# Patient Record
Sex: FEMALE | Race: BLACK OR AFRICAN AMERICAN | NOT HISPANIC OR LATINO | Employment: OTHER | URBAN - METROPOLITAN AREA
[De-identification: names, ages, dates, MRNs, and addresses within clinical notes are randomized per-mention and may not be internally consistent; named-entity substitution may affect disease eponyms.]

---

## 2020-09-27 ENCOUNTER — APPOINTMENT (OUTPATIENT)
Dept: RADIOLOGY | Facility: MEDICAL CENTER | Age: 45
End: 2020-09-27
Attending: EMERGENCY MEDICINE
Payer: MEDICAID

## 2020-09-27 ENCOUNTER — HOSPITAL ENCOUNTER (EMERGENCY)
Facility: MEDICAL CENTER | Age: 45
End: 2020-09-27
Attending: EMERGENCY MEDICINE
Payer: MEDICAID

## 2020-09-27 VITALS
TEMPERATURE: 96.8 F | OXYGEN SATURATION: 92 % | RESPIRATION RATE: 22 BRPM | SYSTOLIC BLOOD PRESSURE: 142 MMHG | WEIGHT: 216 LBS | HEIGHT: 72 IN | HEART RATE: 110 BPM | BODY MASS INDEX: 29.26 KG/M2 | DIASTOLIC BLOOD PRESSURE: 86 MMHG

## 2020-09-27 DIAGNOSIS — J45.41 MODERATE PERSISTENT ASTHMA WITH ACUTE EXACERBATION: ICD-10-CM

## 2020-09-27 DIAGNOSIS — F41.8 SITUATIONAL ANXIETY: ICD-10-CM

## 2020-09-27 LAB
ALBUMIN SERPL BCP-MCNC: 4.1 G/DL (ref 3.2–4.9)
ALBUMIN/GLOB SERPL: 1.4 G/DL
ALP SERPL-CCNC: 60 U/L (ref 30–99)
ALT SERPL-CCNC: 18 U/L (ref 2–50)
ANION GAP SERPL CALC-SCNC: 11 MMOL/L (ref 7–16)
AST SERPL-CCNC: 14 U/L (ref 12–45)
BASOPHILS # BLD AUTO: 0.2 % (ref 0–1.8)
BASOPHILS # BLD: 0.02 K/UL (ref 0–0.12)
BILIRUB SERPL-MCNC: 0.5 MG/DL (ref 0.1–1.5)
BUN SERPL-MCNC: 9 MG/DL (ref 8–22)
CALCIUM SERPL-MCNC: 9.3 MG/DL (ref 8.4–10.2)
CHLORIDE SERPL-SCNC: 106 MMOL/L (ref 96–112)
CO2 SERPL-SCNC: 23 MMOL/L (ref 20–33)
CREAT SERPL-MCNC: 0.77 MG/DL (ref 0.5–1.4)
EKG IMPRESSION: NORMAL
EOSINOPHIL # BLD AUTO: 0.03 K/UL (ref 0–0.51)
EOSINOPHIL NFR BLD: 0.3 % (ref 0–6.9)
ERYTHROCYTE [DISTWIDTH] IN BLOOD BY AUTOMATED COUNT: 46.8 FL (ref 35.9–50)
GLOBULIN SER CALC-MCNC: 3 G/DL (ref 1.9–3.5)
GLUCOSE SERPL-MCNC: 134 MG/DL (ref 65–99)
HCT VFR BLD AUTO: 48.8 % (ref 37–47)
HGB BLD-MCNC: 15.7 G/DL (ref 12–16)
IMM GRANULOCYTES # BLD AUTO: 0.04 K/UL (ref 0–0.11)
IMM GRANULOCYTES NFR BLD AUTO: 0.4 % (ref 0–0.9)
LACTATE BLD-SCNC: 2.3 MMOL/L (ref 0.5–2)
LYMPHOCYTES # BLD AUTO: 1.58 K/UL (ref 1–4.8)
LYMPHOCYTES NFR BLD: 15.6 % (ref 22–41)
MCH RBC QN AUTO: 29.2 PG (ref 27–33)
MCHC RBC AUTO-ENTMCNC: 32.2 G/DL (ref 33.6–35)
MCV RBC AUTO: 90.9 FL (ref 81.4–97.8)
MONOCYTES # BLD AUTO: 0.59 K/UL (ref 0–0.85)
MONOCYTES NFR BLD AUTO: 5.8 % (ref 0–13.4)
NEUTROPHILS # BLD AUTO: 7.9 K/UL (ref 2–7.15)
NEUTROPHILS NFR BLD: 77.7 % (ref 44–72)
NRBC # BLD AUTO: 0 K/UL
NRBC BLD-RTO: 0 /100 WBC
NT-PROBNP SERPL IA-MCNC: 166 PG/ML (ref 0–125)
PLATELET # BLD AUTO: 240 K/UL (ref 164–446)
PMV BLD AUTO: 10.3 FL (ref 9–12.9)
POTASSIUM SERPL-SCNC: 4 MMOL/L (ref 3.6–5.5)
PROT SERPL-MCNC: 7.1 G/DL (ref 6–8.2)
RBC # BLD AUTO: 5.37 M/UL (ref 4.2–5.4)
SODIUM SERPL-SCNC: 140 MMOL/L (ref 135–145)
TROPONIN T SERPL-MCNC: 6 NG/L (ref 6–19)
WBC # BLD AUTO: 10.2 K/UL (ref 4.8–10.8)

## 2020-09-27 PROCEDURE — 84484 ASSAY OF TROPONIN QUANT: CPT

## 2020-09-27 PROCEDURE — 700111 HCHG RX REV CODE 636 W/ 250 OVERRIDE (IP): Performed by: EMERGENCY MEDICINE

## 2020-09-27 PROCEDURE — 36415 COLL VENOUS BLD VENIPUNCTURE: CPT

## 2020-09-27 PROCEDURE — 93005 ELECTROCARDIOGRAM TRACING: CPT | Performed by: EMERGENCY MEDICINE

## 2020-09-27 PROCEDURE — 96365 THER/PROPH/DIAG IV INF INIT: CPT | Mod: XU

## 2020-09-27 PROCEDURE — 99284 EMERGENCY DEPT VISIT MOD MDM: CPT

## 2020-09-27 PROCEDURE — 71045 X-RAY EXAM CHEST 1 VIEW: CPT

## 2020-09-27 PROCEDURE — 700101 HCHG RX REV CODE 250: Performed by: EMERGENCY MEDICINE

## 2020-09-27 PROCEDURE — 94640 AIRWAY INHALATION TREATMENT: CPT

## 2020-09-27 PROCEDURE — 94760 N-INVAS EAR/PLS OXIMETRY 1: CPT

## 2020-09-27 PROCEDURE — 83605 ASSAY OF LACTIC ACID: CPT

## 2020-09-27 PROCEDURE — 85025 COMPLETE CBC W/AUTO DIFF WBC: CPT

## 2020-09-27 PROCEDURE — 83880 ASSAY OF NATRIURETIC PEPTIDE: CPT

## 2020-09-27 PROCEDURE — A9270 NON-COVERED ITEM OR SERVICE: HCPCS | Performed by: EMERGENCY MEDICINE

## 2020-09-27 PROCEDURE — 700117 HCHG RX CONTRAST REV CODE 255: Performed by: EMERGENCY MEDICINE

## 2020-09-27 PROCEDURE — 80053 COMPREHEN METABOLIC PANEL: CPT

## 2020-09-27 PROCEDURE — 700102 HCHG RX REV CODE 250 W/ 637 OVERRIDE(OP): Performed by: EMERGENCY MEDICINE

## 2020-09-27 PROCEDURE — 71275 CT ANGIOGRAPHY CHEST: CPT

## 2020-09-27 PROCEDURE — 96375 TX/PRO/DX INJ NEW DRUG ADDON: CPT | Mod: XU

## 2020-09-27 RX ORDER — DIPHENHYDRAMINE HCL 25 MG
25 TABLET ORAL DAILY
Status: SHIPPED | COMMUNITY
End: 2021-10-27

## 2020-09-27 RX ORDER — PROMETHAZINE HYDROCHLORIDE AND CODEINE PHOSPHATE 6.25; 1 MG/5ML; MG/5ML
5-10 SYRUP ORAL 4 TIMES DAILY PRN
Status: SHIPPED | COMMUNITY
End: 2021-03-08

## 2020-09-27 RX ORDER — ACETAMINOPHEN 500 MG
500-1000 TABLET ORAL EVERY 6 HOURS PRN
Status: SHIPPED | COMMUNITY
End: 2021-10-27

## 2020-09-27 RX ORDER — METHYLPREDNISOLONE SODIUM SUCCINATE 125 MG/2ML
125 INJECTION, POWDER, LYOPHILIZED, FOR SOLUTION INTRAMUSCULAR; INTRAVENOUS ONCE
Status: COMPLETED | OUTPATIENT
Start: 2020-09-27 | End: 2020-09-27

## 2020-09-27 RX ORDER — PREDNISONE 50 MG/1
50 TABLET ORAL DAILY
Qty: 5 TAB | Refills: 0 | Status: SHIPPED | OUTPATIENT
Start: 2020-09-27 | End: 2020-10-02

## 2020-09-27 RX ORDER — LORAZEPAM 1 MG/1
0.5 TABLET ORAL ONCE
Status: DISCONTINUED | OUTPATIENT
Start: 2020-09-27 | End: 2020-09-27

## 2020-09-27 RX ORDER — MAGNESIUM SULFATE HEPTAHYDRATE 500 MG/ML
1 INJECTION, SOLUTION INTRAMUSCULAR; INTRAVENOUS ONCE
Status: DISCONTINUED | OUTPATIENT
Start: 2020-09-27 | End: 2020-09-27

## 2020-09-27 RX ORDER — HYDROXYZINE HYDROCHLORIDE 25 MG/1
25 TABLET, FILM COATED ORAL 3 TIMES DAILY PRN
Qty: 30 TAB | Refills: 0 | Status: SHIPPED | OUTPATIENT
Start: 2020-09-27 | End: 2021-03-08

## 2020-09-27 RX ORDER — LORAZEPAM 1 MG/1
1 TABLET ORAL ONCE
Status: COMPLETED | OUTPATIENT
Start: 2020-09-27 | End: 2020-09-27

## 2020-09-27 RX ORDER — AZITHROMYCIN 250 MG/1
250-500 TABLET, FILM COATED ORAL DAILY
Status: SHIPPED | COMMUNITY
Start: 2020-09-24 | End: 2021-03-08

## 2020-09-27 RX ORDER — IPRATROPIUM BROMIDE AND ALBUTEROL SULFATE 2.5; .5 MG/3ML; MG/3ML
3 SOLUTION RESPIRATORY (INHALATION) ONCE
Status: COMPLETED | OUTPATIENT
Start: 2020-09-27 | End: 2020-09-27

## 2020-09-27 RX ORDER — ALBUTEROL SULFATE 90 UG/1
2 AEROSOL, METERED RESPIRATORY (INHALATION) EVERY 6 HOURS PRN
Status: SHIPPED | COMMUNITY
End: 2021-03-12 | Stop reason: SDUPTHER

## 2020-09-27 RX ORDER — PREDNISONE 20 MG/1
30-60 TABLET ORAL PRN
Status: SHIPPED | COMMUNITY
End: 2020-09-27

## 2020-09-27 RX ORDER — MAGNESIUM SULFATE HEPTAHYDRATE 40 MG/ML
2 INJECTION, SOLUTION INTRAVENOUS ONCE
Status: COMPLETED | OUTPATIENT
Start: 2020-09-27 | End: 2020-09-27

## 2020-09-27 RX ORDER — ALBUTEROL SULFATE 2.5 MG/3ML
SOLUTION RESPIRATORY (INHALATION)
Status: SHIPPED | COMMUNITY
Start: 2020-09-21 | End: 2020-09-27

## 2020-09-27 RX ORDER — HYDROCODONE BITARTRATE AND ACETAMINOPHEN 5; 325 MG/1; MG/1
1 TABLET ORAL 3 TIMES DAILY
Status: SHIPPED | COMMUNITY
End: 2021-10-27

## 2020-09-27 RX ORDER — IPRATROPIUM BROMIDE AND ALBUTEROL SULFATE 2.5; .5 MG/3ML; MG/3ML
3 SOLUTION RESPIRATORY (INHALATION)
Status: DISCONTINUED | OUTPATIENT
Start: 2020-09-27 | End: 2020-09-27

## 2020-09-27 RX ORDER — TIZANIDINE 2 MG/1
2 TABLET ORAL 3 TIMES DAILY
Status: SHIPPED | COMMUNITY
Start: 2020-09-21 | End: 2021-10-28

## 2020-09-27 RX ORDER — HYDROCODONE BITARTRATE AND ACETAMINOPHEN 10; 325 MG/1; MG/1
1 TABLET ORAL EVERY 8 HOURS PRN
Status: SHIPPED | COMMUNITY
End: 2020-09-27

## 2020-09-27 RX ORDER — IPRATROPIUM BROMIDE AND ALBUTEROL SULFATE 2.5; .5 MG/3ML; MG/3ML
3 SOLUTION RESPIRATORY (INHALATION)
Status: DISCONTINUED | OUTPATIENT
Start: 2020-09-27 | End: 2020-09-27 | Stop reason: HOSPADM

## 2020-09-27 RX ORDER — IPRATROPIUM BROMIDE AND ALBUTEROL SULFATE 2.5; .5 MG/3ML; MG/3ML
3 SOLUTION RESPIRATORY (INHALATION) 4 TIMES DAILY
Status: SHIPPED | COMMUNITY
End: 2020-09-27 | Stop reason: SDUPTHER

## 2020-09-27 RX ORDER — IPRATROPIUM BROMIDE AND ALBUTEROL SULFATE 2.5; .5 MG/3ML; MG/3ML
3 SOLUTION RESPIRATORY (INHALATION) 4 TIMES DAILY
Qty: 30 EACH | Refills: 0 | Status: SHIPPED | OUTPATIENT
Start: 2020-09-27 | End: 2021-03-12 | Stop reason: SDUPTHER

## 2020-09-27 RX ADMIN — MAGNESIUM SULFATE 2 G: 2 INJECTION INTRAVENOUS at 07:40

## 2020-09-27 RX ADMIN — LORAZEPAM 1 MG: 1 TABLET ORAL at 11:07

## 2020-09-27 RX ADMIN — ALBUTEROL SULFATE 10 MG/HR: 5 SOLUTION RESPIRATORY (INHALATION) at 07:28

## 2020-09-27 RX ADMIN — IOHEXOL 63 ML: 350 INJECTION, SOLUTION INTRAVENOUS at 11:58

## 2020-09-27 RX ADMIN — IPRATROPIUM BROMIDE 0.5 MG: 0.5 SOLUTION RESPIRATORY (INHALATION) at 07:33

## 2020-09-27 RX ADMIN — METHYLPREDNISOLONE SODIUM SUCCINATE 125 MG: 125 INJECTION, POWDER, FOR SOLUTION INTRAMUSCULAR; INTRAVENOUS at 07:39

## 2020-09-27 RX ADMIN — LORAZEPAM 1 MG: 1 TABLET ORAL at 06:58

## 2020-09-27 RX ADMIN — IPRATROPIUM BROMIDE AND ALBUTEROL SULFATE 3 ML: .5; 3 SOLUTION RESPIRATORY (INHALATION) at 09:52

## 2020-09-27 SDOH — HEALTH STABILITY: MENTAL HEALTH: HOW MANY STANDARD DRINKS CONTAINING ALCOHOL DO YOU HAVE ON A TYPICAL DAY?: 1 OR 2

## 2020-09-27 SDOH — HEALTH STABILITY: MENTAL HEALTH: HOW OFTEN DO YOU HAVE A DRINK CONTAINING ALCOHOL?: MONTHLY OR LESS

## 2020-09-27 SDOH — HEALTH STABILITY: MENTAL HEALTH: HOW OFTEN DO YOU HAVE 6 OR MORE DRINKS ON ONE OCCASION?: NEVER

## 2020-09-27 NOTE — FLOWSHEET NOTE
09/27/20 0953   Events/Summary/Plan   Events/Summary/Plan Found pt on 2L NC, tx given   Skin Inspection Respiratory Device Intact   Vital Signs   Pulse (!) 104   Respiration 19   Pulse Oximetry 95 %   Respiratory Assessment   Level of Consciousness Alert   Breath Sounds   RUL Breath Sounds Expiratory Wheezes   RML Breath Sounds Expiratory Wheezes   RLL Breath Sounds Expiratory Wheezes   STAN Breath Sounds Expiratory Wheezes   LLL Breath Sounds Expiratory Wheezes   Oxygen   O2 (LPM) 2   O2 Delivery Device Silicone Nasal Cannula

## 2020-09-27 NOTE — ED NOTES
Report to ANA LUISA Ohara. Care turned over at this time. ERP at bedside to reevaluate. Pt ambulated to restroom and back to [facility].

## 2020-09-27 NOTE — ED NOTES
"Pt reports feeling anxious but \" less tight\" following second tx. Pt's RA sat now 95%. Hot meal, extra blankets and pillow provided.   "

## 2020-09-27 NOTE — ED PROVIDER NOTES
ED Provider Note    ED Provider Note    Primary care provider: No primary care provider on file.  Means of arrival: Walk-in  History obtained from: Patient    CHIEF COMPLAINT  Chief Complaint   Patient presents with   • Shortness of Breath     Seen at 6:24 AM.   HPI  Melly Shin is a 45 y.o. female with history of severe persistent asthma presents with shortness of breath and cough, consistent with her prior asthma exacerbations.  She has been living in the [facility] for the past month, prior to this she received all of her medical treatment in [place].    She is going through a divorce which has had a great deal of stress on her.  Because of this she has been smoking more than usual.  She felt wheezing and shortness of breath increasing over the past 48 hours, she took 60 mg of prednisone yesterday which she has in case of flareups.  She took 30 mg today.    She states that when she usually gets like this, she requires high doses of Solu-Medrol and magnesium to the IV.  She does not feel that she is severe enough to require BiPAP currently.    She denies any fevers, chills, headache.  She notes some chest tightness.  She obviously notes shortness of breath.  She denies nausea, vomiting, diarrhea or abdominal pain.  She notes some chronic back pain for which she takes Norco.  She also notes some increasing anxiety.    At home she uses albuterol and ipratropium nebulizers.  She reports either having no effect or allergic reactions to Spiriva, Symbicort and Advair.  She is able to tolerate Ventolin HFA.  She denies any sick contacts or Covid-19 exposure.  She did have a Covid-19 swab 2 weeks ago that was negative.    REVIEW OF SYSTEMS  See HPI,   Remainder of ROS negative.     PAST MEDICAL HISTORY   has a past medical history of Asthma, Chronic obstructive pulmonary disease (HCC), and Psychiatric disorder.Severe persistent asthma    SURGICAL HISTORY  patient denies any surgical history    SOCIAL HISTORY  Social  History     Tobacco Use   • Smoking status: Current Some Day Smoker     Packs/day: 1.00     Types: Cigarettes   • Smokeless tobacco: Never Used   Substance Use Topics   • Alcohol use: Yes     Frequency: Monthly or less     Drinks per session: 1 or 2     Binge frequency: Never   • Drug use: Yes     Comment: THC      Social History     Substance and Sexual Activity   Drug Use Yes    Comment: THC       FAMILY HISTORY  History reviewed. No pertinent family history.    CURRENT MEDICATIONS  Reviewed.  See Encounter Summary.     ALLERGIES  Allergies   Allergen Reactions   • Aspirin Rash     Pt reports that she received a rash on face, pt reports it ok if she takes NORCO   • Penicillins Rash and Swelling     Pt reports that she gets swelling in throat and face, rash all over face and arms.        PHYSICAL EXAM  VITAL SIGNS: /86   Pulse (!) 110   Temp 36 °C (96.8 °F) (Temporal)   Resp (!) 22   Ht 1.829 m (6')   Wt 98 kg (216 lb)   SpO2 92%   Breastfeeding No   BMI 29.29 kg/m²   Constitutional: Awake, alert anxious, tachypneic with moderate respiratory distress.  HENT: Normocephalic, Bilateral external ears normal. Nose normal.   Eyes: Conjunctiva normal, non-icteric, EOMI.    Thorax & Lungs: Tachypneic, speaking in 3-4 word sentences, loud audible wheezes diffusely   cardiovascular: Tachycardic, No murmurs, rubs or gallops. Bilateral pulses symmetrical.   Abdomen:  Soft, nontender, nondistended, normal active bowel sounds.   :    Skin: Visualized skin is  Dry, No erythema, No rash.   Musculoskeletal:   No cyanosis, clubbing or edema. No leg asymmetry.   Neurologic: Alert, Grossly non-focal.   Psychiatric: Appropriately anxious.    Lymphatic:  No cervical LAD    EKG   12 lead Interpreted by me  Rhythm: Sinus tach  Rate: 130  Axis: normal  Ectopy: none  Conduction: normal  ST Segments: no acute change  T Waves: no acute change  Clinical Impression: Sinus tach, otherwise unremarkable EKG.    RADIOLOGY  CT-CTA  CHEST PULMONARY ARTERY W/ RECONS   Final Result      No central or segmental pulmonary embolus is identified.      Emphysematous changes.      Mild patchy groundglass opacities in the right lower lobe are likely infectious. The largest groundglass opacity measures 1.7 cm in the right lower lobe.      Mild secretions within bronchi on the right.      3 mm right lower lobe pulmonary nodule.               Less than 6 mm: CT at 3-6 months. If stable, consider CT at 2 and 4 years.      6 mm or greater: CT at 3-6 months. Subsequent management based on the most suspicious nodule(s).      Comments: Multiple less than 6 mm pure ground-glass nodules are usually benign, but consider follow-up in selected patients at high risk at 2 and 4 years.      Low Risk - Minimal or absent history of smoking and of other known risk factors.      High Risk - History of smoking or of other known risk factors.      Note: These recommendations do not apply to lung cancer screening, patients with immunosuppression, or patients with known primary cancer.      Fleischner Society 2017 Guidelines for Management of Incidentally Detected Pulmonary Nodules in Adults      DX-CHEST-PORTABLE (1 VIEW)   Final Result         1. No acute cardiopulmonary abnormalities are identified.            COURSE & MEDICAL DECISION MAKING  Pertinent Labs & Imaging studies reviewed. (See chart for details)    Differential diagnoses include but are not limited to: Most likely asthma/COPD exacerbation.  Much less likely sepsis, heart failure, pneumonia    6:24 AM - Medical record reviewed, no prior visits in our system.    6:45 AM-after discussion with the patient, she apparently does well with duo nebs, magnesium and Solu-Medrol.  Therefore we will initiate this treatment.      8:07 AM-2 g of magnesium is finished, the patient states that she does not feel right but is improved.  Tachypnea has improved.  Oxygenation 98% on room air.  Plan to observe the patient for  another 30 minutes after magnesium, possibly repeat nebulizer treatments.  Patient still audibly wheezing but improved.    10:49 AM -patient states she still feels tight, she feels anxious.  Resting heart rate 115, oxygenation 95% on room air, the patient is attempting to eat some mac & cheese.  Plan to administer 1 more dose of Ativan, no additional nebulizers indicated at this time, no increased work of breathing or tachypnea.    11:28 AM-patient states she feels tight still, she still feels short of breath and does not feel that this is her asthma, plan to CTA chest.  If negative anticipate discharge.    12:44 PM-we discussed the negative CT results.  Patient's heart rate is 105 at rest.  Tachypnea has resolved, initially the patient was sleeping.  When I awaken the patient she becomes more tachycardic, she states that she does not feel well still and does not feel like she can go home.  I explained that unfortunately I see no indication for admission as she has no leukocytosis, no findings on CT, no hypoxia, no tachypnea no increased work of breathing.  I do recommend she stop smoking and adhere to her COPD treatment.  If she notes no improvement then it would be reasonable to return to the emergency department for repeat evaluation, possible admit at that time.  Due to the Covid-19 pandemic I feel that it could be deleterious to admit the patient and put her at risk for a nosocomial infection.    Decision Making:  This is a 45 y.o. year old female who presents with shortness of breath consistent with asthma exacerbations.  The patient was aggressively treated with Solu-Medrol, magnesium, nebulizer treatments.  She had significant improvement in her tachypnea, tachycardia.  She did not have any episodes of hypoxia throughout the ED course.  She was quite anxious, she received Ativan 1 mg x 2 doses.  She gradually improved clinically throughout her ER stay but still felt that she was not back to baseline.  For  this reason I added a CTA of the chest to rule out a more sinister process, this is negative for any pulmonary embolus, the patient has some faint groundglass opacities, not concerning for Covid-19.    Currently I have no reason to admit the patient, high-sensitivity troponin is negative, proBNP not significantly elevated, no hypoxia with oxygenation 95% when the patient is sleeping.  The only abnormal finding on discharge was a resting tachycardia, which could be either due to anxiety or recent albuterol.    This also could be the patient's baseline tachycardia, I have do not have any prior records for her.  She was directed return for any worsening shortness of breath, pain or any other concern.  She left prior to receiving the discharge paperwork.  She was instructed not to drive after taking the Ativan.    Discharge Medications:  Discharge Medication List as of 9/27/2020 12:31 PM      START taking these medications    Details   hydrOXYzine HCl (ATARAX) 25 MG Tab Take 1 Tab by mouth 3 times a day as needed for Itching., Disp-30 Tab,R-0, Normal             The patient was discharged home (see d/c instructions) was told to return immediately for any signs or symptoms listed, or any worsening at all.  The patient verbally agreed to the discharge precautions and follow-up plan which is documented in EPIC.    The patient's blood pressure is elevated today. >120/80. I have referred them to primary care for follow up.       FINAL IMPRESSION  1. Moderate persistent asthma with acute exacerbation    2. Situational anxiety

## 2020-09-27 NOTE — ED NOTES
Pt c/o SOB for past 4-days; Pt stated that she has Hx of COPD & Asthma;     Pt has been using her inhaler with no relief;

## 2020-09-27 NOTE — ED NOTES
Returned to room-pt amb out of room to exit-provided d/c instructions and aware of need to  meds at [facility] and return for worsening s/s.-instructions given as pt walking out of er

## 2020-09-27 NOTE — FLOWSHEET NOTE
09/27/20 0733   Events/Summary/Plan   Events/Summary/Plan Continuous SVN given in ER   Vital Signs   Pulse 100   Respiration (!) 24   Pulse Oximetry 99 %   $ Pulse Oximetry (Spot Check) Yes   Respiratory Assessment   Level of Consciousness Alert   Breath Sounds   RUL Breath Sounds Expiratory Wheezes   RML Breath Sounds Expiratory Wheezes   RLL Breath Sounds Expiratory Wheezes   STAN Breath Sounds Expiratory Wheezes   LLL Breath Sounds Expiratory Wheezes   Oxygen   O2 (LPM) 0   O2 Delivery Device Room air w/o2 available

## 2020-12-07 ENCOUNTER — OFFICE VISIT (OUTPATIENT)
Dept: SLEEP MEDICINE | Facility: MEDICAL CENTER | Age: 45
End: 2020-12-07
Payer: MEDICAID

## 2020-12-07 VITALS
BODY MASS INDEX: 28.75 KG/M2 | OXYGEN SATURATION: 96 % | SYSTOLIC BLOOD PRESSURE: 126 MMHG | WEIGHT: 212 LBS | RESPIRATION RATE: 16 BRPM | HEART RATE: 98 BPM | TEMPERATURE: 98.2 F | DIASTOLIC BLOOD PRESSURE: 70 MMHG

## 2020-12-07 DIAGNOSIS — K21.9 GASTROESOPHAGEAL REFLUX DISEASE, UNSPECIFIED WHETHER ESOPHAGITIS PRESENT: ICD-10-CM

## 2020-12-07 DIAGNOSIS — Z72.0 TOBACCO USE: ICD-10-CM

## 2020-12-07 DIAGNOSIS — J45.50 SEVERE PERSISTENT ASTHMA WITHOUT COMPLICATION: ICD-10-CM

## 2020-12-07 PROCEDURE — 99204 OFFICE O/P NEW MOD 45 MIN: CPT | Performed by: INTERNAL MEDICINE

## 2020-12-07 RX ORDER — PREDNISONE 10 MG/1
20 TABLET ORAL
COMMUNITY
Start: 2020-09-21 | End: 2021-03-08

## 2020-12-07 RX ORDER — FLUTICASONE PROPIONATE 220 UG/1
AEROSOL, METERED RESPIRATORY (INHALATION)
COMMUNITY
Start: 2020-11-06 | End: 2020-12-07

## 2020-12-07 RX ORDER — TIOTROPIUM BROMIDE AND OLODATEROL 3.124; 2.736 UG/1; UG/1
2 SPRAY, METERED RESPIRATORY (INHALATION) DAILY
Qty: 3 EACH | Refills: 4 | Status: SHIPPED | OUTPATIENT
Start: 2020-12-07 | End: 2022-06-02

## 2020-12-07 RX ORDER — FLUTICASONE PROPIONATE 50 MCG
SPRAY, SUSPENSION (ML) NASAL
COMMUNITY
Start: 2020-10-02 | End: 2021-12-06 | Stop reason: SDUPTHER

## 2020-12-07 RX ORDER — NICOTINE 21 MG/24HR
1 PATCH, TRANSDERMAL 24 HOURS TRANSDERMAL EVERY 24 HOURS
Qty: 30 PATCH | Refills: 3 | Status: SHIPPED | OUTPATIENT
Start: 2020-12-07 | End: 2021-11-12

## 2020-12-07 RX ORDER — HYDROCODONE BITARTRATE AND ACETAMINOPHEN 10; 325 MG/1; MG/1
TABLET ORAL
COMMUNITY
Start: 2020-12-02 | End: 2021-10-27

## 2020-12-07 RX ORDER — TIOTROPIUM BROMIDE AND OLODATEROL 3.124; 2.736 UG/1; UG/1
SPRAY, METERED RESPIRATORY (INHALATION)
COMMUNITY
Start: 2020-11-06 | End: 2020-12-07 | Stop reason: SDUPTHER

## 2020-12-07 ASSESSMENT — ENCOUNTER SYMPTOMS
NECK PAIN: 0
ABDOMINAL PAIN: 0
SHORTNESS OF BREATH: 0
COUGH: 0
HEMOPTYSIS: 0
CONSTIPATION: 0
MYALGIAS: 0
CLAUDICATION: 0
VOMITING: 0
PALPITATIONS: 0
FALLS: 0
HEARTBURN: 0
SPUTUM PRODUCTION: 0
DOUBLE VISION: 0
FOCAL WEAKNESS: 0
ORTHOPNEA: 0
PHOTOPHOBIA: 0
NAUSEA: 0
DEPRESSION: 0
TREMORS: 0
STRIDOR: 0
WHEEZING: 0
SPEECH CHANGE: 0
SORE THROAT: 0
FEVER: 0
BACK PAIN: 0
WEIGHT LOSS: 0
EYE DISCHARGE: 0
WEAKNESS: 0
DIARRHEA: 0
PND: 0
DIAPHORESIS: 0
CHILLS: 0
DIZZINESS: 0
EYE REDNESS: 0
SINUS PAIN: 0
BLURRED VISION: 0
EYE PAIN: 0
HEADACHES: 0

## 2020-12-07 ASSESSMENT — FIBROSIS 4 INDEX: FIB4 SCORE: 0.65

## 2020-12-07 NOTE — PROGRESS NOTES
Chief Complaint   Patient presents with   • Establish Care     referral 10/26/2020 SANJU Flores MD DX moderate persistant asthma with acute exacerbation    • Results     HOS DX SOB 9/27/2020, CTA 9/27/2020, CXR 9/27/2020       HPI: This patient is a 45 y.o. female presenting for evaluation of asthma.  The patient's past medical history is significant for chronic asthma diagnosed in infancy with possible element of COPD, degenerative joint disease followed in pain management on chronic opiates, history of iron deficiency anemia.  She is a current tobacco user and has smoked for the past 30 years up to 1 pack/day.  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  She recently relocated in August from [facility] to [place].  She says in part this was to help with asthma control which was fairly severe from 2012 2016, per patient she was hospitalized almost once a month and on prednisone almost continuously.  She was seen by allergy in the past and started on allergy shot therapy in 2011 and 2012 which she believes contributed to the decline in her respiratory status therefore has not pursued this further.  She reports having multiple environmental allergies.  She also reports adverse reaction to Advair and was apparently not on inhaled steroid at the time of her recent emergency department visit on September 27 after which she was started on Flovent and Stiolto.  The patient has not started the Flovent but has been taking the Stiolto.  She does report decreased nebulizer use from every 4-6 hours while living in [place] to twice a day and only occasionally a third time of day.  She does use supplemental oxygen at 2 L/min at night and occasionally as needed during the day.  He will often self treat with prednisone at home using up to 60 mg at a time.  Her last prednisone was last Wednesday.  In the ED on 927 the patient had a CT chest  with bilateral groundglass infiltrates, she was treated with magnesium, Ativan and discharged with hydroxyzine.  No evidence for eosinophilia at the time of her visit although she had recently taken prednisone.  She apparently had IgE and eosinophil levels tested in the past in [place] and was told they were within normal limits.  No family history of atopic disease or sarcoidosis although she does report a sister with unknown autoimmune disease.  She does report gastroesophageal reflux disease that is fairly frequent and she reportedly had adverse reactions to both proton pump inhibitors and antihistamine acid suppressants in the past.    Past Medical History:   Diagnosis Date   • Asthma    • Chronic obstructive pulmonary disease (HCC)    • Psychiatric disorder     Anxiety, Depression       Social History     Socioeconomic History   • Marital status: Single     Spouse name: Not on file   • Number of children: Not on file   • Years of education: Not on file   • Highest education level: Not on file   Occupational History   • Not on file   Social Needs   • Financial resource strain: Not on file   • Food insecurity     Worry: Not on file     Inability: Not on file   • Transportation needs     Medical: Not on file     Non-medical: Not on file   Tobacco Use   • Smoking status: Current Some Day Smoker     Packs/day: 1.00     Years: 20.00     Pack years: 20.00     Types: Cigarettes   • Smokeless tobacco: Never Used   • Tobacco comment: on and off    Substance and Sexual Activity   • Alcohol use: Yes     Frequency: Monthly or less     Drinks per session: 1 or 2     Binge frequency: Never   • Drug use: Not Currently     Comment: THC   • Sexual activity: Not on file   Lifestyle   • Physical activity     Days per week: Not on file     Minutes per session: Not on file   • Stress: Not on file   Relationships   • Social connections     Talks on phone: Not on file     Gets together: Not on file     Attends Voodoo service: Not  on file     Active member of club or organization: Not on file     Attends meetings of clubs or organizations: Not on file     Relationship status: Not on file   • Intimate partner violence     Fear of current or ex partner: Not on file     Emotionally abused: Not on file     Physically abused: Not on file     Forced sexual activity: Not on file   Other Topics Concern   • Not on file   Social History Narrative   • Not on file       Family History   Problem Relation Age of Onset   • Diabetes Mother    • Cancer Father        Current Outpatient Medications on File Prior to Visit   Medication Sig Dispense Refill   • predniSONE (DELTASONE) 10 MG Tab Take 20 mg by mouth.     • HYDROcodone/acetaminophen (NORCO)  MG Tab      • fluticasone (FLONASE) 50 MCG/ACT nasal spray      • tizanidine (ZANAFLEX) 2 MG tablet Take 2 mg by mouth 3 times a day.     • promethazine-codeine (PHENERGAN-CODEINE) 6.25-10 MG/5ML Syrup Take 5-10 mL by mouth 4 times a day as needed for Cough.     • azithromycin (ZITHROMAX) 250 MG Tab Take 250-500 mg by mouth every day. Pt started on 9/24/2020 for 5 day course     • acetaminophen (TYLENOL) 500 MG Tab Take 500-1,000 mg by mouth every 6 hours as needed for Moderate Pain.     • diphenhydrAMINE (BENADRYL) 25 MG Tab Take 25 mg by mouth every day. For itching     • albuterol 108 (90 Base) MCG/ACT Aero Soln inhalation aerosol Inhale 2 Puffs by mouth every 6 hours as needed for Shortness of Breath.     • ipratropium-albuterol (DUONEB) 0.5-2.5 (3) MG/3ML nebulizer solution 3 mL by Nebulization route 4 times a day. 30 Each 0   • HYDROcodone-acetaminophen (NORCO) 5-325 MG Tab per tablet Take 1 Tab by mouth 3 times a day.     • hydrOXYzine HCl (ATARAX) 25 MG Tab Take 1 Tab by mouth 3 times a day as needed for Itching. (Patient not taking: Reported on 12/7/2020) 30 Tab 0     No current facility-administered medications on file prior to visit.        Allergies: Aspirin and Penicillins    ROS:   Review of  Systems   Constitutional: Negative for chills, diaphoresis, fever, malaise/fatigue and weight loss.   HENT: Negative for congestion, ear discharge, ear pain, hearing loss, nosebleeds, sinus pain, sore throat and tinnitus.    Eyes: Negative for blurred vision, double vision, photophobia, pain, discharge and redness.   Respiratory: Negative for cough, hemoptysis, sputum production, shortness of breath, wheezing and stridor.    Cardiovascular: Negative for chest pain, palpitations, orthopnea, claudication, leg swelling and PND.   Gastrointestinal: Negative for abdominal pain, constipation, diarrhea, heartburn, nausea and vomiting.   Genitourinary: Negative for dysuria and urgency.   Musculoskeletal: Negative for back pain, falls, joint pain, myalgias and neck pain.   Skin: Negative for itching and rash.   Neurological: Negative for dizziness, tremors, speech change, focal weakness, weakness and headaches.   Endo/Heme/Allergies: Negative for environmental allergies.   Psychiatric/Behavioral: Negative for depression.       /70 (BP Location: Right arm, Patient Position: Sitting, BP Cuff Size: Large adult)   Pulse 98   Temp 36.8 °C (98.2 °F) (Temporal)   Resp 16   Wt 96.2 kg (212 lb)   SpO2 96%     Physical Exam:  Physical Exam  Vitals signs reviewed.   Constitutional:       General: She is not in acute distress.     Appearance: Normal appearance. She is well-developed. She is obese.   HENT:      Head: Normocephalic and atraumatic.      Right Ear: External ear normal.      Left Ear: External ear normal.      Nose: Nose normal. No congestion.      Mouth/Throat:      Mouth: Mucous membranes are moist.      Pharynx: Oropharynx is clear. No oropharyngeal exudate.   Eyes:      General: No scleral icterus.     Extraocular Movements: Extraocular movements intact.      Conjunctiva/sclera: Conjunctivae normal.      Pupils: Pupils are equal, round, and reactive to light.   Neck:      Musculoskeletal: Normal range of  motion and neck supple.      Vascular: No JVD.      Trachea: No tracheal deviation.   Cardiovascular:      Rate and Rhythm: Normal rate and regular rhythm.      Heart sounds: Normal heart sounds. No murmur. No friction rub. No gallop.    Pulmonary:      Effort: Pulmonary effort is normal. No accessory muscle usage or respiratory distress.      Breath sounds: Wheezing present. No rales.      Comments: Diffuse bilateral expiratory wheezes  Abdominal:      General: There is no distension.      Palpations: Abdomen is soft.      Tenderness: There is no abdominal tenderness.   Musculoskeletal: Normal range of motion.         General: No tenderness or deformity.      Right lower leg: No edema.      Left lower leg: No edema.   Lymphadenopathy:      Cervical: No cervical adenopathy.   Skin:     General: Skin is warm and dry.      Findings: No rash.      Nails: There is no clubbing.     Neurological:      Mental Status: She is alert and oriented to person, place, and time.      Cranial Nerves: No cranial nerve deficit.      Gait: Gait normal.   Psychiatric:         Mood and Affect: Mood normal.         Behavior: Behavior normal.         PFTs as reviewed by me personally: Pending    Imaging as reviewed by me personally: As per HPI    Assessment:  1. Severe persistent asthma without complication  PULMONARY FUNCTION TESTS -Test requested: Complete Pulmonary Function Test    STIOLTO RESPIMAT 2.5-2.5 MCG/ACT Aero Soln    mometasone (ASMANEX) 220 MCG/INH inhaler    IGE SERUM    CBC WITH DIFFERENTIAL   2. Tobacco use     3. Gastroesophageal reflux disease, unspecified whether esophagitis present         Plan:  1.  This patient has a longstanding history of severe asthma that sounds as though was thoroughly worked up while in [facility] although she was not on a controller therapy regimen when seen in the emergency department.  Given she has had adverse reaction to Advair in the past, I would choose an alternative inhaled  steroid and told her not to take Flovent given this is fluticasone.  We will try adding mometasone with Asmanex, continue Stiolto and obtain full pulmonary function testing as well as IgE level and CBC with differential to evaluate for eosinophilia.  It sounds as though she has not tolerated allergy shot therapy in the past but may benefit from biologic therapy.  I advised her to try and get the labs when she is not on prednisone as that can affect the levels.  If necessary we can consider allergy consult.  2.  Patient was counseled extensively on tobacco cessation.  I did write her for some nicotine patches as these have worked for her in the past.   3.  This is chronic and she has not tolerated routine medication for acid suppression.  It is possible that her poorly controlled asthma is in part related to GERD and reflux with associated pneumonitis would explain abnormal CT were asthma alone would not.  We discussed lifestyle modifications.  If necessary we can consider GI consult.  Return in about 3 months (around 3/7/2021) for pfts, labs.

## 2020-12-26 ENCOUNTER — HOSPITAL ENCOUNTER (OUTPATIENT)
Dept: RADIOLOGY | Facility: MEDICAL CENTER | Age: 45
End: 2020-12-26
Attending: PHYSICIAN ASSISTANT
Payer: MEDICAID

## 2020-12-26 DIAGNOSIS — E04.9 ENLARGEMENT OF THYROID: ICD-10-CM

## 2020-12-26 PROCEDURE — 76536 US EXAM OF HEAD AND NECK: CPT

## 2020-12-29 ENCOUNTER — HOSPITAL ENCOUNTER (OUTPATIENT)
Dept: LAB | Facility: MEDICAL CENTER | Age: 45
End: 2020-12-29
Attending: INTERNAL MEDICINE
Payer: MEDICAID

## 2020-12-29 DIAGNOSIS — J45.50 SEVERE PERSISTENT ASTHMA WITHOUT COMPLICATION: ICD-10-CM

## 2020-12-29 LAB
BASOPHILS # BLD AUTO: 0.5 % (ref 0–1.8)
BASOPHILS # BLD: 0.03 K/UL (ref 0–0.12)
EOSINOPHIL # BLD AUTO: 0.09 K/UL (ref 0–0.51)
EOSINOPHIL NFR BLD: 1.4 % (ref 0–6.9)
ERYTHROCYTE [DISTWIDTH] IN BLOOD BY AUTOMATED COUNT: 49.4 FL (ref 35.9–50)
HCT VFR BLD AUTO: 48 % (ref 37–47)
HGB BLD-MCNC: 15 G/DL (ref 12–16)
IMM GRANULOCYTES # BLD AUTO: 0.02 K/UL (ref 0–0.11)
IMM GRANULOCYTES NFR BLD AUTO: 0.3 % (ref 0–0.9)
LYMPHOCYTES # BLD AUTO: 2.36 K/UL (ref 1–4.8)
LYMPHOCYTES NFR BLD: 37.8 % (ref 22–41)
MCH RBC QN AUTO: 28.5 PG (ref 27–33)
MCHC RBC AUTO-ENTMCNC: 31.3 G/DL (ref 33.6–35)
MCV RBC AUTO: 91.3 FL (ref 81.4–97.8)
MONOCYTES # BLD AUTO: 0.64 K/UL (ref 0–0.85)
MONOCYTES NFR BLD AUTO: 10.2 % (ref 0–13.4)
NEUTROPHILS # BLD AUTO: 3.11 K/UL (ref 2–7.15)
NEUTROPHILS NFR BLD: 49.8 % (ref 44–72)
NRBC # BLD AUTO: 0 K/UL
NRBC BLD-RTO: 0 /100 WBC
PLATELET # BLD AUTO: 219 K/UL (ref 164–446)
PMV BLD AUTO: 10.7 FL (ref 9–12.9)
RBC # BLD AUTO: 5.26 M/UL (ref 4.2–5.4)
WBC # BLD AUTO: 6.3 K/UL (ref 4.8–10.8)

## 2020-12-29 PROCEDURE — 36415 COLL VENOUS BLD VENIPUNCTURE: CPT

## 2020-12-29 PROCEDURE — 82785 ASSAY OF IGE: CPT

## 2020-12-29 PROCEDURE — 85025 COMPLETE CBC W/AUTO DIFF WBC: CPT

## 2020-12-31 LAB — IGE SERPL-ACNC: 39 KU/L

## 2021-01-08 ENCOUNTER — NON-PROVIDER VISIT (OUTPATIENT)
Dept: SLEEP MEDICINE | Facility: MEDICAL CENTER | Age: 46
End: 2021-01-08
Attending: INTERNAL MEDICINE
Payer: MEDICAID

## 2021-01-08 VITALS — BODY MASS INDEX: 29.12 KG/M2 | HEIGHT: 71 IN | WEIGHT: 208 LBS

## 2021-01-08 DIAGNOSIS — J45.50 SEVERE PERSISTENT ASTHMA WITHOUT COMPLICATION: ICD-10-CM

## 2021-01-08 PROCEDURE — 94729 DIFFUSING CAPACITY: CPT | Performed by: INTERNAL MEDICINE

## 2021-01-08 PROCEDURE — 94060 EVALUATION OF WHEEZING: CPT | Performed by: INTERNAL MEDICINE

## 2021-01-08 PROCEDURE — 94726 PLETHYSMOGRAPHY LUNG VOLUMES: CPT | Performed by: INTERNAL MEDICINE

## 2021-01-08 ASSESSMENT — FIBROSIS 4 INDEX: FIB4 SCORE: 0.71

## 2021-01-08 ASSESSMENT — PULMONARY FUNCTION TESTS
FEV1/FVC_PERCENT_CHANGE: 86
FVC: 4.52
FVC_LLN: 3.19
FVC_PERCENT_PREDICTED: 127
FEV1: 2.84
FVC_PREDICTED: 3.82
FEV1/FVC: 58
FEV1/FVC_PERCENT_PREDICTED: 73
FVC_PERCENT_PREDICTED: 118
FEV1/FVC_PERCENT_PREDICTED: 80
FEV1/FVC: 59
FEV1_PERCENT_CHANGE: 7
FEV1_PERCENT_PREDICTED: 87
FEV1/FVC_PERCENT_PREDICTED: 74
FEV1/FVC_PERCENT_PREDICTED: 72
FEV1_LLN: 2.55
FEV1_PERCENT_PREDICTED: 93
FVC: 4.86
FEV1: 2.66
FEV1/FVC: 58.44
FEV1/FVC_PERCENT_CHANGE: 0
FEV1/FVC_PERCENT_PREDICTED: 73
FEV1_PREDICTED: 3.05
FEV1/FVC_PERCENT_LLN: 67
FEV1/FVC_PREDICTED: 81
FEV1_PERCENT_CHANGE: 6
FEV1/FVC: 59

## 2021-01-08 NOTE — PROCEDURES
Tech: Natasha Monzon, RRT, CPFT  Tech notes: Good patient effort & cooperation.  Patient appeared anxious and confrontational, at times during session.  Pt smoked two cigarettes between 5am and 7am prior to testing, which may have affected DLCO measurement.  Unable to take a full inhale after FVC maneuvers.  The results of this test meet the ATS/ERS standards for acceptability & reproducibility.  Test was performed on the [employer] Body Plethysmograph-Elite DX system.  Predicted values were GLI-2012 for spirometry, GLI- 2017 for DLCO, ITS for Lung Volumes.  The DLCO was uncorrected for Hgb.  A bronchodilator of Ventolin HFA -2puffs via spacer administered.  DLCO performed during dilation period.    1. Baseline spirometry demonstrates a normal FEV1 and FVC. FEV1/FVC ratio is significantly reduced at 59%.  FEV1 is 2.66 L which is 87% of predicted.    2. After administration of an inhaled bronchodilator there is 6% improvement in FEV1.    3. Lung volumes demonstrate mild hyperinflation.    4. Gas exchange as estimated by DLCO is normal at 111% of predicted.    5. Airway resistance is in the normal range.      Impression:    This study demonstrates the presence of mild to moderate obstructive lung disease.  No significant reversibility is noted on the study.

## 2021-03-08 ENCOUNTER — OFFICE VISIT (OUTPATIENT)
Dept: SLEEP MEDICINE | Facility: MEDICAL CENTER | Age: 46
End: 2021-03-08
Payer: MEDICAID

## 2021-03-08 VITALS
HEIGHT: 72 IN | HEART RATE: 120 BPM | BODY MASS INDEX: 28.44 KG/M2 | DIASTOLIC BLOOD PRESSURE: 86 MMHG | SYSTOLIC BLOOD PRESSURE: 138 MMHG | TEMPERATURE: 98.1 F | OXYGEN SATURATION: 96 % | WEIGHT: 210 LBS | RESPIRATION RATE: 16 BRPM

## 2021-03-08 DIAGNOSIS — Z72.0 TOBACCO USE: ICD-10-CM

## 2021-03-08 DIAGNOSIS — K21.9 GASTROESOPHAGEAL REFLUX DISEASE, UNSPECIFIED WHETHER ESOPHAGITIS PRESENT: ICD-10-CM

## 2021-03-08 DIAGNOSIS — J32.0 CHRONIC MAXILLARY SINUSITIS: ICD-10-CM

## 2021-03-08 DIAGNOSIS — J45.50 SEVERE PERSISTENT ASTHMA WITHOUT COMPLICATION: ICD-10-CM

## 2021-03-08 PROCEDURE — 99214 OFFICE O/P EST MOD 30 MIN: CPT | Performed by: INTERNAL MEDICINE

## 2021-03-08 RX ORDER — HYDROXYZINE HYDROCHLORIDE 10 MG/1
10 TABLET, FILM COATED ORAL
COMMUNITY
Start: 2020-10-26 | End: 2021-10-27

## 2021-03-08 RX ORDER — PREDNISONE 10 MG/1
TABLET ORAL
Qty: 18 TABLET | Refills: 0 | Status: SHIPPED | OUTPATIENT
Start: 2021-03-08 | End: 2021-08-20 | Stop reason: SDUPTHER

## 2021-03-08 ASSESSMENT — ENCOUNTER SYMPTOMS
DOUBLE VISION: 0
STRIDOR: 0
PALPITATIONS: 0
SPUTUM PRODUCTION: 1
NAUSEA: 0
FEVER: 0
ABDOMINAL PAIN: 0
TREMORS: 0
BACK PAIN: 0
MYALGIAS: 0
FOCAL WEAKNESS: 0
SINUS PAIN: 0
DEPRESSION: 0
EYE REDNESS: 0
EYE DISCHARGE: 0
SPEECH CHANGE: 0
DIZZINESS: 0
PHOTOPHOBIA: 0
VOMITING: 0
EYE PAIN: 0
HEMOPTYSIS: 0
BLURRED VISION: 0
DIARRHEA: 0
CLAUDICATION: 0
FALLS: 0
NECK PAIN: 0
SORE THROAT: 0
CONSTIPATION: 0
COUGH: 1
WEIGHT LOSS: 0
DIAPHORESIS: 0
HEARTBURN: 0
HEADACHES: 0
CHILLS: 0
WEAKNESS: 0
SHORTNESS OF BREATH: 1
PND: 0
WHEEZING: 1
ORTHOPNEA: 0

## 2021-03-08 ASSESSMENT — FIBROSIS 4 INDEX: FIB4 SCORE: 0.71

## 2021-03-08 NOTE — PROGRESS NOTES
Chief Complaint   Patient presents with   • Asthma     last seen 12/7/2020    • Results     PFt 1/8/21, labs Ige, CBC 12/29/2020          HPI: This patient is a 45 y.o. female whom is followed in our clinic for asthma last seen by me on 12/7/2020.  The patient's past medical history is significant for chronic asthma diagnosed in infancy with possible element of COPD, degenerative joint disease followed in pain management on chronic opiates, history of iron deficiency anemia.  She is a current tobacco user and has smoked for the past 30 years up to 1 pack/day.    Patient presented to me to establish care after relocating from [place] to [facility] in August of last year.  She reported being hospitalized almost once a month and on prednisone almost continuously prior to relocation.  She had been seen by allergy in the past and apparently was treated with allergy shot therapy which she felt contributed to her decline in respiratory status.  She reported an adverse reaction to Advair and was on only Stiolto at the time of our last clinic visit although she had been prescribed Flovent.  She was using her nebulizer every 4-6 hours while living in [place] but did notice slight improvement in need relocating to [place].  She will often self treat with prednisone up to 60 mg at a time at home.  She had fairly severe chronic sinusitis with associated nasal polyps as well as gastroesophageal reflux disease for which she had not been able to tolerate proton pump inhibitors or antihistamine therapy.  I added Asmanex given she had had adverse reaction to Advair and her Flovent was the same inhaled corticosteroid as contained in Advair.  We also ordered serum CBC to evaluate for eosinophilia and IgE level.  Both of these were within normal limits.  Pulmonary function testing showed mild airflow obstruction with preserved FEV1, no bronchodilator response, normal lung volumes and normal DLCO.  She has since undergone septoplasty and  sinus surgery earlier this month.  She reports being able to breathe better and smell better however she stopped all of her inhalers prior to surgery and has developed fairly severe cough particularly at night.  She is also wheezing on exam today.  No fevers or chills.  No chest pain.  She was under the impression that the goal was to come off inhaled therapies.    Past Medical History:   Diagnosis Date   • Asthma    • Chronic obstructive pulmonary disease (HCC)    • Psychiatric disorder     Anxiety, Depression       Social History     Socioeconomic History   • Marital status: Single     Spouse name: Not on file   • Number of children: Not on file   • Years of education: Not on file   • Highest education level: Not on file   Occupational History   • Not on file   Tobacco Use   • Smoking status: Current Some Day Smoker     Packs/day: 1.00     Years: 20.00     Pack years: 20.00     Types: Cigarettes   • Smokeless tobacco: Never Used   • Tobacco comment: on and off    Substance and Sexual Activity   • Alcohol use: Not Currently   • Drug use: Not Currently     Comment: THC   • Sexual activity: Not on file   Other Topics Concern   • Not on file   Social History Narrative   • Not on file     Social Determinants of Health     Financial Resource Strain:    • Difficulty of Paying Living Expenses:    Food Insecurity:    • Worried About Running Out of Food in the Last Year:    • Ran Out of Food in the Last Year:    Transportation Needs:    • Lack of Transportation (Medical):    • Lack of Transportation (Non-Medical):    Physical Activity:    • Days of Exercise per Week:    • Minutes of Exercise per Session:    Stress:    • Feeling of Stress :    Social Connections:    • Frequency of Communication with Friends and Family:    • Frequency of Social Gatherings with Friends and Family:    • Attends [employer] Services:    • Active Member of Clubs or Organizations:    • Attends Club or Organization Meetings:    • Marital Status:     Intimate Partner Violence:    • Fear of Current or Ex-Partner:    • Emotionally Abused:    • Physically Abused:    • Sexually Abused:        Family History   Problem Relation Age of Onset   • Diabetes Mother    • Cancer Father        Current Outpatient Medications on File Prior to Visit   Medication Sig Dispense Refill   • hydrOXYzine HCl (ATARAX) 10 MG Tab Take 10 mg by mouth.     • HYDROcodone/acetaminophen (NORCO)  MG Tab      • fluticasone (FLONASE) 50 MCG/ACT nasal spray      • STIOLTO RESPIMAT 2.5-2.5 MCG/ACT Aero Soln Inhale 2 Puffs every day. 3 Each 4   • mometasone (ASMANEX) 220 MCG/INH inhaler Inhale 2 Puffs every day. 3 Each 4   • nicotine ([employer] NICOTINE TRANSDERMAL SYS) 14 MG/24HR PATCH 24 HR Place 1 Patch on the skin every 24 hours. 30 Patch 3   • tizanidine (ZANAFLEX) 2 MG tablet Take 2 mg by mouth 3 times a day.     • HYDROcodone-acetaminophen (NORCO) 5-325 MG Tab per tablet Take 1 Tab by mouth 3 times a day.     • acetaminophen (TYLENOL) 500 MG Tab Take 500-1,000 mg by mouth every 6 hours as needed for Moderate Pain.     • diphenhydrAMINE (BENADRYL) 25 MG Tab Take 25 mg by mouth every day. For itching     • albuterol 108 (90 Base) MCG/ACT Aero Soln inhalation aerosol Inhale 2 Puffs by mouth every 6 hours as needed for Shortness of Breath.     • ipratropium-albuterol (DUONEB) 0.5-2.5 (3) MG/3ML nebulizer solution 3 mL by Nebulization route 4 times a day. 30 Each 0   • predniSONE (DELTASONE) 10 MG Tab Take 20 mg by mouth.     • promethazine-codeine (PHENERGAN-CODEINE) 6.25-10 MG/5ML Syrup Take 5-10 mL by mouth 4 times a day as needed for Cough.     • azithromycin (ZITHROMAX) 250 MG Tab Take 250-500 mg by mouth every day. Pt started on 9/24/2020 for 5 day course     • hydrOXYzine HCl (ATARAX) 25 MG Tab Take 1 Tab by mouth 3 times a day as needed for Itching. (Patient not taking: Reported on 12/7/2020) 30 Tab 0     No current facility-administered medications on file prior to visit.       Aspirin  and Penicillins      ROS:   Review of Systems   Constitutional: Negative for chills, diaphoresis, fever, malaise/fatigue and weight loss.   HENT: Negative for congestion, ear discharge, ear pain, hearing loss, nosebleeds, sinus pain, sore throat and tinnitus.    Eyes: Negative for blurred vision, double vision, photophobia, pain, discharge and redness.   Respiratory: Positive for cough, sputum production, shortness of breath and wheezing. Negative for hemoptysis and stridor.    Cardiovascular: Negative for chest pain, palpitations, orthopnea, claudication, leg swelling and PND.   Gastrointestinal: Negative for abdominal pain, constipation, diarrhea, heartburn, nausea and vomiting.   Genitourinary: Negative for dysuria and urgency.   Musculoskeletal: Negative for back pain, falls, joint pain, myalgias and neck pain.   Skin: Negative for itching and rash.   Neurological: Negative for dizziness, tremors, speech change, focal weakness, weakness and headaches.   Endo/Heme/Allergies: Negative for environmental allergies.   Psychiatric/Behavioral: Negative for depression.       /86 (BP Location: Right arm, Patient Position: Sitting, BP Cuff Size: Large adult)   Pulse (!) 120   Temp 36.7 °C (98.1 °F) (Temporal)   Resp 16   Ht 1.829 m (6')   Wt 95.3 kg (210 lb)   SpO2 96%   Physical Exam  Vitals reviewed.   Constitutional:       General: She is not in acute distress.     Appearance: Normal appearance. She is well-developed and normal weight.   HENT:      Head: Normocephalic and atraumatic.      Right Ear: External ear normal.      Left Ear: External ear normal.      Nose: Nose normal.      Mouth/Throat:      Pharynx: No oropharyngeal exudate.   Eyes:      General: No scleral icterus.     Conjunctiva/sclera: Conjunctivae normal.      Pupils: Pupils are equal, round, and reactive to light.   Neck:      Vascular: No JVD.      Trachea: No tracheal deviation.   Cardiovascular:      Rate and Rhythm: Normal rate and  regular rhythm.      Heart sounds: Normal heart sounds. No murmur. No friction rub. No gallop.    Pulmonary:      Effort: Pulmonary effort is normal. No accessory muscle usage or respiratory distress.      Breath sounds: Normal breath sounds. No rales.      Comments: Diffuse bilateral expiratory wheezes  Abdominal:      General: There is no distension.      Palpations: Abdomen is soft.      Tenderness: There is no abdominal tenderness.   Musculoskeletal:         General: No tenderness or deformity. Normal range of motion.      Cervical back: Normal range of motion and neck supple.      Right lower leg: No edema.      Left lower leg: No edema.   Lymphadenopathy:      Cervical: No cervical adenopathy.   Skin:     General: Skin is warm and dry.      Findings: No rash.      Nails: There is no clubbing.   Neurological:      Mental Status: She is alert and oriented to person, place, and time.      Cranial Nerves: No cranial nerve deficit.      Gait: Gait normal.   Psychiatric:         Mood and Affect: Mood normal.         Behavior: Behavior normal.         PFTs as reviewed by me personally: As per HPI    Imaging as reviewed by me personally: As per HPI    Assessment:  1. Severe persistent asthma without complication  predniSONE (DELTASONE) 10 MG Tab   2. Tobacco use     3. Gastroesophageal reflux disease, unspecified whether esophagitis present     4. Chronic maxillary sinusitis         Plan:  1.  This is chronic, severe and she appears to be in acute exacerbation today.  I advised the patient that there is no plan to stop her inhaled therapies unless we are able to get full control of her symptoms for at least 3 months time.  I recommended she resume mometasone, Stiolto and I will treat with a 9-day taper of prednisone.  2.  Patient was counseled extensively on tobacco use.  She is not a candidate for lung cancer screening given age under 55.  3.  Hard to say whether or not this is contributing and her cough is worse at  night but this could also be due to postnasal drip following her recent surgery.  Discussed lifestyle modifications.  4.  Status post recent spinal surgery and septoplasty.  Follow-up with ENT.  Return in about 3 months (around 6/8/2021) for asthma.

## 2021-03-12 DIAGNOSIS — J45.50 SEVERE PERSISTENT ASTHMA WITHOUT COMPLICATION: ICD-10-CM

## 2021-03-12 NOTE — TELEPHONE ENCOUNTER
Have we ever prescribed this med? Yes.  If yes, what date? 9/27/2020    Last OV: 3/8/2021 Jeffery    Next OV: Due 6/2021    DX: Asthma    Medications: Albuterol and Duoneb

## 2021-03-15 RX ORDER — ALBUTEROL SULFATE 90 UG/1
2 AEROSOL, METERED RESPIRATORY (INHALATION) EVERY 6 HOURS PRN
Qty: 8.5 G | Refills: 5 | Status: SHIPPED
Start: 2021-03-15 | End: 2021-11-17 | Stop reason: SDUPTHER

## 2021-03-15 RX ORDER — IPRATROPIUM BROMIDE AND ALBUTEROL SULFATE 2.5; .5 MG/3ML; MG/3ML
3 SOLUTION RESPIRATORY (INHALATION) 4 TIMES DAILY
Qty: 120 EACH | Refills: 5 | Status: SHIPPED | OUTPATIENT
Start: 2021-03-15 | End: 2022-04-07

## 2021-05-04 ENCOUNTER — APPOINTMENT (OUTPATIENT)
Dept: RADIOLOGY | Facility: MEDICAL CENTER | Age: 46
End: 2021-05-04
Attending: PAIN MEDICINE
Payer: MEDICAID

## 2021-05-04 DIAGNOSIS — M47.816 LUMBAR SPONDYLOSIS: ICD-10-CM

## 2021-05-04 PROCEDURE — 72148 MRI LUMBAR SPINE W/O DYE: CPT

## 2021-05-26 ENCOUNTER — HOSPITAL ENCOUNTER (OUTPATIENT)
Dept: LAB | Facility: MEDICAL CENTER | Age: 46
End: 2021-05-26
Payer: MEDICAID

## 2021-05-26 ENCOUNTER — HOSPITAL ENCOUNTER (OUTPATIENT)
Dept: RADIOLOGY | Facility: MEDICAL CENTER | Age: 46
End: 2021-05-26
Attending: PAIN MEDICINE
Payer: MEDICAID

## 2021-05-26 DIAGNOSIS — M70.61 GREATER TROCHANTERIC BURSITIS OF BOTH HIPS: ICD-10-CM

## 2021-05-26 DIAGNOSIS — M70.62 GREATER TROCHANTERIC BURSITIS OF BOTH HIPS: ICD-10-CM

## 2021-05-26 PROCEDURE — 73502 X-RAY EXAM HIP UNI 2-3 VIEWS: CPT | Mod: LT

## 2021-05-27 ENCOUNTER — HOSPITAL ENCOUNTER (OUTPATIENT)
Dept: LAB | Facility: MEDICAL CENTER | Age: 46
End: 2021-05-27
Payer: MEDICAID

## 2021-05-27 LAB
ALBUMIN SERPL BCP-MCNC: 3.8 G/DL (ref 3.2–4.9)
ALBUMIN/GLOB SERPL: 1.3 G/DL
ALP SERPL-CCNC: 55 U/L (ref 30–99)
ALT SERPL-CCNC: 11 U/L (ref 2–50)
ANION GAP SERPL CALC-SCNC: 6 MMOL/L (ref 7–16)
AST SERPL-CCNC: 15 U/L (ref 12–45)
BASOPHILS # BLD AUTO: 0.6 % (ref 0–1.8)
BASOPHILS # BLD: 0.04 K/UL (ref 0–0.12)
BILIRUB SERPL-MCNC: 0.3 MG/DL (ref 0.1–1.5)
BUN SERPL-MCNC: 8 MG/DL (ref 8–22)
CALCIUM SERPL-MCNC: 9 MG/DL (ref 8.5–10.5)
CHLORIDE SERPL-SCNC: 106 MMOL/L (ref 96–112)
CHOLEST SERPL-MCNC: 176 MG/DL (ref 100–199)
CO2 SERPL-SCNC: 27 MMOL/L (ref 20–33)
CREAT SERPL-MCNC: 0.84 MG/DL (ref 0.5–1.4)
EOSINOPHIL # BLD AUTO: 0.09 K/UL (ref 0–0.51)
EOSINOPHIL NFR BLD: 1.4 % (ref 0–6.9)
ERYTHROCYTE [DISTWIDTH] IN BLOOD BY AUTOMATED COUNT: 51.9 FL (ref 35.9–50)
EST. AVERAGE GLUCOSE BLD GHB EST-MCNC: 120 MG/DL
FASTING STATUS PATIENT QL REPORTED: NORMAL
GLOBULIN SER CALC-MCNC: 2.9 G/DL (ref 1.9–3.5)
GLUCOSE SERPL-MCNC: 77 MG/DL (ref 65–99)
HBA1C MFR BLD: 5.8 % (ref 4–5.6)
HCT VFR BLD AUTO: 43.6 % (ref 37–47)
HDLC SERPL-MCNC: 53 MG/DL
HGB BLD-MCNC: 13.9 G/DL (ref 12–16)
IMM GRANULOCYTES # BLD AUTO: 0.02 K/UL (ref 0–0.11)
IMM GRANULOCYTES NFR BLD AUTO: 0.3 % (ref 0–0.9)
LDLC SERPL CALC-MCNC: 110 MG/DL
LYMPHOCYTES # BLD AUTO: 1.91 K/UL (ref 1–4.8)
LYMPHOCYTES NFR BLD: 30.7 % (ref 22–41)
MCH RBC QN AUTO: 28.3 PG (ref 27–33)
MCHC RBC AUTO-ENTMCNC: 31.9 G/DL (ref 33.6–35)
MCV RBC AUTO: 88.8 FL (ref 81.4–97.8)
MONOCYTES # BLD AUTO: 0.65 K/UL (ref 0–0.85)
MONOCYTES NFR BLD AUTO: 10.5 % (ref 0–13.4)
NEUTROPHILS # BLD AUTO: 3.51 K/UL (ref 2–7.15)
NEUTROPHILS NFR BLD: 56.5 % (ref 44–72)
NRBC # BLD AUTO: 0 K/UL
NRBC BLD-RTO: 0 /100 WBC
PLATELET # BLD AUTO: 195 K/UL (ref 164–446)
PMV BLD AUTO: 10.2 FL (ref 9–12.9)
POTASSIUM SERPL-SCNC: 4.3 MMOL/L (ref 3.6–5.5)
PROT SERPL-MCNC: 6.7 G/DL (ref 6–8.2)
RBC # BLD AUTO: 4.91 M/UL (ref 4.2–5.4)
SODIUM SERPL-SCNC: 139 MMOL/L (ref 135–145)
TRIGL SERPL-MCNC: 65 MG/DL (ref 0–149)
WBC # BLD AUTO: 6.2 K/UL (ref 4.8–10.8)

## 2021-05-27 PROCEDURE — 85025 COMPLETE CBC W/AUTO DIFF WBC: CPT

## 2021-05-27 PROCEDURE — 83036 HEMOGLOBIN GLYCOSYLATED A1C: CPT

## 2021-05-27 PROCEDURE — 80053 COMPREHEN METABOLIC PANEL: CPT

## 2021-05-27 PROCEDURE — 80061 LIPID PANEL: CPT

## 2021-05-27 PROCEDURE — 36415 COLL VENOUS BLD VENIPUNCTURE: CPT

## 2021-06-05 ENCOUNTER — HOSPITAL ENCOUNTER (OUTPATIENT)
Dept: RADIOLOGY | Facility: MEDICAL CENTER | Age: 46
End: 2021-06-05
Payer: MEDICAID

## 2021-06-05 DIAGNOSIS — Z87.19 PERSONAL HISTORY OF DIGESTIVE DISEASE: ICD-10-CM

## 2021-06-05 PROCEDURE — 74177 CT ABD & PELVIS W/CONTRAST: CPT

## 2021-06-05 PROCEDURE — 700117 HCHG RX CONTRAST REV CODE 255

## 2021-06-05 RX ADMIN — IOHEXOL 25 ML: 240 INJECTION, SOLUTION INTRATHECAL; INTRAVASCULAR; INTRAVENOUS; ORAL at 17:22

## 2021-06-05 RX ADMIN — IOHEXOL 100 ML: 350 INJECTION, SOLUTION INTRAVENOUS at 17:21

## 2021-06-16 ENCOUNTER — HOSPITAL ENCOUNTER (OUTPATIENT)
Dept: RADIOLOGY | Facility: MEDICAL CENTER | Age: 46
End: 2021-06-16
Attending: STUDENT IN AN ORGANIZED HEALTH CARE EDUCATION/TRAINING PROGRAM
Payer: MEDICAID

## 2021-06-16 DIAGNOSIS — R10.816 EPIGASTRIC ABDOMINAL TENDERNESS, REBOUND TENDERNESS PRESENCE NOT SPECIFIED: ICD-10-CM

## 2021-06-16 PROCEDURE — 71120 X-RAY EXAM BREASTBONE 2/>VWS: CPT

## 2021-07-27 ENCOUNTER — APPOINTMENT (OUTPATIENT)
Dept: RADIOLOGY | Facility: MEDICAL CENTER | Age: 46
End: 2021-07-27
Attending: STUDENT IN AN ORGANIZED HEALTH CARE EDUCATION/TRAINING PROGRAM
Payer: MEDICAID

## 2021-08-20 ENCOUNTER — SLEEP CENTER VISIT (OUTPATIENT)
Dept: SLEEP MEDICINE | Facility: MEDICAL CENTER | Age: 46
End: 2021-08-20
Payer: MEDICAID

## 2021-08-20 VITALS
TEMPERATURE: 98.1 F | SYSTOLIC BLOOD PRESSURE: 126 MMHG | DIASTOLIC BLOOD PRESSURE: 94 MMHG | WEIGHT: 212 LBS | BODY MASS INDEX: 28.71 KG/M2 | OXYGEN SATURATION: 100 % | HEIGHT: 72 IN | HEART RATE: 95 BPM | RESPIRATION RATE: 16 BRPM

## 2021-08-20 DIAGNOSIS — Z72.0 TOBACCO USE: ICD-10-CM

## 2021-08-20 DIAGNOSIS — J45.50 SEVERE PERSISTENT ASTHMA WITHOUT COMPLICATION: ICD-10-CM

## 2021-08-20 DIAGNOSIS — J32.0 CHRONIC MAXILLARY SINUSITIS: ICD-10-CM

## 2021-08-20 DIAGNOSIS — R93.89 ABNORMAL CT OF THE CHEST: ICD-10-CM

## 2021-08-20 DIAGNOSIS — K21.9 GASTROESOPHAGEAL REFLUX DISEASE, UNSPECIFIED WHETHER ESOPHAGITIS PRESENT: ICD-10-CM

## 2021-08-20 PROCEDURE — 99214 OFFICE O/P EST MOD 30 MIN: CPT | Performed by: INTERNAL MEDICINE

## 2021-08-20 RX ORDER — PREDNISONE 10 MG/1
TABLET ORAL
Qty: 18 TABLET | Refills: 3 | Status: SHIPPED | OUTPATIENT
Start: 2021-08-20 | End: 2021-11-12 | Stop reason: SDUPTHER

## 2021-08-20 ASSESSMENT — ENCOUNTER SYMPTOMS
STRIDOR: 0
DIAPHORESIS: 0
HEARTBURN: 0
CLAUDICATION: 0
EYE REDNESS: 0
FEVER: 0
HEMOPTYSIS: 0
SINUS PAIN: 0
NECK PAIN: 0
BLURRED VISION: 0
FALLS: 0
FOCAL WEAKNESS: 0
NAUSEA: 0
TREMORS: 0
SORE THROAT: 0
WEAKNESS: 0
PND: 0
EYE DISCHARGE: 0
ABDOMINAL PAIN: 0
CHILLS: 0
DOUBLE VISION: 0
HEADACHES: 0
SHORTNESS OF BREATH: 1
BACK PAIN: 0
WHEEZING: 1
SPEECH CHANGE: 0
VOMITING: 0
MYALGIAS: 0
DIARRHEA: 0
WEIGHT LOSS: 0
PALPITATIONS: 0
EYE PAIN: 0
ORTHOPNEA: 0
DEPRESSION: 0
CONSTIPATION: 0
DIZZINESS: 0
SPUTUM PRODUCTION: 0
COUGH: 0
PHOTOPHOBIA: 0

## 2021-08-20 ASSESSMENT — FIBROSIS 4 INDEX: FIB4 SCORE: 1.11

## 2021-08-20 NOTE — PROGRESS NOTES
Chief Complaint   Patient presents with   • Asthma     last seen 3/8/21    • Results     CT-ABDOMEN-PELVIS WITH 6/5/21         HPI: This patient is a 46 y.o. female whom is followed in our clinic for asthma last seen by me on 3/8/21.  The patient's past medical history is significant for chronic asthma diagnosed in infancy with possible element of COPD, degenerative joint disease followed in pain management on chronic opiates, history of iron deficiency anemia.  She is a current tobacco user and has smoked for the past 30 years up to 1 pack/day.    Patient presented to me to establish care after relocating from [place] to [facility] in August of last year.  She reported being hospitalized almost once a month and on prednisone almost continuously prior to relocation.  She had been seen by allergy in the past and apparently was treated with allergy shot therapy which she felt contributed to her decline in respiratory status.  She reported an adverse reaction to Advair and was on only Stiolto at the time of our last clinic visit.  Since that time she has had adverse reaction to Asmanex and more recently Pulmicort.  She is on no inhaled corticosteroid at this time but has not required hospitalization since relocating to [place].  She was using her nebulizer every 4-6 hours daily while living in [place] but has had some improvement since moving to [place].  She does self treat with prednisone between 20 and 40 mg at a time, last dosed 5 days ago.  She has fairly severe chronic sinusitis with associated nasal polyps as well as gastroesophageal reflux disease for which she has not been able to tolerate proton pump inhibitors or antihistamine therapy.  She did not have elevated IgE or eosinophil levels but as per above does self treat with prednisone on a fairly regular basis. Pulmonary function testing showed mild airflow obstruction with preserved FEV1, no bronchodilator response, normal lung volumes and normal DLCO.  She  has since undergone septoplasty and sinus surgery earlier this year which she felt improved her respiratory condition.  At her last clinic visit I actually treated her for acute exacerbation.  She presents today for routine follow-up.  She had a recent CT of her abdomen and pelvis which showed some mild groundglass opacity in the right lower lobe.  This was new compared to CT from September 2020 although she did have some patchy groundglass opacities in other areas of the lung at that time.  She also had a 3 mm right lower lobe nodule.    Past Medical History:   Diagnosis Date   • Asthma    • Chronic obstructive pulmonary disease (HCC)    • Psychiatric disorder     Anxiety, Depression       Social History     Socioeconomic History   • Marital status: Single     Spouse name: Not on file   • Number of children: Not on file   • Years of education: Not on file   • Highest education level: Not on file   Occupational History   • Not on file   Tobacco Use   • Smoking status: Current Some Day Smoker     Packs/day: 1.00     Years: 20.00     Pack years: 20.00     Types: Cigarettes   • Smokeless tobacco: Never Used   • Tobacco comment: on and off    Vaping Use   • Vaping Use: Never used   Substance and Sexual Activity   • Alcohol use: Not Currently   • Drug use: Not Currently     Comment: THC   • Sexual activity: Not on file   Other Topics Concern   • Not on file   Social History Narrative   • Not on file     Social Determinants of Health     Financial Resource Strain:    • Difficulty of Paying Living Expenses:    Food Insecurity:    • Worried About Running Out of Food in the Last Year:    • Ran Out of Food in the Last Year:    Transportation Needs:    • Lack of Transportation (Medical):    • Lack of Transportation (Non-Medical):    Physical Activity:    • Days of Exercise per Week:    • Minutes of Exercise per Session:    Stress:    • Feeling of Stress :    Social Connections:    • Frequency of Communication with Friends and  Family:    • Frequency of Social Gatherings with Friends and Family:    • Attends Christian Services:    • Active Member of Clubs or Organizations:    • Attends Club or Organization Meetings:    • Marital Status:    Intimate Partner Violence:    • Fear of Current or Ex-Partner:    • Emotionally Abused:    • Physically Abused:    • Sexually Abused:        Family History   Problem Relation Age of Onset   • Diabetes Mother    • Cancer Father        Current Outpatient Medications on File Prior to Visit   Medication Sig Dispense Refill   • ipratropium-albuterol (DUONEB) 0.5-2.5 (3) MG/3ML nebulizer solution Take 3 mL by nebulization 4 times a day. 120 Each 5   • albuterol 108 (90 Base) MCG/ACT Aero Soln inhalation aerosol Inhale 2 Puffs every 6 hours as needed for Shortness of Breath. 8.5 g 5   • hydrOXYzine HCl (ATARAX) 10 MG Tab Take 10 mg by mouth.     • HYDROcodone/acetaminophen (NORCO)  MG Tab      • fluticasone (FLONASE) 50 MCG/ACT nasal spray      • nicotine ([employer] NICOTINE TRANSDERMAL SYS) 14 MG/24HR PATCH 24 HR Place 1 Patch on the skin every 24 hours. 30 Patch 3   • tizanidine (ZANAFLEX) 2 MG tablet Take 2 mg by mouth 3 times a day.     • acetaminophen (TYLENOL) 500 MG Tab Take 500-1,000 mg by mouth every 6 hours as needed for Moderate Pain.     • diphenhydrAMINE (BENADRYL) 25 MG Tab Take 25 mg by mouth every day. For itching     • STIOLTO RESPIMAT 2.5-2.5 MCG/ACT Aero Soln Inhale 2 Puffs every day. (Patient not taking: Reported on 8/20/2021) 3 Each 4   • HYDROcodone-acetaminophen (NORCO) 5-325 MG Tab per tablet Take 1 Tab by mouth 3 times a day. (Patient not taking: Reported on 8/20/2021)       No current facility-administered medications on file prior to visit.       Aspirin and Penicillins      ROS:   Review of Systems   Constitutional: Negative for chills, diaphoresis, fever, malaise/fatigue and weight loss.   HENT: Negative for congestion, ear discharge, ear pain, hearing loss, nosebleeds, sinus pain,  sore throat and tinnitus.    Eyes: Negative for blurred vision, double vision, photophobia, pain, discharge and redness.   Respiratory: Positive for shortness of breath and wheezing. Negative for cough, hemoptysis, sputum production and stridor.    Cardiovascular: Negative for chest pain, palpitations, orthopnea, claudication, leg swelling and PND.   Gastrointestinal: Negative for abdominal pain, constipation, diarrhea, heartburn, nausea and vomiting.   Genitourinary: Negative for dysuria and urgency.   Musculoskeletal: Negative for back pain, falls, joint pain, myalgias and neck pain.   Skin: Negative for itching and rash.   Neurological: Negative for dizziness, tremors, speech change, focal weakness, weakness and headaches.   Endo/Heme/Allergies: Negative for environmental allergies.   Psychiatric/Behavioral: Negative for depression.       /94 (BP Location: Right arm, Patient Position: Sitting, BP Cuff Size: Large adult)   Pulse 95   Temp 36.7 °C (98.1 °F) (Temporal)   Resp 16   Ht 1.829 m (6')   Wt 96.2 kg (212 lb)   SpO2 100%   Physical Exam  Constitutional:       General: She is not in acute distress.     Appearance: Normal appearance. She is well-developed and normal weight.   HENT:      Head: Normocephalic and atraumatic.      Right Ear: External ear normal.      Left Ear: External ear normal.      Nose: Nose normal.      Mouth/Throat:      Pharynx: No oropharyngeal exudate.   Eyes:      General: No scleral icterus.     Conjunctiva/sclera: Conjunctivae normal.      Pupils: Pupils are equal, round, and reactive to light.   Neck:      Vascular: No JVD.      Trachea: No tracheal deviation.   Cardiovascular:      Rate and Rhythm: Normal rate and regular rhythm.      Heart sounds: Normal heart sounds. No murmur heard.   No friction rub. No gallop.    Pulmonary:      Effort: No accessory muscle usage or respiratory distress.      Breath sounds: Wheezing present. No rales.   Abdominal:      General:  There is no distension.      Palpations: Abdomen is soft.      Tenderness: There is no abdominal tenderness.   Musculoskeletal:         General: No tenderness or deformity. Normal range of motion.      Cervical back: Neck supple.   Lymphadenopathy:      Cervical: No cervical adenopathy.   Skin:     General: Skin is warm and dry.      Findings: No rash.      Nails: There is no clubbing.   Neurological:      Mental Status: She is alert and oriented to person, place, and time.      Cranial Nerves: No cranial nerve deficit.      Gait: Gait normal.         PFTs as reviewed by me personally: As per HPI    Imaging as reviewed by me personally: As per HPI    Assessment:  1. Severe persistent asthma without complication  Fluid Cell Count    predniSONE (DELTASONE) 10 MG Tab    CT-CHEST (THORAX) W/O   2. Tobacco use     3. Gastroesophageal reflux disease, unspecified whether esophagitis present     4. Chronic maxillary sinusitis     5. Abnormal CT of the chest         Plan:  1.  This is a chronic diagnosis with an element of COPD and poorly controlled but we have had difficulty getting her on a regimen that she can tolerate without significant side effects.  It sounds as though this is a chronic issue for the patient and symptoms were even worse when she lived in [place].  I would like to see if we can identify sputum eosinophilia to potentially qualify her for biologic therapy as it does not sound as though they ever attempted biologic therapy in [place].  We will attempt to get records from her pulmonologist there.  I did okay her to self treat with prednisone as it has kept her out of the hospital and we do not have her on adequate controller therapy at this time.    2.  Patient was counseled extensively on tobacco cessation as this is likely contributing to her active airway inflammation.  She is not yet a candidate for lung cancer screening.  3.  Patient has groundglass opacities in the right lower lobe now on 2  different occasions that appear to be in different locations.  This is concerning for reflux which can be contributing to her ongoing airway inflammation.  We discussed lifestyle modifications, she already sleeps with the head of her bed elevated.  Has not tolerated antihistamine or PPI.  4.  Well controlled at present after sinus surgery.  5.  See discussion above.  I will repeat CT next month 1 year from prior at 6 months from most recent.  Return in about 3 months (around 11/20/2021) for okay to add to a Friday at 10:10 or 10:30.

## 2021-08-26 ENCOUNTER — HOSPITAL ENCOUNTER (OUTPATIENT)
Dept: RADIOLOGY | Facility: MEDICAL CENTER | Age: 46
End: 2021-08-26
Attending: STUDENT IN AN ORGANIZED HEALTH CARE EDUCATION/TRAINING PROGRAM
Payer: MEDICAID

## 2021-08-26 DIAGNOSIS — N83.292 COMPLEX CYST OF LEFT OVARY: ICD-10-CM

## 2021-08-26 PROCEDURE — 76830 TRANSVAGINAL US NON-OB: CPT

## 2021-09-08 ENCOUNTER — HOSPITAL ENCOUNTER (OUTPATIENT)
Dept: RADIOLOGY | Facility: MEDICAL CENTER | Age: 46
End: 2021-09-08
Attending: INTERNAL MEDICINE
Payer: MEDICAID

## 2021-09-08 ENCOUNTER — HOSPITAL ENCOUNTER (OUTPATIENT)
Facility: MEDICAL CENTER | Age: 46
End: 2021-09-08
Attending: INTERNAL MEDICINE
Payer: MEDICAID

## 2021-09-08 DIAGNOSIS — J45.50 SEVERE PERSISTENT ASTHMA WITHOUT COMPLICATION: ICD-10-CM

## 2021-09-08 PROCEDURE — 89051 BODY FLUID CELL COUNT: CPT

## 2021-09-08 PROCEDURE — 71250 CT THORAX DX C-: CPT

## 2021-10-27 RX ORDER — ALBUTEROL SULFATE 90 UG/1
1 AEROSOL, METERED RESPIRATORY (INHALATION)
COMMUNITY
Start: 2020-09-21 | End: 2021-10-27

## 2021-10-27 RX ORDER — BUDESONIDE AND FORMOTEROL FUMARATE DIHYDRATE 160; 4.5 UG/1; UG/1
AEROSOL RESPIRATORY (INHALATION)
COMMUNITY
Start: 2021-07-22 | End: 2021-10-27

## 2021-10-27 RX ORDER — DIPHENHYDRAMINE HCL 25 MG
CAPSULE ORAL
COMMUNITY
End: 2021-10-27

## 2021-10-27 RX ORDER — ACETAMINOPHEN 500 MG
TABLET ORAL
COMMUNITY
End: 2021-10-27

## 2021-10-27 RX ORDER — TIOTROPIUM BROMIDE AND OLODATEROL 3.124; 2.736 UG/1; UG/1
1 SPRAY, METERED RESPIRATORY (INHALATION)
COMMUNITY
End: 2021-10-27

## 2021-10-27 RX ORDER — MOMETASONE FUROATE 100 UG/1
2-4 AEROSOL RESPIRATORY (INHALATION)
COMMUNITY
End: 2022-02-03

## 2021-10-27 RX ORDER — CETIRIZINE HYDROCHLORIDE 10 MG/1
TABLET ORAL
COMMUNITY
Start: 2021-06-15 | End: 2021-10-27

## 2021-10-27 RX ORDER — HYDROCODONE BITARTRATE AND ACETAMINOPHEN 10; 325 MG/1; MG/1
1 TABLET ORAL
COMMUNITY
End: 2021-10-27

## 2021-10-27 RX ORDER — HYDROCODONE BITARTRATE AND ACETAMINOPHEN 10; 325 MG/15ML; MG/15ML
SOLUTION ORAL
COMMUNITY
Start: 2021-06-15 | End: 2021-10-27

## 2021-10-27 RX ORDER — AZITHROMYCIN 250 MG/1
TABLET, FILM COATED ORAL
COMMUNITY
Start: 2021-07-22 | End: 2021-10-28

## 2021-10-27 RX ORDER — OMEPRAZOLE 40 MG/1
40 CAPSULE, DELAYED RELEASE ORAL
COMMUNITY
Start: 2021-02-11 | End: 2022-03-01

## 2021-10-27 RX ORDER — DEXTROMETHORPHAN POLISTIREX 30 MG/5ML
SUSPENSION ORAL
COMMUNITY
Start: 2021-07-22 | End: 2021-10-28

## 2021-10-28 ENCOUNTER — OFFICE VISIT (OUTPATIENT)
Dept: INTERNAL MEDICINE | Facility: OTHER | Age: 46
End: 2021-10-28
Payer: MEDICAID

## 2021-10-28 ENCOUNTER — HOSPITAL ENCOUNTER (OUTPATIENT)
Dept: LAB | Facility: MEDICAL CENTER | Age: 46
End: 2021-10-28
Attending: STUDENT IN AN ORGANIZED HEALTH CARE EDUCATION/TRAINING PROGRAM
Payer: MEDICAID

## 2021-10-28 VITALS
HEIGHT: 72 IN | HEART RATE: 97 BPM | TEMPERATURE: 99.1 F | WEIGHT: 215 LBS | SYSTOLIC BLOOD PRESSURE: 138 MMHG | OXYGEN SATURATION: 97 % | BODY MASS INDEX: 29.12 KG/M2 | DIASTOLIC BLOOD PRESSURE: 83 MMHG

## 2021-10-28 DIAGNOSIS — R35.0 URINARY FREQUENCY: ICD-10-CM

## 2021-10-28 DIAGNOSIS — G62.9 NEUROPATHY: ICD-10-CM

## 2021-10-28 DIAGNOSIS — M25.561 CHRONIC PAIN OF RIGHT KNEE: ICD-10-CM

## 2021-10-28 DIAGNOSIS — G89.29 CHRONIC PAIN OF RIGHT KNEE: ICD-10-CM

## 2021-10-28 DIAGNOSIS — Z32.00 POSSIBLE PREGNANCY, NOT YET CONFIRMED: ICD-10-CM

## 2021-10-28 PROBLEM — J45.909 ASTHMA: Status: ACTIVE | Noted: 2021-07-12

## 2021-10-28 PROBLEM — Z13.228 ENCOUNTER FOR SCREENING FOR OTHER METABOLIC DISORDERS: Status: ACTIVE | Noted: 2021-05-26

## 2021-10-28 PROBLEM — G47.33 OBSTRUCTIVE SLEEP APNEA SYNDROME: Status: ACTIVE | Noted: 2021-07-12

## 2021-10-28 PROBLEM — F41.9 CHRONIC ANXIETY: Status: ACTIVE | Noted: 2020-10-26

## 2021-10-28 PROBLEM — N83.292 OTHER OVARIAN CYST, LEFT SIDE: Status: ACTIVE | Noted: 2021-06-15

## 2021-10-28 PROBLEM — J32.9 CHRONIC SINUSITIS, UNSPECIFIED: Status: ACTIVE | Noted: 2020-09-21

## 2021-10-28 PROBLEM — I10 HYPERTENSION: Status: ACTIVE | Noted: 2021-07-12

## 2021-10-28 PROBLEM — R91.8 LUNG MASS: Status: ACTIVE | Noted: 2021-07-12

## 2021-10-28 PROBLEM — K21.00 GASTROESOPHAGEAL REFLUX DISEASE WITH ESOPHAGITIS: Status: ACTIVE | Noted: 2021-02-11

## 2021-10-28 PROBLEM — R73.03 PREDIABETES: Status: ACTIVE | Noted: 2021-06-15

## 2021-10-28 PROBLEM — D17.1 LIPOMA OF ABDOMINAL WALL: Status: ACTIVE | Noted: 2021-07-12

## 2021-10-28 PROBLEM — R05.9 COUGH: Status: ACTIVE | Noted: 2021-07-22

## 2021-10-28 PROBLEM — Z12.4 SCREENING FOR MALIGNANT NEOPLASM OF CERVIX: Status: ACTIVE | Noted: 2021-08-27

## 2021-10-28 PROBLEM — J44.9 CHRONIC OBSTRUCTIVE PULMONARY DISEASE, UNSPECIFIED (HCC): Status: ACTIVE | Noted: 2020-09-21

## 2021-10-28 PROBLEM — E66.3 OVERWEIGHT WITH BODY MASS INDEX (BMI) 25.0-29.9: Status: ACTIVE | Noted: 2021-07-12

## 2021-10-28 PROBLEM — M54.9 BACK PAIN: Status: ACTIVE | Noted: 2020-09-21

## 2021-10-28 PROBLEM — M51.37 DEGENERATION OF INTERVERTEBRAL DISC OF LUMBOSACRAL REGION: Status: ACTIVE | Noted: 2020-09-21

## 2021-10-28 PROBLEM — F17.200 TOBACCO DEPENDENCE SYNDROME: Status: ACTIVE | Noted: 2021-07-12

## 2021-10-28 PROBLEM — F41.1 GENERALIZED ANXIETY DISORDER: Status: ACTIVE | Noted: 2021-07-12

## 2021-10-28 PROBLEM — R91.8 ABNORMAL FINDINGS ON DIAGNOSTIC IMAGING OF LUNG: Status: ACTIVE | Noted: 2021-06-15

## 2021-10-28 PROBLEM — Z87.19 HISTORY OF ABDOMINAL HERNIA: Status: ACTIVE | Noted: 2021-05-26

## 2021-10-28 PROBLEM — E78.5 HYPERLIPIDEMIA: Status: ACTIVE | Noted: 2021-07-12

## 2021-10-28 PROBLEM — R10.816 EPIGASTRIC ABDOMINAL TENDERNESS: Status: ACTIVE | Noted: 2021-06-15

## 2021-10-28 LAB
ALBUMIN SERPL BCP-MCNC: 4.3 G/DL (ref 3.2–4.9)
ALBUMIN/GLOB SERPL: 1.7 G/DL
ALP SERPL-CCNC: 59 U/L (ref 30–99)
ALT SERPL-CCNC: 16 U/L (ref 2–50)
ANION GAP SERPL CALC-SCNC: 10 MMOL/L (ref 7–16)
APPEARANCE UR: CLEAR
AST SERPL-CCNC: 16 U/L (ref 12–45)
B-HCG SERPL-ACNC: <1 MIU/ML (ref 0–5)
BASOPHILS # BLD AUTO: 0.7 % (ref 0–1.8)
BASOPHILS # BLD: 0.04 K/UL (ref 0–0.12)
BILIRUB SERPL-MCNC: 0.3 MG/DL (ref 0.1–1.5)
BILIRUB UR STRIP-MCNC: NEGATIVE MG/DL
BUN SERPL-MCNC: 9 MG/DL (ref 8–22)
CALCIUM SERPL-MCNC: 9.4 MG/DL (ref 8.5–10.5)
CHLORIDE SERPL-SCNC: 108 MMOL/L (ref 96–112)
CO2 SERPL-SCNC: 23 MMOL/L (ref 20–33)
COLOR UR AUTO: YELLOW
CREAT SERPL-MCNC: 0.82 MG/DL (ref 0.5–1.4)
EOSINOPHIL # BLD AUTO: 0.13 K/UL (ref 0–0.51)
EOSINOPHIL NFR BLD: 2.2 % (ref 0–6.9)
ERYTHROCYTE [DISTWIDTH] IN BLOOD BY AUTOMATED COUNT: 49.1 FL (ref 35.9–50)
FOLATE SERPL-MCNC: 8.6 NG/ML
GLOBULIN SER CALC-MCNC: 2.6 G/DL (ref 1.9–3.5)
GLUCOSE SERPL-MCNC: 85 MG/DL (ref 65–99)
GLUCOSE UR STRIP.AUTO-MCNC: NEGATIVE MG/DL
HCT VFR BLD AUTO: 45.5 % (ref 37–47)
HGB BLD-MCNC: 14.5 G/DL (ref 12–16)
IMM GRANULOCYTES # BLD AUTO: 0.02 K/UL (ref 0–0.11)
IMM GRANULOCYTES NFR BLD AUTO: 0.3 % (ref 0–0.9)
INT CON NEG: NEGATIVE
INT CON POS: POSITIVE
KETONES UR STRIP.AUTO-MCNC: NEGATIVE MG/DL
LEUKOCYTE ESTERASE UR QL STRIP.AUTO: NORMAL
LYMPHOCYTES # BLD AUTO: 2.37 K/UL (ref 1–4.8)
LYMPHOCYTES NFR BLD: 40.2 % (ref 22–41)
MCH RBC QN AUTO: 28.1 PG (ref 27–33)
MCHC RBC AUTO-ENTMCNC: 31.9 G/DL (ref 33.6–35)
MCV RBC AUTO: 88.2 FL (ref 81.4–97.8)
MONOCYTES # BLD AUTO: 0.62 K/UL (ref 0–0.85)
MONOCYTES NFR BLD AUTO: 10.5 % (ref 0–13.4)
NEUTROPHILS # BLD AUTO: 2.71 K/UL (ref 2–7.15)
NEUTROPHILS NFR BLD: 46.1 % (ref 44–72)
NITRITE UR QL STRIP.AUTO: NEGATIVE
NRBC # BLD AUTO: 0 K/UL
NRBC BLD-RTO: 0 /100 WBC
PH UR STRIP.AUTO: 5.5 [PH] (ref 5–8)
PLATELET # BLD AUTO: 244 K/UL (ref 164–446)
PMV BLD AUTO: 10.8 FL (ref 9–12.9)
POC URINE PREGNANCY TEST: NEGATIVE
POTASSIUM SERPL-SCNC: 4.5 MMOL/L (ref 3.6–5.5)
PROT SERPL-MCNC: 6.9 G/DL (ref 6–8.2)
PROT UR QL STRIP: NEGATIVE MG/DL
RBC # BLD AUTO: 5.16 M/UL (ref 4.2–5.4)
RBC UR QL AUTO: NEGATIVE
SODIUM SERPL-SCNC: 141 MMOL/L (ref 135–145)
SP GR UR STRIP.AUTO: 1.02
TSH SERPL DL<=0.005 MIU/L-ACNC: 1.61 UIU/ML (ref 0.38–5.33)
UROBILINOGEN UR STRIP-MCNC: 0.2 MG/DL
VIT B12 SERPL-MCNC: 465 PG/ML (ref 211–911)
WBC # BLD AUTO: 5.9 K/UL (ref 4.8–10.8)

## 2021-10-28 PROCEDURE — 84443 ASSAY THYROID STIM HORMONE: CPT

## 2021-10-28 PROCEDURE — 81002 URINALYSIS NONAUTO W/O SCOPE: CPT | Mod: GC | Performed by: STUDENT IN AN ORGANIZED HEALTH CARE EDUCATION/TRAINING PROGRAM

## 2021-10-28 PROCEDURE — 84702 CHORIONIC GONADOTROPIN TEST: CPT

## 2021-10-28 PROCEDURE — 82607 VITAMIN B-12: CPT

## 2021-10-28 PROCEDURE — 99213 OFFICE O/P EST LOW 20 MIN: CPT | Mod: 25 | Performed by: STUDENT IN AN ORGANIZED HEALTH CARE EDUCATION/TRAINING PROGRAM

## 2021-10-28 PROCEDURE — 36415 COLL VENOUS BLD VENIPUNCTURE: CPT

## 2021-10-28 PROCEDURE — 80053 COMPREHEN METABOLIC PANEL: CPT

## 2021-10-28 PROCEDURE — 82746 ASSAY OF FOLIC ACID SERUM: CPT

## 2021-10-28 PROCEDURE — 85025 COMPLETE CBC W/AUTO DIFF WBC: CPT

## 2021-10-28 PROCEDURE — 81025 URINE PREGNANCY TEST: CPT | Mod: GC | Performed by: STUDENT IN AN ORGANIZED HEALTH CARE EDUCATION/TRAINING PROGRAM

## 2021-10-28 RX ORDER — HYDROCODONE BITARTRATE AND ACETAMINOPHEN 10; 325 MG/1; MG/1
1 TABLET ORAL 4 TIMES DAILY
Status: ON HOLD | COMMUNITY
End: 2023-01-01

## 2021-10-28 RX ORDER — METHOCARBAMOL 500 MG/1
TABLET, FILM COATED ORAL
COMMUNITY
Start: 2021-09-08 | End: 2021-10-28

## 2021-10-28 RX ORDER — HYDROCODONE BITARTRATE AND ACETAMINOPHEN 10; 325 MG/1; MG/1
TABLET ORAL
Qty: 20 TABLET | Status: CANCELLED | OUTPATIENT
Start: 2021-10-28

## 2021-10-28 ASSESSMENT — FIBROSIS 4 INDEX: FIB4 SCORE: 1.11

## 2021-10-28 NOTE — PROGRESS NOTES
Established Patient    Patient Care Team:  Yosvany Montesinos M.D. as PCP - General (Internal Medicine)    Melly Shin is a 46 y.o. female who presents today with the following Chief Complaint(s): Follow up for Diagnoses of Possible pregnancy, not yet confirmed, Neuropathy, Chronic pain of right knee, and Urinary frequency were pertinent to this visit.    HPI:  Patient last seen in clinic 8/27/2021 for pelvic ultrasound follow-up in asthma.    Patient has a past medical history of hiatal hernia, GERD, hives with PPI, MICHELL without CPAP due to claustrophobia, VIVIANA, asthma (pulmonary on 3/21 mild airflow obstruction and preserved FEV1), prior abnormal imaging finding (CT chest showed groundglass in bilateral bases need 1 year follow-up, follows with [facility] Dr. Gil) ovarian cyst (ultrasound shows hemorrhagic cyst)    She was referred to OB/GYN during last visit. She report she has appointment with [facility] 11/10/21    She report asthma is well controlled.       Problem   Possible Pregnancy, Not Yet Confirmed    Report menstrual cycle is 12 days late  Patient has tried OTC pregnancy test which were negative   Patient report has appointment with Dr. Moreira for OBGYN for hemorrhagic cyst  Patient last sexual encounter was 10/6/2021  No hot flashes  She is concern as she has history of ectopic pregnancy.  POC HCG was negative in clinic       Neuropathy    -Report burning and pain on bilateral feet and legs x months, frequency of every night  - patient has hx of CLBP and cervical pain for which she follows with pain management. She report she has not had injection for past 6 month. She previously took methocarbamol but stop because it made her too relaxed  -Patient follows with pain management, next appointment 11/10/21  -No weakness     Right Knee Pain    - Reports on occasion she experiences popping sensation with associated short term pain  - She report she is able to walk normally after  - no limit  on range of motion     Screening for Malignant Neoplasm of Cervix   Cough   Asthma   Generalized Anxiety Disorder   Hyperlipidemia   Hypertension   Lipoma of Abdominal Wall   Lung Mass   Obstructive Sleep Apnea Syndrome   Overweight With Body Mass Index (Bmi) 25.0-29.9   Tobacco Dependence Syndrome   Abnormal Findings On Diagnostic Imaging of Lung   Epigastric Abdominal Tenderness   Other Ovarian Cyst, Left Side   Prediabetes   Encounter for Screening for Other Metabolic Disorders   History of Abdominal Hernia   Gastroesophageal Reflux Disease With Esophagitis   Chronic Anxiety   Back Pain   Chronic Obstructive Pulmonary Disease, Unspecified (Hcc)   Chronic Sinusitis, Unspecified   Degeneration of Intervertebral Disc of Lumbosacral Region        ROS:     Denies any new chest pain or shortness of breath.  No changes to urinary or bowel function.  See HPI.    Past Medical History:   Diagnosis Date   • Asthma    • Chronic obstructive pulmonary disease (HCC)    • Psychiatric disorder     Anxiety, Depression     Social History     Tobacco Use   • Smoking status: Current Some Day Smoker     Packs/day: 1.00     Years: 20.00     Pack years: 20.00     Types: Cigarettes   • Smokeless tobacco: Never Used   • Tobacco comment: on and off    Vaping Use   • Vaping Use: Never used   Substance Use Topics   • Alcohol use: Not Currently   • Drug use: Not Currently     Comment: THC     Current Outpatient Medications   Medication Sig Dispense Refill   • diclofenac sodium (VOLTAREN) 1 % Gel      • HYDROcodone/acetaminophen (NORCO)  MG Tab Take 1-2 Tablets by mouth every 6 hours as needed.     • Mometasone Furoate (ASMANEX HFA) 100 MCG/ACT Aerosol Inhale 2-4 Puffs.     • omeprazole (PRILOSEC) 40 MG delayed-release capsule Take 40 mg by mouth.     • predniSONE (DELTASONE) 10 MG Tab Take 30mg x 3 days, then take 20mg x 3 days, then take 10mg x 3 days, with food, then discontinue. 18 Tablet 3   • ipratropium-albuterol (DUONEB) 0.5-2.5  (3) MG/3ML nebulizer solution Take 3 mL by nebulization 4 times a day. 120 Each 5   • albuterol 108 (90 Base) MCG/ACT Aero Soln inhalation aerosol Inhale 2 Puffs every 6 hours as needed for Shortness of Breath. 8.5 g 5   • fluticasone (FLONASE) 50 MCG/ACT nasal spray      • STIOLTO RESPIMAT 2.5-2.5 MCG/ACT Aero Soln Inhale 2 Puffs every day. 3 Each 4   • nicotine ([employer] NICOTINE TRANSDERMAL SYS) 14 MG/24HR PATCH 24 HR Place 1 Patch on the skin every 24 hours. 30 Patch 3     No current facility-administered medications for this visit.     Physical Exam:  /83 (BP Location: Left arm, Patient Position: Sitting, BP Cuff Size: Large adult)   Pulse 97   Temp 37.3 °C (99.1 °F) (Temporal)   Ht 1.829 m (6')   Wt 97.5 kg (215 lb)   SpO2 97%   BMI 29.16 kg/m²   General: Well developed, well nourished female, in no distress.  Eyes: Conjuntiva without any obvious injection or erythema.   Cardiovascular: Heart is regular with No murmur  Lungs: Clear to auscultation bilaterally. Mild expiratory wheezes. No rhonci or crackles heard. Respiratory effort is normal.  Abd: Soft, non-tender, no pelvic discomfort with palpation  Ext: right knee strength intact, rom intact, mild tenderness to palpation in area lateral to tibial tuberosity    Assessment and Plan:   Neuropathy  Neuropathy without association with weakness, incontinence. Etiology likely related to chronic back injury, however will check tsh, vitamins.  No recent trauma or increase in back pain    Plan  - Will check vitamin B12, Folate, TSH  - patient declined trial gabapentin  - if symptoms persists may consider EMG for evaluation      Possible pregnancy, not yet confirmed  POC urine hcg negative  Will obtain serum HCG  Discussed with patient to obtain lab asap, will schedule for close follow up, will need to adjust medication including opiate medication if patient is pregnant    Right knee pain  Atraumatic  Etiology likely bursitis/ or related to IT band over  usage  Plan  Recommend conservative management  Continue to monitor  Patient instructed to follow up if pain worsens for consideration of xray imaging  Will refer to physical therapy       Return in about 2 months (around 12/28/2021).  There are no Patient Instructions on file for this visit.

## 2021-11-01 ENCOUNTER — TELEPHONE (OUTPATIENT)
Dept: INTERNAL MEDICINE | Facility: OTHER | Age: 46
End: 2021-11-01

## 2021-11-01 NOTE — TELEPHONE ENCOUNTER
Called patient, reviewed labs.   Discussed with patient base on labs study it is unlikely she is pregnant. She confirmed her LMP 9/23/21.  Serum bHCG < 1.0  Reviewed TSH which is within normal limit.

## 2021-11-01 NOTE — TELEPHONE ENCOUNTER
Caller Name: Melly Shin  Call Back Number: [phone]      How would the patient prefer to be contacted with a response: Phone call OK to leave a detailed message    Lab Results: pt called and is having severe anxiety, she stated she saw her lab results on MyChart but does not know what they mean, she would like a call back regarding results.

## 2021-11-08 ENCOUNTER — GYNECOLOGY VISIT (OUTPATIENT)
Dept: OBGYN | Facility: CLINIC | Age: 46
End: 2021-11-08
Payer: MEDICAID

## 2021-11-08 ENCOUNTER — HOSPITAL ENCOUNTER (OUTPATIENT)
Facility: MEDICAL CENTER | Age: 46
End: 2021-11-08
Attending: STUDENT IN AN ORGANIZED HEALTH CARE EDUCATION/TRAINING PROGRAM
Payer: MEDICAID

## 2021-11-08 VITALS
BODY MASS INDEX: 29.43 KG/M2 | DIASTOLIC BLOOD PRESSURE: 74 MMHG | HEART RATE: 82 BPM | WEIGHT: 217 LBS | SYSTOLIC BLOOD PRESSURE: 134 MMHG

## 2021-11-08 DIAGNOSIS — R35.0 FREQUENCY OF URINATION: ICD-10-CM

## 2021-11-08 DIAGNOSIS — Z11.3 SCREENING EXAMINATION FOR SEXUALLY TRANSMITTED DISEASE: ICD-10-CM

## 2021-11-08 DIAGNOSIS — N88.9 CERVICAL LESION: ICD-10-CM

## 2021-11-08 DIAGNOSIS — Z12.4 CERVICAL CANCER SCREENING: ICD-10-CM

## 2021-11-08 DIAGNOSIS — N92.6 IRREGULAR MENSES: Primary | ICD-10-CM

## 2021-11-08 DIAGNOSIS — N39.46 URINARY INCONTINENCE, MIXED: ICD-10-CM

## 2021-11-08 LAB
APPEARANCE UR: CLEAR
BILIRUB UR STRIP-MCNC: NORMAL MG/DL
COLOR UR AUTO: YELLOW
GLUCOSE UR STRIP.AUTO-MCNC: NEGATIVE MG/DL
INT CON NEG: NORMAL
INT CON POS: NORMAL
KETONES UR STRIP.AUTO-MCNC: NEGATIVE MG/DL
LEUKOCYTE ESTERASE UR QL STRIP.AUTO: NORMAL
NITRITE UR QL STRIP.AUTO: NEGATIVE
PH UR STRIP.AUTO: 6 [PH] (ref 5–8)
POC URINE PREGNANCY TEST: NEGATIVE
PROT UR QL STRIP: NEGATIVE MG/DL
RBC UR QL AUTO: NEGATIVE
SP GR UR STRIP.AUTO: 1
UROBILINOGEN UR STRIP-MCNC: NORMAL MG/DL

## 2021-11-08 PROCEDURE — 81002 URINALYSIS NONAUTO W/O SCOPE: CPT | Performed by: STUDENT IN AN ORGANIZED HEALTH CARE EDUCATION/TRAINING PROGRAM

## 2021-11-08 PROCEDURE — 99204 OFFICE O/P NEW MOD 45 MIN: CPT | Mod: 25 | Performed by: STUDENT IN AN ORGANIZED HEALTH CARE EDUCATION/TRAINING PROGRAM

## 2021-11-08 PROCEDURE — 88175 CYTOPATH C/V AUTO FLUID REDO: CPT

## 2021-11-08 PROCEDURE — 87491 CHLMYD TRACH DNA AMP PROBE: CPT

## 2021-11-08 PROCEDURE — 87591 N.GONORRHOEAE DNA AMP PROB: CPT

## 2021-11-08 PROCEDURE — 81025 URINE PREGNANCY TEST: CPT | Performed by: STUDENT IN AN ORGANIZED HEALTH CARE EDUCATION/TRAINING PROGRAM

## 2021-11-08 ASSESSMENT — FIBROSIS 4 INDEX: FIB4 SCORE: 0.79

## 2021-11-08 NOTE — PROGRESS NOTES
Urogynecology and Pelvic Reconstructive Surgery Consultation Visit    Melly Shin MRN:[id number] :[id number]      Reason for Visit:   Chief Complaint   Patient presents with   • New Patient     irregular bleeding         Subjective     History of Presenting Illness:        HPI: Melly Shin is a 46 y.o.  LMP 21with h/o severe asthma, DJD with chronic pain, iron deficiency anemia. She presents today for GYN visit to discuss irregular bleeding, and to establish GYN care after moving from [place] last year.    Her main issue today is irregular cycles. She has always had very regular period, however this last period was 15 days late and heavier than usual. She has taken multiple pregnancy tests, including a beta quant on 10/28/21, all of which were negative. She is wary of these results due to a h/o prior pregnancy which had negative pregnancy tests until late, as well as a h/o prior ectopic pregnancy x2. She had not been sexually active for some time, and had sex in early October with a partner she had known previously but had not been with in a long time.     Health maintenance:  Last pap: likely 2019 (result unavailable), reports no abnormal previously  Mammo: n/a - h/o breast reduction 2019  Colonoscopy: n/a        Prior Pelvic surgery:   Right salpingectomy - ruptured ectopic        Fluid intake:   5 cups water  1 cup coffee  1 cup tea    Pelvic floor symptom review:     Bladder:   Voids per day: 5 Voids per night: 3      Urinary incontinence episodes per day: 1-2    Urge leakage:  Full Bladder   Stress leakage: With Cough and With Laugh   Continuous / insensible urine loss: No    Nocturnal enuresis: No    Leakage volume: Drops   Number of pads/day: none    Bladder emptying: Incomplete   Voiding symptoms: Post-Void Dribble   UTI in last 12 months: No   Other urologic history: no      Prolapse:     Prolapse symptoms: None        Sexual function:    Sexually active: Yes   Gender of partners:  "Male   Pain with intercourse: No       Pelvic Pain: No     Social: current tobacco smoker    Past medical and surgical history    Past obstetric history   Number of vaginal deliveries: 1   Number of  deliveries: 0   History of vacuum/forceps: No    History of obstetric anal sphincter injury: No     Past gynecological history:    Last menstrual period: 21   Periods: regular, length 6+ days   History of abnormal uterine bleeding: \"missed period\" otherwise normal   History of fibroids: No    History of endometrial polyps:  No    History of endometriosis: No    History of cervical dysplasia: No    Last pap: likely 2019   Current contraception: none   Prior GYN surgery: Adnexal Surgery    Past medical history:  Past Medical History:   Diagnosis Date   • Asthma    • Chronic obstructive pulmonary disease (HCC)    • Psychiatric disorder     Anxiety, Depression     Past surgical history:No past surgical history on file.  Medications:has a current medication list which includes the following prescription(s): diclofenac sodium, hydrocodone/acetaminophen, asmanex hfa, omeprazole, prednisone, ipratropium-albuterol, albuterol, fluticasone, stiolto respimat, and nicotine.  Allergies:Aspirin and Penicillins  Family history:  Family History   Problem Relation Age of Onset   • Diabetes Mother    • Cancer Father      Social history: reports that she has been smoking cigarettes. She has a 20.00 pack-year smoking history. She has never used smokeless tobacco. She reports previous alcohol use. She reports previous drug use.    Review of systems: A full review of systems was performed, and negative with the exception of want is noted above in the HPI.        Objective        /74 (BP Location: Right arm, Patient Position: Sitting, BP Cuff Size: Adult)   Pulse 82   Wt 98.4 kg (217 lb)   BMI 29.43 kg/m²     Physical Exam    Genitourinary:    External female genitalia: WNL   Vulva: WNL   Urethra: WNL   Vagina: WNL "    Cervix: small friable lesion on anterior cervix. Darkened area around the right cervical os, which on palpation felt like a nabothian cyst, however this is uncelar. External os with inflammatory changes.    Atrophy: None   Cough stress test: Negative    Pelvic floor:    POP-Q: no significant prolapse    Urethral tenderness: No    Bladder/ suprapubic tenderness: No    Levator tenderness: None   Levator muscle tone: WNL   Pelvic floor contraction strength (modified Oxford scale): 3=Moderate   Pelvic floor contraction duration: Normal   Bimanual exam: small mobile uterus, no palpable adnexal masses     Procedure Performed: No    Diagnostic test and records review:    Urine dipstick: leuks     Post-void residual: 0mL, performed by Bladder Scanner    Labs:     Radiology:     Pelvic US 8/26/21 - images independently reviewed  FINDINGS:  Both transabdominal and transvaginal scanning were performed to optimally visualize the pelvis.     The uterus measures 5.36 cm x 9.62 cm x 6.03 cm.  The uterine myometrium is inhomogeneous.  The endometrial echo complex measures 0.14 cm.     Low resistive waveforms are confirmed within the ovaries.  The right ovary measures 2.51 cm x 2.26 cm x 1.99 cm.     The left ovary measures 4.86 cm x 2.98 cm x 2.96 cm.     There is a hypoechoic lesion left adnexa without internal vascularity measuring 3.0 x 2.6 x 2.4 cm. This is most consistent with a hemorrhagic cyst.     There is no free fluid seen.     IMPRESSION:  1.  There is a 3 cm left ovarian hemorrhagic cyst.    Documentation reviewed: Prior EMR Records             Assessment & Plan     Ms.Ayanna Shin is a 46 y.o. year old P1 with irregular vaginal bleeding, liklely perimenopausal. Also, abnormal cervical appearance today on exam. We discussed my recommendations for further diagnosis and treatment at length today.     1. Irregular menses  Multiple pregnancy tests were negative, including today.  She is counseled thoroughly that her  pattern of bleeding can be safely observed at this time.  She has had regular periods her entire life and just now reports a missed period with a heavier menstruation when it arrived last week.  Given her age is very likely that she is in a perimenopausal status.  The normal pattern of perimenopause is lengthening time between periods.  Additionally, a recent pelvic ultrasound was overall unremarkable with the exception of a small hemorrhagic ovarian cyst which does not require routine follow-up unless symptoms change.  She was counseled that if her periods to become consistently heavier, if she has intermenstrual bleeding, or postcoital bleeding, we can involve further work-up.  If positive bleeding symptoms worsen she can contact me and be seen in 3 to 4 months time.    2. Cervical cancer screening  3. Cervical lesion  Abnormal cervical parents was seen today with an anterior small friable lesion as well as a inflamed appearing cervix with a possible nabothian cyst visualized.  For this reason, and for cervical cancer screening, a Pap with cotesting was performed.  - THINPREP RFLX HPV ASCUS W/CTNG; Future    4. Screening examination for sexually transmitted disease  Given the inflamed appearance of her cervix I recommended gonorrhea and Chlamydia testing, especially given the recent reinitiation of sexual intercourse.  GC/CT testing done at the time of Pap.    5.  Mixed urinary incontinence  She does report both stress and urge related symptoms, most of which are mild at this time without the use of a pad.  Given her age and mild status of symptoms I do recommend conservative management observation, encouraging pelvic floor exercises in the interim.  Most bothersome symptom is that she cannot drive long distances without having a strong urge to urinate, more specifically when she drives to [place].  Given the infrequency of these events I do not recommend urge medications at this time, however this might be a  useful intervention in the future.  She was briefly touch urge suppression strategies with rapidfire Kegel's as well as avoidance of bladder irritants like caffeine and tea prior to drives, as well as fluid restriction prior to drives.  -Continue to monitor and if symptoms worsen we can talk about further management for her incontinence symptoms           Phylicia Patricio MD, FACOG    Female Pelvic Medicine and Reconstructive Surgery  Department of Obstetrics and Gynecology  [facility]        This medical record contains text that has been entered with the assistance of computer voice recognition and dictation software.  Therefore, it may contain unintended errors in text, spelling, punctuation, or grammar

## 2021-11-08 NOTE — NON-PROVIDER
PT here today for urination frequency   PT States Urgency and frequency to urinate for 2 months. As well as irregular period in October.   Hysterectomy?Never  Good #: [phone]   Bladder Scan: N/A  Pharmacy Verified

## 2021-11-10 LAB
C TRACH DNA GENITAL QL NAA+PROBE: NEGATIVE
CYTOLOGY REG CYTOL: NORMAL
N GONORRHOEA DNA GENITAL QL NAA+PROBE: NEGATIVE
SPECIMEN SOURCE: NORMAL

## 2021-11-12 ENCOUNTER — OFFICE VISIT (OUTPATIENT)
Dept: SLEEP MEDICINE | Facility: MEDICAL CENTER | Age: 46
End: 2021-11-12
Payer: MEDICAID

## 2021-11-12 VITALS
DIASTOLIC BLOOD PRESSURE: 82 MMHG | HEART RATE: 102 BPM | WEIGHT: 217 LBS | HEIGHT: 72 IN | OXYGEN SATURATION: 99 % | BODY MASS INDEX: 29.39 KG/M2 | SYSTOLIC BLOOD PRESSURE: 122 MMHG

## 2021-11-12 DIAGNOSIS — Z28.21 COVID-19 VACCINATION REFUSED: ICD-10-CM

## 2021-11-12 DIAGNOSIS — J45.50 SEVERE PERSISTENT ASTHMA WITHOUT COMPLICATION: ICD-10-CM

## 2021-11-12 DIAGNOSIS — F41.1 GENERALIZED ANXIETY DISORDER: ICD-10-CM

## 2021-11-12 DIAGNOSIS — J43.1 PANLOBULAR EMPHYSEMA (HCC): ICD-10-CM

## 2021-11-12 DIAGNOSIS — F41.9 CHRONIC ANXIETY: ICD-10-CM

## 2021-11-12 DIAGNOSIS — F17.200 TOBACCO DEPENDENCE SYNDROME: ICD-10-CM

## 2021-11-12 DIAGNOSIS — R91.8 GROUND GLASS OPACITY PRESENT ON IMAGING OF LUNG: ICD-10-CM

## 2021-11-12 PROCEDURE — 99215 OFFICE O/P EST HI 40 MIN: CPT | Performed by: INTERNAL MEDICINE

## 2021-11-12 RX ORDER — PREDNISONE 10 MG/1
TABLET ORAL
Qty: 18 TABLET | Refills: 3 | Status: SHIPPED | OUTPATIENT
Start: 2021-11-12 | End: 2022-06-02

## 2021-11-12 ASSESSMENT — FIBROSIS 4 INDEX: FIB4 SCORE: 0.79

## 2021-11-12 ASSESSMENT — ENCOUNTER SYMPTOMS
SHORTNESS OF BREATH: 1
COUGH: 1
WHEEZING: 1
NERVOUS/ANXIOUS: 1

## 2021-11-13 NOTE — ASSESSMENT & PLAN NOTE
CT chest 9/2021:  1.  No significant interval change in 17 mm right lower lobe groundglass opacity.  2.  Stable 3 mm right lower lobe pulmonary nodule.  3.  Emphysema with mild diffuse bronchial wall thickening suggesting chronic bronchitis.    Repeat CT chest 6/2023 per Fleischner guidelines

## 2021-11-13 NOTE — ASSESSMENT & PLAN NOTE
- Patient was advised of importance of smoking cessation and counseled about the importance of smoking cessation for improved morbidity, mortality, and overall well-being. Patient was presented pharmacologic and non-pharmacologic methods for quitting.  - At this time, the patient is pre contemplative and not interested in quitting. 5 min spent counseling.

## 2021-11-13 NOTE — ASSESSMENT & PLAN NOTE
Multiple medication/inhaler intolerances  Self titrates prednisone based on symptoms  Prescribed asmanex/stiolto, not using though, relying on duonebs and albuterol using twice daily  Active tobacco smoking and developing steroid-related complications  -- discussed these issues at length, she is reluctant to change patterns of behavior at this time  -- overall she feels she is much better than she was when living in [place]  -- refill for prednisone provided

## 2021-11-13 NOTE — ASSESSMENT & PLAN NOTE
She reports she is essentially crippled with anxiety about fear of anna COVID-19, medications and medication side effects, about quitting smoking.  -- this is a significant barrier to her health and well being, recommend continued counseling/psychiatric care

## 2021-11-13 NOTE — PROGRESS NOTES
Pulmonary Clinic Note    Chief Complaint:  Chief Complaint   Patient presents with   • Asthma     Last seen 08/20/21   • Results     CT-Chest 09/08/21     HPI:   Melly Shin is a very pleasant 46 y.o. female active tobacco smoker followed in our clinic by Dr. Gil for severe persistent asthma and COPD.  Please see detailed note from 8/2021 for details.  Briefly, the patient has a longstanding history of asthma since childhood.  Previously was living in [place] where she was hospitalized nearly monthly for asthma exacerbations.  She has multiple medication intolerances/allergies to various inhalers.  She has a history of allergy shots when living in [place] from which she also had negative reactions.  In 8/2020 she moved from [place] to [place] on the advice of her pulmonologist, and has since had much better control of her asthma and has not been hospitalized since relocating. She has a history of degenerative joint disease and severe anxiety for which she sees a counselor.  Previous work-up including peripheral differential and serum IgE was unremarkable.      Her current regimen includes Asmanex and Stiolto which she is currently not taking.  She is using DuoNebs twice daily and albuterol as needed, which she averages about twice a day as well.  She self titrates prednisone at various doses based on how she feels she is breathing.  She is currently on no prednisone but last used 3 weeks ago.  She is on omeprazole for GERD and Flonase for chronic rhinosinusitis.  She reports she continues to smoke anywhere from 0 to 1 pack/day, based on her stress levels    At her last appointment with Dr. Gil, a sputum cell count for eosinophils was ordered but was discontinued by the lab.  Groundglass opacities are noted on outpatient CT abdomen/pelvis, repeat CT chest showed redemonstration of these groundglass opacities with no change since prior.    She reports she is essentially crippled with anxiety about fear of  anna COVID-19 and does not leave her house.  She is not vaccinated, as she has previously had vaccine-related side effects.    Exacerbations this year: 3+    Past Medical History:   Diagnosis Date   • Asthma    • Chronic obstructive pulmonary disease (HCC)    • Psychiatric disorder     Anxiety, Depression     History reviewed. No pertinent surgical history.    Social History     Socioeconomic History   • Marital status:      Spouse name: Not on file   • Number of children: Not on file   • Years of education: Not on file   • Highest education level: Not on file   Occupational History   • Not on file   Tobacco Use   • Smoking status: Current Some Day Smoker     Packs/day: 1.00     Years: 20.00     Pack years: 20.00     Types: Cigarettes   • Smokeless tobacco: Never Used   • Tobacco comment: on and off    Vaping Use   • Vaping Use: Never used   Substance and Sexual Activity   • Alcohol use: Not Currently   • Drug use: Not Currently     Comment: THC   • Sexual activity: Not on file   Other Topics Concern   • Not on file   Social History Narrative   • Not on file     Social Determinants of Health     Financial Resource Strain:    • Difficulty of Paying Living Expenses: Not on file   Food Insecurity:    • Worried About Running Out of Food in the Last Year: Not on file   • Ran Out of Food in the Last Year: Not on file   Transportation Needs:    • Lack of Transportation (Medical): Not on file   • Lack of Transportation (Non-Medical): Not on file   Physical Activity:    • Days of Exercise per Week: Not on file   • Minutes of Exercise per Session: Not on file   Stress:    • Feeling of Stress : Not on file   Social Connections:    • Frequency of Communication with Friends and Family: Not on file   • Frequency of Social Gatherings with Friends and Family: Not on file   • Attends Bahai Services: Not on file   • Active Member of Clubs or Organizations: Not on file   • Attends Club or Organization Meetings: Not  on file   • Marital Status: Not on file   Intimate Partner Violence:    • Fear of Current or Ex-Partner: Not on file   • Emotionally Abused: Not on file   • Physically Abused: Not on file   • Sexually Abused: Not on file   Housing Stability:    • Unable to Pay for Housing in the Last Year: Not on file   • Number of Places Lived in the Last Year: Not on file   • Unstable Housing in the Last Year: Not on file        Family History   Problem Relation Age of Onset   • Diabetes Mother    • Cancer Father      Current Outpatient Medications on File Prior to Visit   Medication Sig Dispense Refill   • diclofenac sodium (VOLTAREN) 1 % Gel      • HYDROcodone/acetaminophen (NORCO)  MG Tab Take 1-2 Tablets by mouth every 6 hours as needed.     • Mometasone Furoate (ASMANEX HFA) 100 MCG/ACT Aerosol Inhale 2-4 Puffs.     • omeprazole (PRILOSEC) 40 MG delayed-release capsule Take 40 mg by mouth.     • ipratropium-albuterol (DUONEB) 0.5-2.5 (3) MG/3ML nebulizer solution Take 3 mL by nebulization 4 times a day. 120 Each 5   • albuterol 108 (90 Base) MCG/ACT Aero Soln inhalation aerosol Inhale 2 Puffs every 6 hours as needed for Shortness of Breath. 8.5 g 5   • fluticasone (FLONASE) 50 MCG/ACT nasal spray      • STIOLTO RESPIMAT 2.5-2.5 MCG/ACT Aero Soln Inhale 2 Puffs every day. 3 Each 4     No current facility-administered medications on file prior to visit.     Allergies: Aspirin and Penicillins    ROS:   Review of Systems   Constitutional:        She reports she is essentially crippled with anxiety about fear of anna COVID-19 and does not leave her house.   Respiratory: Positive for cough, shortness of breath and wheezing.    Psychiatric/Behavioral: The patient is nervous/anxious ( ).    All other systems reviewed and are negative.    Vitals:  /82 (BP Location: Left arm, Patient Position: Sitting, BP Cuff Size: Adult)   Pulse (!) 102   Ht 1.829 m (6')   Wt 98.4 kg (217 lb)   SpO2 99%     Physical  Exam:  Physical Exam  Vitals and nursing note reviewed.   Constitutional:       General: She is not in acute distress.     Appearance: Normal appearance. She is well-developed. She is not ill-appearing or diaphoretic.      Comments: Very pleasant   Eyes:      General: No scleral icterus.        Right eye: No discharge.         Left eye: No discharge.      Conjunctiva/sclera: Conjunctivae normal.      Pupils: Pupils are equal, round, and reactive to light.   Neck:      Thyroid: No thyromegaly.      Vascular: No JVD.   Cardiovascular:      Rate and Rhythm: Normal rate and regular rhythm.      Heart sounds: Normal heart sounds. No murmur heard.  No gallop.    Pulmonary:      Effort: Pulmonary effort is normal. No respiratory distress.      Breath sounds: Normal breath sounds. No wheezing (end expiratory, scattered) or rales.   Abdominal:      General: There is no distension.      Palpations: Abdomen is soft.      Tenderness: There is no abdominal tenderness. There is no guarding.   Musculoskeletal:         General: No tenderness.   Lymphadenopathy:      Cervical: No cervical adenopathy.   Skin:     General: Skin is warm.      Capillary Refill: Capillary refill takes less than 2 seconds.      Findings: No erythema or rash.   Neurological:      Mental Status: She is alert and oriented to person, place, and time.      Cranial Nerves: No cranial nerve deficit.      Sensory: No sensory deficit.      Motor: No abnormal muscle tone.   Psychiatric:      Comments: Generalized anxiety  Hyperverbal       Laboratory Data:    PFTs as reviewed by me personally show:  None for review at time of consultation    Imaging as reviewed by me personally show:    CT chest 9/2021 personally reviewed, agree with radiology interpretation:  1.  No significant interval change in 17 mm right lower lobe groundglass opacity.  2.  Stable 3 mm right lower lobe pulmonary nodule.  3.  Emphysema with mild diffuse bronchial wall thickening suggesting  chronic bronchitis.     Assessment/Plan:    Problem List Items Addressed This Visit     Severe asthma     Multiple medication/inhaler intolerances  Self titrates prednisone based on symptoms  Prescribed asmanex/stiolto, not using though, relying on duonebs and albuterol using twice daily  Active tobacco smoking and developing steroid-related complications  -- discussed these issues at length, she is reluctant to change patterns of behavior at this time  -- overall she feels she is much better than she was when living in [place]  -- refill for prednisone provided         Relevant Medications    predniSONE (DELTASONE) 10 MG Tab    Generalized anxiety disorder     She is essentially crippled with anxiety about fear of anna COVID-19, medications and medication side effects, quitting smoking, etc.  -- this is a significant barrier to her health and well being, recommend continued counseling/psychiatric care         Ground glass opacity present on imaging of lung     CT chest 9/2021:  1.  No significant interval change in 17 mm right lower lobe groundglass opacity.  2.  Stable 3 mm right lower lobe pulmonary nodule.  3.  Emphysema with mild diffuse bronchial wall thickening suggesting chronic bronchitis.  Repeat CT chest 6/2023 per Fleischner guidelines         Chronic obstructive pulmonary disease, unspecified (HCC)     +emphysema on CT  Continues to smoke  Plan of care as outlined above.         Relevant Medications    predniSONE (DELTASONE) 10 MG Tab    Tobacco dependence syndrome     - Patient was advised of importance of smoking cessation and counseled about the importance of smoking cessation for improved morbidity, mortality, and overall well-being. Patient was presented pharmacologic and non-pharmacologic methods for quitting.  - At this time, the patient is pre contemplative and not interested in quitting. 5 min spent counseling.         COVID-19 vaccination refused     Fearful of side effects due to  intolerance to vaccines in past  Plan of care as outlined above.             Return in about 6 months (around 5/12/2022)    This note was generated using voice recognition software which has a chance of producing errors of grammar and possibly content.  I have made every reasonable attempt to find and correct any obvious errors, but it should be expected that some may not be found prior to finalization of this note.    Time spent in record review prior to patient arrival, reviewing results, and in face-to-face encounter totaled 45 min, excluding any procedures if performed.  __________  Luis Jin MD  Pulmonary and Critical Care Medicine  [facility]

## 2021-11-13 NOTE — ASSESSMENT & PLAN NOTE
She is essentially crippled with anxiety about fear of anna COVID-19, medications and medication side effects, quitting smoking, etc.  -- this is a significant barrier to her health and well being, recommend continued counseling/psychiatric care

## 2021-11-17 ENCOUNTER — PATIENT MESSAGE (OUTPATIENT)
Dept: SLEEP MEDICINE | Facility: MEDICAL CENTER | Age: 46
End: 2021-11-17

## 2021-11-17 DIAGNOSIS — J45.50 SEVERE PERSISTENT ASTHMA WITHOUT COMPLICATION: ICD-10-CM

## 2021-11-17 RX ORDER — ALBUTEROL SULFATE 90 UG/1
2 AEROSOL, METERED RESPIRATORY (INHALATION) EVERY 6 HOURS PRN
Qty: 8.5 G | Refills: 5 | Status: SHIPPED | OUTPATIENT
Start: 2021-11-17 | End: 2022-05-16 | Stop reason: SDUPTHER

## 2021-11-29 ENCOUNTER — TELEPHONE (OUTPATIENT)
Dept: SLEEP MEDICINE | Facility: MEDICAL CENTER | Age: 46
End: 2021-11-29

## 2021-11-29 NOTE — TELEPHONE ENCOUNTER
MEDICATION PRIOR AUTHORIZATION NEEDED:    1. Name of Medication: ALBUTEROL SULFATE 90 MCG    2. Requested By (Name of Pharmacy): [facility]     3. Is insurance on file current? YES    4. What is the name & phone number of the 3rd party payor? MEDICAID [phone]

## 2021-11-30 NOTE — TELEPHONE ENCOUNTER
DOCUMENTATION OF PRIOR AUTH STATUS    1. Medication name and dose: Albuterol Sulfate 90 mcg    2. Name and Phone # of Prescription coverage company: Medicaid [id number]    3. Date Prior Auth was submitted: 11/23/2021    4. What information was given to obtain insurance decision: Clinical notes    5. Prior Auth letter Approved or Denied: Approved through 11/23/2022    6. Pharmacy notified: Yes    7. Patient notified: Yes

## 2021-12-06 ENCOUNTER — OFFICE VISIT (OUTPATIENT)
Dept: INTERNAL MEDICINE | Facility: OTHER | Age: 46
End: 2021-12-06
Payer: MEDICAID

## 2021-12-06 VITALS
HEIGHT: 72 IN | BODY MASS INDEX: 28.71 KG/M2 | HEART RATE: 88 BPM | SYSTOLIC BLOOD PRESSURE: 138 MMHG | OXYGEN SATURATION: 96 % | DIASTOLIC BLOOD PRESSURE: 86 MMHG | TEMPERATURE: 98.2 F | WEIGHT: 212 LBS

## 2021-12-06 DIAGNOSIS — J45.50 SEVERE PERSISTENT ASTHMA WITHOUT COMPLICATION: ICD-10-CM

## 2021-12-06 DIAGNOSIS — J32.3 CHRONIC SPHENOIDAL SINUSITIS: ICD-10-CM

## 2021-12-06 DIAGNOSIS — J00 ACUTE NASOPHARYNGITIS: ICD-10-CM

## 2021-12-06 DIAGNOSIS — Z28.21 COVID-19 VACCINATION REFUSED: ICD-10-CM

## 2021-12-06 PROCEDURE — 99213 OFFICE O/P EST LOW 20 MIN: CPT | Mod: GE | Performed by: STUDENT IN AN ORGANIZED HEALTH CARE EDUCATION/TRAINING PROGRAM

## 2021-12-06 RX ORDER — AZITHROMYCIN 500 MG/1
500 TABLET, FILM COATED ORAL DAILY
Qty: 5 TABLET | Refills: 0 | Status: SHIPPED | OUTPATIENT
Start: 2021-12-06 | End: 2021-12-09

## 2021-12-06 RX ORDER — FLUTICASONE PROPIONATE 50 MCG
2 SPRAY, SUSPENSION (ML) NASAL DAILY
Qty: 16 G | Refills: 11 | Status: SHIPPED | OUTPATIENT
Start: 2021-12-06 | End: 2023-01-01

## 2021-12-06 ASSESSMENT — FIBROSIS 4 INDEX: FIB4 SCORE: 0.79

## 2021-12-06 NOTE — PROGRESS NOTES
Established Patient    Patient Care Team:  Yosvany Montesinos M.D. as PCP - General (Internal Medicine)    Melly Shin is a 46 y.o. female who presents today with the following Chief Complaint(s): Follow up for Diagnoses of Severe persistent asthma without complication, Chronic sphenoidal sinusitis, Acute nasopharyngitis, and COVID-19 vaccination refused were pertinent to this visit.    HPI:    Patient has a past medical history of   - Hiatal hernia, GERD  - Hives with PPI  - MICHELL without CPAP due to claustrophobia,   - VIVIANA  - Hx of Asthma, previously living in [place], requiring multiple hospitalization. Hx of severe reaction to allergy shots. (pulmonary on 3/21 mild airflow obstruction and preserved FEV1)  - Prior abnormal lung imaging finding (CT chest showed groundglass in bilateral bases need 1 year follow-up, follows with [facility] pulmonology Dr. Gil)   - Ovarian cyst (ultrasound shows hemorrhagic cyst)    Since last visit patient had followed up with gynecology and pulmonology.   - OBGYN following irregular menses thought to be perimenopausal bleed, pap smear done pathology shows negative intraepithelial lesion or malignancy.  Shift in vaginal curry concern for BV.  -Patient notes no  concerns    Patient complaint of acute issue of sinus pain, drainage, productive cough with sputum and chest tightness x 3 days.   - nebulizer 4 times a day from 2 times a day  - albuterol 4 times a day  - patient had not required the prednisone 10mg , patient had supply ad home  - no fever, or chills  - noted shortness of breath  - report tested negative for COVID PCR from [place]            ROS:     Denies any new chest pain.  Positive shortness of breath and URI symptoms.  No changes to urinary or bowel function.  See HPI.    Past Medical History:   Diagnosis Date   • Asthma    • Chronic obstructive pulmonary disease (HCC)    • Psychiatric disorder     Anxiety, Depression     Social History     Tobacco Use   •  Smoking status: Current Some Day Smoker     Packs/day: 1.00     Years: 20.00     Pack years: 20.00     Types: Cigarettes   • Smokeless tobacco: Never Used   • Tobacco comment: on and off    Vaping Use   • Vaping Use: Never used   Substance Use Topics   • Alcohol use: Not Currently   • Drug use: Not Currently     Comment: THC     Current Outpatient Medications   Medication Sig Dispense Refill   • fluticasone (FLONASE) 50 MCG/ACT nasal spray Administer 2 Sprays into affected nostril(S) every day. 16 g 11   • azithromycin (ZITHROMAX) 500 MG tablet Take 1 Tablet by mouth every day for 5 days. 5 Tablet 0   • albuterol 108 (90 Base) MCG/ACT Aero Soln inhalation aerosol Inhale 2 Puffs every 6 hours as needed for Shortness of Breath. 8.5 g 5   • predniSONE (DELTASONE) 10 MG Tab Take 30mg x 3 days, then take 20mg x 3 days, then take 10mg x 3 days, with food, then discontinue. 18 Tablet 3   • diclofenac sodium (VOLTAREN) 1 % Gel      • HYDROcodone/acetaminophen (NORCO)  MG Tab Take 1-2 Tablets by mouth every 6 hours as needed.     • Mometasone Furoate (ASMANEX HFA) 100 MCG/ACT Aerosol Inhale 2-4 Puffs.     • omeprazole (PRILOSEC) 40 MG delayed-release capsule Take 40 mg by mouth.     • ipratropium-albuterol (DUONEB) 0.5-2.5 (3) MG/3ML nebulizer solution Take 3 mL by nebulization 4 times a day. 120 Each 5   • STIOLTO RESPIMAT 2.5-2.5 MCG/ACT Aero Soln Inhale 2 Puffs every day. 3 Each 4     No current facility-administered medications for this visit.     Physical Exam:  /86 (BP Location: Left arm, Patient Position: Sitting, BP Cuff Size: Adult)   Pulse 88   Temp 36.8 °C (98.2 °F) (Temporal)   Ht 1.829 m (6')   Wt 96.2 kg (212 lb)   SpO2 96%   BMI 28.75 kg/m²   General: Well developed, well nourished female, in no distress.  Eyes: Conjuntiva without any obvious injection or erythema.   HEENT: There is pain with palpation of the frontal and nasal and maxillary sinuses, there is slight erythema in the oropharynx,  no lymphadenopathy.  Cardiovascular: Heart is regular with no murmur  Lungs: Normal respiratory effort.  There is audible wheezes throughout.  Otherwise clear    Assessment and Plan:   1. Severe persistent asthma without complication  Worsened due to current URIs  History of severe asthma previously difficult to control back in [place] requiring almost monthly hospitalization.  Patient is requiring increased frequency of her nebulizer/albuterol and notes increased shortness of breath and wheezing  Plan  Refilled the fluticasone, will send a course of azithromycin 500 mg daily for 5 days.  Patient has emergent prednisone 10 mg at home prescribed by pulmonary.    - fluticasone (FLONASE) 50 MCG/ACT nasal spray; Administer 2 Sprays into affected nostril(S) every day.  Dispense: 16 g; Refill: 11  - azithromycin (ZITHROMAX) 500 MG tablet; Take 1 Tablet by mouth every day for 5 days.  Dispense: 5 Tablet; Refill: 0    2. Chronic sphenoidal sinusitis  Worsened with current URI  Continue fluticasone  - fluticasone (FLONASE) 50 MCG/ACT nasal spray; Administer 2 Sprays into affected nostril(S) every day.  Dispense: 16 g; Refill: 11  - azithromycin (ZITHROMAX) 500 MG tablet; Take 1 Tablet by mouth every day for 5 days.  Dispense: 5 Tablet; Refill: 0    3. Acute nasopharyngitis  Acute nasopharyngitis in the setting of severe asthma, emphysema on imaging and ongoing smoking  -We will prescribe azithromycin 500 mg daily for 5 days given significant comorbidities    4. COVID-19 vaccination refused  Patient reported prior adverse reaction with vaccination and is fearful for COVID-19 vaccination.

## 2022-01-01 ENCOUNTER — OFFICE VISIT (OUTPATIENT)
Dept: INTERNAL MEDICINE | Facility: OTHER | Age: 47
End: 2022-01-01
Payer: MEDICAID

## 2022-01-01 ENCOUNTER — APPOINTMENT (OUTPATIENT)
Dept: RADIOLOGY | Facility: MEDICAL CENTER | Age: 47
End: 2022-01-01
Attending: GENERAL PRACTICE
Payer: MEDICAID

## 2022-01-01 ENCOUNTER — TELEPHONE (OUTPATIENT)
Dept: INTERNAL MEDICINE | Facility: OTHER | Age: 47
End: 2022-01-01
Payer: MEDICAID

## 2022-01-01 ENCOUNTER — HOSPITAL ENCOUNTER (OUTPATIENT)
Dept: RADIOLOGY | Facility: MEDICAL CENTER | Age: 47
End: 2022-12-16
Payer: MEDICAID

## 2022-01-01 VITALS
SYSTOLIC BLOOD PRESSURE: 138 MMHG | DIASTOLIC BLOOD PRESSURE: 84 MMHG | HEIGHT: 72 IN | WEIGHT: 222.8 LBS | OXYGEN SATURATION: 94 % | TEMPERATURE: 98.3 F | BODY MASS INDEX: 30.18 KG/M2 | HEART RATE: 98 BPM

## 2022-01-01 VITALS
WEIGHT: 223 LBS | SYSTOLIC BLOOD PRESSURE: 146 MMHG | BODY MASS INDEX: 30.2 KG/M2 | HEART RATE: 100 BPM | HEIGHT: 72 IN | TEMPERATURE: 98.2 F | OXYGEN SATURATION: 94 % | DIASTOLIC BLOOD PRESSURE: 91 MMHG

## 2022-01-01 DIAGNOSIS — J43.1 PANLOBULAR EMPHYSEMA (HCC): ICD-10-CM

## 2022-01-01 DIAGNOSIS — J44.9 CHRONIC OBSTRUCTIVE PULMONARY DISEASE, UNSPECIFIED COPD TYPE (HCC): ICD-10-CM

## 2022-01-01 DIAGNOSIS — J06.9 UPPER RESPIRATORY TRACT INFECTION, UNSPECIFIED TYPE: ICD-10-CM

## 2022-01-01 DIAGNOSIS — J01.00 ACUTE MAXILLARY SINUSITIS, RECURRENCE NOT SPECIFIED: ICD-10-CM

## 2022-01-01 DIAGNOSIS — R09.82 POST-NASAL DRIP: ICD-10-CM

## 2022-01-01 DIAGNOSIS — F17.210 CIGARETTE NICOTINE DEPENDENCE WITHOUT COMPLICATION: ICD-10-CM

## 2022-01-01 DIAGNOSIS — J44.1 COPD EXACERBATION (HCC): ICD-10-CM

## 2022-01-01 DIAGNOSIS — G47.33 OBSTRUCTIVE SLEEP APNEA: ICD-10-CM

## 2022-01-01 PROCEDURE — 99214 OFFICE O/P EST MOD 30 MIN: CPT | Mod: GC | Performed by: GENERAL PRACTICE

## 2022-01-01 PROCEDURE — 71046 X-RAY EXAM CHEST 2 VIEWS: CPT

## 2022-01-01 PROCEDURE — 99214 OFFICE O/P EST MOD 30 MIN: CPT | Mod: GC

## 2022-01-01 RX ORDER — VARENICLINE TARTRATE 1 MG/1
1 TABLET, FILM COATED ORAL 2 TIMES DAILY
COMMUNITY
Start: 2022-10-14 | End: 2023-01-01

## 2022-01-01 RX ORDER — AZITHROMYCIN 250 MG/1
250 TABLET, FILM COATED ORAL DAILY
Qty: 6 TABLET | Refills: 0 | Status: SHIPPED | OUTPATIENT
Start: 2022-01-01 | End: 2022-01-01

## 2022-01-01 RX ORDER — LORATADINE 10 MG/1
10 TABLET ORAL DAILY
Qty: 30 TABLET | Refills: 0 | Status: SHIPPED | OUTPATIENT
Start: 2022-01-01 | End: 2023-01-01 | Stop reason: SDUPTHER

## 2022-01-01 RX ORDER — ALBUTEROL SULFATE 90 UG/1
2 AEROSOL, METERED RESPIRATORY (INHALATION) EVERY 4 HOURS PRN
Qty: 1 EACH | Refills: 3 | Status: SHIPPED | OUTPATIENT
Start: 2022-01-01 | End: 2022-01-01 | Stop reason: SDUPTHER

## 2022-01-01 RX ORDER — ALBUTEROL SULFATE 90 UG/1
AEROSOL, METERED RESPIRATORY (INHALATION)
Qty: 9 G | Refills: 3 | Status: SHIPPED
Start: 2022-01-01 | End: 2023-01-01

## 2022-01-01 ASSESSMENT — FIBROSIS 4 INDEX
FIB4 SCORE: 0.86
FIB4 SCORE: 0.86

## 2022-01-01 ASSESSMENT — LIFESTYLE VARIABLES: SUBSTANCE_ABUSE: 0

## 2022-01-01 ASSESSMENT — ENCOUNTER SYMPTOMS
TREMORS: 0
ABDOMINAL PAIN: 0
COUGH: 1
PALPITATIONS: 0
VOMITING: 0
MUSCULOSKELETAL NEGATIVE: 1
PSYCHIATRIC NEGATIVE: 1
SORE THROAT: 0
CHILLS: 0
SHORTNESS OF BREATH: 0
DOUBLE VISION: 0
CONSTIPATION: 0
WEAKNESS: 0
DEPRESSION: 0
FALLS: 0
DIZZINESS: 0
EYES NEGATIVE: 1
BLURRED VISION: 0
MYALGIAS: 0
CONSTITUTIONAL NEGATIVE: 1
NAUSEA: 0
HEARTBURN: 0
WHEEZING: 1
WEIGHT LOSS: 0
FEVER: 0
NEUROLOGICAL NEGATIVE: 1
SPUTUM PRODUCTION: 1
HEADACHES: 0
GASTROINTESTINAL NEGATIVE: 1
CARDIOVASCULAR NEGATIVE: 1
HEMOPTYSIS: 0
NERVOUS/ANXIOUS: 0
DIARRHEA: 0

## 2022-01-31 ENCOUNTER — TELEPHONE (OUTPATIENT)
Dept: OBGYN | Facility: CLINIC | Age: 47
End: 2022-01-31

## 2022-01-31 DIAGNOSIS — N93.9 ABNORMAL UTERINE BLEEDING: ICD-10-CM

## 2022-01-31 RX ORDER — MEDROXYPROGESTERONE ACETATE 10 MG/1
10 TABLET ORAL DAILY
Qty: 10 TABLET | Refills: 0 | Status: SHIPPED | OUTPATIENT
Start: 2022-01-31 | End: 2022-02-10

## 2022-01-31 NOTE — TELEPHONE ENCOUNTER
PT called stating she started her period 01/19/2022 has been bleeding pretty infrequently with sometimes filling a pad within an hour and some times her pad is completely dry, also states some discomfort and fatigue, PT does not want to go to ER due to respiratory issues. I advised with  and she stated she can send in provera 10 mg for 10 days to her pharmacy on file. PT verbalized understanding and was transferred to [place] [facility]RIEN to schedule fv.

## 2022-02-01 NOTE — TELEPHONE ENCOUNTER
Rx provera 10mg x 10 days to control current prolonged bleeding. Pt will need further eval including EMB for AUB. Appt made

## 2022-02-03 ENCOUNTER — HOSPITAL ENCOUNTER (OUTPATIENT)
Facility: MEDICAL CENTER | Age: 47
End: 2022-02-03
Attending: OBSTETRICS & GYNECOLOGY
Payer: MEDICAID

## 2022-02-03 ENCOUNTER — GYNECOLOGY VISIT (OUTPATIENT)
Dept: OBGYN | Facility: CLINIC | Age: 47
End: 2022-02-03
Payer: MEDICAID

## 2022-02-03 ENCOUNTER — HOSPITAL ENCOUNTER (OUTPATIENT)
Dept: LAB | Facility: MEDICAL CENTER | Age: 47
End: 2022-02-03
Attending: OBSTETRICS & GYNECOLOGY
Payer: MEDICAID

## 2022-02-03 VITALS — DIASTOLIC BLOOD PRESSURE: 90 MMHG | BODY MASS INDEX: 29.57 KG/M2 | WEIGHT: 218 LBS | SYSTOLIC BLOOD PRESSURE: 154 MMHG

## 2022-02-03 DIAGNOSIS — R30.0 DYSURIA: ICD-10-CM

## 2022-02-03 DIAGNOSIS — R10.32 ABDOMINAL PAIN, LLQ: ICD-10-CM

## 2022-02-03 DIAGNOSIS — N93.9 ABNORMAL UTERINE BLEEDING: ICD-10-CM

## 2022-02-03 DIAGNOSIS — B37.31 YEAST VAGINITIS: ICD-10-CM

## 2022-02-03 DIAGNOSIS — N93.9 ABNORMAL UTERINE BLEEDING: Primary | ICD-10-CM

## 2022-02-03 DIAGNOSIS — B96.89 BACTERIAL VAGINITIS: ICD-10-CM

## 2022-02-03 DIAGNOSIS — N76.0 BACTERIAL VAGINITIS: ICD-10-CM

## 2022-02-03 LAB
AMBIGUOUS DTTM AMBI4: NORMAL
APPEARANCE UR: CLEAR
BILIRUB UR STRIP-MCNC: NORMAL MG/DL
COLOR UR AUTO: NORMAL
ERYTHROCYTE [DISTWIDTH] IN BLOOD BY AUTOMATED COUNT: 54.7 FL (ref 35.9–50)
GLUCOSE UR STRIP.AUTO-MCNC: NEGATIVE MG/DL
HCT VFR BLD AUTO: 48.5 % (ref 37–47)
HGB BLD-MCNC: 14.9 G/DL (ref 12–16)
INT CON NEG: NEGATIVE
INT CON POS: POSITIVE
KETONES UR STRIP.AUTO-MCNC: NEGATIVE MG/DL
LEUKOCYTE ESTERASE UR QL STRIP.AUTO: NEGATIVE
MCH RBC QN AUTO: 27.4 PG (ref 27–33)
MCHC RBC AUTO-ENTMCNC: 30.7 G/DL (ref 33.6–35)
MCV RBC AUTO: 89.2 FL (ref 81.4–97.8)
NITRITE UR QL STRIP.AUTO: NEGATIVE
PATHOLOGY CONSULT NOTE: NORMAL
PH UR STRIP.AUTO: 7.5 [PH] (ref 5–8)
PLATELET # BLD AUTO: 226 K/UL (ref 164–446)
PMV BLD AUTO: 10.4 FL (ref 9–12.9)
POC URINE PREGNANCY TEST: NEGATIVE
PROT UR QL STRIP: NEGATIVE MG/DL
RBC # BLD AUTO: 5.44 M/UL (ref 4.2–5.4)
RBC UR QL AUTO: NORMAL
SP GR UR STRIP.AUTO: 1.01
UROBILINOGEN UR STRIP-MCNC: NORMAL MG/DL
WBC # BLD AUTO: 7.5 K/UL (ref 4.8–10.8)

## 2022-02-03 PROCEDURE — 88305 TISSUE EXAM BY PATHOLOGIST: CPT

## 2022-02-03 PROCEDURE — 99214 OFFICE O/P EST MOD 30 MIN: CPT | Mod: 25 | Performed by: OBSTETRICS & GYNECOLOGY

## 2022-02-03 PROCEDURE — 81025 URINE PREGNANCY TEST: CPT | Performed by: OBSTETRICS & GYNECOLOGY

## 2022-02-03 PROCEDURE — 58100 BIOPSY OF UTERUS LINING: CPT | Performed by: OBSTETRICS & GYNECOLOGY

## 2022-02-03 PROCEDURE — 36415 COLL VENOUS BLD VENIPUNCTURE: CPT

## 2022-02-03 PROCEDURE — 85027 COMPLETE CBC AUTOMATED: CPT

## 2022-02-03 PROCEDURE — 81002 URINALYSIS NONAUTO W/O SCOPE: CPT | Performed by: OBSTETRICS & GYNECOLOGY

## 2022-02-03 RX ORDER — METRONIDAZOLE 500 MG/1
500 TABLET ORAL 2 TIMES DAILY
Qty: 14 TABLET | Refills: 0 | Status: SHIPPED | OUTPATIENT
Start: 2022-02-03 | End: 2022-02-10

## 2022-02-03 RX ORDER — FLUCONAZOLE 150 MG/1
150 TABLET ORAL ONCE
Qty: 1 TABLET | Refills: 0 | Status: SHIPPED | OUTPATIENT
Start: 2022-02-03 | End: 2022-02-03

## 2022-02-03 ASSESSMENT — FIBROSIS 4 INDEX: FIB4 SCORE: 0.79

## 2022-02-03 NOTE — PROCEDURES
Endometrial Biopsy Procedure Note    Indication: AUB  The speculum was placed.  Beta-dine was applied to the cervix.  The uterus was not sounded.  The biopsy device was placed through the uterus without the aid of a tenaculum/Allis clamp.  Suction was applied and the sample removed and sent to pathology.  There were no complications.    Laura Mills D.O.

## 2022-02-03 NOTE — PROGRESS NOTES
"GYN PROBLEM VISIT    CC:  Menstrual Problem       HPI: Patient is a 46 y.o.  who complains of abnormal uterine bleeding. She was seen by Dr. Patricio in early November for the onset of this complaint (at the time she had skipped her October period and was afraid for pregnancy) and then also consulted with him for urinary urgency and frequency.    She informs me that she normally has regular monthly periods, lasting 5 days with only day 2 being heavy. She states she skipped menses in October and that's when she saw Dr. Patricio for a \"pregnancy scare.\"  UPT was negative at that time. She reports regular menses in November (and denies recollection of it being heavier or longer than normal as she just remembers being \"relieved\" that it came).  December was also normal.  She usually starts around the 17th or 18th each month.  She then started with normal timing in January (22) and reports she has been bleeding ever since.  She reports some hours/days are heavy and then sometimes it will taper off.  Nevertheless, it has not stopped since . She called the office on  and I sent a prescription for Provera 10mg x 10 days to try to stop the bleeding. She has taken 3 doses and as of now is still bleeding and flow has remained variable from heavy to light.    She also complains of an intense and \"horrible\" vaginal odor as well as burning with urination.  She also complains of abdominal pain, located centrally, just below umbilicus and radiating to LLQ that just \"pulls\" at times when she tries to move from sitting to standing.    GYN History   Menarche at age 9   Same sexual partner for many years, unprotected   GC/CT screening with pap negative in November    Pap wnl (cotest not sent) in November    OB History    Para Term  AB Living   11 5 5   6 5   SAB IAB Ectopic Molar Multiple Live Births   4   2            # Outcome Date GA Lbr [place]/2nd Weight Sex Delivery Anes PTL Lv   11 Ectopic            10 " Ectopic            9 SAB            8 SAB            7 SAB            6 SAB            5 Term            4 Term            3 Term            2 Term            1 Term                 ROS:   General: denies fever / chills  HEENT: denies sore throat:  CV: denies chest pain:  Repiratory: denies shortness of breath  GI: denies abdominal pain  : denies dysuria:    PFSH:  I personally reviewed the past medical and surgical histories.     Social History     Tobacco Use   • Smoking status: Current Some Day Smoker     Packs/day: 1.00     Years: 20.00     Pack years: 20.00     Types: Cigarettes   • Smokeless tobacco: Never Used   • Tobacco comment: on and off    Vaping Use   • Vaping Use: Never used   Substance Use Topics   • Alcohol use: Not Currently   • Drug use: Not Currently     Comment: THC       Social History     Substance and Sexual Activity   Sexual Activity Not on file        ALLERGIES / REACTIONS:  Allergies   Allergen Reactions   • Aspirin Rash and Hives     Pt reports that she received a rash on face, pt reports it ok if she takes NORCO   • Penicillins Rash, Swelling and Hives     Pt reports that she gets swelling in throat and face, rash all over face and arms.                            PHYSICAL EXAMINATION:  Vital Signs:   Vitals:    22 1307   BP: 154/90   BP Location: Left arm   Patient Position: Sitting   BP Cuff Size: Adult   Weight: 98.9 kg (218 lb)     Body mass index is 29.57 kg/m².    Gen: appears well, NAD  Respiratory: normal effort  Abdomen: Soft, non-tender.  Pelvic Exam:    Vulva: normal.    Urethra: normal.   Vagina: menstrual blood present   Cervix: normal. Bluish lesion seen previous by Dr. Patricio at 9:00 most likely a nabothian cyst    EMB obtained. See procedure note.    ASSESSMENT AND PLAN:  46 y.o.      1. Abnormal uterine bleeding   - discussed this is most likely perimenopausal bleeding but EMB obtained to rule out pathology   - recent US was normal and I believe  non-contributory in regards to her bleeding but she did have a hemorrhagic cyst on last US and given her bleeding and continued pain in the LLQ will go ahead and repeat US for any changes   - UPT neg today    - POCT Pregnancy  - CBC WITHOUT DIFFERENTIAL; Future  - US-PELVIC COMPLETE (TRANSABDOMINAL/TRANSVAGINAL) (COMBO); Future  - Pathology Specimen    2. Dysuria   - UA neg today except for blood (which is explained by uterine bleeding). No culture sent   - continue follow up with Dr. Patricio for urinary urgency and frequency.    - POCT Urinalysis    3. Bacterial vaginitis and yeast vaginitis   - neglected to collect swab today and pt needed to urinate badly during pelvic exam and EMB.  She was + for yeast and BV on November pap and given her complaints of vaginal burning and odor, will go ahead and give Rx for both yeast and BV    - metroNIDAZOLE (FLAGYL) 500 MG Tab; Take 1 Tablet by mouth 2 times a day for 7 days.  Dispense: 14 Tablet; Refill: 0    - fluconazole (DIFLUCAN) 150 MG tablet; Take 1 Tablet by mouth one time for 1 dose.  Dispense: 1 Tablet; Refill: 0    4. Abdominal pain, LLQ   - as above in AUB.  H/o hemorrhagic left ovarian cyst.  Will repeat US to verify resolution    - US-PELVIC COMPLETE (TRANSABDOMINAL/TRANSVAGINAL) (COMBO); Future      Patient to complete CBC and US and return to see me at next available appt for results review of labs/US and EMB and to discuss further management options for her bleeding.  Would be a great candidate for tubal/ablation.     Laura Mills D.O.

## 2022-02-08 ENCOUNTER — HOSPITAL ENCOUNTER (OUTPATIENT)
Dept: RADIOLOGY | Facility: MEDICAL CENTER | Age: 47
End: 2022-02-08
Attending: OBSTETRICS & GYNECOLOGY
Payer: MEDICAID

## 2022-02-08 DIAGNOSIS — N93.9 ABNORMAL UTERINE BLEEDING: ICD-10-CM

## 2022-02-08 DIAGNOSIS — R10.32 ABDOMINAL PAIN, LLQ: ICD-10-CM

## 2022-02-08 PROCEDURE — 76830 TRANSVAGINAL US NON-OB: CPT

## 2022-02-28 ENCOUNTER — OFFICE VISIT (OUTPATIENT)
Dept: INTERNAL MEDICINE | Facility: OTHER | Age: 47
End: 2022-02-28
Payer: MEDICAID

## 2022-02-28 VITALS
WEIGHT: 212 LBS | HEIGHT: 72 IN | TEMPERATURE: 97.5 F | BODY MASS INDEX: 28.71 KG/M2 | HEART RATE: 89 BPM | OXYGEN SATURATION: 95 % | DIASTOLIC BLOOD PRESSURE: 100 MMHG | SYSTOLIC BLOOD PRESSURE: 167 MMHG

## 2022-02-28 DIAGNOSIS — Z12.31 ENCOUNTER FOR SCREENING MAMMOGRAM FOR BREAST CANCER: ICD-10-CM

## 2022-02-28 DIAGNOSIS — R59.0 ENLARGED SUBMENTAL LYMPH NODE: ICD-10-CM

## 2022-02-28 DIAGNOSIS — J45.50 SEVERE PERSISTENT ASTHMA WITHOUT COMPLICATION: ICD-10-CM

## 2022-02-28 PROBLEM — R05.9 COUGH: Status: RESOLVED | Noted: 2021-07-22 | Resolved: 2022-02-28

## 2022-02-28 PROBLEM — Z13.228 ENCOUNTER FOR SCREENING FOR OTHER METABOLIC DISORDERS: Status: RESOLVED | Noted: 2021-05-26 | Resolved: 2022-02-28

## 2022-02-28 PROBLEM — R10.816 EPIGASTRIC ABDOMINAL TENDERNESS: Status: RESOLVED | Noted: 2021-06-15 | Resolved: 2022-02-28

## 2022-02-28 PROCEDURE — 99213 OFFICE O/P EST LOW 20 MIN: CPT | Mod: GE | Performed by: STUDENT IN AN ORGANIZED HEALTH CARE EDUCATION/TRAINING PROGRAM

## 2022-02-28 ASSESSMENT — PATIENT HEALTH QUESTIONNAIRE - PHQ9: CLINICAL INTERPRETATION OF PHQ2 SCORE: 0

## 2022-02-28 ASSESSMENT — FIBROSIS 4 INDEX: FIB4 SCORE: 0.85

## 2022-02-28 NOTE — PROGRESS NOTES
Established Patient    Patient Care Team:  Yosvany Montesinos M.D. as PCP - General (Internal Medicine)    Melly Shin is a 46 y.o. female who presents today with the following Chief Complaint(s): Follow up for There were no encounter diagnoses.    HPI:    Patient has a past medical history of   - Hiatal hernia, GERD  - Hives with PPI  - MICHELL without CPAP due to claustrophobia,   - VIVIANA  - Hx of Asthma, previously living in [place], requiring multiple hospitalization. Hx of severe reaction to allergy shots. (pulmonary on 3/21 mild airflow obstruction and preserved FEV1)  - Prior abnormal lung imaging finding (CT chest showed groundglass in bilateral bases need 1 year follow-up, follows with [facility] pulmonology Dr. Gil)   - Ovarian cyst (ultrasound shows hemorrhagic cyst)    Patient presents today for submandibular/ sub mental mass.   Reports here for for acute visit for neck mass x 1 week.   - mass located below the chin, originally appears to be 1.5 cm , and painful to touch. She report the size has decreased to 1cm and pain has improved  - She is concern this may be related to her thyroid  - she denies fever, chills, oral maxillary facial infection, weight loss, no recent URI    Patient has follow up with OBGYN tomorrow.       ROS:     Denies any new chest pain or shortness of breath.  No changes to urinary or bowel function.   See HPI.    Past Medical History:   Diagnosis Date   • Asthma    • Chronic obstructive pulmonary disease (HCC)    • Psychiatric disorder     Anxiety, Depression     Social History     Tobacco Use   • Smoking status: Current Some Day Smoker     Packs/day: 1.00     Years: 20.00     Pack years: 20.00     Types: Cigarettes   • Smokeless tobacco: Never Used   • Tobacco comment: on and off    Vaping Use   • Vaping Use: Never used   Substance Use Topics   • Alcohol use: Not Currently   • Drug use: Not Currently     Comment: THC     Current Outpatient Medications   Medication Sig Dispense  Refill   • azithromycin (ZITHROMAX) 250 MG Tab Take 2 tab on day 1, take 1 tab for 4 days 6 Tablet 0   • fluticasone (FLONASE) 50 MCG/ACT nasal spray Administer 2 Sprays into affected nostril(S) every day. 16 g 11   • albuterol 108 (90 Base) MCG/ACT Aero Soln inhalation aerosol Inhale 2 Puffs every 6 hours as needed for Shortness of Breath. 8.5 g 5   • predniSONE (DELTASONE) 10 MG Tab Take 30mg x 3 days, then take 20mg x 3 days, then take 10mg x 3 days, with food, then discontinue. 18 Tablet 3   • diclofenac sodium (VOLTAREN) 1 % Gel      • HYDROcodone/acetaminophen (NORCO)  MG Tab Take 1-2 Tablets by mouth every 6 hours as needed.     • omeprazole (PRILOSEC) 40 MG delayed-release capsule Take 40 mg by mouth.     • ipratropium-albuterol (DUONEB) 0.5-2.5 (3) MG/3ML nebulizer solution Take 3 mL by nebulization 4 times a day. 120 Each 5   • STIOLTO RESPIMAT 2.5-2.5 MCG/ACT Aero Soln Inhale 2 Puffs every day. 3 Each 4     No current facility-administered medications for this visit.     Physical Exam:  BP (!) 167/100 (BP Location: Left arm, Patient Position: Sitting, BP Cuff Size: Large adult)   Pulse 89   Temp 36.4 °C (97.5 °F) (Temporal)   Ht 1.829 m (6')   Wt 96.2 kg (212 lb)   SpO2 95%   BMI 28.75 kg/m²   General: Well developed, well nourished female, in no distress.  Eyes: Conjuntiva without any obvious injection or erythema.   Cardiovascular: Heart is regular with no murmur  Lungs: audible wheeze throughout.  Respiratory effort is normal.  HEENT- no notable lesion in oropharynx, prior dental work, no visible signs of infection swelling on maxillary or facial. There is a 1cm mass noted in submental region with minimal tenderness with palpation.       Assessment and Plan:   1. Enlarged submental lymph node  Acute. Likely reactive lymph node per history, as size and symptom of pain has improved  Plan  Watch/ monitor. Per hx likely reactive, however, there is no clear source of infection per exam.  Discussed with patient, if lymph node/ mass does not resolve or persist > 3-4 week will consider soft tissue ultrasound of neck    2. Severe persistent asthma without complication  Stable  Stable wheezing on exam.  Follows with pulmonology    3. Encounter for screening mammogram for breast cancer  Ordered    - MA-SCREENING MAMMO BILAT W/CAD; Future      Return in about 3 months (around 5/28/2022).  There are no Patient Instructions on file for this visit.

## 2022-02-28 NOTE — PATIENT INSTRUCTIONS
Mass likely consistent with Jaw/ chin lymph node inflammation  - please let me know if the mass persist or gets bigger and doesn't go away

## 2022-03-01 ENCOUNTER — GYNECOLOGY VISIT (OUTPATIENT)
Dept: OBGYN | Facility: CLINIC | Age: 47
End: 2022-03-01
Payer: MEDICAID

## 2022-03-01 VITALS
DIASTOLIC BLOOD PRESSURE: 90 MMHG | WEIGHT: 211 LBS | SYSTOLIC BLOOD PRESSURE: 146 MMHG | BODY MASS INDEX: 28.58 KG/M2 | HEIGHT: 72 IN

## 2022-03-01 DIAGNOSIS — F17.210 LIGHT SMOKER: ICD-10-CM

## 2022-03-01 DIAGNOSIS — N92.1 MENORRHAGIA WITH IRREGULAR CYCLE: Primary | ICD-10-CM

## 2022-03-01 DIAGNOSIS — Z30.011 ENCOUNTER FOR INITIAL PRESCRIPTION OF CONTRACEPTIVE PILLS: ICD-10-CM

## 2022-03-01 PROCEDURE — 99214 OFFICE O/P EST MOD 30 MIN: CPT | Performed by: OBSTETRICS & GYNECOLOGY

## 2022-03-01 ASSESSMENT — FIBROSIS 4 INDEX: FIB4 SCORE: 0.85

## 2022-03-01 NOTE — PROGRESS NOTES
"GYN FOLLOW UP VISIT    CC:  No chief complaint on file.       HPI: Patient is a 46 y.o.  who presents for follow up for to discuss US and EMB results and management of perimenopausal bleeding.  She reports bleeding finally stopped on  which was about 4 days after COMPLETING provera course.  In all, she bled from -.  She states she is feeling crampy now almost like she is going to start again.        ROS:   General: denies fever / chills  HEENT: denies sore throat:  CV: denies chest pain:  Repiratory: denies shortness of breath  GI: denies abdominal pain  : denies dysuria:    PFSH:  I personally reviewed the past medical and surgical histories.       PHYSICAL EXAMINATION:  Vital Signs:   Vitals:    22 1317   BP: 146/90   BP Location: Right arm   Patient Position: Sitting   Weight: 95.7 kg (211 lb)   Height: 1.829 m (6')     Body mass index is 28.62 kg/m².    Gen: appears well, NAD  Respiratory: normal effort    Exam deferred     ASSESSMENT AND PLAN:  46 y.o.    1. Encounter for initial prescription of contraceptive pills   - given pt initially presented with abnormal menses and \"pregnancy scares\" we discussed that hormonal medication would treat both her irregular/perimenopausal bleeding as well as provider her with a contraceptive method.    - pt is a light smoker so we reviewed that estrogen containing contraceptives are contradindicated given her age and smoking status   - she has used pills in the past and doesn't have a problem taking them but would like to have regularity of her menses if possible.  As such I would like to prescribe her with Slynd rather than micronor to try to aid in a regular monthly period (if approved by insurance)    - Drospirenone 4 MG Tab; Take 1 Tablet by mouth every day.  Dispense: 28 Tablet; Refill: 3    2. Light smoker  - Drospirenone 4 MG Tab; Take 1 Tablet by mouth every day.  Dispense: 28 Tablet; Refill: 3    3. Menorrhagia with irregular " cycle   - reviewed options for management of perimenopausal bleeding   - typically I recommend a step-wise approach to control bleeding medically or with conservative surgery first rather than hysterectomy which is a major surgery and pt has pulmonary compromise.     - recommend trial of progestin hormonal therapy first which will serve as treatment for menorrhagia as well as contraception.  Discussed IUD and progestin pills as options.  Pt is leery of IUD stating she had issues with one at age 21.  While I think she is an EXCELLENT candidate for IUD, we elected to trial oral contraceptives first   - discussed ablation as an option as well.  This is a great option given her proximity to menopause and would like control her bleeding up to and through menopause.      Reviewed US which indicates possible hydrosalpinx however images reviewed by me and imaging is unclear.  Given this was not present on US 8/2021 I am unsure of the accuracy of this dx.  Her pelvic pain is currently resolved so will not further pursue or concern with that at this time.  Pt has a h/o right salpingectomy for ectopic.  Left tube could be removed at time of ablation if deemed necessary.    Follow up with me in 3 mo to see how medication is working      Laura Mills D.O.

## 2022-04-07 DIAGNOSIS — J45.50 SEVERE PERSISTENT ASTHMA WITHOUT COMPLICATION: ICD-10-CM

## 2022-04-07 RX ORDER — IPRATROPIUM BROMIDE AND ALBUTEROL SULFATE 2.5; .5 MG/3ML; MG/3ML
SOLUTION RESPIRATORY (INHALATION)
Qty: 180 ML | Refills: 5 | Status: SHIPPED | OUTPATIENT
Start: 2022-04-07 | End: 2023-01-01

## 2022-04-07 NOTE — TELEPHONE ENCOUNTER
Caller Name: Melly Shin                 Call Back Number: [phone] (home)         Patient approves a detailed voicemail message: N\A    Have we ever prescribed this med? Yes.  If yes, what date? 3/15/21     Last OV: 8/20/21 Dr. Gil     Next OV: 5/16/22 Dr. Gil     DX: COPD     Medications:  Current Outpatient Medications   Medication Sig Dispense Refill   • Drospirenone 4 MG Tab Take 1 Tablet by mouth every day. 28 Tablet 3   • fluticasone (FLONASE) 50 MCG/ACT nasal spray Administer 2 Sprays into affected nostril(S) every day. 16 g 11   • albuterol 108 (90 Base) MCG/ACT Aero Soln inhalation aerosol Inhale 2 Puffs every 6 hours as needed for Shortness of Breath. 8.5 g 5   • predniSONE (DELTASONE) 10 MG Tab Take 30mg x 3 days, then take 20mg x 3 days, then take 10mg x 3 days, with food, then discontinue. 18 Tablet 3   • diclofenac sodium (VOLTAREN) 1 % Gel      • HYDROcodone/acetaminophen (NORCO)  MG Tab Take 1-2 Tablets by mouth every 6 hours as needed.     • ipratropium-albuterol (DUONEB) 0.5-2.5 (3) MG/3ML nebulizer solution Take 3 mL by nebulization 4 times a day. 120 Each 5   • STIOLTO RESPIMAT 2.5-2.5 MCG/ACT Aero Soln Inhale 2 Puffs every day. 3 Each 4     No current facility-administered medications for this visit.

## 2022-05-16 ENCOUNTER — OFFICE VISIT (OUTPATIENT)
Dept: SLEEP MEDICINE | Facility: MEDICAL CENTER | Age: 47
End: 2022-05-16
Payer: MEDICAID

## 2022-05-16 VITALS
OXYGEN SATURATION: 100 % | HEIGHT: 72 IN | HEART RATE: 96 BPM | DIASTOLIC BLOOD PRESSURE: 88 MMHG | BODY MASS INDEX: 30.65 KG/M2 | TEMPERATURE: 97.7 F | WEIGHT: 226.25 LBS | SYSTOLIC BLOOD PRESSURE: 114 MMHG

## 2022-05-16 DIAGNOSIS — J45.50 SEVERE PERSISTENT ASTHMA WITHOUT COMPLICATION: ICD-10-CM

## 2022-05-16 DIAGNOSIS — J43.1 PANLOBULAR EMPHYSEMA (HCC): ICD-10-CM

## 2022-05-16 DIAGNOSIS — Z87.892 HISTORY OF ANAPHYLAXIS: ICD-10-CM

## 2022-05-16 DIAGNOSIS — R93.89 ABNORMAL CT OF THE CHEST: ICD-10-CM

## 2022-05-16 DIAGNOSIS — Z72.0 TOBACCO USE: ICD-10-CM

## 2022-05-16 PROCEDURE — 99214 OFFICE O/P EST MOD 30 MIN: CPT | Performed by: INTERNAL MEDICINE

## 2022-05-16 RX ORDER — ALBUTEROL SULFATE 90 UG/1
2 AEROSOL, METERED RESPIRATORY (INHALATION) EVERY 6 HOURS PRN
Qty: 8.5 G | Refills: 5 | Status: SHIPPED | OUTPATIENT
Start: 2022-05-16 | End: 2022-01-01

## 2022-05-16 RX ORDER — DULOXETIN HYDROCHLORIDE 30 MG/1
30 CAPSULE, DELAYED RELEASE ORAL
COMMUNITY
Start: 2022-04-27 | End: 2023-01-01

## 2022-05-16 RX ORDER — HYDROXYZINE HYDROCHLORIDE 10 MG/1
10 TABLET, FILM COATED ORAL
Status: ON HOLD | COMMUNITY
Start: 2022-04-24 | End: 2023-01-01

## 2022-05-16 RX ORDER — GABAPENTIN 100 MG/1
100 CAPSULE ORAL EVERY EVENING
COMMUNITY
Start: 2022-04-27 | End: 2023-01-01

## 2022-05-16 RX ORDER — PREDNISONE 10 MG/1
TABLET ORAL
Qty: 40 TABLET | Refills: 1 | Status: SHIPPED | OUTPATIENT
Start: 2022-05-16 | End: 2022-06-02 | Stop reason: SDUPTHER

## 2022-05-16 RX ORDER — EPINEPHRINE 0.3 MG/.3ML
0.3 INJECTION SUBCUTANEOUS ONCE
Qty: 2 EACH | Refills: 1 | Status: SHIPPED | OUTPATIENT
Start: 2022-05-16 | End: 2022-05-16

## 2022-05-16 ASSESSMENT — ENCOUNTER SYMPTOMS
SPEECH CHANGE: 0
HEADACHES: 0
PND: 0
BACK PAIN: 0
DIAPHORESIS: 0
DEPRESSION: 0
FEVER: 0
DIZZINESS: 0
PALPITATIONS: 0
PHOTOPHOBIA: 0
EYE DISCHARGE: 0
COUGH: 1
EYE PAIN: 0
EYE REDNESS: 0
CONSTIPATION: 0
SORE THROAT: 0
FALLS: 0
WEAKNESS: 0
DIARRHEA: 0
TREMORS: 0
SINUS PAIN: 0
VOMITING: 0
WEIGHT LOSS: 0
HEMOPTYSIS: 0
NAUSEA: 0
BLURRED VISION: 0
STRIDOR: 0
DOUBLE VISION: 0
CHILLS: 0
SHORTNESS OF BREATH: 1
WHEEZING: 1
SPUTUM PRODUCTION: 1
FOCAL WEAKNESS: 0
ABDOMINAL PAIN: 0
MYALGIAS: 0
ORTHOPNEA: 0
HEARTBURN: 0
CLAUDICATION: 0
NECK PAIN: 0

## 2022-05-16 ASSESSMENT — FIBROSIS 4 INDEX: FIB4 SCORE: 0.85

## 2022-05-16 NOTE — PROGRESS NOTES
"Chief Complaint   Patient presents with   • Follow-Up     Severe persistent asthma without complication  Last seen with 11/12/21           HPI: This patient is a 46 y.o. female whom is followed in our clinic for severe, persistent asthma last seen by Dr. Jin on 11/12/21.  Prior to that the pt was seen by me in August. PMHx is significant for chronic asthma diagnosed in infancy with possible element of COPD, degenerative joint disease followed in pain management on chronic opiates, history of iron deficiency anemia.  She is a current tobacco user and has smoked for the past 30 years up to 1 pack/day.    Patient presented to me to establish care after relocating from [facility] in August of 2020.  She reported being hospitalized almost once a month and on prednisone almost continuously prior to relocation.  She had been seen by allergy in the past and apparently was treated with allergy shot therapy which she felt contributed to her decline in respiratory status. She has had adverse rxn to Advair, Pulmicort, asmanex and has stopped stiolto since I last saw her in august. She tells me she feels \"unsafe\" taking her controller medications and uses only rescue JAVED via nebulizer and MDI and has been titrating prednisone on her own rather than burst with taper as prescribed. She has not required hospitalization since I took over her care but symptom control has been poor with steroid dependence. She has fairly severe chronic sinusitis with associated nasal polyps as well as gastroesophageal reflux disease for which she has not been able to tolerate proton pump inhibitors or antihistamine therapy.  She has not had elevated IgE or eosinophil levels when checked last in 12/2020 but does self treat with prednisone on a fairly regular basis.  She is quite wheezy today but speaking in near complete sentences. She is reluctant to try any alternative controller therapy due to adverse rxn which is commonly a " sensation that the inhalers worsen her breathing acutely due to upper airway narrowing/sensation there is swelling in the back of her throat. She has not yet seen allergy locally but is interested in tertiary referral.     Pulmonary function testing from January 2021 showed a baseline FEV1/FVC ratio 59 with preserved FEV1 at 2.66 L or 87% predicted.  Postbronchodilator there was a 6% improvement in FEV1 to 2.84 L.  Total lung capacity was within normal limits at 7.16 L of 117% predicted.  There was mild air trapping with residual volume of 126% predicted.  Diffusion capacity within normal limits at 111% predicted.    CT chest from September 2021 showed 17 mm right lower lobe groundglass opacity stable compared to September 2020, 3 mm right lower lobe nodule also stable from 2020, mild emphysematous changes with diffuse bronchial wall thickening.      Past Medical History:   Diagnosis Date   • Asthma    • Chronic obstructive pulmonary disease (HCC)    • Psychiatric disorder     Anxiety, Depression       Social History     Socioeconomic History   • Marital status:      Spouse name: Not on file   • Number of children: Not on file   • Years of education: Not on file   • Highest education level: Not on file   Occupational History   • Not on file   Tobacco Use   • Smoking status: Current Some Day Smoker     Packs/day: 1.00     Years: 20.00     Pack years: 20.00     Types: Cigarettes   • Smokeless tobacco: Never Used   • Tobacco comment: on and off    Vaping Use   • Vaping Use: Never used   Substance and Sexual Activity   • Alcohol use: Not Currently   • Drug use: Not Currently     Comment: THC   • Sexual activity: Not on file   Other Topics Concern   • Not on file   Social History Narrative   • Not on file     Social Determinants of Health     Financial Resource Strain: Not on file   Food Insecurity: Not on file   Transportation Needs: Not on file   Physical Activity: Not on file   Stress: Not on file   Social  Connections: Not on file   Intimate Partner Violence: Not on file   Housing Stability: Not on file       Family History   Problem Relation Age of Onset   • Diabetes Mother    • Cancer Father        Current Outpatient Medications on File Prior to Visit   Medication Sig Dispense Refill   • DULoxetine (CYMBALTA) 30 MG Cap DR Particles Take 30 mg by mouth at bedtime.     • gabapentin (NEURONTIN) 100 MG Cap Take 100 mg by mouth 3 times a day.     • hydrOXYzine HCl (ATARAX) 10 MG Tab Take 10 mg by mouth at bedtime as needed.     • ipratropium-albuterol (DUONEB) 0.5-2.5 (3) MG/3ML nebulizer solution USE 1 AMPULE IN NEBULIZER 4 TIMES DAILY 180 mL 5   • Drospirenone 4 MG Tab Take 1 Tablet by mouth every day. 28 Tablet 3   • fluticasone (FLONASE) 50 MCG/ACT nasal spray Administer 2 Sprays into affected nostril(S) every day. 16 g 11   • predniSONE (DELTASONE) 10 MG Tab Take 30mg x 3 days, then take 20mg x 3 days, then take 10mg x 3 days, with food, then discontinue. 18 Tablet 3   • diclofenac sodium (VOLTAREN) 1 % Gel      • HYDROcodone/acetaminophen (NORCO)  MG Tab Take 1-2 Tablets by mouth every 6 hours as needed.     • STIOLTO RESPIMAT 2.5-2.5 MCG/ACT Aero Soln Inhale 2 Puffs every day. (Patient not taking: Reported on 5/16/2022) 3 Each 4     No current facility-administered medications on file prior to visit.       Aspirin and Penicillins      ROS:   Review of Systems   Constitutional: Negative for chills, diaphoresis, fever, malaise/fatigue and weight loss.   HENT: Negative for congestion, ear discharge, ear pain, hearing loss, nosebleeds, sinus pain, sore throat and tinnitus.    Eyes: Negative for blurred vision, double vision, photophobia, pain, discharge and redness.   Respiratory: Positive for cough, sputum production, shortness of breath and wheezing. Negative for hemoptysis and stridor.    Cardiovascular: Negative for chest pain, palpitations, orthopnea, claudication, leg swelling and PND.   Gastrointestinal:  Negative for abdominal pain, constipation, diarrhea, heartburn, nausea and vomiting.   Genitourinary: Negative for dysuria and urgency.   Musculoskeletal: Negative for back pain, falls, joint pain, myalgias and neck pain.   Skin: Negative for itching and rash.   Neurological: Negative for dizziness, tremors, speech change, focal weakness, weakness and headaches.   Endo/Heme/Allergies: Negative for environmental allergies.   Psychiatric/Behavioral: Negative for depression.       /88 (BP Location: Right arm, Patient Position: Sitting, BP Cuff Size: Adult)   Pulse 96   Temp 36.5 °C (97.7 °F) (Temporal)   Ht 1.829 m (6')   Wt 103 kg (226 lb 4 oz)   SpO2 100%   Physical Exam  Vitals reviewed.   Constitutional:       General: She is not in acute distress.     Appearance: Normal appearance. She is well-developed. She is obese.   HENT:      Head: Normocephalic and atraumatic.      Right Ear: External ear normal.      Left Ear: External ear normal.      Nose: Nose normal. No congestion.      Mouth/Throat:      Mouth: Mucous membranes are moist.      Pharynx: Oropharynx is clear. No oropharyngeal exudate.   Eyes:      General: No scleral icterus.     Extraocular Movements: Extraocular movements intact.      Conjunctiva/sclera: Conjunctivae normal.      Pupils: Pupils are equal, round, and reactive to light.   Neck:      Vascular: No JVD.      Trachea: No tracheal deviation.   Cardiovascular:      Rate and Rhythm: Normal rate and regular rhythm.      Heart sounds: Normal heart sounds. No murmur heard.    No friction rub. No gallop.   Pulmonary:      Effort: Pulmonary effort is normal. No accessory muscle usage or respiratory distress.      Breath sounds: Wheezing present. No rales.      Comments: Diffuse expiratory wheezing  Abdominal:      General: There is no distension.      Palpations: Abdomen is soft.      Tenderness: There is no abdominal tenderness.   Musculoskeletal:         General: No tenderness or  deformity. Normal range of motion.      Cervical back: Normal range of motion and neck supple.      Right lower leg: No edema.      Left lower leg: No edema.   Lymphadenopathy:      Cervical: No cervical adenopathy.   Skin:     General: Skin is warm and dry.      Findings: No rash.      Nails: There is no clubbing.   Neurological:      Mental Status: She is alert and oriented to person, place, and time.      Cranial Nerves: No cranial nerve deficit.      Gait: Gait normal.   Psychiatric:         Mood and Affect: Mood normal.         Behavior: Behavior normal.         PFTs as reviewed by me personally: as per hPI    Imaging as reviewed by me personally:  As per HPI    Assessment:  1. Severe persistent asthma without complication  Referral to Other    PULMONARY FUNCTION TESTS -Test requested: Complete Pulmonary Function Test    albuterol 108 (90 Base) MCG/ACT Aero Soln inhalation aerosol   2. Panlobular emphysema (HCC)     3. Tobacco use     4. History of anaphylaxis  EPINEPHrine (EPIPEN 2-CINDY) 0.3 MG/0.3ML Solution Auto-injector solution for injection   5. Abnormal CT of the chest         Plan:  1.  Chronic, severe and poorly controlled.  Patient is reluctant to try any other controller therapies due to sxs experienced with multiple agents. I had a long discussion with the patient regarding use of prednisone as needed for 1 day here and 1 day there.  I recommended she use her taper as prescribed today starting at fairly high doses with 40 mg and tapering to 10 mg then off.  She will contact me with any rebound symptoms or worsening symptoms as the taper proceeds.  I do think she needs to be assessed by a tertiary center where they can do a more comprehensive evaluation of her asthma phenotype, allergies and any contribution from allergies in addition to recommendations for long-term management.  I have placed a referral to [facility] where the patient can have extensive evaluation.  Pending weight to be seen in  [place], we can consider local allergy evaluation.  2.  Patient does have emphysematous changes on CT and is an active tobacco smoker.  She is well aware that this can contribute to asthma symptoms and worsened airflow obstruction in the long-term.  She is actively trying to cut back.  Not yet a candidate for lung cancer screening.  Intolerant to therapies other than rescue bronchodilators as per above.  We did not pursue any vaccines today given her concern that she may have allergic type reaction.  3.  Counseled on tobacco cessation.  See discussion above.  4.  Stable findings from September 2020 to September 2021.  Repeat CT in September 2022.  Return in about 4 months (around 9/16/2022) for asthma.

## 2022-06-02 ENCOUNTER — OFFICE VISIT (OUTPATIENT)
Dept: INTERNAL MEDICINE | Facility: OTHER | Age: 47
End: 2022-06-02
Payer: MEDICAID

## 2022-06-02 VITALS
BODY MASS INDEX: 30.61 KG/M2 | HEART RATE: 105 BPM | WEIGHT: 226 LBS | SYSTOLIC BLOOD PRESSURE: 154 MMHG | HEIGHT: 72 IN | OXYGEN SATURATION: 94 % | TEMPERATURE: 97.7 F | DIASTOLIC BLOOD PRESSURE: 96 MMHG

## 2022-06-02 DIAGNOSIS — R59.0 ENLARGED SUBMENTAL LYMPH NODE: ICD-10-CM

## 2022-06-02 DIAGNOSIS — J45.51 SEVERE PERSISTENT ASTHMA WITH ACUTE EXACERBATION: ICD-10-CM

## 2022-06-02 DIAGNOSIS — I10 HYPERTENSION, UNSPECIFIED TYPE: ICD-10-CM

## 2022-06-02 DIAGNOSIS — J45.50 SEVERE PERSISTENT ASTHMA WITHOUT COMPLICATION: ICD-10-CM

## 2022-06-02 PROCEDURE — 99213 OFFICE O/P EST LOW 20 MIN: CPT | Mod: GE,25 | Performed by: STUDENT IN AN ORGANIZED HEALTH CARE EDUCATION/TRAINING PROGRAM

## 2022-06-02 PROCEDURE — 94640 AIRWAY INHALATION TREATMENT: CPT | Performed by: STUDENT IN AN ORGANIZED HEALTH CARE EDUCATION/TRAINING PROGRAM

## 2022-06-02 RX ORDER — EPINEPHRINE 0.3 MG/.3ML
0.3 INJECTION SUBCUTANEOUS ONCE
Status: ON HOLD | COMMUNITY
Start: 2022-05-16 | End: 2023-01-01

## 2022-06-02 RX ORDER — MONTELUKAST SODIUM 10 MG/1
10 TABLET ORAL DAILY
Qty: 30 TABLET | Refills: 1 | Status: SHIPPED | OUTPATIENT
Start: 2022-06-02 | End: 2022-09-07

## 2022-06-02 RX ORDER — PREDNISONE 10 MG/1
TABLET ORAL
Qty: 40 TABLET | Refills: 2 | Status: SHIPPED | OUTPATIENT
Start: 2022-06-02 | End: 2023-01-01

## 2022-06-02 RX ORDER — AZITHROMYCIN 250 MG/1
TABLET, FILM COATED ORAL
Qty: 6 TABLET | Refills: 0 | Status: SHIPPED | OUTPATIENT
Start: 2022-06-02 | End: 2022-06-16

## 2022-06-02 RX ORDER — ALBUTEROL SULFATE 2.5 MG/3ML
2.5 SOLUTION RESPIRATORY (INHALATION) ONCE
Status: COMPLETED | OUTPATIENT
Start: 2022-06-02 | End: 2022-06-02

## 2022-06-02 RX ADMIN — ALBUTEROL SULFATE 2.5 MG: 2.5 SOLUTION RESPIRATORY (INHALATION) at 09:26

## 2022-06-02 ASSESSMENT — FIBROSIS 4 INDEX: FIB4 SCORE: 0.87

## 2022-06-02 NOTE — PATIENT INSTRUCTIONS
Patient breathing stabilized after breathing treatment.     She will use albuterol 2 puff every 4 hours, and nebulizer at home and start prednisone taper per pulmonlolgy    Recommend patient monitor her condition with peak flow meter if persistently < 60%  She will head to ER.     She declined ER transfer at this time. Please call ER if your condition get worse

## 2022-06-02 NOTE — PROGRESS NOTES
Established Patient    Patient Care Team:  Yosvany Montesinos M.D. as PCP - General (Internal Medicine)    Melly Shin is a 47 y.o. female who presents today with the following Chief Complaint(s): Follow up for Diagnoses of Severe persistent asthma without complication, Hypertension, unspecified type, Enlarged submental lymph node, and Severe persistent asthma with acute exacerbation were pertinent to this visit.    HPI:    Since last visit, she followed with OBGYN- initiated on contraceptive pills, drospirenone use as needed, for menses regulation. May consider OUD/ ablation in future  She followed with Dr. Gil. Pulmonologist. Patient continues to have severe poorly controlled asthma and was referred to tertiary center for more extensive evaluation. Pending insurance approval. CT scan with some emphysematous changes.     # Asthma, severe, uncontrolled  - Patient presents with acute complain of shortness of breath for past 3 day. She reports slight increase from baseline wheezing and dyspnea with exercise. She report prior adverse reaction with advair,, pulmicort, asmanex, and stiolto.  She has been using nebulizer 4 times per day and rescue JAVED 4 times per day.   - She was recently prescribed prednisone taper for acute exacerbation but has not started on the course.   - she was speaking in complete sentences today, but had coughing episode mid interview. She was given treatment of albuterol in office and report feeling better, she maintained her spO2 and was able to ambulate to car.   - She was instructed to start prednisone taper 40mg and to call our office and or head to ER if condition worsen.   - vital sign reviewed. Vitals pulse 105, spo2 94, 154/96        ROS:     Denies any new chest pain.  No changes to urinary or bowel function.   See HPI.    Past Medical History:   Diagnosis Date   • Asthma    • Chronic obstructive pulmonary disease (HCC)    • Psychiatric disorder     Anxiety, Depression      Social History     Tobacco Use   • Smoking status: Current Some Day Smoker     Packs/day: 1.00     Years: 20.00     Pack years: 20.00     Types: Cigarettes   • Smokeless tobacco: Never Used   • Tobacco comment: on and off    Vaping Use   • Vaping Use: Never used   Substance Use Topics   • Alcohol use: Not Currently   • Drug use: Not Currently     Comment: THC     Current Outpatient Medications   Medication Sig Dispense Refill   • montelukast (SINGULAIR) 10 MG Tab Take 1 Tablet by mouth every day. 30 Tablet 1   • predniSONE (DELTASONE) 10 MG Tab Take 40mg x 4 days, then take 30mg x 4 days, then take 20mg x 4 days, then 10mg x 4 days with food, then discontinue. 40 Tablet 2   • azithromycin (ZITHROMAX) 250 MG Tab Take 2 tab on day 1, take 1 tab on day 2, 3, 4, 5 6 Tablet 0   • DULoxetine (CYMBALTA) 30 MG Cap DR Particles Take 30 mg by mouth at bedtime.     • gabapentin (NEURONTIN) 100 MG Cap Take 100 mg by mouth 3 times a day.     • hydrOXYzine HCl (ATARAX) 10 MG Tab Take 10 mg by mouth at bedtime as needed.     • albuterol 108 (90 Base) MCG/ACT Aero Soln inhalation aerosol Inhale 2 Puffs  as needed in the morning and 2 Puffs as needed at noon and 2 Puffs as needed in the evening and 2 Puffs as needed before bedtime for Shortness of Breath. 8.5 g 5   • ipratropium-albuterol (DUONEB) 0.5-2.5 (3) MG/3ML nebulizer solution USE 1 AMPULE IN NEBULIZER 4 TIMES DAILY 180 mL 5   • Drospirenone 4 MG Tab Take 1 Tablet by mouth every day. 28 Tablet 3   • fluticasone (FLONASE) 50 MCG/ACT nasal spray Administer 2 Sprays into affected nostril(S) every day. 16 g 11   • diclofenac sodium (VOLTAREN) 1 % Gel      • HYDROcodone/acetaminophen (NORCO)  MG Tab Take 1-2 Tablets by mouth every 6 hours as needed.     • EPINEPHrine (EPIPEN) 0.3 MG/0.3ML Solution Auto-injector solution for injection INJECT 0.3ML INTO THE SHOULDER, THIGH, OR BUTTOCKS ONE TIME FOR 1 DOSE       No current facility-administered medications for this visit.      Physical Exam:  BP (!) 154/96 (BP Location: Left arm, Patient Position: Sitting, BP Cuff Size: Adult)   Pulse (!) 105   Temp 36.5 °C (97.7 °F) (Temporal)   Ht 1.829 m (6')   Wt 103 kg (226 lb)   SpO2 94%   BMI 30.65 kg/m²   General: Well developed, well nourished female, in no distress.  Eyes: Conjuntiva without any obvious injection or erythema.   Cardiovascular: Heart is regular with no murmur  Lungs: Wheezes through out. No in respiratory distress. Coughing. Oropharynx examined uvula midline, no swelling of tonsils, ulcers, or petichiae see.   Ext: No edema      Assessment and Plan:     1. Severe persistent asthma without complication  Patient has severe persistent asthma and worsen shortness of breath x 3 days. She had previously been placed on ICS but report adverse effect and declines ICS therapy. She is not on optimal therapy. She has not been measuring her PEF regularly. She has not start prednisone taper.   Plan  - start prednisone 40mg taper  - will send in z pack  - patient is on benadryl for allergy  - continue albuterol as needed and ipratropium neb as needed  - recommend measuring peak flow at home and monitor her respiratory status and head to ER if her condition gets worse  - She is currently pending referral to [facility] for further work up    orders  - Small Volume Nebulizer  - DX-CHEST-2 VIEWS; Future  - CBC WITH DIFFERENTIAL; Future  - Comp Metabolic Panel; Future    2. Hypertension, unspecified type  Elevated BP in setting of exacerbation  Continue to monitor    3. Enlarged submental lymph node  Report intermittently painful and change in size. On exam 1 cm nodule, non draining, and nontender to palpation.   On examination there is small lesion in under lip  Continue to monitor  She does complain of fever but likely more related to asthma exacerbation at this time. She does not report any other complaint.   Plan  May consider referral for biopsy vs imaging next visit if  persistent  Discuss with patient for close follow up for this issue once her respiratory condition improves    4. Severe persistent asthma with acute exacerbation  See assessment 1

## 2022-06-04 PROBLEM — M25.561 RIGHT KNEE PAIN: Status: RESOLVED | Noted: 2021-10-28 | Resolved: 2022-06-04

## 2022-06-04 PROBLEM — J00 ACUTE NASOPHARYNGITIS: Status: RESOLVED | Noted: 2021-12-06 | Resolved: 2022-06-04

## 2022-06-13 ENCOUNTER — HOSPITAL ENCOUNTER (OUTPATIENT)
Dept: RADIOLOGY | Facility: MEDICAL CENTER | Age: 47
End: 2022-06-13
Attending: STUDENT IN AN ORGANIZED HEALTH CARE EDUCATION/TRAINING PROGRAM
Payer: MEDICAID

## 2022-06-13 DIAGNOSIS — J45.50 SEVERE PERSISTENT ASTHMA WITHOUT COMPLICATION: ICD-10-CM

## 2022-06-13 PROCEDURE — 71046 X-RAY EXAM CHEST 2 VIEWS: CPT

## 2022-06-15 ENCOUNTER — HOSPITAL ENCOUNTER (OUTPATIENT)
Dept: LAB | Facility: MEDICAL CENTER | Age: 47
End: 2022-06-15
Attending: STUDENT IN AN ORGANIZED HEALTH CARE EDUCATION/TRAINING PROGRAM
Payer: MEDICAID

## 2022-06-15 DIAGNOSIS — J45.50 SEVERE PERSISTENT ASTHMA WITHOUT COMPLICATION: ICD-10-CM

## 2022-06-15 PROBLEM — Z12.4 SCREENING FOR MALIGNANT NEOPLASM OF CERVIX: Status: RESOLVED | Noted: 2021-08-27 | Resolved: 2022-06-15

## 2022-06-15 LAB
ALBUMIN SERPL BCP-MCNC: 4 G/DL (ref 3.2–4.9)
ALBUMIN/GLOB SERPL: 1.3 G/DL
ALP SERPL-CCNC: 53 U/L (ref 30–99)
ALT SERPL-CCNC: 6 U/L (ref 2–50)
ANION GAP SERPL CALC-SCNC: 11 MMOL/L (ref 7–16)
AST SERPL-CCNC: 13 U/L (ref 12–45)
BASOPHILS # BLD AUTO: 0.3 % (ref 0–1.8)
BASOPHILS # BLD: 0.02 K/UL (ref 0–0.12)
BILIRUB SERPL-MCNC: 0.3 MG/DL (ref 0.1–1.5)
BUN SERPL-MCNC: 6 MG/DL (ref 8–22)
CALCIUM SERPL-MCNC: 9.3 MG/DL (ref 8.5–10.5)
CHLORIDE SERPL-SCNC: 105 MMOL/L (ref 96–112)
CO2 SERPL-SCNC: 23 MMOL/L (ref 20–33)
CREAT SERPL-MCNC: 0.81 MG/DL (ref 0.5–1.4)
EOSINOPHIL # BLD AUTO: 0.16 K/UL (ref 0–0.51)
EOSINOPHIL NFR BLD: 2.4 % (ref 0–6.9)
ERYTHROCYTE [DISTWIDTH] IN BLOOD BY AUTOMATED COUNT: 52.4 FL (ref 35.9–50)
FASTING STATUS PATIENT QL REPORTED: NORMAL
GFR SERPLBLD CREATININE-BSD FMLA CKD-EPI: 89 ML/MIN/1.73 M 2
GLOBULIN SER CALC-MCNC: 3.2 G/DL (ref 1.9–3.5)
GLUCOSE SERPL-MCNC: 95 MG/DL (ref 65–99)
HCT VFR BLD AUTO: 45.1 % (ref 37–47)
HGB BLD-MCNC: 14.1 G/DL (ref 12–16)
IMM GRANULOCYTES # BLD AUTO: 0.02 K/UL (ref 0–0.11)
IMM GRANULOCYTES NFR BLD AUTO: 0.3 % (ref 0–0.9)
LYMPHOCYTES # BLD AUTO: 2.63 K/UL (ref 1–4.8)
LYMPHOCYTES NFR BLD: 39.1 % (ref 22–41)
MCH RBC QN AUTO: 27.6 PG (ref 27–33)
MCHC RBC AUTO-ENTMCNC: 31.3 G/DL (ref 33.6–35)
MCV RBC AUTO: 88.4 FL (ref 81.4–97.8)
MONOCYTES # BLD AUTO: 0.62 K/UL (ref 0–0.85)
MONOCYTES NFR BLD AUTO: 9.2 % (ref 0–13.4)
NEUTROPHILS # BLD AUTO: 3.27 K/UL (ref 2–7.15)
NEUTROPHILS NFR BLD: 48.7 % (ref 44–72)
NRBC # BLD AUTO: 0 K/UL
NRBC BLD-RTO: 0 /100 WBC
PLATELET # BLD AUTO: 304 K/UL (ref 164–446)
PMV BLD AUTO: 10 FL (ref 9–12.9)
POTASSIUM SERPL-SCNC: 4.2 MMOL/L (ref 3.6–5.5)
PROT SERPL-MCNC: 7.2 G/DL (ref 6–8.2)
RBC # BLD AUTO: 5.1 M/UL (ref 4.2–5.4)
SODIUM SERPL-SCNC: 139 MMOL/L (ref 135–145)
WBC # BLD AUTO: 6.7 K/UL (ref 4.8–10.8)

## 2022-06-15 PROCEDURE — 80053 COMPREHEN METABOLIC PANEL: CPT

## 2022-06-15 PROCEDURE — 85025 COMPLETE CBC W/AUTO DIFF WBC: CPT

## 2022-06-15 PROCEDURE — 36415 COLL VENOUS BLD VENIPUNCTURE: CPT

## 2022-06-15 ASSESSMENT — ENCOUNTER SYMPTOMS
SORE THROAT: 0
CONSTIPATION: 0
NERVOUS/ANXIOUS: 0
WHEEZING: 0
DIARRHEA: 0
FALLS: 0
COUGH: 0
ABDOMINAL PAIN: 0
SHORTNESS OF BREATH: 0
HEARTBURN: 0
BLURRED VISION: 0
HEADACHES: 0
CHILLS: 0
FEVER: 0
PALPITATIONS: 0
WEIGHT LOSS: 0
DEPRESSION: 0
WEAKNESS: 0
VOMITING: 0
DOUBLE VISION: 0
MYALGIAS: 0
DIZZINESS: 0
NAUSEA: 0

## 2022-06-15 ASSESSMENT — LIFESTYLE VARIABLES: SUBSTANCE_ABUSE: 0

## 2022-06-15 NOTE — PROGRESS NOTES
Established Patient    Melly presents today with the following:    CC: 2 week follow up    HPI: 47 year old female, presents for a follow up.      Asthma: treated earlier this month for an exacerbation with Prednisone and Pack. Patient also takes Albuterol and Ipatropium duoneb as needed. CBC, CMP, CXR. Denies fever, chills, less cough. CBC, CMP, CXR unremarkable. Followed by Pulmonology for Asthma and COPD. Seen by PUlmonlogy most recently 5/16/22. Is on a tapering Prednisone dose that started at 40mg and is now around 10mg daily. Has been on this for 8 years. Has a referral to [facility] by PUlmonlogy, but declined by insurance, now has a referral by Pulmonology to [facility] with pending approval. Pulm has her on Duoneb, Albuterol, Singulair. Has been off supplemental oxygen 2L for about two years since moving from [place].Due for PPVS 23. Overall, feels 50% better since treatment a few weeks ago by Dr. Montesinos.    Tobacco: 1/4-3/4 PPD for 20 years. Not ready to quit.      BP: 154/96 a few weeks ago, today 138/86. No history of HTN. Patient denies chest pain, headache, shortness of breath, or any other symptoms.   enlarged submental lymph node noted on last appointment. 1cm on exam earlier this month by Dr. Montesinos.           Patient Active Problem List    Diagnosis Date Noted   • Elevated blood pressure reading 06/16/2022   • Menorrhagia with irregular cycle 03/01/2022   • Enlarged submental lymph node 02/28/2022   • COVID-19 vaccination refused 11/12/2021   • Neuropathy 10/28/2021   • Severe persistent asthma with acute exacerbation 07/12/2021   • Generalized anxiety disorder 07/12/2021   • Hyperlipidemia 07/12/2021   • Hypertension 07/12/2021   • Lipoma of abdominal wall 07/12/2021   • Lung nodule < 6cm on CT 07/12/2021   • Obstructive sleep apnea syndrome 07/12/2021   • Overweight with body mass index (BMI) 25.0-29.9 07/12/2021   • Tobacco dependence syndrome 07/12/2021   • Ground glass opacity  present on imaging of lung 06/15/2021   • Other ovarian cyst, left side 06/15/2021   • Prediabetes 06/15/2021   • History of abdominal hernia 05/26/2021   • Gastroesophageal reflux disease with esophagitis 02/11/2021   • Back pain 09/21/2020   • Chronic obstructive pulmonary disease, unspecified (HCC) 09/21/2020   • Chronic sinusitis, unspecified 09/21/2020   • Degeneration of intervertebral disc of lumbosacral region 09/21/2020       Social History     Tobacco Use   • Smoking status: Current Some Day Smoker     Packs/day: 1.00     Years: 20.00     Pack years: 20.00     Types: Cigarettes   • Smokeless tobacco: Never Used   • Tobacco comment: on and off    Vaping Use   • Vaping Use: Never used   Substance Use Topics   • Alcohol use: Not Currently   • Drug use: Not Currently     Comment: THC       Current Outpatient Medications   Medication Sig Dispense Refill   • EPINEPHrine (EPIPEN) 0.3 MG/0.3ML Solution Auto-injector solution for injection INJECT 0.3ML INTO THE SHOULDER, THIGH, OR BUTTOCKS ONE TIME FOR 1 DOSE     • montelukast (SINGULAIR) 10 MG Tab Take 1 Tablet by mouth every day. 30 Tablet 1   • predniSONE (DELTASONE) 10 MG Tab Take 40mg x 4 days, then take 30mg x 4 days, then take 20mg x 4 days, then 10mg x 4 days with food, then discontinue. 40 Tablet 2   • DULoxetine (CYMBALTA) 30 MG Cap DR Particles Take 30 mg by mouth at bedtime.     • gabapentin (NEURONTIN) 100 MG Cap Take 100 mg by mouth 3 times a day.     • hydrOXYzine HCl (ATARAX) 10 MG Tab Take 10 mg by mouth at bedtime as needed.     • albuterol 108 (90 Base) MCG/ACT Aero Soln inhalation aerosol Inhale 2 Puffs  as needed in the morning and 2 Puffs as needed at noon and 2 Puffs as needed in the evening and 2 Puffs as needed before bedtime for Shortness of Breath. 8.5 g 5   • ipratropium-albuterol (DUONEB) 0.5-2.5 (3) MG/3ML nebulizer solution USE 1 AMPULE IN NEBULIZER 4 TIMES DAILY 180 mL 5   • fluticasone (FLONASE) 50 MCG/ACT nasal spray Administer 2  Sprays into affected nostril(S) every day. 16 g 11   • diclofenac sodium (VOLTAREN) 1 % Gel      • HYDROcodone/acetaminophen (NORCO)  MG Tab Take 1-2 Tablets by mouth every 6 hours as needed.       No current facility-administered medications for this visit.       Review of Systems   Constitutional: Negative for chills, fever, malaise/fatigue and weight loss.   HENT: Negative for congestion and sore throat.    Eyes: Negative for blurred vision and double vision.   Respiratory: Negative for cough, shortness of breath and wheezing.    Cardiovascular: Negative for chest pain and palpitations.   Gastrointestinal: Negative for abdominal pain, constipation, diarrhea, heartburn, nausea and vomiting.   Genitourinary: Negative for dysuria, frequency and urgency.   Musculoskeletal: Negative for falls, joint pain and myalgias.   Skin: Negative for rash.   Neurological: Negative for dizziness, weakness and headaches.   Psychiatric/Behavioral: Negative for depression, substance abuse and suicidal ideas. The patient is not nervous/anxious.          /86 (BP Location: Left arm, Patient Position: Sitting, BP Cuff Size: Adult)   Pulse 98   Temp 36.3 °C (97.4 °F) (Temporal)   Ht 1.829 m (6')   Wt 103 kg (227 lb)   SpO2 100%   BMI 30.79 kg/m²       PHYSICAL EXAM:  Physical Exam  Vitals and nursing note reviewed.   Constitutional:       General: She is not in acute distress.     Appearance: Normal appearance. She is not ill-appearing.   HENT:      Head: Normocephalic and atraumatic.      Right Ear: External ear normal.      Left Ear: External ear normal.      Mouth/Throat:      Mouth: Mucous membranes are moist.      Pharynx: Oropharynx is clear. No oropharyngeal exudate or posterior oropharyngeal erythema.   Eyes:      General:         Right eye: No discharge.         Left eye: No discharge.      Conjunctiva/sclera: Conjunctivae normal.   Neck:      Comments: No submental lymphadenopathy appreciated  Cardiovascular:       Rate and Rhythm: Normal rate and regular rhythm.      Pulses: Normal pulses.      Heart sounds: Normal heart sounds. No murmur heard.    No friction rub. No gallop.   Pulmonary:      Effort: Pulmonary effort is normal. No respiratory distress.      Breath sounds: Wheezing present. No rhonchi or rales.      Comments: Minimal expiratory wheezing bilaterally  Musculoskeletal:      Cervical back: Neck supple. No tenderness.   Lymphadenopathy:      Cervical:      Right cervical: No posterior cervical adenopathy.  Neurological:      Mental Status: She is alert and oriented to person, place, and time. Mental status is at baseline.      Cranial Nerves: No cranial nerve deficit.      Sensory: No sensory deficit.      Coordination: Coordination normal.   Psychiatric:         Mood and Affect: Mood normal.         Behavior: Behavior normal.         Note: I have reviewed all pertinent labs and diagnostic tests associated with this visit with specific comments listed under the assessment and plan below    Assessment and Plan    1. Severe persistent asthma with acute exacerbation-resolved.   -patient followed by Pulmonology  -continue current medication regimen per Pulmonology with pending appointment with [facility]   -recommend to get the Pneumococcal 20 vaccine    2. Tobacco dependence syndrome  Not ready to quit. Spent 5 minutes spent counseling patient regarding quitting smoking to improve overall health. Will address on follow up.    3. Enlarged submental lymph node  resolved    4. Elevated blood pressure reading  No history of HTN. ON the border, but is taking Prednisone that could be contributing to blood pressure elevation, but improved from last appointment earlier this month. Will continue to monitor on follow up          Followup: Return in about 10 weeks (around 8/25/2022).      Signed by: Dimitri Hdez Jr., M.D.

## 2022-06-16 ENCOUNTER — OFFICE VISIT (OUTPATIENT)
Dept: INTERNAL MEDICINE | Facility: OTHER | Age: 47
End: 2022-06-16
Payer: MEDICAID

## 2022-06-16 VITALS
BODY MASS INDEX: 30.75 KG/M2 | DIASTOLIC BLOOD PRESSURE: 86 MMHG | TEMPERATURE: 97.4 F | SYSTOLIC BLOOD PRESSURE: 138 MMHG | OXYGEN SATURATION: 100 % | HEIGHT: 72 IN | WEIGHT: 227 LBS | HEART RATE: 98 BPM

## 2022-06-16 DIAGNOSIS — R03.0 ELEVATED BLOOD PRESSURE READING: ICD-10-CM

## 2022-06-16 DIAGNOSIS — J45.51 SEVERE PERSISTENT ASTHMA WITH ACUTE EXACERBATION: ICD-10-CM

## 2022-06-16 DIAGNOSIS — R59.0 ENLARGED SUBMENTAL LYMPH NODE: ICD-10-CM

## 2022-06-16 DIAGNOSIS — F17.200 TOBACCO DEPENDENCE SYNDROME: ICD-10-CM

## 2022-06-16 PROCEDURE — 99213 OFFICE O/P EST LOW 20 MIN: CPT | Mod: GE | Performed by: GENERAL PRACTICE

## 2022-06-16 ASSESSMENT — FIBROSIS 4 INDEX: FIB4 SCORE: 0.86

## 2022-06-16 NOTE — PROGRESS NOTES
Teaching Physician Attestation      Level of Participation    I discussed with the resident physician the patient's history, exam, assessment and plan in detail.  Topics listed in my addendum were the focus of the visit.  Healthcare maintenance was not addressed this visit unless listed as a topic in my addendum.  I agree with plan as written along with the following additions/modifications:      Discussed smoking cessation, pt not ready to quit    Elevated BP  -likely 2/2 steroid use for asthma exacerbation treatment by pulm as well as acute illness as was previously was lower.  No sx end organ damage reported, mildly elevated, will monitor at 1 month f/u after prednisone wean completed and if still elevated begin tx.    rec pcv20 given hx asthma    Appreciate pulm support for asthma.    rtc 1 month to establish with new resident PCP.

## 2022-06-25 PROBLEM — R03.0 ELEVATED BLOOD PRESSURE READING: Status: RESOLVED | Noted: 2022-06-16 | Resolved: 2022-06-25

## 2022-07-21 PROBLEM — R59.0 ENLARGED SUBMENTAL LYMPH NODE: Status: RESOLVED | Noted: 2022-02-28 | Resolved: 2022-07-21

## 2022-07-25 ENCOUNTER — OFFICE VISIT (OUTPATIENT)
Dept: INTERNAL MEDICINE | Facility: OTHER | Age: 47
End: 2022-07-25
Payer: MEDICAID

## 2022-07-25 ENCOUNTER — HOSPITAL ENCOUNTER (OUTPATIENT)
Facility: MEDICAL CENTER | Age: 47
End: 2022-07-25
Attending: GENERAL PRACTICE
Payer: MEDICAID

## 2022-07-25 VITALS
DIASTOLIC BLOOD PRESSURE: 90 MMHG | WEIGHT: 222.6 LBS | HEIGHT: 72 IN | BODY MASS INDEX: 30.15 KG/M2 | TEMPERATURE: 97.6 F | OXYGEN SATURATION: 98 % | HEART RATE: 107 BPM | SYSTOLIC BLOOD PRESSURE: 140 MMHG

## 2022-07-25 DIAGNOSIS — J01.00 ACUTE MAXILLARY SINUSITIS, RECURRENCE NOT SPECIFIED: ICD-10-CM

## 2022-07-25 DIAGNOSIS — R05.9 COUGH: ICD-10-CM

## 2022-07-25 DIAGNOSIS — I10 HYPERTENSION, UNSPECIFIED TYPE: ICD-10-CM

## 2022-07-25 DIAGNOSIS — F17.200 TOBACCO DEPENDENCE SYNDROME: ICD-10-CM

## 2022-07-25 PROCEDURE — 99214 OFFICE O/P EST MOD 30 MIN: CPT | Mod: GC | Performed by: GENERAL PRACTICE

## 2022-07-25 PROCEDURE — U0003 INFECTIOUS AGENT DETECTION BY NUCLEIC ACID (DNA OR RNA); SEVERE ACUTE RESPIRATORY SYNDROME CORONAVIRUS 2 (SARS-COV-2) (CORONAVIRUS DISEASE [COVID-19]), AMPLIFIED PROBE TECHNIQUE, MAKING USE OF HIGH THROUGHPUT TECHNOLOGIES AS DESCRIBED BY CMS-2020-01-R: HCPCS

## 2022-07-25 PROCEDURE — U0005 INFEC AGEN DETEC AMPLI PROBE: HCPCS

## 2022-07-25 RX ORDER — LORATADINE 10 MG/1
10 TABLET ORAL DAILY
Qty: 30 TABLET | Refills: 0 | Status: SHIPPED | OUTPATIENT
Start: 2022-07-25 | End: 2022-01-01 | Stop reason: SDUPTHER

## 2022-07-25 RX ORDER — ALBUTEROL SULFATE 2.5 MG/3ML
SOLUTION RESPIRATORY (INHALATION)
COMMUNITY
Start: 2022-06-02 | End: 2022-01-01

## 2022-07-25 RX ORDER — GUAIFENESIN 100 MG/1
GRANULE ORAL
Qty: 20 EACH | Refills: 0 | Status: SHIPPED | OUTPATIENT
Start: 2022-07-25 | End: 2022-10-13

## 2022-07-25 ASSESSMENT — ENCOUNTER SYMPTOMS
DIARRHEA: 0
ABDOMINAL PAIN: 0
DOUBLE VISION: 0
SPUTUM PRODUCTION: 1
BLURRED VISION: 0
HEADACHES: 1
WEIGHT LOSS: 0
MYALGIAS: 0
HEMOPTYSIS: 0
PALPITATIONS: 0
CHILLS: 0
CONSTIPATION: 0
VOMITING: 0
WHEEZING: 0
SINUS PAIN: 1
NERVOUS/ANXIOUS: 0
FEVER: 0
COUGH: 1
SORE THROAT: 0
NAUSEA: 0
SHORTNESS OF BREATH: 0
HEARTBURN: 0

## 2022-07-25 ASSESSMENT — FIBROSIS 4 INDEX: FIB4 SCORE: 0.86

## 2022-07-25 NOTE — PROGRESS NOTES
"      Established Patient    Melly presents today with the following:    CC: follow up    HPI:       HTN: Here for blood pressure follow up.No history of HTN. ON the border, but is taking Prednisone that could be contributing to blood pressure elevation, but improved from last appointment earlier this month. Will continue to monitor on follow up.    The patient also reports a 5 days of cough and sinus pain. Denies fever, chills,sore throat, runny nose, sick contacts.. Productive mucous greenish. No increased wheezing, reports pain in sinus \"sinus cold\". Thinks it's a sinus infection. Taking Equate OTC cough medication. Has not taken decongestant.       Tobacco: smokes 7-8 cigarettes/day, for about 20 years.not ready to quit, but almost.    Patient Active Problem List    Diagnosis Date Noted   • Menorrhagia with irregular cycle 03/01/2022   • COVID-19 vaccination refused 11/12/2021   • Neuropathy 10/28/2021   • Severe persistent asthma with acute exacerbation 07/12/2021   • Generalized anxiety disorder 07/12/2021   • Hyperlipidemia 07/12/2021   • Hypertension 07/12/2021   • Lipoma of abdominal wall 07/12/2021   • Lung nodule < 6cm on CT 07/12/2021   • Obstructive sleep apnea syndrome 07/12/2021   • Overweight with body mass index (BMI) 25.0-29.9 07/12/2021   • Tobacco dependence syndrome 07/12/2021   • Ground glass opacity present on imaging of lung 06/15/2021   • Other ovarian cyst, left side 06/15/2021   • Prediabetes 06/15/2021   • History of abdominal hernia 05/26/2021   • Gastroesophageal reflux disease with esophagitis 02/11/2021   • Back pain 09/21/2020   • Chronic obstructive pulmonary disease, unspecified (HCC) 09/21/2020   • Chronic sinusitis, unspecified 09/21/2020   • Degeneration of intervertebral disc of lumbosacral region 09/21/2020       Social History     Tobacco Use   • Smoking status: Current Some Day Smoker     Packs/day: 1.00     Years: 20.00     Pack years: 20.00     Types: Cigarettes   • " Smokeless tobacco: Never Used   • Tobacco comment: on and off    Vaping Use   • Vaping Use: Never used   Substance Use Topics   • Alcohol use: Yes     Comment: occ, rare   • Drug use: Not Currently     Comment: THC       Current Outpatient Medications   Medication Sig Dispense Refill   • albuterol (PROVENTIL) 2.5mg/3ml Nebu Soln solution for nebulization      • Guaifenesin 100 MG Pack Take 100 mg by mouth 4 times a day as needed (cough). 20 Each 0   • loratadine (CLARITIN) 10 MG Tab Take 1 Tablet by mouth every day. 30 Tablet 0   • EPINEPHrine (EPIPEN) 0.3 MG/0.3ML Solution Auto-injector solution for injection INJECT 0.3ML INTO THE SHOULDER, THIGH, OR BUTTOCKS ONE TIME FOR 1 DOSE     • predniSONE (DELTASONE) 10 MG Tab Take 40mg x 4 days, then take 30mg x 4 days, then take 20mg x 4 days, then 10mg x 4 days with food, then discontinue. 40 Tablet 2   • DULoxetine (CYMBALTA) 30 MG Cap DR Particles Take 30 mg by mouth at bedtime.     • gabapentin (NEURONTIN) 100 MG Cap Take 100 mg by mouth 3 times a day.     • hydrOXYzine HCl (ATARAX) 10 MG Tab Take 10 mg by mouth at bedtime as needed.     • albuterol 108 (90 Base) MCG/ACT Aero Soln inhalation aerosol Inhale 2 Puffs  as needed in the morning and 2 Puffs as needed at noon and 2 Puffs as needed in the evening and 2 Puffs as needed before bedtime for Shortness of Breath. 8.5 g 5   • ipratropium-albuterol (DUONEB) 0.5-2.5 (3) MG/3ML nebulizer solution USE 1 AMPULE IN NEBULIZER 4 TIMES DAILY 180 mL 5   • fluticasone (FLONASE) 50 MCG/ACT nasal spray Administer 2 Sprays into affected nostril(S) every day. 16 g 11   • diclofenac sodium (VOLTAREN) 1 % Gel      • HYDROcodone/acetaminophen (NORCO)  MG Tab Take 1-2 Tablets by mouth every 6 hours as needed.     • montelukast (SINGULAIR) 10 MG Tab Take 1 Tablet by mouth every day. (Patient not taking: Reported on 7/25/2022) 30 Tablet 1     No current facility-administered medications for this visit.       Review of Systems    Constitutional: Positive for malaise/fatigue. Negative for chills, fever and weight loss.   HENT: Positive for congestion and sinus pain. Negative for ear discharge, ear pain and sore throat.    Eyes: Negative for blurred vision and double vision.   Respiratory: Positive for cough and sputum production. Negative for hemoptysis, shortness of breath and wheezing.    Cardiovascular: Negative for chest pain and palpitations.   Gastrointestinal: Negative for abdominal pain, constipation, diarrhea, heartburn, nausea and vomiting.   Musculoskeletal: Negative for joint pain and myalgias.   Skin: Negative for rash.   Neurological: Positive for headaches.   Psychiatric/Behavioral: The patient is not nervous/anxious.          BP (!) 140/90 (BP Location: Left arm, Patient Position: Sitting, BP Cuff Size: Adult)   Pulse (!) 107   Temp 36.4 °C (97.6 °F) (Temporal)   Ht 1.829 m (6')   Wt 101 kg (222 lb 9.6 oz)   SpO2 98%   BMI 30.19 kg/m²       PHYSICAL EXAM:  Physical Exam  Vitals and nursing note reviewed.   Constitutional:       General: She is not in acute distress.     Appearance: Normal appearance. She is ill-appearing. She is not toxic-appearing or diaphoretic.   HENT:      Head: Normocephalic and atraumatic.      Right Ear: Tympanic membrane, ear canal and external ear normal. There is no impacted cerumen.      Left Ear: Tympanic membrane, ear canal and external ear normal. There is no impacted cerumen.      Nose: Congestion present. No rhinorrhea.      Mouth/Throat:      Mouth: Mucous membranes are moist.      Pharynx: Oropharynx is clear. Posterior oropharyngeal erythema present. No oropharyngeal exudate.   Eyes:      General:         Right eye: No discharge.         Left eye: No discharge.      Conjunctiva/sclera: Conjunctivae normal.   Cardiovascular:      Rate and Rhythm: Normal rate and regular rhythm.      Pulses: Normal pulses.      Heart sounds: Normal heart sounds. No murmur heard.    No friction rub. No  gallop.   Pulmonary:      Effort: Pulmonary effort is normal. No respiratory distress.      Breath sounds: Wheezing present. No rhonchi or rales.      Comments: Mild bilateral expiratory wheezing  Musculoskeletal:         General: No tenderness.      Cervical back: Neck supple. No rigidity or tenderness.   Lymphadenopathy:      Cervical: No cervical adenopathy.   Skin:     General: Skin is warm and dry.      Findings: No bruising or rash.   Neurological:      Mental Status: She is alert and oriented to person, place, and time. Mental status is at baseline.   Psychiatric:         Mood and Affect: Mood normal.         Behavior: Behavior normal.         Note: I have reviewed all pertinent labs and diagnostic tests associated with this visit with specific comments listed under the assessment and plan below    Assessment and Plan    1. Cough  Concern for possible COVID-19.  --will call if COVID test positive. If so will prescribed Paxlovid. Would recommend to take home COVID test and if positive, call our clinic and will prescribed Paxlovid.  -recommend to go to the emergency room if having severe shortness of breath, wheezing, fever, chills, or chest pain  -recommend to get some decongestants: Claritin, Zyrtec, or Allega as well  -encourage to quit smoking  -return to the clinic in four days if not feeling better  -wear a mask, if have COVID-19, isolate 5 days, then wear mask 5 days afterwards  -If no improvement by day 10 and COVID-negative treat  with antibiotics for bacterial rhinosinusitis  - SARS-CoV-2, PCR (In-House); Future  - Guaifenesin 100 MG Pack; Take 100 mg by mouth 4 times a day as needed (cough).  Dispense: 20 Each; Refill: 0    2. Acute maxillary sinusitis, recurrence not specified  - loratadine (CLARITIN) 10 MG Tab; Take 1 Tablet by mouth every day.  Dispense: 30 Tablet; Refill: 0    3. Tobacco dependence syndrome  Not ready to quit, readdress on next appointment    4. Hypertension, unspecified  type  Readdress on next appointment            Followup: Return in 5 weeks (on 8/29/2022), or if symptoms worsen or fail to improve.      Signed by: Dimitri Hdez Jr., M.D.

## 2022-07-25 NOTE — TELEPHONE ENCOUNTER
Mucinex Issue- Pharmacy comment: Patient is requesting cough medication that is covered on insurance. Claims Mucinex is not working.    Please advise

## 2022-07-25 NOTE — PROGRESS NOTES
Teaching Physician Attestation      Level of Participation    I have personally interviewed and examined the patient.  In addition, I discussed with the resident physician the patient's history, exam, assessment and plan in detail.  Topics listed in my addendum were the focus of the visit.  Healthcare maintenance was not addressed this visit unless listed as a topic in my addendum.  I agree with the plan as written along with the following additions/modifications:      URI, possible sinusitis  -Patient has cough, sinus congestion, rhinorrhea, sore throat for 5 days.  Rescue inhaler use at baseline.  On exam she is nontoxic, speaking full sentences, satting normally on room air, lungs faint wheezing bilaterally but otherwise no crackles or consolidations.  Tender to palpation on the sinuses.  centor =0.    Plan  -COVID swab now, would treat with paxlovid if it is positive  -Supportive care with Treasure pot, loratadine daily  -If no improvement by day 10 and COVID-negative, would treat presumptively with antibiotics for bacterial rhinosinusitis that is prolonged.  -Any shortness of breath or increased rescue inhaler use to the emergency room immediately      Elevated bp and tobacco cessation will be addressed after acute issue resolves, moderately elevated BP today.

## 2022-07-25 NOTE — PATIENT INSTRUCTIONS
-will call if COVID test positive. If so will prescribed Paxlovid. Would recommend to take home COVID test and if positive, call our clinic and will prescribed Paxlovid.  -recommend to go to the emergency room if having severe shortness of breath, wheezing, fever, chills, or chest pain  -recommend to get some decongestants: Claritin, Zyrtec, or Allega as well  -encourage to quit smoking  -return to the clinic in four days if not feeling better  -wear a mask, if have COVID-19, isolate 5 days, then wear mask 5 days afterwards

## 2022-07-26 LAB — COVID ORDER STATUS COVID19: NORMAL

## 2022-07-27 ENCOUNTER — TELEPHONE (OUTPATIENT)
Dept: INTERNAL MEDICINE | Facility: OTHER | Age: 47
End: 2022-07-27
Payer: MEDICAID

## 2022-07-27 DIAGNOSIS — U07.1 COVID-19: ICD-10-CM

## 2022-07-27 LAB
SARS-COV-2 RNA RESP QL NAA+PROBE: DETECTED
SPECIMEN SOURCE: ABNORMAL

## 2022-07-27 RX ORDER — BENZONATATE 100 MG/1
100 CAPSULE ORAL 3 TIMES DAILY PRN
Qty: 30 CAPSULE | Refills: 0 | Status: SHIPPED | OUTPATIENT
Start: 2022-07-27 | End: 2022-10-13

## 2022-07-28 NOTE — TELEPHONE ENCOUNTER
Called the patient and informed her that she is COVID-19 positive and I would put in a prescription for Paxlovid and Tessalon for her cough. Advised her to isolate for 5 days and wear a mask for 5 days.may consider repeating COVID-19 testing at home in 7-10 days. Patient reports she is not having shortness of breath,wheezing, fever, chills, chest pain. Patient counseled to go to the emergency room if having severe shortness of breath, wheezing, fever, chills, or chest pain which the patient verbalized an understanding to. Patient declines wanting to get a COVID-19 vaccine in the future after discussing with her Pulmonologist in the past. Patient again counseled on smoking cessation which she is not ready to do. Patient has follow up scheduled with me 9/8/22 and counseled to call the clinic or message me on [email or web address] for any concerns or questions which the patient verbalized an understanding of.

## 2022-08-25 ENCOUNTER — TELEPHONE (OUTPATIENT)
Dept: SLEEP MEDICINE | Facility: MEDICAL CENTER | Age: 47
End: 2022-08-25
Payer: MEDICAID

## 2022-08-25 NOTE — TELEPHONE ENCOUNTER
Received letter from [facility] referral denied insurance is not contracted with [facility]. Scanned in media.

## 2022-09-02 PROBLEM — M54.9 BACK PAIN: Status: RESOLVED | Noted: 2020-09-21 | Resolved: 2022-09-02

## 2022-09-02 PROBLEM — J45.51 SEVERE PERSISTENT ASTHMA WITH ACUTE EXACERBATION: Status: RESOLVED | Noted: 2021-07-12 | Resolved: 2022-09-02

## 2022-09-02 NOTE — TELEPHONE ENCOUNTER
Paulina Gil M.D.  You      Would you mind letting her know that she would have to be self pay for any of the out of state referral due to her insurance   [employer] message sent to the patient.

## 2022-09-08 ENCOUNTER — HOSPITAL ENCOUNTER (OUTPATIENT)
Facility: MEDICAL CENTER | Age: 47
End: 2022-09-08
Attending: GENERAL PRACTICE
Payer: MEDICAID

## 2022-09-08 ENCOUNTER — OFFICE VISIT (OUTPATIENT)
Dept: INTERNAL MEDICINE | Facility: OTHER | Age: 47
End: 2022-09-08
Payer: MEDICAID

## 2022-09-08 VITALS
DIASTOLIC BLOOD PRESSURE: 82 MMHG | WEIGHT: 220 LBS | HEIGHT: 72 IN | TEMPERATURE: 98.3 F | HEART RATE: 93 BPM | BODY MASS INDEX: 29.8 KG/M2 | SYSTOLIC BLOOD PRESSURE: 132 MMHG

## 2022-09-08 DIAGNOSIS — F17.210 CIGARETTE NICOTINE DEPENDENCE WITHOUT COMPLICATION: ICD-10-CM

## 2022-09-08 DIAGNOSIS — L91.8 ACROCHORDON: ICD-10-CM

## 2022-09-08 DIAGNOSIS — G47.33 OBSTRUCTIVE SLEEP APNEA: ICD-10-CM

## 2022-09-08 DIAGNOSIS — Z12.31 ENCOUNTER FOR SCREENING MAMMOGRAM FOR BREAST CANCER: ICD-10-CM

## 2022-09-08 PROBLEM — Z87.19 HISTORY OF ABDOMINAL HERNIA: Status: RESOLVED | Noted: 2021-05-26 | Resolved: 2022-09-08

## 2022-09-08 PROBLEM — G62.9 NEUROPATHY: Status: RESOLVED | Noted: 2021-10-28 | Resolved: 2022-09-08

## 2022-09-08 PROBLEM — F17.200 TOBACCO DEPENDENCE SYNDROME: Status: RESOLVED | Noted: 2021-07-12 | Resolved: 2022-09-08

## 2022-09-08 PROBLEM — N92.1 MENORRHAGIA WITH IRREGULAR CYCLE: Status: RESOLVED | Noted: 2022-03-01 | Resolved: 2022-09-08

## 2022-09-08 PROBLEM — Z28.21 COVID-19 VACCINATION REFUSED: Status: RESOLVED | Noted: 2021-11-12 | Resolved: 2022-09-08

## 2022-09-08 LAB — PATHOLOGY CONSULT NOTE: NORMAL

## 2022-09-08 PROCEDURE — 88305 TISSUE EXAM BY PATHOLOGIST: CPT

## 2022-09-08 PROCEDURE — 99213 OFFICE O/P EST LOW 20 MIN: CPT | Mod: GC | Performed by: GENERAL PRACTICE

## 2022-09-08 ASSESSMENT — ENCOUNTER SYMPTOMS
PALPITATIONS: 0
DIARRHEA: 0
WEAKNESS: 0
WEIGHT LOSS: 0
NAUSEA: 0
CHILLS: 0
ABDOMINAL PAIN: 0
HEARTBURN: 0
DOUBLE VISION: 0
DEPRESSION: 0
HEADACHES: 0
CONSTIPATION: 0
FALLS: 0
NERVOUS/ANXIOUS: 0
DIZZINESS: 0
VOMITING: 0
MYALGIAS: 0
SORE THROAT: 0
BLURRED VISION: 0
WHEEZING: 0
SHORTNESS OF BREATH: 0
FEVER: 0
COUGH: 0

## 2022-09-08 ASSESSMENT — LIFESTYLE VARIABLES: SUBSTANCE_ABUSE: 0

## 2022-09-08 ASSESSMENT — FIBROSIS 4 INDEX: FIB4 SCORE: 0.86

## 2022-09-08 NOTE — PATIENT INSTRUCTIONS
-referral to sleep medicine and dermatology. Call [phone] in 2 weeks if not called to schedule an appointment  -call 1-00-QUIT NOW to assist with tobacco cessation  -mammogram: call [phone], press option 2

## 2022-09-08 NOTE — PROGRESS NOTES
Established Patient    Melly presents today with the following:    CC: follow up    HPI: 47 year old female, presents for a routine appointment.      COPD/asthma: followed by [facility].       MICHELL: intolerant to CPAP. Will talk to sleep medicine.      Smoker: 7-8 cigarettes/day for 20 years, not ready to quit.stress makes her smoke.Has not tried 1-00-QUIT NOW.    VIVIANA: Treated with Cymbalta.    Treated for URI/sinusitis in July 2022, and tested positive for COVID and treated with Paxlovid.    Due for a mammogram.never had an abnormal. Most recent 2019. Had mammoplasty 2019.    Due for a PCV20, wants to hold off.    Hep B series: due    Refuses COVID vaccine and influenza vaccine.      Patient Active Problem List    Diagnosis Date Noted    Obstructive sleep apnea 09/08/2022    Cigarette nicotine dependence without complication 09/08/2022    Acrochordon 09/08/2022    Generalized anxiety disorder 07/12/2021    Hyperlipidemia 07/12/2021    Hypertension 07/12/2021    Lipoma of abdominal wall 07/12/2021    Lung nodule < 6cm on CT 07/12/2021    Overweight with body mass index (BMI) 25.0-29.9 07/12/2021    Ground glass opacity present on imaging of lung 06/15/2021    Other ovarian cyst, left side 06/15/2021    Prediabetes 06/15/2021    Gastroesophageal reflux disease with esophagitis 02/11/2021    Chronic obstructive pulmonary disease, unspecified (HCC) 09/21/2020    Chronic sinusitis, unspecified 09/21/2020    Degeneration of intervertebral disc of lumbosacral region 09/21/2020       Social History     Tobacco Use    Smoking status: Some Days     Packs/day: 1.00     Years: 20.00     Pack years: 20.00     Types: Cigarettes    Smokeless tobacco: Never    Tobacco comments:     on and off    Vaping Use    Vaping Use: Never used   Substance Use Topics    Alcohol use: Yes     Comment: occ, rare    Drug use: Not Currently     Comment: THC       Current Outpatient Medications   Medication Sig Dispense Refill     benzonatate (TESSALON) 100 MG Cap Take 1 Capsule by mouth 3 times a day as needed for Cough. 30 Capsule 0    albuterol (PROVENTIL) 2.5mg/3ml Nebu Soln solution for nebulization       loratadine (CLARITIN) 10 MG Tab Take 1 Tablet by mouth every day. 30 Tablet 0    MUCINEX FOR KIDS 100 MG Pack TAKE 1  BY MOUTH 4 TIMES DAILY AS NEEDED FOR COUGH 20 Each 0    EPINEPHrine (EPIPEN) 0.3 MG/0.3ML Solution Auto-injector solution for injection INJECT 0.3ML INTO THE SHOULDER, THIGH, OR BUTTOCKS ONE TIME FOR 1 DOSE      predniSONE (DELTASONE) 10 MG Tab Take 40mg x 4 days, then take 30mg x 4 days, then take 20mg x 4 days, then 10mg x 4 days with food, then discontinue. 40 Tablet 2    DULoxetine (CYMBALTA) 30 MG Cap DR Particles Take 30 mg by mouth at bedtime.      gabapentin (NEURONTIN) 100 MG Cap Take 100 mg by mouth 3 times a day.      hydrOXYzine HCl (ATARAX) 10 MG Tab Take 10 mg by mouth at bedtime as needed.      albuterol 108 (90 Base) MCG/ACT Aero Soln inhalation aerosol Inhale 2 Puffs  as needed in the morning and 2 Puffs as needed at noon and 2 Puffs as needed in the evening and 2 Puffs as needed before bedtime for Shortness of Breath. 8.5 g 5    ipratropium-albuterol (DUONEB) 0.5-2.5 (3) MG/3ML nebulizer solution USE 1 AMPULE IN NEBULIZER 4 TIMES DAILY 180 mL 5    fluticasone (FLONASE) 50 MCG/ACT nasal spray Administer 2 Sprays into affected nostril(S) every day. 16 g 11    diclofenac sodium (VOLTAREN) 1 % Gel       HYDROcodone/acetaminophen (NORCO)  MG Tab Take 1-2 Tablets by mouth every 6 hours as needed.       No current facility-administered medications for this visit.       Review of Systems   Constitutional:  Negative for chills, fever, malaise/fatigue and weight loss.   HENT:  Negative for congestion and sore throat.    Eyes:  Negative for blurred vision and double vision.   Respiratory:  Negative for cough, shortness of breath and wheezing.    Cardiovascular:  Negative for chest pain and palpitations.    Gastrointestinal:  Negative for abdominal pain, constipation, diarrhea, heartburn, nausea and vomiting.   Genitourinary:  Negative for dysuria, frequency and urgency.   Musculoskeletal:  Negative for falls, joint pain and myalgias.   Skin:  Negative for rash.   Neurological:  Negative for dizziness, weakness and headaches.   Psychiatric/Behavioral:  Negative for depression, substance abuse and suicidal ideas. The patient is not nervous/anxious.        /82 (BP Location: Left arm, Patient Position: Sitting, BP Cuff Size: Adult)   Pulse 93   Temp 36.8 °C (98.3 °F) (Temporal)   Ht 1.829 m (6')   Wt 99.8 kg (220 lb)   BMI 29.84 kg/m²       PHYSICAL EXAM:  Physical Exam  Vitals and nursing note reviewed.   Constitutional:       General: She is not in acute distress.     Appearance: Normal appearance. She is not ill-appearing.   HENT:      Head: Normocephalic and atraumatic.   Eyes:      General:         Right eye: No discharge.         Left eye: No discharge.      Conjunctiva/sclera: Conjunctivae normal.   Cardiovascular:      Rate and Rhythm: Normal rate and regular rhythm.      Pulses: Normal pulses.      Heart sounds: Normal heart sounds. No murmur heard.    No friction rub. No gallop.   Pulmonary:      Effort: Pulmonary effort is normal. No respiratory distress.      Breath sounds: Normal breath sounds. No wheezing, rhonchi or rales.   Abdominal:      Tenderness: There is no guarding.   Skin:     Capillary Refill: Capillary refill takes less than 2 seconds.      Findings: Lesion present.      Comments: Flesh colored large papule inferior-lateral to right eye   Neurological:      Mental Status: She is alert and oriented to person, place, and time. Mental status is at baseline.      Cranial Nerves: No cranial nerve deficit.      Sensory: No sensory deficit.      Coordination: Coordination normal.   Psychiatric:         Mood and Affect: Mood normal.         Behavior: Behavior normal.       Skin tag  excision:  Skin tags are snipped off using Betadine for cleansing and sterile 11 blade to excise lesion.Local anesthesia was used 1% Lidocaine w/ Epi x 3 ml.These pathognomonic lesions are not sent for pathology.no complications. EBL <1ml. Used silver nitrate to control bleeding.     Dr. Casas, attending, supervised face to face with me during the procedure.    Note: I have reviewed all pertinent labs and diagnostic tests associated with this visit with specific comments listed under the assessment and plan below    Assessment and Plan    1. Acrochordon  Excised lesion.  - Referral to Dermatology  - Pathology Specimen; Future    2. Obstructive sleep apnea  - Referral to Pulmonary and Sleep Medicine    3. Cigarette nicotine dependence without complication  Smokes 7-8 cigarettes/daily for 20 years. Ready to quit, will try 1-800 QUIT NOW.   3 minutes spent counseling patient regarding quitting smoking to improve overall health.       4. Encounter for screening mammogram for breast cancer  - MA-SCREENING MAMMO BILAT W/TOMOSYNTHESIS W/CAD; Future        Followup: Return in about 5 weeks (around 10/13/2022).      Signed by: Dimitri Hdez Jr., M.D.

## 2022-09-19 ENCOUNTER — APPOINTMENT (OUTPATIENT)
Dept: SLEEP MEDICINE | Facility: MEDICAL CENTER | Age: 47
End: 2022-09-19
Attending: INTERNAL MEDICINE
Payer: MEDICAID

## 2022-09-19 ENCOUNTER — APPOINTMENT (OUTPATIENT)
Dept: SLEEP MEDICINE | Facility: MEDICAL CENTER | Age: 47
End: 2022-09-19
Payer: MEDICAID

## 2022-09-22 ENCOUNTER — APPOINTMENT (OUTPATIENT)
Dept: RADIOLOGY | Facility: MEDICAL CENTER | Age: 47
End: 2022-09-22
Attending: GENERAL PRACTICE
Payer: MEDICAID

## 2022-09-22 DIAGNOSIS — Z12.31 ENCOUNTER FOR SCREENING MAMMOGRAM FOR BREAST CANCER: ICD-10-CM

## 2022-09-22 PROCEDURE — 77067 SCR MAMMO BI INCL CAD: CPT

## 2022-10-12 PROBLEM — L91.8 ACROCHORDON: Status: RESOLVED | Noted: 2022-09-08 | Resolved: 2022-10-12

## 2022-10-12 PROBLEM — R91.8 GROUND GLASS OPACITY PRESENT ON IMAGING OF LUNG: Status: RESOLVED | Noted: 2021-06-15 | Resolved: 2022-10-12

## 2022-10-13 ENCOUNTER — OFFICE VISIT (OUTPATIENT)
Dept: INTERNAL MEDICINE | Facility: OTHER | Age: 47
End: 2022-10-13
Payer: MEDICAID

## 2022-10-13 VITALS
DIASTOLIC BLOOD PRESSURE: 82 MMHG | OXYGEN SATURATION: 100 % | HEIGHT: 72 IN | HEART RATE: 92 BPM | SYSTOLIC BLOOD PRESSURE: 122 MMHG | TEMPERATURE: 98 F | BODY MASS INDEX: 30.34 KG/M2 | WEIGHT: 224 LBS

## 2022-10-13 DIAGNOSIS — F17.210 CIGARETTE NICOTINE DEPENDENCE WITHOUT COMPLICATION: ICD-10-CM

## 2022-10-13 DIAGNOSIS — K21.00 GASTROESOPHAGEAL REFLUX DISEASE WITH ESOPHAGITIS, UNSPECIFIED WHETHER HEMORRHAGE: ICD-10-CM

## 2022-10-13 DIAGNOSIS — E66.9 OBESITY (BMI 30-39.9): ICD-10-CM

## 2022-10-13 PROBLEM — E66.3 OVERWEIGHT WITH BODY MASS INDEX (BMI) 25.0-29.9: Status: RESOLVED | Noted: 2021-07-12 | Resolved: 2022-10-13

## 2022-10-13 PROCEDURE — 99213 OFFICE O/P EST LOW 20 MIN: CPT | Mod: GE | Performed by: GENERAL PRACTICE

## 2022-10-13 RX ORDER — DROSPIRENONE 4 MG/1
TABLET, FILM COATED ORAL
COMMUNITY
End: 2022-01-01

## 2022-10-13 ASSESSMENT — ENCOUNTER SYMPTOMS
WEIGHT LOSS: 0
PALPITATIONS: 0
DIARRHEA: 0
HEADACHES: 0
DOUBLE VISION: 0
SORE THROAT: 0
DEPRESSION: 0
DIZZINESS: 0
ABDOMINAL PAIN: 0
MYALGIAS: 0
COUGH: 0
CHILLS: 0
NAUSEA: 0
BLURRED VISION: 0
CONSTIPATION: 0
SHORTNESS OF BREATH: 0
WHEEZING: 0
FALLS: 0
VOMITING: 0
FEVER: 0
NERVOUS/ANXIOUS: 0
HEARTBURN: 1
WEAKNESS: 0

## 2022-10-13 ASSESSMENT — FIBROSIS 4 INDEX: FIB4 SCORE: 0.86

## 2022-10-13 ASSESSMENT — LIFESTYLE VARIABLES: SUBSTANCE_ABUSE: 0

## 2022-10-13 NOTE — PATIENT INSTRUCTIONS
-start Chantix. Quit smoking days 8-35. Call the 1-800 QUIT NOW program along with the Chantix.  -recommend to get the Hepatitis B vaccine as well as the pneumococcal vaccine 20.

## 2022-10-13 NOTE — PROGRESS NOTES
Established Patient    Melly presents today with the following:    CC: established    HPI: 47 year old female, presents for a routine appointment.    Reports abdominal swelling.has bloating. Upper abdominal discomfort at times.  GERD:did not do well with Prevacid, Prilosec, no dysphagia or odynophagia, no weight loss, hematemesis. Did not do well with PPIs as stated above, less than a week with rash.    Wants to review lab results from Dr. Montesinos and Dr. Gil.Dr. Montesinos ordered a CBC/CMP June 2022 that are unremarkable.    COPD/asthma: followed by [facility]. Next appointment in January 2023.     MICHELL: intolerant to CPAP. Referred to sleep medicine September 2022 and pending appointment.      Smoker: 7-8 cigarettes/day for 20 years, not ready to quit.stress makes her smoke.Has not tried 1-00-QUIT NOW.declines pills, Chantix.stress related.       Obesity:Was on Prednisone 60mg [id number] for Asthma/COPD, but now not requiring Prednisone.declines nutrition referral.    Patient Active Problem List    Diagnosis Date Noted    Obesity (BMI 30-39.9) 10/13/2022    Obstructive sleep apnea 09/08/2022    Cigarette nicotine dependence without complication 09/08/2022    Generalized anxiety disorder 07/12/2021    Hyperlipidemia 07/12/2021    Hypertension 07/12/2021    Lipoma of abdominal wall 07/12/2021    Lung nodule < 6cm on CT 07/12/2021    Other ovarian cyst, left side 06/15/2021    Prediabetes 06/15/2021    Gastroesophageal reflux disease with esophagitis 02/11/2021    Chronic obstructive pulmonary disease, unspecified (HCC) 09/21/2020    Chronic sinusitis, unspecified 09/21/2020    Degeneration of intervertebral disc of lumbosacral region 09/21/2020       Social History     Tobacco Use    Smoking status: Some Days     Packs/day: 1.00     Years: 20.00     Pack years: 20.00     Types: Cigarettes    Smokeless tobacco: Never    Tobacco comments:     on and off    Vaping Use    Vaping Use: Never used   Substance  Use Topics    Alcohol use: Yes     Comment: occ, rare    Drug use: Not Currently     Comment: THC       Current Outpatient Medications   Medication Sig Dispense Refill    Drospirenone (SLYND) 4 MG Tab Take  by mouth.      varenicline (CHANTIX STARTING MONTH PAK) 0.5 MG X 11 & 1 MG X 42 tablet Follow the pack 53 Each 3    esomeprazole (NEXIUM) 20 MG capsule Take 1 Capsule by mouth every morning before breakfast for 45 days. 45 Capsule 1    albuterol (PROVENTIL) 2.5mg/3ml Nebu Soln solution for nebulization       loratadine (CLARITIN) 10 MG Tab Take 1 Tablet by mouth every day. 30 Tablet 0    EPINEPHrine (EPIPEN) 0.3 MG/0.3ML Solution Auto-injector solution for injection INJECT 0.3ML INTO THE SHOULDER, THIGH, OR BUTTOCKS ONE TIME FOR 1 DOSE      predniSONE (DELTASONE) 10 MG Tab Take 40mg x 4 days, then take 30mg x 4 days, then take 20mg x 4 days, then 10mg x 4 days with food, then discontinue. 40 Tablet 2    DULoxetine (CYMBALTA) 30 MG Cap DR Particles Take 30 mg by mouth at bedtime.      gabapentin (NEURONTIN) 100 MG Cap Take 100 mg by mouth 3 times a day.      hydrOXYzine HCl (ATARAX) 10 MG Tab Take 10 mg by mouth at bedtime as needed.      albuterol 108 (90 Base) MCG/ACT Aero Soln inhalation aerosol Inhale 2 Puffs  as needed in the morning and 2 Puffs as needed at noon and 2 Puffs as needed in the evening and 2 Puffs as needed before bedtime for Shortness of Breath. 8.5 g 5    ipratropium-albuterol (DUONEB) 0.5-2.5 (3) MG/3ML nebulizer solution USE 1 AMPULE IN NEBULIZER 4 TIMES DAILY 180 mL 5    fluticasone (FLONASE) 50 MCG/ACT nasal spray Administer 2 Sprays into affected nostril(S) every day. 16 g 11    diclofenac sodium (VOLTAREN) 1 % Gel       HYDROcodone/acetaminophen (NORCO)  MG Tab Take 1-2 Tablets by mouth every 6 hours as needed.       No current facility-administered medications for this visit.       Review of Systems   Constitutional:  Negative for chills, fever, malaise/fatigue and weight loss.    HENT:  Negative for congestion and sore throat.    Eyes:  Negative for blurred vision and double vision.   Respiratory:  Negative for cough, shortness of breath and wheezing.    Cardiovascular:  Negative for chest pain and palpitations.   Gastrointestinal:  Positive for heartburn. Negative for abdominal pain, constipation, diarrhea, melena, nausea and vomiting.   Genitourinary:  Negative for dysuria, frequency and urgency.   Musculoskeletal:  Negative for falls, joint pain and myalgias.   Skin:  Negative for rash.   Neurological:  Negative for dizziness, weakness and headaches.   Psychiatric/Behavioral:  Negative for depression, substance abuse and suicidal ideas. The patient is not nervous/anxious.        /82 (BP Location: Left arm, Patient Position: Sitting, BP Cuff Size: Adult)   Pulse 92   Temp 36.7 °C (98 °F) (Temporal)   Ht 1.829 m (6')   Wt 102 kg (224 lb)   SpO2 100%   BMI 30.38 kg/m²       PHYSICAL EXAM:  Physical Exam  Vitals and nursing note reviewed.   Constitutional:       General: She is not in acute distress.     Appearance: Normal appearance. She is not ill-appearing.   HENT:      Head: Normocephalic and atraumatic.   Cardiovascular:      Rate and Rhythm: Normal rate and regular rhythm.      Pulses: Normal pulses.      Heart sounds: Normal heart sounds. No murmur heard.    No friction rub. No gallop.   Pulmonary:      Effort: Pulmonary effort is normal. No respiratory distress.      Breath sounds: Normal breath sounds. No rhonchi or rales.      Comments: Mild bilateral expiratory wheezing upper mid lobes bilaterally  Abdominal:      General: Abdomen is flat. Bowel sounds are normal. There is no distension.      Palpations: Abdomen is soft.      Tenderness: There is no abdominal tenderness. There is no guarding.   Musculoskeletal:      Cervical back: Neck supple. No rigidity or tenderness.   Neurological:      Mental Status: She is alert and oriented to person, place, and time. Mental  status is at baseline.   Psychiatric:         Mood and Affect: Mood normal.         Behavior: Behavior normal.       Note: I have reviewed all pertinent labs and diagnostic tests associated with this visit with specific comments listed under the assessment and plan below    Assessment and Plan     1. Cigarette nicotine dependence without complication  Patient smokes 7.8 cig/day for 20 years.  Wants to try Chantix, willing to quit.    I discussed cessation with patient including starting on nicotine patch and/or gum on discharge.  I also discussed medications to help with cessation with patient including Wellbutrin and Chantix, nicotine patches .  Smoking cessation discussed with patient for 4 minutes.Previous attempts to quit: yes. Also given 1-800 QUIT NOW to utilize.      2. Gastroesophageal reflux disease with esophagitis, unspecified whether hemorrhage  - esomeprazole (NEXIUM) 20 MG capsule; Take 1 Capsule by mouth every morning before breakfast for 45 days.  Dispense: 45 Capsule; Refill: 1    3. Obesity (BMI 30-39.9)  - Patient identified as having weight management issue.  Appropriate orders and counseling given.      Followup: Return in about 6 weeks (around 11/24/2022).      Signed by: Dimitri Hdez Jr., M.D.

## 2022-10-14 ENCOUNTER — TELEPHONE (OUTPATIENT)
Dept: INTERNAL MEDICINE | Facility: OTHER | Age: 47
End: 2022-10-14
Payer: MEDICAID

## 2022-10-14 DIAGNOSIS — K21.00 GASTROESOPHAGEAL REFLUX DISEASE WITH ESOPHAGITIS, UNSPECIFIED WHETHER HEMORRHAGE: ICD-10-CM

## 2022-10-14 NOTE — TELEPHONE ENCOUNTER
DOCUMENTATION OF PAR STATUS:    1. Name of Medication & Dose: Esomeprazole Magnesium     2. Name of Prescription Coverage Company & phone #: Medicaid/[employer], cover my meds key [id number]    3. Date Prior Auth Submitted: 10/14/22    4. What information was given to obtain insurance decision? ICD-10, medication history, insurance information    5. Prior Auth Status? Pending    6. Patient Notified:

## 2022-10-14 NOTE — TELEPHONE ENCOUNTER
Medication Denied, scanned denial into chart, I listed the medications that she has failed and they still denied it, please advise on something else.

## 2022-10-17 RX ORDER — FAMOTIDINE 20 MG/1
20 TABLET, FILM COATED ORAL 2 TIMES DAILY
Qty: 60 TABLET | Refills: 2 | Status: SHIPPED | OUTPATIENT
Start: 2022-10-17 | End: 2022-01-01

## 2022-10-17 NOTE — TELEPHONE ENCOUNTER
Dimitri Hdez Jr., M.D.  You 2 days ago     [facility]  So they denied Prevacid and Prilosec as well as Nexium. Will they cover any PPI (Proton Pump Inhibitors) for GERD? She did not tolerate the first two.Thanks.

## 2022-10-17 NOTE — TELEPHONE ENCOUNTER
I dont know, I did a PA for the one you recently prescribed and that was denied, I put in the denial the ones that you listed on the visit note that she has failed, I am not sure what else they want or will cover at this point, you can try the other one you mentioned?

## 2022-10-31 PROBLEM — R09.82 POST-NASAL DRIP: Status: ACTIVE | Noted: 2022-01-01

## 2022-10-31 NOTE — PATIENT INSTRUCTIONS
Please call us if you develop new onset severe shortness of breath and throat swelling (left side) which can cause you difficulty of breathing difficulty eating, as well as fever  Please take your loratadine every day  Please follow-up with your pulmonologist
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
## 2022-11-01 NOTE — PROGRESS NOTES
Teaching Physician Attestation        Level of Participation     I have personally interviewed and examined the patient.  In addition, I discussed with the resident physician the patient's history, exam, assessment and plan in detail.  Topics listed in my addendum were the focus of the visit.  Healthcare maintenance was not addressed this visit unless listed as a topic in my addendum.  I agree with the plan as written along with the following additions/modifications:        Left ear pain in the setting of likely postnasal drip causing ear congestion resulting in pulsatile tinnitus, in the setting of possible mildly worsening COPD/asthma  -Patient reports following with Dr. Gil and having specialty lung treatments in [place], she reports sore throat and cough that is chronic and unchanged but new pain in the left ear along with intermittent pulsatile tinnitus when laying in bed on the side, on exam she is speaking in full sentences in no respiratory distress, satting 94% on room air, lungs have a faint expiratory wheeze bilaterally in the upper and mid fields though there is no crackles and no egophony at the bases, she has no accessory muscle use.  she states besides her ear feels at her baseline.  We discussed extensively possibilities for treatment given her significant lung history.  Patient reports many hospitalizations due to severe asthma.  Given her overall stability we elected to begin loratadine 10 mg daily to help with ear congestion, also given perhaps slightly worsening cough rx for azithromycin 5-day course given, particularly since azithromycin is indicated for COPD exacerbations and also has anti-inflammatory activity and has worked for her in the past.  she does NOT meet copd excerbation criteria at present thought.  plan is to try claritin for 2-3 days, if no clinical improvement begin zpack azithromycin, or if worsening begin azithromycin immediately and call.  She will continue to use her rescue  inhaler as she was instructed by Dr. Gil and will also reach out to Dr. Gil to make her aware of her current clinical status, appreciate support.  Patient states she has an action plan in place with prednisone at home, counseled patient any significant worsening of shortness of breath or change of rescue inhaler use from her baseline (patient states at baseline presently) to proceed to the emergency room immediately.  Patient will follow-up closely in 1 week to check on her progress.  We considered initiating prednisone now, but given overall patient states she is at her baseline except for ear pain and very mild progression of her chronic cough we will trial Claritin first.  Of note, tympanic membranes are clear bilaterally.  If pulsatile tinnitus does not improve with Claritin/resolution of allergic symptoms could consider imaging for AVM, although seems unlikley given clinical picture.

## 2022-11-28 PROBLEM — R09.82 POST-NASAL DRIP: Status: RESOLVED | Noted: 2022-01-01 | Resolved: 2022-01-01

## 2022-11-28 NOTE — PATIENT INSTRUCTIONS
-get chest xray today; if has pneumonia, will treat with antibiotics and call you  -changed inhaler to Ventolin  -double dose of Prednisone for 40mg for 5 days, then stop  -take Mucinex over the counter  -recommend to take Chantix once feel better  -recommend once upper respiratory infection better, get the COVID-19 and flu vaccines  -follow up in the clinic in one week

## 2022-11-28 NOTE — PROGRESS NOTES
Established Patient    Melly presents today with the following:    CC: follow up    HPI: 47 year old female, presents for a follow up.    Seen 10/31/22 in the clinic for URI and prescribed Claritin, Zpack which has completed. The patient denies fever, chills, muscle aches. Has brown-colored sputum, no hemoptysis. Overall, feels better since being seen in the clinic 10/31/22.      COPD/asthma: followed by [facility]. Next appointment in January 2023. PFT January 2021: FEV1 87% predicted. Needs Ventolin inhaler. Duoneb.      MICHELL: intolerant to CPAP. Referred to sleep medicine September 2022 and appointment in January 2023.     Smoker: 7-8 cigarettes/day for 20 years. Prescribed Chantix, not started yet.        Patient Active Problem List    Diagnosis Date Noted    Obesity (BMI 30-39.9) 10/13/2022    Obstructive sleep apnea 09/08/2022    Cigarette nicotine dependence without complication 09/08/2022    Generalized anxiety disorder 07/12/2021    Hyperlipidemia 07/12/2021    Hypertension 07/12/2021    Lipoma of abdominal wall 07/12/2021    Lung nodule < 6cm on CT 07/12/2021    Other ovarian cyst, left side 06/15/2021    Prediabetes 06/15/2021    Gastroesophageal reflux disease with esophagitis 02/11/2021    Chronic obstructive pulmonary disease, unspecified (HCC) 09/21/2020    Chronic sinusitis, unspecified 09/21/2020    Degeneration of intervertebral disc of lumbosacral region 09/21/2020       Social History     Tobacco Use    Smoking status: Some Days     Packs/day: 1.00     Years: 20.00     Pack years: 20.00     Types: Cigarettes    Smokeless tobacco: Never    Tobacco comments:     on and off    Vaping Use    Vaping Use: Never used   Substance Use Topics    Alcohol use: Yes     Comment: occ, rare    Drug use: Not Currently     Comment: THC       Current Outpatient Medications   Medication Sig Dispense Refill    albuterol 108 (90 Base) MCG/ACT Aero Soln inhalation aerosol Inhale 2 Puffs every four hours  as needed for Shortness of Breath. 1 Each 3    CHANTIX CONTINUING MONTH CINDY 1 MG tablet Take 1 mg by mouth 2 times a day.      loratadine (CLARITIN) 10 MG Tab Take 1 Tablet by mouth every day. 30 Tablet 0    EPINEPHrine (EPIPEN) 0.3 MG/0.3ML Solution Auto-injector solution for injection INJECT 0.3ML INTO THE SHOULDER, THIGH, OR BUTTOCKS ONE TIME FOR 1 DOSE      predniSONE (DELTASONE) 10 MG Tab Take 40mg x 4 days, then take 30mg x 4 days, then take 20mg x 4 days, then 10mg x 4 days with food, then discontinue. 40 Tablet 2    DULoxetine (CYMBALTA) 30 MG Cap DR Particles Take 30 mg by mouth at bedtime.      gabapentin (NEURONTIN) 100 MG Cap Take 100 mg by mouth 3 times a day.      hydrOXYzine HCl (ATARAX) 10 MG Tab Take 10 mg by mouth at bedtime as needed.      ipratropium-albuterol (DUONEB) 0.5-2.5 (3) MG/3ML nebulizer solution USE 1 AMPULE IN NEBULIZER 4 TIMES DAILY 180 mL 5    fluticasone (FLONASE) 50 MCG/ACT nasal spray Administer 2 Sprays into affected nostril(S) every day. 16 g 11    diclofenac sodium (VOLTAREN) 1 % Gel       HYDROcodone/acetaminophen (NORCO)  MG Tab Take 1-2 Tablets by mouth every 6 hours as needed.       No current facility-administered medications for this visit.       Review of Systems   Constitutional:  Negative for chills, fever, malaise/fatigue and weight loss.   HENT:  Negative for congestion and sore throat.    Eyes:  Negative for blurred vision and double vision.   Respiratory:  Positive for cough, sputum production and wheezing. Negative for hemoptysis and shortness of breath.    Cardiovascular:  Negative for chest pain and palpitations.   Gastrointestinal:  Negative for abdominal pain, constipation, diarrhea, heartburn, nausea and vomiting.   Genitourinary:  Negative for dysuria, frequency and urgency.   Musculoskeletal:  Negative for falls, joint pain and myalgias.   Skin:  Negative for rash.   Neurological:  Negative for dizziness, weakness and headaches.    Psychiatric/Behavioral:  Negative for depression, substance abuse and suicidal ideas. The patient is not nervous/anxious.        /84 (BP Location: Left arm, Patient Position: Sitting, BP Cuff Size: Adult)   Pulse 98   Temp 36.8 °C (98.3 °F) (Temporal)   Ht 1.829 m (6')   Wt 101 kg (222 lb 12.8 oz)   SpO2 94%   BMI 30.22 kg/m²       PHYSICAL EXAM:  Physical Exam  Vitals and nursing note reviewed.   Constitutional:       General: She is not in acute distress.     Appearance: Normal appearance. She is ill-appearing.   HENT:      Head: Normocephalic and atraumatic.      Right Ear: Tympanic membrane, ear canal and external ear normal. There is no impacted cerumen.      Left Ear: Tympanic membrane, ear canal and external ear normal. There is no impacted cerumen.      Nose: No congestion or rhinorrhea.      Mouth/Throat:      Mouth: Mucous membranes are moist.      Pharynx: Oropharynx is clear. No oropharyngeal exudate or posterior oropharyngeal erythema.   Neck:      Comments: Mild left anterior cervical lymphadenopathy  Cardiovascular:      Rate and Rhythm: Normal rate and regular rhythm.      Pulses: Normal pulses.      Heart sounds: Normal heart sounds. No murmur heard.    No friction rub. No gallop.   Pulmonary:      Effort: Pulmonary effort is normal. No respiratory distress.      Breath sounds: No stridor. Wheezing present. No rhonchi or rales.      Comments: Bilateral mild upper wheezes appreciated  Musculoskeletal:      Cervical back: Tenderness present. No rigidity.   Lymphadenopathy:      Cervical: Cervical adenopathy present.   Skin:     Capillary Refill: Capillary refill takes less than 2 seconds.   Neurological:      Mental Status: She is alert and oriented to person, place, and time. Mental status is at baseline.   Psychiatric:         Mood and Affect: Mood normal.         Behavior: Behavior normal.       Note: I have reviewed all pertinent labs and diagnostic tests associated with this visit  with specific comments listed under the assessment and plan below    Assessment and Plan    1. Upper respiratory tract infection, unspecified type  Concern for possible bacterial pneumonia vs. COPD exacerbation. If CXR unremarkable for pneumonia, no antibiotics to be given. Patient encouraged to get Mucinex OTC PRN, and to increase Prednisone to 40mg daily for five days total, as well as use Duoneb. Will prescribe Ventolin. Take other inhalers as  prescribed. Will reevaluate in 5 weeks, unless patient has a concern that needs to be addressed sooner, then the patient can send a [employer] message to me or call the clinic.  - DX-CHEST-2 VIEWS; Future    2. Panlobular emphysema (HCC)  Followed by Pulmonology with follow up in January 2023.  - albuterol 108 (90 Base) MCG/ACT Aero Soln inhalation aerosol; Inhale 2 Puffs every four hours as needed for Shortness of Breath.  Dispense: 1 Each; Refill: 3    3. Cigarette nicotine dependence without complication  Patient smokes 7-8 cigarettes/daily for 20 years.    I discussed cessation with patient including starting on nicotine patch and/or gum on discharge.  I also discussed medications to help with cessation with patient including Wellbutrin and Chantix.  Smoking cessation discussed with patient for 4 minutes.Previous attempts to quit:1.5 years in the past. Patient encouraged to start Chantix.          4. Obstructive sleep apnea  Followed by Sleep Medicine with follow up in January 2023.      Followup: Return in about 5 weeks (around 1/2/2023).      Signed by: Dimitri Hdez Jr., M.D.

## 2022-11-28 NOTE — TELEPHONE ENCOUNTER
DOCUMENTATION OF PAR STATUS:    1. Name of Medication & Dose: Albuterol     2. Name of Prescription Coverage Company & phone #: Medicaid [id number] cover my meds key: [id number]    3. Date Prior Auth Submitted: 11/28/22    4. What information was given to obtain insurance decision? Icd-10, insurance    5. Prior Auth Status? Pending    6. Patient Notified:

## 2022-11-29 NOTE — TELEPHONE ENCOUNTER
Called the patient and informed her the Chest xray was normal and no antibiotics indicated. Patient was happy for the phone call.

## 2022-12-08 NOTE — TELEPHONE ENCOUNTER
Called insurance- [employer]- she states the brand name is preferred and does not need PA    Called pharmacy and spoke with Edwin and he will run it as the brand name and get it filled

## 2023-01-01 ENCOUNTER — PATIENT MESSAGE (OUTPATIENT)
Dept: SLEEP MEDICINE | Facility: MEDICAL CENTER | Age: 48
End: 2023-01-01

## 2023-01-01 ENCOUNTER — TELEMEDICINE (OUTPATIENT)
Dept: SLEEP MEDICINE | Facility: MEDICAL CENTER | Age: 48
End: 2023-01-01
Attending: INTERNAL MEDICINE
Payer: MEDICAID

## 2023-01-01 ENCOUNTER — APPOINTMENT (OUTPATIENT)
Dept: RADIATION ONCOLOGY | Facility: MEDICAL CENTER | Age: 48
End: 2023-01-01
Attending: RADIOLOGY
Payer: MEDICAID

## 2023-01-01 ENCOUNTER — APPOINTMENT (OUTPATIENT)
Dept: INTERNAL MEDICINE | Facility: OTHER | Age: 48
End: 2023-01-01
Payer: MEDICAID

## 2023-01-01 ENCOUNTER — HOSPITAL ENCOUNTER (OUTPATIENT)
Dept: RADIOLOGY | Facility: MEDICAL CENTER | Age: 48
End: 2023-04-13
Payer: MEDICAID

## 2023-01-01 ENCOUNTER — TELEPHONE (OUTPATIENT)
Dept: HEALTH INFORMATION MANAGEMENT | Facility: OTHER | Age: 48
End: 2023-01-01
Payer: MEDICAID

## 2023-01-01 ENCOUNTER — APPOINTMENT (OUTPATIENT)
Dept: RADIOLOGY | Facility: MEDICAL CENTER | Age: 48
DRG: 180 | End: 2023-01-01
Attending: HOSPITALIST
Payer: MEDICAID

## 2023-01-01 ENCOUNTER — APPOINTMENT (OUTPATIENT)
Dept: SLEEP MEDICINE | Facility: MEDICAL CENTER | Age: 48
End: 2023-01-01
Attending: NURSE PRACTITIONER
Payer: MEDICAID

## 2023-01-01 ENCOUNTER — HOSPITAL ENCOUNTER (OUTPATIENT)
Dept: RADIATION ONCOLOGY | Facility: MEDICAL CENTER | Age: 48
End: 2023-07-12

## 2023-01-01 ENCOUNTER — HOSPITAL ENCOUNTER (OUTPATIENT)
Facility: MEDICAL CENTER | Age: 48
End: 2023-07-06
Attending: INTERNAL MEDICINE | Admitting: INTERNAL MEDICINE
Payer: MEDICAID

## 2023-01-01 ENCOUNTER — HOSPITAL ENCOUNTER (OUTPATIENT)
Dept: RADIOLOGY | Facility: MEDICAL CENTER | Age: 48
End: 2023-01-30
Attending: INTERNAL MEDICINE
Payer: MEDICAID

## 2023-01-01 ENCOUNTER — HOSPITAL ENCOUNTER (OUTPATIENT)
Dept: RADIATION ONCOLOGY | Facility: MEDICAL CENTER | Age: 48
End: 2023-06-30
Attending: RADIOLOGY
Payer: MEDICAID

## 2023-01-01 ENCOUNTER — TELEPHONE (OUTPATIENT)
Dept: VASCULAR LAB | Facility: MEDICAL CENTER | Age: 48
End: 2023-01-01
Payer: MEDICAID

## 2023-01-01 ENCOUNTER — TELEPHONE (OUTPATIENT)
Dept: HEMATOLOGY ONCOLOGY | Facility: MEDICAL CENTER | Age: 48
End: 2023-01-01
Payer: MEDICAID

## 2023-01-01 ENCOUNTER — TELEPHONE (OUTPATIENT)
Dept: INTERNAL MEDICINE | Facility: OTHER | Age: 48
End: 2023-01-01
Payer: MEDICAID

## 2023-01-01 ENCOUNTER — HOSPITAL ENCOUNTER (OUTPATIENT)
Dept: RADIOLOGY | Facility: MEDICAL CENTER | Age: 48
End: 2023-02-13
Attending: INTERNAL MEDICINE
Payer: MEDICAID

## 2023-01-01 ENCOUNTER — HOSPITAL ENCOUNTER (OUTPATIENT)
Dept: RADIATION ONCOLOGY | Facility: MEDICAL CENTER | Age: 48
End: 2023-04-17

## 2023-01-01 ENCOUNTER — APPOINTMENT (OUTPATIENT)
Dept: RADIOLOGY | Facility: MEDICAL CENTER | Age: 48
DRG: 180 | End: 2023-01-01
Attending: STUDENT IN AN ORGANIZED HEALTH CARE EDUCATION/TRAINING PROGRAM
Payer: MEDICAID

## 2023-01-01 ENCOUNTER — DOCUMENTATION (OUTPATIENT)
Dept: PHARMACY | Facility: MEDICAL CENTER | Age: 48
End: 2023-01-01
Payer: MEDICAID

## 2023-01-01 ENCOUNTER — APPOINTMENT (OUTPATIENT)
Dept: RADIOLOGY | Facility: MEDICAL CENTER | Age: 48
DRG: 190 | End: 2023-01-01
Attending: INTERNAL MEDICINE
Payer: MEDICAID

## 2023-01-01 ENCOUNTER — ANESTHESIA EVENT (OUTPATIENT)
Dept: RADIOLOGY | Facility: MEDICAL CENTER | Age: 48
End: 2023-01-01
Payer: MEDICAID

## 2023-01-01 ENCOUNTER — HOSPITAL ENCOUNTER (OUTPATIENT)
Dept: LAB | Facility: MEDICAL CENTER | Age: 48
End: 2023-01-27
Attending: PAIN MEDICINE
Payer: MEDICAID

## 2023-01-01 ENCOUNTER — HOSPITAL ENCOUNTER (OUTPATIENT)
Dept: RADIOLOGY | Facility: MEDICAL CENTER | Age: 48
End: 2023-05-18
Attending: GENERAL PRACTICE
Payer: MEDICAID

## 2023-01-01 ENCOUNTER — ANESTHESIA EVENT (OUTPATIENT)
Dept: SURGERY | Facility: MEDICAL CENTER | Age: 48
DRG: 175 | End: 2023-01-01
Payer: MEDICAID

## 2023-01-01 ENCOUNTER — APPOINTMENT (OUTPATIENT)
Dept: RADIOLOGY | Facility: MEDICAL CENTER | Age: 48
DRG: 180 | End: 2023-01-01
Attending: INTERNAL MEDICINE
Payer: MEDICAID

## 2023-01-01 ENCOUNTER — OFFICE VISIT (OUTPATIENT)
Dept: INTERNAL MEDICINE | Facility: OTHER | Age: 48
End: 2023-01-01
Payer: MEDICAID

## 2023-01-01 ENCOUNTER — APPOINTMENT (OUTPATIENT)
Dept: RADIOLOGY | Facility: MEDICAL CENTER | Age: 48
DRG: 180 | End: 2023-01-01
Payer: MEDICAID

## 2023-01-01 ENCOUNTER — ANESTHESIA (OUTPATIENT)
Dept: SURGERY | Facility: MEDICAL CENTER | Age: 48
DRG: 175 | End: 2023-01-01
Payer: MEDICAID

## 2023-01-01 ENCOUNTER — APPOINTMENT (OUTPATIENT)
Dept: RADIOLOGY | Facility: MEDICAL CENTER | Age: 48
DRG: 180 | End: 2023-01-01
Attending: EMERGENCY MEDICINE
Payer: MEDICAID

## 2023-01-01 ENCOUNTER — APPOINTMENT (OUTPATIENT)
Dept: LAB | Facility: MEDICAL CENTER | Age: 48
End: 2023-01-01
Attending: STUDENT IN AN ORGANIZED HEALTH CARE EDUCATION/TRAINING PROGRAM
Payer: MEDICAID

## 2023-01-01 ENCOUNTER — HOSPITAL ENCOUNTER (OUTPATIENT)
Dept: RADIATION ONCOLOGY | Facility: MEDICAL CENTER | Age: 48
End: 2023-07-31
Attending: RADIOLOGY
Payer: MEDICAID

## 2023-01-01 ENCOUNTER — APPOINTMENT (OUTPATIENT)
Dept: RADIOLOGY | Facility: MEDICAL CENTER | Age: 48
DRG: 190 | End: 2023-01-01
Attending: EMERGENCY MEDICINE
Payer: MEDICAID

## 2023-01-01 ENCOUNTER — HOSPITAL ENCOUNTER (OUTPATIENT)
Dept: RADIATION ONCOLOGY | Facility: MEDICAL CENTER | Age: 48
End: 2023-04-03

## 2023-01-01 ENCOUNTER — PATIENT MESSAGE (OUTPATIENT)
Dept: HEMATOLOGY ONCOLOGY | Facility: MEDICAL CENTER | Age: 48
End: 2023-01-01
Payer: MEDICAID

## 2023-01-01 ENCOUNTER — APPOINTMENT (OUTPATIENT)
Dept: CARDIOLOGY | Facility: MEDICAL CENTER | Age: 48
DRG: 180 | End: 2023-01-01
Payer: MEDICAID

## 2023-01-01 ENCOUNTER — HOSPITAL ENCOUNTER (OUTPATIENT)
Dept: HEMATOLOGY ONCOLOGY | Facility: MEDICAL CENTER | Age: 48
End: 2023-09-19
Attending: STUDENT IN AN ORGANIZED HEALTH CARE EDUCATION/TRAINING PROGRAM
Payer: MEDICAID

## 2023-01-01 ENCOUNTER — HOSPITAL ENCOUNTER (OUTPATIENT)
Dept: RADIATION ONCOLOGY | Facility: MEDICAL CENTER | Age: 48
End: 2023-07-19

## 2023-01-01 ENCOUNTER — HOSPITAL ENCOUNTER (OUTPATIENT)
Dept: RADIOLOGY | Facility: MEDICAL CENTER | Age: 48
End: 2023-07-10
Attending: RADIOLOGY
Payer: MEDICAID

## 2023-01-01 ENCOUNTER — PATIENT OUTREACH (OUTPATIENT)
Dept: ONCOLOGY | Facility: MEDICAL CENTER | Age: 48
End: 2023-01-01
Payer: MEDICAID

## 2023-01-01 ENCOUNTER — HOSPITAL ENCOUNTER (OUTPATIENT)
Dept: RADIOLOGY | Facility: MEDICAL CENTER | Age: 48
End: 2023-10-03
Attending: HOSPITALIST
Payer: MEDICAID

## 2023-01-01 ENCOUNTER — APPOINTMENT (OUTPATIENT)
Dept: RADIOLOGY | Facility: MEDICAL CENTER | Age: 48
DRG: 175 | End: 2023-01-01
Attending: EMERGENCY MEDICINE
Payer: MEDICAID

## 2023-01-01 ENCOUNTER — APPOINTMENT (OUTPATIENT)
Dept: ADMISSIONS | Facility: MEDICAL CENTER | Age: 48
End: 2023-01-01
Attending: SURGERY
Payer: MEDICAID

## 2023-01-01 ENCOUNTER — APPOINTMENT (OUTPATIENT)
Dept: RADIOLOGY | Facility: MEDICAL CENTER | Age: 48
DRG: 180 | End: 2023-01-01
Attending: NURSE PRACTITIONER
Payer: MEDICAID

## 2023-01-01 ENCOUNTER — HOSPITAL ENCOUNTER (INPATIENT)
Facility: MEDICAL CENTER | Age: 48
LOS: 3 days | DRG: 175 | End: 2023-08-24
Attending: EMERGENCY MEDICINE | Admitting: INTERNAL MEDICINE
Payer: MEDICAID

## 2023-01-01 ENCOUNTER — PATIENT OUTREACH (OUTPATIENT)
Dept: ONCOLOGY | Facility: MEDICAL CENTER | Age: 48
End: 2023-01-01

## 2023-01-01 ENCOUNTER — APPOINTMENT (OUTPATIENT)
Dept: CARDIOLOGY | Facility: MEDICAL CENTER | Age: 48
DRG: 180 | End: 2023-01-01
Attending: INTERNAL MEDICINE
Payer: MEDICAID

## 2023-01-01 ENCOUNTER — HOSPITAL ENCOUNTER (OUTPATIENT)
Dept: RADIATION ONCOLOGY | Facility: MEDICAL CENTER | Age: 48
End: 2023-07-21

## 2023-01-01 ENCOUNTER — APPOINTMENT (OUTPATIENT)
Dept: RADIOLOGY | Facility: MEDICAL CENTER | Age: 48
End: 2023-01-01
Attending: EMERGENCY MEDICINE
Payer: MEDICAID

## 2023-01-01 ENCOUNTER — TELEPHONE (OUTPATIENT)
Dept: SLEEP MEDICINE | Facility: MEDICAL CENTER | Age: 48
End: 2023-01-01
Payer: MEDICAID

## 2023-01-01 ENCOUNTER — HOSPITAL ENCOUNTER (OUTPATIENT)
Dept: RADIATION ONCOLOGY | Facility: MEDICAL CENTER | Age: 48
End: 2023-04-30
Attending: RADIOLOGY
Payer: MEDICAID

## 2023-01-01 ENCOUNTER — HOSPITAL ENCOUNTER (EMERGENCY)
Facility: MEDICAL CENTER | Age: 48
End: 2023-07-25
Attending: EMERGENCY MEDICINE
Payer: MEDICAID

## 2023-01-01 ENCOUNTER — APPOINTMENT (OUTPATIENT)
Dept: RADIOLOGY | Facility: MEDICAL CENTER | Age: 48
End: 2023-01-01
Attending: INTERNAL MEDICINE
Payer: MEDICAID

## 2023-01-01 ENCOUNTER — APPOINTMENT (OUTPATIENT)
Dept: CARDIOLOGY | Facility: MEDICAL CENTER | Age: 48
DRG: 175 | End: 2023-01-01
Attending: INTERNAL MEDICINE
Payer: MEDICAID

## 2023-01-01 ENCOUNTER — ANESTHESIA (OUTPATIENT)
Dept: SURGERY | Facility: MEDICAL CENTER | Age: 48
End: 2023-01-01
Payer: MEDICAID

## 2023-01-01 ENCOUNTER — HOSPITAL ENCOUNTER (OUTPATIENT)
Facility: MEDICAL CENTER | Age: 48
End: 2023-07-18
Attending: INTERNAL MEDICINE | Admitting: INTERNAL MEDICINE
Payer: MEDICAID

## 2023-01-01 ENCOUNTER — APPOINTMENT (OUTPATIENT)
Dept: VASCULAR LAB | Facility: MEDICAL CENTER | Age: 48
End: 2023-01-01
Payer: MEDICAID

## 2023-01-01 ENCOUNTER — ANESTHESIA EVENT (OUTPATIENT)
Dept: SURGERY | Facility: MEDICAL CENTER | Age: 48
End: 2023-01-01
Payer: MEDICAID

## 2023-01-01 ENCOUNTER — HOSPITAL ENCOUNTER (OUTPATIENT)
Dept: RADIOLOGY | Facility: MEDICAL CENTER | Age: 48
End: 2023-01-01
Attending: STUDENT IN AN ORGANIZED HEALTH CARE EDUCATION/TRAINING PROGRAM
Payer: MEDICAID

## 2023-01-01 ENCOUNTER — APPOINTMENT (OUTPATIENT)
Dept: RADIOLOGY | Facility: MEDICAL CENTER | Age: 48
DRG: 175 | End: 2023-01-01
Attending: INTERNAL MEDICINE
Payer: MEDICAID

## 2023-01-01 ENCOUNTER — HOSPITAL ENCOUNTER (OUTPATIENT)
Dept: RADIATION ONCOLOGY | Facility: MEDICAL CENTER | Age: 48
End: 2023-04-21

## 2023-01-01 ENCOUNTER — TELEMEDICINE (OUTPATIENT)
Dept: INTERNAL MEDICINE | Facility: OTHER | Age: 48
End: 2023-01-01
Payer: MEDICAID

## 2023-01-01 ENCOUNTER — HOSPITAL ENCOUNTER (OUTPATIENT)
Facility: MEDICAL CENTER | Age: 48
End: 2023-02-14
Attending: INTERNAL MEDICINE | Admitting: INTERNAL MEDICINE
Payer: MEDICAID

## 2023-01-01 ENCOUNTER — HOSPITAL ENCOUNTER (OUTPATIENT)
Dept: HEMATOLOGY ONCOLOGY | Facility: MEDICAL CENTER | Age: 48
End: 2023-07-28
Attending: STUDENT IN AN ORGANIZED HEALTH CARE EDUCATION/TRAINING PROGRAM
Payer: MEDICAID

## 2023-01-01 ENCOUNTER — PRE-ADMISSION TESTING (OUTPATIENT)
Dept: ADMISSIONS | Facility: MEDICAL CENTER | Age: 48
End: 2023-01-01
Attending: INTERNAL MEDICINE
Payer: MEDICAID

## 2023-01-01 ENCOUNTER — OFFICE VISIT (OUTPATIENT)
Dept: SLEEP MEDICINE | Facility: MEDICAL CENTER | Age: 48
End: 2023-01-01
Payer: MEDICAID

## 2023-01-01 ENCOUNTER — HOSPITAL ENCOUNTER (OUTPATIENT)
Dept: HEMATOLOGY ONCOLOGY | Facility: MEDICAL CENTER | Age: 48
End: 2023-07-14
Attending: STUDENT IN AN ORGANIZED HEALTH CARE EDUCATION/TRAINING PROGRAM
Payer: MEDICAID

## 2023-01-01 ENCOUNTER — HOSPITAL ENCOUNTER (OUTPATIENT)
Dept: RADIATION ONCOLOGY | Facility: MEDICAL CENTER | Age: 48
End: 2023-07-24

## 2023-01-01 ENCOUNTER — HOSPITAL ENCOUNTER (INPATIENT)
Facility: MEDICAL CENTER | Age: 48
LOS: 3 days | DRG: 190 | End: 2023-06-23
Attending: EMERGENCY MEDICINE | Admitting: INTERNAL MEDICINE
Payer: MEDICAID

## 2023-01-01 ENCOUNTER — PATIENT OUTREACH (OUTPATIENT)
Dept: HEALTH INFORMATION MANAGEMENT | Facility: OTHER | Age: 48
End: 2023-01-01
Payer: MEDICAID

## 2023-01-01 ENCOUNTER — HOSPITAL ENCOUNTER (OUTPATIENT)
Dept: HEMATOLOGY ONCOLOGY | Facility: MEDICAL CENTER | Age: 48
End: 2023-08-11
Attending: STUDENT IN AN ORGANIZED HEALTH CARE EDUCATION/TRAINING PROGRAM
Payer: MEDICAID

## 2023-01-01 ENCOUNTER — HOSPITAL ENCOUNTER (EMERGENCY)
Facility: MEDICAL CENTER | Age: 48
End: 2023-07-21
Attending: EMERGENCY MEDICINE
Payer: MEDICAID

## 2023-01-01 ENCOUNTER — HOSPITAL ENCOUNTER (OUTPATIENT)
Dept: LAB | Facility: MEDICAL CENTER | Age: 48
End: 2023-07-14
Attending: STUDENT IN AN ORGANIZED HEALTH CARE EDUCATION/TRAINING PROGRAM
Payer: MEDICAID

## 2023-01-01 ENCOUNTER — HOSPITAL ENCOUNTER (OUTPATIENT)
Dept: RADIATION ONCOLOGY | Facility: MEDICAL CENTER | Age: 48
End: 2023-03-31
Attending: RADIOLOGY
Payer: MEDICAID

## 2023-01-01 ENCOUNTER — HOSPITAL ENCOUNTER (OUTPATIENT)
Dept: RADIOLOGY | Facility: MEDICAL CENTER | Age: 48
End: 2023-07-27
Attending: STUDENT IN AN ORGANIZED HEALTH CARE EDUCATION/TRAINING PROGRAM
Payer: MEDICAID

## 2023-01-01 ENCOUNTER — HOSPITAL ENCOUNTER (OUTPATIENT)
Dept: RADIATION ONCOLOGY | Facility: MEDICAL CENTER | Age: 48
End: 2023-07-25

## 2023-01-01 ENCOUNTER — HOSPITAL ENCOUNTER (INPATIENT)
Facility: MEDICAL CENTER | Age: 48
LOS: 35 days | DRG: 180 | End: 2023-10-25
Attending: EMERGENCY MEDICINE | Admitting: HOSPITALIST
Payer: MEDICAID

## 2023-01-01 ENCOUNTER — PHARMACY VISIT (OUTPATIENT)
Dept: PHARMACY | Facility: MEDICAL CENTER | Age: 48
End: 2023-01-01
Payer: COMMERCIAL

## 2023-01-01 ENCOUNTER — HOSPITAL ENCOUNTER (OUTPATIENT)
Dept: RADIOLOGY | Facility: MEDICAL CENTER | Age: 48
End: 2023-03-29
Attending: SURGERY
Payer: MEDICAID

## 2023-01-01 ENCOUNTER — TELEPHONE (OUTPATIENT)
Dept: INTERNAL MEDICINE | Facility: OTHER | Age: 48
End: 2023-01-01

## 2023-01-01 ENCOUNTER — HOSPITAL ENCOUNTER (OUTPATIENT)
Dept: RADIATION ONCOLOGY | Facility: MEDICAL CENTER | Age: 48
End: 2023-07-20

## 2023-01-01 ENCOUNTER — ANESTHESIA (OUTPATIENT)
Dept: RADIOLOGY | Facility: MEDICAL CENTER | Age: 48
End: 2023-01-01
Payer: MEDICAID

## 2023-01-01 ENCOUNTER — HOSPITAL ENCOUNTER (OUTPATIENT)
Dept: HEMATOLOGY ONCOLOGY | Facility: MEDICAL CENTER | Age: 48
End: 2023-09-07
Attending: STUDENT IN AN ORGANIZED HEALTH CARE EDUCATION/TRAINING PROGRAM
Payer: MEDICAID

## 2023-01-01 ENCOUNTER — HOSPITAL ENCOUNTER (OUTPATIENT)
Dept: HEMATOLOGY ONCOLOGY | Facility: MEDICAL CENTER | Age: 48
End: 2023-08-16
Attending: STUDENT IN AN ORGANIZED HEALTH CARE EDUCATION/TRAINING PROGRAM
Payer: MEDICAID

## 2023-01-01 ENCOUNTER — HOSPITAL ENCOUNTER (OUTPATIENT)
Dept: RADIATION ONCOLOGY | Facility: MEDICAL CENTER | Age: 48
End: 2023-04-20

## 2023-01-01 ENCOUNTER — PATIENT MESSAGE (OUTPATIENT)
Dept: HEALTH INFORMATION MANAGEMENT | Facility: OTHER | Age: 48
End: 2023-01-01

## 2023-01-01 ENCOUNTER — HOSPITAL ENCOUNTER (OUTPATIENT)
Dept: RADIATION ONCOLOGY | Facility: MEDICAL CENTER | Age: 48
End: 2023-04-18

## 2023-01-01 ENCOUNTER — NON-PROVIDER VISIT (OUTPATIENT)
Dept: VASCULAR LAB | Facility: MEDICAL CENTER | Age: 48
End: 2023-01-01
Attending: INTERNAL MEDICINE
Payer: MEDICAID

## 2023-01-01 ENCOUNTER — HOSPITAL ENCOUNTER (OUTPATIENT)
Dept: RADIATION ONCOLOGY | Facility: MEDICAL CENTER | Age: 48
End: 2023-04-19

## 2023-01-01 ENCOUNTER — HOSPITAL ENCOUNTER (OUTPATIENT)
Facility: MEDICAL CENTER | Age: 48
End: 2023-06-07
Attending: SURGERY | Admitting: SURGERY
Payer: MEDICAID

## 2023-01-01 VITALS
HEART RATE: 103 BPM | HEIGHT: 72 IN | DIASTOLIC BLOOD PRESSURE: 88 MMHG | WEIGHT: 200 LBS | HEIGHT: 72 IN | WEIGHT: 205 LBS | OXYGEN SATURATION: 95 % | BODY MASS INDEX: 27.77 KG/M2 | BODY MASS INDEX: 27.09 KG/M2 | TEMPERATURE: 97.8 F | SYSTOLIC BLOOD PRESSURE: 142 MMHG

## 2023-01-01 VITALS
DIASTOLIC BLOOD PRESSURE: 75 MMHG | WEIGHT: 180.12 LBS | TEMPERATURE: 98.9 F | BODY MASS INDEX: 24.4 KG/M2 | OXYGEN SATURATION: 83 % | SYSTOLIC BLOOD PRESSURE: 113 MMHG | RESPIRATION RATE: 32 BRPM | HEIGHT: 72 IN | HEART RATE: 142 BPM

## 2023-01-01 VITALS
SYSTOLIC BLOOD PRESSURE: 163 MMHG | WEIGHT: 198.41 LBS | HEIGHT: 72 IN | TEMPERATURE: 97.2 F | DIASTOLIC BLOOD PRESSURE: 83 MMHG | BODY MASS INDEX: 26.87 KG/M2 | RESPIRATION RATE: 20 BRPM | OXYGEN SATURATION: 90 % | HEART RATE: 90 BPM

## 2023-01-01 VITALS
TEMPERATURE: 97.3 F | DIASTOLIC BLOOD PRESSURE: 86 MMHG | HEIGHT: 71 IN | HEART RATE: 108 BPM | OXYGEN SATURATION: 96 % | BODY MASS INDEX: 28.42 KG/M2 | SYSTOLIC BLOOD PRESSURE: 138 MMHG | WEIGHT: 203 LBS

## 2023-01-01 VITALS
BODY MASS INDEX: 26.59 KG/M2 | DIASTOLIC BLOOD PRESSURE: 94 MMHG | OXYGEN SATURATION: 95 % | TEMPERATURE: 99.1 F | HEART RATE: 95 BPM | SYSTOLIC BLOOD PRESSURE: 142 MMHG | WEIGHT: 196.32 LBS | HEIGHT: 72 IN

## 2023-01-01 VITALS
WEIGHT: 198.85 LBS | BODY MASS INDEX: 26.93 KG/M2 | HEIGHT: 72 IN | RESPIRATION RATE: 18 BRPM | HEART RATE: 95 BPM | DIASTOLIC BLOOD PRESSURE: 102 MMHG | TEMPERATURE: 97.8 F | OXYGEN SATURATION: 95 % | SYSTOLIC BLOOD PRESSURE: 175 MMHG

## 2023-01-01 VITALS
HEIGHT: 72 IN | HEART RATE: 104 BPM | RESPIRATION RATE: 16 BRPM | OXYGEN SATURATION: 96 % | DIASTOLIC BLOOD PRESSURE: 86 MMHG | TEMPERATURE: 97 F | BODY MASS INDEX: 27.25 KG/M2 | WEIGHT: 201.17 LBS | SYSTOLIC BLOOD PRESSURE: 142 MMHG

## 2023-01-01 VITALS
BODY MASS INDEX: 26.67 KG/M2 | TEMPERATURE: 98.1 F | HEART RATE: 113 BPM | SYSTOLIC BLOOD PRESSURE: 152 MMHG | WEIGHT: 196.65 LBS | DIASTOLIC BLOOD PRESSURE: 86 MMHG | OXYGEN SATURATION: 97 %

## 2023-01-01 VITALS
HEART RATE: 94 BPM | WEIGHT: 211.2 LBS | HEIGHT: 72 IN | OXYGEN SATURATION: 94 % | BODY MASS INDEX: 28.61 KG/M2 | RESPIRATION RATE: 16 BRPM | SYSTOLIC BLOOD PRESSURE: 145 MMHG | TEMPERATURE: 98.1 F | DIASTOLIC BLOOD PRESSURE: 84 MMHG

## 2023-01-01 VITALS
BODY MASS INDEX: 28.85 KG/M2 | SYSTOLIC BLOOD PRESSURE: 153 MMHG | HEART RATE: 74 BPM | OXYGEN SATURATION: 97 % | WEIGHT: 213 LBS | HEIGHT: 72 IN | DIASTOLIC BLOOD PRESSURE: 104 MMHG

## 2023-01-01 VITALS
DIASTOLIC BLOOD PRESSURE: 82 MMHG | HEIGHT: 72 IN | SYSTOLIC BLOOD PRESSURE: 136 MMHG | HEART RATE: 88 BPM | WEIGHT: 204.8 LBS | BODY MASS INDEX: 27.74 KG/M2 | OXYGEN SATURATION: 96 % | TEMPERATURE: 98.8 F

## 2023-01-01 VITALS
HEIGHT: 72 IN | TEMPERATURE: 97.3 F | DIASTOLIC BLOOD PRESSURE: 89 MMHG | SYSTOLIC BLOOD PRESSURE: 179 MMHG | WEIGHT: 203.71 LBS | RESPIRATION RATE: 18 BRPM | HEART RATE: 112 BPM | OXYGEN SATURATION: 95 % | BODY MASS INDEX: 27.59 KG/M2

## 2023-01-01 VITALS — WEIGHT: 210 LBS | HEIGHT: 71 IN | BODY MASS INDEX: 29.4 KG/M2

## 2023-01-01 VITALS
BODY MASS INDEX: 26.95 KG/M2 | HEIGHT: 72 IN | RESPIRATION RATE: 18 BRPM | SYSTOLIC BLOOD PRESSURE: 138 MMHG | WEIGHT: 199 LBS | OXYGEN SATURATION: 96 % | TEMPERATURE: 98.2 F | HEART RATE: 99 BPM | DIASTOLIC BLOOD PRESSURE: 88 MMHG

## 2023-01-01 VITALS
TEMPERATURE: 98.1 F | BODY MASS INDEX: 28.44 KG/M2 | HEART RATE: 86 BPM | HEIGHT: 72 IN | DIASTOLIC BLOOD PRESSURE: 95 MMHG | WEIGHT: 210 LBS | SYSTOLIC BLOOD PRESSURE: 154 MMHG

## 2023-01-01 VITALS
TEMPERATURE: 98.5 F | SYSTOLIC BLOOD PRESSURE: 153 MMHG | OXYGEN SATURATION: 94 % | WEIGHT: 199 LBS | HEART RATE: 107 BPM | BODY MASS INDEX: 26.95 KG/M2 | HEIGHT: 72 IN | DIASTOLIC BLOOD PRESSURE: 95 MMHG

## 2023-01-01 VITALS
WEIGHT: 203.2 LBS | DIASTOLIC BLOOD PRESSURE: 96 MMHG | HEIGHT: 72 IN | TEMPERATURE: 98 F | OXYGEN SATURATION: 95 % | HEART RATE: 92 BPM | SYSTOLIC BLOOD PRESSURE: 162 MMHG | BODY MASS INDEX: 27.52 KG/M2

## 2023-01-01 VITALS
OXYGEN SATURATION: 99 % | WEIGHT: 209.88 LBS | HEIGHT: 72 IN | SYSTOLIC BLOOD PRESSURE: 163 MMHG | DIASTOLIC BLOOD PRESSURE: 89 MMHG | HEART RATE: 97 BPM | RESPIRATION RATE: 20 BRPM | BODY MASS INDEX: 28.43 KG/M2 | TEMPERATURE: 98.3 F

## 2023-01-01 VITALS — BODY MASS INDEX: 26.28 KG/M2 | WEIGHT: 194 LBS | HEIGHT: 72 IN

## 2023-01-01 VITALS
DIASTOLIC BLOOD PRESSURE: 90 MMHG | OXYGEN SATURATION: 92 % | BODY MASS INDEX: 28.04 KG/M2 | HEART RATE: 90 BPM | WEIGHT: 207.01 LBS | SYSTOLIC BLOOD PRESSURE: 141 MMHG | TEMPERATURE: 97.5 F | RESPIRATION RATE: 16 BRPM | HEIGHT: 72 IN

## 2023-01-01 VITALS
OXYGEN SATURATION: 91 % | TEMPERATURE: 97.5 F | RESPIRATION RATE: 16 BRPM | SYSTOLIC BLOOD PRESSURE: 168 MMHG | WEIGHT: 203.48 LBS | HEIGHT: 72 IN | BODY MASS INDEX: 27.56 KG/M2 | HEART RATE: 92 BPM | DIASTOLIC BLOOD PRESSURE: 87 MMHG

## 2023-01-01 VITALS
SYSTOLIC BLOOD PRESSURE: 112 MMHG | HEIGHT: 72 IN | DIASTOLIC BLOOD PRESSURE: 78 MMHG | HEART RATE: 132 BPM | OXYGEN SATURATION: 88 % | TEMPERATURE: 97 F | BODY MASS INDEX: 25.3 KG/M2 | WEIGHT: 186.8 LBS

## 2023-01-01 VITALS
HEIGHT: 71 IN | DIASTOLIC BLOOD PRESSURE: 86 MMHG | BODY MASS INDEX: 29.4 KG/M2 | HEART RATE: 97 BPM | SYSTOLIC BLOOD PRESSURE: 124 MMHG | RESPIRATION RATE: 16 BRPM | OXYGEN SATURATION: 97 % | WEIGHT: 210 LBS

## 2023-01-01 VITALS
OXYGEN SATURATION: 93 % | WEIGHT: 192.9 LBS | HEART RATE: 123 BPM | HEIGHT: 72 IN | BODY MASS INDEX: 26.13 KG/M2 | SYSTOLIC BLOOD PRESSURE: 122 MMHG | DIASTOLIC BLOOD PRESSURE: 78 MMHG | TEMPERATURE: 98.4 F

## 2023-01-01 VITALS
WEIGHT: 199 LBS | TEMPERATURE: 97.6 F | HEIGHT: 72 IN | HEART RATE: 109 BPM | DIASTOLIC BLOOD PRESSURE: 103 MMHG | OXYGEN SATURATION: 93 % | SYSTOLIC BLOOD PRESSURE: 150 MMHG | BODY MASS INDEX: 26.95 KG/M2

## 2023-01-01 VITALS — DIASTOLIC BLOOD PRESSURE: 99 MMHG | OXYGEN SATURATION: 98 % | HEART RATE: 101 BPM | SYSTOLIC BLOOD PRESSURE: 150 MMHG

## 2023-01-01 VITALS — WEIGHT: 200 LBS | BODY MASS INDEX: 27.09 KG/M2 | HEIGHT: 72 IN

## 2023-01-01 DIAGNOSIS — Z72.0 TOBACCO USE: ICD-10-CM

## 2023-01-01 DIAGNOSIS — R03.0 ELEVATED BLOOD PRESSURE READING: ICD-10-CM

## 2023-01-01 DIAGNOSIS — G89.3 CHRONIC PAIN DUE TO NEOPLASM: ICD-10-CM

## 2023-01-01 DIAGNOSIS — C79.89 METASTATIC SARCOMA (HCC): ICD-10-CM

## 2023-01-01 DIAGNOSIS — C34.91 ADENOCARCINOMA OF RIGHT LUNG (HCC): ICD-10-CM

## 2023-01-01 DIAGNOSIS — A41.9 SEPSIS WITHOUT ACUTE ORGAN DYSFUNCTION, DUE TO UNSPECIFIED ORGANISM (HCC): ICD-10-CM

## 2023-01-01 DIAGNOSIS — M25.361 PATELLOFEMORAL INSTABILITY OF RIGHT KNEE WITH PAIN: ICD-10-CM

## 2023-01-01 DIAGNOSIS — R04.2 HEMOPTYSIS: ICD-10-CM

## 2023-01-01 DIAGNOSIS — C34.31 MALIGNANT NEOPLASM OF LOWER LOBE OF RIGHT LUNG (HCC): ICD-10-CM

## 2023-01-01 DIAGNOSIS — C49.9 PRIMARY UNDIFFERENTIATED SARCOMA OF SOFT TISSUE (HCC): Primary | ICD-10-CM

## 2023-01-01 DIAGNOSIS — R79.89 ELEVATED SERUM HCG: ICD-10-CM

## 2023-01-01 DIAGNOSIS — F17.210 CIGARETTE NICOTINE DEPENDENCE WITHOUT COMPLICATION: ICD-10-CM

## 2023-01-01 DIAGNOSIS — R93.89 ABNORMAL CT OF THE CHEST: ICD-10-CM

## 2023-01-01 DIAGNOSIS — C49.9 PRIMARY UNDIFFERENTIATED SARCOMA OF SOFT TISSUE (HCC): ICD-10-CM

## 2023-01-01 DIAGNOSIS — C34.91 PRIMARY ADENOCARCINOMA OF RIGHT LUNG (HCC): ICD-10-CM

## 2023-01-01 DIAGNOSIS — J96.01 ACUTE RESPIRATORY FAILURE WITH HYPOXIA (HCC): ICD-10-CM

## 2023-01-01 DIAGNOSIS — R91.1 LUNG NODULE: ICD-10-CM

## 2023-01-01 DIAGNOSIS — M53.3 SACRAL MASS: ICD-10-CM

## 2023-01-01 DIAGNOSIS — Z87.892 HISTORY OF ANAPHYLAXIS: ICD-10-CM

## 2023-01-01 DIAGNOSIS — C49.9 SARCOMA (HCC): ICD-10-CM

## 2023-01-01 DIAGNOSIS — J45.50 SEVERE PERSISTENT ASTHMA WITHOUT COMPLICATION: ICD-10-CM

## 2023-01-01 DIAGNOSIS — G89.29 CHRONIC RIGHT-SIDED LOW BACK PAIN WITH RIGHT-SIDED SCIATICA: ICD-10-CM

## 2023-01-01 DIAGNOSIS — J96.91 RESPIRATORY FAILURE WITH HYPOXIA, UNSPECIFIED CHRONICITY (HCC): ICD-10-CM

## 2023-01-01 DIAGNOSIS — J96.20 ACUTE ON CHRONIC RESPIRATORY FAILURE, UNSPECIFIED WHETHER WITH HYPOXIA OR HYPERCAPNIA (HCC): ICD-10-CM

## 2023-01-01 DIAGNOSIS — R52 INTRACTABLE PAIN: ICD-10-CM

## 2023-01-01 DIAGNOSIS — I26.99 PULMONARY EMBOLISM ON RIGHT (HCC): ICD-10-CM

## 2023-01-01 DIAGNOSIS — M79.89 NODULE OF SOFT TISSUE: ICD-10-CM

## 2023-01-01 DIAGNOSIS — J18.9 PNEUMONIA OF BOTH LUNGS DUE TO INFECTIOUS ORGANISM, UNSPECIFIED PART OF LUNG: ICD-10-CM

## 2023-01-01 DIAGNOSIS — Z71.89 GRIEF COUNSELING: ICD-10-CM

## 2023-01-01 DIAGNOSIS — C34.90 MALIGNANT NEOPLASM OF LUNG, UNSPECIFIED LATERALITY, UNSPECIFIED PART OF LUNG (HCC): ICD-10-CM

## 2023-01-01 DIAGNOSIS — M53.3 SACROCOCCYGEAL DISORDERS, NOT ELSEWHERE CLASSIFIED: ICD-10-CM

## 2023-01-01 DIAGNOSIS — N92.1 MENORRHAGIA WITH IRREGULAR CYCLE: ICD-10-CM

## 2023-01-01 DIAGNOSIS — M53.3 MASS OF SACRUM: ICD-10-CM

## 2023-01-01 DIAGNOSIS — L82.1 SEBORRHEIC KERATOSES: ICD-10-CM

## 2023-01-01 DIAGNOSIS — J15.9 BACTERIAL PNEUMONIA: ICD-10-CM

## 2023-01-01 DIAGNOSIS — R07.81 RIB PAIN ON LEFT SIDE: ICD-10-CM

## 2023-01-01 DIAGNOSIS — M79.2 NEUROPATHIC PAIN OF FOOT, RIGHT: ICD-10-CM

## 2023-01-01 DIAGNOSIS — F17.218 CIGARETTE NICOTINE DEPENDENCE WITH OTHER NICOTINE-INDUCED DISORDER: ICD-10-CM

## 2023-01-01 DIAGNOSIS — C79.9 METASTATIC MALIGNANT NEOPLASM, UNSPECIFIED SITE (HCC): ICD-10-CM

## 2023-01-01 DIAGNOSIS — M54.31 SCIATICA OF RIGHT SIDE: ICD-10-CM

## 2023-01-01 DIAGNOSIS — J30.2 SEASONAL ALLERGIES: ICD-10-CM

## 2023-01-01 DIAGNOSIS — M25.561 PATELLOFEMORAL INSTABILITY OF RIGHT KNEE WITH PAIN: ICD-10-CM

## 2023-01-01 DIAGNOSIS — C34.90 MALIGNANT NEOPLASM OF BRONCHUS AND LUNG (HCC): ICD-10-CM

## 2023-01-01 DIAGNOSIS — I26.94 MULTIPLE SUBSEGMENTAL PULMONARY EMBOLI WITHOUT ACUTE COR PULMONALE (HCC): Primary | ICD-10-CM

## 2023-01-01 DIAGNOSIS — R03.0 TRANSIENT ELEVATED BLOOD PRESSURE: ICD-10-CM

## 2023-01-01 DIAGNOSIS — M10.9 GOUT INVOLVING TOE OF RIGHT FOOT, UNSPECIFIED CAUSE, UNSPECIFIED CHRONICITY: ICD-10-CM

## 2023-01-01 DIAGNOSIS — Z13.228 SCREENING FOR METABOLIC DISORDER: ICD-10-CM

## 2023-01-01 DIAGNOSIS — M54.41 CHRONIC RIGHT-SIDED LOW BACK PAIN WITH RIGHT-SIDED SCIATICA: ICD-10-CM

## 2023-01-01 DIAGNOSIS — G89.18 POST-OPERATIVE PAIN: ICD-10-CM

## 2023-01-01 DIAGNOSIS — J32.3 CHRONIC SPHENOIDAL SINUSITIS: ICD-10-CM

## 2023-01-01 DIAGNOSIS — E78.5 HYPERLIPIDEMIA, UNSPECIFIED HYPERLIPIDEMIA TYPE: ICD-10-CM

## 2023-01-01 DIAGNOSIS — R22.2 CHEST WALL MASS: ICD-10-CM

## 2023-01-01 DIAGNOSIS — L91.8 ACROCHORDON: ICD-10-CM

## 2023-01-01 DIAGNOSIS — J43.1 PANLOBULAR EMPHYSEMA (HCC): ICD-10-CM

## 2023-01-01 DIAGNOSIS — R07.81 PLEURITIC CHEST PAIN: ICD-10-CM

## 2023-01-01 DIAGNOSIS — R00.0 TACHYCARDIA: ICD-10-CM

## 2023-01-01 DIAGNOSIS — J13 PNEUMONIA DUE TO STREPTOCOCCUS PNEUMONIAE, UNSPECIFIED LATERALITY, UNSPECIFIED PART OF LUNG (HCC): ICD-10-CM

## 2023-01-01 DIAGNOSIS — R06.09 OTHER FORM OF DYSPNEA: ICD-10-CM

## 2023-01-01 DIAGNOSIS — M25.561 ACUTE PAIN OF RIGHT KNEE: ICD-10-CM

## 2023-01-01 LAB
1,25(OH)2D3 SERPL-MCNC: 50.6 PG/ML (ref 19.9–79.3)
25(OH)D3 SERPL-MCNC: 10 NG/ML (ref 30–100)
ALBUMIN SERPL BCP-MCNC: 2 G/DL (ref 3.2–4.9)
ALBUMIN SERPL BCP-MCNC: 2.1 G/DL (ref 3.2–4.9)
ALBUMIN SERPL BCP-MCNC: 2.4 G/DL (ref 3.2–4.9)
ALBUMIN SERPL BCP-MCNC: 2.5 G/DL (ref 3.2–4.9)
ALBUMIN SERPL BCP-MCNC: 2.6 G/DL (ref 3.2–4.9)
ALBUMIN SERPL BCP-MCNC: 2.7 G/DL (ref 3.2–4.9)
ALBUMIN SERPL BCP-MCNC: 2.8 G/DL (ref 3.2–4.9)
ALBUMIN SERPL BCP-MCNC: 2.9 G/DL (ref 3.2–4.9)
ALBUMIN SERPL BCP-MCNC: 3 G/DL (ref 3.2–4.9)
ALBUMIN SERPL BCP-MCNC: 3 G/DL (ref 3.2–4.9)
ALBUMIN SERPL BCP-MCNC: 3.1 G/DL (ref 3.2–4.9)
ALBUMIN SERPL BCP-MCNC: 3.1 G/DL (ref 3.2–4.9)
ALBUMIN SERPL BCP-MCNC: 3.2 G/DL (ref 3.2–4.9)
ALBUMIN SERPL BCP-MCNC: 3.6 G/DL (ref 3.2–4.9)
ALBUMIN SERPL BCP-MCNC: 3.8 G/DL (ref 3.2–4.9)
ALBUMIN/GLOB SERPL: 0.6 G/DL
ALBUMIN/GLOB SERPL: 0.7 G/DL
ALBUMIN/GLOB SERPL: 0.8 G/DL
ALBUMIN/GLOB SERPL: 0.8 G/DL
ALBUMIN/GLOB SERPL: 1 G/DL
ALBUMIN/GLOB SERPL: 1 G/DL
ALBUMIN/GLOB SERPL: 1.1 G/DL
ALBUMIN/GLOB SERPL: 1.1 G/DL
ALBUMIN/GLOB SERPL: 1.2 G/DL
ALBUMIN/GLOB SERPL: 1.3 G/DL
ALBUMIN/GLOB SERPL: 1.3 G/DL
ALP SERPL-CCNC: 214 U/L (ref 30–99)
ALP SERPL-CCNC: 255 U/L (ref 30–99)
ALP SERPL-CCNC: 267 U/L (ref 30–99)
ALP SERPL-CCNC: 269 U/L (ref 30–99)
ALP SERPL-CCNC: 310 U/L (ref 30–99)
ALP SERPL-CCNC: 347 U/L (ref 30–99)
ALP SERPL-CCNC: 400 U/L (ref 30–99)
ALP SERPL-CCNC: 420 U/L (ref 30–99)
ALP SERPL-CCNC: 428 U/L (ref 30–99)
ALP SERPL-CCNC: 467 U/L (ref 30–99)
ALP SERPL-CCNC: 569 U/L (ref 30–99)
ALP SERPL-CCNC: 57 U/L (ref 30–99)
ALP SERPL-CCNC: 62 U/L (ref 30–99)
ALP SERPL-CCNC: 73 U/L (ref 30–99)
ALT SERPL-CCNC: 12 U/L (ref 2–50)
ALT SERPL-CCNC: 177 U/L (ref 2–50)
ALT SERPL-CCNC: 206 U/L (ref 2–50)
ALT SERPL-CCNC: 23 U/L (ref 2–50)
ALT SERPL-CCNC: 32 U/L (ref 2–50)
ALT SERPL-CCNC: 37 U/L (ref 2–50)
ALT SERPL-CCNC: 47 U/L (ref 2–50)
ALT SERPL-CCNC: 65 U/L (ref 2–50)
ALT SERPL-CCNC: 66 U/L (ref 2–50)
ALT SERPL-CCNC: 8 U/L (ref 2–50)
ALT SERPL-CCNC: 82 U/L (ref 2–50)
ALT SERPL-CCNC: 86 U/L (ref 2–50)
ALT SERPL-CCNC: 88 U/L (ref 2–50)
ALT SERPL-CCNC: 9 U/L (ref 2–50)
AMPHET UR QL SCN: NEGATIVE
ANION GAP SERPL CALC-SCNC: 10 MMOL/L (ref 7–16)
ANION GAP SERPL CALC-SCNC: 11 MMOL/L (ref 7–16)
ANION GAP SERPL CALC-SCNC: 12 MMOL/L (ref 7–16)
ANION GAP SERPL CALC-SCNC: 15 MMOL/L (ref 7–16)
ANION GAP SERPL CALC-SCNC: 6 MMOL/L (ref 7–16)
ANION GAP SERPL CALC-SCNC: 7 MMOL/L (ref 7–16)
ANION GAP SERPL CALC-SCNC: 8 MMOL/L (ref 7–16)
ANION GAP SERPL CALC-SCNC: 9 MMOL/L (ref 7–16)
ANISOCYTOSIS BLD QL SMEAR: ABNORMAL
APPEARANCE FLD: NORMAL
APPEARANCE UR: CLEAR
APTT PPP: 27.4 SEC (ref 24.7–36)
APTT PPP: 31 SEC (ref 24.7–36)
AST SERPL-CCNC: 101 U/L (ref 12–45)
AST SERPL-CCNC: 12 U/L (ref 12–45)
AST SERPL-CCNC: 13 U/L (ref 12–45)
AST SERPL-CCNC: 13 U/L (ref 12–45)
AST SERPL-CCNC: 15 U/L (ref 12–45)
AST SERPL-CCNC: 16 U/L (ref 12–45)
AST SERPL-CCNC: 17 U/L (ref 12–45)
AST SERPL-CCNC: 21 U/L (ref 12–45)
AST SERPL-CCNC: 30 U/L (ref 12–45)
AST SERPL-CCNC: 43 U/L (ref 12–45)
AST SERPL-CCNC: 43 U/L (ref 12–45)
AST SERPL-CCNC: 47 U/L (ref 12–45)
AST SERPL-CCNC: 78 U/L (ref 12–45)
AST SERPL-CCNC: 8 U/L (ref 12–45)
B PARAP IS1001 DNA NPH QL NAA+NON-PROBE: NOT DETECTED
B PERT.PT PRMT NPH QL NAA+NON-PROBE: NOT DETECTED
B-HCG SERPL-ACNC: <1 MIU/ML (ref 0–10)
B-HCG SERPL-ACNC: <1 MIU/ML (ref 0–5)
B-HCG SERPL-ACNC: <1 MIU/ML (ref 0–5)
BACTERIA BLD CULT: NORMAL
BACTERIA SPEC RESP CULT: NORMAL
BACTERIA UR CULT: NORMAL
BACTERIA UR CULT: NORMAL
BARBITURATES UR QL SCN: NEGATIVE
BASE EXCESS BLDA CALC-SCNC: 5 MMOL/L (ref -4–3)
BASE EXCESS BLDV CALC-SCNC: 5 MMOL/L
BASOPHILS # BLD AUTO: 0 % (ref 0–1.8)
BASOPHILS # BLD AUTO: 0.1 % (ref 0–1.8)
BASOPHILS # BLD AUTO: 0.2 % (ref 0–1.8)
BASOPHILS # BLD AUTO: 0.3 % (ref 0–1.8)
BASOPHILS # BLD AUTO: 0.4 % (ref 0–1.8)
BASOPHILS # BLD AUTO: 0.9 % (ref 0–1.8)
BASOPHILS # BLD: 0 K/UL (ref 0–0.12)
BASOPHILS # BLD: 0.01 K/UL (ref 0–0.12)
BASOPHILS # BLD: 0.02 K/UL (ref 0–0.12)
BASOPHILS # BLD: 0.03 K/UL (ref 0–0.12)
BASOPHILS # BLD: 0.04 K/UL (ref 0–0.12)
BASOPHILS # BLD: 0.04 K/UL (ref 0–0.12)
BASOPHILS # BLD: 0.05 K/UL (ref 0–0.12)
BASOPHILS # BLD: 0.05 K/UL (ref 0–0.12)
BASOPHILS # BLD: 0.11 K/UL (ref 0–0.12)
BENZODIAZ UR QL SCN: NEGATIVE
BILIRUB SERPL-MCNC: 0.2 MG/DL (ref 0.1–1.5)
BILIRUB SERPL-MCNC: 0.2 MG/DL (ref 0.1–1.5)
BILIRUB SERPL-MCNC: 0.3 MG/DL (ref 0.1–1.5)
BILIRUB SERPL-MCNC: 0.3 MG/DL (ref 0.1–1.5)
BILIRUB SERPL-MCNC: 0.4 MG/DL (ref 0.1–1.5)
BILIRUB SERPL-MCNC: 0.5 MG/DL (ref 0.1–1.5)
BILIRUB SERPL-MCNC: 0.6 MG/DL (ref 0.1–1.5)
BILIRUB SERPL-MCNC: 0.6 MG/DL (ref 0.1–1.5)
BILIRUB SERPL-MCNC: 0.9 MG/DL (ref 0.1–1.5)
BILIRUB SERPL-MCNC: 1.3 MG/DL (ref 0.1–1.5)
BILIRUB UR QL STRIP.AUTO: NEGATIVE
BODY FLD TYPE: NORMAL
BODY TEMPERATURE: 36.2 CENTIGRADE
BODY TEMPERATURE: 36.7 CENTIGRADE
BODY TEMPERATURE: 36.9 CENTIGRADE
BODY TEMPERATURE: 37 CENTIGRADE
BUN SERPL-MCNC: 10 MG/DL (ref 8–22)
BUN SERPL-MCNC: 10 MG/DL (ref 8–22)
BUN SERPL-MCNC: 11 MG/DL (ref 8–22)
BUN SERPL-MCNC: 12 MG/DL (ref 8–22)
BUN SERPL-MCNC: 12 MG/DL (ref 8–22)
BUN SERPL-MCNC: 14 MG/DL (ref 8–22)
BUN SERPL-MCNC: 15 MG/DL (ref 8–22)
BUN SERPL-MCNC: 15 MG/DL (ref 8–22)
BUN SERPL-MCNC: 16 MG/DL (ref 8–22)
BUN SERPL-MCNC: 17 MG/DL (ref 8–22)
BUN SERPL-MCNC: 17 MG/DL (ref 8–22)
BUN SERPL-MCNC: 18 MG/DL (ref 8–22)
BUN SERPL-MCNC: 18 MG/DL (ref 8–22)
BUN SERPL-MCNC: 19 MG/DL (ref 8–22)
BUN SERPL-MCNC: 21 MG/DL (ref 8–22)
BUN SERPL-MCNC: 22 MG/DL (ref 8–22)
BUN SERPL-MCNC: 23 MG/DL (ref 8–22)
BUN SERPL-MCNC: 23 MG/DL (ref 8–22)
BUN SERPL-MCNC: 6 MG/DL (ref 8–22)
BUN SERPL-MCNC: 6 MG/DL (ref 8–22)
BUN SERPL-MCNC: 7 MG/DL (ref 8–22)
BUN SERPL-MCNC: 7 MG/DL (ref 8–22)
BUN SERPL-MCNC: 8 MG/DL (ref 8–22)
BUN SERPL-MCNC: 9 MG/DL (ref 8–22)
BURR CELLS BLD QL SMEAR: NORMAL
BURR CELLS BLD QL SMEAR: NORMAL
BZE UR QL SCN: NEGATIVE
C PNEUM DNA NPH QL NAA+NON-PROBE: NOT DETECTED
CA-I SERPL-SCNC: 1.2 MMOL/L (ref 1.1–1.3)
CALCIUM ALBUM COR SERPL-MCNC: 9 MG/DL (ref 8.5–10.5)
CALCIUM ALBUM COR SERPL-MCNC: 9 MG/DL (ref 8.5–10.5)
CALCIUM ALBUM COR SERPL-MCNC: 9.1 MG/DL (ref 8.5–10.5)
CALCIUM ALBUM COR SERPL-MCNC: 9.1 MG/DL (ref 8.5–10.5)
CALCIUM ALBUM COR SERPL-MCNC: 9.2 MG/DL (ref 8.5–10.5)
CALCIUM ALBUM COR SERPL-MCNC: 9.3 MG/DL (ref 8.5–10.5)
CALCIUM ALBUM COR SERPL-MCNC: 9.4 MG/DL (ref 8.5–10.5)
CALCIUM ALBUM COR SERPL-MCNC: 9.4 MG/DL (ref 8.5–10.5)
CALCIUM ALBUM COR SERPL-MCNC: 9.5 MG/DL (ref 8.5–10.5)
CALCIUM ALBUM COR SERPL-MCNC: 9.6 MG/DL (ref 8.5–10.5)
CALCIUM ALBUM COR SERPL-MCNC: 9.7 MG/DL (ref 8.5–10.5)
CALCIUM ALBUM COR SERPL-MCNC: 9.8 MG/DL (ref 8.5–10.5)
CALCIUM ALBUM COR SERPL-MCNC: 9.9 MG/DL (ref 8.5–10.5)
CALCIUM SERPL-MCNC: 7.9 MG/DL (ref 8.5–10.5)
CALCIUM SERPL-MCNC: 8 MG/DL (ref 8.5–10.5)
CALCIUM SERPL-MCNC: 8.1 MG/DL (ref 8.5–10.5)
CALCIUM SERPL-MCNC: 8.2 MG/DL (ref 8.5–10.5)
CALCIUM SERPL-MCNC: 8.2 MG/DL (ref 8.5–10.5)
CALCIUM SERPL-MCNC: 8.3 MG/DL (ref 8.5–10.5)
CALCIUM SERPL-MCNC: 8.3 MG/DL (ref 8.5–10.5)
CALCIUM SERPL-MCNC: 8.4 MG/DL (ref 8.5–10.5)
CALCIUM SERPL-MCNC: 8.5 MG/DL (ref 8.5–10.5)
CALCIUM SERPL-MCNC: 8.6 MG/DL (ref 8.5–10.5)
CALCIUM SERPL-MCNC: 8.7 MG/DL (ref 8.4–10.2)
CALCIUM SERPL-MCNC: 8.7 MG/DL (ref 8.5–10.5)
CALCIUM SERPL-MCNC: 8.8 MG/DL (ref 8.4–10.2)
CALCIUM SERPL-MCNC: 8.8 MG/DL (ref 8.4–10.2)
CALCIUM SERPL-MCNC: 8.8 MG/DL (ref 8.5–10.5)
CALCIUM SERPL-MCNC: 8.9 MG/DL (ref 8.4–10.2)
CALCIUM SERPL-MCNC: 8.9 MG/DL (ref 8.4–10.2)
CALCIUM SERPL-MCNC: 8.9 MG/DL (ref 8.5–10.5)
CALCIUM SERPL-MCNC: 9 MG/DL (ref 8.4–10.2)
CALCIUM SERPL-MCNC: 9.1 MG/DL (ref 8.5–10.5)
CALCIUM SERPL-MCNC: 9.2 MG/DL (ref 8.4–10.2)
CALCIUM SERPL-MCNC: 9.4 MG/DL (ref 8.4–10.2)
CANNABINOIDS UR QL SCN: POSITIVE
CELLS FLD: 20
CHEMOTHERAPY INFUSION START DATE: NORMAL
CHEMOTHERAPY INFUSION STOP DATE: NORMAL
CHEMOTHERAPY INFUSION STOP DATE: NORMAL
CHEMOTHERAPY RECORDS: 10
CHEMOTHERAPY RECORDS: 3500
CHEMOTHERAPY RECORDS: 5000
CHEMOTHERAPY RECORDS: 7
CHEMOTHERAPY RECORDS: NORMAL
CHEMOTHERAPY RX CANCER: NORMAL
CHLORIDE SERPL-SCNC: 100 MMOL/L (ref 96–112)
CHLORIDE SERPL-SCNC: 101 MMOL/L (ref 96–112)
CHLORIDE SERPL-SCNC: 102 MMOL/L (ref 96–112)
CHLORIDE SERPL-SCNC: 104 MMOL/L (ref 96–112)
CHLORIDE SERPL-SCNC: 105 MMOL/L (ref 96–112)
CHLORIDE SERPL-SCNC: 108 MMOL/L (ref 96–112)
CHLORIDE SERPL-SCNC: 109 MMOL/L (ref 96–112)
CHLORIDE SERPL-SCNC: 94 MMOL/L (ref 96–112)
CHLORIDE SERPL-SCNC: 95 MMOL/L (ref 96–112)
CHLORIDE SERPL-SCNC: 95 MMOL/L (ref 96–112)
CHLORIDE SERPL-SCNC: 97 MMOL/L (ref 96–112)
CHLORIDE SERPL-SCNC: 98 MMOL/L (ref 96–112)
CHLORIDE SERPL-SCNC: 99 MMOL/L (ref 96–112)
CO2 SERPL-SCNC: 18 MMOL/L (ref 20–33)
CO2 SERPL-SCNC: 20 MMOL/L (ref 20–33)
CO2 SERPL-SCNC: 23 MMOL/L (ref 20–33)
CO2 SERPL-SCNC: 23 MMOL/L (ref 20–33)
CO2 SERPL-SCNC: 24 MMOL/L (ref 20–33)
CO2 SERPL-SCNC: 24 MMOL/L (ref 20–33)
CO2 SERPL-SCNC: 25 MMOL/L (ref 20–33)
CO2 SERPL-SCNC: 25 MMOL/L (ref 20–33)
CO2 SERPL-SCNC: 26 MMOL/L (ref 20–33)
CO2 SERPL-SCNC: 27 MMOL/L (ref 20–33)
CO2 SERPL-SCNC: 28 MMOL/L (ref 20–33)
CO2 SERPL-SCNC: 28 MMOL/L (ref 20–33)
CO2 SERPL-SCNC: 29 MMOL/L (ref 20–33)
CO2 SERPL-SCNC: 30 MMOL/L (ref 20–33)
CO2 SERPL-SCNC: 32 MMOL/L (ref 20–33)
CO2 SERPL-SCNC: 33 MMOL/L (ref 20–33)
COLOR FLD: NORMAL
COLOR UR: YELLOW
COMMENT 1642: NORMAL
COMMENT NL1176: NORMAL
COMMENT NL1176: NORMAL
CREAT SERPL-MCNC: 0.42 MG/DL (ref 0.5–1.4)
CREAT SERPL-MCNC: 0.43 MG/DL (ref 0.5–1.4)
CREAT SERPL-MCNC: 0.44 MG/DL (ref 0.5–1.4)
CREAT SERPL-MCNC: 0.45 MG/DL (ref 0.5–1.4)
CREAT SERPL-MCNC: 0.46 MG/DL (ref 0.5–1.4)
CREAT SERPL-MCNC: 0.46 MG/DL (ref 0.5–1.4)
CREAT SERPL-MCNC: 0.47 MG/DL (ref 0.5–1.4)
CREAT SERPL-MCNC: 0.48 MG/DL (ref 0.5–1.4)
CREAT SERPL-MCNC: 0.49 MG/DL (ref 0.5–1.4)
CREAT SERPL-MCNC: 0.5 MG/DL (ref 0.5–1.4)
CREAT SERPL-MCNC: 0.5 MG/DL (ref 0.5–1.4)
CREAT SERPL-MCNC: 0.51 MG/DL (ref 0.5–1.4)
CREAT SERPL-MCNC: 0.52 MG/DL (ref 0.5–1.4)
CREAT SERPL-MCNC: 0.53 MG/DL (ref 0.5–1.4)
CREAT SERPL-MCNC: 0.53 MG/DL (ref 0.5–1.4)
CREAT SERPL-MCNC: 0.55 MG/DL (ref 0.5–1.4)
CREAT SERPL-MCNC: 0.56 MG/DL (ref 0.5–1.4)
CREAT SERPL-MCNC: 0.58 MG/DL (ref 0.5–1.4)
CREAT SERPL-MCNC: 0.59 MG/DL (ref 0.5–1.4)
CREAT SERPL-MCNC: 0.59 MG/DL (ref 0.5–1.4)
CREAT SERPL-MCNC: 0.6 MG/DL (ref 0.5–1.4)
CREAT SERPL-MCNC: 0.61 MG/DL (ref 0.5–1.4)
CREAT SERPL-MCNC: 0.63 MG/DL (ref 0.5–1.4)
CREAT SERPL-MCNC: 0.64 MG/DL (ref 0.5–1.4)
CREAT SERPL-MCNC: 0.65 MG/DL (ref 0.5–1.4)
CREAT SERPL-MCNC: 0.68 MG/DL (ref 0.5–1.4)
CREAT SERPL-MCNC: 0.68 MG/DL (ref 0.5–1.4)
CREAT SERPL-MCNC: 0.69 MG/DL (ref 0.5–1.4)
CREAT SERPL-MCNC: 0.71 MG/DL (ref 0.5–1.4)
CREAT SERPL-MCNC: 0.82 MG/DL (ref 0.5–1.4)
CRP SERPL HS-MCNC: 0.54 MG/DL (ref 0–0.75)
CRP SERPL HS-MCNC: 2.94 MG/DL (ref 0–0.75)
CRP SERPL HS-MCNC: 20.85 MG/DL (ref 0–0.75)
D DIMER PPP IA.FEU-MCNC: >20 UG/ML (FEU) (ref 0–0.5)
DATE 1ST CHEMO CANCER: NORMAL
EKG IMPRESSION: NORMAL
EOSINOPHIL # BLD AUTO: 0 K/UL (ref 0–0.51)
EOSINOPHIL # BLD AUTO: 0.01 K/UL (ref 0–0.51)
EOSINOPHIL # BLD AUTO: 0.02 K/UL (ref 0–0.51)
EOSINOPHIL # BLD AUTO: 0.03 K/UL (ref 0–0.51)
EOSINOPHIL # BLD AUTO: 0.04 K/UL (ref 0–0.51)
EOSINOPHIL # BLD AUTO: 0.04 K/UL (ref 0–0.51)
EOSINOPHIL # BLD AUTO: 0.05 K/UL (ref 0–0.51)
EOSINOPHIL # BLD AUTO: 0.06 K/UL (ref 0–0.51)
EOSINOPHIL # BLD AUTO: 0.07 K/UL (ref 0–0.51)
EOSINOPHIL # BLD AUTO: 0.07 K/UL (ref 0–0.51)
EOSINOPHIL # BLD AUTO: 0.08 K/UL (ref 0–0.51)
EOSINOPHIL # BLD AUTO: 0.08 K/UL (ref 0–0.51)
EOSINOPHIL # BLD AUTO: 0.09 K/UL (ref 0–0.51)
EOSINOPHIL # BLD AUTO: 0.09 K/UL (ref 0–0.51)
EOSINOPHIL # BLD AUTO: 0.11 K/UL (ref 0–0.51)
EOSINOPHIL # BLD AUTO: 0.14 K/UL (ref 0–0.51)
EOSINOPHIL # BLD AUTO: 0.16 K/UL (ref 0–0.51)
EOSINOPHIL # BLD AUTO: 0.47 K/UL (ref 0–0.51)
EOSINOPHIL NFR BLD: 0 % (ref 0–6.9)
EOSINOPHIL NFR BLD: 0.1 % (ref 0–6.9)
EOSINOPHIL NFR BLD: 0.2 % (ref 0–6.9)
EOSINOPHIL NFR BLD: 0.3 % (ref 0–6.9)
EOSINOPHIL NFR BLD: 0.3 % (ref 0–6.9)
EOSINOPHIL NFR BLD: 0.4 % (ref 0–6.9)
EOSINOPHIL NFR BLD: 0.5 % (ref 0–6.9)
EOSINOPHIL NFR BLD: 0.5 % (ref 0–6.9)
EOSINOPHIL NFR BLD: 0.6 % (ref 0–6.9)
EOSINOPHIL NFR BLD: 0.7 % (ref 0–6.9)
EOSINOPHIL NFR BLD: 0.8 % (ref 0–6.9)
EOSINOPHIL NFR BLD: 1 % (ref 0–6.9)
EOSINOPHIL NFR BLD: 1.1 % (ref 0–6.9)
EOSINOPHIL NFR BLD: 1.1 % (ref 0–6.9)
EOSINOPHIL NFR BLD: 3.9 % (ref 0–6.9)
ERYTHROCYTE [DISTWIDTH] IN BLOOD BY AUTOMATED COUNT: 51.4 FL (ref 35.9–50)
ERYTHROCYTE [DISTWIDTH] IN BLOOD BY AUTOMATED COUNT: 54.5 FL (ref 35.9–50)
ERYTHROCYTE [DISTWIDTH] IN BLOOD BY AUTOMATED COUNT: 54.8 FL (ref 35.9–50)
ERYTHROCYTE [DISTWIDTH] IN BLOOD BY AUTOMATED COUNT: 55.2 FL (ref 35.9–50)
ERYTHROCYTE [DISTWIDTH] IN BLOOD BY AUTOMATED COUNT: 55.3 FL (ref 35.9–50)
ERYTHROCYTE [DISTWIDTH] IN BLOOD BY AUTOMATED COUNT: 55.7 FL (ref 35.9–50)
ERYTHROCYTE [DISTWIDTH] IN BLOOD BY AUTOMATED COUNT: 56 FL (ref 35.9–50)
ERYTHROCYTE [DISTWIDTH] IN BLOOD BY AUTOMATED COUNT: 56.1 FL (ref 35.9–50)
ERYTHROCYTE [DISTWIDTH] IN BLOOD BY AUTOMATED COUNT: 56.7 FL (ref 35.9–50)
ERYTHROCYTE [DISTWIDTH] IN BLOOD BY AUTOMATED COUNT: 56.7 FL (ref 35.9–50)
ERYTHROCYTE [DISTWIDTH] IN BLOOD BY AUTOMATED COUNT: 56.8 FL (ref 35.9–50)
ERYTHROCYTE [DISTWIDTH] IN BLOOD BY AUTOMATED COUNT: 57.2 FL (ref 35.9–50)
ERYTHROCYTE [DISTWIDTH] IN BLOOD BY AUTOMATED COUNT: 57.2 FL (ref 35.9–50)
ERYTHROCYTE [DISTWIDTH] IN BLOOD BY AUTOMATED COUNT: 57.3 FL (ref 35.9–50)
ERYTHROCYTE [DISTWIDTH] IN BLOOD BY AUTOMATED COUNT: 57.8 FL (ref 35.9–50)
ERYTHROCYTE [DISTWIDTH] IN BLOOD BY AUTOMATED COUNT: 57.9 FL (ref 35.9–50)
ERYTHROCYTE [DISTWIDTH] IN BLOOD BY AUTOMATED COUNT: 58.1 FL (ref 35.9–50)
ERYTHROCYTE [DISTWIDTH] IN BLOOD BY AUTOMATED COUNT: 58.1 FL (ref 35.9–50)
ERYTHROCYTE [DISTWIDTH] IN BLOOD BY AUTOMATED COUNT: 58.4 FL (ref 35.9–50)
ERYTHROCYTE [DISTWIDTH] IN BLOOD BY AUTOMATED COUNT: 58.5 FL (ref 35.9–50)
ERYTHROCYTE [DISTWIDTH] IN BLOOD BY AUTOMATED COUNT: 58.5 FL (ref 35.9–50)
ERYTHROCYTE [DISTWIDTH] IN BLOOD BY AUTOMATED COUNT: 58.6 FL (ref 35.9–50)
ERYTHROCYTE [DISTWIDTH] IN BLOOD BY AUTOMATED COUNT: 59.1 FL (ref 35.9–50)
ERYTHROCYTE [DISTWIDTH] IN BLOOD BY AUTOMATED COUNT: 59.8 FL (ref 35.9–50)
ERYTHROCYTE [DISTWIDTH] IN BLOOD BY AUTOMATED COUNT: 60 FL (ref 35.9–50)
ERYTHROCYTE [DISTWIDTH] IN BLOOD BY AUTOMATED COUNT: 60.1 FL (ref 35.9–50)
ERYTHROCYTE [DISTWIDTH] IN BLOOD BY AUTOMATED COUNT: 60.8 FL (ref 35.9–50)
ERYTHROCYTE [DISTWIDTH] IN BLOOD BY AUTOMATED COUNT: 60.9 FL (ref 35.9–50)
ERYTHROCYTE [DISTWIDTH] IN BLOOD BY AUTOMATED COUNT: 61.1 FL (ref 35.9–50)
ERYTHROCYTE [DISTWIDTH] IN BLOOD BY AUTOMATED COUNT: 61.5 FL (ref 35.9–50)
ERYTHROCYTE [DISTWIDTH] IN BLOOD BY AUTOMATED COUNT: 61.8 FL (ref 35.9–50)
ERYTHROCYTE [DISTWIDTH] IN BLOOD BY AUTOMATED COUNT: 61.9 FL (ref 35.9–50)
ERYTHROCYTE [DISTWIDTH] IN BLOOD BY AUTOMATED COUNT: 62 FL (ref 35.9–50)
ERYTHROCYTE [DISTWIDTH] IN BLOOD BY AUTOMATED COUNT: 62.4 FL (ref 35.9–50)
ERYTHROCYTE [DISTWIDTH] IN BLOOD BY AUTOMATED COUNT: 62.4 FL (ref 35.9–50)
ERYTHROCYTE [DISTWIDTH] IN BLOOD BY AUTOMATED COUNT: 62.5 FL (ref 35.9–50)
ERYTHROCYTE [DISTWIDTH] IN BLOOD BY AUTOMATED COUNT: 62.5 FL (ref 35.9–50)
ERYTHROCYTE [DISTWIDTH] IN BLOOD BY AUTOMATED COUNT: 62.6 FL (ref 35.9–50)
ERYTHROCYTE [DISTWIDTH] IN BLOOD BY AUTOMATED COUNT: 62.9 FL (ref 35.9–50)
ERYTHROCYTE [DISTWIDTH] IN BLOOD BY AUTOMATED COUNT: 64.7 FL (ref 35.9–50)
ERYTHROCYTE [SEDIMENTATION RATE] IN BLOOD BY WESTERGREN METHOD: 45 MM/HOUR (ref 0–25)
EST. AVERAGE GLUCOSE BLD GHB EST-MCNC: 146 MG/DL
FENTANYL UR QL: POSITIVE
FLUAV RNA NPH QL NAA+NON-PROBE: NOT DETECTED
FLUAV RNA SPEC QL NAA+PROBE: NEGATIVE
FLUBV RNA NPH QL NAA+NON-PROBE: NOT DETECTED
FLUBV RNA SPEC QL NAA+PROBE: NEGATIVE
FUNGUS SPEC CULT: NORMAL
FUNGUS SPEC CULT: NORMAL
FUNGUS SPEC FUNGUS STN: NORMAL
GFR SERPLBLD CREATININE-BSD FMLA CKD-EPI: 103 ML/MIN/1.73 M 2
GFR SERPLBLD CREATININE-BSD FMLA CKD-EPI: 105 ML/MIN/1.73 M 2
GFR SERPLBLD CREATININE-BSD FMLA CKD-EPI: 106 ML/MIN/1.73 M 2
GFR SERPLBLD CREATININE-BSD FMLA CKD-EPI: 106 ML/MIN/1.73 M 2
GFR SERPLBLD CREATININE-BSD FMLA CKD-EPI: 107 ML/MIN/1.73 M 2
GFR SERPLBLD CREATININE-BSD FMLA CKD-EPI: 108 ML/MIN/1.73 M 2
GFR SERPLBLD CREATININE-BSD FMLA CKD-EPI: 109 ML/MIN/1.73 M 2
GFR SERPLBLD CREATININE-BSD FMLA CKD-EPI: 110 ML/MIN/1.73 M 2
GFR SERPLBLD CREATININE-BSD FMLA CKD-EPI: 111 ML/MIN/1.73 M 2
GFR SERPLBLD CREATININE-BSD FMLA CKD-EPI: 111 ML/MIN/1.73 M 2
GFR SERPLBLD CREATININE-BSD FMLA CKD-EPI: 112 ML/MIN/1.73 M 2
GFR SERPLBLD CREATININE-BSD FMLA CKD-EPI: 112 ML/MIN/1.73 M 2
GFR SERPLBLD CREATININE-BSD FMLA CKD-EPI: 113 ML/MIN/1.73 M 2
GFR SERPLBLD CREATININE-BSD FMLA CKD-EPI: 114 ML/MIN/1.73 M 2
GFR SERPLBLD CREATININE-BSD FMLA CKD-EPI: 115 ML/MIN/1.73 M 2
GFR SERPLBLD CREATININE-BSD FMLA CKD-EPI: 116 ML/MIN/1.73 M 2
GFR SERPLBLD CREATININE-BSD FMLA CKD-EPI: 117 ML/MIN/1.73 M 2
GFR SERPLBLD CREATININE-BSD FMLA CKD-EPI: 117 ML/MIN/1.73 M 2
GFR SERPLBLD CREATININE-BSD FMLA CKD-EPI: 118 ML/MIN/1.73 M 2
GFR SERPLBLD CREATININE-BSD FMLA CKD-EPI: 119 ML/MIN/1.73 M 2
GFR SERPLBLD CREATININE-BSD FMLA CKD-EPI: 87 ML/MIN/1.73 M 2
GLOBULIN SER CALC-MCNC: 2.4 G/DL (ref 1.9–3.5)
GLOBULIN SER CALC-MCNC: 2.5 G/DL (ref 1.9–3.5)
GLOBULIN SER CALC-MCNC: 2.6 G/DL (ref 1.9–3.5)
GLOBULIN SER CALC-MCNC: 2.9 G/DL (ref 1.9–3.5)
GLOBULIN SER CALC-MCNC: 3 G/DL (ref 1.9–3.5)
GLOBULIN SER CALC-MCNC: 3.1 G/DL (ref 1.9–3.5)
GLOBULIN SER CALC-MCNC: 3.2 G/DL (ref 1.9–3.5)
GLUCOSE BLD STRIP.AUTO-MCNC: 102 MG/DL (ref 65–99)
GLUCOSE BLD STRIP.AUTO-MCNC: 143 MG/DL (ref 65–99)
GLUCOSE BLD STRIP.AUTO-MCNC: 174 MG/DL (ref 65–99)
GLUCOSE BLD STRIP.AUTO-MCNC: 95 MG/DL (ref 65–99)
GLUCOSE SERPL-MCNC: 106 MG/DL (ref 65–99)
GLUCOSE SERPL-MCNC: 106 MG/DL (ref 65–99)
GLUCOSE SERPL-MCNC: 109 MG/DL (ref 65–99)
GLUCOSE SERPL-MCNC: 122 MG/DL (ref 65–99)
GLUCOSE SERPL-MCNC: 122 MG/DL (ref 65–99)
GLUCOSE SERPL-MCNC: 124 MG/DL (ref 65–99)
GLUCOSE SERPL-MCNC: 125 MG/DL (ref 65–99)
GLUCOSE SERPL-MCNC: 125 MG/DL (ref 65–99)
GLUCOSE SERPL-MCNC: 133 MG/DL (ref 65–99)
GLUCOSE SERPL-MCNC: 134 MG/DL (ref 65–99)
GLUCOSE SERPL-MCNC: 135 MG/DL (ref 65–99)
GLUCOSE SERPL-MCNC: 138 MG/DL (ref 65–99)
GLUCOSE SERPL-MCNC: 141 MG/DL (ref 65–99)
GLUCOSE SERPL-MCNC: 141 MG/DL (ref 65–99)
GLUCOSE SERPL-MCNC: 145 MG/DL (ref 65–99)
GLUCOSE SERPL-MCNC: 148 MG/DL (ref 65–99)
GLUCOSE SERPL-MCNC: 151 MG/DL (ref 65–99)
GLUCOSE SERPL-MCNC: 157 MG/DL (ref 65–99)
GLUCOSE SERPL-MCNC: 159 MG/DL (ref 65–99)
GLUCOSE SERPL-MCNC: 166 MG/DL (ref 65–99)
GLUCOSE SERPL-MCNC: 172 MG/DL (ref 65–99)
GLUCOSE SERPL-MCNC: 173 MG/DL (ref 65–99)
GLUCOSE SERPL-MCNC: 178 MG/DL (ref 65–99)
GLUCOSE SERPL-MCNC: 179 MG/DL (ref 65–99)
GLUCOSE SERPL-MCNC: 183 MG/DL (ref 65–99)
GLUCOSE SERPL-MCNC: 184 MG/DL (ref 65–99)
GLUCOSE SERPL-MCNC: 193 MG/DL (ref 65–99)
GLUCOSE SERPL-MCNC: 205 MG/DL (ref 65–99)
GLUCOSE SERPL-MCNC: 210 MG/DL (ref 65–99)
GLUCOSE SERPL-MCNC: 214 MG/DL (ref 65–99)
GLUCOSE SERPL-MCNC: 228 MG/DL (ref 65–99)
GLUCOSE SERPL-MCNC: 252 MG/DL (ref 65–99)
GLUCOSE SERPL-MCNC: 264 MG/DL (ref 65–99)
GLUCOSE SERPL-MCNC: 78 MG/DL (ref 65–99)
GLUCOSE SERPL-MCNC: 79 MG/DL (ref 65–99)
GLUCOSE SERPL-MCNC: 96 MG/DL (ref 65–99)
GLUCOSE SERPL-MCNC: 96 MG/DL (ref 65–99)
GLUCOSE UR STRIP.AUTO-MCNC: NEGATIVE MG/DL
GRAM STN SPEC: NORMAL
HADV DNA NPH QL NAA+NON-PROBE: NOT DETECTED
HBA1C MFR BLD: 6.7 % (ref 4–5.6)
HCG SERPL QL: POSITIVE
HCG UR QL: NEGATIVE
HCG UR QL: NEGATIVE
HCG UR QL: POSITIVE
HCO3 BLDA-SCNC: 30 MMOL/L (ref 17–25)
HCO3 BLDA-SCNC: 31 MMOL/L (ref 17–25)
HCO3 BLDA-SCNC: 31 MMOL/L (ref 17–25)
HCO3 BLDV-SCNC: 29 MMOL/L (ref 24–28)
HCOV 229E RNA NPH QL NAA+NON-PROBE: NOT DETECTED
HCOV HKU1 RNA NPH QL NAA+NON-PROBE: NOT DETECTED
HCOV NL63 RNA NPH QL NAA+NON-PROBE: NOT DETECTED
HCOV OC43 RNA NPH QL NAA+NON-PROBE: NOT DETECTED
HCT VFR BLD AUTO: 28.3 % (ref 37–47)
HCT VFR BLD AUTO: 29.5 % (ref 37–47)
HCT VFR BLD AUTO: 31.5 % (ref 37–47)
HCT VFR BLD AUTO: 31.8 % (ref 37–47)
HCT VFR BLD AUTO: 32 % (ref 37–47)
HCT VFR BLD AUTO: 32.7 % (ref 37–47)
HCT VFR BLD AUTO: 32.7 % (ref 37–47)
HCT VFR BLD AUTO: 33.2 % (ref 37–47)
HCT VFR BLD AUTO: 33.3 % (ref 37–47)
HCT VFR BLD AUTO: 33.3 % (ref 37–47)
HCT VFR BLD AUTO: 33.6 % (ref 37–47)
HCT VFR BLD AUTO: 33.6 % (ref 37–47)
HCT VFR BLD AUTO: 33.7 % (ref 37–47)
HCT VFR BLD AUTO: 33.8 % (ref 37–47)
HCT VFR BLD AUTO: 33.9 % (ref 37–47)
HCT VFR BLD AUTO: 33.9 % (ref 37–47)
HCT VFR BLD AUTO: 34.3 % (ref 37–47)
HCT VFR BLD AUTO: 34.4 % (ref 37–47)
HCT VFR BLD AUTO: 34.4 % (ref 37–47)
HCT VFR BLD AUTO: 34.6 % (ref 37–47)
HCT VFR BLD AUTO: 34.7 % (ref 37–47)
HCT VFR BLD AUTO: 35 % (ref 37–47)
HCT VFR BLD AUTO: 35 % (ref 37–47)
HCT VFR BLD AUTO: 35.3 % (ref 37–47)
HCT VFR BLD AUTO: 35.4 % (ref 37–47)
HCT VFR BLD AUTO: 35.5 % (ref 37–47)
HCT VFR BLD AUTO: 35.7 % (ref 37–47)
HCT VFR BLD AUTO: 36 % (ref 37–47)
HCT VFR BLD AUTO: 36.1 % (ref 37–47)
HCT VFR BLD AUTO: 36.3 % (ref 37–47)
HCT VFR BLD AUTO: 36.4 % (ref 37–47)
HCT VFR BLD AUTO: 36.5 % (ref 37–47)
HCT VFR BLD AUTO: 36.8 % (ref 37–47)
HCT VFR BLD AUTO: 36.9 % (ref 37–47)
HCT VFR BLD AUTO: 37.2 % (ref 37–47)
HCT VFR BLD AUTO: 37.4 % (ref 37–47)
HCT VFR BLD AUTO: 38.2 % (ref 37–47)
HCT VFR BLD AUTO: 38.6 % (ref 37–47)
HCT VFR BLD AUTO: 38.7 % (ref 37–47)
HCT VFR BLD AUTO: 39.3 % (ref 37–47)
HCT VFR BLD AUTO: 39.3 % (ref 37–47)
HCT VFR BLD AUTO: 39.8 % (ref 37–47)
HCT VFR BLD AUTO: 39.9 % (ref 37–47)
HCT VFR BLD AUTO: 39.9 % (ref 37–47)
HCT VFR BLD AUTO: 40.1 % (ref 37–47)
HCT VFR BLD AUTO: 41.3 % (ref 37–47)
HCT VFR BLD AUTO: 43.4 % (ref 37–47)
HCT VFR BLD AUTO: 45 % (ref 37–47)
HGB BLD-MCNC: 10 G/DL (ref 12–16)
HGB BLD-MCNC: 10 G/DL (ref 12–16)
HGB BLD-MCNC: 10.1 G/DL (ref 12–16)
HGB BLD-MCNC: 10.2 G/DL (ref 12–16)
HGB BLD-MCNC: 10.2 G/DL (ref 12–16)
HGB BLD-MCNC: 10.3 G/DL (ref 12–16)
HGB BLD-MCNC: 10.3 G/DL (ref 12–16)
HGB BLD-MCNC: 10.4 G/DL (ref 12–16)
HGB BLD-MCNC: 10.4 G/DL (ref 12–16)
HGB BLD-MCNC: 10.5 G/DL (ref 12–16)
HGB BLD-MCNC: 10.6 G/DL (ref 12–16)
HGB BLD-MCNC: 10.8 G/DL (ref 12–16)
HGB BLD-MCNC: 10.9 G/DL (ref 12–16)
HGB BLD-MCNC: 11 G/DL (ref 12–16)
HGB BLD-MCNC: 11.3 G/DL (ref 12–16)
HGB BLD-MCNC: 11.3 G/DL (ref 12–16)
HGB BLD-MCNC: 11.5 G/DL (ref 12–16)
HGB BLD-MCNC: 11.8 G/DL (ref 12–16)
HGB BLD-MCNC: 11.9 G/DL (ref 12–16)
HGB BLD-MCNC: 12 G/DL (ref 12–16)
HGB BLD-MCNC: 12 G/DL (ref 12–16)
HGB BLD-MCNC: 12.2 G/DL (ref 12–16)
HGB BLD-MCNC: 12.2 G/DL (ref 12–16)
HGB BLD-MCNC: 12.4 G/DL (ref 12–16)
HGB BLD-MCNC: 12.6 G/DL (ref 12–16)
HGB BLD-MCNC: 13.3 G/DL (ref 12–16)
HGB BLD-MCNC: 13.4 G/DL (ref 12–16)
HGB BLD-MCNC: 13.5 G/DL (ref 12–16)
HGB BLD-MCNC: 8.4 G/DL (ref 12–16)
HGB BLD-MCNC: 8.5 G/DL (ref 12–16)
HGB BLD-MCNC: 9.4 G/DL (ref 12–16)
HGB BLD-MCNC: 9.5 G/DL (ref 12–16)
HGB BLD-MCNC: 9.6 G/DL (ref 12–16)
HGB BLD-MCNC: 9.7 G/DL (ref 12–16)
HGB BLD-MCNC: 9.7 G/DL (ref 12–16)
HGB BLD-MCNC: 9.8 G/DL (ref 12–16)
HGB BLD-MCNC: 9.9 G/DL (ref 12–16)
HMPV RNA NPH QL NAA+NON-PROBE: NOT DETECTED
HPIV1 RNA NPH QL NAA+NON-PROBE: NOT DETECTED
HPIV2 RNA NPH QL NAA+NON-PROBE: NOT DETECTED
HPIV3 RNA NPH QL NAA+NON-PROBE: NOT DETECTED
HPIV4 RNA NPH QL NAA+NON-PROBE: NOT DETECTED
HYPOCHROMIA BLD QL SMEAR: ABNORMAL
HYPOCHROMIA BLD QL SMEAR: ABNORMAL
IMM GRANULOCYTES # BLD AUTO: 0.02 K/UL (ref 0–0.11)
IMM GRANULOCYTES # BLD AUTO: 0.04 K/UL (ref 0–0.11)
IMM GRANULOCYTES # BLD AUTO: 0.09 K/UL (ref 0–0.11)
IMM GRANULOCYTES # BLD AUTO: 0.1 K/UL (ref 0–0.11)
IMM GRANULOCYTES # BLD AUTO: 0.15 K/UL (ref 0–0.11)
IMM GRANULOCYTES # BLD AUTO: 0.16 K/UL (ref 0–0.11)
IMM GRANULOCYTES # BLD AUTO: 0.22 K/UL (ref 0–0.11)
IMM GRANULOCYTES # BLD AUTO: 0.24 K/UL (ref 0–0.11)
IMM GRANULOCYTES # BLD AUTO: 0.34 K/UL (ref 0–0.11)
IMM GRANULOCYTES # BLD AUTO: 0.34 K/UL (ref 0–0.11)
IMM GRANULOCYTES # BLD AUTO: 0.38 K/UL (ref 0–0.11)
IMM GRANULOCYTES # BLD AUTO: 0.4 K/UL (ref 0–0.11)
IMM GRANULOCYTES # BLD AUTO: 0.41 K/UL (ref 0–0.11)
IMM GRANULOCYTES # BLD AUTO: 0.41 K/UL (ref 0–0.11)
IMM GRANULOCYTES # BLD AUTO: 0.42 K/UL (ref 0–0.11)
IMM GRANULOCYTES # BLD AUTO: 0.45 K/UL (ref 0–0.11)
IMM GRANULOCYTES # BLD AUTO: 0.48 K/UL (ref 0–0.11)
IMM GRANULOCYTES # BLD AUTO: 0.5 K/UL (ref 0–0.11)
IMM GRANULOCYTES # BLD AUTO: 0.53 K/UL (ref 0–0.11)
IMM GRANULOCYTES # BLD AUTO: 0.54 K/UL (ref 0–0.11)
IMM GRANULOCYTES # BLD AUTO: 0.62 K/UL (ref 0–0.11)
IMM GRANULOCYTES # BLD AUTO: 0.62 K/UL (ref 0–0.11)
IMM GRANULOCYTES # BLD AUTO: 0.67 K/UL (ref 0–0.11)
IMM GRANULOCYTES # BLD AUTO: 0.67 K/UL (ref 0–0.11)
IMM GRANULOCYTES # BLD AUTO: 0.77 K/UL (ref 0–0.11)
IMM GRANULOCYTES NFR BLD AUTO: 0.3 % (ref 0–0.9)
IMM GRANULOCYTES NFR BLD AUTO: 0.3 % (ref 0–0.9)
IMM GRANULOCYTES NFR BLD AUTO: 0.8 % (ref 0–0.9)
IMM GRANULOCYTES NFR BLD AUTO: 0.9 % (ref 0–0.9)
IMM GRANULOCYTES NFR BLD AUTO: 1.2 % (ref 0–0.9)
IMM GRANULOCYTES NFR BLD AUTO: 1.4 % (ref 0–0.9)
IMM GRANULOCYTES NFR BLD AUTO: 1.8 % (ref 0–0.9)
IMM GRANULOCYTES NFR BLD AUTO: 2 % (ref 0–0.9)
IMM GRANULOCYTES NFR BLD AUTO: 2.5 % (ref 0–0.9)
IMM GRANULOCYTES NFR BLD AUTO: 2.5 % (ref 0–0.9)
IMM GRANULOCYTES NFR BLD AUTO: 2.7 % (ref 0–0.9)
IMM GRANULOCYTES NFR BLD AUTO: 3.2 % (ref 0–0.9)
IMM GRANULOCYTES NFR BLD AUTO: 3.2 % (ref 0–0.9)
IMM GRANULOCYTES NFR BLD AUTO: 3.6 % (ref 0–0.9)
IMM GRANULOCYTES NFR BLD AUTO: 3.7 % (ref 0–0.9)
IMM GRANULOCYTES NFR BLD AUTO: 4.2 % (ref 0–0.9)
IMM GRANULOCYTES NFR BLD AUTO: 4.3 % (ref 0–0.9)
IMM GRANULOCYTES NFR BLD AUTO: 4.4 % (ref 0–0.9)
IMM GRANULOCYTES NFR BLD AUTO: 4.5 % (ref 0–0.9)
IMM GRANULOCYTES NFR BLD AUTO: 4.6 % (ref 0–0.9)
IMM GRANULOCYTES NFR BLD AUTO: 4.7 % (ref 0–0.9)
IMM GRANULOCYTES NFR BLD AUTO: 4.9 % (ref 0–0.9)
INHALED O2 FLOW RATE: 50 L/MIN (ref 2–10)
INHALED O2 FLOW RATE: 6 L/MIN
INHALED O2 FLOW RATE: 60 L/MIN (ref 2–10)
INHALED O2 FLOW RATE: 60 L/MIN (ref 2–10)
INHALED O2 FLOW RATE: ABNORMAL L/MIN
INR PPP: 1 (ref 0.87–1.13)
INR PPP: 1.06 (ref 0.87–1.13)
INR PPP: 1.23 (ref 0.87–1.13)
KETONES UR STRIP.AUTO-MCNC: NEGATIVE MG/DL
LACTATE SERPL-SCNC: 1.1 MMOL/L (ref 0.5–2)
LACTATE SERPL-SCNC: 1.2 MMOL/L (ref 0.5–2)
LACTATE SERPL-SCNC: 1.8 MMOL/L (ref 0.5–2)
LACTATE SERPL-SCNC: 1.9 MMOL/L (ref 0.5–2)
LACTATE SERPL-SCNC: 1.9 MMOL/L (ref 0.5–2)
LACTATE SERPL-SCNC: 2.2 MMOL/L (ref 0.5–2)
LEUKOCYTE ESTERASE UR QL STRIP.AUTO: NEGATIVE
LV EJECT FRACT  99904: 60
LV EJECT FRACT  99904: 65
LV EJECT FRACT  99904: 65
LV EJECT FRACT MOD 2C 99903: 36.11
LV EJECT FRACT MOD 2C 99903: 57.88
LV EJECT FRACT MOD 2C 99903: 65.63
LV EJECT FRACT MOD 4C 99902: 47.77
LV EJECT FRACT MOD 4C 99902: 66.28
LV EJECT FRACT MOD 4C 99902: 72.29
LV EJECT FRACT MOD BP 99901: 40.93
LV EJECT FRACT MOD BP 99901: 62.6
LV EJECT FRACT MOD BP 99901: 65.41
LYMPHOCYTES # BLD AUTO: 0.42 K/UL (ref 1–4.8)
LYMPHOCYTES # BLD AUTO: 0.55 K/UL (ref 1–4.8)
LYMPHOCYTES # BLD AUTO: 0.59 K/UL (ref 1–4.8)
LYMPHOCYTES # BLD AUTO: 0.65 K/UL (ref 1–4.8)
LYMPHOCYTES # BLD AUTO: 0.74 K/UL (ref 1–4.8)
LYMPHOCYTES # BLD AUTO: 0.75 K/UL (ref 1–4.8)
LYMPHOCYTES # BLD AUTO: 0.77 K/UL (ref 1–4.8)
LYMPHOCYTES # BLD AUTO: 0.79 K/UL (ref 1–4.8)
LYMPHOCYTES # BLD AUTO: 0.82 K/UL (ref 1–4.8)
LYMPHOCYTES # BLD AUTO: 0.9 K/UL (ref 1–4.8)
LYMPHOCYTES # BLD AUTO: 0.97 K/UL (ref 1–4.8)
LYMPHOCYTES # BLD AUTO: 0.99 K/UL (ref 1–4.8)
LYMPHOCYTES # BLD AUTO: 0.99 K/UL (ref 1–4.8)
LYMPHOCYTES # BLD AUTO: 1.01 K/UL (ref 1–4.8)
LYMPHOCYTES # BLD AUTO: 1.02 K/UL (ref 1–4.8)
LYMPHOCYTES # BLD AUTO: 1.06 K/UL (ref 1–4.8)
LYMPHOCYTES # BLD AUTO: 1.08 K/UL (ref 1–4.8)
LYMPHOCYTES # BLD AUTO: 1.08 K/UL (ref 1–4.8)
LYMPHOCYTES # BLD AUTO: 1.09 K/UL (ref 1–4.8)
LYMPHOCYTES # BLD AUTO: 1.25 K/UL (ref 1–4.8)
LYMPHOCYTES # BLD AUTO: 1.25 K/UL (ref 1–4.8)
LYMPHOCYTES # BLD AUTO: 1.28 K/UL (ref 1–4.8)
LYMPHOCYTES # BLD AUTO: 1.3 K/UL (ref 1–4.8)
LYMPHOCYTES # BLD AUTO: 1.33 K/UL (ref 1–4.8)
LYMPHOCYTES # BLD AUTO: 1.35 K/UL (ref 1–4.8)
LYMPHOCYTES # BLD AUTO: 1.38 K/UL (ref 1–4.8)
LYMPHOCYTES # BLD AUTO: 1.64 K/UL (ref 1–4.8)
LYMPHOCYTES NFR BLD: 10.5 % (ref 22–41)
LYMPHOCYTES NFR BLD: 11 % (ref 22–41)
LYMPHOCYTES NFR BLD: 12 % (ref 22–41)
LYMPHOCYTES NFR BLD: 12.1 % (ref 22–41)
LYMPHOCYTES NFR BLD: 12.3 % (ref 22–41)
LYMPHOCYTES NFR BLD: 3.5 % (ref 22–41)
LYMPHOCYTES NFR BLD: 4.4 % (ref 22–41)
LYMPHOCYTES NFR BLD: 5.3 % (ref 22–41)
LYMPHOCYTES NFR BLD: 5.7 % (ref 22–41)
LYMPHOCYTES NFR BLD: 5.9 % (ref 22–41)
LYMPHOCYTES NFR BLD: 6.2 % (ref 22–41)
LYMPHOCYTES NFR BLD: 6.2 % (ref 22–41)
LYMPHOCYTES NFR BLD: 6.9 % (ref 22–41)
LYMPHOCYTES NFR BLD: 7 % (ref 22–41)
LYMPHOCYTES NFR BLD: 7.1 % (ref 22–41)
LYMPHOCYTES NFR BLD: 7.4 % (ref 22–41)
LYMPHOCYTES NFR BLD: 7.4 % (ref 22–41)
LYMPHOCYTES NFR BLD: 7.6 % (ref 22–41)
LYMPHOCYTES NFR BLD: 7.7 % (ref 22–41)
LYMPHOCYTES NFR BLD: 7.8 % (ref 22–41)
LYMPHOCYTES NFR BLD: 8.2 % (ref 22–41)
LYMPHOCYTES NFR BLD: 8.5 % (ref 22–41)
LYMPHOCYTES NFR BLD: 8.5 % (ref 22–41)
LYMPHOCYTES NFR BLD: 8.8 % (ref 22–41)
LYMPHOCYTES NFR BLD: 9.1 % (ref 22–41)
LYMPHOCYTES NFR BLD: 9.4 % (ref 22–41)
LYMPHOCYTES NFR BLD: 9.5 % (ref 22–41)
LYMPHOCYTES NFR FLD: 24 %
M PNEUMO DNA NPH QL NAA+NON-PROBE: NOT DETECTED
MACROCYTES BLD QL SMEAR: ABNORMAL
MAGNESIUM SERPL-MCNC: 2 MG/DL (ref 1.5–2.5)
MAGNESIUM SERPL-MCNC: 2.1 MG/DL (ref 1.5–2.5)
MAGNESIUM SERPL-MCNC: 2.2 MG/DL (ref 1.5–2.5)
MAGNESIUM SERPL-MCNC: 2.2 MG/DL (ref 1.5–2.5)
MAGNESIUM SERPL-MCNC: 2.3 MG/DL (ref 1.5–2.5)
MAGNESIUM SERPL-MCNC: 2.3 MG/DL (ref 1.5–2.5)
MANUAL DIFF BLD: NORMAL
MANUAL DIFF BLD: NORMAL
MCH RBC QN AUTO: 25.5 PG (ref 27–33)
MCH RBC QN AUTO: 25.7 PG (ref 27–33)
MCH RBC QN AUTO: 25.8 PG (ref 27–33)
MCH RBC QN AUTO: 25.9 PG (ref 27–33)
MCH RBC QN AUTO: 26 PG (ref 27–33)
MCH RBC QN AUTO: 26 PG (ref 27–33)
MCH RBC QN AUTO: 26.1 PG (ref 27–33)
MCH RBC QN AUTO: 26.2 PG (ref 27–33)
MCH RBC QN AUTO: 26.2 PG (ref 27–33)
MCH RBC QN AUTO: 26.3 PG (ref 27–33)
MCH RBC QN AUTO: 26.4 PG (ref 27–33)
MCH RBC QN AUTO: 26.4 PG (ref 27–33)
MCH RBC QN AUTO: 26.5 PG (ref 27–33)
MCH RBC QN AUTO: 26.6 PG (ref 27–33)
MCH RBC QN AUTO: 26.6 PG (ref 27–33)
MCH RBC QN AUTO: 26.7 PG (ref 27–33)
MCH RBC QN AUTO: 26.8 PG (ref 27–33)
MCH RBC QN AUTO: 26.9 PG (ref 27–33)
MCH RBC QN AUTO: 26.9 PG (ref 27–33)
MCH RBC QN AUTO: 27 PG (ref 27–33)
MCH RBC QN AUTO: 27 PG (ref 27–33)
MCH RBC QN AUTO: 27.2 PG (ref 27–33)
MCH RBC QN AUTO: 27.3 PG (ref 27–33)
MCH RBC QN AUTO: 27.3 PG (ref 27–33)
MCH RBC QN AUTO: 27.4 PG (ref 27–33)
MCH RBC QN AUTO: 27.5 PG (ref 27–33)
MCH RBC QN AUTO: 27.6 PG (ref 27–33)
MCH RBC QN AUTO: 27.6 PG (ref 27–33)
MCH RBC QN AUTO: 27.7 PG (ref 27–33)
MCH RBC QN AUTO: 27.8 PG (ref 27–33)
MCHC RBC AUTO-ENTMCNC: 28.3 G/DL (ref 32.2–35.5)
MCHC RBC AUTO-ENTMCNC: 28.5 G/DL (ref 32.2–35.5)
MCHC RBC AUTO-ENTMCNC: 28.8 G/DL (ref 32.2–35.5)
MCHC RBC AUTO-ENTMCNC: 28.9 G/DL (ref 32.2–35.5)
MCHC RBC AUTO-ENTMCNC: 28.9 G/DL (ref 32.2–35.5)
MCHC RBC AUTO-ENTMCNC: 29 G/DL (ref 32.2–35.5)
MCHC RBC AUTO-ENTMCNC: 29.2 G/DL (ref 32.2–35.5)
MCHC RBC AUTO-ENTMCNC: 29.2 G/DL (ref 32.2–35.5)
MCHC RBC AUTO-ENTMCNC: 29.3 G/DL (ref 32.2–35.5)
MCHC RBC AUTO-ENTMCNC: 29.4 G/DL (ref 32.2–35.5)
MCHC RBC AUTO-ENTMCNC: 29.5 G/DL (ref 32.2–35.5)
MCHC RBC AUTO-ENTMCNC: 29.6 G/DL (ref 32.2–35.5)
MCHC RBC AUTO-ENTMCNC: 29.7 G/DL (ref 32.2–35.5)
MCHC RBC AUTO-ENTMCNC: 29.8 G/DL (ref 32.2–35.5)
MCHC RBC AUTO-ENTMCNC: 29.9 G/DL (ref 32.2–35.5)
MCHC RBC AUTO-ENTMCNC: 29.9 G/DL (ref 32.2–35.5)
MCHC RBC AUTO-ENTMCNC: 30 G/DL (ref 32.2–35.5)
MCHC RBC AUTO-ENTMCNC: 30 G/DL (ref 32.2–35.5)
MCHC RBC AUTO-ENTMCNC: 30.1 G/DL (ref 32.2–35.5)
MCHC RBC AUTO-ENTMCNC: 30.1 G/DL (ref 32.2–35.5)
MCHC RBC AUTO-ENTMCNC: 30.2 G/DL (ref 32.2–35.5)
MCHC RBC AUTO-ENTMCNC: 30.2 G/DL (ref 32.2–35.5)
MCHC RBC AUTO-ENTMCNC: 30.3 G/DL (ref 32.2–35.5)
MCHC RBC AUTO-ENTMCNC: 30.5 G/DL (ref 32.2–35.5)
MCHC RBC AUTO-ENTMCNC: 30.5 G/DL (ref 32.2–35.5)
MCHC RBC AUTO-ENTMCNC: 30.6 G/DL (ref 32.2–35.5)
MCHC RBC AUTO-ENTMCNC: 30.7 G/DL (ref 32.2–35.5)
MCHC RBC AUTO-ENTMCNC: 31 G/DL (ref 32.2–35.5)
MCHC RBC AUTO-ENTMCNC: 31.1 G/DL (ref 32.2–35.5)
MCHC RBC AUTO-ENTMCNC: 31.1 G/DL (ref 32.2–35.5)
MCHC RBC AUTO-ENTMCNC: 31.2 G/DL (ref 32.2–35.5)
MCHC RBC AUTO-ENTMCNC: 31.4 G/DL (ref 32.2–35.5)
MCHC RBC AUTO-ENTMCNC: 31.6 G/DL (ref 32.2–35.5)
MCHC RBC AUTO-ENTMCNC: 32.2 G/DL (ref 32.2–35.5)
MCV RBC AUTO: 84.6 FL (ref 81.4–97.8)
MCV RBC AUTO: 85.3 FL (ref 81.4–97.8)
MCV RBC AUTO: 85.9 FL (ref 81.4–97.8)
MCV RBC AUTO: 86.3 FL (ref 81.4–97.8)
MCV RBC AUTO: 86.6 FL (ref 81.4–97.8)
MCV RBC AUTO: 87.1 FL (ref 81.4–97.8)
MCV RBC AUTO: 87.2 FL (ref 81.4–97.8)
MCV RBC AUTO: 87.4 FL (ref 81.4–97.8)
MCV RBC AUTO: 87.5 FL (ref 81.4–97.8)
MCV RBC AUTO: 87.6 FL (ref 81.4–97.8)
MCV RBC AUTO: 87.7 FL (ref 81.4–97.8)
MCV RBC AUTO: 87.8 FL (ref 81.4–97.8)
MCV RBC AUTO: 87.8 FL (ref 81.4–97.8)
MCV RBC AUTO: 87.9 FL (ref 81.4–97.8)
MCV RBC AUTO: 88.3 FL (ref 81.4–97.8)
MCV RBC AUTO: 88.4 FL (ref 81.4–97.8)
MCV RBC AUTO: 88.4 FL (ref 81.4–97.8)
MCV RBC AUTO: 88.7 FL (ref 81.4–97.8)
MCV RBC AUTO: 88.8 FL (ref 81.4–97.8)
MCV RBC AUTO: 88.9 FL (ref 81.4–97.8)
MCV RBC AUTO: 89 FL (ref 81.4–97.8)
MCV RBC AUTO: 89.1 FL (ref 81.4–97.8)
MCV RBC AUTO: 89.1 FL (ref 81.4–97.8)
MCV RBC AUTO: 89.2 FL (ref 81.4–97.8)
MCV RBC AUTO: 89.2 FL (ref 81.4–97.8)
MCV RBC AUTO: 89.3 FL (ref 81.4–97.8)
MCV RBC AUTO: 89.4 FL (ref 81.4–97.8)
MCV RBC AUTO: 89.4 FL (ref 81.4–97.8)
MCV RBC AUTO: 89.9 FL (ref 81.4–97.8)
MCV RBC AUTO: 89.9 FL (ref 81.4–97.8)
MCV RBC AUTO: 90 FL (ref 81.4–97.8)
MCV RBC AUTO: 90 FL (ref 81.4–97.8)
MCV RBC AUTO: 90.1 FL (ref 81.4–97.8)
MCV RBC AUTO: 90.1 FL (ref 81.4–97.8)
MCV RBC AUTO: 90.4 FL (ref 81.4–97.8)
MCV RBC AUTO: 90.4 FL (ref 81.4–97.8)
MCV RBC AUTO: 90.5 FL (ref 81.4–97.8)
MCV RBC AUTO: 91.3 FL (ref 81.4–97.8)
MCV RBC AUTO: 92.7 FL (ref 81.4–97.8)
METAMYELOCYTES NFR BLD MANUAL: 1.3 %
METAMYELOCYTES NFR BLD MANUAL: 2.5 %
METHADONE UR QL SCN: NEGATIVE
MICRO URNS: NORMAL
MICROCYTES BLD QL SMEAR: ABNORMAL
MONOCYTES # BLD AUTO: 0.06 K/UL (ref 0–0.85)
MONOCYTES # BLD AUTO: 0.21 K/UL (ref 0–0.85)
MONOCYTES # BLD AUTO: 0.44 K/UL (ref 0–0.85)
MONOCYTES # BLD AUTO: 0.44 K/UL (ref 0–0.85)
MONOCYTES # BLD AUTO: 0.5 K/UL (ref 0–0.85)
MONOCYTES # BLD AUTO: 0.54 K/UL (ref 0–0.85)
MONOCYTES # BLD AUTO: 0.55 K/UL (ref 0–0.85)
MONOCYTES # BLD AUTO: 0.56 K/UL (ref 0–0.85)
MONOCYTES # BLD AUTO: 0.6 K/UL (ref 0–0.85)
MONOCYTES # BLD AUTO: 0.6 K/UL (ref 0–0.85)
MONOCYTES # BLD AUTO: 0.64 K/UL (ref 0–0.85)
MONOCYTES # BLD AUTO: 0.65 K/UL (ref 0–0.85)
MONOCYTES # BLD AUTO: 0.67 K/UL (ref 0–0.85)
MONOCYTES # BLD AUTO: 0.68 K/UL (ref 0–0.85)
MONOCYTES # BLD AUTO: 0.71 K/UL (ref 0–0.85)
MONOCYTES # BLD AUTO: 0.74 K/UL (ref 0–0.85)
MONOCYTES # BLD AUTO: 0.79 K/UL (ref 0–0.85)
MONOCYTES # BLD AUTO: 0.81 K/UL (ref 0–0.85)
MONOCYTES # BLD AUTO: 0.82 K/UL (ref 0–0.85)
MONOCYTES # BLD AUTO: 0.84 K/UL (ref 0–0.85)
MONOCYTES # BLD AUTO: 0.84 K/UL (ref 0–0.85)
MONOCYTES # BLD AUTO: 0.86 K/UL (ref 0–0.85)
MONOCYTES # BLD AUTO: 0.88 K/UL (ref 0–0.85)
MONOCYTES # BLD AUTO: 0.89 K/UL (ref 0–0.85)
MONOCYTES # BLD AUTO: 0.99 K/UL (ref 0–0.85)
MONOCYTES # BLD AUTO: 1.01 K/UL (ref 0–0.85)
MONOCYTES # BLD AUTO: 1.15 K/UL (ref 0–0.85)
MONOCYTES NFR BLD AUTO: 0.5 % (ref 0–13.4)
MONOCYTES NFR BLD AUTO: 11.1 % (ref 0–13.4)
MONOCYTES NFR BLD AUTO: 3.2 % (ref 0–13.4)
MONOCYTES NFR BLD AUTO: 3.7 % (ref 0–13.4)
MONOCYTES NFR BLD AUTO: 3.7 % (ref 0–13.4)
MONOCYTES NFR BLD AUTO: 3.8 % (ref 0–13.4)
MONOCYTES NFR BLD AUTO: 4 % (ref 0–13.4)
MONOCYTES NFR BLD AUTO: 4.2 % (ref 0–13.4)
MONOCYTES NFR BLD AUTO: 4.3 % (ref 0–13.4)
MONOCYTES NFR BLD AUTO: 4.7 % (ref 0–13.4)
MONOCYTES NFR BLD AUTO: 4.8 % (ref 0–13.4)
MONOCYTES NFR BLD AUTO: 5.1 % (ref 0–13.4)
MONOCYTES NFR BLD AUTO: 5.3 % (ref 0–13.4)
MONOCYTES NFR BLD AUTO: 5.4 % (ref 0–13.4)
MONOCYTES NFR BLD AUTO: 5.9 % (ref 0–13.4)
MONOCYTES NFR BLD AUTO: 6.3 % (ref 0–13.4)
MONOCYTES NFR BLD AUTO: 6.5 % (ref 0–13.4)
MONOCYTES NFR BLD AUTO: 6.6 % (ref 0–13.4)
MONOCYTES NFR BLD AUTO: 6.8 % (ref 0–13.4)
MONOCYTES NFR BLD AUTO: 7.3 % (ref 0–13.4)
MONOCYTES NFR BLD AUTO: 7.4 % (ref 0–13.4)
MONOCYTES NFR BLD AUTO: 7.7 % (ref 0–13.4)
MONOCYTES NFR BLD AUTO: 7.9 % (ref 0–13.4)
MONOS+MACROS NFR FLD MANUAL: 20 %
MORPHOLOGY BLD-IMP: NORMAL
MYCOBACTERIUM SPEC CULT: NORMAL
MYCOBACTERIUM SPEC CULT: NORMAL
MYELOCYTES NFR BLD MANUAL: 1.7 %
NEUTROPHILS # BLD AUTO: 10.12 K/UL (ref 1.82–7.42)
NEUTROPHILS # BLD AUTO: 10.29 K/UL (ref 1.82–7.42)
NEUTROPHILS # BLD AUTO: 10.45 K/UL (ref 1.82–7.42)
NEUTROPHILS # BLD AUTO: 10.55 K/UL (ref 1.82–7.42)
NEUTROPHILS # BLD AUTO: 10.6 K/UL (ref 1.82–7.42)
NEUTROPHILS # BLD AUTO: 10.61 K/UL (ref 1.82–7.42)
NEUTROPHILS # BLD AUTO: 10.74 K/UL (ref 1.82–7.42)
NEUTROPHILS # BLD AUTO: 10.94 K/UL (ref 1.82–7.42)
NEUTROPHILS # BLD AUTO: 10.97 K/UL (ref 1.82–7.42)
NEUTROPHILS # BLD AUTO: 11.14 K/UL (ref 1.82–7.42)
NEUTROPHILS # BLD AUTO: 11.39 K/UL (ref 1.82–7.42)
NEUTROPHILS # BLD AUTO: 11.59 K/UL (ref 1.82–7.42)
NEUTROPHILS # BLD AUTO: 11.76 K/UL (ref 1.82–7.42)
NEUTROPHILS # BLD AUTO: 11.99 K/UL (ref 1.82–7.42)
NEUTROPHILS # BLD AUTO: 12.03 K/UL (ref 1.82–7.42)
NEUTROPHILS # BLD AUTO: 13.19 K/UL (ref 1.82–7.42)
NEUTROPHILS # BLD AUTO: 15.78 K/UL (ref 1.82–7.42)
NEUTROPHILS # BLD AUTO: 5.69 K/UL (ref 1.82–7.42)
NEUTROPHILS # BLD AUTO: 7.83 K/UL (ref 1.82–7.42)
NEUTROPHILS # BLD AUTO: 9 K/UL (ref 1.82–7.42)
NEUTROPHILS # BLD AUTO: 9.05 K/UL (ref 1.82–7.42)
NEUTROPHILS # BLD AUTO: 9.2 K/UL (ref 1.82–7.42)
NEUTROPHILS # BLD AUTO: 9.25 K/UL (ref 1.82–7.42)
NEUTROPHILS # BLD AUTO: 9.38 K/UL (ref 1.82–7.42)
NEUTROPHILS # BLD AUTO: 9.81 K/UL (ref 1.82–7.42)
NEUTROPHILS # BLD AUTO: 9.92 K/UL (ref 1.82–7.42)
NEUTROPHILS # BLD AUTO: 9.96 K/UL (ref 1.82–7.42)
NEUTROPHILS NFR BLD: 75.4 % (ref 44–72)
NEUTROPHILS NFR BLD: 75.4 % (ref 44–72)
NEUTROPHILS NFR BLD: 76.2 % (ref 44–72)
NEUTROPHILS NFR BLD: 77.3 % (ref 44–72)
NEUTROPHILS NFR BLD: 78.6 % (ref 44–72)
NEUTROPHILS NFR BLD: 79.2 % (ref 44–72)
NEUTROPHILS NFR BLD: 80 % (ref 44–72)
NEUTROPHILS NFR BLD: 80.7 % (ref 44–72)
NEUTROPHILS NFR BLD: 80.9 % (ref 44–72)
NEUTROPHILS NFR BLD: 81 % (ref 44–72)
NEUTROPHILS NFR BLD: 81.3 % (ref 44–72)
NEUTROPHILS NFR BLD: 82.5 % (ref 44–72)
NEUTROPHILS NFR BLD: 82.9 % (ref 44–72)
NEUTROPHILS NFR BLD: 83.6 % (ref 44–72)
NEUTROPHILS NFR BLD: 83.7 % (ref 44–72)
NEUTROPHILS NFR BLD: 84.2 % (ref 44–72)
NEUTROPHILS NFR BLD: 84.4 % (ref 44–72)
NEUTROPHILS NFR BLD: 84.4 % (ref 44–72)
NEUTROPHILS NFR BLD: 84.9 % (ref 44–72)
NEUTROPHILS NFR BLD: 85 % (ref 44–72)
NEUTROPHILS NFR BLD: 85.6 % (ref 44–72)
NEUTROPHILS NFR BLD: 86.4 % (ref 44–72)
NEUTROPHILS NFR BLD: 86.6 % (ref 44–72)
NEUTROPHILS NFR BLD: 87.6 % (ref 44–72)
NEUTROPHILS NFR BLD: 88.2 % (ref 44–72)
NEUTROPHILS NFR BLD: 90 % (ref 44–72)
NEUTROPHILS NFR BLD: 90.5 % (ref 44–72)
NEUTROPHILS NFR FLD: 36 %
NEUTS BAND NFR BLD MANUAL: 0.4 % (ref 0–10)
NEUTS BAND NFR BLD MANUAL: 0.9 % (ref 0–10)
NITRITE UR QL STRIP.AUTO: NEGATIVE
NRBC # BLD AUTO: 0 K/UL
NRBC # BLD AUTO: 0.02 K/UL
NRBC # BLD AUTO: 0.03 K/UL
NRBC # BLD AUTO: 0.04 K/UL
NRBC # BLD AUTO: 0.05 K/UL
NRBC # BLD AUTO: 0.05 K/UL
NRBC # BLD AUTO: 0.06 K/UL
NRBC # BLD AUTO: 0.06 K/UL
NRBC BLD-RTO: 0 /100 WBC (ref 0–0.2)
NRBC BLD-RTO: 0.1 /100 WBC (ref 0–0.2)
NRBC BLD-RTO: 0.2 /100 WBC (ref 0–0.2)
NRBC BLD-RTO: 0.3 /100 WBC (ref 0–0.2)
NRBC BLD-RTO: 0.4 /100 WBC (ref 0–0.2)
NRBC BLD-RTO: 0.4 /100 WBC (ref 0–0.2)
NRBC BLD-RTO: 0.5 /100 WBC (ref 0–0.2)
NT-PROBNP SERPL IA-MCNC: 1188 PG/ML (ref 0–125)
NT-PROBNP SERPL IA-MCNC: 254 PG/ML (ref 0–125)
NT-PROBNP SERPL IA-MCNC: 3049 PG/ML (ref 0–125)
NT-PROBNP SERPL IA-MCNC: 393 PG/ML (ref 0–125)
NT-PROBNP SERPL IA-MCNC: 440 PG/ML (ref 0–125)
NT-PROBNP SERPL IA-MCNC: 93 PG/ML (ref 0–125)
OPIATES UR QL SCN: POSITIVE
OVALOCYTES BLD QL SMEAR: NORMAL
OXYCODONE UR QL SCN: NEGATIVE
PATHOLOGY CONSULT NOTE: NORMAL
PCO2 BLDA: 46.5 MMHG (ref 26–37)
PCO2 BLDA: 48.9 MMHG (ref 26–37)
PCO2 BLDA: 54.4 MMHG (ref 26–37)
PCO2 BLDV: 40.1 MMHG (ref 41–51)
PCO2 TEMP ADJ BLDA: 45.9 MMHG (ref 26–37)
PCO2 TEMP ADJ BLDA: 48.7 MMHG (ref 26–37)
PCO2 TEMP ADJ BLDA: 54.4 MMHG (ref 26–37)
PCO2 TEMP ADJ BLDV: 39.6 MMHG (ref 41–51)
PCP UR QL SCN: NEGATIVE
PH BLDA: 7.38 [PH] (ref 7.4–7.5)
PH BLDA: 7.41 [PH] (ref 7.4–7.5)
PH BLDA: 7.43 [PH] (ref 7.4–7.5)
PH BLDV: 7.47 [PH] (ref 7.31–7.45)
PH TEMP ADJ BLDA: 7.38 [PH] (ref 7.4–7.5)
PH TEMP ADJ BLDA: 7.41 [PH] (ref 7.4–7.5)
PH TEMP ADJ BLDA: 7.43 [PH] (ref 7.4–7.5)
PH TEMP ADJ BLDV: 7.48 [PH] (ref 7.31–7.45)
PH UR STRIP.AUTO: 5.5 [PH] (ref 5–8)
PH UR STRIP.AUTO: 6 [PH] (ref 5–8)
PH UR STRIP.AUTO: 6 [PH] (ref 5–8)
PHOSPHATE SERPL-MCNC: 2 MG/DL (ref 2.5–4.5)
PHOSPHATE SERPL-MCNC: 2.9 MG/DL (ref 2.5–4.5)
PHOSPHATE SERPL-MCNC: 3 MG/DL (ref 2.5–4.5)
PHOSPHATE SERPL-MCNC: 3.2 MG/DL (ref 2.5–4.5)
PHOSPHATE SERPL-MCNC: 3.3 MG/DL (ref 2.5–4.5)
PHOSPHATE SERPL-MCNC: 3.3 MG/DL (ref 2.5–4.5)
PHOSPHATE SERPL-MCNC: 3.6 MG/DL (ref 2.5–4.5)
PHOSPHATE SERPL-MCNC: 3.7 MG/DL (ref 2.5–4.5)
PHOSPHATE SERPL-MCNC: 4.1 MG/DL (ref 2.5–4.5)
PHOSPHATE SERPL-MCNC: 4.2 MG/DL (ref 2.5–4.5)
PHOSPHATE SERPL-MCNC: 4.3 MG/DL (ref 2.5–4.5)
PLATELET # BLD AUTO: 101 K/UL (ref 164–446)
PLATELET # BLD AUTO: 103 K/UL (ref 164–446)
PLATELET # BLD AUTO: 104 K/UL (ref 164–446)
PLATELET # BLD AUTO: 104 K/UL (ref 164–446)
PLATELET # BLD AUTO: 105 K/UL (ref 164–446)
PLATELET # BLD AUTO: 106 K/UL (ref 164–446)
PLATELET # BLD AUTO: 108 K/UL (ref 164–446)
PLATELET # BLD AUTO: 110 K/UL (ref 164–446)
PLATELET # BLD AUTO: 115 K/UL (ref 164–446)
PLATELET # BLD AUTO: 116 K/UL (ref 164–446)
PLATELET # BLD AUTO: 116 K/UL (ref 164–446)
PLATELET # BLD AUTO: 119 K/UL (ref 164–446)
PLATELET # BLD AUTO: 120 K/UL (ref 164–446)
PLATELET # BLD AUTO: 124 K/UL (ref 164–446)
PLATELET # BLD AUTO: 133 K/UL (ref 164–446)
PLATELET # BLD AUTO: 135 K/UL (ref 164–446)
PLATELET # BLD AUTO: 151 K/UL (ref 164–446)
PLATELET # BLD AUTO: 154 K/UL (ref 164–446)
PLATELET # BLD AUTO: 160 K/UL (ref 164–446)
PLATELET # BLD AUTO: 162 K/UL (ref 164–446)
PLATELET # BLD AUTO: 164 K/UL (ref 164–446)
PLATELET # BLD AUTO: 169 K/UL (ref 164–446)
PLATELET # BLD AUTO: 173 K/UL (ref 164–446)
PLATELET # BLD AUTO: 176 K/UL (ref 164–446)
PLATELET # BLD AUTO: 183 K/UL (ref 164–446)
PLATELET # BLD AUTO: 189 K/UL (ref 164–446)
PLATELET # BLD AUTO: 191 K/UL (ref 164–446)
PLATELET # BLD AUTO: 191 K/UL (ref 164–446)
PLATELET # BLD AUTO: 196 K/UL (ref 164–446)
PLATELET # BLD AUTO: 218 K/UL (ref 164–446)
PLATELET # BLD AUTO: 22 K/UL (ref 164–446)
PLATELET # BLD AUTO: 27 K/UL (ref 164–446)
PLATELET # BLD AUTO: 29 K/UL (ref 164–446)
PLATELET # BLD AUTO: 30 K/UL (ref 164–446)
PLATELET # BLD AUTO: 30 K/UL (ref 164–446)
PLATELET # BLD AUTO: 33 K/UL (ref 164–446)
PLATELET # BLD AUTO: 58 K/UL (ref 164–446)
PLATELET # BLD AUTO: 80 K/UL (ref 164–446)
PLATELET # BLD AUTO: 92 K/UL (ref 164–446)
PLATELET # BLD AUTO: 93 K/UL (ref 164–446)
PLATELET # BLD AUTO: 93 K/UL (ref 164–446)
PLATELET # BLD AUTO: 96 K/UL (ref 164–446)
PLATELET # BLD AUTO: 98 K/UL (ref 164–446)
PLATELET BLD QL SMEAR: NORMAL
PLATELETS.RETICULATED NFR BLD AUTO: 2.7 % (ref 0.6–13.1)
PLATELETS.RETICULATED NFR BLD AUTO: 2.8 % (ref 0.6–13.1)
PLATELETS.RETICULATED NFR BLD AUTO: 3.2 % (ref 0.6–13.1)
PLATELETS.RETICULATED NFR BLD AUTO: 3.9 % (ref 0.6–13.1)
PLATELETS.RETICULATED NFR BLD AUTO: 4.9 % (ref 0.6–13.1)
PLATELETS.RETICULATED NFR BLD AUTO: 6.6 % (ref 0.6–13.1)
PLATELETS.RETICULATED NFR BLD AUTO: 6.8 % (ref 0.6–13.1)
PLATELETS.RETICULATED NFR BLD AUTO: 7.3 % (ref 0.6–13.1)
PLATELETS.RETICULATED NFR BLD AUTO: 7.3 % (ref 0.6–13.1)
PLATELETS.RETICULATED NFR BLD AUTO: 7.8 % (ref 0.6–13.1)
PLATELETS.RETICULATED NFR BLD AUTO: 8 % (ref 0.6–13.1)
PLATELETS.RETICULATED NFR BLD AUTO: 8.1 % (ref 0.6–13.1)
PMV BLD AUTO: 10 FL (ref 9–12.9)
PMV BLD AUTO: 10.1 FL (ref 9–12.9)
PMV BLD AUTO: 10.1 FL (ref 9–12.9)
PMV BLD AUTO: 10.2 FL (ref 9–12.9)
PMV BLD AUTO: 10.3 FL (ref 9–12.9)
PMV BLD AUTO: 10.4 FL (ref 9–12.9)
PMV BLD AUTO: 10.4 FL (ref 9–12.9)
PMV BLD AUTO: 10.6 FL (ref 9–12.9)
PMV BLD AUTO: 10.6 FL (ref 9–12.9)
PMV BLD AUTO: 10.8 FL (ref 9–12.9)
PMV BLD AUTO: 10.9 FL (ref 9–12.9)
PMV BLD AUTO: 11.1 FL (ref 9–12.9)
PMV BLD AUTO: 11.3 FL (ref 9–12.9)
PMV BLD AUTO: 11.4 FL (ref 9–12.9)
PMV BLD AUTO: 11.4 FL (ref 9–12.9)
PMV BLD AUTO: 11.6 FL (ref 9–12.9)
PMV BLD AUTO: 11.6 FL (ref 9–12.9)
PMV BLD AUTO: 9 FL (ref 9–12.9)
PMV BLD AUTO: 9 FL (ref 9–12.9)
PMV BLD AUTO: 9.4 FL (ref 9–12.9)
PMV BLD AUTO: 9.5 FL (ref 9–12.9)
PMV BLD AUTO: 9.6 FL (ref 9–12.9)
PMV BLD AUTO: 9.6 FL (ref 9–12.9)
PMV BLD AUTO: 9.7 FL (ref 9–12.9)
PMV BLD AUTO: 9.8 FL (ref 9–12.9)
PMV BLD AUTO: 9.9 FL (ref 9–12.9)
PO2 BLDA: 53.4 MMHG (ref 64–87)
PO2 BLDA: 56.3 MMHG (ref 64–87)
PO2 BLDA: 90.3 MMHG (ref 64–87)
PO2 BLDV: 73.8 MMHG (ref 25–40)
PO2 TEMP ADJ BLDA: 53.4 MMHG (ref 64–87)
PO2 TEMP ADJ BLDA: 55.1 MMHG (ref 64–87)
PO2 TEMP ADJ BLDA: 89.7 MMHG (ref 64–87)
PO2 TEMP ADJ BLDV: 72.3 MMHG (ref 25–40)
POIKILOCYTOSIS BLD QL SMEAR: NORMAL
POTASSIUM SERPL-SCNC: 3.9 MMOL/L (ref 3.6–5.5)
POTASSIUM SERPL-SCNC: 4 MMOL/L (ref 3.6–5.5)
POTASSIUM SERPL-SCNC: 4.2 MMOL/L (ref 3.6–5.5)
POTASSIUM SERPL-SCNC: 4.2 MMOL/L (ref 3.6–5.5)
POTASSIUM SERPL-SCNC: 4.3 MMOL/L (ref 3.6–5.5)
POTASSIUM SERPL-SCNC: 4.4 MMOL/L (ref 3.6–5.5)
POTASSIUM SERPL-SCNC: 4.5 MMOL/L (ref 3.6–5.5)
POTASSIUM SERPL-SCNC: 4.6 MMOL/L (ref 3.6–5.5)
POTASSIUM SERPL-SCNC: 4.7 MMOL/L (ref 3.6–5.5)
POTASSIUM SERPL-SCNC: 4.7 MMOL/L (ref 3.6–5.5)
POTASSIUM SERPL-SCNC: 4.8 MMOL/L (ref 3.6–5.5)
POTASSIUM SERPL-SCNC: 4.8 MMOL/L (ref 3.6–5.5)
POTASSIUM SERPL-SCNC: 4.9 MMOL/L (ref 3.6–5.5)
POTASSIUM SERPL-SCNC: 5 MMOL/L (ref 3.6–5.5)
POTASSIUM SERPL-SCNC: 5 MMOL/L (ref 3.6–5.5)
POTASSIUM SERPL-SCNC: 5.1 MMOL/L (ref 3.6–5.5)
POTASSIUM SERPL-SCNC: 5.2 MMOL/L (ref 3.6–5.5)
PROCALCITONIN SERPL-MCNC: 0.03 NG/ML
PROCALCITONIN SERPL-MCNC: 0.03 NG/ML
PROCALCITONIN SERPL-MCNC: 0.04 NG/ML
PROCALCITONIN SERPL-MCNC: 0.04 NG/ML
PROCALCITONIN SERPL-MCNC: 0.05 NG/ML
PROCALCITONIN SERPL-MCNC: 0.08 NG/ML
PROCALCITONIN SERPL-MCNC: 0.11 NG/ML
PROCALCITONIN SERPL-MCNC: 0.16 NG/ML
PROCALCITONIN SERPL-MCNC: 0.16 NG/ML
PROMYELOCYTES NFR BLD MANUAL: 0.4 %
PROMYELOCYTES NFR BLD MANUAL: 0.8 %
PROPOXYPH UR QL SCN: NEGATIVE
PROT SERPL-MCNC: 5 G/DL (ref 6–8.2)
PROT SERPL-MCNC: 5.1 G/DL (ref 6–8.2)
PROT SERPL-MCNC: 5.2 G/DL (ref 6–8.2)
PROT SERPL-MCNC: 5.2 G/DL (ref 6–8.2)
PROT SERPL-MCNC: 5.3 G/DL (ref 6–8.2)
PROT SERPL-MCNC: 5.4 G/DL (ref 6–8.2)
PROT SERPL-MCNC: 5.4 G/DL (ref 6–8.2)
PROT SERPL-MCNC: 5.5 G/DL (ref 6–8.2)
PROT SERPL-MCNC: 5.6 G/DL (ref 6–8.2)
PROT SERPL-MCNC: 5.6 G/DL (ref 6–8.2)
PROT SERPL-MCNC: 5.7 G/DL (ref 6–8.2)
PROT SERPL-MCNC: 6.1 G/DL (ref 6–8.2)
PROT SERPL-MCNC: 6.6 G/DL (ref 6–8.2)
PROT SERPL-MCNC: 7 G/DL (ref 6–8.2)
PROT UR QL STRIP: NEGATIVE MG/DL
PROTHROMBIN TIME: 13.6 SEC (ref 12–14.6)
PROTHROMBIN TIME: 13.7 SEC (ref 12–14.6)
PROTHROMBIN TIME: 15.7 SEC (ref 12–14.6)
RAD ONC ARIA COURSE LAST TREATMENT DATE: NORMAL
RAD ONC ARIA COURSE TREATMENT ELAPSED DAYS: NORMAL
RAD ONC ARIA REFERENCE POINT DOSAGE GIVEN TO DATE: 10
RAD ONC ARIA REFERENCE POINT DOSAGE GIVEN TO DATE: 12.46
RAD ONC ARIA REFERENCE POINT DOSAGE GIVEN TO DATE: 14
RAD ONC ARIA REFERENCE POINT DOSAGE GIVEN TO DATE: 20
RAD ONC ARIA REFERENCE POINT DOSAGE GIVEN TO DATE: 21
RAD ONC ARIA REFERENCE POINT DOSAGE GIVEN TO DATE: 24.92
RAD ONC ARIA REFERENCE POINT DOSAGE GIVEN TO DATE: 28
RAD ONC ARIA REFERENCE POINT DOSAGE GIVEN TO DATE: 30
RAD ONC ARIA REFERENCE POINT DOSAGE GIVEN TO DATE: 35
RAD ONC ARIA REFERENCE POINT DOSAGE GIVEN TO DATE: 35
RAD ONC ARIA REFERENCE POINT DOSAGE GIVEN TO DATE: 37.38
RAD ONC ARIA REFERENCE POINT DOSAGE GIVEN TO DATE: 40
RAD ONC ARIA REFERENCE POINT DOSAGE GIVEN TO DATE: 49.85
RAD ONC ARIA REFERENCE POINT DOSAGE GIVEN TO DATE: 50
RAD ONC ARIA REFERENCE POINT DOSAGE GIVEN TO DATE: 50
RAD ONC ARIA REFERENCE POINT DOSAGE GIVEN TO DATE: 62.31
RAD ONC ARIA REFERENCE POINT DOSAGE GIVEN TO DATE: 62.31
RAD ONC ARIA REFERENCE POINT DOSAGE GIVEN TO DATE: 7
RAD ONC ARIA REFERENCE POINT ID: NORMAL
RAD ONC ARIA REFERENCE POINT SESSION DOSAGE GIVEN: 10
RAD ONC ARIA REFERENCE POINT SESSION DOSAGE GIVEN: 12.46
RAD ONC ARIA REFERENCE POINT SESSION DOSAGE GIVEN: 7
RBC # BLD AUTO: 3.13 M/UL (ref 4.2–5.4)
RBC # BLD AUTO: 3.26 M/UL (ref 4.2–5.4)
RBC # BLD AUTO: 3.54 M/UL (ref 4.2–5.4)
RBC # BLD AUTO: 3.57 M/UL (ref 4.2–5.4)
RBC # BLD AUTO: 3.6 M/UL (ref 4.2–5.4)
RBC # BLD AUTO: 3.66 M/UL (ref 4.2–5.4)
RBC # BLD AUTO: 3.68 M/UL (ref 4.2–5.4)
RBC # BLD AUTO: 3.7 M/UL (ref 4.2–5.4)
RBC # BLD AUTO: 3.72 M/UL (ref 4.2–5.4)
RBC # BLD AUTO: 3.74 M/UL (ref 4.2–5.4)
RBC # BLD AUTO: 3.75 M/UL (ref 4.2–5.4)
RBC # BLD AUTO: 3.76 M/UL (ref 4.2–5.4)
RBC # BLD AUTO: 3.82 M/UL (ref 4.2–5.4)
RBC # BLD AUTO: 3.82 M/UL (ref 4.2–5.4)
RBC # BLD AUTO: 3.84 M/UL (ref 4.2–5.4)
RBC # BLD AUTO: 3.85 M/UL (ref 4.2–5.4)
RBC # BLD AUTO: 3.87 M/UL (ref 4.2–5.4)
RBC # BLD AUTO: 3.88 M/UL (ref 4.2–5.4)
RBC # BLD AUTO: 3.88 M/UL (ref 4.2–5.4)
RBC # BLD AUTO: 3.89 M/UL (ref 4.2–5.4)
RBC # BLD AUTO: 3.95 M/UL (ref 4.2–5.4)
RBC # BLD AUTO: 3.96 M/UL (ref 4.2–5.4)
RBC # BLD AUTO: 3.97 M/UL (ref 4.2–5.4)
RBC # BLD AUTO: 4 M/UL (ref 4.2–5.4)
RBC # BLD AUTO: 4.02 M/UL (ref 4.2–5.4)
RBC # BLD AUTO: 4.04 M/UL (ref 4.2–5.4)
RBC # BLD AUTO: 4.04 M/UL (ref 4.2–5.4)
RBC # BLD AUTO: 4.05 M/UL (ref 4.2–5.4)
RBC # BLD AUTO: 4.06 M/UL (ref 4.2–5.4)
RBC # BLD AUTO: 4.07 M/UL (ref 4.2–5.4)
RBC # BLD AUTO: 4.11 M/UL (ref 4.2–5.4)
RBC # BLD AUTO: 4.11 M/UL (ref 4.2–5.4)
RBC # BLD AUTO: 4.13 M/UL (ref 4.2–5.4)
RBC # BLD AUTO: 4.16 M/UL (ref 4.2–5.4)
RBC # BLD AUTO: 4.23 M/UL (ref 4.2–5.4)
RBC # BLD AUTO: 4.36 M/UL (ref 4.2–5.4)
RBC # BLD AUTO: 4.38 M/UL (ref 4.2–5.4)
RBC # BLD AUTO: 4.4 M/UL (ref 4.2–5.4)
RBC # BLD AUTO: 4.46 M/UL (ref 4.2–5.4)
RBC # BLD AUTO: 4.48 M/UL (ref 4.2–5.4)
RBC # BLD AUTO: 4.56 M/UL (ref 4.2–5.4)
RBC # BLD AUTO: 4.81 M/UL (ref 4.2–5.4)
RBC # BLD AUTO: 4.91 M/UL (ref 4.2–5.4)
RBC BLD AUTO: PRESENT
RBC UR QL AUTO: NEGATIVE
RHODAMINE-AURAMINE STN SPEC: NORMAL
RSV RNA NPH QL NAA+NON-PROBE: NOT DETECTED
RSV RNA SPEC QL NAA+PROBE: NEGATIVE
RV+EV RNA NPH QL NAA+NON-PROBE: NOT DETECTED
SAO2 % BLDA: 83.7 % (ref 93–99)
SAO2 % BLDA: 87.6 % (ref 93–99)
SAO2 % BLDA: 96 % (ref 93–99)
SAO2 % BLDV: 94.7 %
SARS-COV-2 RNA NPH QL NAA+NON-PROBE: NOTDETECTED
SARS-COV-2 RNA RESP QL NAA+PROBE: NOTDETECTED
SCCMEC + MECA PNL NOSE NAA+PROBE: NEGATIVE
SCCMEC + MECA PNL NOSE NAA+PROBE: NEGATIVE
SIGNIFICANT IND 70042: NORMAL
SITE SITE: NORMAL
SMUDGE CELLS BLD QL SMEAR: NORMAL
SODIUM SERPL-SCNC: 131 MMOL/L (ref 135–145)
SODIUM SERPL-SCNC: 131 MMOL/L (ref 135–145)
SODIUM SERPL-SCNC: 132 MMOL/L (ref 135–145)
SODIUM SERPL-SCNC: 132 MMOL/L (ref 135–145)
SODIUM SERPL-SCNC: 133 MMOL/L (ref 135–145)
SODIUM SERPL-SCNC: 134 MMOL/L (ref 135–145)
SODIUM SERPL-SCNC: 135 MMOL/L (ref 135–145)
SODIUM SERPL-SCNC: 136 MMOL/L (ref 135–145)
SODIUM SERPL-SCNC: 137 MMOL/L (ref 135–145)
SODIUM SERPL-SCNC: 138 MMOL/L (ref 135–145)
SODIUM SERPL-SCNC: 139 MMOL/L (ref 135–145)
SODIUM SERPL-SCNC: 139 MMOL/L (ref 135–145)
SODIUM SERPL-SCNC: 140 MMOL/L (ref 135–145)
SOURCE SOURCE: NORMAL
SP GR UR STRIP.AUTO: 1
SP GR UR STRIP.AUTO: 1.01
SP GR UR STRIP.AUTO: <=1.005
SPECIMEN SOURCE: NORMAL
TROPONIN T SERPL-MCNC: 22 NG/L (ref 6–19)
TROPONIN T SERPL-MCNC: 65 NG/L (ref 6–19)
TROPONIN T SERPL-MCNC: 67 NG/L (ref 6–19)
TROPONIN T SERPL-MCNC: 9 NG/L (ref 6–19)
UROBILINOGEN UR STRIP.AUTO-MCNC: 0.2 MG/DL
VANCOMYCIN PEAK SERPL-MCNC: 4 UG/ML (ref 20–40)
WBC # BLD AUTO: 10.4 K/UL (ref 4.8–10.8)
WBC # BLD AUTO: 10.9 K/UL (ref 4.8–10.8)
WBC # BLD AUTO: 11 K/UL (ref 4.8–10.8)
WBC # BLD AUTO: 11.4 K/UL (ref 4.8–10.8)
WBC # BLD AUTO: 11.4 K/UL (ref 4.8–10.8)
WBC # BLD AUTO: 11.5 K/UL (ref 4.8–10.8)
WBC # BLD AUTO: 11.8 K/UL (ref 4.8–10.8)
WBC # BLD AUTO: 11.9 K/UL (ref 4.8–10.8)
WBC # BLD AUTO: 11.9 K/UL (ref 4.8–10.8)
WBC # BLD AUTO: 12.1 K/UL (ref 4.8–10.8)
WBC # BLD AUTO: 12.3 K/UL (ref 4.8–10.8)
WBC # BLD AUTO: 12.3 K/UL (ref 4.8–10.8)
WBC # BLD AUTO: 12.4 K/UL (ref 4.8–10.8)
WBC # BLD AUTO: 12.4 K/UL (ref 4.8–10.8)
WBC # BLD AUTO: 12.6 K/UL (ref 4.8–10.8)
WBC # BLD AUTO: 12.7 K/UL (ref 4.8–10.8)
WBC # BLD AUTO: 12.7 K/UL (ref 4.8–10.8)
WBC # BLD AUTO: 12.8 K/UL (ref 4.8–10.8)
WBC # BLD AUTO: 13.2 K/UL (ref 4.8–10.8)
WBC # BLD AUTO: 13.2 K/UL (ref 4.8–10.8)
WBC # BLD AUTO: 13.3 K/UL (ref 4.8–10.8)
WBC # BLD AUTO: 13.6 K/UL (ref 4.8–10.8)
WBC # BLD AUTO: 13.7 K/UL (ref 4.8–10.8)
WBC # BLD AUTO: 13.8 K/UL (ref 4.8–10.8)
WBC # BLD AUTO: 14.3 K/UL (ref 4.8–10.8)
WBC # BLD AUTO: 14.6 K/UL (ref 4.8–10.8)
WBC # BLD AUTO: 14.8 K/UL (ref 4.8–10.8)
WBC # BLD AUTO: 15 K/UL (ref 4.8–10.8)
WBC # BLD AUTO: 16.9 K/UL (ref 4.8–10.8)
WBC # BLD AUTO: 17.7 K/UL (ref 4.8–10.8)
WBC # BLD AUTO: 18 K/UL (ref 4.8–10.8)
WBC # BLD AUTO: 18.4 K/UL (ref 4.8–10.8)
WBC # BLD AUTO: 19.1 K/UL (ref 4.8–10.8)
WBC # BLD AUTO: 5.5 K/UL (ref 4.8–10.8)
WBC # BLD AUTO: 6.5 K/UL (ref 4.8–10.8)
WBC # BLD AUTO: 7.8 K/UL (ref 4.8–10.8)
WBC # BLD AUTO: 8.1 K/UL (ref 4.8–10.8)
WBC # BLD AUTO: 8.6 K/UL (ref 4.8–10.8)
WBC # BLD AUTO: 8.8 K/UL (ref 4.8–10.8)
WBC # BLD AUTO: 9.1 K/UL (ref 4.8–10.8)
WBC # BLD AUTO: 9.4 K/UL (ref 4.8–10.8)
WBC TOXIC VACUOLES BLD QL SMEAR: NORMAL

## 2023-01-01 PROCEDURE — A9270 NON-COVERED ITEM OR SERVICE: HCPCS | Performed by: HOSPITALIST

## 2023-01-01 PROCEDURE — 700102 HCHG RX REV CODE 250 W/ 637 OVERRIDE(OP): Performed by: HOSPITALIST

## 2023-01-01 PROCEDURE — 700111 HCHG RX REV CODE 636 W/ 250 OVERRIDE (IP): Performed by: STUDENT IN AN ORGANIZED HEALTH CARE EDUCATION/TRAINING PROGRAM

## 2023-01-01 PROCEDURE — 77295 3-D RADIOTHERAPY PLAN: CPT | Mod: 26 | Performed by: RADIOLOGY

## 2023-01-01 PROCEDURE — 700101 HCHG RX REV CODE 250: Performed by: INTERNAL MEDICINE

## 2023-01-01 PROCEDURE — 0241U HCHG SARS-COV-2 COVID-19 NFCT DS RESP RNA 4 TRGT MIC: CPT

## 2023-01-01 PROCEDURE — 77435 SBRT MANAGEMENT: CPT | Performed by: RADIOLOGY

## 2023-01-01 PROCEDURE — A9270 NON-COVERED ITEM OR SERVICE: HCPCS

## 2023-01-01 PROCEDURE — 71045 X-RAY EXAM CHEST 1 VIEW: CPT

## 2023-01-01 PROCEDURE — 700111 HCHG RX REV CODE 636 W/ 250 OVERRIDE (IP): Performed by: NURSE PRACTITIONER

## 2023-01-01 PROCEDURE — 700111 HCHG RX REV CODE 636 W/ 250 OVERRIDE (IP): Performed by: HOSPITALIST

## 2023-01-01 PROCEDURE — 88307 TISSUE EXAM BY PATHOLOGIST: CPT

## 2023-01-01 PROCEDURE — 94640 AIRWAY INHALATION TREATMENT: CPT

## 2023-01-01 PROCEDURE — 700102 HCHG RX REV CODE 250 W/ 637 OVERRIDE(OP)

## 2023-01-01 PROCEDURE — 700111 HCHG RX REV CODE 636 W/ 250 OVERRIDE (IP): Mod: JZ | Performed by: INTERNAL MEDICINE

## 2023-01-01 PROCEDURE — 93970 EXTREMITY STUDY: CPT

## 2023-01-01 PROCEDURE — 99418 PROLNG IP/OBS E/M EA 15 MIN: CPT | Performed by: INTERNAL MEDICINE

## 2023-01-01 PROCEDURE — 88342 IMHCHEM/IMCYTCHM 1ST ANTB: CPT

## 2023-01-01 PROCEDURE — 700111 HCHG RX REV CODE 636 W/ 250 OVERRIDE (IP): Performed by: INTERNAL MEDICINE

## 2023-01-01 PROCEDURE — 77263 THER RADIOLOGY TX PLNG CPLX: CPT | Performed by: RADIOLOGY

## 2023-01-01 PROCEDURE — 700102 HCHG RX REV CODE 250 W/ 637 OVERRIDE(OP): Performed by: NURSE PRACTITIONER

## 2023-01-01 PROCEDURE — 700102 HCHG RX REV CODE 250 W/ 637 OVERRIDE(OP): Performed by: INTERNAL MEDICINE

## 2023-01-01 PROCEDURE — A9270 NON-COVERED ITEM OR SERVICE: HCPCS | Performed by: INTERNAL MEDICINE

## 2023-01-01 PROCEDURE — 700102 HCHG RX REV CODE 250 W/ 637 OVERRIDE(OP): Performed by: STUDENT IN AN ORGANIZED HEALTH CARE EDUCATION/TRAINING PROGRAM

## 2023-01-01 PROCEDURE — 83605 ASSAY OF LACTIC ACID: CPT | Mod: 91

## 2023-01-01 PROCEDURE — 99291 CRITICAL CARE FIRST HOUR: CPT | Performed by: HOSPITALIST

## 2023-01-01 PROCEDURE — 77280 THER RAD SIMULAJ FIELD SMPL: CPT | Performed by: RADIOLOGY

## 2023-01-01 PROCEDURE — 99233 SBSQ HOSP IP/OBS HIGH 50: CPT | Performed by: HOSPITALIST

## 2023-01-01 PROCEDURE — 85025 COMPLETE CBC W/AUTO DIFF WBC: CPT

## 2023-01-01 PROCEDURE — 36415 COLL VENOUS BLD VENIPUNCTURE: CPT

## 2023-01-01 PROCEDURE — 700101 HCHG RX REV CODE 250: Performed by: ANESTHESIOLOGY

## 2023-01-01 PROCEDURE — 99232 SBSQ HOSP IP/OBS MODERATE 35: CPT | Performed by: FAMILY MEDICINE

## 2023-01-01 PROCEDURE — 700117 HCHG RX CONTRAST REV CODE 255: Performed by: EMERGENCY MEDICINE

## 2023-01-01 PROCEDURE — 77295 3-D RADIOTHERAPY PLAN: CPT | Performed by: RADIOLOGY

## 2023-01-01 PROCEDURE — 93005 ELECTROCARDIOGRAM TRACING: CPT | Performed by: HOSPITALIST

## 2023-01-01 PROCEDURE — 99291 CRITICAL CARE FIRST HOUR: CPT | Performed by: INTERNAL MEDICINE

## 2023-01-01 PROCEDURE — 96375 TX/PRO/DX INJ NEW DRUG ADDON: CPT

## 2023-01-01 PROCEDURE — 77293 RESPIRATOR MOTION MGMT SIMUL: CPT | Performed by: RADIOLOGY

## 2023-01-01 PROCEDURE — A9552 F18 FDG: HCPCS

## 2023-01-01 PROCEDURE — 700101 HCHG RX REV CODE 250: Performed by: HOSPITALIST

## 2023-01-01 PROCEDURE — 770004 HCHG ROOM/CARE - ONCOLOGY PRIVATE *

## 2023-01-01 PROCEDURE — 80048 BASIC METABOLIC PNL TOTAL CA: CPT

## 2023-01-01 PROCEDURE — 99292 CRITICAL CARE ADDL 30 MIN: CPT | Performed by: INTERNAL MEDICINE

## 2023-01-01 PROCEDURE — 83735 ASSAY OF MAGNESIUM: CPT

## 2023-01-01 PROCEDURE — 700102 HCHG RX REV CODE 250 W/ 637 OVERRIDE(OP): Performed by: EMERGENCY MEDICINE

## 2023-01-01 PROCEDURE — 700111 HCHG RX REV CODE 636 W/ 250 OVERRIDE (IP)

## 2023-01-01 PROCEDURE — 83605 ASSAY OF LACTIC ACID: CPT

## 2023-01-01 PROCEDURE — 77290 THER RAD SIMULAJ FIELD CPLX: CPT | Performed by: RADIOLOGY

## 2023-01-01 PROCEDURE — 99284 EMERGENCY DEPT VISIT MOD MDM: CPT

## 2023-01-01 PROCEDURE — 99255 IP/OBS CONSLTJ NEW/EST HI 80: CPT | Performed by: INTERNAL MEDICINE

## 2023-01-01 PROCEDURE — 99203 OFFICE O/P NEW LOW 30 MIN: CPT | Performed by: DERMATOLOGY

## 2023-01-01 PROCEDURE — 99232 SBSQ HOSP IP/OBS MODERATE 35: CPT | Performed by: INTERNAL MEDICINE

## 2023-01-01 PROCEDURE — 99232 SBSQ HOSP IP/OBS MODERATE 35: CPT | Performed by: NURSE PRACTITIONER

## 2023-01-01 PROCEDURE — 99231 SBSQ HOSP IP/OBS SF/LOW 25: CPT | Performed by: NURSE PRACTITIONER

## 2023-01-01 PROCEDURE — 99239 HOSP IP/OBS DSCHRG MGMT >30: CPT | Performed by: INTERNAL MEDICINE

## 2023-01-01 PROCEDURE — 77280 THER RAD SIMULAJ FIELD SMPL: CPT | Mod: 26 | Performed by: RADIOLOGY

## 2023-01-01 PROCEDURE — 700105 HCHG RX REV CODE 258: Performed by: FAMILY MEDICINE

## 2023-01-01 PROCEDURE — 99233 SBSQ HOSP IP/OBS HIGH 50: CPT | Performed by: STUDENT IN AN ORGANIZED HEALTH CARE EDUCATION/TRAINING PROGRAM

## 2023-01-01 PROCEDURE — 85055 RETICULATED PLATELET ASSAY: CPT

## 2023-01-01 PROCEDURE — 160048 HCHG OR STATISTICAL LEVEL 1-5: Performed by: INTERNAL MEDICINE

## 2023-01-01 PROCEDURE — 3077F SYST BP >= 140 MM HG: CPT | Mod: GC

## 2023-01-01 PROCEDURE — A9270 NON-COVERED ITEM OR SERVICE: HCPCS | Performed by: STUDENT IN AN ORGANIZED HEALTH CARE EDUCATION/TRAINING PROGRAM

## 2023-01-01 PROCEDURE — 700102 HCHG RX REV CODE 250 W/ 637 OVERRIDE(OP): Mod: UD | Performed by: ANESTHESIOLOGY

## 2023-01-01 PROCEDURE — 85027 COMPLETE CBC AUTOMATED: CPT

## 2023-01-01 PROCEDURE — 94760 N-INVAS EAR/PLS OXIMETRY 1: CPT

## 2023-01-01 PROCEDURE — 700101 HCHG RX REV CODE 250: Performed by: STUDENT IN AN ORGANIZED HEALTH CARE EDUCATION/TRAINING PROGRAM

## 2023-01-01 PROCEDURE — 770022 HCHG ROOM/CARE - ICU (200)

## 2023-01-01 PROCEDURE — 160009 HCHG ANES TIME/MIN: Performed by: SURGERY

## 2023-01-01 PROCEDURE — 80053 COMPREHEN METABOLIC PANEL: CPT

## 2023-01-01 PROCEDURE — 700101 HCHG RX REV CODE 250

## 2023-01-01 PROCEDURE — 85007 BL SMEAR W/DIFF WBC COUNT: CPT

## 2023-01-01 PROCEDURE — 70450 CT HEAD/BRAIN W/O DYE: CPT

## 2023-01-01 PROCEDURE — 93010 ELECTROCARDIOGRAM REPORT: CPT | Performed by: STUDENT IN AN ORGANIZED HEALTH CARE EDUCATION/TRAINING PROGRAM

## 2023-01-01 PROCEDURE — 700105 HCHG RX REV CODE 258: Mod: JZ | Performed by: INTERNAL MEDICINE

## 2023-01-01 PROCEDURE — 99291 CRITICAL CARE FIRST HOUR: CPT | Mod: GC | Performed by: INTERNAL MEDICINE

## 2023-01-01 PROCEDURE — A9270 NON-COVERED ITEM OR SERVICE: HCPCS | Performed by: NURSE PRACTITIONER

## 2023-01-01 PROCEDURE — 700105 HCHG RX REV CODE 258: Performed by: INTERNAL MEDICINE

## 2023-01-01 PROCEDURE — 99214 OFFICE O/P EST MOD 30 MIN: CPT | Mod: GC

## 2023-01-01 PROCEDURE — 84100 ASSAY OF PHOSPHORUS: CPT

## 2023-01-01 PROCEDURE — 96365 THER/PROPH/DIAG IV INF INIT: CPT

## 2023-01-01 PROCEDURE — 87486 CHLMYD PNEUM DNA AMP PROBE: CPT

## 2023-01-01 PROCEDURE — 90837 PSYTX W PT 60 MINUTES: CPT | Performed by: SOCIAL WORKER

## 2023-01-01 PROCEDURE — 700111 HCHG RX REV CODE 636 W/ 250 OVERRIDE (IP): Mod: JZ | Performed by: HOSPITALIST

## 2023-01-01 PROCEDURE — 93306 TTE W/DOPPLER COMPLETE: CPT | Mod: 26 | Performed by: INTERNAL MEDICINE

## 2023-01-01 PROCEDURE — 20225 BONE BIOPSY TROCAR/NDL DEEP: CPT

## 2023-01-01 PROCEDURE — 700102 HCHG RX REV CODE 250 W/ 637 OVERRIDE(OP): Mod: UD

## 2023-01-01 PROCEDURE — 99418 PROLNG IP/OBS E/M EA 15 MIN: CPT | Performed by: NURSE PRACTITIONER

## 2023-01-01 PROCEDURE — 71260 CT THORAX DX C+: CPT

## 2023-01-01 PROCEDURE — A9270 NON-COVERED ITEM OR SERVICE: HCPCS | Mod: UD | Performed by: STUDENT IN AN ORGANIZED HEALTH CARE EDUCATION/TRAINING PROGRAM

## 2023-01-01 PROCEDURE — 85610 PROTHROMBIN TIME: CPT

## 2023-01-01 PROCEDURE — 99212 OFFICE O/P EST SF 10 MIN: CPT | Performed by: STUDENT IN AN ORGANIZED HEALTH CARE EDUCATION/TRAINING PROGRAM

## 2023-01-01 PROCEDURE — 99407 BEHAV CHNG SMOKING > 10 MIN: CPT

## 2023-01-01 PROCEDURE — 700111 HCHG RX REV CODE 636 W/ 250 OVERRIDE (IP): Mod: UD

## 2023-01-01 PROCEDURE — 77334 RADIATION TREATMENT AID(S): CPT | Mod: 26 | Performed by: RADIOLOGY

## 2023-01-01 PROCEDURE — 88341 IMHCHEM/IMCYTCHM EA ADD ANTB: CPT | Mod: 91

## 2023-01-01 PROCEDURE — 90834 PSYTX W PT 45 MINUTES: CPT | Performed by: SOCIAL WORKER

## 2023-01-01 PROCEDURE — 87102 FUNGUS ISOLATION CULTURE: CPT

## 2023-01-01 PROCEDURE — 81025 URINE PREGNANCY TEST: CPT

## 2023-01-01 PROCEDURE — C1887 CATHETER, GUIDING: HCPCS | Performed by: INTERNAL MEDICINE

## 2023-01-01 PROCEDURE — 84484 ASSAY OF TROPONIN QUANT: CPT

## 2023-01-01 PROCEDURE — 700111 HCHG RX REV CODE 636 W/ 250 OVERRIDE (IP): Mod: JZ,UD | Performed by: ANESTHESIOLOGY

## 2023-01-01 PROCEDURE — 99213 OFFICE O/P EST LOW 20 MIN: CPT | Mod: GC,GT | Performed by: GENERAL PRACTICE

## 2023-01-01 PROCEDURE — 85379 FIBRIN DEGRADATION QUANT: CPT

## 2023-01-01 PROCEDURE — 160036 HCHG PACU - EA ADDL 30 MINS PHASE I

## 2023-01-01 PROCEDURE — 770006 HCHG ROOM/CARE - MED/SURG/GYN SEMI*

## 2023-01-01 PROCEDURE — 700105 HCHG RX REV CODE 258: Mod: UD | Performed by: RADIOLOGY

## 2023-01-01 PROCEDURE — 87206 SMEAR FLUORESCENT/ACID STAI: CPT

## 2023-01-01 PROCEDURE — 700111 HCHG RX REV CODE 636 W/ 250 OVERRIDE (IP): Mod: JZ | Performed by: NURSE PRACTITIONER

## 2023-01-01 PROCEDURE — 700111 HCHG RX REV CODE 636 W/ 250 OVERRIDE (IP): Mod: JZ | Performed by: ANESTHESIOLOGY

## 2023-01-01 PROCEDURE — 99213 OFFICE O/P EST LOW 20 MIN: CPT | Mod: GE

## 2023-01-01 PROCEDURE — 87086 URINE CULTURE/COLONY COUNT: CPT

## 2023-01-01 PROCEDURE — 160036 HCHG PACU - EA ADDL 30 MINS PHASE I: Performed by: INTERNAL MEDICINE

## 2023-01-01 PROCEDURE — 700111 HCHG RX REV CODE 636 W/ 250 OVERRIDE (IP): Mod: JZ | Performed by: EMERGENCY MEDICINE

## 2023-01-01 PROCEDURE — 85652 RBC SED RATE AUTOMATED: CPT

## 2023-01-01 PROCEDURE — 96366 THER/PROPH/DIAG IV INF ADDON: CPT

## 2023-01-01 PROCEDURE — 94669 MECHANICAL CHEST WALL OSCILL: CPT

## 2023-01-01 PROCEDURE — 87581 M.PNEUMON DNA AMP PROBE: CPT

## 2023-01-01 PROCEDURE — 99232 SBSQ HOSP IP/OBS MODERATE 35: CPT | Performed by: STUDENT IN AN ORGANIZED HEALTH CARE EDUCATION/TRAINING PROGRAM

## 2023-01-01 PROCEDURE — A9270 NON-COVERED ITEM OR SERVICE: HCPCS | Performed by: EMERGENCY MEDICINE

## 2023-01-01 PROCEDURE — 96374 THER/PROPH/DIAG INJ IV PUSH: CPT

## 2023-01-01 PROCEDURE — 77470 SPECIAL RADIATION TREATMENT: CPT | Performed by: RADIOLOGY

## 2023-01-01 PROCEDURE — 77293 RESPIRATOR MOTION MGMT SIMUL: CPT | Mod: 26 | Performed by: RADIOLOGY

## 2023-01-01 PROCEDURE — 93010 ELECTROCARDIOGRAM REPORT: CPT | Performed by: INTERNAL MEDICINE

## 2023-01-01 PROCEDURE — 85014 HEMATOCRIT: CPT | Mod: 91

## 2023-01-01 PROCEDURE — 90832 PSYTX W PT 30 MINUTES: CPT | Performed by: SOCIAL WORKER

## 2023-01-01 PROCEDURE — 80069 RENAL FUNCTION PANEL: CPT

## 2023-01-01 PROCEDURE — 94729 DIFFUSING CAPACITY: CPT | Performed by: INTERNAL MEDICINE

## 2023-01-01 PROCEDURE — 71250 CT THORAX DX C-: CPT

## 2023-01-01 PROCEDURE — 700111 HCHG RX REV CODE 636 W/ 250 OVERRIDE (IP): Performed by: ANESTHESIOLOGY

## 2023-01-01 PROCEDURE — 88305 TISSUE EXAM BY PATHOLOGIST: CPT

## 2023-01-01 PROCEDURE — 93005 ELECTROCARDIOGRAM TRACING: CPT | Performed by: INTERNAL MEDICINE

## 2023-01-01 PROCEDURE — 87205 SMEAR GRAM STAIN: CPT | Mod: 91

## 2023-01-01 PROCEDURE — 84703 CHORIONIC GONADOTROPIN ASSAY: CPT

## 2023-01-01 PROCEDURE — 77373 STRTCTC BDY RAD THER TX DLVR: CPT | Performed by: RADIOLOGY

## 2023-01-01 PROCEDURE — 700111 HCHG RX REV CODE 636 W/ 250 OVERRIDE (IP): Mod: JZ

## 2023-01-01 PROCEDURE — 700111 HCHG RX REV CODE 636 W/ 250 OVERRIDE (IP): Mod: UD | Performed by: ANESTHESIOLOGY

## 2023-01-01 PROCEDURE — 700111 HCHG RX REV CODE 636 W/ 250 OVERRIDE (IP): Mod: JZ | Performed by: FAMILY MEDICINE

## 2023-01-01 PROCEDURE — 77470 SPECIAL RADIATION TREATMENT: CPT | Mod: 26 | Performed by: RADIOLOGY

## 2023-01-01 PROCEDURE — 302131 K PAD MOTOR: Performed by: INTERNAL MEDICINE

## 2023-01-01 PROCEDURE — 99406 BEHAV CHNG SMOKING 3-10 MIN: CPT

## 2023-01-01 PROCEDURE — 0B9F8ZX DRAINAGE OF RIGHT LOWER LUNG LOBE, VIA NATURAL OR ARTIFICIAL OPENING ENDOSCOPIC, DIAGNOSTIC: ICD-10-PCS | Performed by: INTERNAL MEDICINE

## 2023-01-01 PROCEDURE — 81003 URINALYSIS AUTO W/O SCOPE: CPT

## 2023-01-01 PROCEDURE — 99214 OFFICE O/P EST MOD 30 MIN: CPT | Mod: GC,25 | Performed by: INTERNAL MEDICINE

## 2023-01-01 PROCEDURE — 700101 HCHG RX REV CODE 250: Mod: UD | Performed by: ANESTHESIOLOGY

## 2023-01-01 PROCEDURE — 88112 CYTOPATH CELL ENHANCE TECH: CPT | Mod: XU

## 2023-01-01 PROCEDURE — 160048 HCHG OR STATISTICAL LEVEL 1-5: Performed by: SURGERY

## 2023-01-01 PROCEDURE — 93970 EXTREMITY STUDY: CPT | Mod: 26 | Performed by: INTERNAL MEDICINE

## 2023-01-01 PROCEDURE — 85730 THROMBOPLASTIN TIME PARTIAL: CPT

## 2023-01-01 PROCEDURE — 770000 HCHG ROOM/CARE - INTERMEDIATE ICU *

## 2023-01-01 PROCEDURE — 99233 SBSQ HOSP IP/OBS HIGH 50: CPT | Performed by: NURSE PRACTITIONER

## 2023-01-01 PROCEDURE — 99233 SBSQ HOSP IP/OBS HIGH 50: CPT | Performed by: INTERNAL MEDICINE

## 2023-01-01 PROCEDURE — 99214 OFFICE O/P EST MOD 30 MIN: CPT | Performed by: INTERNAL MEDICINE

## 2023-01-01 PROCEDURE — 96376 TX/PRO/DX INJ SAME DRUG ADON: CPT

## 2023-01-01 PROCEDURE — 99223 1ST HOSP IP/OBS HIGH 75: CPT | Mod: 25 | Performed by: INTERNAL MEDICINE

## 2023-01-01 PROCEDURE — 3079F DIAST BP 80-89 MM HG: CPT | Performed by: GENERAL PRACTICE

## 2023-01-01 PROCEDURE — 77300 RADIATION THERAPY DOSE PLAN: CPT | Mod: 26 | Performed by: RADIOLOGY

## 2023-01-01 PROCEDURE — 770001 HCHG ROOM/CARE - MED/SURG/GYN PRIV*

## 2023-01-01 PROCEDURE — 86140 C-REACTIVE PROTEIN: CPT

## 2023-01-01 PROCEDURE — 82962 GLUCOSE BLOOD TEST: CPT

## 2023-01-01 PROCEDURE — 700105 HCHG RX REV CODE 258: Performed by: EMERGENCY MEDICINE

## 2023-01-01 PROCEDURE — 99231 SBSQ HOSP IP/OBS SF/LOW 25: CPT | Performed by: STUDENT IN AN ORGANIZED HEALTH CARE EDUCATION/TRAINING PROGRAM

## 2023-01-01 PROCEDURE — 160038 HCHG SURGERY MINUTES - EA ADDL 1 MIN LEVEL 2: Performed by: SURGERY

## 2023-01-01 PROCEDURE — 160002 HCHG RECOVERY MINUTES (STAT): Performed by: INTERNAL MEDICINE

## 2023-01-01 PROCEDURE — 99253 IP/OBS CNSLTJ NEW/EST LOW 45: CPT | Performed by: SURGERY

## 2023-01-01 PROCEDURE — 700111 HCHG RX REV CODE 636 W/ 250 OVERRIDE (IP): Performed by: EMERGENCY MEDICINE

## 2023-01-01 PROCEDURE — 99223 1ST HOSP IP/OBS HIGH 75: CPT | Mod: AI | Performed by: HOSPITALIST

## 2023-01-01 PROCEDURE — 87070 CULTURE OTHR SPECIMN AEROBIC: CPT

## 2023-01-01 PROCEDURE — 700111 HCHG RX REV CODE 636 W/ 250 OVERRIDE (IP): Mod: UD | Performed by: STUDENT IN AN ORGANIZED HEALTH CARE EDUCATION/TRAINING PROGRAM

## 2023-01-01 PROCEDURE — 99212 OFFICE O/P EST SF 10 MIN: CPT | Performed by: RADIOLOGY

## 2023-01-01 PROCEDURE — C1751 CATH, INF, PER/CENT/MIDLINE: HCPCS

## 2023-01-01 PROCEDURE — 93306 TTE W/DOPPLER COMPLETE: CPT

## 2023-01-01 PROCEDURE — C9803 HOPD COVID-19 SPEC COLLECT: HCPCS | Performed by: EMERGENCY MEDICINE

## 2023-01-01 PROCEDURE — 700102 HCHG RX REV CODE 250 W/ 637 OVERRIDE(OP): Performed by: FAMILY MEDICINE

## 2023-01-01 PROCEDURE — 160031 HCHG SURGERY MINUTES - 1ST 30 MINS LEVEL 5: Performed by: INTERNAL MEDICINE

## 2023-01-01 PROCEDURE — 82803 BLOOD GASES ANY COMBINATION: CPT

## 2023-01-01 PROCEDURE — 96372 THER/PROPH/DIAG INJ SC/IM: CPT

## 2023-01-01 PROCEDURE — 87641 MR-STAPH DNA AMP PROBE: CPT

## 2023-01-01 PROCEDURE — 83880 ASSAY OF NATRIURETIC PEPTIDE: CPT

## 2023-01-01 PROCEDURE — 84145 PROCALCITONIN (PCT): CPT

## 2023-01-01 PROCEDURE — 88311 DECALCIFY TISSUE: CPT

## 2023-01-01 PROCEDURE — 99215 OFFICE O/P EST HI 40 MIN: CPT | Performed by: STUDENT IN AN ORGANIZED HEALTH CARE EDUCATION/TRAINING PROGRAM

## 2023-01-01 PROCEDURE — 77334 RADIATION TREATMENT AID(S): CPT | Performed by: RADIOLOGY

## 2023-01-01 PROCEDURE — 700102 HCHG RX REV CODE 250 W/ 637 OVERRIDE(OP): Mod: UD | Performed by: STUDENT IN AN ORGANIZED HEALTH CARE EDUCATION/TRAINING PROGRAM

## 2023-01-01 PROCEDURE — 700105 HCHG RX REV CODE 258: Mod: UD | Performed by: EMERGENCY MEDICINE

## 2023-01-01 PROCEDURE — 94060 EVALUATION OF WHEEZING: CPT | Performed by: INTERNAL MEDICINE

## 2023-01-01 PROCEDURE — 77370 RADIATION PHYSICS CONSULT: CPT | Performed by: RADIOLOGY

## 2023-01-01 PROCEDURE — 77336 RADIATION PHYSICS CONSULT: CPT | Performed by: RADIOLOGY

## 2023-01-01 PROCEDURE — 31628 BRONCHOSCOPY/LUNG BX EACH: CPT | Mod: 51 | Performed by: INTERNAL MEDICINE

## 2023-01-01 PROCEDURE — 700111 HCHG RX REV CODE 636 W/ 250 OVERRIDE (IP): Mod: JZ,UD | Performed by: EMERGENCY MEDICINE

## 2023-01-01 PROCEDURE — 99233 SBSQ HOSP IP/OBS HIGH 50: CPT | Mod: 25 | Performed by: INTERNAL MEDICINE

## 2023-01-01 PROCEDURE — 93005 ELECTROCARDIOGRAM TRACING: CPT

## 2023-01-01 PROCEDURE — 99214 OFFICE O/P EST MOD 30 MIN: CPT | Performed by: STUDENT IN AN ORGANIZED HEALTH CARE EDUCATION/TRAINING PROGRAM

## 2023-01-01 PROCEDURE — 700117 HCHG RX CONTRAST REV CODE 255: Performed by: INTERNAL MEDICINE

## 2023-01-01 PROCEDURE — 99443 PR PHYSICIAN TELEPHONE EVALUATION 21-30 MIN: CPT | Performed by: RADIOLOGY

## 2023-01-01 PROCEDURE — 700105 HCHG RX REV CODE 258: Mod: UD | Performed by: SURGERY

## 2023-01-01 PROCEDURE — 99291 CRITICAL CARE FIRST HOUR: CPT | Performed by: STUDENT IN AN ORGANIZED HEALTH CARE EDUCATION/TRAINING PROGRAM

## 2023-01-01 PROCEDURE — 99406 BEHAV CHNG SMOKING 3-10 MIN: CPT | Performed by: INTERNAL MEDICINE

## 2023-01-01 PROCEDURE — 3074F SYST BP LT 130 MM HG: CPT

## 2023-01-01 PROCEDURE — 31629 BRONCHOSCOPY/NEEDLE BX EACH: CPT | Performed by: INTERNAL MEDICINE

## 2023-01-01 PROCEDURE — 84702 CHORIONIC GONADOTROPIN TEST: CPT

## 2023-01-01 PROCEDURE — 99232 SBSQ HOSP IP/OBS MODERATE 35: CPT | Performed by: HOSPITALIST

## 2023-01-01 PROCEDURE — 93356 MYOCRD STRAIN IMG SPCKL TRCK: CPT | Performed by: INTERNAL MEDICINE

## 2023-01-01 PROCEDURE — 71101 X-RAY EXAM UNILAT RIBS/CHEST: CPT | Mod: LT

## 2023-01-01 PROCEDURE — 87040 BLOOD CULTURE FOR BACTERIA: CPT

## 2023-01-01 PROCEDURE — 99285 EMERGENCY DEPT VISIT HI MDM: CPT

## 2023-01-01 PROCEDURE — 160027 HCHG SURGERY MINUTES - 1ST 30 MINS LEVEL 2: Performed by: SURGERY

## 2023-01-01 PROCEDURE — 82306 VITAMIN D 25 HYDROXY: CPT

## 2023-01-01 PROCEDURE — 71275 CT ANGIOGRAPHY CHEST: CPT

## 2023-01-01 PROCEDURE — A9270 NON-COVERED ITEM OR SERVICE: HCPCS | Performed by: FAMILY MEDICINE

## 2023-01-01 PROCEDURE — 77290 THER RAD SIMULAJ FIELD CPLX: CPT | Mod: 26 | Performed by: RADIOLOGY

## 2023-01-01 PROCEDURE — 87015 SPECIMEN INFECT AGNT CONCNTJ: CPT

## 2023-01-01 PROCEDURE — 96368 THER/DIAG CONCURRENT INF: CPT

## 2023-01-01 PROCEDURE — 77300 RADIATION THERAPY DOSE PLAN: CPT | Performed by: RADIOLOGY

## 2023-01-01 PROCEDURE — 82652 VIT D 1 25-DIHYDROXY: CPT

## 2023-01-01 PROCEDURE — 160035 HCHG PACU - 1ST 60 MINS PHASE I: Performed by: INTERNAL MEDICINE

## 2023-01-01 PROCEDURE — 700117 HCHG RX CONTRAST REV CODE 255: Mod: UD | Performed by: EMERGENCY MEDICINE

## 2023-01-01 PROCEDURE — 01922 ANES N-INVAS IMG/RADJ THER: CPT | Performed by: ANESTHESIOLOGY

## 2023-01-01 PROCEDURE — 94664 DEMO&/EVAL PT USE INHALER: CPT

## 2023-01-01 PROCEDURE — 96367 TX/PROPH/DG ADDL SEQ IV INF: CPT

## 2023-01-01 PROCEDURE — 77336 RADIATION PHYSICS CONSULT: CPT | Mod: XU | Performed by: RADIOLOGY

## 2023-01-01 PROCEDURE — 82330 ASSAY OF CALCIUM: CPT

## 2023-01-01 PROCEDURE — 700105 HCHG RX REV CODE 258: Mod: JZ,UD | Performed by: ANESTHESIOLOGY

## 2023-01-01 PROCEDURE — 3075F SYST BP GE 130 - 139MM HG: CPT | Performed by: GENERAL PRACTICE

## 2023-01-01 PROCEDURE — 00470 ANES PARTIAL RIB RESCJ NOS: CPT | Performed by: STUDENT IN AN ORGANIZED HEALTH CARE EDUCATION/TRAINING PROGRAM

## 2023-01-01 PROCEDURE — 700101 HCHG RX REV CODE 250: Mod: UD | Performed by: SURGERY

## 2023-01-01 PROCEDURE — 99205 OFFICE O/P NEW HI 60 MIN: CPT | Performed by: STUDENT IN AN ORGANIZED HEALTH CARE EDUCATION/TRAINING PROGRAM

## 2023-01-01 PROCEDURE — 3080F DIAST BP >= 90 MM HG: CPT | Performed by: GENERAL PRACTICE

## 2023-01-01 PROCEDURE — 89240 UNLISTED MISC PATH TEST: CPT

## 2023-01-01 PROCEDURE — 160046 HCHG PACU - 1ST 60 MINS PHASE II: Performed by: INTERNAL MEDICINE

## 2023-01-01 PROCEDURE — 87040 BLOOD CULTURE FOR BACTERIA: CPT | Mod: 91

## 2023-01-01 PROCEDURE — 88172 CYTP DX EVAL FNA 1ST EA SITE: CPT | Mod: XU

## 2023-01-01 PROCEDURE — 85018 HEMOGLOBIN: CPT

## 2023-01-01 PROCEDURE — 3075F SYST BP GE 130 - 139MM HG: CPT | Mod: GC | Performed by: INTERNAL MEDICINE

## 2023-01-01 PROCEDURE — 502714 HCHG ROBOTIC SURGERY SERVICES: Performed by: INTERNAL MEDICINE

## 2023-01-01 PROCEDURE — 73562 X-RAY EXAM OF KNEE 3: CPT | Mod: RT

## 2023-01-01 PROCEDURE — 87798 DETECT AGENT NOS DNA AMP: CPT

## 2023-01-01 PROCEDURE — 88173 CYTOPATH EVAL FNA REPORT: CPT

## 2023-01-01 PROCEDURE — 87116 MYCOBACTERIA CULTURE: CPT

## 2023-01-01 PROCEDURE — 93356 MYOCRD STRAIN IMG SPCKL TRCK: CPT

## 2023-01-01 PROCEDURE — 99213 OFFICE O/P EST LOW 20 MIN: CPT | Mod: GC | Performed by: GENERAL PRACTICE

## 2023-01-01 PROCEDURE — 160046 HCHG PACU - 1ST 60 MINS PHASE II

## 2023-01-01 PROCEDURE — A9270 NON-COVERED ITEM OR SERVICE: HCPCS | Mod: UD | Performed by: ANESTHESIOLOGY

## 2023-01-01 PROCEDURE — 700105 HCHG RX REV CODE 258: Performed by: STUDENT IN AN ORGANIZED HEALTH CARE EDUCATION/TRAINING PROGRAM

## 2023-01-01 PROCEDURE — 3078F DIAST BP <80 MM HG: CPT

## 2023-01-01 PROCEDURE — 87205 SMEAR GRAM STAIN: CPT

## 2023-01-01 PROCEDURE — 1125F AMNT PAIN NOTED PAIN PRSNT: CPT | Performed by: GENERAL PRACTICE

## 2023-01-01 PROCEDURE — 83036 HEMOGLOBIN GLYCOSYLATED A1C: CPT

## 2023-01-01 PROCEDURE — 88112 CYTOPATH CELL ENHANCE TECH: CPT

## 2023-01-01 PROCEDURE — 700105 HCHG RX REV CODE 258: Performed by: HOSPITALIST

## 2023-01-01 PROCEDURE — 99292 CRITICAL CARE ADDL 30 MIN: CPT | Mod: GC | Performed by: INTERNAL MEDICINE

## 2023-01-01 PROCEDURE — 160042 HCHG SURGERY MINUTES - EA ADDL 1 MIN LEVEL 5: Performed by: INTERNAL MEDICINE

## 2023-01-01 PROCEDURE — 93005 ELECTROCARDIOGRAM TRACING: CPT | Performed by: EMERGENCY MEDICINE

## 2023-01-01 PROCEDURE — 99214 OFFICE O/P EST MOD 30 MIN: CPT | Mod: 95 | Performed by: STUDENT IN AN ORGANIZED HEALTH CARE EDUCATION/TRAINING PROGRAM

## 2023-01-01 PROCEDURE — 3079F DIAST BP 80-89 MM HG: CPT | Mod: GC

## 2023-01-01 PROCEDURE — 85014 HEMATOCRIT: CPT

## 2023-01-01 PROCEDURE — 72158 MRI LUMBAR SPINE W/O & W/DYE: CPT

## 2023-01-01 PROCEDURE — 99223 1ST HOSP IP/OBS HIGH 75: CPT | Performed by: INTERNAL MEDICINE

## 2023-01-01 PROCEDURE — 302151 K-PAD 14X20: Performed by: INTERNAL MEDICINE

## 2023-01-01 PROCEDURE — 3080F DIAST BP >= 90 MM HG: CPT | Mod: GC

## 2023-01-01 PROCEDURE — 31624 DX BRONCHOSCOPE/LAVAGE: CPT | Mod: 51 | Performed by: INTERNAL MEDICINE

## 2023-01-01 PROCEDURE — 160041 HCHG SURGERY MINUTES - EA ADDL 1 MIN LEVEL 4: Performed by: INTERNAL MEDICINE

## 2023-01-01 PROCEDURE — 99214 OFFICE O/P EST MOD 30 MIN: CPT | Performed by: RADIOLOGY

## 2023-01-01 PROCEDURE — 99253 IP/OBS CNSLTJ NEW/EST LOW 45: CPT | Performed by: NURSE PRACTITIONER

## 2023-01-01 PROCEDURE — 160025 RECOVERY II MINUTES (STATS): Performed by: INTERNAL MEDICINE

## 2023-01-01 PROCEDURE — 88333 PATH CONSLTJ SURG CYTO XM 1: CPT | Mod: XU

## 2023-01-01 PROCEDURE — 700111 HCHG RX REV CODE 636 W/ 250 OVERRIDE (IP): Mod: JZ | Performed by: STUDENT IN AN ORGANIZED HEALTH CARE EDUCATION/TRAINING PROGRAM

## 2023-01-01 PROCEDURE — 160029 HCHG SURGERY MINUTES - 1ST 30 MINS LEVEL 4: Performed by: INTERNAL MEDICINE

## 2023-01-01 PROCEDURE — 160036 HCHG PACU - EA ADDL 30 MINS PHASE I: Performed by: SURGERY

## 2023-01-01 PROCEDURE — 160009 HCHG ANES TIME/MIN: Performed by: INTERNAL MEDICINE

## 2023-01-01 PROCEDURE — 00520 ANES CLOSED CHEST PX NOS: CPT | Performed by: ANESTHESIOLOGY

## 2023-01-01 PROCEDURE — 87633 RESP VIRUS 12-25 TARGETS: CPT

## 2023-01-01 PROCEDURE — 3080F DIAST BP >= 90 MM HG: CPT

## 2023-01-01 PROCEDURE — 87077 CULTURE AEROBIC IDENTIFY: CPT

## 2023-01-01 PROCEDURE — 160025 RECOVERY II MINUTES (STATS): Performed by: SURGERY

## 2023-01-01 PROCEDURE — 31623 DX BRONCHOSCOPE/BRUSH: CPT | Performed by: INTERNAL MEDICINE

## 2023-01-01 PROCEDURE — 700101 HCHG RX REV CODE 250: Performed by: EMERGENCY MEDICINE

## 2023-01-01 PROCEDURE — 88177 CYTP FNA EVAL EA ADDL: CPT

## 2023-01-01 PROCEDURE — A9270 NON-COVERED ITEM OR SERVICE: HCPCS | Mod: UD

## 2023-01-01 PROCEDURE — 160035 HCHG PACU - 1ST 60 MINS PHASE I: Performed by: SURGERY

## 2023-01-01 PROCEDURE — 87640 STAPH A DNA AMP PROBE: CPT

## 2023-01-01 PROCEDURE — RXMED WILLOW AMBULATORY MEDICATION CHARGE: Performed by: STUDENT IN AN ORGANIZED HEALTH CARE EDUCATION/TRAINING PROGRAM

## 2023-01-01 PROCEDURE — 93971 EXTREMITY STUDY: CPT | Mod: RT

## 2023-01-01 PROCEDURE — 31627 NAVIGATIONAL BRONCHOSCOPY: CPT | Performed by: INTERNAL MEDICINE

## 2023-01-01 PROCEDURE — 85018 HEMOGLOBIN: CPT | Mod: 91

## 2023-01-01 PROCEDURE — 80307 DRUG TEST PRSMV CHEM ANLYZR: CPT

## 2023-01-01 PROCEDURE — 3077F SYST BP >= 140 MM HG: CPT

## 2023-01-01 PROCEDURE — 3077F SYST BP >= 140 MM HG: CPT | Performed by: GENERAL PRACTICE

## 2023-01-01 PROCEDURE — 99254 IP/OBS CNSLTJ NEW/EST MOD 60: CPT | Performed by: NURSE PRACTITIONER

## 2023-01-01 PROCEDURE — 160035 HCHG PACU - 1ST 60 MINS PHASE I

## 2023-01-01 PROCEDURE — 77012 CT SCAN FOR NEEDLE BIOPSY: CPT

## 2023-01-01 PROCEDURE — 700105 HCHG RX REV CODE 258: Mod: UD | Performed by: STUDENT IN AN ORGANIZED HEALTH CARE EDUCATION/TRAINING PROGRAM

## 2023-01-01 PROCEDURE — A9579 GAD-BASE MR CONTRAST NOS,1ML: HCPCS | Performed by: INTERNAL MEDICINE

## 2023-01-01 PROCEDURE — 80202 ASSAY OF VANCOMYCIN: CPT

## 2023-01-01 PROCEDURE — 99205 OFFICE O/P NEW HI 60 MIN: CPT | Performed by: RADIOLOGY

## 2023-01-01 PROCEDURE — 160046 HCHG PACU - 1ST 60 MINS PHASE II: Performed by: SURGERY

## 2023-01-01 PROCEDURE — 99252 IP/OBS CONSLTJ NEW/EST SF 35: CPT | Performed by: STUDENT IN AN ORGANIZED HEALTH CARE EDUCATION/TRAINING PROGRAM

## 2023-01-01 PROCEDURE — 05HY33Z INSERTION OF INFUSION DEVICE INTO UPPER VEIN, PERCUTANEOUS APPROACH: ICD-10-PCS | Performed by: INTERNAL MEDICINE

## 2023-01-01 PROCEDURE — 05HY33Z INSERTION OF INFUSION DEVICE INTO UPPER VEIN, PERCUTANEOUS APPROACH: ICD-10-PCS | Performed by: HOSPITALIST

## 2023-01-01 PROCEDURE — 3079F DIAST BP 80-89 MM HG: CPT | Mod: GC | Performed by: INTERNAL MEDICINE

## 2023-01-01 PROCEDURE — 160002 HCHG RECOVERY MINUTES (STAT): Performed by: SURGERY

## 2023-01-01 PROCEDURE — 160002 HCHG RECOVERY MINUTES (STAT)

## 2023-01-01 RX ORDER — ONDANSETRON 8 MG/1
8 TABLET, ORALLY DISINTEGRATING ORAL EVERY 8 HOURS PRN
Status: CANCELLED | OUTPATIENT
Start: 2023-01-01

## 2023-01-01 RX ORDER — ACETAMINOPHEN 325 MG/1
650 TABLET ORAL EVERY 6 HOURS PRN
Status: DISCONTINUED | OUTPATIENT
Start: 2023-01-01 | End: 2023-01-01 | Stop reason: HOSPADM

## 2023-01-01 RX ORDER — OXYCODONE HYDROCHLORIDE 5 MG/1
5 TABLET ORAL
Status: CANCELLED | OUTPATIENT
Start: 2023-01-01 | End: 2023-01-01

## 2023-01-01 RX ORDER — CLOTRIMAZOLE 10 MG/1
10 LOZENGE ORAL; TOPICAL
Status: DISPENSED | OUTPATIENT
Start: 2023-01-01 | End: 2023-01-01

## 2023-01-01 RX ORDER — POLYETHYLENE GLYCOL 3350 17 G/17G
1 POWDER, FOR SOLUTION ORAL
Status: DISCONTINUED | OUTPATIENT
Start: 2023-01-01 | End: 2023-01-01

## 2023-01-01 RX ORDER — SODIUM CHLORIDE, SODIUM LACTATE, POTASSIUM CHLORIDE, CALCIUM CHLORIDE 600; 310; 30; 20 MG/100ML; MG/100ML; MG/100ML; MG/100ML
INJECTION, SOLUTION INTRAVENOUS CONTINUOUS
Status: DISCONTINUED | OUTPATIENT
Start: 2023-01-01 | End: 2023-01-01 | Stop reason: HOSPADM

## 2023-01-01 RX ORDER — LORAZEPAM 2 MG/ML
0.5 INJECTION INTRAMUSCULAR ONCE
Status: COMPLETED | OUTPATIENT
Start: 2023-01-01 | End: 2023-01-01

## 2023-01-01 RX ORDER — LORAZEPAM 2 MG/ML
1 INJECTION INTRAMUSCULAR EVERY 4 HOURS PRN
Status: DISCONTINUED | OUTPATIENT
Start: 2023-01-01 | End: 2023-01-01

## 2023-01-01 RX ORDER — 0.9 % SODIUM CHLORIDE 0.9 %
VIAL (ML) INJECTION PRN
Status: CANCELLED | OUTPATIENT
Start: 2023-01-01

## 2023-01-01 RX ORDER — SODIUM CHLORIDE 9 MG/ML
500 INJECTION, SOLUTION INTRAVENOUS ONCE
Status: CANCELLED | OUTPATIENT
Start: 2023-01-01

## 2023-01-01 RX ORDER — HALOPERIDOL 5 MG/ML
1 INJECTION INTRAMUSCULAR
Status: DISCONTINUED | OUTPATIENT
Start: 2023-01-01 | End: 2023-01-01 | Stop reason: HOSPADM

## 2023-01-01 RX ORDER — 0.9 % SODIUM CHLORIDE 0.9 %
10 VIAL (ML) INJECTION PRN
Status: CANCELLED | OUTPATIENT
Start: 2023-01-01

## 2023-01-01 RX ORDER — HYDROCODONE BITARTRATE AND ACETAMINOPHEN 10; 325 MG/1; MG/1
1 TABLET ORAL
Status: DISCONTINUED | OUTPATIENT
Start: 2023-01-01 | End: 2023-01-01

## 2023-01-01 RX ORDER — FLUTICASONE PROPIONATE 50 MCG
SPRAY, SUSPENSION (ML) NASAL
Qty: 16 G | Refills: 3 | Status: SHIPPED | OUTPATIENT
Start: 2023-01-01 | End: 2023-01-01 | Stop reason: SDUPTHER

## 2023-01-01 RX ORDER — HYDROMORPHONE HYDROCHLORIDE 2 MG/1
4 TABLET ORAL
Status: DISCONTINUED | OUTPATIENT
Start: 2023-01-01 | End: 2023-01-01 | Stop reason: HOSPADM

## 2023-01-01 RX ORDER — HYDROMORPHONE HYDROCHLORIDE 1 MG/ML
0.2 INJECTION, SOLUTION INTRAMUSCULAR; INTRAVENOUS; SUBCUTANEOUS
Status: DISCONTINUED | OUTPATIENT
Start: 2023-01-01 | End: 2023-01-01 | Stop reason: HOSPADM

## 2023-01-01 RX ORDER — DEXAMETHASONE SODIUM PHOSPHATE 4 MG/ML
4 INJECTION, SOLUTION INTRA-ARTICULAR; INTRALESIONAL; INTRAMUSCULAR; INTRAVENOUS; SOFT TISSUE 2 TIMES DAILY
Status: DISCONTINUED | OUTPATIENT
Start: 2023-01-01 | End: 2023-01-01

## 2023-01-01 RX ORDER — MAGNESIUM SULFATE 1 G/100ML
1 INJECTION INTRAVENOUS ONCE
Status: COMPLETED | OUTPATIENT
Start: 2023-01-01 | End: 2023-01-01

## 2023-01-01 RX ORDER — GUAIFENESIN/DEXTROMETHORPHAN 100-10MG/5
10 SYRUP ORAL EVERY 6 HOURS PRN
Status: DISCONTINUED | OUTPATIENT
Start: 2023-01-01 | End: 2023-01-01 | Stop reason: HOSPADM

## 2023-01-01 RX ORDER — BISACODYL 10 MG
10 SUPPOSITORY, RECTAL RECTAL
Status: DISCONTINUED | OUTPATIENT
Start: 2023-01-01 | End: 2023-01-01 | Stop reason: HOSPADM

## 2023-01-01 RX ORDER — HYDROCODONE BITARTRATE AND ACETAMINOPHEN 5; 325 MG/1; MG/1
1 TABLET ORAL EVERY 6 HOURS PRN
Status: DISCONTINUED | OUTPATIENT
Start: 2023-01-01 | End: 2023-01-01

## 2023-01-01 RX ORDER — HYDROMORPHONE HYDROCHLORIDE 1 MG/ML
1 INJECTION, SOLUTION INTRAMUSCULAR; INTRAVENOUS; SUBCUTANEOUS
Status: DISCONTINUED | OUTPATIENT
Start: 2023-01-01 | End: 2023-01-01 | Stop reason: HOSPADM

## 2023-01-01 RX ORDER — SODIUM CHLORIDE, SODIUM GLUCONATE, SODIUM ACETATE, POTASSIUM CHLORIDE AND MAGNESIUM CHLORIDE 526; 502; 368; 37; 30 MG/100ML; MG/100ML; MG/100ML; MG/100ML; MG/100ML
500 INJECTION, SOLUTION INTRAVENOUS CONTINUOUS
Status: DISCONTINUED | OUTPATIENT
Start: 2023-01-01 | End: 2023-01-01 | Stop reason: HOSPADM

## 2023-01-01 RX ORDER — ONDANSETRON 2 MG/ML
8 INJECTION INTRAMUSCULAR; INTRAVENOUS ONCE
Status: CANCELLED | OUTPATIENT
Start: 2023-01-01 | End: 2023-01-01

## 2023-01-01 RX ORDER — HYDROMORPHONE HYDROCHLORIDE 1 MG/ML
0.4 INJECTION, SOLUTION INTRAMUSCULAR; INTRAVENOUS; SUBCUTANEOUS
Status: DISCONTINUED | OUTPATIENT
Start: 2023-01-01 | End: 2023-01-01 | Stop reason: HOSPADM

## 2023-01-01 RX ORDER — DEXAMETHASONE 4 MG/1
6 TABLET ORAL DAILY
Status: DISCONTINUED | OUTPATIENT
Start: 2023-01-01 | End: 2023-01-01

## 2023-01-01 RX ORDER — OXYCODONE HCL 5 MG/5 ML
5 SOLUTION, ORAL ORAL
Status: COMPLETED | OUTPATIENT
Start: 2023-01-01 | End: 2023-01-01

## 2023-01-01 RX ORDER — HYDRALAZINE HYDROCHLORIDE 20 MG/ML
5 INJECTION INTRAMUSCULAR; INTRAVENOUS
Status: DISCONTINUED | OUTPATIENT
Start: 2023-01-01 | End: 2023-01-01 | Stop reason: HOSPADM

## 2023-01-01 RX ORDER — LORATADINE 10 MG/1
10 TABLET ORAL DAILY
Qty: 90 TABLET | Refills: 1 | Status: SHIPPED | OUTPATIENT
Start: 2023-01-01 | End: 2023-01-01

## 2023-01-01 RX ORDER — ONDANSETRON 2 MG/ML
4 INJECTION INTRAMUSCULAR; INTRAVENOUS
Status: DISCONTINUED | OUTPATIENT
Start: 2023-01-01 | End: 2023-01-01 | Stop reason: HOSPADM

## 2023-01-01 RX ORDER — OXYCODONE HYDROCHLORIDE 10 MG/1
10 TABLET ORAL
Status: DISCONTINUED | OUTPATIENT
Start: 2023-01-01 | End: 2023-01-01

## 2023-01-01 RX ORDER — FUROSEMIDE 10 MG/ML
40 INJECTION INTRAMUSCULAR; INTRAVENOUS ONCE
Status: COMPLETED | OUTPATIENT
Start: 2023-01-01 | End: 2023-01-01

## 2023-01-01 RX ORDER — GABAPENTIN 100 MG/1
200 CAPSULE ORAL 3 TIMES DAILY
Status: DISCONTINUED | OUTPATIENT
Start: 2023-01-01 | End: 2023-01-01 | Stop reason: HOSPADM

## 2023-01-01 RX ORDER — HYDROMORPHONE HYDROCHLORIDE 2 MG/ML
INJECTION, SOLUTION INTRAMUSCULAR; INTRAVENOUS; SUBCUTANEOUS PRN
Status: DISCONTINUED | OUTPATIENT
Start: 2023-01-01 | End: 2023-01-01 | Stop reason: SURG

## 2023-01-01 RX ORDER — DEXAMETHASONE SODIUM PHOSPHATE 4 MG/ML
INJECTION, SOLUTION INTRA-ARTICULAR; INTRALESIONAL; INTRAMUSCULAR; INTRAVENOUS; SOFT TISSUE PRN
Status: DISCONTINUED | OUTPATIENT
Start: 2023-01-01 | End: 2023-01-01 | Stop reason: SURG

## 2023-01-01 RX ORDER — DIPHENHYDRAMINE HCL 25 MG
25 TABLET ORAL EVERY 4 HOURS PRN
Status: DISCONTINUED | OUTPATIENT
Start: 2023-01-01 | End: 2023-01-01 | Stop reason: HOSPADM

## 2023-01-01 RX ORDER — MEPERIDINE HYDROCHLORIDE 25 MG/ML
12.5 INJECTION INTRAMUSCULAR; INTRAVENOUS; SUBCUTANEOUS
Status: DISCONTINUED | OUTPATIENT
Start: 2023-01-01 | End: 2023-01-01 | Stop reason: HOSPADM

## 2023-01-01 RX ORDER — LORAZEPAM 2 MG/ML
0.5 INJECTION INTRAMUSCULAR EVERY 6 HOURS PRN
Status: DISCONTINUED | OUTPATIENT
Start: 2023-01-01 | End: 2023-01-01

## 2023-01-01 RX ORDER — DEXAMETHASONE SODIUM PHOSPHATE 4 MG/ML
4 INJECTION, SOLUTION INTRA-ARTICULAR; INTRALESIONAL; INTRAMUSCULAR; INTRAVENOUS; SOFT TISSUE EVERY 6 HOURS
Status: DISCONTINUED | OUTPATIENT
Start: 2023-01-01 | End: 2023-01-01

## 2023-01-01 RX ORDER — FLUTICASONE PROPIONATE 50 MCG
2 SPRAY, SUSPENSION (ML) NASAL DAILY
Status: DISCONTINUED | OUTPATIENT
Start: 2023-01-01 | End: 2023-01-01

## 2023-01-01 RX ORDER — MORPHINE SULFATE 4 MG/ML
4 INJECTION INTRAVENOUS
Status: DISCONTINUED | OUTPATIENT
Start: 2023-01-01 | End: 2023-01-01 | Stop reason: HOSPADM

## 2023-01-01 RX ORDER — SODIUM CHLORIDE, SODIUM LACTATE, POTASSIUM CHLORIDE, CALCIUM CHLORIDE 600; 310; 30; 20 MG/100ML; MG/100ML; MG/100ML; MG/100ML
INJECTION, SOLUTION INTRAVENOUS
Status: DISCONTINUED | OUTPATIENT
Start: 2023-01-01 | End: 2023-01-01 | Stop reason: SURG

## 2023-01-01 RX ORDER — HYDROXYZINE HYDROCHLORIDE 10 MG/1
10 TABLET, FILM COATED ORAL
Status: DISCONTINUED | OUTPATIENT
Start: 2023-01-01 | End: 2023-01-01 | Stop reason: HOSPADM

## 2023-01-01 RX ORDER — FUROSEMIDE 10 MG/ML
80 INJECTION INTRAMUSCULAR; INTRAVENOUS ONCE
Status: COMPLETED | OUTPATIENT
Start: 2023-01-01 | End: 2023-01-01

## 2023-01-01 RX ORDER — DIAZEPAM 5 MG/ML
5 INJECTION, SOLUTION INTRAMUSCULAR; INTRAVENOUS EVERY 6 HOURS PRN
Status: DISCONTINUED | OUTPATIENT
Start: 2023-01-01 | End: 2023-01-01 | Stop reason: HOSPADM

## 2023-01-01 RX ORDER — ONDANSETRON 2 MG/ML
4 INJECTION INTRAMUSCULAR; INTRAVENOUS EVERY 4 HOURS PRN
Status: DISCONTINUED | OUTPATIENT
Start: 2023-01-01 | End: 2023-01-01 | Stop reason: HOSPADM

## 2023-01-01 RX ORDER — OXYCODONE HCL 5 MG/5 ML
10 SOLUTION, ORAL ORAL
Status: DISCONTINUED | OUTPATIENT
Start: 2023-01-01 | End: 2023-01-01 | Stop reason: HOSPADM

## 2023-01-01 RX ORDER — LORAZEPAM 0.5 MG/1
0.5 TABLET ORAL EVERY 8 HOURS
Status: DISCONTINUED | OUTPATIENT
Start: 2023-01-01 | End: 2023-01-01

## 2023-01-01 RX ORDER — DIPHENHYDRAMINE HYDROCHLORIDE 50 MG/ML
12.5 INJECTION INTRAMUSCULAR; INTRAVENOUS
Status: DISCONTINUED | OUTPATIENT
Start: 2023-01-01 | End: 2023-01-01 | Stop reason: HOSPADM

## 2023-01-01 RX ORDER — BUPIVACAINE HYDROCHLORIDE AND EPINEPHRINE 5; 5 MG/ML; UG/ML
INJECTION, SOLUTION EPIDURAL; INTRACAUDAL; PERINEURAL
Status: DISCONTINUED | OUTPATIENT
Start: 2023-01-01 | End: 2023-01-01 | Stop reason: HOSPADM

## 2023-01-01 RX ORDER — MORPHINE SULFATE 4 MG/ML
4 INJECTION INTRAVENOUS ONCE
Status: COMPLETED | OUTPATIENT
Start: 2023-01-01 | End: 2023-01-01

## 2023-01-01 RX ORDER — OXYCODONE HYDROCHLORIDE AND ACETAMINOPHEN 5; 325 MG/1; MG/1
TABLET ORAL
COMMUNITY
End: 2023-01-01

## 2023-01-01 RX ORDER — DEXAMETHASONE 4 MG/1
4 TABLET ORAL EVERY 12 HOURS
Status: COMPLETED | OUTPATIENT
Start: 2023-01-01 | End: 2023-01-01

## 2023-01-01 RX ORDER — PREGABALIN 75 MG/1
75 CAPSULE ORAL 2 TIMES DAILY
Status: DISCONTINUED | OUTPATIENT
Start: 2023-01-01 | End: 2023-01-01

## 2023-01-01 RX ORDER — MIDAZOLAM HYDROCHLORIDE 1 MG/ML
INJECTION INTRAMUSCULAR; INTRAVENOUS
Status: COMPLETED
Start: 2023-01-01 | End: 2023-01-01

## 2023-01-01 RX ORDER — SODIUM CHLORIDE, SODIUM LACTATE, POTASSIUM CHLORIDE, CALCIUM CHLORIDE 600; 310; 30; 20 MG/100ML; MG/100ML; MG/100ML; MG/100ML
1000 INJECTION, SOLUTION INTRAVENOUS ONCE
Status: COMPLETED | OUTPATIENT
Start: 2023-01-01 | End: 2023-01-01

## 2023-01-01 RX ORDER — FLUTICASONE PROPIONATE 50 MCG
SPRAY, SUSPENSION (ML) NASAL
Qty: 16 G | Refills: 3 | Status: CANCELLED | OUTPATIENT
Start: 2023-01-01

## 2023-01-01 RX ORDER — IPRATROPIUM BROMIDE AND ALBUTEROL SULFATE 2.5; .5 MG/3ML; MG/3ML
3 SOLUTION RESPIRATORY (INHALATION) EVERY 6 HOURS PRN
COMMUNITY

## 2023-01-01 RX ORDER — ALBUTEROL SULFATE 90 UG/1
AEROSOL, METERED RESPIRATORY (INHALATION) PRN
Status: DISCONTINUED | OUTPATIENT
Start: 2023-01-01 | End: 2023-01-01 | Stop reason: SURG

## 2023-01-01 RX ORDER — BUSPIRONE HYDROCHLORIDE 10 MG/1
10 TABLET ORAL 3 TIMES DAILY
Status: DISCONTINUED | OUTPATIENT
Start: 2023-01-01 | End: 2023-01-01

## 2023-01-01 RX ORDER — FLUTICASONE PROPIONATE 50 MCG
SPRAY, SUSPENSION (ML) NASAL
Qty: 16 G | Refills: 3 | Status: SHIPPED | OUTPATIENT
Start: 2023-01-01 | End: 2023-01-01

## 2023-01-01 RX ORDER — DIPHENHYDRAMINE HYDROCHLORIDE 50 MG/ML
50 INJECTION INTRAMUSCULAR; INTRAVENOUS PRN
Status: CANCELLED | OUTPATIENT
Start: 2023-01-01

## 2023-01-01 RX ORDER — HYDROCODONE BITARTRATE AND ACETAMINOPHEN 10; 325 MG/1; MG/1
1 TABLET ORAL EVERY 6 HOURS PRN
Status: DISCONTINUED | OUTPATIENT
Start: 2023-01-01 | End: 2023-01-01

## 2023-01-01 RX ORDER — PROCHLORPERAZINE MALEATE 10 MG
10 TABLET ORAL EVERY 6 HOURS PRN
Status: CANCELLED | OUTPATIENT
Start: 2023-01-01

## 2023-01-01 RX ORDER — ONDANSETRON 2 MG/ML
INJECTION INTRAMUSCULAR; INTRAVENOUS PRN
Status: DISCONTINUED | OUTPATIENT
Start: 2023-01-01 | End: 2023-01-01 | Stop reason: SURG

## 2023-01-01 RX ORDER — OXYCODONE HYDROCHLORIDE 5 MG/1
5 TABLET ORAL
Status: DISCONTINUED | OUTPATIENT
Start: 2023-01-01 | End: 2023-01-01

## 2023-01-01 RX ORDER — FUROSEMIDE 10 MG/ML
40 INJECTION INTRAMUSCULAR; INTRAVENOUS
Status: COMPLETED | OUTPATIENT
Start: 2023-01-01 | End: 2023-01-01

## 2023-01-01 RX ORDER — GUAIFENESIN 600 MG/1
600 TABLET, EXTENDED RELEASE ORAL EVERY 12 HOURS
Status: DISCONTINUED | OUTPATIENT
Start: 2023-01-01 | End: 2023-01-01 | Stop reason: HOSPADM

## 2023-01-01 RX ORDER — IPRATROPIUM BROMIDE AND ALBUTEROL SULFATE 2.5; .5 MG/3ML; MG/3ML
3 SOLUTION RESPIRATORY (INHALATION)
Status: DISCONTINUED | OUTPATIENT
Start: 2023-01-01 | End: 2023-01-01

## 2023-01-01 RX ORDER — BUPRENORPHINE 10 UG/H
1 PATCH TRANSDERMAL
Status: DISCONTINUED | OUTPATIENT
Start: 2023-01-01 | End: 2023-01-01

## 2023-01-01 RX ORDER — LORAZEPAM 1 MG/1
1 TABLET ORAL EVERY 4 HOURS PRN
Status: DISCONTINUED | OUTPATIENT
Start: 2023-01-01 | End: 2023-01-01 | Stop reason: HOSPADM

## 2023-01-01 RX ORDER — ACETAMINOPHEN 325 MG/1
650 TABLET ORAL ONCE
Status: COMPLETED | OUTPATIENT
Start: 2023-01-01 | End: 2023-01-01

## 2023-01-01 RX ORDER — FENTANYL 25 UG/1
1 PATCH TRANSDERMAL
Status: DISCONTINUED | OUTPATIENT
Start: 2023-01-01 | End: 2023-01-01

## 2023-01-01 RX ORDER — HYDROXYZINE HYDROCHLORIDE 25 MG/1
25 TABLET, FILM COATED ORAL DAILY
COMMUNITY
Start: 2023-01-01 | End: 2023-01-01

## 2023-01-01 RX ORDER — MORPHINE SULFATE 30 MG/1
30 TABLET, FILM COATED, EXTENDED RELEASE ORAL EVERY 12 HOURS
Status: DISCONTINUED | OUTPATIENT
Start: 2023-01-01 | End: 2023-01-01

## 2023-01-01 RX ORDER — TRANEXAMIC ACID 100 MG/ML
500 INJECTION, SOLUTION INTRAVENOUS
Status: COMPLETED | OUTPATIENT
Start: 2023-01-01 | End: 2023-01-01

## 2023-01-01 RX ORDER — ONDANSETRON 4 MG/1
4 TABLET, ORALLY DISINTEGRATING ORAL EVERY 4 HOURS PRN
Status: DISCONTINUED | OUTPATIENT
Start: 2023-01-01 | End: 2023-01-01 | Stop reason: HOSPADM

## 2023-01-01 RX ORDER — NORTRIPTYLINE HYDROCHLORIDE 10 MG/1
10-20 CAPSULE ORAL NIGHTLY PRN
COMMUNITY
Start: 2023-01-01 | End: 2023-01-01

## 2023-01-01 RX ORDER — DEXAMETHASONE SODIUM PHOSPHATE 4 MG/ML
4 INJECTION, SOLUTION INTRA-ARTICULAR; INTRALESIONAL; INTRAMUSCULAR; INTRAVENOUS; SOFT TISSUE ONCE
Status: COMPLETED | OUTPATIENT
Start: 2023-01-01 | End: 2023-01-01

## 2023-01-01 RX ORDER — LORAZEPAM 0.5 MG/1
0.5 TABLET ORAL EVERY 6 HOURS PRN
Status: CANCELLED | OUTPATIENT
Start: 2023-01-01

## 2023-01-01 RX ORDER — SODIUM CHLORIDE 9 MG/ML
1000 INJECTION, SOLUTION INTRAVENOUS ONCE
Status: CANCELLED | OUTPATIENT
Start: 2023-01-01

## 2023-01-01 RX ORDER — LABETALOL HYDROCHLORIDE 5 MG/ML
10 INJECTION, SOLUTION INTRAVENOUS EVERY 4 HOURS PRN
Status: DISCONTINUED | OUTPATIENT
Start: 2023-01-01 | End: 2023-01-01 | Stop reason: HOSPADM

## 2023-01-01 RX ORDER — LABETALOL HYDROCHLORIDE 5 MG/ML
5 INJECTION, SOLUTION INTRAVENOUS
Status: DISCONTINUED | OUTPATIENT
Start: 2023-01-01 | End: 2023-01-01 | Stop reason: HOSPADM

## 2023-01-01 RX ORDER — HYDROMORPHONE HYDROCHLORIDE 1 MG/ML
1 INJECTION, SOLUTION INTRAMUSCULAR; INTRAVENOUS; SUBCUTANEOUS
Status: DISCONTINUED | OUTPATIENT
Start: 2023-01-01 | End: 2023-01-01

## 2023-01-01 RX ORDER — CEFAZOLIN SODIUM 1 G/3ML
INJECTION, POWDER, FOR SOLUTION INTRAMUSCULAR; INTRAVENOUS PRN
Status: DISCONTINUED | OUTPATIENT
Start: 2023-01-01 | End: 2023-01-01 | Stop reason: SURG

## 2023-01-01 RX ORDER — LORATADINE 10 MG/1
10 TABLET ORAL DAILY
Status: DISCONTINUED | OUTPATIENT
Start: 2023-01-01 | End: 2023-01-01 | Stop reason: HOSPADM

## 2023-01-01 RX ORDER — HYDROMORPHONE HYDROCHLORIDE 1 MG/ML
1 INJECTION, SOLUTION INTRAMUSCULAR; INTRAVENOUS; SUBCUTANEOUS ONCE
Status: COMPLETED | OUTPATIENT
Start: 2023-01-01 | End: 2023-01-01

## 2023-01-01 RX ORDER — DIPHENHYDRAMINE HCL 25 MG
25 TABLET ORAL 2 TIMES DAILY PRN
COMMUNITY

## 2023-01-01 RX ORDER — EPHEDRINE SULFATE 50 MG/ML
5 INJECTION, SOLUTION INTRAVENOUS
Status: DISCONTINUED | OUTPATIENT
Start: 2023-01-01 | End: 2023-01-01 | Stop reason: HOSPADM

## 2023-01-01 RX ORDER — ACETAMINOPHEN 325 MG/1
650 TABLET ORAL EVERY 6 HOURS
Status: DISCONTINUED | OUTPATIENT
Start: 2023-01-01 | End: 2023-01-01 | Stop reason: HOSPADM

## 2023-01-01 RX ORDER — OXYCODONE HYDROCHLORIDE 10 MG/1
10 TABLET ORAL EVERY 4 HOURS PRN
Status: DISCONTINUED | OUTPATIENT
Start: 2023-01-01 | End: 2023-01-01 | Stop reason: HOSPADM

## 2023-01-01 RX ORDER — LORAZEPAM 1 MG/1
0.5 TABLET ORAL NIGHTLY PRN
Status: DISCONTINUED | OUTPATIENT
Start: 2023-01-01 | End: 2023-01-01

## 2023-01-01 RX ORDER — METHYLPREDNISOLONE SODIUM SUCCINATE 125 MG/2ML
125 INJECTION, POWDER, LYOPHILIZED, FOR SOLUTION INTRAMUSCULAR; INTRAVENOUS PRN
Status: CANCELLED | OUTPATIENT
Start: 2023-01-01

## 2023-01-01 RX ORDER — DEXAMETHASONE SODIUM PHOSPHATE 4 MG/ML
4 INJECTION, SOLUTION INTRA-ARTICULAR; INTRALESIONAL; INTRAMUSCULAR; INTRAVENOUS; SOFT TISSUE EVERY 12 HOURS
Status: DISCONTINUED | OUTPATIENT
Start: 2023-01-01 | End: 2023-01-01 | Stop reason: HOSPADM

## 2023-01-01 RX ORDER — IPRATROPIUM BROMIDE AND ALBUTEROL SULFATE 2.5; .5 MG/3ML; MG/3ML
SOLUTION RESPIRATORY (INHALATION)
Qty: 180 ML | Refills: 3 | Status: SHIPPED | OUTPATIENT
Start: 2023-01-01 | End: 2023-01-01

## 2023-01-01 RX ORDER — 0.9 % SODIUM CHLORIDE 0.9 %
3 VIAL (ML) INJECTION PRN
Status: CANCELLED | OUTPATIENT
Start: 2023-01-01

## 2023-01-01 RX ORDER — IPRATROPIUM BROMIDE AND ALBUTEROL SULFATE 2.5; .5 MG/3ML; MG/3ML
3 SOLUTION RESPIRATORY (INHALATION)
Status: DISCONTINUED | OUTPATIENT
Start: 2023-01-01 | End: 2023-01-01 | Stop reason: HOSPADM

## 2023-01-01 RX ORDER — METOPROLOL TARTRATE 1 MG/ML
1 INJECTION, SOLUTION INTRAVENOUS
Status: DISCONTINUED | OUTPATIENT
Start: 2023-01-01 | End: 2023-01-01 | Stop reason: HOSPADM

## 2023-01-01 RX ORDER — LIDOCAINE 50 MG/G
1 PATCH TOPICAL EVERY 24 HOURS
Status: DISCONTINUED | OUTPATIENT
Start: 2023-01-01 | End: 2023-01-01 | Stop reason: HOSPADM

## 2023-01-01 RX ORDER — HYDROMORPHONE HYDROCHLORIDE 2 MG/1
2-4 TABLET ORAL
COMMUNITY
End: 2023-01-01

## 2023-01-01 RX ORDER — CHOLECALCIFEROL (VITAMIN D3) 125 MCG
10 CAPSULE ORAL NIGHTLY PRN
Status: DISCONTINUED | OUTPATIENT
Start: 2023-01-01 | End: 2023-01-01

## 2023-01-01 RX ORDER — DEXAMETHASONE 4 MG/1
4 TABLET ORAL 2 TIMES DAILY
Qty: 20 TABLET | Refills: 0 | Status: SHIPPED | OUTPATIENT
Start: 2023-01-01 | End: 2023-01-01

## 2023-01-01 RX ORDER — LORAZEPAM 2 MG/ML
1 INJECTION INTRAMUSCULAR
Status: DISCONTINUED | OUTPATIENT
Start: 2023-01-01 | End: 2023-01-01

## 2023-01-01 RX ORDER — DIPHENHYDRAMINE HYDROCHLORIDE 50 MG/ML
25 INJECTION INTRAMUSCULAR; INTRAVENOUS EVERY 6 HOURS PRN
Status: DISCONTINUED | OUTPATIENT
Start: 2023-01-01 | End: 2023-01-01 | Stop reason: HOSPADM

## 2023-01-01 RX ORDER — DIPHENHYDRAMINE HYDROCHLORIDE 50 MG/ML
25 INJECTION INTRAMUSCULAR; INTRAVENOUS EVERY 6 HOURS PRN
Status: DISCONTINUED | OUTPATIENT
Start: 2023-01-01 | End: 2023-01-01

## 2023-01-01 RX ORDER — IPRATROPIUM BROMIDE AND ALBUTEROL SULFATE 2.5; .5 MG/3ML; MG/3ML
3 SOLUTION RESPIRATORY (INHALATION) 2 TIMES DAILY
COMMUNITY
End: 2023-01-01

## 2023-01-01 RX ORDER — BUDESONIDE 180 UG/1
2 AEROSOL, POWDER RESPIRATORY (INHALATION) 2 TIMES DAILY
Qty: 30 EACH | Refills: 3 | Status: SHIPPED | OUTPATIENT
Start: 2023-01-01 | End: 2023-01-01

## 2023-01-01 RX ORDER — METHYLPREDNISOLONE SODIUM SUCCINATE 125 MG/2ML
125 INJECTION, POWDER, LYOPHILIZED, FOR SOLUTION INTRAMUSCULAR; INTRAVENOUS ONCE
Status: COMPLETED | OUTPATIENT
Start: 2023-01-01 | End: 2023-01-01

## 2023-01-01 RX ORDER — SODIUM CHLORIDE 9 MG/ML
1000 INJECTION, SOLUTION INTRAVENOUS CONTINUOUS
Status: DISCONTINUED | OUTPATIENT
Start: 2023-01-01 | End: 2023-01-01 | Stop reason: HOSPADM

## 2023-01-01 RX ORDER — ONDANSETRON 2 MG/ML
8 INJECTION INTRAMUSCULAR; INTRAVENOUS EVERY 8 HOURS PRN
Status: CANCELLED | OUTPATIENT
Start: 2023-01-01

## 2023-01-01 RX ORDER — LIDOCAINE HYDROCHLORIDE 20 MG/ML
INJECTION, SOLUTION EPIDURAL; INFILTRATION; INTRACAUDAL; PERINEURAL PRN
Status: DISCONTINUED | OUTPATIENT
Start: 2023-01-01 | End: 2023-01-01 | Stop reason: SURG

## 2023-01-01 RX ORDER — POLYETHYLENE GLYCOL 3350 17 G/17G
1 POWDER, FOR SOLUTION ORAL
Status: DISCONTINUED | OUTPATIENT
Start: 2023-01-01 | End: 2023-01-01 | Stop reason: HOSPADM

## 2023-01-01 RX ORDER — ALPRAZOLAM 0.25 MG/1
0.25 TABLET ORAL 3 TIMES DAILY PRN
Status: DISCONTINUED | OUTPATIENT
Start: 2023-01-01 | End: 2023-01-01

## 2023-01-01 RX ORDER — GABAPENTIN 100 MG/1
100 CAPSULE ORAL EVERY 8 HOURS
Status: DISCONTINUED | OUTPATIENT
Start: 2023-01-01 | End: 2023-01-01

## 2023-01-01 RX ORDER — LEVOFLOXACIN 750 MG/1
750 TABLET, FILM COATED ORAL DAILY
Qty: 5 TABLET | Refills: 0 | Status: SHIPPED | OUTPATIENT
Start: 2023-01-01 | End: 2023-01-01

## 2023-01-01 RX ORDER — OXYCODONE HYDROCHLORIDE 5 MG/1
5 TABLET ORAL EVERY 4 HOURS PRN
Status: DISCONTINUED | OUTPATIENT
Start: 2023-01-01 | End: 2023-01-01 | Stop reason: HOSPADM

## 2023-01-01 RX ORDER — AMOXICILLIN 250 MG
2 CAPSULE ORAL 2 TIMES DAILY
Status: DISCONTINUED | OUTPATIENT
Start: 2023-01-01 | End: 2023-01-01 | Stop reason: HOSPADM

## 2023-01-01 RX ORDER — LORAZEPAM 0.5 MG/1
0.5 TABLET ORAL EVERY 4 HOURS PRN
Status: DISCONTINUED | OUTPATIENT
Start: 2023-01-01 | End: 2023-01-01

## 2023-01-01 RX ORDER — OXYCODONE HCL 5 MG/5 ML
10 SOLUTION, ORAL ORAL
Status: COMPLETED | OUTPATIENT
Start: 2023-01-01 | End: 2023-01-01

## 2023-01-01 RX ORDER — HYDROXYZINE HYDROCHLORIDE 10 MG/1
TABLET, FILM COATED ORAL
COMMUNITY
End: 2023-01-01

## 2023-01-01 RX ORDER — SODIUM CHLORIDE, SODIUM LACTATE, POTASSIUM CHLORIDE, AND CALCIUM CHLORIDE .6; .31; .03; .02 G/100ML; G/100ML; G/100ML; G/100ML
30 INJECTION, SOLUTION INTRAVENOUS ONCE
Status: COMPLETED | OUTPATIENT
Start: 2023-01-01 | End: 2023-01-01

## 2023-01-01 RX ORDER — PROMETHAZINE HYDROCHLORIDE 25 MG/1
12.5-25 TABLET ORAL EVERY 4 HOURS PRN
Status: DISCONTINUED | OUTPATIENT
Start: 2023-01-01 | End: 2023-01-01 | Stop reason: HOSPADM

## 2023-01-01 RX ORDER — LIDOCAINE HYDROCHLORIDE 40 MG/ML
SOLUTION TOPICAL PRN
Status: DISCONTINUED | OUTPATIENT
Start: 2023-01-01 | End: 2023-01-01 | Stop reason: SURG

## 2023-01-01 RX ORDER — OXYCODONE HCL 5 MG/5 ML
5 SOLUTION, ORAL ORAL
Status: DISCONTINUED | OUTPATIENT
Start: 2023-01-01 | End: 2023-01-01 | Stop reason: HOSPADM

## 2023-01-01 RX ORDER — HYDRALAZINE HYDROCHLORIDE 20 MG/ML
10 INJECTION INTRAMUSCULAR; INTRAVENOUS EVERY 4 HOURS PRN
Status: DISCONTINUED | OUTPATIENT
Start: 2023-01-01 | End: 2023-01-01

## 2023-01-01 RX ORDER — EPINEPHRINE 1 MG/ML(1)
0.5 AMPUL (ML) INJECTION PRN
Status: CANCELLED | OUTPATIENT
Start: 2023-01-01

## 2023-01-01 RX ORDER — GABAPENTIN 300 MG/1
300 CAPSULE ORAL 3 TIMES DAILY
Qty: 90 CAPSULE | Refills: 1 | Status: SHIPPED | OUTPATIENT
Start: 2023-01-01 | End: 2023-01-01

## 2023-01-01 RX ORDER — FENTANYL 50 UG/1
1 PATCH TRANSDERMAL
Status: DISCONTINUED | OUTPATIENT
Start: 2023-01-01 | End: 2023-01-01

## 2023-01-01 RX ORDER — PROCHLORPERAZINE EDISYLATE 5 MG/ML
10 INJECTION INTRAMUSCULAR; INTRAVENOUS EVERY 6 HOURS PRN
Status: CANCELLED | OUTPATIENT
Start: 2023-01-01

## 2023-01-01 RX ORDER — ALBUTEROL SULFATE 90 UG/1
AEROSOL, METERED RESPIRATORY (INHALATION)
Qty: 18 G | Refills: 2 | Status: SHIPPED | OUTPATIENT
Start: 2023-01-01 | End: 2023-01-01

## 2023-01-01 RX ORDER — HYDROMORPHONE HYDROCHLORIDE 1 MG/ML
INJECTION, SOLUTION INTRAMUSCULAR; INTRAVENOUS; SUBCUTANEOUS
Status: COMPLETED
Start: 2023-01-01 | End: 2023-01-01

## 2023-01-01 RX ORDER — LEVALBUTEROL INHALATION SOLUTION 1.25 MG/3ML
1.25 SOLUTION RESPIRATORY (INHALATION)
Status: DISCONTINUED | OUTPATIENT
Start: 2023-01-01 | End: 2023-01-01

## 2023-01-01 RX ORDER — LORAZEPAM 2 MG/ML
0.5 INJECTION INTRAMUSCULAR EVERY 4 HOURS PRN
Status: DISCONTINUED | OUTPATIENT
Start: 2023-01-01 | End: 2023-01-01

## 2023-01-01 RX ORDER — NORTRIPTYLINE HYDROCHLORIDE 10 MG/1
20 CAPSULE ORAL
Status: ON HOLD | COMMUNITY
Start: 2023-01-01 | End: 2023-01-01

## 2023-01-01 RX ORDER — SODIUM CHLORIDE 9 MG/ML
INJECTION, SOLUTION INTRAVENOUS CONTINUOUS
Status: DISCONTINUED | OUTPATIENT
Start: 2023-01-01 | End: 2023-01-01 | Stop reason: HOSPADM

## 2023-01-01 RX ORDER — SODIUM CHLORIDE, SODIUM LACTATE, POTASSIUM CHLORIDE, CALCIUM CHLORIDE 600; 310; 30; 20 MG/100ML; MG/100ML; MG/100ML; MG/100ML
INJECTION, SOLUTION INTRAVENOUS CONTINUOUS
Status: ACTIVE | OUTPATIENT
Start: 2023-01-01 | End: 2023-01-01

## 2023-01-01 RX ORDER — PROCHLORPERAZINE EDISYLATE 5 MG/ML
5-10 INJECTION INTRAMUSCULAR; INTRAVENOUS EVERY 4 HOURS PRN
Status: DISCONTINUED | OUTPATIENT
Start: 2023-01-01 | End: 2023-01-01

## 2023-01-01 RX ORDER — HYDROMORPHONE HYDROCHLORIDE 2 MG/1
2 TABLET ORAL
Status: DISCONTINUED | OUTPATIENT
Start: 2023-01-01 | End: 2023-01-01 | Stop reason: HOSPADM

## 2023-01-01 RX ORDER — MORPHINE SULFATE 30 MG/1
30 TABLET, FILM COATED, EXTENDED RELEASE ORAL EVERY 8 HOURS
Status: DISCONTINUED | OUTPATIENT
Start: 2023-01-01 | End: 2023-01-01 | Stop reason: HOSPADM

## 2023-01-01 RX ORDER — DEXAMETHASONE SODIUM PHOSPHATE 4 MG/ML
6 INJECTION, SOLUTION INTRA-ARTICULAR; INTRALESIONAL; INTRAMUSCULAR; INTRAVENOUS; SOFT TISSUE 2 TIMES DAILY
Status: DISCONTINUED | OUTPATIENT
Start: 2023-01-01 | End: 2023-01-01

## 2023-01-01 RX ORDER — CYCLOBENZAPRINE HCL 10 MG
10 TABLET ORAL 3 TIMES DAILY PRN
Status: DISCONTINUED | OUTPATIENT
Start: 2023-01-01 | End: 2023-01-01 | Stop reason: HOSPADM

## 2023-01-01 RX ORDER — SODIUM CHLORIDE 9 MG/ML
INJECTION, SOLUTION INTRAVENOUS CONTINUOUS
Status: DISCONTINUED | OUTPATIENT
Start: 2023-01-01 | End: 2023-01-01

## 2023-01-01 RX ORDER — DEXAMETHASONE 4 MG/1
4 TABLET ORAL EVERY 12 HOURS
Qty: 8 TABLET | Refills: 0 | Status: SHIPPED | OUTPATIENT
Start: 2023-01-01 | End: 2023-01-01

## 2023-01-01 RX ORDER — DIPHENHYDRAMINE HYDROCHLORIDE 50 MG/ML
25 INJECTION INTRAMUSCULAR; INTRAVENOUS EVERY 6 HOURS
Status: DISCONTINUED | OUTPATIENT
Start: 2023-01-01 | End: 2023-01-01

## 2023-01-01 RX ORDER — IPRATROPIUM BROMIDE AND ALBUTEROL SULFATE 2.5; .5 MG/3ML; MG/3ML
3 SOLUTION RESPIRATORY (INHALATION) EVERY 6 HOURS PRN
Status: DISCONTINUED | OUTPATIENT
Start: 2023-01-01 | End: 2023-01-01

## 2023-01-01 RX ORDER — GABAPENTIN 300 MG/1
300 CAPSULE ORAL EVERY 8 HOURS
Status: DISCONTINUED | OUTPATIENT
Start: 2023-01-01 | End: 2023-01-01

## 2023-01-01 RX ORDER — HYDROMORPHONE HYDROCHLORIDE 1 MG/ML
0.6 INJECTION, SOLUTION INTRAMUSCULAR; INTRAVENOUS; SUBCUTANEOUS
Status: DISCONTINUED | OUTPATIENT
Start: 2023-01-01 | End: 2023-01-01 | Stop reason: HOSPADM

## 2023-01-01 RX ORDER — CALCIUM CARBONATE 500 MG/1
500 TABLET, CHEWABLE ORAL 2 TIMES DAILY PRN
Status: DISCONTINUED | OUTPATIENT
Start: 2023-01-01 | End: 2023-01-01 | Stop reason: HOSPADM

## 2023-01-01 RX ORDER — OXYCODONE HYDROCHLORIDE AND ACETAMINOPHEN 5; 325 MG/1; MG/1
1-2 TABLET ORAL EVERY 4 HOURS PRN
Qty: 15 TABLET | Refills: 0 | Status: SHIPPED | OUTPATIENT
Start: 2023-01-01 | End: 2023-01-01

## 2023-01-01 RX ORDER — MEDROXYPROGESTERONE ACETATE 10 MG/1
10 TABLET ORAL DAILY
Qty: 10 TABLET | Refills: 0 | Status: SHIPPED | OUTPATIENT
Start: 2023-01-01 | End: 2023-01-01

## 2023-01-01 RX ORDER — DEXMEDETOMIDINE HYDROCHLORIDE 100 UG/ML
INJECTION, SOLUTION INTRAVENOUS PRN
Status: DISCONTINUED | OUTPATIENT
Start: 2023-01-01 | End: 2023-01-01 | Stop reason: SURG

## 2023-01-01 RX ORDER — PREDNISONE 10 MG/1
40 TABLET ORAL ONCE
Status: COMPLETED | OUTPATIENT
Start: 2023-01-01 | End: 2023-01-01

## 2023-01-01 RX ORDER — MORPHINE SULFATE 4 MG/ML
2 INJECTION INTRAVENOUS ONCE
Status: COMPLETED | OUTPATIENT
Start: 2023-01-01 | End: 2023-01-01

## 2023-01-01 RX ORDER — SODIUM CHLORIDE, SODIUM LACTATE, POTASSIUM CHLORIDE, CALCIUM CHLORIDE 600; 310; 30; 20 MG/100ML; MG/100ML; MG/100ML; MG/100ML
INJECTION, SOLUTION INTRAVENOUS ONCE
Status: COMPLETED | OUTPATIENT
Start: 2023-01-01 | End: 2023-01-01

## 2023-01-01 RX ORDER — HYDROMORPHONE HYDROCHLORIDE 1 MG/ML
0.5 INJECTION, SOLUTION INTRAMUSCULAR; INTRAVENOUS; SUBCUTANEOUS
Status: DISCONTINUED | OUTPATIENT
Start: 2023-01-01 | End: 2023-01-01

## 2023-01-01 RX ORDER — SODIUM CHLORIDE 9 MG/ML
INJECTION, SOLUTION INTRAVENOUS CONTINUOUS
Status: CANCELLED | OUTPATIENT
Start: 2023-01-01

## 2023-01-01 RX ORDER — BUPRENORPHINE 10 UG/H
1 PATCH TRANSDERMAL
COMMUNITY
End: 2023-01-01

## 2023-01-01 RX ORDER — HYDROCODONE BITARTRATE AND ACETAMINOPHEN 10; 325 MG/1; MG/1
1 TABLET ORAL EVERY 4 HOURS
Status: DISCONTINUED | OUTPATIENT
Start: 2023-01-01 | End: 2023-01-01

## 2023-01-01 RX ORDER — PREGABALIN 75 MG/1
75 CAPSULE ORAL 3 TIMES DAILY
Status: DISCONTINUED | OUTPATIENT
Start: 2023-01-01 | End: 2023-01-01 | Stop reason: HOSPADM

## 2023-01-01 RX ORDER — LIDOCAINE 50 MG/G
1 PATCH TOPICAL EVERY 24 HOURS
Qty: 10 PATCH | Refills: 0 | Status: SHIPPED | OUTPATIENT
Start: 2023-01-01 | End: 2023-01-01

## 2023-01-01 RX ORDER — DEXAMETHASONE 4 MG/1
4 TABLET ORAL DAILY
Status: DISCONTINUED | OUTPATIENT
Start: 2023-10-26 | End: 2023-01-01 | Stop reason: HOSPADM

## 2023-01-01 RX ORDER — HYDROMORPHONE HYDROCHLORIDE 1 MG/ML
0.1 INJECTION, SOLUTION INTRAMUSCULAR; INTRAVENOUS; SUBCUTANEOUS
Status: DISCONTINUED | OUTPATIENT
Start: 2023-01-01 | End: 2023-01-01 | Stop reason: HOSPADM

## 2023-01-01 RX ORDER — HYDROMORPHONE HYDROCHLORIDE 1 MG/ML
0.5 INJECTION, SOLUTION INTRAMUSCULAR; INTRAVENOUS; SUBCUTANEOUS
Status: CANCELLED | OUTPATIENT
Start: 2023-01-01 | End: 2023-01-01

## 2023-01-01 RX ORDER — IPRATROPIUM BROMIDE AND ALBUTEROL SULFATE 2.5; .5 MG/3ML; MG/3ML
3 SOLUTION RESPIRATORY (INHALATION) 4 TIMES DAILY
Status: DISCONTINUED | OUTPATIENT
Start: 2023-01-01 | End: 2023-01-01

## 2023-01-01 RX ORDER — LIDOCAINE 50 MG/G
3 PATCH TOPICAL EVERY 24 HOURS
Status: DISCONTINUED | OUTPATIENT
Start: 2023-01-01 | End: 2023-01-01

## 2023-01-01 RX ORDER — HYDROMORPHONE HYDROCHLORIDE 1 MG/ML
0.5 INJECTION, SOLUTION INTRAMUSCULAR; INTRAVENOUS; SUBCUTANEOUS
Status: DISCONTINUED | OUTPATIENT
Start: 2023-01-01 | End: 2023-01-01 | Stop reason: HOSPADM

## 2023-01-01 RX ORDER — GABAPENTIN 300 MG/1
300 CAPSULE ORAL 3 TIMES DAILY
Status: DISCONTINUED | OUTPATIENT
Start: 2023-01-01 | End: 2023-01-01

## 2023-01-01 RX ORDER — ALBUTEROL SULFATE 90 UG/1
2 AEROSOL, METERED RESPIRATORY (INHALATION)
Status: DISCONTINUED | OUTPATIENT
Start: 2023-01-01 | End: 2023-01-01

## 2023-01-01 RX ORDER — LEVALBUTEROL INHALATION SOLUTION 1.25 MG/3ML
1.25 SOLUTION RESPIRATORY (INHALATION)
Status: DISCONTINUED | OUTPATIENT
Start: 2023-01-01 | End: 2023-01-01 | Stop reason: HOSPADM

## 2023-01-01 RX ORDER — HYDROXYZINE HYDROCHLORIDE 10 MG/1
10 TABLET, FILM COATED ORAL
Status: DISCONTINUED | OUTPATIENT
Start: 2023-01-01 | End: 2023-01-01

## 2023-01-01 RX ORDER — SULFAMETHOXAZOLE AND TRIMETHOPRIM 800; 160 MG/1; MG/1
1 TABLET ORAL 2 TIMES DAILY
Qty: 10 TABLET | Refills: 0 | Status: SHIPPED | OUTPATIENT
Start: 2023-01-01 | End: 2023-01-01

## 2023-01-01 RX ORDER — APIXABAN 5 MG (74)
5 KIT ORAL
Qty: 70 EACH | Refills: 3 | Status: SHIPPED | OUTPATIENT
Start: 2023-01-01 | End: 2023-01-01

## 2023-01-01 RX ORDER — ALBUTEROL SULFATE 90 UG/1
2 AEROSOL, METERED RESPIRATORY (INHALATION) EVERY 4 HOURS PRN
Status: DISCONTINUED | OUTPATIENT
Start: 2023-01-01 | End: 2023-01-01

## 2023-01-01 RX ORDER — AZITHROMYCIN 500 MG/1
500 INJECTION, POWDER, LYOPHILIZED, FOR SOLUTION INTRAVENOUS ONCE
Status: COMPLETED | OUTPATIENT
Start: 2023-01-01 | End: 2023-01-01

## 2023-01-01 RX ORDER — LORAZEPAM 2 MG/ML
0.5 INJECTION INTRAMUSCULAR EVERY 6 HOURS PRN
Status: DISCONTINUED | OUTPATIENT
Start: 2023-01-01 | End: 2023-01-01 | Stop reason: HOSPADM

## 2023-01-01 RX ORDER — ECHINACEA PURPUREA EXTRACT 125 MG
2 TABLET ORAL
Status: DISCONTINUED | OUTPATIENT
Start: 2023-01-01 | End: 2023-01-01 | Stop reason: HOSPADM

## 2023-01-01 RX ORDER — ALBUTEROL SULFATE 90 UG/1
2 AEROSOL, METERED RESPIRATORY (INHALATION) EVERY 4 HOURS PRN
COMMUNITY

## 2023-01-01 RX ORDER — ROCURONIUM BROMIDE 10 MG/ML
INJECTION, SOLUTION INTRAVENOUS PRN
Status: DISCONTINUED | OUTPATIENT
Start: 2023-01-01 | End: 2023-01-01 | Stop reason: SURG

## 2023-01-01 RX ORDER — NICOTINE 21 MG/24HR
14 PATCH, TRANSDERMAL 24 HOURS TRANSDERMAL
Status: DISCONTINUED | OUTPATIENT
Start: 2023-01-01 | End: 2023-01-01 | Stop reason: HOSPADM

## 2023-01-01 RX ORDER — DEXAMETHASONE 4 MG/1
4 TABLET ORAL EVERY 12 HOURS
Status: DISCONTINUED | OUTPATIENT
Start: 2023-01-01 | End: 2023-01-01 | Stop reason: HOSPADM

## 2023-01-01 RX ORDER — DEXAMETHASONE 4 MG/1
4 TABLET ORAL 2 TIMES DAILY
COMMUNITY
End: 2023-01-01

## 2023-01-01 RX ORDER — LORAZEPAM 2 MG/ML
1 INJECTION INTRAMUSCULAR ONCE
Status: COMPLETED | OUTPATIENT
Start: 2023-01-01 | End: 2023-01-01

## 2023-01-01 RX ORDER — NICOTINE 21 MG/24HR
1 PATCH, TRANSDERMAL 24 HOURS TRANSDERMAL EVERY 24 HOURS
Qty: 14 PATCH | Refills: 0 | Status: ON HOLD | OUTPATIENT
Start: 2023-01-01 | End: 2023-01-01

## 2023-01-01 RX ORDER — HYDROMORPHONE HYDROCHLORIDE 1 MG/ML
0.5 INJECTION, SOLUTION INTRAMUSCULAR; INTRAVENOUS; SUBCUTANEOUS ONCE
Status: COMPLETED | OUTPATIENT
Start: 2023-01-01 | End: 2023-01-01

## 2023-01-01 RX ORDER — HYDROMORPHONE HYDROCHLORIDE 2 MG/ML
2 INJECTION, SOLUTION INTRAMUSCULAR; INTRAVENOUS; SUBCUTANEOUS
Status: DISCONTINUED | OUTPATIENT
Start: 2023-01-01 | End: 2023-01-01 | Stop reason: HOSPADM

## 2023-01-01 RX ORDER — LABETALOL HYDROCHLORIDE 5 MG/ML
INJECTION, SOLUTION INTRAVENOUS PRN
Status: DISCONTINUED | OUTPATIENT
Start: 2023-01-01 | End: 2023-01-01 | Stop reason: SURG

## 2023-01-01 RX ORDER — IPRATROPIUM BROMIDE AND ALBUTEROL SULFATE 2.5; .5 MG/3ML; MG/3ML
SOLUTION RESPIRATORY (INHALATION)
Qty: 180 ML | Refills: 11 | Status: ON HOLD | OUTPATIENT
Start: 2023-01-01 | End: 2023-01-01

## 2023-01-01 RX ORDER — MORPHINE SULFATE 15 MG/1
15 TABLET, FILM COATED, EXTENDED RELEASE ORAL EVERY 12 HOURS
Status: DISCONTINUED | OUTPATIENT
Start: 2023-01-01 | End: 2023-01-01 | Stop reason: HOSPADM

## 2023-01-01 RX ORDER — PROCHLORPERAZINE EDISYLATE 5 MG/ML
5-10 INJECTION INTRAMUSCULAR; INTRAVENOUS EVERY 4 HOURS PRN
Status: DISCONTINUED | OUTPATIENT
Start: 2023-01-01 | End: 2023-01-01 | Stop reason: HOSPADM

## 2023-01-01 RX ORDER — HYDROCODONE BITARTRATE AND ACETAMINOPHEN 10; 325 MG/1; MG/1
1 TABLET ORAL EVERY 4 HOURS PRN
Status: DISCONTINUED | OUTPATIENT
Start: 2023-01-01 | End: 2023-01-01

## 2023-01-01 RX ORDER — FLUTICASONE PROPIONATE 50 MCG
2 SPRAY, SUSPENSION (ML) NASAL DAILY
COMMUNITY

## 2023-01-01 RX ORDER — DEXAMETHASONE SODIUM PHOSPHATE 4 MG/ML
4 INJECTION, SOLUTION INTRA-ARTICULAR; INTRALESIONAL; INTRAMUSCULAR; INTRAVENOUS; SOFT TISSUE 3 TIMES DAILY
Status: DISCONTINUED | OUTPATIENT
Start: 2023-01-01 | End: 2023-01-01

## 2023-01-01 RX ORDER — GABAPENTIN 300 MG/1
300 CAPSULE ORAL 3 TIMES DAILY PRN
COMMUNITY
End: 2023-01-01

## 2023-01-01 RX ORDER — ONDANSETRON 2 MG/ML
4 INJECTION INTRAMUSCULAR; INTRAVENOUS ONCE
Status: COMPLETED | OUTPATIENT
Start: 2023-01-01 | End: 2023-01-01

## 2023-01-01 RX ORDER — CHOLECALCIFEROL (VITAMIN D3) 125 MCG
10 CAPSULE ORAL NIGHTLY
Status: DISCONTINUED | OUTPATIENT
Start: 2023-01-01 | End: 2023-01-01

## 2023-01-01 RX ORDER — LORATADINE 10 MG/1
10 TABLET ORAL DAILY
COMMUNITY

## 2023-01-01 RX ORDER — ALBUTEROL SULFATE 90 UG/1
2 AEROSOL, METERED RESPIRATORY (INHALATION) EVERY 4 HOURS PRN
Status: DISCONTINUED | OUTPATIENT
Start: 2023-01-01 | End: 2023-01-01 | Stop reason: HOSPADM

## 2023-01-01 RX ORDER — BUSPIRONE HYDROCHLORIDE 10 MG/1
15 TABLET ORAL 3 TIMES DAILY
Status: DISCONTINUED | OUTPATIENT
Start: 2023-01-01 | End: 2023-01-01 | Stop reason: HOSPADM

## 2023-01-01 RX ORDER — TRANEXAMIC ACID 100 MG/ML
500 INJECTION, SOLUTION INTRAVENOUS
Status: DISCONTINUED | OUTPATIENT
Start: 2023-01-01 | End: 2023-01-01

## 2023-01-01 RX ORDER — MIDAZOLAM HYDROCHLORIDE 1 MG/ML
1 INJECTION INTRAMUSCULAR; INTRAVENOUS
Status: DISCONTINUED | OUTPATIENT
Start: 2023-01-01 | End: 2023-01-01 | Stop reason: HOSPADM

## 2023-01-01 RX ORDER — GABAPENTIN 300 MG/1
300 CAPSULE ORAL ONCE
Status: COMPLETED | OUTPATIENT
Start: 2023-01-01 | End: 2023-01-01

## 2023-01-01 RX ORDER — DEXAMETHASONE 4 MG/1
4 TABLET ORAL EVERY 8 HOURS
Status: DISPENSED | OUTPATIENT
Start: 2023-01-01 | End: 2023-01-01

## 2023-01-01 RX ORDER — HYDROCODONE BITARTRATE AND ACETAMINOPHEN 10; 325 MG/1; MG/1
1 TABLET ORAL EVERY 4 HOURS PRN
Status: DISCONTINUED | OUTPATIENT
Start: 2023-01-01 | End: 2023-01-01 | Stop reason: HOSPADM

## 2023-01-01 RX ORDER — DIPHENHYDRAMINE HCL 25 MG
25 TABLET ORAL EVERY 6 HOURS PRN
Status: DISCONTINUED | OUTPATIENT
Start: 2023-01-01 | End: 2023-01-01 | Stop reason: HOSPADM

## 2023-01-01 RX ORDER — LIDOCAINE 50 MG/G
1 PATCH TOPICAL ONCE
Status: DISCONTINUED | OUTPATIENT
Start: 2023-01-01 | End: 2023-01-01 | Stop reason: HOSPADM

## 2023-01-01 RX ORDER — HYDROCODONE BITARTRATE AND ACETAMINOPHEN 10; 325 MG/1; MG/1
1 TABLET ORAL EVERY 4 HOURS PRN
COMMUNITY
Start: 2023-01-01

## 2023-01-01 RX ORDER — LORAZEPAM 0.5 MG/1
0.5 TABLET ORAL ONCE
Status: COMPLETED | OUTPATIENT
Start: 2023-01-01 | End: 2023-01-01

## 2023-01-01 RX ORDER — PROMETHAZINE HYDROCHLORIDE 25 MG/1
12.5-25 SUPPOSITORY RECTAL EVERY 4 HOURS PRN
Status: DISCONTINUED | OUTPATIENT
Start: 2023-01-01 | End: 2023-01-01

## 2023-01-01 RX ORDER — FUROSEMIDE 10 MG/ML
20 INJECTION INTRAMUSCULAR; INTRAVENOUS ONCE
Status: COMPLETED | OUTPATIENT
Start: 2023-01-01 | End: 2023-01-01

## 2023-01-01 RX ORDER — IPRATROPIUM BROMIDE AND ALBUTEROL SULFATE 2.5; .5 MG/3ML; MG/3ML
SOLUTION RESPIRATORY (INHALATION)
Qty: 180 ML | Refills: 0 | Status: SHIPPED | OUTPATIENT
Start: 2023-01-01 | End: 2023-01-01

## 2023-01-01 RX ORDER — LIDOCAINE HYDROCHLORIDE 20 MG/ML
INJECTION, SOLUTION INFILTRATION; PERINEURAL
Status: DISCONTINUED
Start: 2023-01-01 | End: 2023-01-01 | Stop reason: HOSPADM

## 2023-01-01 RX ORDER — SODIUM CHLORIDE 9 MG/ML
1000 INJECTION, SOLUTION INTRAVENOUS ONCE
Status: COMPLETED | OUTPATIENT
Start: 2023-01-01 | End: 2023-01-01

## 2023-01-01 RX ORDER — FUROSEMIDE 10 MG/ML
40 INJECTION INTRAMUSCULAR; INTRAVENOUS
Status: DISCONTINUED | OUTPATIENT
Start: 2023-01-01 | End: 2023-01-01 | Stop reason: HOSPADM

## 2023-01-01 RX ORDER — PROMETHAZINE HYDROCHLORIDE 25 MG/1
12.5-25 SUPPOSITORY RECTAL EVERY 4 HOURS PRN
Status: DISCONTINUED | OUTPATIENT
Start: 2023-01-01 | End: 2023-01-01 | Stop reason: HOSPADM

## 2023-01-01 RX ORDER — AZITHROMYCIN 500 MG/5ML
500 INJECTION, POWDER, LYOPHILIZED, FOR SOLUTION INTRAVENOUS EVERY 24 HOURS
Status: COMPLETED | OUTPATIENT
Start: 2023-01-01 | End: 2023-01-01

## 2023-01-01 RX ORDER — LORAZEPAM 1 MG/1
1 TABLET ORAL ONCE
Status: COMPLETED | OUTPATIENT
Start: 2023-01-01 | End: 2023-01-01

## 2023-01-01 RX ORDER — PREDNISONE 20 MG/1
40 TABLET ORAL DAILY
Status: DISCONTINUED | OUTPATIENT
Start: 2023-01-01 | End: 2023-01-01

## 2023-01-01 RX ORDER — HYDROCODONE BITARTRATE AND ACETAMINOPHEN 10; 325 MG/1; MG/1
1 TABLET ORAL ONCE
Status: COMPLETED | OUTPATIENT
Start: 2023-01-01 | End: 2023-01-01

## 2023-01-01 RX ORDER — AZITHROMYCIN 250 MG/1
TABLET, FILM COATED ORAL
Qty: 6 TABLET | Refills: 0 | Status: SHIPPED | OUTPATIENT
Start: 2023-01-01 | End: 2023-01-01

## 2023-01-01 RX ORDER — TRAMADOL HYDROCHLORIDE 50 MG/1
50 TABLET ORAL EVERY 6 HOURS PRN
Status: ON HOLD | COMMUNITY
Start: 2023-01-01 | End: 2023-01-01

## 2023-01-01 RX ORDER — PREDNISONE 20 MG/1
40 TABLET ORAL DAILY
Qty: 8 TABLET | Refills: 0 | Status: SHIPPED | OUTPATIENT
Start: 2023-01-01 | End: 2023-01-01

## 2023-01-01 RX ORDER — AZITHROMYCIN 250 MG/1
500 TABLET, FILM COATED ORAL DAILY
Status: DISCONTINUED | OUTPATIENT
Start: 2023-01-01 | End: 2023-01-01

## 2023-01-01 RX ORDER — PROMETHAZINE HYDROCHLORIDE 25 MG/1
12.5-25 TABLET ORAL EVERY 4 HOURS PRN
Status: DISCONTINUED | OUTPATIENT
Start: 2023-01-01 | End: 2023-01-01

## 2023-01-01 RX ORDER — OXYCODONE HYDROCHLORIDE 5 MG/1
5 TABLET ORAL
Status: DISCONTINUED | OUTPATIENT
Start: 2023-01-01 | End: 2023-01-01 | Stop reason: HOSPADM

## 2023-01-01 RX ORDER — DEXAMETHASONE SODIUM PHOSPHATE 4 MG/ML
12 INJECTION, SOLUTION INTRA-ARTICULAR; INTRALESIONAL; INTRAMUSCULAR; INTRAVENOUS; SOFT TISSUE ONCE
Status: CANCELLED | OUTPATIENT
Start: 2023-01-01 | End: 2023-01-01

## 2023-01-01 RX ORDER — DIPHENHYDRAMINE HCL 25 MG
25 TABLET ORAL 2 TIMES DAILY PRN
Status: DISCONTINUED | OUTPATIENT
Start: 2023-01-01 | End: 2023-01-01

## 2023-01-01 RX ORDER — AZITHROMYCIN 250 MG/1
500 TABLET, FILM COATED ORAL
Status: COMPLETED | OUTPATIENT
Start: 2023-01-01 | End: 2023-01-01

## 2023-01-01 RX ORDER — LORAZEPAM 1 MG/1
0.5 TABLET ORAL NIGHTLY
Status: DISCONTINUED | OUTPATIENT
Start: 2023-01-01 | End: 2023-01-01

## 2023-01-01 RX ORDER — PREDNISONE 10 MG/1
TABLET ORAL
Qty: 18 TABLET | Refills: 0 | Status: SHIPPED | OUTPATIENT
Start: 2023-01-01 | End: 2023-01-01

## 2023-01-01 RX ORDER — DEXMEDETOMIDINE HYDROCHLORIDE 4 UG/ML
.1-1.5 INJECTION, SOLUTION INTRAVENOUS CONTINUOUS
Status: DISCONTINUED | OUTPATIENT
Start: 2023-01-01 | End: 2023-01-01

## 2023-01-01 RX ADMIN — DIPHENHYDRAMINE HYDROCHLORIDE 25 MG: 50 INJECTION, SOLUTION INTRAMUSCULAR; INTRAVENOUS at 19:59

## 2023-01-01 RX ADMIN — CLOTRIMAZOLE 10 MG: 10 LOZENGE ORAL; TOPICAL at 11:59

## 2023-01-01 RX ADMIN — IPRATROPIUM BROMIDE AND ALBUTEROL SULFATE 3 ML: 2.5; .5 SOLUTION RESPIRATORY (INHALATION) at 18:21

## 2023-01-01 RX ADMIN — HYDROMORPHONE HYDROCHLORIDE 1 MG: 1 INJECTION, SOLUTION INTRAMUSCULAR; INTRAVENOUS; SUBCUTANEOUS at 20:15

## 2023-01-01 RX ADMIN — DEXMEDETOMIDINE 1.5 MCG/KG/HR: 100 INJECTION, SOLUTION INTRAVENOUS at 10:02

## 2023-01-01 RX ADMIN — PROPOFOL 200 MG: 10 INJECTION, EMULSION INTRAVENOUS at 15:43

## 2023-01-01 RX ADMIN — HYDROMORPHONE HYDROCHLORIDE 1 MG: 1 INJECTION, SOLUTION INTRAMUSCULAR; INTRAVENOUS; SUBCUTANEOUS at 10:35

## 2023-01-01 RX ADMIN — GUAIFENESIN 600 MG: 600 TABLET, EXTENDED RELEASE ORAL at 17:02

## 2023-01-01 RX ADMIN — HYDROMORPHONE HYDROCHLORIDE 1 MG: 1 INJECTION, SOLUTION INTRAMUSCULAR; INTRAVENOUS; SUBCUTANEOUS at 20:36

## 2023-01-01 RX ADMIN — SENNOSIDES AND DOCUSATE SODIUM 2 TABLET: 50; 8.6 TABLET ORAL at 17:23

## 2023-01-01 RX ADMIN — LORAZEPAM 0.5 MG: 2 INJECTION INTRAMUSCULAR; INTRAVENOUS at 22:59

## 2023-01-01 RX ADMIN — HYDROMORPHONE HYDROCHLORIDE 1 MG: 1 INJECTION, SOLUTION INTRAMUSCULAR; INTRAVENOUS; SUBCUTANEOUS at 07:41

## 2023-01-01 RX ADMIN — LORAZEPAM 0.5 MG: 1 TABLET ORAL at 21:37

## 2023-01-01 RX ADMIN — DEXAMETHASONE SODIUM PHOSPHATE 4 MG: 4 INJECTION INTRA-ARTICULAR; INTRALESIONAL; INTRAMUSCULAR; INTRAVENOUS; SOFT TISSUE at 18:44

## 2023-01-01 RX ADMIN — DIPHENHYDRAMINE HYDROCHLORIDE 25 MG: 50 INJECTION, SOLUTION INTRAMUSCULAR; INTRAVENOUS at 12:53

## 2023-01-01 RX ADMIN — LORAZEPAM 0.5 MG: 2 INJECTION INTRAMUSCULAR; INTRAVENOUS at 19:39

## 2023-01-01 RX ADMIN — IPRATROPIUM BROMIDE AND ALBUTEROL SULFATE 3 ML: .5; 3 SOLUTION RESPIRATORY (INHALATION) at 02:54

## 2023-01-01 RX ADMIN — IPRATROPIUM BROMIDE AND ALBUTEROL SULFATE 3 ML: 2.5; .5 SOLUTION RESPIRATORY (INHALATION) at 11:17

## 2023-01-01 RX ADMIN — IPRATROPIUM BROMIDE AND ALBUTEROL SULFATE 3 ML: 2.5; .5 SOLUTION RESPIRATORY (INHALATION) at 04:40

## 2023-01-01 RX ADMIN — HYDROMORPHONE HYDROCHLORIDE 1 MG: 1 INJECTION, SOLUTION INTRAMUSCULAR; INTRAVENOUS; SUBCUTANEOUS at 15:41

## 2023-01-01 RX ADMIN — ALBUTEROL SULFATE 2 PUFF: 90 AEROSOL, METERED RESPIRATORY (INHALATION) at 05:01

## 2023-01-01 RX ADMIN — DIPHENHYDRAMINE HYDROCHLORIDE 25 MG: 50 INJECTION, SOLUTION INTRAMUSCULAR; INTRAVENOUS at 06:41

## 2023-01-01 RX ADMIN — DEXAMETHASONE SODIUM PHOSPHATE 4 MG: 4 INJECTION INTRA-ARTICULAR; INTRALESIONAL; INTRAMUSCULAR; INTRAVENOUS; SOFT TISSUE at 18:09

## 2023-01-01 RX ADMIN — IPRATROPIUM BROMIDE AND ALBUTEROL SULFATE 3 ML: 2.5; .5 SOLUTION RESPIRATORY (INHALATION) at 13:40

## 2023-01-01 RX ADMIN — OXYCODONE HYDROCHLORIDE 10 MG: 10 TABLET ORAL at 16:36

## 2023-01-01 RX ADMIN — DEXAMETHASONE SODIUM PHOSPHATE 4 MG: 4 INJECTION INTRA-ARTICULAR; INTRALESIONAL; INTRAMUSCULAR; INTRAVENOUS; SOFT TISSUE at 05:56

## 2023-01-01 RX ADMIN — METOPROLOL TARTRATE 25 MG: 25 TABLET, FILM COATED ORAL at 06:24

## 2023-01-01 RX ADMIN — HYDROMORPHONE HYDROCHLORIDE 1 MG: 1 INJECTION, SOLUTION INTRAMUSCULAR; INTRAVENOUS; SUBCUTANEOUS at 03:31

## 2023-01-01 RX ADMIN — AZITHROMYCIN DIHYDRATE 500 MG: 250 TABLET ORAL at 05:58

## 2023-01-01 RX ADMIN — ACETAMINOPHEN 650 MG: 325 TABLET, FILM COATED ORAL at 13:31

## 2023-01-01 RX ADMIN — CEFAZOLIN 2 G: 1 INJECTION, POWDER, FOR SOLUTION INTRAMUSCULAR; INTRAVENOUS at 15:47

## 2023-01-01 RX ADMIN — CAPMATINIB 400 MG: 200 TABLET, FILM COATED ORAL at 15:15

## 2023-01-01 RX ADMIN — DEXMEDETOMIDINE 1.5 MCG/KG/HR: 100 INJECTION, SOLUTION INTRAVENOUS at 05:44

## 2023-01-01 RX ADMIN — MORPHINE SULFATE 4 MG: 4 INJECTION INTRAVENOUS at 03:09

## 2023-01-01 RX ADMIN — DIPHENHYDRAMINE HYDROCHLORIDE 25 MG: 50 INJECTION, SOLUTION INTRAMUSCULAR; INTRAVENOUS at 14:42

## 2023-01-01 RX ADMIN — DEXAMETHASONE SODIUM PHOSPHATE 4 MG: 4 INJECTION INTRA-ARTICULAR; INTRALESIONAL; INTRAMUSCULAR; INTRAVENOUS; SOFT TISSUE at 14:13

## 2023-01-01 RX ADMIN — DIPHENHYDRAMINE HYDROCHLORIDE 25 MG: 25 TABLET ORAL at 17:24

## 2023-01-01 RX ADMIN — HYDROMORPHONE HYDROCHLORIDE 1 MG: 1 INJECTION, SOLUTION INTRAMUSCULAR; INTRAVENOUS; SUBCUTANEOUS at 01:43

## 2023-01-01 RX ADMIN — DEXAMETHASONE SODIUM PHOSPHATE 8 MG: 4 INJECTION INTRA-ARTICULAR; INTRALESIONAL; INTRAMUSCULAR; INTRAVENOUS; SOFT TISSUE at 15:19

## 2023-01-01 RX ADMIN — HYDROMORPHONE HYDROCHLORIDE 1 MG: 1 INJECTION, SOLUTION INTRAMUSCULAR; INTRAVENOUS; SUBCUTANEOUS at 11:31

## 2023-01-01 RX ADMIN — FLUTICASONE FUROATE, UMECLIDINIUM BROMIDE AND VILANTEROL TRIFENATATE 1 PUFF: 100; 62.5; 25 POWDER RESPIRATORY (INHALATION) at 04:58

## 2023-01-01 RX ADMIN — APIXABAN 5 MG: 5 TABLET, FILM COATED ORAL at 04:29

## 2023-01-01 RX ADMIN — APIXABAN 5 MG: 5 TABLET, FILM COATED ORAL at 18:08

## 2023-01-01 RX ADMIN — Medication 1 APPLICATOR: at 05:38

## 2023-01-01 RX ADMIN — DIPHENHYDRAMINE HYDROCHLORIDE 25 MG: 50 INJECTION, SOLUTION INTRAMUSCULAR; INTRAVENOUS at 23:28

## 2023-01-01 RX ADMIN — DIPHENHYDRAMINE HYDROCHLORIDE 25 MG: 50 INJECTION, SOLUTION INTRAMUSCULAR; INTRAVENOUS at 11:31

## 2023-01-01 RX ADMIN — IPRATROPIUM BROMIDE AND ALBUTEROL SULFATE 3 ML: 2.5; .5 SOLUTION RESPIRATORY (INHALATION) at 02:28

## 2023-01-01 RX ADMIN — DEXAMETHASONE SODIUM PHOSPHATE 4 MG: 4 INJECTION INTRA-ARTICULAR; INTRALESIONAL; INTRAMUSCULAR; INTRAVENOUS; SOFT TISSUE at 12:40

## 2023-01-01 RX ADMIN — FENTANYL CITRATE 100 MCG: 50 INJECTION, SOLUTION INTRAMUSCULAR; INTRAVENOUS at 14:32

## 2023-01-01 RX ADMIN — APIXABAN 10 MG: 5 TABLET, FILM COATED ORAL at 04:57

## 2023-01-01 RX ADMIN — PREDNISONE 40 MG: 20 TABLET ORAL at 05:35

## 2023-01-01 RX ADMIN — LORATADINE 10 MG: 10 TABLET ORAL at 06:12

## 2023-01-01 RX ADMIN — CEFTRIAXONE SODIUM 2000 MG: 2 INJECTION, POWDER, FOR SOLUTION INTRAMUSCULAR; INTRAVENOUS at 13:59

## 2023-01-01 RX ADMIN — DEXAMETHASONE SODIUM PHOSPHATE 4 MG: 4 INJECTION INTRA-ARTICULAR; INTRALESIONAL; INTRAMUSCULAR; INTRAVENOUS; SOFT TISSUE at 12:11

## 2023-01-01 RX ADMIN — ACETAMINOPHEN 650 MG: 325 TABLET, FILM COATED ORAL at 05:22

## 2023-01-01 RX ADMIN — HYDROCODONE BITARTRATE AND ACETAMINOPHEN 1 TABLET: 10; 325 TABLET ORAL at 17:23

## 2023-01-01 RX ADMIN — DEXAMETHASONE SODIUM PHOSPHATE 4 MG: 4 INJECTION INTRA-ARTICULAR; INTRALESIONAL; INTRAMUSCULAR; INTRAVENOUS; SOFT TISSUE at 17:55

## 2023-01-01 RX ADMIN — RIVAROXABAN 10 MG: 10 TABLET, FILM COATED ORAL at 17:59

## 2023-01-01 RX ADMIN — DIPHENHYDRAMINE HYDROCHLORIDE 25 MG: 50 INJECTION, SOLUTION INTRAMUSCULAR; INTRAVENOUS at 02:40

## 2023-01-01 RX ADMIN — GUAIFENESIN 600 MG: 600 TABLET, EXTENDED RELEASE ORAL at 05:42

## 2023-01-01 RX ADMIN — DEXAMETHASONE SODIUM PHOSPHATE 4 MG: 4 INJECTION INTRA-ARTICULAR; INTRALESIONAL; INTRAMUSCULAR; INTRAVENOUS; SOFT TISSUE at 05:47

## 2023-01-01 RX ADMIN — APIXABAN 10 MG: 5 TABLET, FILM COATED ORAL at 14:46

## 2023-01-01 RX ADMIN — FENTANYL CITRATE 50 MCG: 50 INJECTION, SOLUTION INTRAMUSCULAR; INTRAVENOUS at 15:12

## 2023-01-01 RX ADMIN — HYDROMORPHONE HYDROCHLORIDE 1 MG: 1 INJECTION, SOLUTION INTRAMUSCULAR; INTRAVENOUS; SUBCUTANEOUS at 23:19

## 2023-01-01 RX ADMIN — DEXAMETHASONE SODIUM PHOSPHATE 4 MG: 4 INJECTION INTRA-ARTICULAR; INTRALESIONAL; INTRAMUSCULAR; INTRAVENOUS; SOFT TISSUE at 05:43

## 2023-01-01 RX ADMIN — APIXABAN 5 MG: 5 TABLET, FILM COATED ORAL at 18:44

## 2023-01-01 RX ADMIN — HYDROMORPHONE HYDROCHLORIDE 1 MG: 1 INJECTION, SOLUTION INTRAMUSCULAR; INTRAVENOUS; SUBCUTANEOUS at 00:17

## 2023-01-01 RX ADMIN — HYDROMORPHONE HYDROCHLORIDE 1 MG: 1 INJECTION, SOLUTION INTRAMUSCULAR; INTRAVENOUS; SUBCUTANEOUS at 04:57

## 2023-01-01 RX ADMIN — ALBUTEROL SULFATE 2.5 MG: 2.5 SOLUTION RESPIRATORY (INHALATION) at 16:17

## 2023-01-01 RX ADMIN — CEFEPIME 2 G: 2 INJECTION, POWDER, FOR SOLUTION INTRAVENOUS at 05:31

## 2023-01-01 RX ADMIN — IPRATROPIUM BROMIDE AND ALBUTEROL SULFATE 3 ML: 2.5; .5 SOLUTION RESPIRATORY (INHALATION) at 14:03

## 2023-01-01 RX ADMIN — DIPHENHYDRAMINE HYDROCHLORIDE 25 MG: 50 INJECTION, SOLUTION INTRAMUSCULAR; INTRAVENOUS at 05:43

## 2023-01-01 RX ADMIN — ROCURONIUM BROMIDE 40 MG: 50 INJECTION, SOLUTION INTRAVENOUS at 15:43

## 2023-01-01 RX ADMIN — HYDROMORPHONE HYDROCHLORIDE 1 MG: 1 INJECTION, SOLUTION INTRAMUSCULAR; INTRAVENOUS; SUBCUTANEOUS at 09:55

## 2023-01-01 RX ADMIN — LORAZEPAM 0.5 MG: 1 TABLET ORAL at 05:56

## 2023-01-01 RX ADMIN — GUAIFENESIN 600 MG: 600 TABLET, EXTENDED RELEASE ORAL at 04:48

## 2023-01-01 RX ADMIN — METOPROLOL TARTRATE 25 MG: 25 TABLET, FILM COATED ORAL at 06:14

## 2023-01-01 RX ADMIN — LORAZEPAM 0.5 MG: 2 INJECTION INTRAMUSCULAR; INTRAVENOUS at 17:44

## 2023-01-01 RX ADMIN — BUSPIRONE HYDROCHLORIDE 15 MG: 10 TABLET ORAL at 18:27

## 2023-01-01 RX ADMIN — CAPMATINIB 400 MG: 200 TABLET, FILM COATED ORAL at 03:10

## 2023-01-01 RX ADMIN — LORATADINE 10 MG: 10 TABLET ORAL at 04:58

## 2023-01-01 RX ADMIN — ACETAMINOPHEN 650 MG: 325 TABLET, FILM COATED ORAL at 06:23

## 2023-01-01 RX ADMIN — LORAZEPAM 0.5 MG: 1 TABLET ORAL at 21:32

## 2023-01-01 RX ADMIN — METHYLPREDNISOLONE SODIUM SUCCINATE 125 MG: 125 INJECTION, POWDER, FOR SOLUTION INTRAMUSCULAR; INTRAVENOUS at 11:39

## 2023-01-01 RX ADMIN — TRANEXAMIC ACID 500 MG: 100 INJECTION, SOLUTION INTRAVENOUS at 09:47

## 2023-01-01 RX ADMIN — IPRATROPIUM BROMIDE AND ALBUTEROL SULFATE 3 ML: 2.5; .5 SOLUTION RESPIRATORY (INHALATION) at 14:58

## 2023-01-01 RX ADMIN — HYDROMORPHONE HYDROCHLORIDE 1 MG: 1 INJECTION, SOLUTION INTRAMUSCULAR; INTRAVENOUS; SUBCUTANEOUS at 22:01

## 2023-01-01 RX ADMIN — LORAZEPAM 1 MG: 2 INJECTION INTRAMUSCULAR; INTRAVENOUS at 05:15

## 2023-01-01 RX ADMIN — LORAZEPAM 1 MG: 1 TABLET ORAL at 10:48

## 2023-01-01 RX ADMIN — DOCUSATE SODIUM 50 MG AND SENNOSIDES 8.6 MG 2 TABLET: 8.6; 5 TABLET, FILM COATED ORAL at 18:26

## 2023-01-01 RX ADMIN — VANCOMYCIN HYDROCHLORIDE 1250 MG: 5 INJECTION, POWDER, LYOPHILIZED, FOR SOLUTION INTRAVENOUS at 20:45

## 2023-01-01 RX ADMIN — FENTANYL CITRATE 100 MCG: 50 INJECTION, SOLUTION INTRAMUSCULAR; INTRAVENOUS at 10:58

## 2023-01-01 RX ADMIN — FENTANYL CITRATE 50 MCG: 50 INJECTION, SOLUTION INTRAMUSCULAR; INTRAVENOUS at 12:05

## 2023-01-01 RX ADMIN — CAPMATINIB 400 MG: 200 TABLET, FILM COATED ORAL at 04:50

## 2023-01-01 RX ADMIN — IPRATROPIUM BROMIDE AND ALBUTEROL SULFATE 3 ML: 2.5; .5 SOLUTION RESPIRATORY (INHALATION) at 13:55

## 2023-01-01 RX ADMIN — LORATADINE 10 MG: 10 TABLET ORAL at 05:17

## 2023-01-01 RX ADMIN — HYDROMORPHONE HYDROCHLORIDE 1 MG: 1 INJECTION, SOLUTION INTRAMUSCULAR; INTRAVENOUS; SUBCUTANEOUS at 21:52

## 2023-01-01 RX ADMIN — IPRATROPIUM BROMIDE AND ALBUTEROL SULFATE 3 ML: 2.5; .5 SOLUTION RESPIRATORY (INHALATION) at 22:16

## 2023-01-01 RX ADMIN — LORAZEPAM 0.5 MG: 0.5 TABLET ORAL at 21:03

## 2023-01-01 RX ADMIN — CEFTRIAXONE SODIUM 2000 MG: 10 INJECTION, POWDER, FOR SOLUTION INTRAVENOUS at 17:02

## 2023-01-01 RX ADMIN — DEXMEDETOMIDINE 0.6 MCG/KG/HR: 100 INJECTION, SOLUTION INTRAVENOUS at 10:03

## 2023-01-01 RX ADMIN — DEXMEDETOMIDINE 0.6 MCG/KG/HR: 100 INJECTION, SOLUTION INTRAVENOUS at 14:55

## 2023-01-01 RX ADMIN — CAPMATINIB 400 MG: 200 TABLET, FILM COATED ORAL at 06:37

## 2023-01-01 RX ADMIN — HYDROMORPHONE HYDROCHLORIDE 1 MG: 1 INJECTION, SOLUTION INTRAMUSCULAR; INTRAVENOUS; SUBCUTANEOUS at 14:15

## 2023-01-01 RX ADMIN — ACETAMINOPHEN 650 MG: 325 TABLET, FILM COATED ORAL at 06:13

## 2023-01-01 RX ADMIN — BISACODYL 10 MG: 10 SUPPOSITORY RECTAL at 13:00

## 2023-01-01 RX ADMIN — GUAIFENESIN 600 MG: 600 TABLET, EXTENDED RELEASE ORAL at 05:11

## 2023-01-01 RX ADMIN — IPRATROPIUM BROMIDE AND ALBUTEROL SULFATE 3 ML: 2.5; .5 SOLUTION RESPIRATORY (INHALATION) at 22:54

## 2023-01-01 RX ADMIN — BUSPIRONE HYDROCHLORIDE 15 MG: 10 TABLET ORAL at 13:12

## 2023-01-01 RX ADMIN — VANCOMYCIN HYDROCHLORIDE 1250 MG: 5 INJECTION, POWDER, LYOPHILIZED, FOR SOLUTION INTRAVENOUS at 08:36

## 2023-01-01 RX ADMIN — CLOTRIMAZOLE 10 MG: 10 LOZENGE ORAL; TOPICAL at 05:24

## 2023-01-01 RX ADMIN — LORAZEPAM 1 MG: 2 INJECTION INTRAMUSCULAR; INTRAVENOUS at 01:18

## 2023-01-01 RX ADMIN — GABAPENTIN 300 MG: 300 CAPSULE ORAL at 19:41

## 2023-01-01 RX ADMIN — APIXABAN 10 MG: 5 TABLET, FILM COATED ORAL at 04:58

## 2023-01-01 RX ADMIN — ACETAMINOPHEN 650 MG: 325 TABLET, FILM COATED ORAL at 18:18

## 2023-01-01 RX ADMIN — APIXABAN 5 MG: 5 TABLET, FILM COATED ORAL at 18:02

## 2023-01-01 RX ADMIN — HYDROMORPHONE HYDROCHLORIDE 1 MG: 1 INJECTION, SOLUTION INTRAMUSCULAR; INTRAVENOUS; SUBCUTANEOUS at 09:22

## 2023-01-01 RX ADMIN — LORAZEPAM 0.5 MG: 2 INJECTION INTRAMUSCULAR; INTRAVENOUS at 03:51

## 2023-01-01 RX ADMIN — OXYCODONE HYDROCHLORIDE 10 MG: 10 TABLET ORAL at 12:35

## 2023-01-01 RX ADMIN — BUSPIRONE HYDROCHLORIDE 15 MG: 10 TABLET ORAL at 18:39

## 2023-01-01 RX ADMIN — ACETAMINOPHEN 650 MG: 325 TABLET, FILM COATED ORAL at 16:19

## 2023-01-01 RX ADMIN — DEXAMETHASONE SODIUM PHOSPHATE 4 MG: 4 INJECTION INTRA-ARTICULAR; INTRALESIONAL; INTRAMUSCULAR; INTRAVENOUS; SOFT TISSUE at 23:15

## 2023-01-01 RX ADMIN — POLYETHYLENE GLYCOL 3350 1 PACKET: 17 POWDER, FOR SOLUTION ORAL at 11:32

## 2023-01-01 RX ADMIN — LORAZEPAM 1 MG: 1 TABLET ORAL at 19:59

## 2023-01-01 RX ADMIN — DOCUSATE SODIUM 50 MG AND SENNOSIDES 8.6 MG 2 TABLET: 8.6; 5 TABLET, FILM COATED ORAL at 05:58

## 2023-01-01 RX ADMIN — BUSPIRONE HYDROCHLORIDE 15 MG: 10 TABLET ORAL at 12:02

## 2023-01-01 RX ADMIN — HYDROMORPHONE HYDROCHLORIDE 1 MG: 1 INJECTION, SOLUTION INTRAMUSCULAR; INTRAVENOUS; SUBCUTANEOUS at 13:25

## 2023-01-01 RX ADMIN — IPRATROPIUM BROMIDE AND ALBUTEROL SULFATE 3 ML: 2.5; .5 SOLUTION RESPIRATORY (INHALATION) at 07:37

## 2023-01-01 RX ADMIN — OXYCODONE HYDROCHLORIDE 15 MG: 10 TABLET ORAL at 00:33

## 2023-01-01 RX ADMIN — MIDAZOLAM 2 MG: 1 INJECTION, SOLUTION INTRAMUSCULAR; INTRAVENOUS at 10:58

## 2023-01-01 RX ADMIN — HYDROMORPHONE HYDROCHLORIDE 1 MG: 1 INJECTION, SOLUTION INTRAMUSCULAR; INTRAVENOUS; SUBCUTANEOUS at 22:13

## 2023-01-01 RX ADMIN — LORAZEPAM 0.5 MG: 1 TABLET ORAL at 22:18

## 2023-01-01 RX ADMIN — MORPHINE SULFATE 15 MG: 15 TABLET, FILM COATED, EXTENDED RELEASE ORAL at 16:58

## 2023-01-01 RX ADMIN — DEXAMETHASONE SODIUM PHOSPHATE 4 MG: 4 INJECTION INTRA-ARTICULAR; INTRALESIONAL; INTRAMUSCULAR; INTRAVENOUS; SOFT TISSUE at 00:52

## 2023-01-01 RX ADMIN — DEXAMETHASONE SODIUM PHOSPHATE 4 MG: 4 INJECTION INTRA-ARTICULAR; INTRALESIONAL; INTRAMUSCULAR; INTRAVENOUS; SOFT TISSUE at 07:41

## 2023-01-01 RX ADMIN — BUSPIRONE HYDROCHLORIDE 15 MG: 10 TABLET ORAL at 05:23

## 2023-01-01 RX ADMIN — IPRATROPIUM BROMIDE AND ALBUTEROL SULFATE 3 ML: 2.5; .5 SOLUTION RESPIRATORY (INHALATION) at 23:58

## 2023-01-01 RX ADMIN — ACETAMINOPHEN 650 MG: 325 TABLET, FILM COATED ORAL at 12:42

## 2023-01-01 RX ADMIN — ACETAMINOPHEN 650 MG: 325 TABLET, FILM COATED ORAL at 00:18

## 2023-01-01 RX ADMIN — IPRATROPIUM BROMIDE AND ALBUTEROL SULFATE 3 ML: 2.5; .5 SOLUTION RESPIRATORY (INHALATION) at 11:57

## 2023-01-01 RX ADMIN — HYDROMORPHONE HYDROCHLORIDE 1 MG: 1 INJECTION, SOLUTION INTRAMUSCULAR; INTRAVENOUS; SUBCUTANEOUS at 19:31

## 2023-01-01 RX ADMIN — LORAZEPAM 0.5 MG: 2 INJECTION INTRAMUSCULAR; INTRAVENOUS at 06:43

## 2023-01-01 RX ADMIN — HYDROCODONE BITARTRATE AND ACETAMINOPHEN 1 TABLET: 10; 325 TABLET ORAL at 02:12

## 2023-01-01 RX ADMIN — LORAZEPAM 0.5 MG: 2 INJECTION INTRAMUSCULAR; INTRAVENOUS at 08:31

## 2023-01-01 RX ADMIN — APIXABAN 5 MG: 5 TABLET, FILM COATED ORAL at 06:24

## 2023-01-01 RX ADMIN — LIDOCAINE 1 PATCH: 50 PATCH TOPICAL at 19:39

## 2023-01-01 RX ADMIN — APIXABAN 10 MG: 5 TABLET, FILM COATED ORAL at 05:06

## 2023-01-01 RX ADMIN — DEXAMETHASONE SODIUM PHOSPHATE 4 MG: 4 INJECTION INTRA-ARTICULAR; INTRALESIONAL; INTRAMUSCULAR; INTRAVENOUS; SOFT TISSUE at 17:42

## 2023-01-01 RX ADMIN — DEXAMETHASONE 4 MG: 4 TABLET ORAL at 05:01

## 2023-01-01 RX ADMIN — GUAIFENESIN SYRUP AND DEXTROMETHORPHAN 10 ML: 100; 10 SYRUP ORAL at 23:19

## 2023-01-01 RX ADMIN — HYDROMORPHONE HYDROCHLORIDE 0.4 MG: 1 INJECTION, SOLUTION INTRAMUSCULAR; INTRAVENOUS; SUBCUTANEOUS at 16:57

## 2023-01-01 RX ADMIN — DEXAMETHASONE SODIUM PHOSPHATE 4 MG: 4 INJECTION INTRA-ARTICULAR; INTRALESIONAL; INTRAMUSCULAR; INTRAVENOUS; SOFT TISSUE at 05:25

## 2023-01-01 RX ADMIN — HYDROMORPHONE HYDROCHLORIDE 1 MG: 1 INJECTION, SOLUTION INTRAMUSCULAR; INTRAVENOUS; SUBCUTANEOUS at 10:11

## 2023-01-01 RX ADMIN — APIXABAN 5 MG: 5 TABLET, FILM COATED ORAL at 18:29

## 2023-01-01 RX ADMIN — LORAZEPAM 0.5 MG: 2 INJECTION INTRAMUSCULAR; INTRAVENOUS at 17:45

## 2023-01-01 RX ADMIN — FLUTICASONE FUROATE, UMECLIDINIUM BROMIDE AND VILANTEROL TRIFENATATE 1 PUFF: 100; 62.5; 25 POWDER RESPIRATORY (INHALATION) at 05:07

## 2023-01-01 RX ADMIN — HYDROMORPHONE HYDROCHLORIDE 1 MG: 1 INJECTION, SOLUTION INTRAMUSCULAR; INTRAVENOUS; SUBCUTANEOUS at 23:04

## 2023-01-01 RX ADMIN — IPRATROPIUM BROMIDE AND ALBUTEROL SULFATE 3 ML: 2.5; .5 SOLUTION RESPIRATORY (INHALATION) at 14:01

## 2023-01-01 RX ADMIN — GUAIFENESIN 600 MG: 600 TABLET, EXTENDED RELEASE ORAL at 05:26

## 2023-01-01 RX ADMIN — FENTANYL CITRATE 50 MCG: 50 INJECTION, SOLUTION INTRAMUSCULAR; INTRAVENOUS at 11:56

## 2023-01-01 RX ADMIN — DEXMEDETOMIDINE 1.4 MCG/KG/HR: 100 INJECTION, SOLUTION INTRAVENOUS at 13:29

## 2023-01-01 RX ADMIN — BUSPIRONE HYDROCHLORIDE 15 MG: 10 TABLET ORAL at 11:13

## 2023-01-01 RX ADMIN — LORAZEPAM 0.5 MG: 0.5 TABLET ORAL at 13:51

## 2023-01-01 RX ADMIN — MORPHINE SULFATE 15 MG: 15 TABLET, FILM COATED, EXTENDED RELEASE ORAL at 18:18

## 2023-01-01 RX ADMIN — SENNOSIDES AND DOCUSATE SODIUM 2 TABLET: 50; 8.6 TABLET ORAL at 17:28

## 2023-01-01 RX ADMIN — ALBUTEROL SULFATE 2 PUFF: 90 AEROSOL, METERED RESPIRATORY (INHALATION) at 06:14

## 2023-01-01 RX ADMIN — MORPHINE SULFATE 15 MG: 15 TABLET, FILM COATED, EXTENDED RELEASE ORAL at 03:36

## 2023-01-01 RX ADMIN — DIPHENHYDRAMINE HYDROCHLORIDE 25 MG: 50 INJECTION, SOLUTION INTRAMUSCULAR; INTRAVENOUS at 09:22

## 2023-01-01 RX ADMIN — APIXABAN 10 MG: 5 TABLET, FILM COATED ORAL at 17:31

## 2023-01-01 RX ADMIN — ALBUTEROL SULFATE 2 PUFF: 90 AEROSOL, METERED RESPIRATORY (INHALATION) at 17:09

## 2023-01-01 RX ADMIN — LIDOCAINE 1 PATCH: 50 PATCH TOPICAL at 18:02

## 2023-01-01 RX ADMIN — IPRATROPIUM BROMIDE AND ALBUTEROL SULFATE 3 ML: 2.5; .5 SOLUTION RESPIRATORY (INHALATION) at 20:03

## 2023-01-01 RX ADMIN — LORAZEPAM 0.5 MG: 2 INJECTION INTRAMUSCULAR; INTRAVENOUS at 10:25

## 2023-01-01 RX ADMIN — HYDROXYZINE HYDROCHLORIDE 10 MG: 10 TABLET ORAL at 00:01

## 2023-01-01 RX ADMIN — ACETAMINOPHEN 650 MG: 325 TABLET, FILM COATED ORAL at 12:36

## 2023-01-01 RX ADMIN — SODIUM CHLORIDE, POTASSIUM CHLORIDE, SODIUM LACTATE AND CALCIUM CHLORIDE: 600; 310; 30; 20 INJECTION, SOLUTION INTRAVENOUS at 13:23

## 2023-01-01 RX ADMIN — APIXABAN 5 MG: 5 TABLET, FILM COATED ORAL at 17:02

## 2023-01-01 RX ADMIN — DEXAMETHASONE 4 MG: 4 TABLET ORAL at 19:26

## 2023-01-01 RX ADMIN — DIPHENHYDRAMINE HYDROCHLORIDE 25 MG: 50 INJECTION, SOLUTION INTRAMUSCULAR; INTRAVENOUS at 12:13

## 2023-01-01 RX ADMIN — GUAIFENESIN 600 MG: 600 TABLET, EXTENDED RELEASE ORAL at 16:55

## 2023-01-01 RX ADMIN — ALBUTEROL SULFATE 2 PUFF: 90 AEROSOL, METERED RESPIRATORY (INHALATION) at 03:57

## 2023-01-01 RX ADMIN — HYDROMORPHONE HYDROCHLORIDE 1 MG: 1 INJECTION, SOLUTION INTRAMUSCULAR; INTRAVENOUS; SUBCUTANEOUS at 17:05

## 2023-01-01 RX ADMIN — ROCURONIUM BROMIDE 50 MG: 10 INJECTION, SOLUTION INTRAVENOUS at 14:35

## 2023-01-01 RX ADMIN — DIPHENHYDRAMINE HYDROCHLORIDE 25 MG: 50 INJECTION, SOLUTION INTRAMUSCULAR; INTRAVENOUS at 10:02

## 2023-01-01 RX ADMIN — CLOTRIMAZOLE 10 MG: 10 LOZENGE ORAL; TOPICAL at 00:02

## 2023-01-01 RX ADMIN — BUSPIRONE HYDROCHLORIDE 15 MG: 10 TABLET ORAL at 05:01

## 2023-01-01 RX ADMIN — IPRATROPIUM BROMIDE AND ALBUTEROL SULFATE 3 ML: 2.5; .5 SOLUTION RESPIRATORY (INHALATION) at 02:51

## 2023-01-01 RX ADMIN — CAPMATINIB 400 MG: 200 TABLET, FILM COATED ORAL at 15:00

## 2023-01-01 RX ADMIN — ACETAMINOPHEN 650 MG: 325 TABLET, FILM COATED ORAL at 16:35

## 2023-01-01 RX ADMIN — DEXMEDETOMIDINE 25 MCG: 100 INJECTION, SOLUTION INTRAVENOUS at 15:19

## 2023-01-01 RX ADMIN — IPRATROPIUM BROMIDE AND ALBUTEROL SULFATE 3 ML: 2.5; .5 SOLUTION RESPIRATORY (INHALATION) at 23:00

## 2023-01-01 RX ADMIN — CAPMATINIB 400 MG: 200 TABLET, FILM COATED ORAL at 06:14

## 2023-01-01 RX ADMIN — BUSPIRONE HYDROCHLORIDE 15 MG: 10 TABLET ORAL at 11:41

## 2023-01-01 RX ADMIN — BUSPIRONE HYDROCHLORIDE 10 MG: 10 TABLET ORAL at 18:31

## 2023-01-01 RX ADMIN — ACETAMINOPHEN 650 MG: 325 TABLET, FILM COATED ORAL at 11:56

## 2023-01-01 RX ADMIN — LORATADINE 10 MG: 10 TABLET ORAL at 05:42

## 2023-01-01 RX ADMIN — DEXMEDETOMIDINE 0.6 MCG/KG/HR: 100 INJECTION, SOLUTION INTRAVENOUS at 19:34

## 2023-01-01 RX ADMIN — DEXMEDETOMIDINE 20 MCG: 100 INJECTION, SOLUTION INTRAVENOUS at 15:24

## 2023-01-01 RX ADMIN — LABETALOL HYDROCHLORIDE 10 MG: 5 INJECTION, SOLUTION INTRAVENOUS at 14:50

## 2023-01-01 RX ADMIN — LIDOCAINE HYDROCHLORIDE 4 ML: 40 SOLUTION TOPICAL at 11:06

## 2023-01-01 RX ADMIN — GUAIFENESIN 600 MG: 600 TABLET, EXTENDED RELEASE ORAL at 17:16

## 2023-01-01 RX ADMIN — MORPHINE SULFATE 2 MG: 4 INJECTION INTRAVENOUS at 06:44

## 2023-01-01 RX ADMIN — LORAZEPAM 1 MG: 2 INJECTION INTRAMUSCULAR; INTRAVENOUS at 07:36

## 2023-01-01 RX ADMIN — HYDROMORPHONE HYDROCHLORIDE 1 MG: 1 INJECTION, SOLUTION INTRAMUSCULAR; INTRAVENOUS; SUBCUTANEOUS at 15:31

## 2023-01-01 RX ADMIN — IPRATROPIUM BROMIDE AND ALBUTEROL SULFATE 3 ML: 2.5; .5 SOLUTION RESPIRATORY (INHALATION) at 07:26

## 2023-01-01 RX ADMIN — DEXAMETHASONE SODIUM PHOSPHATE 4 MG: 4 INJECTION INTRA-ARTICULAR; INTRALESIONAL; INTRAMUSCULAR; INTRAVENOUS; SOFT TISSUE at 00:29

## 2023-01-01 RX ADMIN — RIVAROXABAN 10 MG: 10 TABLET, FILM COATED ORAL at 00:31

## 2023-01-01 RX ADMIN — LORAZEPAM 1 MG: 1 TABLET ORAL at 19:41

## 2023-01-01 RX ADMIN — DIPHENHYDRAMINE HYDROCHLORIDE 25 MG: 25 TABLET ORAL at 09:48

## 2023-01-01 RX ADMIN — LORAZEPAM 0.5 MG: 1 TABLET ORAL at 06:00

## 2023-01-01 RX ADMIN — HYDROMORPHONE HYDROCHLORIDE 4 MG: 2 TABLET ORAL at 12:51

## 2023-01-01 RX ADMIN — ACETAMINOPHEN 650 MG: 325 TABLET, FILM COATED ORAL at 11:37

## 2023-01-01 RX ADMIN — SENNOSIDES AND DOCUSATE SODIUM 2 TABLET: 50; 8.6 TABLET ORAL at 05:36

## 2023-01-01 RX ADMIN — DIPHENHYDRAMINE HYDROCHLORIDE 25 MG: 25 TABLET ORAL at 11:59

## 2023-01-01 RX ADMIN — HYDROMORPHONE HYDROCHLORIDE 1 MG: 1 INJECTION, SOLUTION INTRAMUSCULAR; INTRAVENOUS; SUBCUTANEOUS at 12:40

## 2023-01-01 RX ADMIN — APIXABAN 5 MG: 5 TABLET, FILM COATED ORAL at 05:24

## 2023-01-01 RX ADMIN — ALBUTEROL SULFATE 2 PUFF: 90 AEROSOL, METERED RESPIRATORY (INHALATION) at 16:43

## 2023-01-01 RX ADMIN — LORATADINE 10 MG: 10 TABLET ORAL at 04:43

## 2023-01-01 RX ADMIN — APIXABAN 5 MG: 5 TABLET, FILM COATED ORAL at 05:37

## 2023-01-01 RX ADMIN — AZITHROMYCIN DIHYDRATE 500 MG: 250 TABLET ORAL at 11:05

## 2023-01-01 RX ADMIN — HYDROMORPHONE HYDROCHLORIDE 1 MG: 1 INJECTION, SOLUTION INTRAMUSCULAR; INTRAVENOUS; SUBCUTANEOUS at 04:54

## 2023-01-01 RX ADMIN — DOCUSATE SODIUM 50 MG AND SENNOSIDES 8.6 MG 2 TABLET: 8.6; 5 TABLET, FILM COATED ORAL at 05:06

## 2023-01-01 RX ADMIN — HYDROMORPHONE HYDROCHLORIDE 1 MG: 1 INJECTION, SOLUTION INTRAMUSCULAR; INTRAVENOUS; SUBCUTANEOUS at 02:13

## 2023-01-01 RX ADMIN — IPRATROPIUM BROMIDE AND ALBUTEROL SULFATE 3 ML: 2.5; .5 SOLUTION RESPIRATORY (INHALATION) at 14:46

## 2023-01-01 RX ADMIN — GUAIFENESIN 600 MG: 600 TABLET, EXTENDED RELEASE ORAL at 06:12

## 2023-01-01 RX ADMIN — LORATADINE 10 MG: 10 TABLET ORAL at 05:22

## 2023-01-01 RX ADMIN — DIPHENHYDRAMINE HYDROCHLORIDE 25 MG: 50 INJECTION, SOLUTION INTRAMUSCULAR; INTRAVENOUS at 14:54

## 2023-01-01 RX ADMIN — LORAZEPAM 1 MG: 2 INJECTION INTRAMUSCULAR; INTRAVENOUS at 07:29

## 2023-01-01 RX ADMIN — ACETAMINOPHEN 650 MG: 325 TABLET, FILM COATED ORAL at 17:37

## 2023-01-01 RX ADMIN — APIXABAN 5 MG: 5 TABLET, FILM COATED ORAL at 04:48

## 2023-01-01 RX ADMIN — DIPHENHYDRAMINE HYDROCHLORIDE 25 MG: 50 INJECTION, SOLUTION INTRAMUSCULAR; INTRAVENOUS at 16:05

## 2023-01-01 RX ADMIN — BUSPIRONE HYDROCHLORIDE 15 MG: 10 TABLET ORAL at 07:42

## 2023-01-01 RX ADMIN — IPRATROPIUM BROMIDE AND ALBUTEROL SULFATE 3 ML: 2.5; .5 SOLUTION RESPIRATORY (INHALATION) at 11:38

## 2023-01-01 RX ADMIN — LORAZEPAM 0.5 MG: 2 INJECTION INTRAMUSCULAR; INTRAVENOUS at 18:52

## 2023-01-01 RX ADMIN — ALPRAZOLAM 0.25 MG: 0.25 TABLET ORAL at 23:20

## 2023-01-01 RX ADMIN — DEXAMETHASONE SODIUM PHOSPHATE 4 MG: 4 INJECTION INTRA-ARTICULAR; INTRALESIONAL; INTRAMUSCULAR; INTRAVENOUS; SOFT TISSUE at 18:43

## 2023-01-01 RX ADMIN — HYDROMORPHONE HYDROCHLORIDE 1 MG: 1 INJECTION, SOLUTION INTRAMUSCULAR; INTRAVENOUS; SUBCUTANEOUS at 16:50

## 2023-01-01 RX ADMIN — HYDROMORPHONE HYDROCHLORIDE 1 MG: 1 INJECTION, SOLUTION INTRAMUSCULAR; INTRAVENOUS; SUBCUTANEOUS at 12:33

## 2023-01-01 RX ADMIN — DIPHENHYDRAMINE HYDROCHLORIDE 25 MG: 50 INJECTION, SOLUTION INTRAMUSCULAR; INTRAVENOUS at 17:05

## 2023-01-01 RX ADMIN — GABAPENTIN 300 MG: 300 CAPSULE ORAL at 12:44

## 2023-01-01 RX ADMIN — HYDROCODONE BITARTRATE AND ACETAMINOPHEN 1 TABLET: 10; 325 TABLET ORAL at 09:48

## 2023-01-01 RX ADMIN — IPRATROPIUM BROMIDE AND ALBUTEROL SULFATE 3 ML: 2.5; .5 SOLUTION RESPIRATORY (INHALATION) at 10:36

## 2023-01-01 RX ADMIN — HYDROMORPHONE HYDROCHLORIDE 1 MG: 1 INJECTION, SOLUTION INTRAMUSCULAR; INTRAVENOUS; SUBCUTANEOUS at 16:56

## 2023-01-01 RX ADMIN — DIPHENHYDRAMINE HYDROCHLORIDE 25 MG: 50 INJECTION, SOLUTION INTRAMUSCULAR; INTRAVENOUS at 11:02

## 2023-01-01 RX ADMIN — BUSPIRONE HYDROCHLORIDE 15 MG: 10 TABLET ORAL at 05:42

## 2023-01-01 RX ADMIN — APIXABAN 10 MG: 5 TABLET, FILM COATED ORAL at 17:56

## 2023-01-01 RX ADMIN — APIXABAN 5 MG: 5 TABLET, FILM COATED ORAL at 07:14

## 2023-01-01 RX ADMIN — IPRATROPIUM BROMIDE AND ALBUTEROL SULFATE 3 ML: 2.5; .5 SOLUTION RESPIRATORY (INHALATION) at 23:07

## 2023-01-01 RX ADMIN — IPRATROPIUM BROMIDE AND ALBUTEROL SULFATE 3 ML: 2.5; .5 SOLUTION RESPIRATORY (INHALATION) at 06:58

## 2023-01-01 RX ADMIN — DIPHENHYDRAMINE HYDROCHLORIDE 12.5 MG: 50 INJECTION, SOLUTION INTRAMUSCULAR; INTRAVENOUS at 17:04

## 2023-01-01 RX ADMIN — IPRATROPIUM BROMIDE AND ALBUTEROL SULFATE 3 ML: .5; 3 SOLUTION RESPIRATORY (INHALATION) at 08:08

## 2023-01-01 RX ADMIN — IPRATROPIUM BROMIDE AND ALBUTEROL SULFATE 3 ML: 2.5; .5 SOLUTION RESPIRATORY (INHALATION) at 06:41

## 2023-01-01 RX ADMIN — TIOTROPIUM BROMIDE INHALATION SPRAY 5 MCG: 3.12 SPRAY, METERED RESPIRATORY (INHALATION) at 05:02

## 2023-01-01 RX ADMIN — BUSPIRONE HYDROCHLORIDE 15 MG: 10 TABLET ORAL at 18:09

## 2023-01-01 RX ADMIN — HYDROCODONE BITARTRATE AND ACETAMINOPHEN 1 TABLET: 10; 325 TABLET ORAL at 01:35

## 2023-01-01 RX ADMIN — DEXAMETHASONE SODIUM PHOSPHATE 4 MG: 4 INJECTION INTRA-ARTICULAR; INTRALESIONAL; INTRAMUSCULAR; INTRAVENOUS; SOFT TISSUE at 14:32

## 2023-01-01 RX ADMIN — APIXABAN 5 MG: 5 TABLET, FILM COATED ORAL at 18:16

## 2023-01-01 RX ADMIN — LORAZEPAM 0.5 MG: 2 INJECTION INTRAMUSCULAR; INTRAVENOUS at 22:35

## 2023-01-01 RX ADMIN — HYDROCODONE BITARTRATE AND ACETAMINOPHEN 1 TABLET: 10; 325 TABLET ORAL at 20:34

## 2023-01-01 RX ADMIN — CAPMATINIB 400 MG: 200 TABLET, FILM COATED ORAL at 05:43

## 2023-01-01 RX ADMIN — HYDROMORPHONE HYDROCHLORIDE 1 MG: 1 INJECTION, SOLUTION INTRAMUSCULAR; INTRAVENOUS; SUBCUTANEOUS at 04:35

## 2023-01-01 RX ADMIN — IPRATROPIUM BROMIDE AND ALBUTEROL SULFATE 3 ML: 2.5; .5 SOLUTION RESPIRATORY (INHALATION) at 13:16

## 2023-01-01 RX ADMIN — ACETAMINOPHEN 650 MG: 325 TABLET, FILM COATED ORAL at 00:27

## 2023-01-01 RX ADMIN — DEXAMETHASONE SODIUM PHOSPHATE 4 MG: 4 INJECTION INTRA-ARTICULAR; INTRALESIONAL; INTRAMUSCULAR; INTRAVENOUS; SOFT TISSUE at 05:59

## 2023-01-01 RX ADMIN — HYDROMORPHONE HYDROCHLORIDE 0.5 MG: 1 INJECTION, SOLUTION INTRAMUSCULAR; INTRAVENOUS; SUBCUTANEOUS at 08:22

## 2023-01-01 RX ADMIN — DIPHENHYDRAMINE HYDROCHLORIDE 25 MG: 50 INJECTION, SOLUTION INTRAMUSCULAR; INTRAVENOUS at 18:13

## 2023-01-01 RX ADMIN — DIPHENHYDRAMINE HYDROCHLORIDE 25 MG: 50 INJECTION, SOLUTION INTRAMUSCULAR; INTRAVENOUS at 22:39

## 2023-01-01 RX ADMIN — DEXAMETHASONE SODIUM PHOSPHATE 4 MG: 4 INJECTION INTRA-ARTICULAR; INTRALESIONAL; INTRAMUSCULAR; INTRAVENOUS; SOFT TISSUE at 12:34

## 2023-01-01 RX ADMIN — CAPMATINIB 400 MG: 200 TABLET, FILM COATED ORAL at 05:25

## 2023-01-01 RX ADMIN — DIPHENHYDRAMINE HYDROCHLORIDE 25 MG: 50 INJECTION, SOLUTION INTRAMUSCULAR; INTRAVENOUS at 17:56

## 2023-01-01 RX ADMIN — LORAZEPAM 0.5 MG: 2 INJECTION INTRAMUSCULAR; INTRAVENOUS at 06:03

## 2023-01-01 RX ADMIN — CAPMATINIB 400 MG: 200 TABLET, FILM COATED ORAL at 04:32

## 2023-01-01 RX ADMIN — BUSPIRONE HYDROCHLORIDE 15 MG: 10 TABLET ORAL at 17:54

## 2023-01-01 RX ADMIN — APIXABAN 10 MG: 5 TABLET, FILM COATED ORAL at 06:02

## 2023-01-01 RX ADMIN — HYDROMORPHONE HYDROCHLORIDE 1 MG: 1 INJECTION, SOLUTION INTRAMUSCULAR; INTRAVENOUS; SUBCUTANEOUS at 15:00

## 2023-01-01 RX ADMIN — DIPHENHYDRAMINE HYDROCHLORIDE 25 MG: 50 INJECTION, SOLUTION INTRAMUSCULAR; INTRAVENOUS at 06:24

## 2023-01-01 RX ADMIN — DOCUSATE SODIUM 50 MG AND SENNOSIDES 8.6 MG 2 TABLET: 8.6; 5 TABLET, FILM COATED ORAL at 17:33

## 2023-01-01 RX ADMIN — OXYCODONE HYDROCHLORIDE 10 MG: 10 TABLET ORAL at 11:15

## 2023-01-01 RX ADMIN — MOMETASONE FUROATE AND FORMOTEROL FUMARATE DIHYDRATE 2 PUFF: 200; 5 AEROSOL RESPIRATORY (INHALATION) at 20:53

## 2023-01-01 RX ADMIN — BUSPIRONE HYDROCHLORIDE 15 MG: 10 TABLET ORAL at 18:44

## 2023-01-01 RX ADMIN — DEXAMETHASONE SODIUM PHOSPHATE 4 MG: 4 INJECTION INTRA-ARTICULAR; INTRALESIONAL; INTRAMUSCULAR; INTRAVENOUS; SOFT TISSUE at 17:52

## 2023-01-01 RX ADMIN — CLOTRIMAZOLE 10 MG: 10 LOZENGE ORAL; TOPICAL at 15:18

## 2023-01-01 RX ADMIN — LORATADINE 10 MG: 10 TABLET ORAL at 06:42

## 2023-01-01 RX ADMIN — IPRATROPIUM BROMIDE AND ALBUTEROL SULFATE 3 ML: .5; 3 SOLUTION RESPIRATORY (INHALATION) at 20:29

## 2023-01-01 RX ADMIN — ALPRAZOLAM 0.25 MG: 0.25 TABLET ORAL at 00:46

## 2023-01-01 RX ADMIN — LEVALBUTEROL 1.25 MG: 1.25 SOLUTION RESPIRATORY (INHALATION) at 15:57

## 2023-01-01 RX ADMIN — HYDROMORPHONE HYDROCHLORIDE 1 MG: 1 INJECTION, SOLUTION INTRAMUSCULAR; INTRAVENOUS; SUBCUTANEOUS at 16:52

## 2023-01-01 RX ADMIN — HYDROCODONE BITARTRATE AND ACETAMINOPHEN 1 TABLET: 10; 325 TABLET ORAL at 14:58

## 2023-01-01 RX ADMIN — MOMETASONE FUROATE AND FORMOTEROL FUMARATE DIHYDRATE 2 PUFF: 200; 5 AEROSOL RESPIRATORY (INHALATION) at 20:29

## 2023-01-01 RX ADMIN — FENTANYL CITRATE 50 MCG: 50 INJECTION, SOLUTION INTRAMUSCULAR; INTRAVENOUS at 16:30

## 2023-01-01 RX ADMIN — HYDROMORPHONE HYDROCHLORIDE 2 MG: 2 INJECTION INTRAMUSCULAR; INTRAVENOUS; SUBCUTANEOUS at 20:42

## 2023-01-01 RX ADMIN — LORAZEPAM 0.5 MG: 2 INJECTION INTRAMUSCULAR; INTRAVENOUS at 07:34

## 2023-01-01 RX ADMIN — IPRATROPIUM BROMIDE AND ALBUTEROL SULFATE 3 ML: 2.5; .5 SOLUTION RESPIRATORY (INHALATION) at 15:44

## 2023-01-01 RX ADMIN — LORAZEPAM 1 MG: 1 TABLET ORAL at 08:51

## 2023-01-01 RX ADMIN — ALBUTEROL SULFATE 2.5 MG: 2.5 SOLUTION RESPIRATORY (INHALATION) at 19:27

## 2023-01-01 RX ADMIN — IPRATROPIUM BROMIDE AND ALBUTEROL SULFATE 3 ML: 2.5; .5 SOLUTION RESPIRATORY (INHALATION) at 22:52

## 2023-01-01 RX ADMIN — DOCUSATE SODIUM 50 MG AND SENNOSIDES 8.6 MG 2 TABLET: 8.6; 5 TABLET, FILM COATED ORAL at 16:35

## 2023-01-01 RX ADMIN — HYDROMORPHONE HYDROCHLORIDE 1 MG: 1 INJECTION, SOLUTION INTRAMUSCULAR; INTRAVENOUS; SUBCUTANEOUS at 03:56

## 2023-01-01 RX ADMIN — APIXABAN 5 MG: 5 TABLET, FILM COATED ORAL at 16:54

## 2023-01-01 RX ADMIN — BUSPIRONE HYDROCHLORIDE 15 MG: 10 TABLET ORAL at 04:42

## 2023-01-01 RX ADMIN — IPRATROPIUM BROMIDE AND ALBUTEROL SULFATE 3 ML: 2.5; .5 SOLUTION RESPIRATORY (INHALATION) at 04:34

## 2023-01-01 RX ADMIN — IPRATROPIUM BROMIDE AND ALBUTEROL SULFATE 3 ML: 2.5; .5 SOLUTION RESPIRATORY (INHALATION) at 07:47

## 2023-01-01 RX ADMIN — LORAZEPAM 1 MG: 2 INJECTION INTRAMUSCULAR; INTRAVENOUS at 10:07

## 2023-01-01 RX ADMIN — ACETAMINOPHEN 650 MG: 325 TABLET, FILM COATED ORAL at 04:47

## 2023-01-01 RX ADMIN — HYDROMORPHONE HYDROCHLORIDE 1 MG: 1 INJECTION, SOLUTION INTRAMUSCULAR; INTRAVENOUS; SUBCUTANEOUS at 09:31

## 2023-01-01 RX ADMIN — HYDROMORPHONE HYDROCHLORIDE 0.6 MG: 2 INJECTION INTRAMUSCULAR; INTRAVENOUS; SUBCUTANEOUS at 15:54

## 2023-01-01 RX ADMIN — DEXAMETHASONE SODIUM PHOSPHATE 4 MG: 4 INJECTION INTRA-ARTICULAR; INTRALESIONAL; INTRAMUSCULAR; INTRAVENOUS; SOFT TISSUE at 14:46

## 2023-01-01 RX ADMIN — PREDNISONE 5 ML: 10 TABLET ORAL at 05:14

## 2023-01-01 RX ADMIN — PREGABALIN 75 MG: 75 CAPSULE ORAL at 17:23

## 2023-01-01 RX ADMIN — HYDROMORPHONE HYDROCHLORIDE 1 MG: 1 INJECTION, SOLUTION INTRAMUSCULAR; INTRAVENOUS; SUBCUTANEOUS at 01:40

## 2023-01-01 RX ADMIN — HYDROMORPHONE HYDROCHLORIDE 1 MG: 1 INJECTION, SOLUTION INTRAMUSCULAR; INTRAVENOUS; SUBCUTANEOUS at 06:50

## 2023-01-01 RX ADMIN — MORPHINE SULFATE 30 MG: 30 TABLET, FILM COATED, EXTENDED RELEASE ORAL at 06:09

## 2023-01-01 RX ADMIN — CLOTRIMAZOLE 10 MG: 10 LOZENGE ORAL; TOPICAL at 14:03

## 2023-01-01 RX ADMIN — LORAZEPAM 1 MG: 2 INJECTION INTRAMUSCULAR; INTRAVENOUS at 16:54

## 2023-01-01 RX ADMIN — DOCUSATE SODIUM 50 MG AND SENNOSIDES 8.6 MG 2 TABLET: 8.6; 5 TABLET, FILM COATED ORAL at 18:44

## 2023-01-01 RX ADMIN — HYDROMORPHONE HYDROCHLORIDE 1 MG: 1 INJECTION, SOLUTION INTRAMUSCULAR; INTRAVENOUS; SUBCUTANEOUS at 18:16

## 2023-01-01 RX ADMIN — DIPHENHYDRAMINE HYDROCHLORIDE 25 MG: 50 INJECTION, SOLUTION INTRAMUSCULAR; INTRAVENOUS at 17:30

## 2023-01-01 RX ADMIN — BUSPIRONE HYDROCHLORIDE 15 MG: 10 TABLET ORAL at 11:56

## 2023-01-01 RX ADMIN — ACETAMINOPHEN 650 MG: 325 TABLET, FILM COATED ORAL at 13:12

## 2023-01-01 RX ADMIN — DIPHENHYDRAMINE HYDROCHLORIDE 25 MG: 50 INJECTION, SOLUTION INTRAMUSCULAR; INTRAVENOUS at 13:42

## 2023-01-01 RX ADMIN — CEFEPIME 2 G: 2 INJECTION, POWDER, FOR SOLUTION INTRAVENOUS at 13:09

## 2023-01-01 RX ADMIN — HYDROMORPHONE HYDROCHLORIDE 1 MG: 1 INJECTION, SOLUTION INTRAMUSCULAR; INTRAVENOUS; SUBCUTANEOUS at 17:43

## 2023-01-01 RX ADMIN — BUSPIRONE HYDROCHLORIDE 15 MG: 10 TABLET ORAL at 12:39

## 2023-01-01 RX ADMIN — HYDROMORPHONE HYDROCHLORIDE 1 MG: 1 INJECTION, SOLUTION INTRAMUSCULAR; INTRAVENOUS; SUBCUTANEOUS at 14:12

## 2023-01-01 RX ADMIN — DEXAMETHASONE SODIUM PHOSPHATE 4 MG: 4 INJECTION INTRA-ARTICULAR; INTRALESIONAL; INTRAMUSCULAR; INTRAVENOUS; SOFT TISSUE at 12:08

## 2023-01-01 RX ADMIN — BUSPIRONE HYDROCHLORIDE 15 MG: 10 TABLET ORAL at 05:32

## 2023-01-01 RX ADMIN — GUAIFENESIN 600 MG: 600 TABLET, EXTENDED RELEASE ORAL at 04:07

## 2023-01-01 RX ADMIN — HYDROMORPHONE HYDROCHLORIDE 1 MG: 1 INJECTION, SOLUTION INTRAMUSCULAR; INTRAVENOUS; SUBCUTANEOUS at 08:48

## 2023-01-01 RX ADMIN — IPRATROPIUM BROMIDE AND ALBUTEROL SULFATE 3 ML: 2.5; .5 SOLUTION RESPIRATORY (INHALATION) at 06:56

## 2023-01-01 RX ADMIN — LORAZEPAM 1 MG: 2 INJECTION INTRAMUSCULAR; INTRAVENOUS at 16:08

## 2023-01-01 RX ADMIN — LORAZEPAM 1 MG: 2 INJECTION INTRAMUSCULAR; INTRAVENOUS at 21:54

## 2023-01-01 RX ADMIN — IPRATROPIUM BROMIDE AND ALBUTEROL SULFATE 3 ML: 2.5; .5 SOLUTION RESPIRATORY (INHALATION) at 20:50

## 2023-01-01 RX ADMIN — HYDROCODONE BITARTRATE AND ACETAMINOPHEN 1 TABLET: 10; 325 TABLET ORAL at 11:03

## 2023-01-01 RX ADMIN — CEFEPIME 2 G: 2 INJECTION, POWDER, FOR SOLUTION INTRAVENOUS at 21:29

## 2023-01-01 RX ADMIN — GUAIFENESIN 600 MG: 600 TABLET, EXTENDED RELEASE ORAL at 05:06

## 2023-01-01 RX ADMIN — SENNOSIDES AND DOCUSATE SODIUM 2 TABLET: 50; 8.6 TABLET ORAL at 00:31

## 2023-01-01 RX ADMIN — DIPHENHYDRAMINE HYDROCHLORIDE 25 MG: 50 INJECTION, SOLUTION INTRAMUSCULAR; INTRAVENOUS at 06:45

## 2023-01-01 RX ADMIN — SODIUM CHLORIDE, POTASSIUM CHLORIDE, SODIUM LACTATE AND CALCIUM CHLORIDE: 600; 310; 30; 20 INJECTION, SOLUTION INTRAVENOUS at 15:39

## 2023-01-01 RX ADMIN — DIPHENHYDRAMINE HYDROCHLORIDE 25 MG: 50 INJECTION, SOLUTION INTRAMUSCULAR; INTRAVENOUS at 06:34

## 2023-01-01 RX ADMIN — GABAPENTIN 100 MG: 100 CAPSULE ORAL at 09:11

## 2023-01-01 RX ADMIN — LORAZEPAM 0.5 MG: 2 INJECTION INTRAMUSCULAR; INTRAVENOUS at 08:52

## 2023-01-01 RX ADMIN — DEXAMETHASONE SODIUM PHOSPHATE 4 MG: 4 INJECTION INTRA-ARTICULAR; INTRALESIONAL; INTRAMUSCULAR; INTRAVENOUS; SOFT TISSUE at 17:27

## 2023-01-01 RX ADMIN — LORAZEPAM 0.5 MG: 2 INJECTION INTRAMUSCULAR; INTRAVENOUS at 05:25

## 2023-01-01 RX ADMIN — LORAZEPAM 0.5 MG: 2 INJECTION INTRAMUSCULAR; INTRAVENOUS at 06:24

## 2023-01-01 RX ADMIN — DEXAMETHASONE SODIUM PHOSPHATE 4 MG: 4 INJECTION INTRA-ARTICULAR; INTRALESIONAL; INTRAMUSCULAR; INTRAVENOUS; SOFT TISSUE at 05:19

## 2023-01-01 RX ADMIN — ACETAMINOPHEN 650 MG: 325 TABLET, FILM COATED ORAL at 17:09

## 2023-01-01 RX ADMIN — LORATADINE 10 MG: 10 TABLET ORAL at 05:20

## 2023-01-01 RX ADMIN — DIPHENHYDRAMINE HYDROCHLORIDE 25 MG: 50 INJECTION, SOLUTION INTRAMUSCULAR; INTRAVENOUS at 15:21

## 2023-01-01 RX ADMIN — HYDROCODONE BITARTRATE AND ACETAMINOPHEN 1 TABLET: 10; 325 TABLET ORAL at 13:18

## 2023-01-01 RX ADMIN — GUAIFENESIN 600 MG: 600 TABLET, EXTENDED RELEASE ORAL at 05:22

## 2023-01-01 RX ADMIN — TIOTROPIUM BROMIDE INHALATION SPRAY 5 MCG: 3.12 SPRAY, METERED RESPIRATORY (INHALATION) at 07:02

## 2023-01-01 RX ADMIN — SODIUM CHLORIDE, POTASSIUM CHLORIDE, SODIUM LACTATE AND CALCIUM CHLORIDE 1000 ML: 600; 310; 30; 20 INJECTION, SOLUTION INTRAVENOUS at 15:37

## 2023-01-01 RX ADMIN — CAPMATINIB 400 MG: 200 TABLET, FILM COATED ORAL at 15:33

## 2023-01-01 RX ADMIN — ACETAMINOPHEN 650 MG: 325 TABLET, FILM COATED ORAL at 05:42

## 2023-01-01 RX ADMIN — DEXAMETHASONE SODIUM PHOSPHATE 4 MG: 4 INJECTION INTRA-ARTICULAR; INTRALESIONAL; INTRAMUSCULAR; INTRAVENOUS; SOFT TISSUE at 18:25

## 2023-01-01 RX ADMIN — BUSPIRONE HYDROCHLORIDE 15 MG: 10 TABLET ORAL at 05:58

## 2023-01-01 RX ADMIN — GUAIFENESIN SYRUP AND DEXTROMETHORPHAN 10 ML: 100; 10 SYRUP ORAL at 18:02

## 2023-01-01 RX ADMIN — DOCUSATE SODIUM 50 MG AND SENNOSIDES 8.6 MG 2 TABLET: 8.6; 5 TABLET, FILM COATED ORAL at 05:11

## 2023-01-01 RX ADMIN — BUSPIRONE HYDROCHLORIDE 15 MG: 10 TABLET ORAL at 05:20

## 2023-01-01 RX ADMIN — APIXABAN 5 MG: 5 TABLET, FILM COATED ORAL at 17:39

## 2023-01-01 RX ADMIN — ACETAMINOPHEN 650 MG: 325 TABLET, FILM COATED ORAL at 05:38

## 2023-01-01 RX ADMIN — SUGAMMADEX 200 MG: 100 INJECTION, SOLUTION INTRAVENOUS at 11:17

## 2023-01-01 RX ADMIN — HYDROMORPHONE HYDROCHLORIDE 1 MG: 1 INJECTION, SOLUTION INTRAMUSCULAR; INTRAVENOUS; SUBCUTANEOUS at 20:46

## 2023-01-01 RX ADMIN — LIDOCAINE 1 PATCH: 50 PATCH TOPICAL at 16:44

## 2023-01-01 RX ADMIN — DIPHENHYDRAMINE HYDROCHLORIDE 25 MG: 50 INJECTION, SOLUTION INTRAMUSCULAR; INTRAVENOUS at 12:54

## 2023-01-01 RX ADMIN — ACETAMINOPHEN 650 MG: 325 TABLET, FILM COATED ORAL at 17:18

## 2023-01-01 RX ADMIN — CAPMATINIB 400 MG: 200 TABLET, FILM COATED ORAL at 05:21

## 2023-01-01 RX ADMIN — LORAZEPAM 0.5 MG: 2 INJECTION INTRAMUSCULAR; INTRAVENOUS at 03:31

## 2023-01-01 RX ADMIN — IPRATROPIUM BROMIDE AND ALBUTEROL SULFATE 3 ML: .5; 3 SOLUTION RESPIRATORY (INHALATION) at 07:31

## 2023-01-01 RX ADMIN — HYDROMORPHONE HYDROCHLORIDE 1 MG: 1 INJECTION, SOLUTION INTRAMUSCULAR; INTRAVENOUS; SUBCUTANEOUS at 21:04

## 2023-01-01 RX ADMIN — BUSPIRONE HYDROCHLORIDE 15 MG: 10 TABLET ORAL at 12:17

## 2023-01-01 RX ADMIN — LORATADINE 10 MG: 10 TABLET ORAL at 05:59

## 2023-01-01 RX ADMIN — DEXAMETHASONE SODIUM PHOSPHATE 4 MG: 4 INJECTION INTRA-ARTICULAR; INTRALESIONAL; INTRAMUSCULAR; INTRAVENOUS; SOFT TISSUE at 12:12

## 2023-01-01 RX ADMIN — DEXAMETHASONE SODIUM PHOSPHATE 4 MG: 4 INJECTION INTRA-ARTICULAR; INTRALESIONAL; INTRAMUSCULAR; INTRAVENOUS; SOFT TISSUE at 12:02

## 2023-01-01 RX ADMIN — DEXAMETHASONE SODIUM PHOSPHATE 4 MG: 4 INJECTION INTRA-ARTICULAR; INTRALESIONAL; INTRAMUSCULAR; INTRAVENOUS; SOFT TISSUE at 00:24

## 2023-01-01 RX ADMIN — CLOTRIMAZOLE 10 MG: 10 LOZENGE ORAL; TOPICAL at 20:46

## 2023-01-01 RX ADMIN — SALINE NASAL SPRAY 2 SPRAY: 1.5 SOLUTION NASAL at 13:12

## 2023-01-01 RX ADMIN — HYDROMORPHONE HYDROCHLORIDE 1 MG: 1 INJECTION, SOLUTION INTRAMUSCULAR; INTRAVENOUS; SUBCUTANEOUS at 00:08

## 2023-01-01 RX ADMIN — SODIUM CHLORIDE, POTASSIUM CHLORIDE, SODIUM LACTATE AND CALCIUM CHLORIDE: 600; 310; 30; 20 INJECTION, SOLUTION INTRAVENOUS at 10:57

## 2023-01-01 RX ADMIN — DEXAMETHASONE SODIUM PHOSPHATE 4 MG: 4 INJECTION INTRA-ARTICULAR; INTRALESIONAL; INTRAMUSCULAR; INTRAVENOUS; SOFT TISSUE at 15:47

## 2023-01-01 RX ADMIN — DIPHENHYDRAMINE HYDROCHLORIDE 25 MG: 50 INJECTION, SOLUTION INTRAMUSCULAR; INTRAVENOUS at 00:10

## 2023-01-01 RX ADMIN — DEXMEDETOMIDINE 0.9 MCG/KG/HR: 100 INJECTION, SOLUTION INTRAVENOUS at 11:51

## 2023-01-01 RX ADMIN — OXYCODONE HYDROCHLORIDE 10 MG: 10 TABLET ORAL at 13:38

## 2023-01-01 RX ADMIN — LORAZEPAM 0.5 MG: 2 INJECTION INTRAMUSCULAR; INTRAVENOUS at 22:13

## 2023-01-01 RX ADMIN — OXYCODONE HYDROCHLORIDE 10 MG: 10 TABLET ORAL at 23:44

## 2023-01-01 RX ADMIN — HYDROMORPHONE HYDROCHLORIDE 1 MG: 1 INJECTION, SOLUTION INTRAMUSCULAR; INTRAVENOUS; SUBCUTANEOUS at 14:43

## 2023-01-01 RX ADMIN — MORPHINE SULFATE 15 MG: 15 TABLET, FILM COATED, EXTENDED RELEASE ORAL at 05:20

## 2023-01-01 RX ADMIN — IPRATROPIUM BROMIDE AND ALBUTEROL SULFATE 3 ML: 2.5; .5 SOLUTION RESPIRATORY (INHALATION) at 22:37

## 2023-01-01 RX ADMIN — CAPMATINIB 400 MG: 200 TABLET, FILM COATED ORAL at 18:32

## 2023-01-01 RX ADMIN — METOPROLOL TARTRATE 25 MG: 25 TABLET, FILM COATED ORAL at 17:35

## 2023-01-01 RX ADMIN — LORAZEPAM 0.5 MG: 2 INJECTION INTRAMUSCULAR; INTRAVENOUS at 07:23

## 2023-01-01 RX ADMIN — CEFTRIAXONE SODIUM 2000 MG: 10 INJECTION, POWDER, FOR SOLUTION INTRAVENOUS at 17:56

## 2023-01-01 RX ADMIN — LORATADINE 10 MG: 10 TABLET ORAL at 05:11

## 2023-01-01 RX ADMIN — DEXAMETHASONE SODIUM PHOSPHATE 4 MG: 4 INJECTION INTRA-ARTICULAR; INTRALESIONAL; INTRAMUSCULAR; INTRAVENOUS; SOFT TISSUE at 06:24

## 2023-01-01 RX ADMIN — DEXAMETHASONE SODIUM PHOSPHATE 4 MG: 4 INJECTION INTRA-ARTICULAR; INTRALESIONAL; INTRAMUSCULAR; INTRAVENOUS; SOFT TISSUE at 04:58

## 2023-01-01 RX ADMIN — ONDANSETRON 4 MG: 2 INJECTION INTRAMUSCULAR; INTRAVENOUS at 16:06

## 2023-01-01 RX ADMIN — DEXMEDETOMIDINE 1.5 MCG/KG/HR: 100 INJECTION, SOLUTION INTRAVENOUS at 06:45

## 2023-01-01 RX ADMIN — HYDROMORPHONE HYDROCHLORIDE 1 MG: 1 INJECTION, SOLUTION INTRAMUSCULAR; INTRAVENOUS; SUBCUTANEOUS at 19:29

## 2023-01-01 RX ADMIN — CAPMATINIB 400 MG: 200 TABLET, FILM COATED ORAL at 05:09

## 2023-01-01 RX ADMIN — METOPROLOL TARTRATE 25 MG: 25 TABLET, FILM COATED ORAL at 18:01

## 2023-01-01 RX ADMIN — IPRATROPIUM BROMIDE AND ALBUTEROL SULFATE 3 ML: 2.5; .5 SOLUTION RESPIRATORY (INHALATION) at 02:50

## 2023-01-01 RX ADMIN — OXYCODONE HYDROCHLORIDE 10 MG: 5 SOLUTION ORAL at 16:21

## 2023-01-01 RX ADMIN — DEXAMETHASONE SODIUM PHOSPHATE 4 MG: 4 INJECTION INTRA-ARTICULAR; INTRALESIONAL; INTRAMUSCULAR; INTRAVENOUS; SOFT TISSUE at 15:34

## 2023-01-01 RX ADMIN — DEXAMETHASONE SODIUM PHOSPHATE 4 MG: 4 INJECTION INTRA-ARTICULAR; INTRALESIONAL; INTRAMUSCULAR; INTRAVENOUS; SOFT TISSUE at 18:30

## 2023-01-01 RX ADMIN — FENTANYL CITRATE 50 MCG: 50 INJECTION, SOLUTION INTRAMUSCULAR; INTRAVENOUS at 15:02

## 2023-01-01 RX ADMIN — FENTANYL CITRATE 100 MCG: 50 INJECTION, SOLUTION INTRAMUSCULAR; INTRAVENOUS at 21:15

## 2023-01-01 RX ADMIN — DIPHENHYDRAMINE HYDROCHLORIDE 25 MG: 50 INJECTION, SOLUTION INTRAMUSCULAR; INTRAVENOUS at 23:57

## 2023-01-01 RX ADMIN — LORAZEPAM 1 MG: 2 INJECTION INTRAMUSCULAR; INTRAVENOUS at 03:30

## 2023-01-01 RX ADMIN — GUAIFENESIN 600 MG: 600 TABLET, EXTENDED RELEASE ORAL at 03:41

## 2023-01-01 RX ADMIN — DIPHENHYDRAMINE HYDROCHLORIDE 25 MG: 50 INJECTION, SOLUTION INTRAMUSCULAR; INTRAVENOUS at 04:46

## 2023-01-01 RX ADMIN — IPRATROPIUM BROMIDE AND ALBUTEROL SULFATE 3 ML: 2.5; .5 SOLUTION RESPIRATORY (INHALATION) at 18:31

## 2023-01-01 RX ADMIN — ACETAMINOPHEN 650 MG: 325 TABLET, FILM COATED ORAL at 05:33

## 2023-01-01 RX ADMIN — SODIUM CHLORIDE, POTASSIUM CHLORIDE, SODIUM LACTATE AND CALCIUM CHLORIDE: 600; 310; 30; 20 INJECTION, SOLUTION INTRAVENOUS at 14:30

## 2023-01-01 RX ADMIN — DIPHENHYDRAMINE HYDROCHLORIDE 25 MG: 50 INJECTION, SOLUTION INTRAMUSCULAR; INTRAVENOUS at 17:25

## 2023-01-01 RX ADMIN — GUAIFENESIN 600 MG: 600 TABLET, EXTENDED RELEASE ORAL at 18:44

## 2023-01-01 RX ADMIN — GABAPENTIN 300 MG: 300 CAPSULE ORAL at 17:27

## 2023-01-01 RX ADMIN — IPRATROPIUM BROMIDE AND ALBUTEROL SULFATE 3 ML: 2.5; .5 SOLUTION RESPIRATORY (INHALATION) at 01:00

## 2023-01-01 RX ADMIN — LORAZEPAM 0.5 MG: 0.5 TABLET ORAL at 20:54

## 2023-01-01 RX ADMIN — CEFEPIME 2 G: 2 INJECTION, POWDER, FOR SOLUTION INTRAVENOUS at 00:04

## 2023-01-01 RX ADMIN — METOPROLOL TARTRATE 25 MG: 25 TABLET, FILM COATED ORAL at 16:36

## 2023-01-01 RX ADMIN — CAPMATINIB 400 MG: 200 TABLET, FILM COATED ORAL at 17:52

## 2023-01-01 RX ADMIN — HYDROMORPHONE HYDROCHLORIDE 1 MG: 1 INJECTION, SOLUTION INTRAMUSCULAR; INTRAVENOUS; SUBCUTANEOUS at 01:18

## 2023-01-01 RX ADMIN — HYDROCODONE BITARTRATE AND ACETAMINOPHEN 1 TABLET: 10; 325 TABLET ORAL at 01:23

## 2023-01-01 RX ADMIN — MORPHINE SULFATE 15 MG: 15 TABLET, FILM COATED, EXTENDED RELEASE ORAL at 09:45

## 2023-01-01 RX ADMIN — LORAZEPAM 0.5 MG: 2 INJECTION INTRAMUSCULAR; INTRAVENOUS at 03:08

## 2023-01-01 RX ADMIN — LORATADINE 10 MG: 10 TABLET ORAL at 05:24

## 2023-01-01 RX ADMIN — BUSPIRONE HYDROCHLORIDE 15 MG: 10 TABLET ORAL at 17:56

## 2023-01-01 RX ADMIN — HYDROMORPHONE HYDROCHLORIDE 1 MG: 1 INJECTION, SOLUTION INTRAMUSCULAR; INTRAVENOUS; SUBCUTANEOUS at 09:00

## 2023-01-01 RX ADMIN — ALBUTEROL SULFATE 2 PUFF: 90 AEROSOL, METERED RESPIRATORY (INHALATION) at 15:42

## 2023-01-01 RX ADMIN — IPRATROPIUM BROMIDE AND ALBUTEROL SULFATE 3 ML: 2.5; .5 SOLUTION RESPIRATORY (INHALATION) at 14:40

## 2023-01-01 RX ADMIN — GUAIFENESIN 600 MG: 600 TABLET, EXTENDED RELEASE ORAL at 05:20

## 2023-01-01 RX ADMIN — AZITHROMYCIN FOR INJECTION INJECTION, POWDER, LYOPHILIZED, FOR SOLUTION 500 MG: 500 INJECTION INTRAVENOUS at 05:03

## 2023-01-01 RX ADMIN — DOCUSATE SODIUM 50 MG AND SENNOSIDES 8.6 MG 2 TABLET: 8.6; 5 TABLET, FILM COATED ORAL at 05:22

## 2023-01-01 RX ADMIN — GUAIFENESIN 600 MG: 600 TABLET, EXTENDED RELEASE ORAL at 06:14

## 2023-01-01 RX ADMIN — LORAZEPAM 1 MG: 2 INJECTION INTRAMUSCULAR; INTRAVENOUS at 13:47

## 2023-01-01 RX ADMIN — IPRATROPIUM BROMIDE AND ALBUTEROL SULFATE 3 ML: 2.5; .5 SOLUTION RESPIRATORY (INHALATION) at 04:05

## 2023-01-01 RX ADMIN — HYDROMORPHONE HYDROCHLORIDE 1 MG: 1 INJECTION, SOLUTION INTRAMUSCULAR; INTRAVENOUS; SUBCUTANEOUS at 17:24

## 2023-01-01 RX ADMIN — HYDROCODONE BITARTRATE AND ACETAMINOPHEN 1 TABLET: 10; 325 TABLET ORAL at 16:39

## 2023-01-01 RX ADMIN — HYDROMORPHONE HYDROCHLORIDE 1 MG: 1 INJECTION, SOLUTION INTRAMUSCULAR; INTRAVENOUS; SUBCUTANEOUS at 02:45

## 2023-01-01 RX ADMIN — IPRATROPIUM BROMIDE AND ALBUTEROL SULFATE 3 ML: .5; 3 SOLUTION RESPIRATORY (INHALATION) at 16:40

## 2023-01-01 RX ADMIN — DEXAMETHASONE SODIUM PHOSPHATE 4 MG: 4 INJECTION INTRA-ARTICULAR; INTRALESIONAL; INTRAMUSCULAR; INTRAVENOUS; SOFT TISSUE at 13:11

## 2023-01-01 RX ADMIN — GUAIFENESIN 600 MG: 600 TABLET, EXTENDED RELEASE ORAL at 05:56

## 2023-01-01 RX ADMIN — LORAZEPAM 0.5 MG: 2 INJECTION INTRAMUSCULAR; INTRAVENOUS at 18:16

## 2023-01-01 RX ADMIN — IPRATROPIUM BROMIDE AND ALBUTEROL SULFATE 3 ML: 2.5; .5 SOLUTION RESPIRATORY (INHALATION) at 04:16

## 2023-01-01 RX ADMIN — HYDROCODONE BITARTRATE AND ACETAMINOPHEN 1 TABLET: 10; 325 TABLET ORAL at 14:59

## 2023-01-01 RX ADMIN — DEXAMETHASONE SODIUM PHOSPHATE 4 MG: 4 INJECTION INTRA-ARTICULAR; INTRALESIONAL; INTRAMUSCULAR; INTRAVENOUS; SOFT TISSUE at 00:17

## 2023-01-01 RX ADMIN — ACETAMINOPHEN 650 MG: 325 TABLET, FILM COATED ORAL at 04:43

## 2023-01-01 RX ADMIN — FLUTICASONE FUROATE, UMECLIDINIUM BROMIDE AND VILANTEROL TRIFENATATE 1 PUFF: 100; 62.5; 25 POWDER RESPIRATORY (INHALATION) at 05:28

## 2023-01-01 RX ADMIN — CLOTRIMAZOLE 10 MG: 10 LOZENGE ORAL; TOPICAL at 23:24

## 2023-01-01 RX ADMIN — IPRATROPIUM BROMIDE AND ALBUTEROL SULFATE 3 ML: 2.5; .5 SOLUTION RESPIRATORY (INHALATION) at 15:03

## 2023-01-01 RX ADMIN — OXYCODONE HYDROCHLORIDE 10 MG: 10 TABLET ORAL at 11:25

## 2023-01-01 RX ADMIN — BUSPIRONE HYDROCHLORIDE 15 MG: 10 TABLET ORAL at 06:12

## 2023-01-01 RX ADMIN — DEXAMETHASONE SODIUM PHOSPHATE 4 MG: 4 INJECTION INTRA-ARTICULAR; INTRALESIONAL; INTRAMUSCULAR; INTRAVENOUS; SOFT TISSUE at 23:20

## 2023-01-01 RX ADMIN — IPRATROPIUM BROMIDE AND ALBUTEROL SULFATE 3 ML: 2.5; .5 SOLUTION RESPIRATORY (INHALATION) at 15:20

## 2023-01-01 RX ADMIN — DOCUSATE SODIUM 50 MG AND SENNOSIDES 8.6 MG 2 TABLET: 8.6; 5 TABLET, FILM COATED ORAL at 04:57

## 2023-01-01 RX ADMIN — BUSPIRONE HYDROCHLORIDE 15 MG: 10 TABLET ORAL at 12:41

## 2023-01-01 RX ADMIN — HYDROMORPHONE HYDROCHLORIDE 1 MG: 1 INJECTION, SOLUTION INTRAMUSCULAR; INTRAVENOUS; SUBCUTANEOUS at 18:04

## 2023-01-01 RX ADMIN — DOCUSATE SODIUM 50 MG AND SENNOSIDES 8.6 MG 2 TABLET: 8.6; 5 TABLET, FILM COATED ORAL at 18:16

## 2023-01-01 RX ADMIN — DEXAMETHASONE SODIUM PHOSPHATE 4 MG: 4 INJECTION INTRA-ARTICULAR; INTRALESIONAL; INTRAMUSCULAR; INTRAVENOUS; SOFT TISSUE at 05:23

## 2023-01-01 RX ADMIN — ALPRAZOLAM 0.25 MG: 0.25 TABLET ORAL at 09:39

## 2023-01-01 RX ADMIN — BUSPIRONE HYDROCHLORIDE 15 MG: 10 TABLET ORAL at 12:38

## 2023-01-01 RX ADMIN — Medication 1 APPLICATOR: at 17:23

## 2023-01-01 RX ADMIN — LORAZEPAM 1 MG: 2 INJECTION INTRAMUSCULAR; INTRAVENOUS at 14:38

## 2023-01-01 RX ADMIN — ALBUTEROL SULFATE 2 PUFF: 90 AEROSOL, METERED RESPIRATORY (INHALATION) at 19:48

## 2023-01-01 RX ADMIN — MORPHINE SULFATE 15 MG: 15 TABLET, FILM COATED, EXTENDED RELEASE ORAL at 05:13

## 2023-01-01 RX ADMIN — DEXAMETHASONE 4 MG: 4 TABLET ORAL at 22:00

## 2023-01-01 RX ADMIN — DEXAMETHASONE SODIUM PHOSPHATE 4 MG: 4 INJECTION INTRA-ARTICULAR; INTRALESIONAL; INTRAMUSCULAR; INTRAVENOUS; SOFT TISSUE at 05:08

## 2023-01-01 RX ADMIN — TIOTROPIUM BROMIDE INHALATION SPRAY 5 MCG: 3.12 SPRAY, METERED RESPIRATORY (INHALATION) at 07:49

## 2023-01-01 RX ADMIN — IPRATROPIUM BROMIDE AND ALBUTEROL SULFATE 3 ML: 2.5; .5 SOLUTION RESPIRATORY (INHALATION) at 14:11

## 2023-01-01 RX ADMIN — DEXAMETHASONE SODIUM PHOSPHATE 4 MG: 4 INJECTION INTRA-ARTICULAR; INTRALESIONAL; INTRAMUSCULAR; INTRAVENOUS; SOFT TISSUE at 05:20

## 2023-01-01 RX ADMIN — BUSPIRONE HYDROCHLORIDE 15 MG: 10 TABLET ORAL at 17:58

## 2023-01-01 RX ADMIN — OXYCODONE HYDROCHLORIDE 10 MG: 10 TABLET ORAL at 14:43

## 2023-01-01 RX ADMIN — DEXAMETHASONE SODIUM PHOSPHATE 4 MG: 4 INJECTION INTRA-ARTICULAR; INTRALESIONAL; INTRAMUSCULAR; INTRAVENOUS; SOFT TISSUE at 17:05

## 2023-01-01 RX ADMIN — DEXMEDETOMIDINE 0.6 MCG/KG/HR: 100 INJECTION, SOLUTION INTRAVENOUS at 21:11

## 2023-01-01 RX ADMIN — LORAZEPAM 1 MG: 2 INJECTION INTRAMUSCULAR; INTRAVENOUS at 21:49

## 2023-01-01 RX ADMIN — CAPMATINIB 400 MG: 200 TABLET, FILM COATED ORAL at 17:55

## 2023-01-01 RX ADMIN — DEXAMETHASONE SODIUM PHOSPHATE 4 MG: 4 INJECTION INTRA-ARTICULAR; INTRALESIONAL; INTRAMUSCULAR; INTRAVENOUS; SOFT TISSUE at 04:43

## 2023-01-01 RX ADMIN — DEXAMETHASONE SODIUM PHOSPHATE 4 MG: 4 INJECTION INTRA-ARTICULAR; INTRALESIONAL; INTRAMUSCULAR; INTRAVENOUS; SOFT TISSUE at 17:46

## 2023-01-01 RX ADMIN — LORAZEPAM 1 MG: 2 INJECTION INTRAMUSCULAR; INTRAVENOUS at 03:32

## 2023-01-01 RX ADMIN — DEXAMETHASONE SODIUM PHOSPHATE 4 MG: 4 INJECTION INTRA-ARTICULAR; INTRALESIONAL; INTRAMUSCULAR; INTRAVENOUS; SOFT TISSUE at 12:48

## 2023-01-01 RX ADMIN — HYDROMORPHONE HYDROCHLORIDE 1 MG: 1 INJECTION, SOLUTION INTRAMUSCULAR; INTRAVENOUS; SUBCUTANEOUS at 16:29

## 2023-01-01 RX ADMIN — DIPHENHYDRAMINE HYDROCHLORIDE 25 MG: 50 INJECTION, SOLUTION INTRAMUSCULAR; INTRAVENOUS at 11:41

## 2023-01-01 RX ADMIN — LIDOCAINE HYDROCHLORIDE 100 MG: 20 INJECTION, SOLUTION EPIDURAL; INFILTRATION; INTRACAUDAL at 15:43

## 2023-01-01 RX ADMIN — CYCLOBENZAPRINE 10 MG: 10 TABLET, FILM COATED ORAL at 02:16

## 2023-01-01 RX ADMIN — DIPHENHYDRAMINE HYDROCHLORIDE 25 MG: 50 INJECTION, SOLUTION INTRAMUSCULAR; INTRAVENOUS at 18:21

## 2023-01-01 RX ADMIN — DIPHENHYDRAMINE HYDROCHLORIDE 25 MG: 50 INJECTION, SOLUTION INTRAMUSCULAR; INTRAVENOUS at 12:31

## 2023-01-01 RX ADMIN — CAPMATINIB 400 MG: 200 TABLET, FILM COATED ORAL at 06:12

## 2023-01-01 RX ADMIN — DEXMEDETOMIDINE 1.5 MCG/KG/HR: 100 INJECTION, SOLUTION INTRAVENOUS at 23:14

## 2023-01-01 RX ADMIN — ALBUTEROL SULFATE 2 PUFF: 90 AEROSOL, METERED RESPIRATORY (INHALATION) at 21:26

## 2023-01-01 RX ADMIN — LORAZEPAM 0.5 MG: 1 TABLET ORAL at 05:37

## 2023-01-01 RX ADMIN — BUSPIRONE HYDROCHLORIDE 15 MG: 10 TABLET ORAL at 17:35

## 2023-01-01 RX ADMIN — BUSPIRONE HYDROCHLORIDE 15 MG: 10 TABLET ORAL at 13:32

## 2023-01-01 RX ADMIN — ACETAMINOPHEN 650 MG: 325 TABLET, FILM COATED ORAL at 04:28

## 2023-01-01 RX ADMIN — HYDROMORPHONE HYDROCHLORIDE 2 MG: 2 INJECTION INTRAMUSCULAR; INTRAVENOUS; SUBCUTANEOUS at 11:29

## 2023-01-01 RX ADMIN — CEFTRIAXONE SODIUM 2000 MG: 10 INJECTION, POWDER, FOR SOLUTION INTRAVENOUS at 11:00

## 2023-01-01 RX ADMIN — HYDROMORPHONE HYDROCHLORIDE 1 MG: 1 INJECTION, SOLUTION INTRAMUSCULAR; INTRAVENOUS; SUBCUTANEOUS at 23:36

## 2023-01-01 RX ADMIN — FENTANYL CITRATE 100 MCG: 50 INJECTION, SOLUTION INTRAMUSCULAR; INTRAVENOUS at 15:43

## 2023-01-01 RX ADMIN — HYDROCODONE BITARTRATE AND ACETAMINOPHEN 1 TABLET: 10; 325 TABLET ORAL at 02:59

## 2023-01-01 RX ADMIN — DOCUSATE SODIUM 50 MG AND SENNOSIDES 8.6 MG 2 TABLET: 8.6; 5 TABLET, FILM COATED ORAL at 03:36

## 2023-01-01 RX ADMIN — HYDROMORPHONE HYDROCHLORIDE 1 MG: 1 INJECTION, SOLUTION INTRAMUSCULAR; INTRAVENOUS; SUBCUTANEOUS at 15:32

## 2023-01-01 RX ADMIN — IPRATROPIUM BROMIDE AND ALBUTEROL SULFATE 3 ML: 2.5; .5 SOLUTION RESPIRATORY (INHALATION) at 09:21

## 2023-01-01 RX ADMIN — DEXAMETHASONE 4 MG: 4 TABLET ORAL at 13:48

## 2023-01-01 RX ADMIN — TRANEXAMIC ACID 500 MG: 100 INJECTION, SOLUTION INTRAVENOUS at 11:02

## 2023-01-01 RX ADMIN — ACETAMINOPHEN 650 MG: 325 TABLET, FILM COATED ORAL at 05:06

## 2023-01-01 RX ADMIN — IPRATROPIUM BROMIDE AND ALBUTEROL SULFATE 3 ML: 2.5; .5 SOLUTION RESPIRATORY (INHALATION) at 03:26

## 2023-01-01 RX ADMIN — IPRATROPIUM BROMIDE AND ALBUTEROL SULFATE 3 ML: 2.5; .5 SOLUTION RESPIRATORY (INHALATION) at 03:24

## 2023-01-01 RX ADMIN — IPRATROPIUM BROMIDE AND ALBUTEROL SULFATE 3 ML: 2.5; .5 SOLUTION RESPIRATORY (INHALATION) at 00:42

## 2023-01-01 RX ADMIN — HYDROMORPHONE HYDROCHLORIDE 1 MG: 1 INJECTION, SOLUTION INTRAMUSCULAR; INTRAVENOUS; SUBCUTANEOUS at 23:20

## 2023-01-01 RX ADMIN — IPRATROPIUM BROMIDE AND ALBUTEROL SULFATE 3 ML: 2.5; .5 SOLUTION RESPIRATORY (INHALATION) at 08:51

## 2023-01-01 RX ADMIN — ACETAMINOPHEN 650 MG: 325 TABLET, FILM COATED ORAL at 23:57

## 2023-01-01 RX ADMIN — HYDROCODONE BITARTRATE AND ACETAMINOPHEN 1 TABLET: 10; 325 TABLET ORAL at 07:25

## 2023-01-01 RX ADMIN — FUROSEMIDE 40 MG: 10 INJECTION INTRAMUSCULAR; INTRAVENOUS at 10:35

## 2023-01-01 RX ADMIN — MOMETASONE FUROATE AND FORMOTEROL FUMARATE DIHYDRATE 2 PUFF: 100; 5 AEROSOL RESPIRATORY (INHALATION) at 11:26

## 2023-01-01 RX ADMIN — CAPMATINIB 400 MG: 200 TABLET, FILM COATED ORAL at 18:11

## 2023-01-01 RX ADMIN — GUAIFENESIN 600 MG: 600 TABLET, EXTENDED RELEASE ORAL at 04:43

## 2023-01-01 RX ADMIN — HYDROCODONE BITARTRATE AND ACETAMINOPHEN 1 TABLET: 10; 325 TABLET ORAL at 10:07

## 2023-01-01 RX ADMIN — IPRATROPIUM BROMIDE AND ALBUTEROL SULFATE 3 ML: 2.5; .5 SOLUTION RESPIRATORY (INHALATION) at 19:56

## 2023-01-01 RX ADMIN — AZITHROMYCIN FOR INJECTION INJECTION, POWDER, LYOPHILIZED, FOR SOLUTION 500 MG: 500 INJECTION INTRAVENOUS at 06:18

## 2023-01-01 RX ADMIN — APIXABAN 5 MG: 5 TABLET, FILM COATED ORAL at 18:25

## 2023-01-01 RX ADMIN — DEXAMETHASONE SODIUM PHOSPHATE 4 MG: 4 INJECTION INTRA-ARTICULAR; INTRALESIONAL; INTRAMUSCULAR; INTRAVENOUS; SOFT TISSUE at 05:11

## 2023-01-01 RX ADMIN — GUAIFENESIN 600 MG: 600 TABLET, EXTENDED RELEASE ORAL at 07:42

## 2023-01-01 RX ADMIN — METOPROLOL TARTRATE 25 MG: 25 TABLET, FILM COATED ORAL at 03:36

## 2023-01-01 RX ADMIN — HYDROMORPHONE HYDROCHLORIDE 1 MG: 1 INJECTION, SOLUTION INTRAMUSCULAR; INTRAVENOUS; SUBCUTANEOUS at 04:08

## 2023-01-01 RX ADMIN — HYDROMORPHONE HYDROCHLORIDE 1 MG: 1 INJECTION, SOLUTION INTRAMUSCULAR; INTRAVENOUS; SUBCUTANEOUS at 05:05

## 2023-01-01 RX ADMIN — GUAIFENESIN 600 MG: 600 TABLET, EXTENDED RELEASE ORAL at 06:11

## 2023-01-01 RX ADMIN — HYDROMORPHONE HYDROCHLORIDE 1 MG: 1 INJECTION, SOLUTION INTRAMUSCULAR; INTRAVENOUS; SUBCUTANEOUS at 11:57

## 2023-01-01 RX ADMIN — IPRATROPIUM BROMIDE AND ALBUTEROL SULFATE 3 ML: 2.5; .5 SOLUTION RESPIRATORY (INHALATION) at 22:15

## 2023-01-01 RX ADMIN — CEFTRIAXONE SODIUM 2000 MG: 10 INJECTION, POWDER, FOR SOLUTION INTRAVENOUS at 21:09

## 2023-01-01 RX ADMIN — SODIUM CHLORIDE 1000 ML: 9 INJECTION, SOLUTION INTRAVENOUS at 23:14

## 2023-01-01 RX ADMIN — GUAIFENESIN 600 MG: 600 TABLET, EXTENDED RELEASE ORAL at 18:13

## 2023-01-01 RX ADMIN — IPRATROPIUM BROMIDE AND ALBUTEROL SULFATE 3 ML: 2.5; .5 SOLUTION RESPIRATORY (INHALATION) at 19:59

## 2023-01-01 RX ADMIN — SUGAMMADEX 200 MG: 100 INJECTION, SOLUTION INTRAVENOUS at 15:35

## 2023-01-01 RX ADMIN — IPRATROPIUM BROMIDE AND ALBUTEROL SULFATE 3 ML: 2.5; .5 SOLUTION RESPIRATORY (INHALATION) at 06:54

## 2023-01-01 RX ADMIN — IPRATROPIUM BROMIDE AND ALBUTEROL SULFATE 3 ML: 2.5; .5 SOLUTION RESPIRATORY (INHALATION) at 02:36

## 2023-01-01 RX ADMIN — TIOTROPIUM BROMIDE INHALATION SPRAY 5 MCG: 3.12 SPRAY, METERED RESPIRATORY (INHALATION) at 04:28

## 2023-01-01 RX ADMIN — MORPHINE SULFATE 15 MG: 15 TABLET, FILM COATED, EXTENDED RELEASE ORAL at 04:30

## 2023-01-01 RX ADMIN — OXYCODONE HYDROCHLORIDE 15 MG: 10 TABLET ORAL at 19:27

## 2023-01-01 RX ADMIN — HYDROCODONE BITARTRATE AND ACETAMINOPHEN 1 TABLET: 10; 325 TABLET ORAL at 05:44

## 2023-01-01 RX ADMIN — GUAIFENESIN 600 MG: 600 TABLET, EXTENDED RELEASE ORAL at 18:10

## 2023-01-01 RX ADMIN — IPRATROPIUM BROMIDE AND ALBUTEROL SULFATE 3 ML: 2.5; .5 SOLUTION RESPIRATORY (INHALATION) at 15:54

## 2023-01-01 RX ADMIN — IPRATROPIUM BROMIDE AND ALBUTEROL SULFATE 3 ML: 2.5; .5 SOLUTION RESPIRATORY (INHALATION) at 02:43

## 2023-01-01 RX ADMIN — APIXABAN 5 MG: 5 TABLET, FILM COATED ORAL at 17:50

## 2023-01-01 RX ADMIN — IPRATROPIUM BROMIDE AND ALBUTEROL SULFATE 3 ML: .5; 3 SOLUTION RESPIRATORY (INHALATION) at 19:28

## 2023-01-01 RX ADMIN — BUSPIRONE HYDROCHLORIDE 15 MG: 10 TABLET ORAL at 04:30

## 2023-01-01 RX ADMIN — LORATADINE 10 MG: 10 TABLET ORAL at 06:01

## 2023-01-01 RX ADMIN — LIDOCAINE 1 PATCH: 50 PATCH TOPICAL at 17:09

## 2023-01-01 RX ADMIN — FLUTICASONE FUROATE, UMECLIDINIUM BROMIDE AND VILANTEROL TRIFENATATE 1 PUFF: 100; 62.5; 25 POWDER RESPIRATORY (INHALATION) at 08:38

## 2023-01-01 RX ADMIN — ACETAMINOPHEN 650 MG: 325 TABLET, FILM COATED ORAL at 16:52

## 2023-01-01 RX ADMIN — CEFEPIME 2 G: 2 INJECTION, POWDER, FOR SOLUTION INTRAVENOUS at 05:14

## 2023-01-01 RX ADMIN — MOMETASONE FUROATE AND FORMOTEROL FUMARATE DIHYDRATE 2 PUFF: 100; 5 AEROSOL RESPIRATORY (INHALATION) at 20:46

## 2023-01-01 RX ADMIN — IPRATROPIUM BROMIDE AND ALBUTEROL SULFATE 3 ML: 2.5; .5 SOLUTION RESPIRATORY (INHALATION) at 21:15

## 2023-01-01 RX ADMIN — HYDROMORPHONE HYDROCHLORIDE 1 MG: 1 INJECTION, SOLUTION INTRAMUSCULAR; INTRAVENOUS; SUBCUTANEOUS at 13:34

## 2023-01-01 RX ADMIN — ACETAMINOPHEN 650 MG: 325 TABLET, FILM COATED ORAL at 05:51

## 2023-01-01 RX ADMIN — DEXAMETHASONE SODIUM PHOSPHATE 6 MG: 4 INJECTION INTRA-ARTICULAR; INTRALESIONAL; INTRAMUSCULAR; INTRAVENOUS; SOFT TISSUE at 05:05

## 2023-01-01 RX ADMIN — LIDOCAINE 1 PATCH: 50 PATCH TOPICAL at 16:21

## 2023-01-01 RX ADMIN — ALBUTEROL SULFATE 2 PUFF: 90 AEROSOL, METERED RESPIRATORY (INHALATION) at 11:47

## 2023-01-01 RX ADMIN — ALBUTEROL SULFATE 2 PUFF: 90 AEROSOL, METERED RESPIRATORY (INHALATION) at 14:07

## 2023-01-01 RX ADMIN — HYDROMORPHONE HYDROCHLORIDE 1 MG: 1 INJECTION, SOLUTION INTRAMUSCULAR; INTRAVENOUS; SUBCUTANEOUS at 14:24

## 2023-01-01 RX ADMIN — DIPHENHYDRAMINE HYDROCHLORIDE 25 MG: 50 INJECTION, SOLUTION INTRAMUSCULAR; INTRAVENOUS at 00:06

## 2023-01-01 RX ADMIN — APIXABAN 5 MG: 5 TABLET, FILM COATED ORAL at 17:34

## 2023-01-01 RX ADMIN — ACETAMINOPHEN 650 MG: 325 TABLET, FILM COATED ORAL at 00:52

## 2023-01-01 RX ADMIN — HYDROMORPHONE HYDROCHLORIDE 1 MG: 1 INJECTION, SOLUTION INTRAMUSCULAR; INTRAVENOUS; SUBCUTANEOUS at 10:39

## 2023-01-01 RX ADMIN — ACETAMINOPHEN 650 MG: 325 TABLET, FILM COATED ORAL at 13:11

## 2023-01-01 RX ADMIN — CLOTRIMAZOLE 10 MG: 10 LOZENGE ORAL; TOPICAL at 06:23

## 2023-01-01 RX ADMIN — ACETAMINOPHEN 650 MG: 325 TABLET, FILM COATED ORAL at 13:28

## 2023-01-01 RX ADMIN — DIPHENHYDRAMINE HYDROCHLORIDE 25 MG: 50 INJECTION, SOLUTION INTRAMUSCULAR; INTRAVENOUS at 06:04

## 2023-01-01 RX ADMIN — APIXABAN 5 MG: 5 TABLET, FILM COATED ORAL at 05:22

## 2023-01-01 RX ADMIN — HYDROMORPHONE HYDROCHLORIDE 1 MG: 1 INJECTION, SOLUTION INTRAMUSCULAR; INTRAVENOUS; SUBCUTANEOUS at 14:58

## 2023-01-01 RX ADMIN — CLOTRIMAZOLE 10 MG: 10 LOZENGE ORAL; TOPICAL at 15:20

## 2023-01-01 RX ADMIN — HYDROMORPHONE HYDROCHLORIDE 1 MG: 1 INJECTION, SOLUTION INTRAMUSCULAR; INTRAVENOUS; SUBCUTANEOUS at 03:58

## 2023-01-01 RX ADMIN — DEXAMETHASONE SODIUM PHOSPHATE 4 MG: 4 INJECTION INTRA-ARTICULAR; INTRALESIONAL; INTRAMUSCULAR; INTRAVENOUS; SOFT TISSUE at 23:44

## 2023-01-01 RX ADMIN — SENNOSIDES AND DOCUSATE SODIUM 2 TABLET: 50; 8.6 TABLET ORAL at 04:52

## 2023-01-01 RX ADMIN — CAPMATINIB 400 MG: 200 TABLET, FILM COATED ORAL at 17:36

## 2023-01-01 RX ADMIN — DIPHENHYDRAMINE HYDROCHLORIDE 25 MG: 50 INJECTION, SOLUTION INTRAMUSCULAR; INTRAVENOUS at 15:47

## 2023-01-01 RX ADMIN — ACETAMINOPHEN 650 MG: 325 TABLET, FILM COATED ORAL at 18:22

## 2023-01-01 RX ADMIN — LIDOCAINE HYDROCHLORIDE 100 MG: 20 INJECTION, SOLUTION EPIDURAL; INFILTRATION; INTRACAUDAL at 11:04

## 2023-01-01 RX ADMIN — GUAIFENESIN 600 MG: 600 TABLET, EXTENDED RELEASE ORAL at 05:37

## 2023-01-01 RX ADMIN — APIXABAN 5 MG: 5 TABLET, FILM COATED ORAL at 17:35

## 2023-01-01 RX ADMIN — METOPROLOL TARTRATE 25 MG: 25 TABLET, FILM COATED ORAL at 16:19

## 2023-01-01 RX ADMIN — DEXAMETHASONE SODIUM PHOSPHATE 4 MG: 4 INJECTION INTRA-ARTICULAR; INTRALESIONAL; INTRAMUSCULAR; INTRAVENOUS; SOFT TISSUE at 11:04

## 2023-01-01 RX ADMIN — CLOTRIMAZOLE 10 MG: 10 LOZENGE ORAL; TOPICAL at 18:25

## 2023-01-01 RX ADMIN — DEXAMETHASONE SODIUM PHOSPHATE 4 MG: 4 INJECTION INTRA-ARTICULAR; INTRALESIONAL; INTRAMUSCULAR; INTRAVENOUS; SOFT TISSUE at 06:14

## 2023-01-01 RX ADMIN — LORATADINE 10 MG: 10 TABLET ORAL at 05:32

## 2023-01-01 RX ADMIN — MIDAZOLAM 2 MG: 1 INJECTION, SOLUTION INTRAMUSCULAR; INTRAVENOUS at 15:00

## 2023-01-01 RX ADMIN — DEXMEDETOMIDINE 0.9 MCG/KG/HR: 100 INJECTION, SOLUTION INTRAVENOUS at 01:10

## 2023-01-01 RX ADMIN — HYDROMORPHONE HYDROCHLORIDE 2 MG: 2 INJECTION INTRAMUSCULAR; INTRAVENOUS; SUBCUTANEOUS at 23:48

## 2023-01-01 RX ADMIN — HYDROMORPHONE HYDROCHLORIDE 1 MG: 1 INJECTION, SOLUTION INTRAMUSCULAR; INTRAVENOUS; SUBCUTANEOUS at 20:56

## 2023-01-01 RX ADMIN — LORAZEPAM 1 MG: 2 INJECTION INTRAMUSCULAR; INTRAVENOUS at 03:29

## 2023-01-01 RX ADMIN — CAPMATINIB 400 MG: 200 TABLET, FILM COATED ORAL at 18:00

## 2023-01-01 RX ADMIN — LORAZEPAM 0.5 MG: 2 INJECTION INTRAMUSCULAR; INTRAVENOUS at 11:13

## 2023-01-01 RX ADMIN — IPRATROPIUM BROMIDE AND ALBUTEROL SULFATE 3 ML: 2.5; .5 SOLUTION RESPIRATORY (INHALATION) at 11:00

## 2023-01-01 RX ADMIN — GUAIFENESIN 600 MG: 600 TABLET, EXTENDED RELEASE ORAL at 06:33

## 2023-01-01 RX ADMIN — HYDROMORPHONE HYDROCHLORIDE 1 MG: 1 INJECTION, SOLUTION INTRAMUSCULAR; INTRAVENOUS; SUBCUTANEOUS at 16:54

## 2023-01-01 RX ADMIN — IOHEXOL 65 ML: 350 INJECTION, SOLUTION INTRAVENOUS at 12:08

## 2023-01-01 RX ADMIN — ROCURONIUM BROMIDE 50 MG: 10 INJECTION, SOLUTION INTRAVENOUS at 15:04

## 2023-01-01 RX ADMIN — CAPMATINIB 400 MG: 200 TABLET, FILM COATED ORAL at 17:22

## 2023-01-01 RX ADMIN — MOMETASONE FUROATE AND FORMOTEROL FUMARATE DIHYDRATE 2 PUFF: 200; 5 AEROSOL RESPIRATORY (INHALATION) at 07:48

## 2023-01-01 RX ADMIN — IPRATROPIUM BROMIDE AND ALBUTEROL SULFATE 3 ML: 2.5; .5 SOLUTION RESPIRATORY (INHALATION) at 19:32

## 2023-01-01 RX ADMIN — HYDROMORPHONE HYDROCHLORIDE 1 MG: 1 INJECTION, SOLUTION INTRAMUSCULAR; INTRAVENOUS; SUBCUTANEOUS at 20:27

## 2023-01-01 RX ADMIN — OXYCODONE HYDROCHLORIDE 10 MG: 10 TABLET ORAL at 21:25

## 2023-01-01 RX ADMIN — LORAZEPAM 1 MG: 2 INJECTION INTRAMUSCULAR; INTRAVENOUS at 12:10

## 2023-01-01 RX ADMIN — HYDROMORPHONE HYDROCHLORIDE 1 MG: 1 INJECTION, SOLUTION INTRAMUSCULAR; INTRAVENOUS; SUBCUTANEOUS at 21:28

## 2023-01-01 RX ADMIN — LORAZEPAM 1 MG: 2 INJECTION INTRAMUSCULAR; INTRAVENOUS at 22:33

## 2023-01-01 RX ADMIN — LORAZEPAM 0.5 MG: 2 INJECTION INTRAMUSCULAR; INTRAVENOUS at 19:53

## 2023-01-01 RX ADMIN — DIPHENHYDRAMINE HYDROCHLORIDE 25 MG: 50 INJECTION, SOLUTION INTRAMUSCULAR; INTRAVENOUS at 16:39

## 2023-01-01 RX ADMIN — ACETAMINOPHEN 650 MG: 325 TABLET, FILM COATED ORAL at 11:13

## 2023-01-01 RX ADMIN — HYDROCODONE BITARTRATE AND ACETAMINOPHEN 1 TABLET: 10; 325 TABLET ORAL at 17:45

## 2023-01-01 RX ADMIN — CLOTRIMAZOLE 10 MG: 10 LOZENGE ORAL; TOPICAL at 10:35

## 2023-01-01 RX ADMIN — APIXABAN 5 MG: 5 TABLET, FILM COATED ORAL at 18:38

## 2023-01-01 RX ADMIN — OXYCODONE HYDROCHLORIDE 10 MG: 10 TABLET ORAL at 04:15

## 2023-01-01 RX ADMIN — DIPHENHYDRAMINE HYDROCHLORIDE 25 MG: 50 INJECTION, SOLUTION INTRAMUSCULAR; INTRAVENOUS at 17:20

## 2023-01-01 RX ADMIN — DIPHENHYDRAMINE HYDROCHLORIDE 25 MG: 50 INJECTION, SOLUTION INTRAMUSCULAR; INTRAVENOUS at 04:58

## 2023-01-01 RX ADMIN — APIXABAN 10 MG: 5 TABLET, FILM COATED ORAL at 17:40

## 2023-01-01 RX ADMIN — SENNOSIDES AND DOCUSATE SODIUM 2 TABLET: 50; 8.6 TABLET ORAL at 05:48

## 2023-01-01 RX ADMIN — DEXAMETHASONE SODIUM PHOSPHATE 4 MG: 4 INJECTION INTRA-ARTICULAR; INTRALESIONAL; INTRAMUSCULAR; INTRAVENOUS; SOFT TISSUE at 15:17

## 2023-01-01 RX ADMIN — IPRATROPIUM BROMIDE AND ALBUTEROL SULFATE 3 ML: 2.5; .5 SOLUTION RESPIRATORY (INHALATION) at 19:39

## 2023-01-01 RX ADMIN — DEXAMETHASONE 4 MG: 4 TABLET ORAL at 18:18

## 2023-01-01 RX ADMIN — DEXAMETHASONE 4 MG: 4 TABLET ORAL at 13:50

## 2023-01-01 RX ADMIN — APIXABAN 5 MG: 5 TABLET, FILM COATED ORAL at 04:08

## 2023-01-01 RX ADMIN — ALBUTEROL SULFATE 2 PUFF: 90 AEROSOL, METERED RESPIRATORY (INHALATION) at 17:33

## 2023-01-01 RX ADMIN — IPRATROPIUM BROMIDE AND ALBUTEROL SULFATE 3 ML: 2.5; .5 SOLUTION RESPIRATORY (INHALATION) at 20:54

## 2023-01-01 RX ADMIN — HYDROMORPHONE HYDROCHLORIDE 1 MG: 1 INJECTION, SOLUTION INTRAMUSCULAR; INTRAVENOUS; SUBCUTANEOUS at 14:59

## 2023-01-01 RX ADMIN — CAPMATINIB 400 MG: 200 TABLET, FILM COATED ORAL at 05:19

## 2023-01-01 RX ADMIN — BUSPIRONE HYDROCHLORIDE 15 MG: 10 TABLET ORAL at 18:24

## 2023-01-01 RX ADMIN — DEXAMETHASONE SODIUM PHOSPHATE 4 MG: 4 INJECTION INTRA-ARTICULAR; INTRALESIONAL; INTRAMUSCULAR; INTRAVENOUS; SOFT TISSUE at 06:33

## 2023-01-01 RX ADMIN — APIXABAN 10 MG: 5 TABLET, FILM COATED ORAL at 06:33

## 2023-01-01 RX ADMIN — APIXABAN 10 MG: 5 TABLET, FILM COATED ORAL at 17:21

## 2023-01-01 RX ADMIN — IPRATROPIUM BROMIDE AND ALBUTEROL SULFATE 3 ML: 2.5; .5 SOLUTION RESPIRATORY (INHALATION) at 11:21

## 2023-01-01 RX ADMIN — LORAZEPAM 1 MG: 1 TABLET ORAL at 03:23

## 2023-01-01 RX ADMIN — HYDROMORPHONE HYDROCHLORIDE 1 MG: 1 INJECTION, SOLUTION INTRAMUSCULAR; INTRAVENOUS; SUBCUTANEOUS at 08:36

## 2023-01-01 RX ADMIN — DEXAMETHASONE SODIUM PHOSPHATE 4 MG: 4 INJECTION INTRA-ARTICULAR; INTRALESIONAL; INTRAMUSCULAR; INTRAVENOUS; SOFT TISSUE at 16:19

## 2023-01-01 RX ADMIN — HYDROMORPHONE HYDROCHLORIDE 1 MG: 1 INJECTION, SOLUTION INTRAMUSCULAR; INTRAVENOUS; SUBCUTANEOUS at 07:12

## 2023-01-01 RX ADMIN — LORAZEPAM 0.5 MG: 2 INJECTION INTRAMUSCULAR; INTRAVENOUS at 10:26

## 2023-01-01 RX ADMIN — HYDROMORPHONE HYDROCHLORIDE 1 MG: 1 INJECTION, SOLUTION INTRAMUSCULAR; INTRAVENOUS; SUBCUTANEOUS at 03:48

## 2023-01-01 RX ADMIN — IPRATROPIUM BROMIDE AND ALBUTEROL SULFATE 3 ML: .5; 3 SOLUTION RESPIRATORY (INHALATION) at 20:27

## 2023-01-01 RX ADMIN — ONDANSETRON 4 MG: 2 INJECTION INTRAMUSCULAR; INTRAVENOUS at 21:50

## 2023-01-01 RX ADMIN — IPRATROPIUM BROMIDE AND ALBUTEROL SULFATE 3 ML: 2.5; .5 SOLUTION RESPIRATORY (INHALATION) at 10:34

## 2023-01-01 RX ADMIN — LORAZEPAM 0.5 MG: 0.5 TABLET ORAL at 20:34

## 2023-01-01 RX ADMIN — GUAIFENESIN 600 MG: 600 TABLET, EXTENDED RELEASE ORAL at 17:58

## 2023-01-01 RX ADMIN — OXYCODONE HYDROCHLORIDE 10 MG: 10 TABLET ORAL at 14:23

## 2023-01-01 RX ADMIN — HYDROMORPHONE HYDROCHLORIDE 1 MG: 1 INJECTION, SOLUTION INTRAMUSCULAR; INTRAVENOUS; SUBCUTANEOUS at 18:43

## 2023-01-01 RX ADMIN — APIXABAN 5 MG: 5 TABLET, FILM COATED ORAL at 16:44

## 2023-01-01 RX ADMIN — CEFEPIME 2 G: 2 INJECTION, POWDER, FOR SOLUTION INTRAVENOUS at 15:32

## 2023-01-01 RX ADMIN — LORAZEPAM 0.5 MG: 2 INJECTION INTRAMUSCULAR; INTRAVENOUS at 05:05

## 2023-01-01 RX ADMIN — DEXMEDETOMIDINE 1 MCG/KG/HR: 100 INJECTION, SOLUTION INTRAVENOUS at 15:50

## 2023-01-01 RX ADMIN — AZITHROMYCIN DIHYDRATE 500 MG: 250 TABLET ORAL at 05:36

## 2023-01-01 RX ADMIN — FENTANYL 1 PATCH: 25 PATCH TRANSDERMAL at 22:00

## 2023-01-01 RX ADMIN — HYDROMORPHONE HYDROCHLORIDE 1 MG: 1 INJECTION, SOLUTION INTRAMUSCULAR; INTRAVENOUS; SUBCUTANEOUS at 10:48

## 2023-01-01 RX ADMIN — GABAPENTIN 200 MG: 100 CAPSULE ORAL at 17:59

## 2023-01-01 RX ADMIN — MORPHINE SULFATE 15 MG: 15 TABLET, FILM COATED, EXTENDED RELEASE ORAL at 05:55

## 2023-01-01 RX ADMIN — LORATADINE 10 MG: 10 TABLET ORAL at 04:30

## 2023-01-01 RX ADMIN — BUSPIRONE HYDROCHLORIDE 15 MG: 10 TABLET ORAL at 16:52

## 2023-01-01 RX ADMIN — IPRATROPIUM BROMIDE AND ALBUTEROL SULFATE 3 ML: 2.5; .5 SOLUTION RESPIRATORY (INHALATION) at 22:01

## 2023-01-01 RX ADMIN — DEXAMETHASONE SODIUM PHOSPHATE 4 MG: 4 INJECTION INTRA-ARTICULAR; INTRALESIONAL; INTRAMUSCULAR; INTRAVENOUS; SOFT TISSUE at 13:31

## 2023-01-01 RX ADMIN — TRANEXAMIC ACID 500 MG: 100 INJECTION, SOLUTION INTRAVENOUS at 07:13

## 2023-01-01 RX ADMIN — BUSPIRONE HYDROCHLORIDE 15 MG: 10 TABLET ORAL at 05:07

## 2023-01-01 RX ADMIN — HYDROMORPHONE HYDROCHLORIDE 1 MG: 1 INJECTION, SOLUTION INTRAMUSCULAR; INTRAVENOUS; SUBCUTANEOUS at 19:46

## 2023-01-01 RX ADMIN — IPRATROPIUM BROMIDE AND ALBUTEROL SULFATE 3 ML: 2.5; .5 SOLUTION RESPIRATORY (INHALATION) at 00:10

## 2023-01-01 RX ADMIN — OXYCODONE HYDROCHLORIDE 10 MG: 10 TABLET ORAL at 11:04

## 2023-01-01 RX ADMIN — IPRATROPIUM BROMIDE AND ALBUTEROL SULFATE 3 ML: 2.5; .5 SOLUTION RESPIRATORY (INHALATION) at 18:37

## 2023-01-01 RX ADMIN — ACETAMINOPHEN 650 MG: 325 TABLET ORAL at 19:05

## 2023-01-01 RX ADMIN — HYDROMORPHONE HYDROCHLORIDE 1 MG: 1 INJECTION, SOLUTION INTRAMUSCULAR; INTRAVENOUS; SUBCUTANEOUS at 20:52

## 2023-01-01 RX ADMIN — HYDROCODONE BITARTRATE AND ACETAMINOPHEN 1 TABLET: 10; 325 TABLET ORAL at 04:29

## 2023-01-01 RX ADMIN — HYDROCODONE BITARTRATE AND ACETAMINOPHEN 1 TABLET: 10; 325 TABLET ORAL at 12:47

## 2023-01-01 RX ADMIN — HYDROMORPHONE HYDROCHLORIDE 1 MG: 1 INJECTION, SOLUTION INTRAMUSCULAR; INTRAVENOUS; SUBCUTANEOUS at 22:16

## 2023-01-01 RX ADMIN — IPRATROPIUM BROMIDE AND ALBUTEROL SULFATE 3 ML: 2.5; .5 SOLUTION RESPIRATORY (INHALATION) at 18:15

## 2023-01-01 RX ADMIN — BUSPIRONE HYDROCHLORIDE 15 MG: 10 TABLET ORAL at 05:24

## 2023-01-01 RX ADMIN — HYDROMORPHONE HYDROCHLORIDE 1 MG: 1 INJECTION, SOLUTION INTRAMUSCULAR; INTRAVENOUS; SUBCUTANEOUS at 21:03

## 2023-01-01 RX ADMIN — DOCUSATE SODIUM 50 MG AND SENNOSIDES 8.6 MG 2 TABLET: 8.6; 5 TABLET, FILM COATED ORAL at 17:31

## 2023-01-01 RX ADMIN — HYDROMORPHONE HYDROCHLORIDE 1 MG: 1 INJECTION, SOLUTION INTRAMUSCULAR; INTRAVENOUS; SUBCUTANEOUS at 22:29

## 2023-01-01 RX ADMIN — DOCUSATE SODIUM 50 MG AND SENNOSIDES 8.6 MG 2 TABLET: 8.6; 5 TABLET, FILM COATED ORAL at 05:32

## 2023-01-01 RX ADMIN — APIXABAN 5 MG: 5 TABLET, FILM COATED ORAL at 05:56

## 2023-01-01 RX ADMIN — HYDROMORPHONE HYDROCHLORIDE 1 MG: 1 INJECTION, SOLUTION INTRAMUSCULAR; INTRAVENOUS; SUBCUTANEOUS at 04:44

## 2023-01-01 RX ADMIN — IPRATROPIUM BROMIDE AND ALBUTEROL SULFATE 3 ML: 2.5; .5 SOLUTION RESPIRATORY (INHALATION) at 20:17

## 2023-01-01 RX ADMIN — LORATADINE 10 MG: 10 TABLET ORAL at 06:23

## 2023-01-01 RX ADMIN — BUSPIRONE HYDROCHLORIDE 15 MG: 10 TABLET ORAL at 06:14

## 2023-01-01 RX ADMIN — ACETAMINOPHEN 650 MG: 325 TABLET, FILM COATED ORAL at 05:08

## 2023-01-01 RX ADMIN — PROPOFOL 200 MG: 10 INJECTION, EMULSION INTRAVENOUS at 11:04

## 2023-01-01 RX ADMIN — MORPHINE SULFATE 15 MG: 15 TABLET, FILM COATED, EXTENDED RELEASE ORAL at 05:41

## 2023-01-01 RX ADMIN — ACETAMINOPHEN 650 MG: 325 TABLET, FILM COATED ORAL at 05:24

## 2023-01-01 RX ADMIN — HYDROMORPHONE HYDROCHLORIDE 1 MG: 1 INJECTION, SOLUTION INTRAMUSCULAR; INTRAVENOUS; SUBCUTANEOUS at 03:29

## 2023-01-01 RX ADMIN — ACETAMINOPHEN 650 MG: 325 TABLET, FILM COATED ORAL at 12:17

## 2023-01-01 RX ADMIN — CEFTRIAXONE SODIUM 2000 MG: 2 INJECTION, POWDER, FOR SOLUTION INTRAMUSCULAR; INTRAVENOUS at 20:52

## 2023-01-01 RX ADMIN — IPRATROPIUM BROMIDE AND ALBUTEROL SULFATE 3 ML: 2.5; .5 SOLUTION RESPIRATORY (INHALATION) at 01:04

## 2023-01-01 RX ADMIN — BUSPIRONE HYDROCHLORIDE 15 MG: 10 TABLET ORAL at 16:54

## 2023-01-01 RX ADMIN — OXYCODONE HYDROCHLORIDE 15 MG: 10 TABLET ORAL at 15:52

## 2023-01-01 RX ADMIN — DIPHENHYDRAMINE HYDROCHLORIDE 25 MG: 50 INJECTION, SOLUTION INTRAMUSCULAR; INTRAVENOUS at 22:52

## 2023-01-01 RX ADMIN — IPRATROPIUM BROMIDE AND ALBUTEROL SULFATE 3 ML: 2.5; .5 SOLUTION RESPIRATORY (INHALATION) at 02:34

## 2023-01-01 RX ADMIN — ACETAMINOPHEN 650 MG: 325 TABLET, FILM COATED ORAL at 23:16

## 2023-01-01 RX ADMIN — TIOTROPIUM BROMIDE INHALATION SPRAY 5 MCG: 3.12 SPRAY, METERED RESPIRATORY (INHALATION) at 08:00

## 2023-01-01 RX ADMIN — CLOTRIMAZOLE 10 MG: 10 LOZENGE ORAL; TOPICAL at 04:43

## 2023-01-01 RX ADMIN — HYDROMORPHONE HYDROCHLORIDE 1 MG: 1 INJECTION, SOLUTION INTRAMUSCULAR; INTRAVENOUS; SUBCUTANEOUS at 01:28

## 2023-01-01 RX ADMIN — CAPMATINIB 400 MG: 200 TABLET, FILM COATED ORAL at 18:49

## 2023-01-01 RX ADMIN — GUAIFENESIN 600 MG: 600 TABLET, EXTENDED RELEASE ORAL at 17:23

## 2023-01-01 RX ADMIN — OXYCODONE HYDROCHLORIDE 15 MG: 10 TABLET ORAL at 11:38

## 2023-01-01 RX ADMIN — HYDROMORPHONE HYDROCHLORIDE 1 MG: 1 INJECTION, SOLUTION INTRAMUSCULAR; INTRAVENOUS; SUBCUTANEOUS at 14:45

## 2023-01-01 RX ADMIN — DEXAMETHASONE SODIUM PHOSPHATE 4 MG: 4 INJECTION INTRA-ARTICULAR; INTRALESIONAL; INTRAMUSCULAR; INTRAVENOUS; SOFT TISSUE at 01:33

## 2023-01-01 RX ADMIN — CAPMATINIB 400 MG: 200 TABLET, FILM COATED ORAL at 16:54

## 2023-01-01 RX ADMIN — CEFTRIAXONE SODIUM 2000 MG: 10 INJECTION, POWDER, FOR SOLUTION INTRAVENOUS at 17:39

## 2023-01-01 RX ADMIN — CAPMATINIB 400 MG: 200 TABLET, FILM COATED ORAL at 15:03

## 2023-01-01 RX ADMIN — DEXAMETHASONE SODIUM PHOSPHATE 4 MG: 4 INJECTION INTRA-ARTICULAR; INTRALESIONAL; INTRAMUSCULAR; INTRAVENOUS; SOFT TISSUE at 16:53

## 2023-01-01 RX ADMIN — CLOTRIMAZOLE 10 MG: 10 LOZENGE ORAL; TOPICAL at 18:26

## 2023-01-01 RX ADMIN — IPRATROPIUM BROMIDE AND ALBUTEROL SULFATE 3 ML: 2.5; .5 SOLUTION RESPIRATORY (INHALATION) at 06:39

## 2023-01-01 RX ADMIN — GUAIFENESIN 600 MG: 600 TABLET, EXTENDED RELEASE ORAL at 16:44

## 2023-01-01 RX ADMIN — CAPMATINIB 400 MG: 200 TABLET, FILM COATED ORAL at 16:52

## 2023-01-01 RX ADMIN — GUAIFENESIN 600 MG: 600 TABLET, EXTENDED RELEASE ORAL at 18:16

## 2023-01-01 RX ADMIN — BUSPIRONE HYDROCHLORIDE 15 MG: 10 TABLET ORAL at 05:12

## 2023-01-01 RX ADMIN — DIAZEPAM 5 MG: 5 INJECTION, SOLUTION INTRAMUSCULAR; INTRAVENOUS at 10:48

## 2023-01-01 RX ADMIN — DEXAMETHASONE SODIUM PHOSPHATE 4 MG: 4 INJECTION INTRA-ARTICULAR; INTRALESIONAL; INTRAMUSCULAR; INTRAVENOUS; SOFT TISSUE at 08:51

## 2023-01-01 RX ADMIN — HYDROMORPHONE HYDROCHLORIDE 1 MG: 1 INJECTION, SOLUTION INTRAMUSCULAR; INTRAVENOUS; SUBCUTANEOUS at 07:36

## 2023-01-01 RX ADMIN — DIPHENHYDRAMINE HYDROCHLORIDE 25 MG: 50 INJECTION, SOLUTION INTRAMUSCULAR; INTRAVENOUS at 10:36

## 2023-01-01 RX ADMIN — DEXAMETHASONE 4 MG: 4 TABLET ORAL at 18:00

## 2023-01-01 RX ADMIN — ONDANSETRON 4 MG: 2 INJECTION INTRAMUSCULAR; INTRAVENOUS at 23:16

## 2023-01-01 RX ADMIN — LORATADINE 10 MG: 10 TABLET ORAL at 12:02

## 2023-01-01 RX ADMIN — DIAZEPAM 5 MG: 5 INJECTION, SOLUTION INTRAMUSCULAR; INTRAVENOUS at 09:42

## 2023-01-01 RX ADMIN — DIPHENHYDRAMINE HYDROCHLORIDE 25 MG: 50 INJECTION, SOLUTION INTRAMUSCULAR; INTRAVENOUS at 12:10

## 2023-01-01 RX ADMIN — BUSPIRONE HYDROCHLORIDE 15 MG: 10 TABLET ORAL at 06:24

## 2023-01-01 RX ADMIN — LORAZEPAM 1 MG: 2 INJECTION INTRAMUSCULAR; INTRAVENOUS at 00:53

## 2023-01-01 RX ADMIN — MORPHINE SULFATE 30 MG: 30 TABLET, FILM COATED, EXTENDED RELEASE ORAL at 04:34

## 2023-01-01 RX ADMIN — HYDROMORPHONE HYDROCHLORIDE 1 MG: 1 INJECTION, SOLUTION INTRAMUSCULAR; INTRAVENOUS; SUBCUTANEOUS at 23:05

## 2023-01-01 RX ADMIN — MORPHINE SULFATE 4 MG: 4 INJECTION INTRAVENOUS at 19:06

## 2023-01-01 RX ADMIN — HYDROMORPHONE HYDROCHLORIDE 1 MG: 1 INJECTION, SOLUTION INTRAMUSCULAR; INTRAVENOUS; SUBCUTANEOUS at 19:58

## 2023-01-01 RX ADMIN — LORAZEPAM 0.5 MG: 1 TABLET ORAL at 15:38

## 2023-01-01 RX ADMIN — GABAPENTIN 200 MG: 100 CAPSULE ORAL at 18:14

## 2023-01-01 RX ADMIN — MORPHINE SULFATE 15 MG: 15 TABLET, FILM COATED, EXTENDED RELEASE ORAL at 16:36

## 2023-01-01 RX ADMIN — ALBUTEROL SULFATE 2 PUFF: 90 AEROSOL, METERED RESPIRATORY (INHALATION) at 11:23

## 2023-01-01 RX ADMIN — FENTANYL CITRATE 50 MCG: 50 INJECTION, SOLUTION INTRAMUSCULAR; INTRAVENOUS at 12:22

## 2023-01-01 RX ADMIN — MORPHINE SULFATE 4 MG: 4 INJECTION INTRAVENOUS at 22:12

## 2023-01-01 RX ADMIN — DIPHENHYDRAMINE HYDROCHLORIDE 25 MG: 50 INJECTION, SOLUTION INTRAMUSCULAR; INTRAVENOUS at 12:29

## 2023-01-01 RX ADMIN — IPRATROPIUM BROMIDE AND ALBUTEROL SULFATE 3 ML: .5; 3 SOLUTION RESPIRATORY (INHALATION) at 20:53

## 2023-01-01 RX ADMIN — HYDROMORPHONE HYDROCHLORIDE 1 MG: 1 INJECTION, SOLUTION INTRAMUSCULAR; INTRAVENOUS; SUBCUTANEOUS at 12:02

## 2023-01-01 RX ADMIN — RIVAROXABAN 10 MG: 10 TABLET, FILM COATED ORAL at 16:27

## 2023-01-01 RX ADMIN — LIDOCAINE PATCH 5% 2 PATCH: 700 PATCH TOPICAL at 05:32

## 2023-01-01 RX ADMIN — IOHEXOL 72 ML: 350 INJECTION, SOLUTION INTRAVENOUS at 00:18

## 2023-01-01 RX ADMIN — IPRATROPIUM BROMIDE AND ALBUTEROL SULFATE 3 ML: 2.5; .5 SOLUTION RESPIRATORY (INHALATION) at 19:12

## 2023-01-01 RX ADMIN — IPRATROPIUM BROMIDE AND ALBUTEROL SULFATE 3 ML: 2.5; .5 SOLUTION RESPIRATORY (INHALATION) at 15:35

## 2023-01-01 RX ADMIN — DEXAMETHASONE SODIUM PHOSPHATE 4 MG: 4 INJECTION INTRA-ARTICULAR; INTRALESIONAL; INTRAMUSCULAR; INTRAVENOUS; SOFT TISSUE at 15:42

## 2023-01-01 RX ADMIN — HYDROMORPHONE HYDROCHLORIDE 1 MG: 1 INJECTION, SOLUTION INTRAMUSCULAR; INTRAVENOUS; SUBCUTANEOUS at 12:10

## 2023-01-01 RX ADMIN — CAPMATINIB 400 MG: 200 TABLET, FILM COATED ORAL at 05:07

## 2023-01-01 RX ADMIN — FENTANYL CITRATE 50 MCG: 50 INJECTION, SOLUTION INTRAMUSCULAR; INTRAVENOUS at 15:17

## 2023-01-01 RX ADMIN — DEXAMETHASONE SODIUM PHOSPHATE 4 MG: 4 INJECTION INTRA-ARTICULAR; INTRALESIONAL; INTRAMUSCULAR; INTRAVENOUS; SOFT TISSUE at 05:24

## 2023-01-01 RX ADMIN — MORPHINE SULFATE 15 MG: 15 TABLET, FILM COATED, EXTENDED RELEASE ORAL at 17:35

## 2023-01-01 RX ADMIN — DOCUSATE SODIUM 50 MG AND SENNOSIDES 8.6 MG 2 TABLET: 8.6; 5 TABLET, FILM COATED ORAL at 04:42

## 2023-01-01 RX ADMIN — IPRATROPIUM BROMIDE AND ALBUTEROL SULFATE 3 ML: 2.5; .5 SOLUTION RESPIRATORY (INHALATION) at 03:48

## 2023-01-01 RX ADMIN — LORATADINE 10 MG: 10 TABLET ORAL at 05:43

## 2023-01-01 RX ADMIN — HYDROMORPHONE HYDROCHLORIDE 1 MG: 1 INJECTION, SOLUTION INTRAMUSCULAR; INTRAVENOUS; SUBCUTANEOUS at 07:29

## 2023-01-01 RX ADMIN — HYDROMORPHONE HYDROCHLORIDE 4 MG: 2 TABLET ORAL at 10:16

## 2023-01-01 RX ADMIN — SODIUM CHLORIDE: 9 INJECTION, SOLUTION INTRAVENOUS at 00:29

## 2023-01-01 RX ADMIN — DIPHENHYDRAMINE HYDROCHLORIDE 25 MG: 50 INJECTION, SOLUTION INTRAMUSCULAR; INTRAVENOUS at 23:53

## 2023-01-01 RX ADMIN — PREDNISONE 40 MG: 10 TABLET ORAL at 19:22

## 2023-01-01 RX ADMIN — IPRATROPIUM BROMIDE AND ALBUTEROL SULFATE 3 ML: 2.5; .5 SOLUTION RESPIRATORY (INHALATION) at 06:12

## 2023-01-01 RX ADMIN — DEXAMETHASONE SODIUM PHOSPHATE 4 MG: 4 INJECTION INTRA-ARTICULAR; INTRALESIONAL; INTRAMUSCULAR; INTRAVENOUS; SOFT TISSUE at 06:09

## 2023-01-01 RX ADMIN — IPRATROPIUM BROMIDE AND ALBUTEROL SULFATE 3 ML: 2.5; .5 SOLUTION RESPIRATORY (INHALATION) at 10:21

## 2023-01-01 RX ADMIN — CAPMATINIB 400 MG: 200 TABLET, FILM COATED ORAL at 04:16

## 2023-01-01 RX ADMIN — IPRATROPIUM BROMIDE AND ALBUTEROL SULFATE 3 ML: 2.5; .5 SOLUTION RESPIRATORY (INHALATION) at 10:53

## 2023-01-01 RX ADMIN — IPRATROPIUM BROMIDE AND ALBUTEROL SULFATE 3 ML: 2.5; .5 SOLUTION RESPIRATORY (INHALATION) at 15:02

## 2023-01-01 RX ADMIN — METOPROLOL TARTRATE 25 MG: 25 TABLET, FILM COATED ORAL at 18:30

## 2023-01-01 RX ADMIN — GUAIFENESIN 600 MG: 600 TABLET, EXTENDED RELEASE ORAL at 18:18

## 2023-01-01 RX ADMIN — LIDOCAINE PATCH 5% 3 PATCH: 700 PATCH TOPICAL at 14:52

## 2023-01-01 RX ADMIN — METOPROLOL TARTRATE 25 MG: 25 TABLET, FILM COATED ORAL at 05:01

## 2023-01-01 RX ADMIN — HYDROMORPHONE HYDROCHLORIDE 1 MG: 1 INJECTION, SOLUTION INTRAMUSCULAR; INTRAVENOUS; SUBCUTANEOUS at 15:04

## 2023-01-01 RX ADMIN — ACETAMINOPHEN 650 MG: 325 TABLET, FILM COATED ORAL at 12:39

## 2023-01-01 RX ADMIN — GUAIFENESIN 600 MG: 600 TABLET, EXTENDED RELEASE ORAL at 05:14

## 2023-01-01 RX ADMIN — IPRATROPIUM BROMIDE AND ALBUTEROL SULFATE 3 ML: 2.5; .5 SOLUTION RESPIRATORY (INHALATION) at 14:47

## 2023-01-01 RX ADMIN — IPRATROPIUM BROMIDE AND ALBUTEROL SULFATE 3 ML: 2.5; .5 SOLUTION RESPIRATORY (INHALATION) at 21:35

## 2023-01-01 RX ADMIN — HYDROMORPHONE HYDROCHLORIDE 1 MG: 1 INJECTION, SOLUTION INTRAMUSCULAR; INTRAVENOUS; SUBCUTANEOUS at 05:45

## 2023-01-01 RX ADMIN — TIOTROPIUM BROMIDE INHALATION SPRAY 5 MCG: 3.12 SPRAY, METERED RESPIRATORY (INHALATION) at 07:13

## 2023-01-01 RX ADMIN — HYDROCODONE BITARTRATE AND ACETAMINOPHEN 1 TABLET: 10; 325 TABLET ORAL at 08:01

## 2023-01-01 RX ADMIN — LORAZEPAM 1 MG: 2 INJECTION INTRAMUSCULAR; INTRAVENOUS at 21:03

## 2023-01-01 RX ADMIN — DEXAMETHASONE SODIUM PHOSPHATE 4 MG: 4 INJECTION INTRA-ARTICULAR; INTRALESIONAL; INTRAMUSCULAR; INTRAVENOUS; SOFT TISSUE at 23:57

## 2023-01-01 RX ADMIN — HYDROMORPHONE HYDROCHLORIDE 1 MG: 1 INJECTION, SOLUTION INTRAMUSCULAR; INTRAVENOUS; SUBCUTANEOUS at 10:45

## 2023-01-01 RX ADMIN — CLOTRIMAZOLE 10 MG: 10 LOZENGE ORAL; TOPICAL at 11:25

## 2023-01-01 RX ADMIN — IPRATROPIUM BROMIDE AND ALBUTEROL SULFATE 3 ML: 2.5; .5 SOLUTION RESPIRATORY (INHALATION) at 23:13

## 2023-01-01 RX ADMIN — DEXAMETHASONE SODIUM PHOSPHATE 4 MG: 4 INJECTION INTRA-ARTICULAR; INTRALESIONAL; INTRAMUSCULAR; INTRAVENOUS; SOFT TISSUE at 12:41

## 2023-01-01 RX ADMIN — LORATADINE 10 MG: 10 TABLET ORAL at 05:14

## 2023-01-01 RX ADMIN — DEXMEDETOMIDINE 0.6 MCG/KG/HR: 100 INJECTION, SOLUTION INTRAVENOUS at 04:25

## 2023-01-01 RX ADMIN — DIPHENHYDRAMINE HYDROCHLORIDE 25 MG: 50 INJECTION, SOLUTION INTRAMUSCULAR; INTRAVENOUS at 04:57

## 2023-01-01 RX ADMIN — IPRATROPIUM BROMIDE AND ALBUTEROL SULFATE 3 ML: 2.5; .5 SOLUTION RESPIRATORY (INHALATION) at 19:03

## 2023-01-01 RX ADMIN — GUAIFENESIN 600 MG: 600 TABLET, EXTENDED RELEASE ORAL at 17:40

## 2023-01-01 RX ADMIN — DIPHENHYDRAMINE HYDROCHLORIDE 25 MG: 50 INJECTION, SOLUTION INTRAMUSCULAR; INTRAVENOUS at 19:46

## 2023-01-01 RX ADMIN — IPRATROPIUM BROMIDE AND ALBUTEROL SULFATE 3 ML: 2.5; .5 SOLUTION RESPIRATORY (INHALATION) at 10:05

## 2023-01-01 RX ADMIN — HYDROMORPHONE HYDROCHLORIDE 1 MG: 1 INJECTION, SOLUTION INTRAMUSCULAR; INTRAVENOUS; SUBCUTANEOUS at 06:26

## 2023-01-01 RX ADMIN — HYDROMORPHONE HYDROCHLORIDE 1 MG: 1 INJECTION, SOLUTION INTRAMUSCULAR; INTRAVENOUS; SUBCUTANEOUS at 18:25

## 2023-01-01 RX ADMIN — CLOTRIMAZOLE 10 MG: 10 LOZENGE ORAL; TOPICAL at 18:47

## 2023-01-01 RX ADMIN — DEXAMETHASONE SODIUM PHOSPHATE 4 MG: 4 INJECTION INTRA-ARTICULAR; INTRALESIONAL; INTRAMUSCULAR; INTRAVENOUS; SOFT TISSUE at 11:13

## 2023-01-01 RX ADMIN — DEXAMETHASONE SODIUM PHOSPHATE 4 MG: 4 INJECTION INTRA-ARTICULAR; INTRALESIONAL; INTRAMUSCULAR; INTRAVENOUS; SOFT TISSUE at 06:41

## 2023-01-01 RX ADMIN — GUAIFENESIN 600 MG: 600 TABLET, EXTENDED RELEASE ORAL at 05:50

## 2023-01-01 RX ADMIN — GUAIFENESIN 600 MG: 600 TABLET, EXTENDED RELEASE ORAL at 18:24

## 2023-01-01 RX ADMIN — LORAZEPAM 0.5 MG: 2 INJECTION INTRAMUSCULAR; INTRAVENOUS at 20:09

## 2023-01-01 RX ADMIN — IPRATROPIUM BROMIDE AND ALBUTEROL SULFATE 3 ML: 2.5; .5 SOLUTION RESPIRATORY (INHALATION) at 11:13

## 2023-01-01 RX ADMIN — MORPHINE SULFATE 15 MG: 15 TABLET, FILM COATED, EXTENDED RELEASE ORAL at 05:00

## 2023-01-01 RX ADMIN — IPRATROPIUM BROMIDE AND ALBUTEROL SULFATE 3 ML: 2.5; .5 SOLUTION RESPIRATORY (INHALATION) at 02:37

## 2023-01-01 RX ADMIN — HYDROCODONE BITARTRATE AND ACETAMINOPHEN 1 TABLET: 10; 325 TABLET ORAL at 17:24

## 2023-01-01 RX ADMIN — DIPHENHYDRAMINE HYDROCHLORIDE 25 MG: 50 INJECTION, SOLUTION INTRAMUSCULAR; INTRAVENOUS at 23:05

## 2023-01-01 RX ADMIN — DEXAMETHASONE 4 MG: 4 TABLET ORAL at 03:34

## 2023-01-01 RX ADMIN — FLUTICASONE FUROATE, UMECLIDINIUM BROMIDE AND VILANTEROL TRIFENATATE 1 PUFF: 100; 62.5; 25 POWDER RESPIRATORY (INHALATION) at 05:09

## 2023-01-01 RX ADMIN — ACETAMINOPHEN 650 MG: 325 TABLET, FILM COATED ORAL at 18:09

## 2023-01-01 RX ADMIN — DIPHENHYDRAMINE HYDROCHLORIDE 25 MG: 50 INJECTION, SOLUTION INTRAMUSCULAR; INTRAVENOUS at 19:40

## 2023-01-01 RX ADMIN — HYDROCODONE BITARTRATE AND ACETAMINOPHEN 1 TABLET: 10; 325 TABLET ORAL at 17:57

## 2023-01-01 RX ADMIN — LORAZEPAM 0.5 MG: 2 INJECTION INTRAMUSCULAR; INTRAVENOUS at 09:33

## 2023-01-01 RX ADMIN — DIPHENHYDRAMINE HYDROCHLORIDE 25 MG: 50 INJECTION, SOLUTION INTRAMUSCULAR; INTRAVENOUS at 01:23

## 2023-01-01 RX ADMIN — HYDROMORPHONE HYDROCHLORIDE 1 MG: 1 INJECTION, SOLUTION INTRAMUSCULAR; INTRAVENOUS; SUBCUTANEOUS at 08:18

## 2023-01-01 RX ADMIN — HYDROMORPHONE HYDROCHLORIDE 1 MG: 1 INJECTION, SOLUTION INTRAMUSCULAR; INTRAVENOUS; SUBCUTANEOUS at 14:02

## 2023-01-01 RX ADMIN — BUSPIRONE HYDROCHLORIDE 15 MG: 10 TABLET ORAL at 11:15

## 2023-01-01 RX ADMIN — DOCUSATE SODIUM 50 MG AND SENNOSIDES 8.6 MG 2 TABLET: 8.6; 5 TABLET, FILM COATED ORAL at 18:01

## 2023-01-01 RX ADMIN — IPRATROPIUM BROMIDE AND ALBUTEROL SULFATE 3 ML: 2.5; .5 SOLUTION RESPIRATORY (INHALATION) at 17:45

## 2023-01-01 RX ADMIN — APIXABAN 5 MG: 5 TABLET, FILM COATED ORAL at 04:43

## 2023-01-01 RX ADMIN — FENTANYL 1 PATCH: 50 PATCH, EXTENDED RELEASE TRANSDERMAL at 11:05

## 2023-01-01 RX ADMIN — TIOTROPIUM BROMIDE INHALATION SPRAY 5 MCG: 3.12 SPRAY, METERED RESPIRATORY (INHALATION) at 08:05

## 2023-01-01 RX ADMIN — LORAZEPAM 1 MG: 1 TABLET ORAL at 19:42

## 2023-01-01 RX ADMIN — GABAPENTIN 200 MG: 100 CAPSULE ORAL at 05:17

## 2023-01-01 RX ADMIN — CAPMATINIB 400 MG: 200 TABLET, FILM COATED ORAL at 06:06

## 2023-01-01 RX ADMIN — METOPROLOL TARTRATE 25 MG: 25 TABLET, FILM COATED ORAL at 05:12

## 2023-01-01 RX ADMIN — IPRATROPIUM BROMIDE AND ALBUTEROL SULFATE 3 ML: .5; 3 SOLUTION RESPIRATORY (INHALATION) at 12:27

## 2023-01-01 RX ADMIN — GUAIFENESIN 600 MG: 600 TABLET, EXTENDED RELEASE ORAL at 04:30

## 2023-01-01 RX ADMIN — BUSPIRONE HYDROCHLORIDE 15 MG: 10 TABLET ORAL at 12:19

## 2023-01-01 RX ADMIN — HYDROMORPHONE HYDROCHLORIDE 0.4 MG: 1 INJECTION, SOLUTION INTRAMUSCULAR; INTRAVENOUS; SUBCUTANEOUS at 16:38

## 2023-01-01 RX ADMIN — IPRATROPIUM BROMIDE AND ALBUTEROL SULFATE 3 ML: .5; 3 SOLUTION RESPIRATORY (INHALATION) at 07:06

## 2023-01-01 RX ADMIN — APIXABAN 5 MG: 5 TABLET, FILM COATED ORAL at 17:55

## 2023-01-01 RX ADMIN — APIXABAN 5 MG: 5 TABLET, FILM COATED ORAL at 16:19

## 2023-01-01 RX ADMIN — HYDROMORPHONE HYDROCHLORIDE 1 MG: 1 INJECTION, SOLUTION INTRAMUSCULAR; INTRAVENOUS; SUBCUTANEOUS at 14:46

## 2023-01-01 RX ADMIN — FENTANYL CITRATE 50 MCG: 50 INJECTION, SOLUTION INTRAMUSCULAR; INTRAVENOUS at 16:46

## 2023-01-01 RX ADMIN — HYDROMORPHONE HYDROCHLORIDE 1 MG: 1 INJECTION, SOLUTION INTRAMUSCULAR; INTRAVENOUS; SUBCUTANEOUS at 08:58

## 2023-01-01 RX ADMIN — HYDROMORPHONE HYDROCHLORIDE 1 MG: 1 INJECTION, SOLUTION INTRAMUSCULAR; INTRAVENOUS; SUBCUTANEOUS at 04:06

## 2023-01-01 RX ADMIN — HYDROMORPHONE HYDROCHLORIDE 1 MG: 1 INJECTION, SOLUTION INTRAMUSCULAR; INTRAVENOUS; SUBCUTANEOUS at 23:34

## 2023-01-01 RX ADMIN — FENTANYL CITRATE 50 MCG: 50 INJECTION, SOLUTION INTRAMUSCULAR; INTRAVENOUS at 15:23

## 2023-01-01 RX ADMIN — HYDROMORPHONE HYDROCHLORIDE 1 MG: 1 INJECTION, SOLUTION INTRAMUSCULAR; INTRAVENOUS; SUBCUTANEOUS at 07:52

## 2023-01-01 RX ADMIN — DIPHENHYDRAMINE HYDROCHLORIDE 25 MG: 50 INJECTION, SOLUTION INTRAMUSCULAR; INTRAVENOUS at 17:02

## 2023-01-01 RX ADMIN — DIPHENHYDRAMINE HYDROCHLORIDE 25 MG: 50 INJECTION, SOLUTION INTRAMUSCULAR; INTRAVENOUS at 01:44

## 2023-01-01 RX ADMIN — ACETAMINOPHEN 650 MG: 325 TABLET, FILM COATED ORAL at 18:28

## 2023-01-01 RX ADMIN — CAPMATINIB 400 MG: 200 TABLET, FILM COATED ORAL at 16:44

## 2023-01-01 RX ADMIN — CAPMATINIB 400 MG: 200 TABLET, FILM COATED ORAL at 06:00

## 2023-01-01 RX ADMIN — BUSPIRONE HYDROCHLORIDE 15 MG: 10 TABLET ORAL at 16:44

## 2023-01-01 RX ADMIN — LORAZEPAM 0.5 MG: 0.5 TABLET ORAL at 10:08

## 2023-01-01 RX ADMIN — GUAIFENESIN 600 MG: 600 TABLET, EXTENDED RELEASE ORAL at 16:36

## 2023-01-01 RX ADMIN — APIXABAN 10 MG: 5 TABLET, FILM COATED ORAL at 06:42

## 2023-01-01 RX ADMIN — CAPMATINIB 400 MG: 200 TABLET, FILM COATED ORAL at 16:55

## 2023-01-01 RX ADMIN — LORAZEPAM 0.5 MG: 1 TABLET ORAL at 14:47

## 2023-01-01 RX ADMIN — LORATADINE 10 MG: 10 TABLET ORAL at 05:08

## 2023-01-01 RX ADMIN — ACETAMINOPHEN 650 MG: 325 TABLET, FILM COATED ORAL at 11:58

## 2023-01-01 RX ADMIN — DEXAMETHASONE SODIUM PHOSPHATE 4 MG: 4 INJECTION INTRA-ARTICULAR; INTRALESIONAL; INTRAMUSCULAR; INTRAVENOUS; SOFT TISSUE at 00:28

## 2023-01-01 RX ADMIN — MORPHINE SULFATE 15 MG: 15 TABLET, FILM COATED, EXTENDED RELEASE ORAL at 16:19

## 2023-01-01 RX ADMIN — HYDROCODONE BITARTRATE AND ACETAMINOPHEN 1 TABLET: 10; 325 TABLET ORAL at 20:59

## 2023-01-01 RX ADMIN — SENNOSIDES AND DOCUSATE SODIUM 2 TABLET: 50; 8.6 TABLET ORAL at 17:27

## 2023-01-01 RX ADMIN — LORAZEPAM 1 MG: 2 INJECTION INTRAMUSCULAR; INTRAVENOUS at 17:55

## 2023-01-01 RX ADMIN — CEFTRIAXONE SODIUM 2000 MG: 10 INJECTION, POWDER, FOR SOLUTION INTRAVENOUS at 17:24

## 2023-01-01 RX ADMIN — MIDAZOLAM 2 MG: 1 INJECTION INTRAMUSCULAR; INTRAVENOUS at 14:32

## 2023-01-01 RX ADMIN — ACETAMINOPHEN 650 MG: 325 TABLET, FILM COATED ORAL at 00:02

## 2023-01-01 RX ADMIN — MOMETASONE FUROATE AND FORMOTEROL FUMARATE DIHYDRATE 2 PUFF: 200; 5 AEROSOL RESPIRATORY (INHALATION) at 07:14

## 2023-01-01 RX ADMIN — IPRATROPIUM BROMIDE AND ALBUTEROL SULFATE 3 ML: 2.5; .5 SOLUTION RESPIRATORY (INHALATION) at 12:42

## 2023-01-01 RX ADMIN — HYDROMORPHONE HYDROCHLORIDE 1 MG: 1 INJECTION, SOLUTION INTRAMUSCULAR; INTRAVENOUS; SUBCUTANEOUS at 06:32

## 2023-01-01 RX ADMIN — SODIUM CHLORIDE, POTASSIUM CHLORIDE, SODIUM LACTATE AND CALCIUM CHLORIDE: 600; 310; 30; 20 INJECTION, SOLUTION INTRAVENOUS at 15:38

## 2023-01-01 RX ADMIN — DOCUSATE SODIUM 50 MG AND SENNOSIDES 8.6 MG 2 TABLET: 8.6; 5 TABLET, FILM COATED ORAL at 17:50

## 2023-01-01 RX ADMIN — HYDROMORPHONE HYDROCHLORIDE 1 MG: 1 INJECTION, SOLUTION INTRAMUSCULAR; INTRAVENOUS; SUBCUTANEOUS at 02:42

## 2023-01-01 RX ADMIN — TRANEXAMIC ACID 500 MG: 100 INJECTION, SOLUTION INTRAVENOUS at 20:29

## 2023-01-01 RX ADMIN — DIPHENHYDRAMINE HYDROCHLORIDE 25 MG: 50 INJECTION, SOLUTION INTRAMUSCULAR; INTRAVENOUS at 12:04

## 2023-01-01 RX ADMIN — SODIUM CHLORIDE, POTASSIUM CHLORIDE, SODIUM LACTATE AND CALCIUM CHLORIDE 2748 ML: 600; 310; 30; 20 INJECTION, SOLUTION INTRAVENOUS at 19:10

## 2023-01-01 RX ADMIN — HYDROMORPHONE HYDROCHLORIDE 4 MG: 2 TABLET ORAL at 05:18

## 2023-01-01 RX ADMIN — SUGAMMADEX 200 MG: 100 INJECTION, SOLUTION INTRAVENOUS at 16:06

## 2023-01-01 RX ADMIN — LORAZEPAM 0.5 MG: 2 INJECTION INTRAMUSCULAR; INTRAVENOUS at 13:49

## 2023-01-01 RX ADMIN — DIPHENHYDRAMINE HYDROCHLORIDE 25 MG: 50 INJECTION, SOLUTION INTRAMUSCULAR; INTRAVENOUS at 14:12

## 2023-01-01 RX ADMIN — HYDROMORPHONE HYDROCHLORIDE 1 MG: 1 INJECTION, SOLUTION INTRAMUSCULAR; INTRAVENOUS; SUBCUTANEOUS at 03:16

## 2023-01-01 RX ADMIN — DEXAMETHASONE SODIUM PHOSPHATE 4 MG: 4 INJECTION INTRA-ARTICULAR; INTRALESIONAL; INTRAMUSCULAR; INTRAVENOUS; SOFT TISSUE at 00:03

## 2023-01-01 RX ADMIN — ACETAMINOPHEN 650 MG: 325 TABLET, FILM COATED ORAL at 00:06

## 2023-01-01 RX ADMIN — LORAZEPAM 0.5 MG: 1 TABLET ORAL at 22:51

## 2023-01-01 RX ADMIN — HYDROMORPHONE HYDROCHLORIDE 1 MG: 1 INJECTION, SOLUTION INTRAMUSCULAR; INTRAVENOUS; SUBCUTANEOUS at 16:48

## 2023-01-01 RX ADMIN — VANCOMYCIN HYDROCHLORIDE 2000 MG: 5 INJECTION, POWDER, LYOPHILIZED, FOR SOLUTION INTRAVENOUS at 12:12

## 2023-01-01 RX ADMIN — HYDROMORPHONE HYDROCHLORIDE 2 MG: 2 INJECTION INTRAMUSCULAR; INTRAVENOUS; SUBCUTANEOUS at 08:33

## 2023-01-01 RX ADMIN — BUSPIRONE HYDROCHLORIDE 15 MG: 10 TABLET ORAL at 17:09

## 2023-01-01 RX ADMIN — METOPROLOL TARTRATE 25 MG: 25 TABLET, FILM COATED ORAL at 05:41

## 2023-01-01 RX ADMIN — DEXAMETHASONE SODIUM PHOSPHATE 4 MG: 4 INJECTION INTRA-ARTICULAR; INTRALESIONAL; INTRAMUSCULAR; INTRAVENOUS; SOFT TISSUE at 12:19

## 2023-01-01 RX ADMIN — DIPHENHYDRAMINE HYDROCHLORIDE 25 MG: 50 INJECTION, SOLUTION INTRAMUSCULAR; INTRAVENOUS at 12:24

## 2023-01-01 RX ADMIN — IPRATROPIUM BROMIDE AND ALBUTEROL SULFATE 3 ML: 2.5; .5 SOLUTION RESPIRATORY (INHALATION) at 04:08

## 2023-01-01 RX ADMIN — CAPMATINIB 400 MG: 200 TABLET, FILM COATED ORAL at 04:26

## 2023-01-01 RX ADMIN — DEXMEDETOMIDINE 1.5 MCG/KG/HR: 100 INJECTION, SOLUTION INTRAVENOUS at 01:04

## 2023-01-01 RX ADMIN — GUAIFENESIN 600 MG: 600 TABLET, EXTENDED RELEASE ORAL at 05:43

## 2023-01-01 RX ADMIN — APIXABAN 5 MG: 5 TABLET, FILM COATED ORAL at 05:51

## 2023-01-01 RX ADMIN — DEXAMETHASONE SODIUM PHOSPHATE 4 MG: 4 INJECTION INTRA-ARTICULAR; INTRALESIONAL; INTRAMUSCULAR; INTRAVENOUS; SOFT TISSUE at 04:57

## 2023-01-01 RX ADMIN — CLOTRIMAZOLE 10 MG: 10 LOZENGE ORAL; TOPICAL at 06:48

## 2023-01-01 RX ADMIN — DEXAMETHASONE SODIUM PHOSPHATE 4 MG: 4 INJECTION INTRA-ARTICULAR; INTRALESIONAL; INTRAMUSCULAR; INTRAVENOUS; SOFT TISSUE at 15:38

## 2023-01-01 RX ADMIN — CEFTRIAXONE SODIUM 2000 MG: 2 INJECTION, POWDER, FOR SOLUTION INTRAMUSCULAR; INTRAVENOUS at 04:36

## 2023-01-01 RX ADMIN — CEFTRIAXONE SODIUM 2000 MG: 10 INJECTION, POWDER, FOR SOLUTION INTRAVENOUS at 17:46

## 2023-01-01 RX ADMIN — GUAIFENESIN 600 MG: 600 TABLET, EXTENDED RELEASE ORAL at 06:41

## 2023-01-01 RX ADMIN — HYDROCODONE BITARTRATE AND ACETAMINOPHEN 1 TABLET: 10; 325 TABLET ORAL at 00:53

## 2023-01-01 RX ADMIN — HYDROMORPHONE HYDROCHLORIDE 1 MG: 1 INJECTION, SOLUTION INTRAMUSCULAR; INTRAVENOUS; SUBCUTANEOUS at 07:59

## 2023-01-01 RX ADMIN — HYDRALAZINE HYDROCHLORIDE 10 MG: 20 INJECTION INTRAMUSCULAR; INTRAVENOUS at 10:10

## 2023-01-01 RX ADMIN — DIPHENHYDRAMINE HYDROCHLORIDE 25 MG: 50 INJECTION, SOLUTION INTRAMUSCULAR; INTRAVENOUS at 05:26

## 2023-01-01 RX ADMIN — HYDROMORPHONE HYDROCHLORIDE 1 MG: 1 INJECTION, SOLUTION INTRAMUSCULAR; INTRAVENOUS; SUBCUTANEOUS at 13:51

## 2023-01-01 RX ADMIN — APIXABAN 5 MG: 5 TABLET, FILM COATED ORAL at 16:52

## 2023-01-01 RX ADMIN — APIXABAN 5 MG: 5 TABLET, FILM COATED ORAL at 17:56

## 2023-01-01 RX ADMIN — GUAIFENESIN 600 MG: 600 TABLET, EXTENDED RELEASE ORAL at 17:38

## 2023-01-01 RX ADMIN — LORATADINE 10 MG: 10 TABLET ORAL at 06:14

## 2023-01-01 RX ADMIN — DEXAMETHASONE SODIUM PHOSPHATE 4 MG: 4 INJECTION INTRA-ARTICULAR; INTRALESIONAL; INTRAMUSCULAR; INTRAVENOUS; SOFT TISSUE at 23:05

## 2023-01-01 RX ADMIN — HYDROMORPHONE HYDROCHLORIDE 1 MG: 1 INJECTION, SOLUTION INTRAMUSCULAR; INTRAVENOUS; SUBCUTANEOUS at 11:03

## 2023-01-01 RX ADMIN — HYDROCODONE BITARTRATE AND ACETAMINOPHEN 1 TABLET: 10; 325 TABLET ORAL at 02:13

## 2023-01-01 RX ADMIN — LORATADINE 10 MG: 10 TABLET ORAL at 05:00

## 2023-01-01 RX ADMIN — ACETAMINOPHEN 650 MG: 325 TABLET, FILM COATED ORAL at 17:57

## 2023-01-01 RX ADMIN — DEXAMETHASONE 4 MG: 4 TABLET ORAL at 13:39

## 2023-01-01 RX ADMIN — PREDNISONE 40 MG: 20 TABLET ORAL at 05:58

## 2023-01-01 RX ADMIN — HYDROMORPHONE HYDROCHLORIDE 1 MG: 1 INJECTION, SOLUTION INTRAMUSCULAR; INTRAVENOUS; SUBCUTANEOUS at 00:24

## 2023-01-01 RX ADMIN — HYDROCODONE BITARTRATE AND ACETAMINOPHEN 1 TABLET: 10; 325 TABLET ORAL at 17:32

## 2023-01-01 RX ADMIN — GUAIFENESIN 600 MG: 600 TABLET, EXTENDED RELEASE ORAL at 18:28

## 2023-01-01 RX ADMIN — APIXABAN 5 MG: 5 TABLET, FILM COATED ORAL at 05:58

## 2023-01-01 RX ADMIN — DOCUSATE SODIUM 50 MG AND SENNOSIDES 8.6 MG 2 TABLET: 8.6; 5 TABLET, FILM COATED ORAL at 17:56

## 2023-01-01 RX ADMIN — CEFTRIAXONE SODIUM 2000 MG: 10 INJECTION, POWDER, FOR SOLUTION INTRAVENOUS at 18:29

## 2023-01-01 RX ADMIN — LORATADINE 10 MG: 10 TABLET ORAL at 05:56

## 2023-01-01 RX ADMIN — TIOTROPIUM BROMIDE INHALATION SPRAY 5 MCG: 3.12 SPRAY, METERED RESPIRATORY (INHALATION) at 05:32

## 2023-01-01 RX ADMIN — DOCUSATE SODIUM 50 MG AND SENNOSIDES 8.6 MG 2 TABLET: 8.6; 5 TABLET, FILM COATED ORAL at 05:56

## 2023-01-01 RX ADMIN — CEFTRIAXONE SODIUM 2000 MG: 2 INJECTION, POWDER, FOR SOLUTION INTRAMUSCULAR; INTRAVENOUS at 05:59

## 2023-01-01 RX ADMIN — METOPROLOL TARTRATE 25 MG: 25 TABLET, FILM COATED ORAL at 05:33

## 2023-01-01 RX ADMIN — GUAIFENESIN 600 MG: 600 TABLET, EXTENDED RELEASE ORAL at 18:26

## 2023-01-01 RX ADMIN — HYDROMORPHONE HYDROCHLORIDE 2 MG: 2 INJECTION INTRAMUSCULAR; INTRAVENOUS; SUBCUTANEOUS at 07:32

## 2023-01-01 RX ADMIN — LIDOCAINE 1 PATCH: 50 PATCH TOPICAL at 18:10

## 2023-01-01 RX ADMIN — ACETAMINOPHEN 650 MG: 325 TABLET, FILM COATED ORAL at 12:12

## 2023-01-01 RX ADMIN — BUSPIRONE HYDROCHLORIDE 15 MG: 10 TABLET ORAL at 11:57

## 2023-01-01 RX ADMIN — CLOTRIMAZOLE 10 MG: 10 LOZENGE ORAL; TOPICAL at 12:23

## 2023-01-01 RX ADMIN — IPRATROPIUM BROMIDE AND ALBUTEROL SULFATE 3 ML: 2.5; .5 SOLUTION RESPIRATORY (INHALATION) at 15:24

## 2023-01-01 RX ADMIN — DIPHENHYDRAMINE HYDROCHLORIDE 25 MG: 50 INJECTION, SOLUTION INTRAMUSCULAR; INTRAVENOUS at 19:01

## 2023-01-01 RX ADMIN — CLOTRIMAZOLE 10 MG: 10 LOZENGE ORAL; TOPICAL at 15:00

## 2023-01-01 RX ADMIN — DEXAMETHASONE SODIUM PHOSPHATE 4 MG: 4 INJECTION INTRA-ARTICULAR; INTRALESIONAL; INTRAMUSCULAR; INTRAVENOUS; SOFT TISSUE at 11:42

## 2023-01-01 RX ADMIN — IPRATROPIUM BROMIDE AND ALBUTEROL SULFATE 3 ML: 2.5; .5 SOLUTION RESPIRATORY (INHALATION) at 06:24

## 2023-01-01 RX ADMIN — GUAIFENESIN 600 MG: 600 TABLET, EXTENDED RELEASE ORAL at 05:28

## 2023-01-01 RX ADMIN — HYDROMORPHONE HYDROCHLORIDE 1 MG: 1 INJECTION, SOLUTION INTRAMUSCULAR; INTRAVENOUS; SUBCUTANEOUS at 05:59

## 2023-01-01 RX ADMIN — DEXAMETHASONE SODIUM PHOSPHATE 4 MG: 4 INJECTION INTRA-ARTICULAR; INTRALESIONAL; INTRAMUSCULAR; INTRAVENOUS; SOFT TISSUE at 04:46

## 2023-01-01 RX ADMIN — OXYCODONE HYDROCHLORIDE 10 MG: 10 TABLET ORAL at 17:19

## 2023-01-01 RX ADMIN — PREGABALIN 75 MG: 75 CAPSULE ORAL at 05:48

## 2023-01-01 RX ADMIN — HYDROMORPHONE HYDROCHLORIDE 1 MG: 1 INJECTION, SOLUTION INTRAMUSCULAR; INTRAVENOUS; SUBCUTANEOUS at 17:34

## 2023-01-01 RX ADMIN — LORAZEPAM 0.5 MG: 1 TABLET ORAL at 13:42

## 2023-01-01 RX ADMIN — LIDOCAINE 1 PATCH: 50 PATCH TOPICAL at 17:35

## 2023-01-01 RX ADMIN — SODIUM CHLORIDE: 9 INJECTION, SOLUTION INTRAVENOUS at 21:10

## 2023-01-01 RX ADMIN — LORAZEPAM 0.5 MG: 1 TABLET ORAL at 13:12

## 2023-01-01 RX ADMIN — FUROSEMIDE 20 MG: 10 INJECTION, SOLUTION INTRAVENOUS at 17:52

## 2023-01-01 RX ADMIN — MORPHINE SULFATE 4 MG: 4 INJECTION, SOLUTION INTRAMUSCULAR; INTRAVENOUS at 13:38

## 2023-01-01 RX ADMIN — HYDROMORPHONE HYDROCHLORIDE 1 MG: 1 INJECTION, SOLUTION INTRAMUSCULAR; INTRAVENOUS; SUBCUTANEOUS at 21:50

## 2023-01-01 RX ADMIN — HYDROMORPHONE HYDROCHLORIDE 1 MG: 1 INJECTION, SOLUTION INTRAMUSCULAR; INTRAVENOUS; SUBCUTANEOUS at 23:15

## 2023-01-01 RX ADMIN — HYDROMORPHONE HYDROCHLORIDE 1 MG: 1 INJECTION, SOLUTION INTRAMUSCULAR; INTRAVENOUS; SUBCUTANEOUS at 14:49

## 2023-01-01 RX ADMIN — OXYCODONE HYDROCHLORIDE 15 MG: 10 TABLET ORAL at 12:58

## 2023-01-01 RX ADMIN — IPRATROPIUM BROMIDE AND ALBUTEROL SULFATE 3 ML: 2.5; .5 SOLUTION RESPIRATORY (INHALATION) at 18:49

## 2023-01-01 RX ADMIN — DEXAMETHASONE SODIUM PHOSPHATE 4 MG: 4 INJECTION INTRA-ARTICULAR; INTRALESIONAL; INTRAMUSCULAR; INTRAVENOUS; SOFT TISSUE at 11:57

## 2023-01-01 RX ADMIN — LORAZEPAM 0.5 MG: 1 TABLET ORAL at 21:50

## 2023-01-01 RX ADMIN — APIXABAN 5 MG: 5 TABLET, FILM COATED ORAL at 05:07

## 2023-01-01 RX ADMIN — HYDROMORPHONE HYDROCHLORIDE 1 MG: 1 INJECTION, SOLUTION INTRAMUSCULAR; INTRAVENOUS; SUBCUTANEOUS at 11:13

## 2023-01-01 RX ADMIN — MORPHINE SULFATE 15 MG: 15 TABLET, FILM COATED, EXTENDED RELEASE ORAL at 06:14

## 2023-01-01 RX ADMIN — DIPHENHYDRAMINE HYDROCHLORIDE 25 MG: 50 INJECTION, SOLUTION INTRAMUSCULAR; INTRAVENOUS at 18:26

## 2023-01-01 RX ADMIN — LORAZEPAM 1 MG: 2 INJECTION INTRAMUSCULAR; INTRAVENOUS at 11:15

## 2023-01-01 RX ADMIN — LIDOCAINE 1 PATCH: 50 PATCH TOPICAL at 16:56

## 2023-01-01 RX ADMIN — ACETAMINOPHEN 650 MG: 325 TABLET, FILM COATED ORAL at 05:12

## 2023-01-01 RX ADMIN — MOMETASONE FUROATE AND FORMOTEROL FUMARATE DIHYDRATE 2 PUFF: 100; 5 AEROSOL RESPIRATORY (INHALATION) at 20:00

## 2023-01-01 RX ADMIN — DOCUSATE SODIUM 50 MG AND SENNOSIDES 8.6 MG 2 TABLET: 8.6; 5 TABLET, FILM COATED ORAL at 17:41

## 2023-01-01 RX ADMIN — IPRATROPIUM BROMIDE AND ALBUTEROL SULFATE 3 ML: 2.5; .5 SOLUTION RESPIRATORY (INHALATION) at 15:49

## 2023-01-01 RX ADMIN — HYDROMORPHONE HYDROCHLORIDE 1 MG: 1 INJECTION, SOLUTION INTRAMUSCULAR; INTRAVENOUS; SUBCUTANEOUS at 09:05

## 2023-01-01 RX ADMIN — LORAZEPAM 0.5 MG: 2 INJECTION INTRAMUSCULAR; INTRAVENOUS at 08:56

## 2023-01-01 RX ADMIN — GUAIFENESIN 600 MG: 600 TABLET, EXTENDED RELEASE ORAL at 17:56

## 2023-01-01 RX ADMIN — GUAIFENESIN 600 MG: 600 TABLET, EXTENDED RELEASE ORAL at 05:58

## 2023-01-01 RX ADMIN — LORAZEPAM 0.5 MG: 2 INJECTION INTRAMUSCULAR; INTRAVENOUS at 15:00

## 2023-01-01 RX ADMIN — DEXMEDETOMIDINE 1 MCG/KG/HR: 100 INJECTION, SOLUTION INTRAVENOUS at 18:43

## 2023-01-01 RX ADMIN — DIPHENHYDRAMINE HYDROCHLORIDE 25 MG: 50 INJECTION, SOLUTION INTRAMUSCULAR; INTRAVENOUS at 06:12

## 2023-01-01 RX ADMIN — CLOTRIMAZOLE 10 MG: 10 LOZENGE ORAL; TOPICAL at 14:47

## 2023-01-01 RX ADMIN — APIXABAN 5 MG: 5 TABLET, FILM COATED ORAL at 05:23

## 2023-01-01 RX ADMIN — HYDROMORPHONE HYDROCHLORIDE 1 MG: 1 INJECTION, SOLUTION INTRAMUSCULAR; INTRAVENOUS; SUBCUTANEOUS at 12:08

## 2023-01-01 RX ADMIN — TIOTROPIUM BROMIDE INHALATION SPRAY 5 MCG: 3.12 SPRAY, METERED RESPIRATORY (INHALATION) at 06:24

## 2023-01-01 RX ADMIN — POLYETHYLENE GLYCOL 3350 1 PACKET: 17 POWDER, FOR SOLUTION ORAL at 00:02

## 2023-01-01 RX ADMIN — ONDANSETRON 4 MG: 2 INJECTION INTRAMUSCULAR; INTRAVENOUS at 15:35

## 2023-01-01 RX ADMIN — IPRATROPIUM BROMIDE AND ALBUTEROL SULFATE 3 ML: 2.5; .5 SOLUTION RESPIRATORY (INHALATION) at 06:21

## 2023-01-01 RX ADMIN — HYDROMORPHONE HYDROCHLORIDE 1 MG: 1 INJECTION, SOLUTION INTRAMUSCULAR; INTRAVENOUS; SUBCUTANEOUS at 21:08

## 2023-01-01 RX ADMIN — LORAZEPAM 0.5 MG: 2 INJECTION INTRAMUSCULAR; INTRAVENOUS at 04:29

## 2023-01-01 RX ADMIN — CAPMATINIB 400 MG: 200 TABLET, FILM COATED ORAL at 05:13

## 2023-01-01 RX ADMIN — MORPHINE SULFATE 15 MG: 15 TABLET, FILM COATED, EXTENDED RELEASE ORAL at 05:32

## 2023-01-01 RX ADMIN — HYDROMORPHONE HYDROCHLORIDE 1 MG: 1 INJECTION, SOLUTION INTRAMUSCULAR; INTRAVENOUS; SUBCUTANEOUS at 14:11

## 2023-01-01 RX ADMIN — LORATADINE 10 MG: 10 TABLET ORAL at 04:08

## 2023-01-01 RX ADMIN — PREGABALIN 75 MG: 75 CAPSULE ORAL at 17:24

## 2023-01-01 RX ADMIN — SENNOSIDES AND DOCUSATE SODIUM 2 TABLET: 50; 8.6 TABLET ORAL at 05:23

## 2023-01-01 RX ADMIN — DIPHENHYDRAMINE HYDROCHLORIDE 25 MG: 50 INJECTION, SOLUTION INTRAMUSCULAR; INTRAVENOUS at 23:20

## 2023-01-01 RX ADMIN — DOCUSATE SODIUM 50 MG AND SENNOSIDES 8.6 MG 2 TABLET: 8.6; 5 TABLET, FILM COATED ORAL at 17:04

## 2023-01-01 RX ADMIN — FUROSEMIDE 40 MG: 10 INJECTION INTRAMUSCULAR; INTRAVENOUS at 18:48

## 2023-01-01 RX ADMIN — SODIUM CHLORIDE: 9 INJECTION, SOLUTION INTRAVENOUS at 00:35

## 2023-01-01 RX ADMIN — APIXABAN 5 MG: 5 TABLET, FILM COATED ORAL at 05:43

## 2023-01-01 RX ADMIN — ACETAMINOPHEN 650 MG: 325 TABLET, FILM COATED ORAL at 17:55

## 2023-01-01 RX ADMIN — HYDROMORPHONE HYDROCHLORIDE 1 MG: 1 INJECTION, SOLUTION INTRAMUSCULAR; INTRAVENOUS; SUBCUTANEOUS at 19:54

## 2023-01-01 RX ADMIN — BUSPIRONE HYDROCHLORIDE 15 MG: 10 TABLET ORAL at 05:22

## 2023-01-01 RX ADMIN — APIXABAN 5 MG: 5 TABLET, FILM COATED ORAL at 05:11

## 2023-01-01 RX ADMIN — DIPHENHYDRAMINE HYDROCHLORIDE 25 MG: 50 INJECTION, SOLUTION INTRAMUSCULAR; INTRAVENOUS at 16:51

## 2023-01-01 RX ADMIN — IPRATROPIUM BROMIDE AND ALBUTEROL SULFATE 3 ML: 2.5; .5 SOLUTION RESPIRATORY (INHALATION) at 15:08

## 2023-01-01 RX ADMIN — IPRATROPIUM BROMIDE AND ALBUTEROL SULFATE 3 ML: .5; 3 SOLUTION RESPIRATORY (INHALATION) at 22:19

## 2023-01-01 RX ADMIN — OXYCODONE HYDROCHLORIDE 10 MG: 10 TABLET ORAL at 18:29

## 2023-01-01 RX ADMIN — DEXAMETHASONE 4 MG: 4 TABLET ORAL at 05:42

## 2023-01-01 RX ADMIN — DOCUSATE SODIUM 50 MG AND SENNOSIDES 8.6 MG 2 TABLET: 8.6; 5 TABLET, FILM COATED ORAL at 18:23

## 2023-01-01 RX ADMIN — HYDROCODONE BITARTRATE AND ACETAMINOPHEN 1 TABLET: 10; 325 TABLET ORAL at 19:09

## 2023-01-01 RX ADMIN — CYCLOBENZAPRINE 10 MG: 10 TABLET, FILM COATED ORAL at 11:59

## 2023-01-01 RX ADMIN — IPRATROPIUM BROMIDE AND ALBUTEROL SULFATE 3 ML: 2.5; .5 SOLUTION RESPIRATORY (INHALATION) at 20:14

## 2023-01-01 RX ADMIN — HYDROMORPHONE HYDROCHLORIDE 1 MG: 1 INJECTION, SOLUTION INTRAMUSCULAR; INTRAVENOUS; SUBCUTANEOUS at 15:11

## 2023-01-01 RX ADMIN — DOCUSATE SODIUM 50 MG AND SENNOSIDES 8.6 MG 2 TABLET: 8.6; 5 TABLET, FILM COATED ORAL at 18:18

## 2023-01-01 RX ADMIN — BUSPIRONE HYDROCHLORIDE 15 MG: 10 TABLET ORAL at 11:37

## 2023-01-01 RX ADMIN — IPRATROPIUM BROMIDE AND ALBUTEROL SULFATE 3 ML: 2.5; .5 SOLUTION RESPIRATORY (INHALATION) at 07:28

## 2023-01-01 RX ADMIN — CLOTRIMAZOLE 10 MG: 10 LOZENGE ORAL; TOPICAL at 00:25

## 2023-01-01 RX ADMIN — LORATADINE 10 MG: 10 TABLET ORAL at 05:23

## 2023-01-01 RX ADMIN — CAPMATINIB 400 MG: 200 TABLET, FILM COATED ORAL at 18:51

## 2023-01-01 RX ADMIN — HYDROMORPHONE HYDROCHLORIDE 1 MG: 1 INJECTION, SOLUTION INTRAMUSCULAR; INTRAVENOUS; SUBCUTANEOUS at 16:53

## 2023-01-01 RX ADMIN — IPRATROPIUM BROMIDE AND ALBUTEROL SULFATE 3 ML: 2.5; .5 SOLUTION RESPIRATORY (INHALATION) at 11:18

## 2023-01-01 RX ADMIN — HYDROMORPHONE HYDROCHLORIDE 1 MG: 1 INJECTION, SOLUTION INTRAMUSCULAR; INTRAVENOUS; SUBCUTANEOUS at 22:34

## 2023-01-01 RX ADMIN — HYDROCODONE BITARTRATE AND ACETAMINOPHEN 1 TABLET: 10; 325 TABLET ORAL at 21:37

## 2023-01-01 RX ADMIN — ALBUTEROL SULFATE 2 PUFF: 90 AEROSOL, METERED RESPIRATORY (INHALATION) at 16:07

## 2023-01-01 RX ADMIN — HYDROMORPHONE HYDROCHLORIDE 1 MG: 1 INJECTION, SOLUTION INTRAMUSCULAR; INTRAVENOUS; SUBCUTANEOUS at 06:29

## 2023-01-01 RX ADMIN — ALBUTEROL SULFATE 2 PUFF: 90 AEROSOL, METERED RESPIRATORY (INHALATION) at 05:16

## 2023-01-01 RX ADMIN — HYDROCODONE BITARTRATE AND ACETAMINOPHEN 1 TABLET: 10; 325 TABLET ORAL at 15:14

## 2023-01-01 RX ADMIN — APIXABAN 5 MG: 5 TABLET, FILM COATED ORAL at 06:14

## 2023-01-01 RX ADMIN — CLOTRIMAZOLE 10 MG: 10 LOZENGE ORAL; TOPICAL at 17:02

## 2023-01-01 RX ADMIN — DEXAMETHASONE SODIUM PHOSPHATE 4 MG: 4 INJECTION INTRA-ARTICULAR; INTRALESIONAL; INTRAMUSCULAR; INTRAVENOUS; SOFT TISSUE at 00:22

## 2023-01-01 RX ADMIN — HYDROMORPHONE HYDROCHLORIDE 1 MG: 1 INJECTION, SOLUTION INTRAMUSCULAR; INTRAVENOUS; SUBCUTANEOUS at 06:09

## 2023-01-01 RX ADMIN — LORATADINE 10 MG: 10 TABLET ORAL at 05:38

## 2023-01-01 RX ADMIN — HYDROMORPHONE HYDROCHLORIDE 1 MG: 1 INJECTION, SOLUTION INTRAMUSCULAR; INTRAVENOUS; SUBCUTANEOUS at 15:29

## 2023-01-01 RX ADMIN — IPRATROPIUM BROMIDE AND ALBUTEROL SULFATE 3 ML: 2.5; .5 SOLUTION RESPIRATORY (INHALATION) at 14:42

## 2023-01-01 RX ADMIN — CLOTRIMAZOLE 10 MG: 10 LOZENGE ORAL; TOPICAL at 22:35

## 2023-01-01 RX ADMIN — HYDROMORPHONE HYDROCHLORIDE 1 MG: 1 INJECTION, SOLUTION INTRAMUSCULAR; INTRAVENOUS; SUBCUTANEOUS at 19:27

## 2023-01-01 RX ADMIN — DIPHENHYDRAMINE HYDROCHLORIDE 25 MG: 50 INJECTION, SOLUTION INTRAMUSCULAR; INTRAVENOUS at 23:33

## 2023-01-01 RX ADMIN — IPRATROPIUM BROMIDE AND ALBUTEROL SULFATE 3 ML: 2.5; .5 SOLUTION RESPIRATORY (INHALATION) at 09:47

## 2023-01-01 RX ADMIN — HYDROMORPHONE HYDROCHLORIDE 1 MG: 1 INJECTION, SOLUTION INTRAMUSCULAR; INTRAVENOUS; SUBCUTANEOUS at 13:49

## 2023-01-01 RX ADMIN — LORAZEPAM 0.5 MG: 2 INJECTION INTRAMUSCULAR; INTRAVENOUS at 18:01

## 2023-01-01 RX ADMIN — DOCUSATE SODIUM 50 MG AND SENNOSIDES 8.6 MG 2 TABLET: 8.6; 5 TABLET, FILM COATED ORAL at 17:18

## 2023-01-01 RX ADMIN — HYDROMORPHONE HYDROCHLORIDE 1 MG: 1 INJECTION, SOLUTION INTRAMUSCULAR; INTRAVENOUS; SUBCUTANEOUS at 02:17

## 2023-01-01 RX ADMIN — MOMETASONE FUROATE AND FORMOTEROL FUMARATE DIHYDRATE 2 PUFF: 100; 5 AEROSOL RESPIRATORY (INHALATION) at 22:35

## 2023-01-01 RX ADMIN — HYDROMORPHONE HYDROCHLORIDE 1 MG: 1 INJECTION, SOLUTION INTRAMUSCULAR; INTRAVENOUS; SUBCUTANEOUS at 12:11

## 2023-01-01 RX ADMIN — CAPMATINIB 400 MG: 200 TABLET, FILM COATED ORAL at 17:54

## 2023-01-01 RX ADMIN — FENTANYL 1 PATCH: 50 PATCH, EXTENDED RELEASE TRANSDERMAL at 11:32

## 2023-01-01 RX ADMIN — DEXMEDETOMIDINE 0.2 MCG/KG/HR: 100 INJECTION, SOLUTION INTRAVENOUS at 08:03

## 2023-01-01 RX ADMIN — LORAZEPAM 1 MG: 2 INJECTION INTRAMUSCULAR; INTRAVENOUS at 22:17

## 2023-01-01 RX ADMIN — VANCOMYCIN HYDROCHLORIDE 2000 MG: 5 INJECTION, POWDER, LYOPHILIZED, FOR SOLUTION INTRAVENOUS at 20:04

## 2023-01-01 RX ADMIN — CEFEPIME 2 G: 2 INJECTION, POWDER, FOR SOLUTION INTRAVENOUS at 11:39

## 2023-01-01 RX ADMIN — HYDROMORPHONE HYDROCHLORIDE 1 MG: 1 INJECTION, SOLUTION INTRAMUSCULAR; INTRAVENOUS; SUBCUTANEOUS at 10:25

## 2023-01-01 RX ADMIN — HYDROMORPHONE HYDROCHLORIDE 1 MG: 1 INJECTION, SOLUTION INTRAMUSCULAR; INTRAVENOUS; SUBCUTANEOUS at 15:20

## 2023-01-01 RX ADMIN — LORAZEPAM 1 MG: 2 INJECTION INTRAMUSCULAR; INTRAVENOUS at 16:33

## 2023-01-01 RX ADMIN — HYDROMORPHONE HYDROCHLORIDE 1 MG: 1 INJECTION, SOLUTION INTRAMUSCULAR; INTRAVENOUS; SUBCUTANEOUS at 08:17

## 2023-01-01 RX ADMIN — CAPMATINIB 400 MG: 200 TABLET, FILM COATED ORAL at 18:21

## 2023-01-01 RX ADMIN — IPRATROPIUM BROMIDE AND ALBUTEROL SULFATE 3 ML: 2.5; .5 SOLUTION RESPIRATORY (INHALATION) at 18:38

## 2023-01-01 RX ADMIN — APIXABAN 5 MG: 5 TABLET, FILM COATED ORAL at 17:46

## 2023-01-01 RX ADMIN — AZITHROMYCIN DIHYDRATE 500 MG: 250 TABLET ORAL at 13:49

## 2023-01-01 RX ADMIN — LORAZEPAM 0.5 MG: 1 TABLET ORAL at 00:07

## 2023-01-01 RX ADMIN — ACETAMINOPHEN 650 MG: 325 TABLET, FILM COATED ORAL at 12:08

## 2023-01-01 RX ADMIN — LORATADINE 10 MG: 10 TABLET ORAL at 05:57

## 2023-01-01 RX ADMIN — LORAZEPAM 0.5 MG: 1 TABLET ORAL at 21:08

## 2023-01-01 RX ADMIN — LORAZEPAM 0.5 MG: 1 TABLET ORAL at 19:33

## 2023-01-01 RX ADMIN — HYDROMORPHONE HYDROCHLORIDE 1 MG: 1 INJECTION, SOLUTION INTRAMUSCULAR; INTRAVENOUS; SUBCUTANEOUS at 00:13

## 2023-01-01 RX ADMIN — LORAZEPAM 0.5 MG: 2 INJECTION INTRAMUSCULAR; INTRAVENOUS at 00:02

## 2023-01-01 RX ADMIN — IPRATROPIUM BROMIDE AND ALBUTEROL SULFATE 3 ML: 2.5; .5 SOLUTION RESPIRATORY (INHALATION) at 09:59

## 2023-01-01 RX ADMIN — ACETAMINOPHEN 650 MG: 325 TABLET, FILM COATED ORAL at 05:00

## 2023-01-01 RX ADMIN — DEXMEDETOMIDINE 1.2 MCG/KG/HR: 100 INJECTION, SOLUTION INTRAVENOUS at 20:34

## 2023-01-01 RX ADMIN — GABAPENTIN 200 MG: 100 CAPSULE ORAL at 11:15

## 2023-01-01 RX ADMIN — ONDANSETRON 4 MG: 2 INJECTION INTRAMUSCULAR; INTRAVENOUS at 06:55

## 2023-01-01 RX ADMIN — LORAZEPAM 0.5 MG: 2 INJECTION INTRAMUSCULAR; INTRAVENOUS at 14:26

## 2023-01-01 RX ADMIN — SENNOSIDES AND DOCUSATE SODIUM 2 TABLET: 50; 8.6 TABLET ORAL at 05:18

## 2023-01-01 RX ADMIN — HYDROCODONE BITARTRATE AND ACETAMINOPHEN 1 TABLET: 10; 325 TABLET ORAL at 21:19

## 2023-01-01 RX ADMIN — LORATADINE 10 MG: 10 TABLET ORAL at 05:28

## 2023-01-01 RX ADMIN — IPRATROPIUM BROMIDE AND ALBUTEROL SULFATE 3 ML: 2.5; .5 SOLUTION RESPIRATORY (INHALATION) at 08:18

## 2023-01-01 RX ADMIN — AZITHROMYCIN DIHYDRATE 500 MG: 250 TABLET ORAL at 13:25

## 2023-01-01 RX ADMIN — HYDROMORPHONE HYDROCHLORIDE 1 MG: 1 INJECTION, SOLUTION INTRAMUSCULAR; INTRAVENOUS; SUBCUTANEOUS at 22:09

## 2023-01-01 RX ADMIN — ACETAMINOPHEN 650 MG: 325 TABLET, FILM COATED ORAL at 17:35

## 2023-01-01 RX ADMIN — BUSPIRONE HYDROCHLORIDE 15 MG: 10 TABLET ORAL at 05:53

## 2023-01-01 RX ADMIN — GUAIFENESIN 600 MG: 600 TABLET, EXTENDED RELEASE ORAL at 17:48

## 2023-01-01 RX ADMIN — GUAIFENESIN 600 MG: 600 TABLET, EXTENDED RELEASE ORAL at 17:54

## 2023-01-01 RX ADMIN — FUROSEMIDE 40 MG: 10 INJECTION INTRAMUSCULAR; INTRAVENOUS at 08:36

## 2023-01-01 RX ADMIN — IPRATROPIUM BROMIDE AND ALBUTEROL SULFATE 3 ML: 2.5; .5 SOLUTION RESPIRATORY (INHALATION) at 07:10

## 2023-01-01 RX ADMIN — HYDROMORPHONE HYDROCHLORIDE 1 MG: 1 INJECTION, SOLUTION INTRAMUSCULAR; INTRAVENOUS; SUBCUTANEOUS at 12:45

## 2023-01-01 RX ADMIN — LORAZEPAM 0.5 MG: 2 INJECTION INTRAMUSCULAR; INTRAVENOUS at 23:15

## 2023-01-01 RX ADMIN — GABAPENTIN 200 MG: 100 CAPSULE ORAL at 05:36

## 2023-01-01 RX ADMIN — DEXAMETHASONE 4 MG: 4 TABLET ORAL at 21:54

## 2023-01-01 RX ADMIN — SODIUM CHLORIDE: 9 INJECTION, SOLUTION INTRAVENOUS at 20:33

## 2023-01-01 RX ADMIN — OXYCODONE HYDROCHLORIDE 10 MG: 5 SOLUTION ORAL at 16:32

## 2023-01-01 RX ADMIN — DEXAMETHASONE SODIUM PHOSPHATE 4 MG: 4 INJECTION INTRA-ARTICULAR; INTRALESIONAL; INTRAMUSCULAR; INTRAVENOUS; SOFT TISSUE at 12:35

## 2023-01-01 RX ADMIN — DEXAMETHASONE SODIUM PHOSPHATE 4 MG: 4 INJECTION INTRA-ARTICULAR; INTRALESIONAL; INTRAMUSCULAR; INTRAVENOUS; SOFT TISSUE at 17:33

## 2023-01-01 RX ADMIN — ACETAMINOPHEN 650 MG: 325 TABLET, FILM COATED ORAL at 03:33

## 2023-01-01 RX ADMIN — DIPHENHYDRAMINE HYDROCHLORIDE 25 MG: 50 INJECTION, SOLUTION INTRAMUSCULAR; INTRAVENOUS at 05:58

## 2023-01-01 RX ADMIN — HYDROMORPHONE HYDROCHLORIDE 1 MG: 1 INJECTION, SOLUTION INTRAMUSCULAR; INTRAVENOUS; SUBCUTANEOUS at 23:57

## 2023-01-01 RX ADMIN — HYDROMORPHONE HYDROCHLORIDE 1 MG: 1 INJECTION, SOLUTION INTRAMUSCULAR; INTRAVENOUS; SUBCUTANEOUS at 23:52

## 2023-01-01 RX ADMIN — MORPHINE SULFATE 30 MG: 30 TABLET, FILM COATED, EXTENDED RELEASE ORAL at 17:23

## 2023-01-01 RX ADMIN — GUAIFENESIN 600 MG: 600 TABLET, EXTENDED RELEASE ORAL at 05:24

## 2023-01-01 RX ADMIN — APIXABAN 10 MG: 5 TABLET, FILM COATED ORAL at 21:09

## 2023-01-01 RX ADMIN — GUAIFENESIN 600 MG: 600 TABLET, EXTENDED RELEASE ORAL at 18:01

## 2023-01-01 RX ADMIN — DIPHENHYDRAMINE HYDROCHLORIDE 25 MG: 50 INJECTION, SOLUTION INTRAMUSCULAR; INTRAVENOUS at 05:08

## 2023-01-01 RX ADMIN — LORATADINE 10 MG: 10 TABLET ORAL at 06:11

## 2023-01-01 RX ADMIN — SENNOSIDES AND DOCUSATE SODIUM 2 TABLET: 50; 8.6 TABLET ORAL at 16:27

## 2023-01-01 RX ADMIN — ACETAMINOPHEN 650 MG: 325 TABLET, FILM COATED ORAL at 09:44

## 2023-01-01 RX ADMIN — HYDROMORPHONE HYDROCHLORIDE 1 MG: 1 INJECTION, SOLUTION INTRAMUSCULAR; INTRAVENOUS; SUBCUTANEOUS at 03:03

## 2023-01-01 RX ADMIN — DEXMEDETOMIDINE 0.9 MCG/KG/HR: 100 INJECTION, SOLUTION INTRAVENOUS at 09:33

## 2023-01-01 RX ADMIN — IPRATROPIUM BROMIDE AND ALBUTEROL SULFATE 3 ML: 2.5; .5 SOLUTION RESPIRATORY (INHALATION) at 06:46

## 2023-01-01 RX ADMIN — HYDROMORPHONE HYDROCHLORIDE 0.5 MG: 1 INJECTION, SOLUTION INTRAMUSCULAR; INTRAVENOUS; SUBCUTANEOUS at 05:23

## 2023-01-01 RX ADMIN — DOCUSATE SODIUM 50 MG AND SENNOSIDES 8.6 MG 2 TABLET: 8.6; 5 TABLET, FILM COATED ORAL at 06:01

## 2023-01-01 RX ADMIN — DIPHENHYDRAMINE HYDROCHLORIDE 25 MG: 50 INJECTION, SOLUTION INTRAMUSCULAR; INTRAVENOUS at 12:02

## 2023-01-01 RX ADMIN — LORAZEPAM 0.5 MG: 2 INJECTION INTRAMUSCULAR; INTRAVENOUS at 06:14

## 2023-01-01 RX ADMIN — GUAIFENESIN 600 MG: 600 TABLET, EXTENDED RELEASE ORAL at 06:23

## 2023-01-01 RX ADMIN — IPRATROPIUM BROMIDE AND ALBUTEROL SULFATE 3 ML: 2.5; .5 SOLUTION RESPIRATORY (INHALATION) at 04:51

## 2023-01-01 RX ADMIN — HYDROMORPHONE HYDROCHLORIDE 1 MG: 1 INJECTION, SOLUTION INTRAMUSCULAR; INTRAVENOUS; SUBCUTANEOUS at 14:14

## 2023-01-01 RX ADMIN — CLOTRIMAZOLE 10 MG: 10 LOZENGE ORAL; TOPICAL at 06:07

## 2023-01-01 RX ADMIN — DIPHENHYDRAMINE HYDROCHLORIDE 25 MG: 50 INJECTION, SOLUTION INTRAMUSCULAR; INTRAVENOUS at 22:32

## 2023-01-01 RX ADMIN — IPRATROPIUM BROMIDE AND ALBUTEROL SULFATE 3 ML: 2.5; .5 SOLUTION RESPIRATORY (INHALATION) at 19:06

## 2023-01-01 RX ADMIN — MORPHINE SULFATE 15 MG: 15 TABLET, FILM COATED, EXTENDED RELEASE ORAL at 17:09

## 2023-01-01 RX ADMIN — BUSPIRONE HYDROCHLORIDE 15 MG: 10 TABLET ORAL at 18:29

## 2023-01-01 RX ADMIN — MORPHINE SULFATE 4 MG: 4 INJECTION INTRAVENOUS at 06:46

## 2023-01-01 RX ADMIN — HYDROMORPHONE HYDROCHLORIDE 1 MG: 1 INJECTION, SOLUTION INTRAMUSCULAR; INTRAVENOUS; SUBCUTANEOUS at 04:22

## 2023-01-01 RX ADMIN — GUAIFENESIN 600 MG: 600 TABLET, EXTENDED RELEASE ORAL at 17:32

## 2023-01-01 RX ADMIN — CLOTRIMAZOLE 10 MG: 10 LOZENGE ORAL; TOPICAL at 11:16

## 2023-01-01 RX ADMIN — DEXAMETHASONE SODIUM PHOSPHATE 4 MG: 4 INJECTION, SOLUTION INTRA-ARTICULAR; INTRALESIONAL; INTRAMUSCULAR; INTRAVENOUS; SOFT TISSUE at 15:35

## 2023-01-01 RX ADMIN — FENTANYL CITRATE 50 MCG: 50 INJECTION, SOLUTION INTRAMUSCULAR; INTRAVENOUS at 12:16

## 2023-01-01 RX ADMIN — ALBUTEROL SULFATE 2 PUFF: 90 AEROSOL, METERED RESPIRATORY (INHALATION) at 00:13

## 2023-01-01 RX ADMIN — DEXAMETHASONE SODIUM PHOSPHATE 4 MG: 4 INJECTION INTRA-ARTICULAR; INTRALESIONAL; INTRAMUSCULAR; INTRAVENOUS; SOFT TISSUE at 05:38

## 2023-01-01 RX ADMIN — CAPMATINIB 400 MG: 200 TABLET, FILM COATED ORAL at 17:10

## 2023-01-01 RX ADMIN — PREGABALIN 75 MG: 75 CAPSULE ORAL at 04:34

## 2023-01-01 RX ADMIN — HYDROMORPHONE HYDROCHLORIDE 1 MG: 1 INJECTION, SOLUTION INTRAMUSCULAR; INTRAVENOUS; SUBCUTANEOUS at 05:07

## 2023-01-01 RX ADMIN — DEXAMETHASONE SODIUM PHOSPHATE 4 MG: 4 INJECTION INTRA-ARTICULAR; INTRALESIONAL; INTRAMUSCULAR; INTRAVENOUS; SOFT TISSUE at 17:56

## 2023-01-01 RX ADMIN — PROPOFOL 200 MG: 10 INJECTION, EMULSION INTRAVENOUS at 15:04

## 2023-01-01 RX ADMIN — DEXAMETHASONE SODIUM PHOSPHATE 4 MG: 4 INJECTION INTRA-ARTICULAR; INTRALESIONAL; INTRAMUSCULAR; INTRAVENOUS; SOFT TISSUE at 00:21

## 2023-01-01 RX ADMIN — DIPHENHYDRAMINE HYDROCHLORIDE 25 MG: 50 INJECTION, SOLUTION INTRAMUSCULAR; INTRAVENOUS at 05:28

## 2023-01-01 RX ADMIN — SODIUM CHLORIDE: 9 INJECTION, SOLUTION INTRAVENOUS at 12:00

## 2023-01-01 RX ADMIN — TIOTROPIUM BROMIDE INHALATION SPRAY 5 MCG: 3.12 SPRAY, METERED RESPIRATORY (INHALATION) at 11:27

## 2023-01-01 RX ADMIN — APIXABAN 5 MG: 5 TABLET, FILM COATED ORAL at 07:41

## 2023-01-01 RX ADMIN — FUROSEMIDE 80 MG: 10 INJECTION INTRAMUSCULAR; INTRAVENOUS at 14:24

## 2023-01-01 RX ADMIN — BUSPIRONE HYDROCHLORIDE 15 MG: 10 TABLET ORAL at 12:33

## 2023-01-01 RX ADMIN — ACETAMINOPHEN 650 MG: 325 TABLET, FILM COATED ORAL at 12:02

## 2023-01-01 RX ADMIN — HYDROMORPHONE HYDROCHLORIDE 1 MG: 1 INJECTION, SOLUTION INTRAMUSCULAR; INTRAVENOUS; SUBCUTANEOUS at 19:26

## 2023-01-01 RX ADMIN — DEXAMETHASONE 4 MG: 4 TABLET ORAL at 21:12

## 2023-01-01 RX ADMIN — DIPHENHYDRAMINE HYDROCHLORIDE 25 MG: 50 INJECTION, SOLUTION INTRAMUSCULAR; INTRAVENOUS at 09:08

## 2023-01-01 RX ADMIN — ACETAMINOPHEN 650 MG: 325 TABLET, FILM COATED ORAL at 17:33

## 2023-01-01 RX ADMIN — HYDROCODONE BITARTRATE AND ACETAMINOPHEN 1 TABLET: 10; 325 TABLET ORAL at 02:56

## 2023-01-01 RX ADMIN — GUAIFENESIN 600 MG: 600 TABLET, EXTENDED RELEASE ORAL at 05:00

## 2023-01-01 RX ADMIN — HYDROMORPHONE HYDROCHLORIDE 1 MG: 1 INJECTION, SOLUTION INTRAMUSCULAR; INTRAVENOUS; SUBCUTANEOUS at 11:41

## 2023-01-01 RX ADMIN — IPRATROPIUM BROMIDE AND ALBUTEROL SULFATE 3 ML: 2.5; .5 SOLUTION RESPIRATORY (INHALATION) at 14:56

## 2023-01-01 RX ADMIN — DIPHENHYDRAMINE HYDROCHLORIDE 25 MG: 25 TABLET ORAL at 01:43

## 2023-01-01 RX ADMIN — GUAIFENESIN 600 MG: 600 TABLET, EXTENDED RELEASE ORAL at 18:00

## 2023-01-01 RX ADMIN — HYDROMORPHONE HYDROCHLORIDE 1 MG: 1 INJECTION, SOLUTION INTRAMUSCULAR; INTRAVENOUS; SUBCUTANEOUS at 08:21

## 2023-01-01 RX ADMIN — HYDROMORPHONE HYDROCHLORIDE 1 MG: 1 INJECTION, SOLUTION INTRAMUSCULAR; INTRAVENOUS; SUBCUTANEOUS at 22:25

## 2023-01-01 RX ADMIN — LIDOCAINE 1 PATCH: 50 PATCH TOPICAL at 18:23

## 2023-01-01 RX ADMIN — HYDROMORPHONE HYDROCHLORIDE 1 MG: 1 INJECTION, SOLUTION INTRAMUSCULAR; INTRAVENOUS; SUBCUTANEOUS at 04:47

## 2023-01-01 RX ADMIN — HYDROCODONE BITARTRATE AND ACETAMINOPHEN 1 TABLET: 10; 325 TABLET ORAL at 09:40

## 2023-01-01 RX ADMIN — BUSPIRONE HYDROCHLORIDE 15 MG: 10 TABLET ORAL at 16:56

## 2023-01-01 RX ADMIN — DEXAMETHASONE SODIUM PHOSPHATE 4 MG: 4 INJECTION INTRA-ARTICULAR; INTRALESIONAL; INTRAMUSCULAR; INTRAVENOUS; SOFT TISSUE at 16:55

## 2023-01-01 RX ADMIN — MORPHINE SULFATE 15 MG: 15 TABLET, FILM COATED, EXTENDED RELEASE ORAL at 16:55

## 2023-01-01 RX ADMIN — BUSPIRONE HYDROCHLORIDE 15 MG: 10 TABLET ORAL at 18:23

## 2023-01-01 RX ADMIN — IPRATROPIUM BROMIDE AND ALBUTEROL SULFATE 3 ML: 2.5; .5 SOLUTION RESPIRATORY (INHALATION) at 10:31

## 2023-01-01 RX ADMIN — DIPHENHYDRAMINE HYDROCHLORIDE 25 MG: 50 INJECTION, SOLUTION INTRAMUSCULAR; INTRAVENOUS at 20:16

## 2023-01-01 RX ADMIN — DEXAMETHASONE SODIUM PHOSPHATE 4 MG: 4 INJECTION INTRA-ARTICULAR; INTRALESIONAL; INTRAMUSCULAR; INTRAVENOUS; SOFT TISSUE at 11:55

## 2023-01-01 RX ADMIN — FENTANYL 1 PATCH: 50 PATCH, EXTENDED RELEASE TRANSDERMAL at 17:06

## 2023-01-01 RX ADMIN — TIOTROPIUM BROMIDE INHALATION SPRAY 5 MCG: 3.12 SPRAY, METERED RESPIRATORY (INHALATION) at 06:19

## 2023-01-01 RX ADMIN — ACETAMINOPHEN 650 MG: 325 TABLET, FILM COATED ORAL at 18:08

## 2023-01-01 RX ADMIN — MORPHINE SULFATE 15 MG: 15 TABLET, FILM COATED, EXTENDED RELEASE ORAL at 18:08

## 2023-01-01 RX ADMIN — CEFTRIAXONE SODIUM 2000 MG: 2 INJECTION, POWDER, FOR SOLUTION INTRAMUSCULAR; INTRAVENOUS at 04:40

## 2023-01-01 RX ADMIN — GUAIFENESIN 600 MG: 600 TABLET, EXTENDED RELEASE ORAL at 17:50

## 2023-01-01 RX ADMIN — CAPMATINIB 400 MG: 200 TABLET, FILM COATED ORAL at 17:40

## 2023-01-01 RX ADMIN — DIPHENHYDRAMINE HYDROCHLORIDE 25 MG: 50 INJECTION, SOLUTION INTRAMUSCULAR; INTRAVENOUS at 02:46

## 2023-01-01 RX ADMIN — IPRATROPIUM BROMIDE AND ALBUTEROL SULFATE 3 ML: 2.5; .5 SOLUTION RESPIRATORY (INHALATION) at 06:42

## 2023-01-01 RX ADMIN — LORAZEPAM 1 MG: 2 INJECTION INTRAMUSCULAR; INTRAVENOUS at 08:57

## 2023-01-01 RX ADMIN — BUSPIRONE HYDROCHLORIDE 15 MG: 10 TABLET ORAL at 13:28

## 2023-01-01 RX ADMIN — HYDROMORPHONE HYDROCHLORIDE 1 MG: 1 INJECTION, SOLUTION INTRAMUSCULAR; INTRAVENOUS; SUBCUTANEOUS at 00:58

## 2023-01-01 RX ADMIN — LIDOCAINE 1 PATCH: 50 PATCH TOPICAL at 23:57

## 2023-01-01 RX ADMIN — CAPMATINIB 400 MG: 200 TABLET, FILM COATED ORAL at 05:20

## 2023-01-01 RX ADMIN — LORAZEPAM 0.5 MG: 2 INJECTION INTRAMUSCULAR; INTRAVENOUS at 08:16

## 2023-01-01 RX ADMIN — METOPROLOL TARTRATE 25 MG: 25 TABLET, FILM COATED ORAL at 20:55

## 2023-01-01 RX ADMIN — ACETAMINOPHEN 650 MG: 325 TABLET, FILM COATED ORAL at 05:58

## 2023-01-01 RX ADMIN — IPRATROPIUM BROMIDE AND ALBUTEROL SULFATE 3 ML: 2.5; .5 SOLUTION RESPIRATORY (INHALATION) at 22:03

## 2023-01-01 RX ADMIN — DEXAMETHASONE SODIUM PHOSPHATE 4 MG: 4 INJECTION INTRA-ARTICULAR; INTRALESIONAL; INTRAMUSCULAR; INTRAVENOUS; SOFT TISSUE at 00:08

## 2023-01-01 RX ADMIN — GABAPENTIN 300 MG: 300 CAPSULE ORAL at 13:25

## 2023-01-01 RX ADMIN — GUAIFENESIN 600 MG: 600 TABLET, EXTENDED RELEASE ORAL at 16:19

## 2023-01-01 RX ADMIN — IPRATROPIUM BROMIDE AND ALBUTEROL SULFATE 3 ML: 2.5; .5 SOLUTION RESPIRATORY (INHALATION) at 22:19

## 2023-01-01 RX ADMIN — IPRATROPIUM BROMIDE AND ALBUTEROL SULFATE 3 ML: 2.5; .5 SOLUTION RESPIRATORY (INHALATION) at 22:36

## 2023-01-01 RX ADMIN — IPRATROPIUM BROMIDE AND ALBUTEROL SULFATE 3 ML: 2.5; .5 SOLUTION RESPIRATORY (INHALATION) at 18:20

## 2023-01-01 RX ADMIN — IPRATROPIUM BROMIDE AND ALBUTEROL SULFATE 3 ML: 2.5; .5 SOLUTION RESPIRATORY (INHALATION) at 10:06

## 2023-01-01 RX ADMIN — SODIUM CHLORIDE, POTASSIUM CHLORIDE, SODIUM LACTATE AND CALCIUM CHLORIDE: 600; 310; 30; 20 INJECTION, SOLUTION INTRAVENOUS at 14:58

## 2023-01-01 RX ADMIN — ROCURONIUM BROMIDE 50 MG: 10 INJECTION, SOLUTION INTRAVENOUS at 11:04

## 2023-01-01 RX ADMIN — IPRATROPIUM BROMIDE AND ALBUTEROL SULFATE 3 ML: 2.5; .5 SOLUTION RESPIRATORY (INHALATION) at 06:52

## 2023-01-01 RX ADMIN — IPRATROPIUM BROMIDE AND ALBUTEROL SULFATE 3 ML: 2.5; .5 SOLUTION RESPIRATORY (INHALATION) at 03:39

## 2023-01-01 RX ADMIN — DIPHENHYDRAMINE HYDROCHLORIDE 25 MG: 50 INJECTION, SOLUTION INTRAMUSCULAR; INTRAVENOUS at 23:04

## 2023-01-01 RX ADMIN — IPRATROPIUM BROMIDE AND ALBUTEROL SULFATE 3 ML: 2.5; .5 SOLUTION RESPIRATORY (INHALATION) at 15:48

## 2023-01-01 RX ADMIN — Medication 1 APPLICATOR: at 06:00

## 2023-01-01 RX ADMIN — GUAIFENESIN 600 MG: 600 TABLET, EXTENDED RELEASE ORAL at 05:08

## 2023-01-01 RX ADMIN — GUAIFENESIN 600 MG: 600 TABLET, EXTENDED RELEASE ORAL at 17:09

## 2023-01-01 RX ADMIN — DEXAMETHASONE SODIUM PHOSPHATE 4 MG: 4 INJECTION INTRA-ARTICULAR; INTRALESIONAL; INTRAMUSCULAR; INTRAVENOUS; SOFT TISSUE at 11:56

## 2023-01-01 RX ADMIN — IPRATROPIUM BROMIDE AND ALBUTEROL SULFATE 3 ML: 2.5; .5 SOLUTION RESPIRATORY (INHALATION) at 10:08

## 2023-01-01 RX ADMIN — IPRATROPIUM BROMIDE AND ALBUTEROL SULFATE 3 ML: 2.5; .5 SOLUTION RESPIRATORY (INHALATION) at 18:55

## 2023-01-01 RX ADMIN — ALBUTEROL SULFATE 2 PUFF: 90 AEROSOL, METERED RESPIRATORY (INHALATION) at 06:24

## 2023-01-01 RX ADMIN — DOCUSATE SODIUM 50 MG AND SENNOSIDES 8.6 MG 2 TABLET: 8.6; 5 TABLET, FILM COATED ORAL at 17:12

## 2023-01-01 RX ADMIN — BUSPIRONE HYDROCHLORIDE 15 MG: 10 TABLET ORAL at 16:19

## 2023-01-01 RX ADMIN — DIPHENHYDRAMINE HYDROCHLORIDE 25 MG: 50 INJECTION, SOLUTION INTRAMUSCULAR; INTRAVENOUS at 20:46

## 2023-01-01 RX ADMIN — BUSPIRONE HYDROCHLORIDE 15 MG: 10 TABLET ORAL at 04:08

## 2023-01-01 RX ADMIN — DEXAMETHASONE 4 MG: 4 TABLET ORAL at 06:00

## 2023-01-01 RX ADMIN — DEXMEDETOMIDINE 0.7 MCG/KG/HR: 100 INJECTION, SOLUTION INTRAVENOUS at 07:41

## 2023-01-01 RX ADMIN — DIPHENHYDRAMINE HYDROCHLORIDE 25 MG: 25 TABLET ORAL at 07:42

## 2023-01-01 RX ADMIN — MORPHINE SULFATE 15 MG: 15 TABLET, FILM COATED, EXTENDED RELEASE ORAL at 16:44

## 2023-01-01 RX ADMIN — DEXAMETHASONE SODIUM PHOSPHATE 4 MG: 4 INJECTION INTRA-ARTICULAR; INTRALESIONAL; INTRAMUSCULAR; INTRAVENOUS; SOFT TISSUE at 23:24

## 2023-01-01 RX ADMIN — IOHEXOL 100 ML: 350 INJECTION, SOLUTION INTRAVENOUS at 21:52

## 2023-01-01 RX ADMIN — ACETAMINOPHEN 650 MG: 325 TABLET, FILM COATED ORAL at 11:14

## 2023-01-01 RX ADMIN — POLYETHYLENE GLYCOL 3350 1 PACKET: 17 POWDER, FOR SOLUTION ORAL at 11:41

## 2023-01-01 RX ADMIN — ACETAMINOPHEN 650 MG: 325 TABLET, FILM COATED ORAL at 16:55

## 2023-01-01 RX ADMIN — ALBUTEROL SULFATE 2 PUFF: 90 AEROSOL, METERED RESPIRATORY (INHALATION) at 05:13

## 2023-01-01 RX ADMIN — IPRATROPIUM BROMIDE AND ALBUTEROL SULFATE 3 ML: 2.5; .5 SOLUTION RESPIRATORY (INHALATION) at 06:59

## 2023-01-01 RX ADMIN — TIOTROPIUM BROMIDE INHALATION SPRAY 5 MCG: 3.12 SPRAY, METERED RESPIRATORY (INHALATION) at 03:38

## 2023-01-01 RX ADMIN — ACETAMINOPHEN 650 MG: 325 TABLET, FILM COATED ORAL at 18:44

## 2023-01-01 RX ADMIN — DIPHENHYDRAMINE HYDROCHLORIDE 25 MG: 25 TABLET ORAL at 17:45

## 2023-01-01 RX ADMIN — BUSPIRONE HYDROCHLORIDE 15 MG: 10 TABLET ORAL at 17:38

## 2023-01-01 RX ADMIN — GUAIFENESIN 600 MG: 600 TABLET, EXTENDED RELEASE ORAL at 05:33

## 2023-01-01 RX ADMIN — HYDROCODONE BITARTRATE AND ACETAMINOPHEN 1 TABLET: 10; 325 TABLET ORAL at 00:29

## 2023-01-01 RX ADMIN — IPRATROPIUM BROMIDE AND ALBUTEROL SULFATE 3 ML: 2.5; .5 SOLUTION RESPIRATORY (INHALATION) at 11:47

## 2023-01-01 RX ADMIN — CLOTRIMAZOLE 10 MG: 10 LOZENGE ORAL; TOPICAL at 06:11

## 2023-01-01 RX ADMIN — DIPHENHYDRAMINE HYDROCHLORIDE 25 MG: 50 INJECTION, SOLUTION INTRAMUSCULAR; INTRAVENOUS at 12:05

## 2023-01-01 RX ADMIN — HYDROMORPHONE HYDROCHLORIDE 1 MG: 1 INJECTION, SOLUTION INTRAMUSCULAR; INTRAVENOUS; SUBCUTANEOUS at 12:41

## 2023-01-01 RX ADMIN — MAGNESIUM HYDROXIDE 30 ML: 1200 LIQUID ORAL at 09:39

## 2023-01-01 RX ADMIN — ALBUTEROL SULFATE 2 PUFF: 90 AEROSOL, METERED RESPIRATORY (INHALATION) at 10:39

## 2023-01-01 RX ADMIN — ACETAMINOPHEN 650 MG: 325 TABLET, FILM COATED ORAL at 23:50

## 2023-01-01 RX ADMIN — ALBUTEROL SULFATE 2 PUFF: 90 AEROSOL, METERED RESPIRATORY (INHALATION) at 21:53

## 2023-01-01 RX ADMIN — HYDROMORPHONE HYDROCHLORIDE 1 MG: 1 INJECTION, SOLUTION INTRAMUSCULAR; INTRAVENOUS; SUBCUTANEOUS at 01:10

## 2023-01-01 RX ADMIN — ACETAMINOPHEN 650 MG: 325 TABLET, FILM COATED ORAL at 12:35

## 2023-01-01 RX ADMIN — DEXAMETHASONE SODIUM PHOSPHATE 4 MG: 4 INJECTION INTRA-ARTICULAR; INTRALESIONAL; INTRAMUSCULAR; INTRAVENOUS; SOFT TISSUE at 05:28

## 2023-01-01 RX ADMIN — HYDROMORPHONE HYDROCHLORIDE 1 MG: 1 INJECTION, SOLUTION INTRAMUSCULAR; INTRAVENOUS; SUBCUTANEOUS at 17:52

## 2023-01-01 RX ADMIN — DOCUSATE SODIUM 50 MG AND SENNOSIDES 8.6 MG 2 TABLET: 8.6; 5 TABLET, FILM COATED ORAL at 17:23

## 2023-01-01 RX ADMIN — CLOTRIMAZOLE 10 MG: 10 LOZENGE ORAL; TOPICAL at 11:00

## 2023-01-01 RX ADMIN — HYDROMORPHONE HYDROCHLORIDE 1 MG: 1 INJECTION, SOLUTION INTRAMUSCULAR; INTRAVENOUS; SUBCUTANEOUS at 03:11

## 2023-01-01 RX ADMIN — FENTANYL CITRATE 50 MCG: 50 INJECTION, SOLUTION INTRAMUSCULAR; INTRAVENOUS at 16:24

## 2023-01-01 RX ADMIN — DEXAMETHASONE SODIUM PHOSPHATE 4 MG: 4 INJECTION INTRA-ARTICULAR; INTRALESIONAL; INTRAMUSCULAR; INTRAVENOUS; SOFT TISSUE at 23:06

## 2023-01-01 RX ADMIN — HYDROCODONE BITARTRATE AND ACETAMINOPHEN 1 TABLET: 10; 325 TABLET ORAL at 14:32

## 2023-01-01 RX ADMIN — DEXAMETHASONE SODIUM PHOSPHATE 4 MG: 4 INJECTION INTRA-ARTICULAR; INTRALESIONAL; INTRAMUSCULAR; INTRAVENOUS; SOFT TISSUE at 14:24

## 2023-01-01 RX ADMIN — MAGNESIUM SULFATE HEPTAHYDRATE 1 G: 10 INJECTION, SOLUTION INTRAVENOUS at 12:07

## 2023-01-01 RX ADMIN — DOCUSATE SODIUM 50 MG AND SENNOSIDES 8.6 MG 2 TABLET: 8.6; 5 TABLET, FILM COATED ORAL at 17:46

## 2023-01-01 RX ADMIN — APIXABAN 5 MG: 5 TABLET, FILM COATED ORAL at 05:20

## 2023-01-01 RX ADMIN — FENTANYL 1 PATCH: 50 PATCH TRANSDERMAL at 16:11

## 2023-01-01 RX ADMIN — FENTANYL CITRATE 50 MCG: 50 INJECTION, SOLUTION INTRAMUSCULAR; INTRAVENOUS at 16:36

## 2023-01-01 RX ADMIN — HYDROMORPHONE HYDROCHLORIDE 1 MG: 1 INJECTION, SOLUTION INTRAMUSCULAR; INTRAVENOUS; SUBCUTANEOUS at 21:45

## 2023-01-01 RX ADMIN — LORAZEPAM 0.5 MG: 0.5 TABLET ORAL at 20:37

## 2023-01-01 RX ADMIN — AZITHROMYCIN FOR INJECTION INJECTION, POWDER, LYOPHILIZED, FOR SOLUTION 500 MG: 500 INJECTION INTRAVENOUS at 14:37

## 2023-01-01 RX ADMIN — DEXAMETHASONE SODIUM PHOSPHATE 4 MG: 4 INJECTION INTRA-ARTICULAR; INTRALESIONAL; INTRAMUSCULAR; INTRAVENOUS; SOFT TISSUE at 18:01

## 2023-01-01 RX ADMIN — ACETAMINOPHEN 650 MG: 325 TABLET, FILM COATED ORAL at 23:15

## 2023-01-01 RX ADMIN — HYDROMORPHONE HYDROCHLORIDE 1 MG: 1 INJECTION, SOLUTION INTRAMUSCULAR; INTRAVENOUS; SUBCUTANEOUS at 07:21

## 2023-01-01 RX ADMIN — ACETAMINOPHEN 650 MG: 325 TABLET, FILM COATED ORAL at 17:47

## 2023-01-01 RX ADMIN — BUSPIRONE HYDROCHLORIDE 15 MG: 10 TABLET ORAL at 17:17

## 2023-01-01 RX ADMIN — TIOTROPIUM BROMIDE INHALATION SPRAY 5 MCG: 3.12 SPRAY, METERED RESPIRATORY (INHALATION) at 06:00

## 2023-01-01 RX ADMIN — CAPMATINIB 400 MG: 200 TABLET, FILM COATED ORAL at 04:45

## 2023-01-01 RX ADMIN — DEXMEDETOMIDINE 1.5 MCG/KG/HR: 100 INJECTION, SOLUTION INTRAVENOUS at 03:08

## 2023-01-01 RX ADMIN — LORAZEPAM 0.5 MG: 2 INJECTION INTRAMUSCULAR; INTRAVENOUS at 01:01

## 2023-01-01 RX ADMIN — HYDROMORPHONE HYDROCHLORIDE 2 MG: 2 INJECTION INTRAMUSCULAR; INTRAVENOUS; SUBCUTANEOUS at 15:40

## 2023-01-01 RX ADMIN — CEFEPIME 2 G: 2 INJECTION, POWDER, FOR SOLUTION INTRAVENOUS at 23:58

## 2023-01-01 RX ADMIN — APIXABAN 5 MG: 5 TABLET, FILM COATED ORAL at 08:58

## 2023-01-01 RX ADMIN — MOMETASONE FUROATE AND FORMOTEROL FUMARATE DIHYDRATE 2 PUFF: 100; 5 AEROSOL RESPIRATORY (INHALATION) at 20:52

## 2023-01-01 RX ADMIN — ONDANSETRON 4 MG: 2 INJECTION INTRAMUSCULAR; INTRAVENOUS at 11:17

## 2023-01-01 RX ADMIN — DEXAMETHASONE SODIUM PHOSPHATE 4 MG: 4 INJECTION INTRA-ARTICULAR; INTRALESIONAL; INTRAMUSCULAR; INTRAVENOUS; SOFT TISSUE at 06:02

## 2023-01-01 RX ADMIN — HYDROMORPHONE HYDROCHLORIDE 2 MG: 2 INJECTION INTRAMUSCULAR; INTRAVENOUS; SUBCUTANEOUS at 01:52

## 2023-01-01 RX ADMIN — POLYETHYLENE GLYCOL 3350 1 PACKET: 17 POWDER, FOR SOLUTION ORAL at 12:07

## 2023-01-01 RX ADMIN — HYDROMORPHONE HYDROCHLORIDE 4 MG: 2 TABLET ORAL at 18:02

## 2023-01-01 RX ADMIN — IPRATROPIUM BROMIDE AND ALBUTEROL SULFATE 3 ML: 2.5; .5 SOLUTION RESPIRATORY (INHALATION) at 23:30

## 2023-01-01 RX ADMIN — HYDROMORPHONE HYDROCHLORIDE 1 MG: 1 INJECTION, SOLUTION INTRAMUSCULAR; INTRAVENOUS; SUBCUTANEOUS at 09:16

## 2023-01-01 RX ADMIN — HYDROMORPHONE HYDROCHLORIDE 1 MG: 1 INJECTION, SOLUTION INTRAMUSCULAR; INTRAVENOUS; SUBCUTANEOUS at 17:41

## 2023-01-01 RX ADMIN — LORATADINE 10 MG: 10 TABLET ORAL at 05:35

## 2023-01-01 RX ADMIN — CLOTRIMAZOLE 10 MG: 10 LOZENGE ORAL; TOPICAL at 17:21

## 2023-01-01 RX ADMIN — CEFEPIME 2 G: 2 INJECTION, POWDER, FOR SOLUTION INTRAVENOUS at 07:52

## 2023-01-01 RX ADMIN — CEFTRIAXONE SODIUM 2000 MG: 10 INJECTION, POWDER, FOR SOLUTION INTRAVENOUS at 17:30

## 2023-01-01 RX ADMIN — ALPRAZOLAM 0.25 MG: 0.25 TABLET ORAL at 10:12

## 2023-01-01 RX ADMIN — PROPOFOL 200 MG: 10 INJECTION, EMULSION INTRAVENOUS at 14:35

## 2023-01-01 RX ADMIN — HYDROMORPHONE HYDROCHLORIDE 1 MG: 1 INJECTION, SOLUTION INTRAMUSCULAR; INTRAVENOUS; SUBCUTANEOUS at 16:19

## 2023-01-01 RX ADMIN — HYDROMORPHONE HYDROCHLORIDE 1 MG: 1 INJECTION, SOLUTION INTRAMUSCULAR; INTRAVENOUS; SUBCUTANEOUS at 06:24

## 2023-01-01 RX ADMIN — BUSPIRONE HYDROCHLORIDE 15 MG: 10 TABLET ORAL at 05:08

## 2023-01-01 RX ADMIN — MORPHINE SULFATE 15 MG: 15 TABLET, FILM COATED, EXTENDED RELEASE ORAL at 15:11

## 2023-01-01 RX ADMIN — HYDROMORPHONE HYDROCHLORIDE 1 MG: 1 INJECTION, SOLUTION INTRAMUSCULAR; INTRAVENOUS; SUBCUTANEOUS at 01:05

## 2023-01-01 RX ADMIN — MAGNESIUM HYDROXIDE 30 ML: 1200 LIQUID ORAL at 05:20

## 2023-01-01 RX ADMIN — IPRATROPIUM BROMIDE AND ALBUTEROL SULFATE 3 ML: 2.5; .5 SOLUTION RESPIRATORY (INHALATION) at 19:38

## 2023-01-01 RX ADMIN — CAPMATINIB 400 MG: 200 TABLET, FILM COATED ORAL at 18:23

## 2023-01-01 RX ADMIN — HYDROMORPHONE HYDROCHLORIDE 1 MG: 1 INJECTION, SOLUTION INTRAMUSCULAR; INTRAVENOUS; SUBCUTANEOUS at 15:24

## 2023-01-01 RX ADMIN — GUAIFENESIN 600 MG: 600 TABLET, EXTENDED RELEASE ORAL at 17:34

## 2023-01-01 RX ADMIN — DEXAMETHASONE SODIUM PHOSPHATE 4 MG: 4 INJECTION INTRA-ARTICULAR; INTRALESIONAL; INTRAMUSCULAR; INTRAVENOUS; SOFT TISSUE at 05:54

## 2023-01-01 RX ADMIN — IPRATROPIUM BROMIDE AND ALBUTEROL SULFATE 3 ML: 2.5; .5 SOLUTION RESPIRATORY (INHALATION) at 14:39

## 2023-01-01 RX ADMIN — LORAZEPAM 0.5 MG: 2 INJECTION INTRAMUSCULAR; INTRAVENOUS at 12:35

## 2023-01-01 RX ADMIN — IPRATROPIUM BROMIDE AND ALBUTEROL SULFATE 3 ML: 2.5; .5 SOLUTION RESPIRATORY (INHALATION) at 02:45

## 2023-01-01 RX ADMIN — OXYCODONE HYDROCHLORIDE 10 MG: 10 TABLET ORAL at 13:50

## 2023-01-01 RX ADMIN — BUSPIRONE HYDROCHLORIDE 15 MG: 10 TABLET ORAL at 12:12

## 2023-01-01 RX ADMIN — IPRATROPIUM BROMIDE AND ALBUTEROL SULFATE 3 ML: 2.5; .5 SOLUTION RESPIRATORY (INHALATION) at 00:56

## 2023-01-01 RX ADMIN — DEXMEDETOMIDINE 0.9 MCG/KG/HR: 100 INJECTION, SOLUTION INTRAVENOUS at 20:29

## 2023-01-01 RX ADMIN — CLOTRIMAZOLE 10 MG: 10 LOZENGE ORAL; TOPICAL at 17:37

## 2023-01-01 RX ADMIN — DIPHENHYDRAMINE HYDROCHLORIDE 25 MG: 50 INJECTION, SOLUTION INTRAMUSCULAR; INTRAVENOUS at 09:29

## 2023-01-01 RX ADMIN — GABAPENTIN 100 MG: 100 CAPSULE ORAL at 14:23

## 2023-01-01 RX ADMIN — SODIUM CHLORIDE: 9 INJECTION, SOLUTION INTRAVENOUS at 12:43

## 2023-01-01 RX ADMIN — LORATADINE 10 MG: 10 TABLET ORAL at 06:39

## 2023-01-01 RX ADMIN — DIPHENHYDRAMINE HYDROCHLORIDE 25 MG: 50 INJECTION, SOLUTION INTRAMUSCULAR; INTRAVENOUS at 19:28

## 2023-01-01 RX ADMIN — IPRATROPIUM BROMIDE AND ALBUTEROL SULFATE 3 ML: 2.5; .5 SOLUTION RESPIRATORY (INHALATION) at 11:06

## 2023-01-01 RX ADMIN — IPRATROPIUM BROMIDE AND ALBUTEROL SULFATE 3 ML: 2.5; .5 SOLUTION RESPIRATORY (INHALATION) at 07:20

## 2023-01-01 RX ADMIN — IPRATROPIUM BROMIDE AND ALBUTEROL SULFATE 3 ML: 2.5; .5 SOLUTION RESPIRATORY (INHALATION) at 14:37

## 2023-01-01 RX ADMIN — GUAIFENESIN 600 MG: 600 TABLET, EXTENDED RELEASE ORAL at 23:19

## 2023-01-01 RX ADMIN — ACETAMINOPHEN 650 MG: 325 TABLET, FILM COATED ORAL at 18:38

## 2023-01-01 RX ADMIN — OXYCODONE HYDROCHLORIDE 15 MG: 10 TABLET ORAL at 20:52

## 2023-01-01 RX ADMIN — ACETAMINOPHEN 650 MG: 325 TABLET, FILM COATED ORAL at 17:50

## 2023-01-01 RX ADMIN — IPRATROPIUM BROMIDE AND ALBUTEROL SULFATE 3 ML: .5; 3 SOLUTION RESPIRATORY (INHALATION) at 06:45

## 2023-01-01 RX ADMIN — DIPHENHYDRAMINE HYDROCHLORIDE 25 MG: 50 INJECTION, SOLUTION INTRAMUSCULAR; INTRAVENOUS at 12:46

## 2023-01-01 RX ADMIN — BUSPIRONE HYDROCHLORIDE 15 MG: 10 TABLET ORAL at 12:08

## 2023-01-01 RX ADMIN — HYDROMORPHONE HYDROCHLORIDE 1 MG: 1 INJECTION, SOLUTION INTRAMUSCULAR; INTRAVENOUS; SUBCUTANEOUS at 21:19

## 2023-01-01 RX ADMIN — ALBUTEROL SULFATE 2 PUFF: 90 AEROSOL, METERED RESPIRATORY (INHALATION) at 05:07

## 2023-01-01 RX ADMIN — LORAZEPAM 1 MG: 2 INJECTION INTRAMUSCULAR; INTRAVENOUS at 08:48

## 2023-01-01 RX ADMIN — BUSPIRONE HYDROCHLORIDE 10 MG: 10 TABLET ORAL at 05:37

## 2023-01-01 RX ADMIN — GUAIFENESIN SYRUP AND DEXTROMETHORPHAN 10 ML: 100; 10 SYRUP ORAL at 16:52

## 2023-01-01 RX ADMIN — IPRATROPIUM BROMIDE AND ALBUTEROL SULFATE 3 ML: 2.5; .5 SOLUTION RESPIRATORY (INHALATION) at 06:32

## 2023-01-01 RX ADMIN — IPRATROPIUM BROMIDE AND ALBUTEROL SULFATE 3 ML: 2.5; .5 SOLUTION RESPIRATORY (INHALATION) at 16:14

## 2023-01-01 RX ADMIN — LORATADINE 10 MG: 10 TABLET ORAL at 04:48

## 2023-01-01 RX ADMIN — HYDROMORPHONE HYDROCHLORIDE 1 MG: 1 INJECTION, SOLUTION INTRAMUSCULAR; INTRAVENOUS; SUBCUTANEOUS at 22:53

## 2023-01-01 RX ADMIN — Medication 1 APPLICATOR: at 06:30

## 2023-01-01 RX ADMIN — DIPHENHYDRAMINE HYDROCHLORIDE 25 MG: 25 TABLET ORAL at 14:59

## 2023-01-01 RX ADMIN — DEXAMETHASONE SODIUM PHOSPHATE 4 MG: 4 INJECTION INTRA-ARTICULAR; INTRALESIONAL; INTRAMUSCULAR; INTRAVENOUS; SOFT TISSUE at 17:24

## 2023-01-01 RX ADMIN — ACETAMINOPHEN 650 MG: 325 TABLET, FILM COATED ORAL at 12:33

## 2023-01-01 RX ADMIN — IPRATROPIUM BROMIDE AND ALBUTEROL SULFATE 3 ML: 2.5; .5 SOLUTION RESPIRATORY (INHALATION) at 16:33

## 2023-01-01 RX ADMIN — OXYCODONE HYDROCHLORIDE 15 MG: 10 TABLET ORAL at 17:27

## 2023-01-01 RX ADMIN — HYDROMORPHONE HYDROCHLORIDE 4 MG: 2 TABLET ORAL at 02:00

## 2023-01-01 RX ADMIN — APIXABAN 5 MG: 5 TABLET, FILM COATED ORAL at 06:11

## 2023-01-01 RX ADMIN — HYDROMORPHONE HYDROCHLORIDE 1 MG: 1 INJECTION, SOLUTION INTRAMUSCULAR; INTRAVENOUS; SUBCUTANEOUS at 09:51

## 2023-01-01 RX ADMIN — DIPHENHYDRAMINE HYDROCHLORIDE 25 MG: 50 INJECTION, SOLUTION INTRAMUSCULAR; INTRAVENOUS at 17:39

## 2023-01-01 RX ADMIN — DIPHENHYDRAMINE HYDROCHLORIDE 25 MG: 50 INJECTION, SOLUTION INTRAMUSCULAR; INTRAVENOUS at 21:29

## 2023-01-01 RX ADMIN — DEXAMETHASONE SODIUM PHOSPHATE 4 MG: 4 INJECTION INTRA-ARTICULAR; INTRALESIONAL; INTRAMUSCULAR; INTRAVENOUS; SOFT TISSUE at 01:18

## 2023-01-01 RX ADMIN — LORAZEPAM 0.5 MG: 2 INJECTION INTRAMUSCULAR; INTRAVENOUS at 05:19

## 2023-01-01 RX ADMIN — GUAIFENESIN 600 MG: 600 TABLET, EXTENDED RELEASE ORAL at 05:59

## 2023-01-01 RX ADMIN — CAPMATINIB 400 MG: 200 TABLET, FILM COATED ORAL at 05:33

## 2023-01-01 RX ADMIN — DIPHENHYDRAMINE HYDROCHLORIDE 25 MG: 50 INJECTION, SOLUTION INTRAMUSCULAR; INTRAVENOUS at 04:09

## 2023-01-01 RX ADMIN — BUSPIRONE HYDROCHLORIDE 15 MG: 10 TABLET ORAL at 04:48

## 2023-01-01 RX ADMIN — DEXAMETHASONE SODIUM PHOSPHATE 4 MG: 4 INJECTION INTRA-ARTICULAR; INTRALESIONAL; INTRAMUSCULAR; INTRAVENOUS; SOFT TISSUE at 15:18

## 2023-01-01 RX ADMIN — MOMETASONE FUROATE AND FORMOTEROL FUMARATE DIHYDRATE 2 PUFF: 200; 5 AEROSOL RESPIRATORY (INHALATION) at 19:48

## 2023-01-01 RX ADMIN — IPRATROPIUM BROMIDE AND ALBUTEROL SULFATE 3 ML: 2.5; .5 SOLUTION RESPIRATORY (INHALATION) at 07:48

## 2023-01-01 RX ADMIN — DOCUSATE SODIUM 50 MG AND SENNOSIDES 8.6 MG 2 TABLET: 8.6; 5 TABLET, FILM COATED ORAL at 04:58

## 2023-01-01 RX ADMIN — SODIUM CHLORIDE: 9 INJECTION, SOLUTION INTRAVENOUS at 15:20

## 2023-01-01 RX ADMIN — ONDANSETRON 4 MG: 2 INJECTION INTRAMUSCULAR; INTRAVENOUS at 22:35

## 2023-01-01 RX ADMIN — LORAZEPAM 1 MG: 1 TABLET ORAL at 13:04

## 2023-01-01 RX ADMIN — SENNOSIDES AND DOCUSATE SODIUM 2 TABLET: 50; 8.6 TABLET ORAL at 17:59

## 2023-01-01 RX ADMIN — FUROSEMIDE 40 MG: 10 INJECTION INTRAMUSCULAR; INTRAVENOUS at 15:13

## 2023-01-01 RX ADMIN — DEXAMETHASONE SODIUM PHOSPHATE 4 MG: 4 INJECTION INTRA-ARTICULAR; INTRALESIONAL; INTRAMUSCULAR; INTRAVENOUS; SOFT TISSUE at 17:23

## 2023-01-01 RX ADMIN — HYDROMORPHONE HYDROCHLORIDE 1 MG: 1 INJECTION, SOLUTION INTRAMUSCULAR; INTRAVENOUS; SUBCUTANEOUS at 10:23

## 2023-01-01 RX ADMIN — TIOTROPIUM BROMIDE INHALATION SPRAY 5 MCG: 3.12 SPRAY, METERED RESPIRATORY (INHALATION) at 05:43

## 2023-01-01 RX ADMIN — CAPMATINIB 400 MG: 200 TABLET, FILM COATED ORAL at 05:02

## 2023-01-01 RX ADMIN — DEXMEDETOMIDINE 0.6 MCG/KG/HR: 100 INJECTION, SOLUTION INTRAVENOUS at 00:40

## 2023-01-01 RX ADMIN — HYDROMORPHONE HYDROCHLORIDE 1 MG: 1 INJECTION, SOLUTION INTRAMUSCULAR; INTRAVENOUS; SUBCUTANEOUS at 11:14

## 2023-01-01 RX ADMIN — APIXABAN 10 MG: 5 TABLET, FILM COATED ORAL at 05:28

## 2023-01-01 RX ADMIN — LORAZEPAM 0.5 MG: 0.5 TABLET ORAL at 13:36

## 2023-01-01 RX ADMIN — LORAZEPAM 1 MG: 2 INJECTION INTRAMUSCULAR; INTRAVENOUS at 02:53

## 2023-01-01 RX ADMIN — Medication 1 APPLICATOR: at 18:00

## 2023-01-01 RX ADMIN — SENNOSIDES AND DOCUSATE SODIUM 2 TABLET: 50; 8.6 TABLET ORAL at 05:58

## 2023-01-01 RX ADMIN — CLOTRIMAZOLE 10 MG: 10 LOZENGE ORAL; TOPICAL at 09:25

## 2023-01-01 RX ADMIN — IPRATROPIUM BROMIDE AND ALBUTEROL SULFATE 3 ML: 2.5; .5 SOLUTION RESPIRATORY (INHALATION) at 07:01

## 2023-01-01 RX ADMIN — LORAZEPAM 0.5 MG: 1 TABLET ORAL at 13:51

## 2023-01-01 RX ADMIN — BUSPIRONE HYDROCHLORIDE 15 MG: 10 TABLET ORAL at 12:35

## 2023-01-01 RX ADMIN — TIOTROPIUM BROMIDE INHALATION SPRAY 5 MCG: 3.12 SPRAY, METERED RESPIRATORY (INHALATION) at 05:19

## 2023-01-01 RX ADMIN — HYDROMORPHONE HYDROCHLORIDE 1 MG: 1 INJECTION, SOLUTION INTRAMUSCULAR; INTRAVENOUS; SUBCUTANEOUS at 11:19

## 2023-01-01 RX ADMIN — CAPMATINIB 400 MG: 200 TABLET, FILM COATED ORAL at 18:40

## 2023-01-01 RX ADMIN — LORAZEPAM 0.5 MG: 2 INJECTION INTRAMUSCULAR; INTRAVENOUS at 14:43

## 2023-01-01 RX ADMIN — DEXAMETHASONE 4 MG: 4 TABLET ORAL at 04:30

## 2023-01-01 RX ADMIN — BUSPIRONE HYDROCHLORIDE 15 MG: 10 TABLET ORAL at 16:35

## 2023-01-01 RX ADMIN — DEXAMETHASONE 4 MG: 4 TABLET ORAL at 05:17

## 2023-01-01 RX ADMIN — APIXABAN 5 MG: 5 TABLET, FILM COATED ORAL at 16:55

## 2023-01-01 RX ADMIN — LORAZEPAM 0.5 MG: 2 INJECTION INTRAMUSCULAR; INTRAVENOUS at 22:57

## 2023-01-01 RX ADMIN — LORAZEPAM 0.5 MG: 2 INJECTION INTRAMUSCULAR; INTRAVENOUS at 18:09

## 2023-01-01 RX ADMIN — DOCUSATE SODIUM 50 MG AND SENNOSIDES 8.6 MG 2 TABLET: 8.6; 5 TABLET, FILM COATED ORAL at 05:28

## 2023-01-01 RX ADMIN — APIXABAN 5 MG: 5 TABLET, FILM COATED ORAL at 05:08

## 2023-01-01 RX ADMIN — LORATADINE 10 MG: 10 TABLET ORAL at 03:35

## 2023-01-01 RX ADMIN — HYDROMORPHONE HYDROCHLORIDE 1 MG: 1 INJECTION, SOLUTION INTRAMUSCULAR; INTRAVENOUS; SUBCUTANEOUS at 15:45

## 2023-01-01 RX ADMIN — IPRATROPIUM BROMIDE AND ALBUTEROL SULFATE 3 ML: 2.5; .5 SOLUTION RESPIRATORY (INHALATION) at 12:02

## 2023-01-01 RX ADMIN — GUAIFENESIN 600 MG: 600 TABLET, EXTENDED RELEASE ORAL at 05:07

## 2023-01-01 RX ADMIN — Medication 20 ML: at 12:07

## 2023-01-01 RX ADMIN — TIOTROPIUM BROMIDE INHALATION SPRAY 5 MCG: 3.12 SPRAY, METERED RESPIRATORY (INHALATION) at 05:13

## 2023-01-01 RX ADMIN — BUSPIRONE HYDROCHLORIDE 15 MG: 10 TABLET ORAL at 17:51

## 2023-01-01 RX ADMIN — ACETAMINOPHEN 650 MG: 325 TABLET, FILM COATED ORAL at 05:20

## 2023-01-01 RX ADMIN — BUSPIRONE HYDROCHLORIDE 15 MG: 10 TABLET ORAL at 15:16

## 2023-01-01 RX ADMIN — HYDROMORPHONE HYDROCHLORIDE 0.5 MG: 1 INJECTION, SOLUTION INTRAMUSCULAR; INTRAVENOUS; SUBCUTANEOUS at 09:01

## 2023-01-01 RX ADMIN — GUAIFENESIN 600 MG: 600 TABLET, EXTENDED RELEASE ORAL at 17:33

## 2023-01-01 RX ADMIN — IPRATROPIUM BROMIDE AND ALBUTEROL SULFATE 3 ML: .5; 3 SOLUTION RESPIRATORY (INHALATION) at 09:47

## 2023-01-01 RX ADMIN — LORAZEPAM 0.5 MG: 1 TABLET ORAL at 05:26

## 2023-01-01 RX ADMIN — FUROSEMIDE 40 MG: 10 INJECTION INTRAMUSCULAR; INTRAVENOUS at 06:13

## 2023-01-01 RX ADMIN — IPRATROPIUM BROMIDE AND ALBUTEROL SULFATE 3 ML: 2.5; .5 SOLUTION RESPIRATORY (INHALATION) at 00:26

## 2023-01-01 RX ADMIN — GADOTERIDOL 20 ML: 279.3 INJECTION, SOLUTION INTRAVENOUS at 12:39

## 2023-01-01 RX ADMIN — DEXAMETHASONE SODIUM PHOSPHATE 4 MG: 4 INJECTION INTRA-ARTICULAR; INTRALESIONAL; INTRAMUSCULAR; INTRAVENOUS; SOFT TISSUE at 00:02

## 2023-01-01 RX ADMIN — HYDROMORPHONE HYDROCHLORIDE 1 MG: 1 INJECTION, SOLUTION INTRAMUSCULAR; INTRAVENOUS; SUBCUTANEOUS at 16:16

## 2023-01-01 RX ADMIN — MORPHINE SULFATE 15 MG: 15 TABLET, FILM COATED, EXTENDED RELEASE ORAL at 17:51

## 2023-01-01 RX ADMIN — HYDROCODONE BITARTRATE AND ACETAMINOPHEN 1 TABLET: 10; 325 TABLET ORAL at 06:20

## 2023-01-01 RX ADMIN — LORATADINE 10 MG: 10 TABLET ORAL at 07:42

## 2023-01-01 RX ADMIN — DEXAMETHASONE SODIUM PHOSPHATE 4 MG: 4 INJECTION INTRA-ARTICULAR; INTRALESIONAL; INTRAMUSCULAR; INTRAVENOUS; SOFT TISSUE at 13:12

## 2023-01-01 RX ADMIN — FENTANYL CITRATE 100 MCG: 50 INJECTION, SOLUTION INTRAMUSCULAR; INTRAVENOUS at 19:22

## 2023-01-01 RX ADMIN — HYDROMORPHONE HYDROCHLORIDE 1 MG: 1 INJECTION, SOLUTION INTRAMUSCULAR; INTRAVENOUS; SUBCUTANEOUS at 11:12

## 2023-01-01 RX ADMIN — HYDROMORPHONE HYDROCHLORIDE 1 MG: 1 INJECTION, SOLUTION INTRAMUSCULAR; INTRAVENOUS; SUBCUTANEOUS at 04:30

## 2023-01-01 RX ADMIN — SODIUM CHLORIDE 1000 ML: 9 INJECTION, SOLUTION INTRAVENOUS at 13:15

## 2023-01-01 RX ADMIN — IPRATROPIUM BROMIDE AND ALBUTEROL SULFATE 3 ML: 2.5; .5 SOLUTION RESPIRATORY (INHALATION) at 23:16

## 2023-01-01 RX ADMIN — IPRATROPIUM BROMIDE AND ALBUTEROL SULFATE 3 ML: 2.5; .5 SOLUTION RESPIRATORY (INHALATION) at 14:16

## 2023-01-01 RX ADMIN — CAPMATINIB 400 MG: 200 TABLET, FILM COATED ORAL at 16:20

## 2023-01-01 RX ADMIN — MOMETASONE FUROATE AND FORMOTEROL FUMARATE DIHYDRATE 2 PUFF: 200; 5 AEROSOL RESPIRATORY (INHALATION) at 07:03

## 2023-01-01 RX ADMIN — ACETAMINOPHEN 650 MG: 325 TABLET, FILM COATED ORAL at 11:42

## 2023-01-01 RX ADMIN — TRANEXAMIC ACID 500 MG: 100 INJECTION, SOLUTION INTRAVENOUS at 20:54

## 2023-01-01 RX ADMIN — DEXAMETHASONE SODIUM PHOSPHATE 4 MG: 4 INJECTION INTRA-ARTICULAR; INTRALESIONAL; INTRAMUSCULAR; INTRAVENOUS; SOFT TISSUE at 11:17

## 2023-01-01 RX ADMIN — ACETAMINOPHEN 650 MG: 325 TABLET, FILM COATED ORAL at 16:54

## 2023-01-01 RX ADMIN — HYDROMORPHONE HYDROCHLORIDE 1 MG: 1 INJECTION, SOLUTION INTRAMUSCULAR; INTRAVENOUS; SUBCUTANEOUS at 01:04

## 2023-01-01 RX ADMIN — HYDROMORPHONE HYDROCHLORIDE 1 MG: 1 INJECTION, SOLUTION INTRAMUSCULAR; INTRAVENOUS; SUBCUTANEOUS at 03:23

## 2023-01-01 RX ADMIN — HYDROMORPHONE HYDROCHLORIDE 1 MG: 1 INJECTION, SOLUTION INTRAMUSCULAR; INTRAVENOUS; SUBCUTANEOUS at 04:00

## 2023-01-01 RX ADMIN — DIPHENHYDRAMINE HYDROCHLORIDE 25 MG: 50 INJECTION, SOLUTION INTRAMUSCULAR; INTRAVENOUS at 20:52

## 2023-01-01 RX ADMIN — ACETAMINOPHEN 650 MG: 325 TABLET, FILM COATED ORAL at 23:44

## 2023-01-01 RX ADMIN — DOCUSATE SODIUM 50 MG AND SENNOSIDES 8.6 MG 2 TABLET: 8.6; 5 TABLET, FILM COATED ORAL at 04:07

## 2023-01-01 RX ADMIN — HYDROCODONE BITARTRATE AND ACETAMINOPHEN 1 TABLET: 10; 325 TABLET ORAL at 23:54

## 2023-01-01 RX ADMIN — DIPHENHYDRAMINE HYDROCHLORIDE 25 MG: 50 INJECTION, SOLUTION INTRAMUSCULAR; INTRAVENOUS at 05:11

## 2023-01-01 RX ADMIN — HYDROMORPHONE HYDROCHLORIDE 1 MG: 1 INJECTION, SOLUTION INTRAMUSCULAR; INTRAVENOUS; SUBCUTANEOUS at 17:18

## 2023-01-01 RX ADMIN — HYDROMORPHONE HYDROCHLORIDE 1 MG: 1 INJECTION, SOLUTION INTRAMUSCULAR; INTRAVENOUS; SUBCUTANEOUS at 23:16

## 2023-01-01 RX ADMIN — HYDROMORPHONE HYDROCHLORIDE 1 MG: 1 INJECTION, SOLUTION INTRAMUSCULAR; INTRAVENOUS; SUBCUTANEOUS at 03:32

## 2023-01-01 RX ADMIN — LIDOCAINE PATCH 5% 3 PATCH: 700 PATCH TOPICAL at 12:44

## 2023-01-01 RX ADMIN — HYDROCODONE BITARTRATE AND ACETAMINOPHEN 1 TABLET: 10; 325 TABLET ORAL at 10:02

## 2023-01-01 RX ADMIN — LORATADINE 10 MG: 10 TABLET ORAL at 05:06

## 2023-01-01 RX ADMIN — OXYCODONE HYDROCHLORIDE 15 MG: 10 TABLET ORAL at 23:56

## 2023-01-01 RX ADMIN — APIXABAN 5 MG: 5 TABLET, FILM COATED ORAL at 06:12

## 2023-01-01 RX ADMIN — CAPMATINIB 400 MG: 200 TABLET, FILM COATED ORAL at 03:42

## 2023-01-01 RX ADMIN — MORPHINE SULFATE 15 MG: 15 TABLET, FILM COATED, EXTENDED RELEASE ORAL at 18:00

## 2023-01-01 RX ADMIN — LORAZEPAM 1 MG: 2 INJECTION INTRAMUSCULAR; INTRAVENOUS at 11:07

## 2023-01-01 RX ADMIN — DEXAMETHASONE SODIUM PHOSPHATE 4 MG: 4 INJECTION INTRA-ARTICULAR; INTRALESIONAL; INTRAMUSCULAR; INTRAVENOUS; SOFT TISSUE at 16:52

## 2023-01-01 RX ADMIN — Medication 1 APPLICATOR: at 05:58

## 2023-01-01 RX ADMIN — DEXMEDETOMIDINE 0.2 MCG/KG/HR: 100 INJECTION, SOLUTION INTRAVENOUS at 12:23

## 2023-01-01 RX ADMIN — AZITHROMYCIN MONOHYDRATE 500 MG: 500 INJECTION, POWDER, LYOPHILIZED, FOR SOLUTION INTRAVENOUS at 22:53

## 2023-01-01 RX ADMIN — HYDROCODONE BITARTRATE AND ACETAMINOPHEN 1 TABLET: 10; 325 TABLET ORAL at 07:42

## 2023-01-01 RX ADMIN — MOMETASONE FUROATE AND FORMOTEROL FUMARATE DIHYDRATE 2 PUFF: 100; 5 AEROSOL RESPIRATORY (INHALATION) at 06:41

## 2023-01-01 RX ADMIN — HYDROMORPHONE HYDROCHLORIDE 1 MG: 1 INJECTION, SOLUTION INTRAMUSCULAR; INTRAVENOUS; SUBCUTANEOUS at 18:02

## 2023-01-01 RX ADMIN — DIPHENHYDRAMINE HYDROCHLORIDE 25 MG: 50 INJECTION, SOLUTION INTRAMUSCULAR; INTRAVENOUS at 23:13

## 2023-01-01 RX ADMIN — BUSPIRONE HYDROCHLORIDE 15 MG: 10 TABLET ORAL at 03:34

## 2023-01-01 RX ADMIN — DOCUSATE SODIUM 50 MG AND SENNOSIDES 8.6 MG 2 TABLET: 8.6; 5 TABLET, FILM COATED ORAL at 05:12

## 2023-01-01 RX ADMIN — HYDROMORPHONE HYDROCHLORIDE 1 MG: 1 INJECTION, SOLUTION INTRAMUSCULAR; INTRAVENOUS; SUBCUTANEOUS at 22:32

## 2023-01-01 RX ADMIN — APIXABAN 10 MG: 5 TABLET, FILM COATED ORAL at 17:04

## 2023-01-01 RX ADMIN — DOCUSATE SODIUM 50 MG AND SENNOSIDES 8.6 MG 2 TABLET: 8.6; 5 TABLET, FILM COATED ORAL at 05:20

## 2023-01-01 RX ADMIN — HYDROMORPHONE HYDROCHLORIDE 1 MG: 1 INJECTION, SOLUTION INTRAMUSCULAR; INTRAVENOUS; SUBCUTANEOUS at 09:03

## 2023-01-01 RX ADMIN — HYDROMORPHONE HYDROCHLORIDE 1 MG: 1 INJECTION, SOLUTION INTRAMUSCULAR; INTRAVENOUS; SUBCUTANEOUS at 06:02

## 2023-01-01 RX ADMIN — APIXABAN 5 MG: 5 TABLET, FILM COATED ORAL at 17:19

## 2023-01-01 SDOH — ECONOMIC STABILITY: HOUSING INSECURITY
IN THE LAST 12 MONTHS, WAS THERE A TIME WHEN YOU DID NOT HAVE A STEADY PLACE TO SLEEP OR SLEPT IN A SHELTER (INCLUDING NOW)?: NO

## 2023-01-01 SDOH — ECONOMIC STABILITY: FOOD INSECURITY: WITHIN THE PAST 12 MONTHS, THE FOOD YOU BOUGHT JUST DIDN'T LAST AND YOU DIDN'T HAVE MONEY TO GET MORE.: PATIENT DECLINED

## 2023-01-01 SDOH — HEALTH STABILITY: PHYSICAL HEALTH: ON AVERAGE, HOW MANY DAYS PER WEEK DO YOU ENGAGE IN MODERATE TO STRENUOUS EXERCISE (LIKE A BRISK WALK)?: 0 DAYS

## 2023-01-01 SDOH — ECONOMIC STABILITY: TRANSPORTATION INSECURITY
IN THE PAST 12 MONTHS, HAS LACK OF RELIABLE TRANSPORTATION KEPT YOU FROM MEDICAL APPOINTMENTS, MEETINGS, WORK OR FROM GETTING THINGS NEEDED FOR DAILY LIVING?: YES

## 2023-01-01 SDOH — ECONOMIC STABILITY: HOUSING INSECURITY: IN THE LAST 12 MONTHS, HOW MANY PLACES HAVE YOU LIVED?: 1

## 2023-01-01 SDOH — ECONOMIC STABILITY: TRANSPORTATION INSECURITY
IN THE PAST 12 MONTHS, HAS LACK OF TRANSPORTATION KEPT YOU FROM MEETINGS, WORK, OR FROM GETTING THINGS NEEDED FOR DAILY LIVING?: YES

## 2023-01-01 SDOH — ECONOMIC STABILITY: INCOME INSECURITY: IN THE LAST 12 MONTHS, WAS THERE A TIME WHEN YOU WERE NOT ABLE TO PAY THE MORTGAGE OR RENT ON TIME?: NO

## 2023-01-01 SDOH — HEALTH STABILITY: MENTAL HEALTH
STRESS IS WHEN SOMEONE FEELS TENSE, NERVOUS, ANXIOUS, OR CAN'T SLEEP AT NIGHT BECAUSE THEIR MIND IS TROUBLED. HOW STRESSED ARE YOU?: TO SOME EXTENT

## 2023-01-01 SDOH — ECONOMIC STABILITY: INCOME INSECURITY: HOW HARD IS IT FOR YOU TO PAY FOR THE VERY BASICS LIKE FOOD, HOUSING, MEDICAL CARE, AND HEATING?: SOMEWHAT HARD

## 2023-01-01 SDOH — ECONOMIC STABILITY: TRANSPORTATION INSECURITY
IN THE PAST 12 MONTHS, HAS THE LACK OF TRANSPORTATION KEPT YOU FROM MEDICAL APPOINTMENTS OR FROM GETTING MEDICATIONS?: YES

## 2023-01-01 SDOH — HEALTH STABILITY: PHYSICAL HEALTH: ON AVERAGE, HOW MANY MINUTES DO YOU ENGAGE IN EXERCISE AT THIS LEVEL?: 0 MIN

## 2023-01-01 SDOH — ECONOMIC STABILITY: FOOD INSECURITY: WITHIN THE PAST 12 MONTHS, YOU WORRIED THAT YOUR FOOD WOULD RUN OUT BEFORE YOU GOT MONEY TO BUY MORE.: SOMETIMES TRUE

## 2023-01-01 ASSESSMENT — ENCOUNTER SYMPTOMS
NERVOUS/ANXIOUS: 1
HEMOPTYSIS: 0
SENSORY CHANGE: 0
PSYCHIATRIC NEGATIVE: 1
NECK PAIN: 0
SHORTNESS OF BREATH: 0
WHEEZING: 0
PSYCHIATRIC NEGATIVE: 1
SHORTNESS OF BREATH: 0
SORE THROAT: 0
ABDOMINAL PAIN: 0
ABDOMINAL PAIN: 0
COUGH: 0
DEPRESSION: 0
TREMORS: 0
BLOOD IN STOOL: 0
HEADACHES: 0
PALPITATIONS: 0
SORE THROAT: 0
WHEEZING: 0
BACK PAIN: 1
GASTROINTESTINAL NEGATIVE: 1
NEUROLOGICAL NEGATIVE: 1
SENSORY CHANGE: 0
FEVER: 0
DEPRESSION: 0
CARDIOVASCULAR NEGATIVE: 1
SENSORY CHANGE: 0
WEAKNESS: 0
FOCAL WEAKNESS: 0
FEVER: 0
COUGH: 0
BRUISES/BLEEDS EASILY: 0
DIARRHEA: 0
WHEEZING: 1
BRUISES/BLEEDS EASILY: 0
CONSTIPATION: 0
BACK PAIN: 1
VOMITING: 0
GASTROINTESTINAL NEGATIVE: 1
BLURRED VISION: 0
POLYDIPSIA: 0
HEADACHES: 0
SENSORY CHANGE: 0
SPEECH CHANGE: 0
WEAKNESS: 0
DIZZINESS: 0
BLURRED VISION: 0
BLURRED VISION: 0
MYALGIAS: 1
MYALGIAS: 0
BACK PAIN: 1
HEADACHES: 0
MEMORY LOSS: 0
EYES NEGATIVE: 1
DEPRESSION: 0
ORTHOPNEA: 0
CLAUDICATION: 0
SORE THROAT: 0
HEADACHES: 0
BACK PAIN: 1
WEAKNESS: 0
NEUROLOGICAL NEGATIVE: 1
GASTROINTESTINAL NEGATIVE: 1
FEVER: 0
ABDOMINAL PAIN: 0
ORTHOPNEA: 0
NERVOUS/ANXIOUS: 0
VOMITING: 0
ABDOMINAL PAIN: 0
NERVOUS/ANXIOUS: 0
DEPRESSION: 1
BLURRED VISION: 0
HEADACHES: 0
BLURRED VISION: 0
GASTROINTESTINAL NEGATIVE: 1
DIAPHORESIS: 0
DEPRESSION: 0
FOCAL WEAKNESS: 0
PSYCHIATRIC NEGATIVE: 1
NERVOUS/ANXIOUS: 0
FOCAL WEAKNESS: 0
ABDOMINAL PAIN: 0
DIZZINESS: 0
SHORTNESS OF BREATH: 1
SHORTNESS OF BREATH: 1
FOCAL WEAKNESS: 0
SPUTUM PRODUCTION: 1
WEIGHT LOSS: 0
EYE DISCHARGE: 0
PALPITATIONS: 0
NAUSEA: 0
NECK PAIN: 0
DEPRESSION: 0
NERVOUS/ANXIOUS: 1
FOCAL WEAKNESS: 0
TINGLING: 0
VOMITING: 0
DEPRESSION: 0
FEVER: 0
SORE THROAT: 0
WHEEZING: 0
NERVOUS/ANXIOUS: 1
DEPRESSION: 1
ROS GI COMMENTS: BELCHING
BACK PAIN: 1
MYALGIAS: 1
EYES NEGATIVE: 1
DIZZINESS: 0
DOUBLE VISION: 0
BACK PAIN: 1
NEUROLOGICAL NEGATIVE: 1
HEARTBURN: 0
GASTROINTESTINAL NEGATIVE: 1
NAUSEA: 0
COUGH: 0
SENSORY CHANGE: 0
FEVER: 0
WEAKNESS: 0
EYES NEGATIVE: 1
FOCAL WEAKNESS: 0
NAUSEA: 0
NAUSEA: 0
SPUTUM PRODUCTION: 0
NAUSEA: 0
SORE THROAT: 0
GASTROINTESTINAL NEGATIVE: 1
FEVER: 0
DEPRESSION: 0
BRUISES/BLEEDS EASILY: 0
MYALGIAS: 0
NERVOUS/ANXIOUS: 1
NEUROLOGICAL NEGATIVE: 1
BLURRED VISION: 0
WHEEZING: 0
FEVER: 1
PALPITATIONS: 0
TINGLING: 0
DIARRHEA: 0
NERVOUS/ANXIOUS: 0
BRUISES/BLEEDS EASILY: 0
TREMORS: 0
GASTROINTESTINAL NEGATIVE: 1
VOMITING: 0
VOMITING: 0
FEVER: 0
SPUTUM PRODUCTION: 0
DIZZINESS: 0
COUGH: 0
CHILLS: 0
NERVOUS/ANXIOUS: 1
PALPITATIONS: 0
WEIGHT LOSS: 0
NAUSEA: 0
SHORTNESS OF BREATH: 1
RESPIRATORY NEGATIVE: 1
BACK PAIN: 1
FALLS: 0
HEADACHES: 0
ABDOMINAL PAIN: 0
NERVOUS/ANXIOUS: 0
DIAPHORESIS: 0
MYALGIAS: 1
SHORTNESS OF BREATH: 0
PALPITATIONS: 1
NEUROLOGICAL NEGATIVE: 1
ORTHOPNEA: 0
CHILLS: 0
BLURRED VISION: 0
FEVER: 0
BACK PAIN: 1
CHILLS: 0
COUGH: 0
VOMITING: 0
HEADACHES: 1
RESPIRATORY NEGATIVE: 1
MEMORY LOSS: 0
BLURRED VISION: 0
COUGH: 0
FEVER: 0
HEADACHES: 0
CONSTIPATION: 0
ORTHOPNEA: 0
DIARRHEA: 0
BACK PAIN: 1
FEVER: 0
SENSORY CHANGE: 0
SPUTUM PRODUCTION: 0
NAUSEA: 0
GASTROINTESTINAL NEGATIVE: 1
HEARTBURN: 0
EYES NEGATIVE: 1
BACK PAIN: 1
HEARTBURN: 0
SORE THROAT: 0
CHILLS: 0
SORE THROAT: 0
LOSS OF CONSCIOUSNESS: 0
SHORTNESS OF BREATH: 1
WEIGHT LOSS: 0
HEADACHES: 0
PALPITATIONS: 0
NECK PAIN: 0
HEADACHES: 0
SPUTUM PRODUCTION: 0
DIZZINESS: 0
HEARTBURN: 0
WEIGHT LOSS: 0
DEPRESSION: 0
MEMORY LOSS: 0
MYALGIAS: 0
TINGLING: 0
NAUSEA: 0
PALPITATIONS: 0
WEAKNESS: 0
WEIGHT LOSS: 0
NAUSEA: 0
COUGH: 0
DIZZINESS: 0
MEMORY LOSS: 0
HEADACHES: 0
PALPITATIONS: 1
BLURRED VISION: 0
DEPRESSION: 0
NECK PAIN: 0
BACK PAIN: 1
CHILLS: 0
DIAPHORESIS: 0
PALPITATIONS: 0
VOMITING: 0
VOMITING: 0
GASTROINTESTINAL NEGATIVE: 1
NECK PAIN: 0
DEPRESSION: 0
ABDOMINAL PAIN: 0
CARDIOVASCULAR NEGATIVE: 1
ORTHOPNEA: 0
HEARTBURN: 0
WEIGHT LOSS: 0
CHILLS: 0
VOMITING: 0
NAUSEA: 0
WEAKNESS: 0
COUGH: 0
BLURRED VISION: 0
NEUROLOGICAL NEGATIVE: 1
WEIGHT LOSS: 0
WHEEZING: 0
SHORTNESS OF BREATH: 1
COUGH: 1
POLYDIPSIA: 0
HEADACHES: 0
NERVOUS/ANXIOUS: 0
NERVOUS/ANXIOUS: 0
SENSORY CHANGE: 0
FEVER: 0
HEADACHES: 0
HEARTBURN: 0
WEAKNESS: 0
WEAKNESS: 0
PSYCHIATRIC NEGATIVE: 1
PSYCHIATRIC NEGATIVE: 1
DIZZINESS: 0
TREMORS: 0
NERVOUS/ANXIOUS: 1
FALLS: 0
EYES NEGATIVE: 1
ABDOMINAL PAIN: 0
PALPITATIONS: 0
FOCAL WEAKNESS: 0
WEAKNESS: 0
DIZZINESS: 0
NEUROLOGICAL NEGATIVE: 1
DEPRESSION: 0
NEUROLOGICAL NEGATIVE: 1
VOMITING: 0
NERVOUS/ANXIOUS: 0
HEADACHES: 0
BLURRED VISION: 0
VOMITING: 0
CHILLS: 0
PALPITATIONS: 0
DEPRESSION: 0
SPUTUM PRODUCTION: 0
ORTHOPNEA: 0
SHORTNESS OF BREATH: 1
SPUTUM PRODUCTION: 0
DIZZINESS: 0
SPUTUM PRODUCTION: 0
BACK PAIN: 1
ABDOMINAL PAIN: 0
HEADACHES: 1
WEIGHT LOSS: 0
GASTROINTESTINAL NEGATIVE: 1
RESPIRATORY NEGATIVE: 1
COUGH: 1
CHILLS: 0
NEUROLOGICAL NEGATIVE: 1
SPUTUM PRODUCTION: 0
NAUSEA: 0
HEARTBURN: 0
POLYDIPSIA: 0
TINGLING: 1
DOUBLE VISION: 0
SORE THROAT: 0
BACK PAIN: 1
FOCAL WEAKNESS: 0
FEVER: 0
WEAKNESS: 0
CARDIOVASCULAR NEGATIVE: 1
HEADACHES: 0
PALPITATIONS: 0
MEMORY LOSS: 0
DIAPHORESIS: 0
TREMORS: 0
WHEEZING: 0
DIAPHORESIS: 0
BACK PAIN: 1
WHEEZING: 0
FOCAL WEAKNESS: 0
EYES NEGATIVE: 1
EYE REDNESS: 0
FEVER: 0
FOCAL WEAKNESS: 0
COUGH: 1
GASTROINTESTINAL NEGATIVE: 1
SPUTUM PRODUCTION: 0
HEADACHES: 0
VOMITING: 0
HEADACHES: 0
CHILLS: 0
SHORTNESS OF BREATH: 0
CLAUDICATION: 0
CARDIOVASCULAR NEGATIVE: 1
ABDOMINAL PAIN: 0
DEPRESSION: 0
HEMOPTYSIS: 0
NERVOUS/ANXIOUS: 1
DIARRHEA: 0
HEARTBURN: 0
HEMOPTYSIS: 0
NECK PAIN: 0
PSYCHIATRIC NEGATIVE: 1
SPEECH CHANGE: 0
BLURRED VISION: 0
FEVER: 0
BLURRED VISION: 0
RESPIRATORY NEGATIVE: 1
FOCAL WEAKNESS: 0
HEADACHES: 0
BACK PAIN: 1
HEADACHES: 0
MYALGIAS: 1
BRUISES/BLEEDS EASILY: 0
ABDOMINAL PAIN: 0
BACK PAIN: 1
BLURRED VISION: 0
SORE THROAT: 0
VOMITING: 0
BACK PAIN: 1
SPUTUM PRODUCTION: 0
HEADACHES: 0
SPUTUM PRODUCTION: 0
SHORTNESS OF BREATH: 0
BACK PAIN: 1
MYALGIAS: 0
ORTHOPNEA: 0
PALPITATIONS: 0
PALPITATIONS: 0
PSYCHIATRIC NEGATIVE: 1
SENSORY CHANGE: 1
ORTHOPNEA: 0
BRUISES/BLEEDS EASILY: 0
WHEEZING: 1
DIZZINESS: 0
DIZZINESS: 0
EYE REDNESS: 0
DIARRHEA: 0
HEARTBURN: 0
COUGH: 0
BRUISES/BLEEDS EASILY: 0
BACK PAIN: 1
EYES NEGATIVE: 1
PALPITATIONS: 0
FOCAL WEAKNESS: 0
PALPITATIONS: 0
SORE THROAT: 0
VOMITING: 0
COUGH: 0
DIAPHORESIS: 0
FOCAL WEAKNESS: 0
PALPITATIONS: 0
DIZZINESS: 0
ABDOMINAL PAIN: 0
TREMORS: 0
SHORTNESS OF BREATH: 1
TREMORS: 0
NECK PAIN: 0
NEUROLOGICAL NEGATIVE: 1
MEMORY LOSS: 0
FOCAL WEAKNESS: 0
HEARTBURN: 0
CHILLS: 0
WEAKNESS: 0
MEMORY LOSS: 0
SENSORY CHANGE: 0
FEVER: 0
VOMITING: 0
CHILLS: 1
POLYDIPSIA: 0
VOMITING: 0
NECK PAIN: 0
NEUROLOGICAL NEGATIVE: 1
PALPITATIONS: 0
NERVOUS/ANXIOUS: 0
NERVOUS/ANXIOUS: 0
WEIGHT LOSS: 0
MYALGIAS: 1
VOMITING: 0
GASTROINTESTINAL NEGATIVE: 1
BRUISES/BLEEDS EASILY: 0
NERVOUS/ANXIOUS: 0
SPUTUM PRODUCTION: 1
BRUISES/BLEEDS EASILY: 0
FOCAL WEAKNESS: 0
BRUISES/BLEEDS EASILY: 0
TREMORS: 0
TINGLING: 0
MYALGIAS: 0
SENSORY CHANGE: 0
COUGH: 1
NEUROLOGICAL NEGATIVE: 1
ORTHOPNEA: 0
GASTROINTESTINAL NEGATIVE: 1
SENSORY CHANGE: 0
PHOTOPHOBIA: 0
BACK PAIN: 0
MYALGIAS: 0
SHORTNESS OF BREATH: 1
MYALGIAS: 0
PSYCHIATRIC NEGATIVE: 1
VOMITING: 0
NECK PAIN: 0
BACK PAIN: 0
TREMORS: 0
COUGH: 0
COUGH: 1
PALPITATIONS: 0
CARDIOVASCULAR NEGATIVE: 1
BRUISES/BLEEDS EASILY: 0
NEUROLOGICAL NEGATIVE: 1
BLURRED VISION: 0
ABDOMINAL PAIN: 0
BACK PAIN: 1
CHILLS: 0
MEMORY LOSS: 0
NERVOUS/ANXIOUS: 1
NAUSEA: 0
DIZZINESS: 0
TREMORS: 0
CONSTIPATION: 0
SHORTNESS OF BREATH: 1
FOCAL WEAKNESS: 0
VOMITING: 0
ABDOMINAL PAIN: 0
GASTROINTESTINAL NEGATIVE: 1
FEVER: 0
ORTHOPNEA: 0
PND: 0
SHORTNESS OF BREATH: 0
VOMITING: 0
FEVER: 0
SORE THROAT: 0
LOSS OF CONSCIOUSNESS: 0
SORE THROAT: 0
POLYDIPSIA: 0
WHEEZING: 0
FEVER: 0
FOCAL WEAKNESS: 0
DEPRESSION: 1
DIZZINESS: 0
HEADACHES: 0
COUGH: 0
POLYDIPSIA: 0
DEPRESSION: 0
BACK PAIN: 1
TINGLING: 0
BLURRED VISION: 0
COUGH: 0
FOCAL WEAKNESS: 0
CHILLS: 0
SENSORY CHANGE: 0
FEVER: 0
WEAKNESS: 1
WEIGHT LOSS: 0
EYE DISCHARGE: 0
SPUTUM PRODUCTION: 0
DIZZINESS: 0
FEVER: 0
VOMITING: 0
DIAPHORESIS: 0
ORTHOPNEA: 0
TREMORS: 0
NAUSEA: 0
COUGH: 0
NAUSEA: 0
BACK PAIN: 1
CHILLS: 0
SPUTUM PRODUCTION: 0
BACK PAIN: 1
BRUISES/BLEEDS EASILY: 0
SINUS PAIN: 0
FEVER: 0
LOSS OF CONSCIOUSNESS: 0
POLYDIPSIA: 0
BACK PAIN: 1
CARDIOVASCULAR NEGATIVE: 1
HEARTBURN: 0
SORE THROAT: 0
WEIGHT LOSS: 0
BLURRED VISION: 0
TREMORS: 0
WEIGHT LOSS: 0
HEARTBURN: 0
NAUSEA: 0
WHEEZING: 0
FOCAL WEAKNESS: 0
PALPITATIONS: 0
DOUBLE VISION: 0
SORE THROAT: 0
FEVER: 0
SPUTUM PRODUCTION: 0
BRUISES/BLEEDS EASILY: 0
DEPRESSION: 0
NAUSEA: 0
ORTHOPNEA: 0
COUGH: 1
NEUROLOGICAL NEGATIVE: 1
DIZZINESS: 0
BRUISES/BLEEDS EASILY: 0
PSYCHIATRIC NEGATIVE: 1
SHORTNESS OF BREATH: 0
COUGH: 1
EYES NEGATIVE: 1
SHORTNESS OF BREATH: 1
DEPRESSION: 0
SHORTNESS OF BREATH: 1
NECK PAIN: 0
CONSTIPATION: 0
BRUISES/BLEEDS EASILY: 0
ABDOMINAL PAIN: 0
MYALGIAS: 0
DEPRESSION: 0
FEVER: 0
HEMOPTYSIS: 1
NAUSEA: 0
PALPITATIONS: 0
DIARRHEA: 0
HEMOPTYSIS: 0
WHEEZING: 1
CHILLS: 0
DIARRHEA: 0
MYALGIAS: 0
INSOMNIA: 0
COUGH: 0
DIZZINESS: 0
DIZZINESS: 0
HEARTBURN: 0
WHEEZING: 0
SENSORY CHANGE: 0
SPUTUM PRODUCTION: 0
COUGH: 0
VOMITING: 0
ABDOMINAL PAIN: 0
SHORTNESS OF BREATH: 0
CARDIOVASCULAR NEGATIVE: 1
DIAPHORESIS: 0
GASTROINTESTINAL NEGATIVE: 1
ORTHOPNEA: 0
CARDIOVASCULAR NEGATIVE: 1
SORE THROAT: 0
DEPRESSION: 0
PALPITATIONS: 0
NERVOUS/ANXIOUS: 0
PALPITATIONS: 0
SHORTNESS OF BREATH: 1
NAUSEA: 0
CHILLS: 0
PALPITATIONS: 0
VOMITING: 0
INSOMNIA: 0
PHOTOPHOBIA: 0
HEARTBURN: 0
GASTROINTESTINAL NEGATIVE: 1
DIAPHORESIS: 0
DIARRHEA: 0
WHEEZING: 1
FOCAL WEAKNESS: 0
COUGH: 1
COUGH: 1
ABDOMINAL PAIN: 0
NEUROLOGICAL NEGATIVE: 1
WHEEZING: 0
HEADACHES: 0
SHORTNESS OF BREATH: 1
BRUISES/BLEEDS EASILY: 0
DIZZINESS: 0
TINGLING: 0
NAUSEA: 0
SHORTNESS OF BREATH: 0
SORE THROAT: 0
MYALGIAS: 1
HEARTBURN: 0
SORE THROAT: 0
HEADACHES: 0
POLYDIPSIA: 0
BACK PAIN: 1
DIZZINESS: 0
BLURRED VISION: 0
HEARTBURN: 0
BACK PAIN: 0
POLYDIPSIA: 0
BRUISES/BLEEDS EASILY: 0
EYES NEGATIVE: 1
SHORTNESS OF BREATH: 0
TREMORS: 0
CHILLS: 0
CHILLS: 0
TREMORS: 0
FEVER: 0
MEMORY LOSS: 0
CHILLS: 0
SHORTNESS OF BREATH: 0
TREMORS: 0
ROS GI COMMENTS: BELCHING
WEIGHT LOSS: 0
COUGH: 1
HEADACHES: 0
VOMITING: 0
HEARTBURN: 0
DIZZINESS: 0
MEMORY LOSS: 0
NECK PAIN: 0
WHEEZING: 0
WHEEZING: 0
PALPITATIONS: 0
COUGH: 0
FEVER: 1
NECK PAIN: 0
BACK PAIN: 1
COUGH: 0
HEARTBURN: 0
MEMORY LOSS: 0
CONSTIPATION: 1
BRUISES/BLEEDS EASILY: 0
HEARTBURN: 0
TREMORS: 0
NAUSEA: 0
EYES NEGATIVE: 1
ABDOMINAL PAIN: 0
SHORTNESS OF BREATH: 1
PALPITATIONS: 0
PALPITATIONS: 0
WEIGHT LOSS: 0
TINGLING: 0
HEADACHES: 1
HEARTBURN: 0
ABDOMINAL PAIN: 0
TREMORS: 0
WEAKNESS: 0
WHEEZING: 0
ABDOMINAL PAIN: 0
ROS GI COMMENTS: BELCHING
HEARTBURN: 0
EYES NEGATIVE: 1
PSYCHIATRIC NEGATIVE: 1
ABDOMINAL PAIN: 0
BACK PAIN: 1
CARDIOVASCULAR NEGATIVE: 1
HEARTBURN: 0
HEARTBURN: 0
ORTHOPNEA: 0
WEIGHT LOSS: 0
WEIGHT LOSS: 0
DIAPHORESIS: 0
WHEEZING: 0
BLURRED VISION: 0
SHORTNESS OF BREATH: 1
VOMITING: 0
ABDOMINAL PAIN: 0
SHORTNESS OF BREATH: 0
WHEEZING: 0
ABDOMINAL PAIN: 0
FOCAL WEAKNESS: 0
SENSORY CHANGE: 0
NAUSEA: 0
HEARTBURN: 0
COUGH: 1
GASTROINTESTINAL NEGATIVE: 1
ABDOMINAL PAIN: 0
TINGLING: 0
ROS GI COMMENTS: BELCHING
MYALGIAS: 0
SHORTNESS OF BREATH: 0
CHILLS: 0
NEUROLOGICAL NEGATIVE: 1
SENSORY CHANGE: 0
MYALGIAS: 0
BRUISES/BLEEDS EASILY: 0
SPUTUM PRODUCTION: 0
HEARTBURN: 0
WEAKNESS: 0
SENSORY CHANGE: 0
BACK PAIN: 1
MYALGIAS: 0
COUGH: 0
TINGLING: 0
SHORTNESS OF BREATH: 0
ORTHOPNEA: 0
MEMORY LOSS: 0
SPUTUM PRODUCTION: 0
WEAKNESS: 0
BLURRED VISION: 0
MYALGIAS: 1
FALLS: 0
NAUSEA: 0
MEMORY LOSS: 0
CHILLS: 0
ORTHOPNEA: 0
DEPRESSION: 0
NAUSEA: 0
COUGH: 1
SPEECH CHANGE: 0
BLURRED VISION: 0
BLURRED VISION: 0
ABDOMINAL PAIN: 0
NAUSEA: 0
NERVOUS/ANXIOUS: 1
WEIGHT LOSS: 0
PALPITATIONS: 0
WHEEZING: 0
SHORTNESS OF BREATH: 0
HEADACHES: 0
NERVOUS/ANXIOUS: 0
PALPITATIONS: 0
NAUSEA: 0
DEPRESSION: 1
WHEEZING: 0
COUGH: 1
HEADACHES: 0
BRUISES/BLEEDS EASILY: 0
NAUSEA: 0
HEADACHES: 0
NAUSEA: 0
HEADACHES: 1
MEMORY LOSS: 0
MEMORY LOSS: 0
WEIGHT LOSS: 0
HEADACHES: 0
WEIGHT LOSS: 0
HEADACHES: 0
CONSTIPATION: 0
DEPRESSION: 0
FEVER: 0
FEVER: 0
SORE THROAT: 0
HEADACHES: 0
ORTHOPNEA: 0
VOMITING: 0
SPUTUM PRODUCTION: 1
NERVOUS/ANXIOUS: 0
FOCAL WEAKNESS: 0
DEPRESSION: 0
WEIGHT LOSS: 0
SHORTNESS OF BREATH: 0
EYES NEGATIVE: 1
INSOMNIA: 0
NECK PAIN: 0
BLURRED VISION: 0
FOCAL WEAKNESS: 0
SORE THROAT: 0
MYALGIAS: 1
SENSORY CHANGE: 0
POLYDIPSIA: 0
GASTROINTESTINAL NEGATIVE: 1
BLURRED VISION: 0
MEMORY LOSS: 0
VOMITING: 0
HEADACHES: 0
WEAKNESS: 0
ORTHOPNEA: 0
CARDIOVASCULAR NEGATIVE: 1
PSYCHIATRIC NEGATIVE: 1
COUGH: 0
CARDIOVASCULAR NEGATIVE: 1
NECK PAIN: 0
SORE THROAT: 0
NERVOUS/ANXIOUS: 1
NERVOUS/ANXIOUS: 1
BLURRED VISION: 0
MYALGIAS: 0
DIZZINESS: 0
HEADACHES: 0
FEVER: 0
MEMORY LOSS: 0
HEMOPTYSIS: 0
CHILLS: 0
WEAKNESS: 0
DIZZINESS: 0
CLAUDICATION: 0
DIZZINESS: 0
DIZZINESS: 0
SINUS PAIN: 0
MYALGIAS: 0
ORTHOPNEA: 0
ROS GI COMMENTS: BELCHING
POLYDIPSIA: 0
CHILLS: 0
BLURRED VISION: 0
COUGH: 1
DOUBLE VISION: 0
PALPITATIONS: 0
WEIGHT LOSS: 0
DIZZINESS: 0
CHILLS: 0
PALPITATIONS: 0
HEADACHES: 0
HEADACHES: 0
NERVOUS/ANXIOUS: 0
MYALGIAS: 0
EYE PAIN: 0
NAUSEA: 0
WHEEZING: 0
HEARTBURN: 0
DIZZINESS: 0
NECK PAIN: 0
SHORTNESS OF BREATH: 1
NAUSEA: 0
CARDIOVASCULAR NEGATIVE: 1
NAUSEA: 0
POLYDIPSIA: 0
COUGH: 0
GASTROINTESTINAL NEGATIVE: 1
BRUISES/BLEEDS EASILY: 0
COUGH: 1
DIAPHORESIS: 0
CARDIOVASCULAR NEGATIVE: 1
FALLS: 0
HEARTBURN: 0
TREMORS: 0
TINGLING: 0
COUGH: 0
WEAKNESS: 0
FLANK PAIN: 0
CARDIOVASCULAR NEGATIVE: 1
NAUSEA: 0
DEPRESSION: 0
WHEEZING: 0
SPUTUM PRODUCTION: 0
DIARRHEA: 0
FEVER: 0
EYES NEGATIVE: 1
FOCAL WEAKNESS: 0
TREMORS: 0
ABDOMINAL PAIN: 0
BLURRED VISION: 0
SPEECH CHANGE: 0
TREMORS: 0
SORE THROAT: 0
ORTHOPNEA: 0
CARDIOVASCULAR NEGATIVE: 1
ABDOMINAL PAIN: 0
DEPRESSION: 0
SENSORY CHANGE: 0
COUGH: 0
CHILLS: 1
WEAKNESS: 0
DEPRESSION: 0
VOMITING: 0
MEMORY LOSS: 0
DIARRHEA: 0
FOCAL WEAKNESS: 0
BACK PAIN: 1
SENSORY CHANGE: 0
DIZZINESS: 0
FALLS: 0
TREMORS: 0
SHORTNESS OF BREATH: 1
VOMITING: 0
CLAUDICATION: 0
PALPITATIONS: 0
TREMORS: 0
PSYCHIATRIC NEGATIVE: 1
TREMORS: 0
COUGH: 1
DEPRESSION: 0
MEMORY LOSS: 0
NECK PAIN: 0
CONSTIPATION: 0
MYALGIAS: 1
SORE THROAT: 0
EYES NEGATIVE: 1
DIZZINESS: 0
BLURRED VISION: 0
BRUISES/BLEEDS EASILY: 0
BACK PAIN: 1
SENSORY CHANGE: 0
BACK PAIN: 1
TINGLING: 0
WEIGHT LOSS: 0
TINGLING: 0
VOMITING: 0
GASTROINTESTINAL NEGATIVE: 1
PHOTOPHOBIA: 0
SHORTNESS OF BREATH: 1
VOMITING: 0
SORE THROAT: 0
PALPITATIONS: 0
CARDIOVASCULAR NEGATIVE: 1
DIZZINESS: 0
HEADACHES: 0
CLAUDICATION: 0
TREMORS: 0
NAUSEA: 0
COUGH: 0
FEVER: 0
HEADACHES: 0
BLURRED VISION: 0
NEUROLOGICAL NEGATIVE: 1
ABDOMINAL PAIN: 0
DOUBLE VISION: 0
HEARTBURN: 0
FOCAL WEAKNESS: 0
WEIGHT LOSS: 1
MYALGIAS: 0
ORTHOPNEA: 0
WHEEZING: 0
SPUTUM PRODUCTION: 0
COUGH: 0
NECK PAIN: 0
COUGH: 1
HEADACHES: 0
MYALGIAS: 1
SORE THROAT: 0
FEVER: 0
MYALGIAS: 1
TINGLING: 0
FOCAL WEAKNESS: 0
VOMITING: 0
GASTROINTESTINAL NEGATIVE: 1
DEPRESSION: 0
ORTHOPNEA: 0
CHILLS: 0
CARDIOVASCULAR NEGATIVE: 1
BLURRED VISION: 0
FOCAL WEAKNESS: 0
MYALGIAS: 1
DIAPHORESIS: 0
DIZZINESS: 0
COUGH: 1
NEUROLOGICAL NEGATIVE: 1
POLYDIPSIA: 0
NECK PAIN: 0
FEVER: 0
SPUTUM PRODUCTION: 0
DIAPHORESIS: 0
BACK PAIN: 1
DIZZINESS: 0
VOMITING: 0
MYALGIAS: 1
WEIGHT LOSS: 0
CARDIOVASCULAR NEGATIVE: 1
SHORTNESS OF BREATH: 1
WEIGHT LOSS: 0
ORTHOPNEA: 0
POLYDIPSIA: 0
FEVER: 0
WEAKNESS: 0
PALPITATIONS: 0
CHILLS: 0
CHILLS: 0
SPUTUM PRODUCTION: 0
FOCAL WEAKNESS: 0
DEPRESSION: 0
ABDOMINAL PAIN: 0
SENSORY CHANGE: 1
NAUSEA: 0
WEAKNESS: 0
WEIGHT LOSS: 0
WEIGHT LOSS: 0
DEPRESSION: 0
VOMITING: 0
DIZZINESS: 0
NAUSEA: 0
SPUTUM PRODUCTION: 0
NECK PAIN: 0
HEARTBURN: 0
BRUISES/BLEEDS EASILY: 0
HEARTBURN: 0
PSYCHIATRIC NEGATIVE: 1
SHORTNESS OF BREATH: 1
NERVOUS/ANXIOUS: 0
ABDOMINAL PAIN: 0
TINGLING: 0
SPUTUM PRODUCTION: 0
BACK PAIN: 1
DIARRHEA: 0
EYE REDNESS: 0
BRUISES/BLEEDS EASILY: 0
DEPRESSION: 0
NEUROLOGICAL NEGATIVE: 1
HEADACHES: 0
NERVOUS/ANXIOUS: 0
EYES NEGATIVE: 1
NERVOUS/ANXIOUS: 1
BRUISES/BLEEDS EASILY: 0
COUGH: 0
ABDOMINAL PAIN: 0
HEARTBURN: 0
CHILLS: 0
MEMORY LOSS: 0
CONSTIPATION: 0
WEIGHT LOSS: 0
SHORTNESS OF BREATH: 0
DEPRESSION: 0
NERVOUS/ANXIOUS: 1
DIARRHEA: 0
TREMORS: 0
POLYDIPSIA: 0
GASTROINTESTINAL NEGATIVE: 1
COUGH: 1
DIARRHEA: 0
MEMORY LOSS: 0
ABDOMINAL PAIN: 0
ABDOMINAL PAIN: 0
VOMITING: 0
MYALGIAS: 1
TINGLING: 0
SHORTNESS OF BREATH: 1
SHORTNESS OF BREATH: 0
PALPITATIONS: 0
DEPRESSION: 1
COUGH: 0
FEVER: 0
WHEEZING: 0
BLURRED VISION: 0
NECK PAIN: 0
GASTROINTESTINAL NEGATIVE: 1
HEADACHES: 0
SPUTUM PRODUCTION: 0
VOMITING: 0
MEMORY LOSS: 0
CHILLS: 0
WEAKNESS: 0
HEARTBURN: 0
SHORTNESS OF BREATH: 1
SPUTUM PRODUCTION: 1
CHILLS: 0
TINGLING: 0
WEAKNESS: 0
CHILLS: 0
MYALGIAS: 0
GASTROINTESTINAL NEGATIVE: 1
TREMORS: 0
WEAKNESS: 0
CHILLS: 0
COUGH: 0
SHORTNESS OF BREATH: 1
CARDIOVASCULAR NEGATIVE: 1
FEVER: 0
BLURRED VISION: 0
DOUBLE VISION: 0
SORE THROAT: 0
ORTHOPNEA: 0
SHORTNESS OF BREATH: 1
SHORTNESS OF BREATH: 1
PSYCHIATRIC NEGATIVE: 1
NECK PAIN: 0
NAUSEA: 1
TINGLING: 0
EYES NEGATIVE: 1
WEIGHT LOSS: 0
WEIGHT LOSS: 0
COUGH: 1
GASTROINTESTINAL NEGATIVE: 1
SORE THROAT: 0
ABDOMINAL PAIN: 0
FOCAL WEAKNESS: 0
DEPRESSION: 1
SORE THROAT: 0
BRUISES/BLEEDS EASILY: 0
BRUISES/BLEEDS EASILY: 0
BACK PAIN: 1
MEMORY LOSS: 0
HEARTBURN: 0
VOMITING: 0
NECK PAIN: 0
CHILLS: 0
WHEEZING: 1
WEIGHT LOSS: 0
BRUISES/BLEEDS EASILY: 0
VOMITING: 0
SPUTUM PRODUCTION: 0
BACK PAIN: 0
FEVER: 1
TINGLING: 0
HEARTBURN: 0
TINGLING: 0
SHORTNESS OF BREATH: 1
NECK PAIN: 0
CHILLS: 0
NEUROLOGICAL NEGATIVE: 1
LOSS OF CONSCIOUSNESS: 0
MYALGIAS: 1
NECK PAIN: 0
NECK PAIN: 0
DIZZINESS: 0
CHILLS: 0
PALPITATIONS: 0
WHEEZING: 1
SORE THROAT: 0
MYALGIAS: 1
NECK PAIN: 0
NECK PAIN: 0
RESPIRATORY NEGATIVE: 1
SPUTUM PRODUCTION: 0
CHILLS: 0
VOMITING: 0
VOMITING: 0
DIZZINESS: 0
SENSORY CHANGE: 0
STRIDOR: 0
COUGH: 0
HEMOPTYSIS: 0
SORE THROAT: 0
NECK PAIN: 0
WHEEZING: 0
STRIDOR: 0
WEAKNESS: 0
DEPRESSION: 0
INSOMNIA: 0
PALPITATIONS: 0
SHORTNESS OF BREATH: 1
BACK PAIN: 1
WEAKNESS: 0
CARDIOVASCULAR NEGATIVE: 1
WEIGHT LOSS: 0
GASTROINTESTINAL NEGATIVE: 1
HEARTBURN: 0
DIAPHORESIS: 0
ABDOMINAL PAIN: 0
DOUBLE VISION: 0
FOCAL WEAKNESS: 0
SPUTUM PRODUCTION: 0
SPUTUM PRODUCTION: 0
HEARTBURN: 0
ORTHOPNEA: 0
BLURRED VISION: 0
SENSORY CHANGE: 0
NAUSEA: 0
NAUSEA: 0
NEUROLOGICAL NEGATIVE: 1
ABDOMINAL PAIN: 0
BRUISES/BLEEDS EASILY: 0
WHEEZING: 0
CARDIOVASCULAR NEGATIVE: 1
COUGH: 0
ABDOMINAL PAIN: 0
VOMITING: 0
CONSTIPATION: 0
MYALGIAS: 0
WEAKNESS: 0
ABDOMINAL PAIN: 0
NERVOUS/ANXIOUS: 0
DEPRESSION: 0
DEPRESSION: 0
FEVER: 0
SORE THROAT: 0
NAUSEA: 0
SENSORY CHANGE: 1
DIZZINESS: 0
DEPRESSION: 0
STRIDOR: 0
NECK PAIN: 0
SHORTNESS OF BREATH: 0
CHILLS: 0
NAUSEA: 1
STRIDOR: 0
FEVER: 0
EYES NEGATIVE: 1
PALPITATIONS: 0
FOCAL WEAKNESS: 0
VOMITING: 0
GASTROINTESTINAL NEGATIVE: 1
DEPRESSION: 0
SPUTUM PRODUCTION: 0
POLYDIPSIA: 0
EYES NEGATIVE: 1
COUGH: 0
NECK PAIN: 0
SENSORY CHANGE: 0
SHORTNESS OF BREATH: 1
COUGH: 1
NAUSEA: 0
CONSTIPATION: 0
RESPIRATORY NEGATIVE: 1
POLYDIPSIA: 0
NERVOUS/ANXIOUS: 0
PALPITATIONS: 0
PSYCHIATRIC NEGATIVE: 1
HEADACHES: 0
FOCAL WEAKNESS: 0
FALLS: 0
CARDIOVASCULAR NEGATIVE: 1
EYE REDNESS: 0
ABDOMINAL PAIN: 0
CHILLS: 0
NERVOUS/ANXIOUS: 0
COUGH: 0
PALPITATIONS: 0
SPUTUM PRODUCTION: 0
DIZZINESS: 0
NECK PAIN: 0
FOCAL WEAKNESS: 0
SHORTNESS OF BREATH: 1
DIARRHEA: 0
MYALGIAS: 1
FOCAL WEAKNESS: 0
TREMORS: 0
CHILLS: 0
DIAPHORESIS: 0
WEIGHT LOSS: 0
VOMITING: 0
SENSORY CHANGE: 0
BACK PAIN: 1
WEAKNESS: 0
COUGH: 0
SHORTNESS OF BREATH: 0
PALPITATIONS: 0
SORE THROAT: 0
CARDIOVASCULAR NEGATIVE: 1
DIZZINESS: 0
DIZZINESS: 0
WEAKNESS: 0
FEVER: 0
POLYDIPSIA: 0
CONSTIPATION: 0
FEVER: 0
MYALGIAS: 0
SPUTUM PRODUCTION: 0
BACK PAIN: 1
SORE THROAT: 0
NECK PAIN: 0
NAUSEA: 0
FALLS: 0
HEARTBURN: 0
DIAPHORESIS: 0
MEMORY LOSS: 0
MYALGIAS: 0
FEVER: 0
CHILLS: 0
WEIGHT LOSS: 0
VOMITING: 0
SENSORY CHANGE: 0
FEVER: 0
HEADACHES: 0
COUGH: 0
HEADACHES: 0
CHILLS: 0
WEAKNESS: 0
FEVER: 0
NAUSEA: 0
HEMOPTYSIS: 0
HEADACHES: 0
BLURRED VISION: 0
PALPITATIONS: 0
HEARTBURN: 0
MYALGIAS: 1
DIAPHORESIS: 0
NECK PAIN: 0
DEPRESSION: 0
CARDIOVASCULAR NEGATIVE: 1
COUGH: 0
BRUISES/BLEEDS EASILY: 0
SORE THROAT: 0
FEVER: 0
BRUISES/BLEEDS EASILY: 0
INSOMNIA: 1
SORE THROAT: 0
COUGH: 0
VOMITING: 0
ORTHOPNEA: 0
FEVER: 0
PALPITATIONS: 0
CHILLS: 0
SORE THROAT: 0
NERVOUS/ANXIOUS: 0
TINGLING: 0
WEIGHT LOSS: 0
DIZZINESS: 0
BLURRED VISION: 0
HEARTBURN: 0
HEADACHES: 0
SORE THROAT: 0
SORE THROAT: 0
MYALGIAS: 0
GASTROINTESTINAL NEGATIVE: 1
NECK PAIN: 0
SHORTNESS OF BREATH: 0
CHILLS: 0
TINGLING: 0
WEAKNESS: 0
VOMITING: 0
ORTHOPNEA: 0
HEADACHES: 0
HEADACHES: 0
SPEECH CHANGE: 0
ROS GI COMMENTS: BELCHING
FEVER: 0
NAUSEA: 0
DIZZINESS: 0
CARDIOVASCULAR NEGATIVE: 1
DEPRESSION: 0
BACK PAIN: 1
HEARTBURN: 0
DIZZINESS: 1
HEADACHES: 0
SENSORY CHANGE: 0
FOCAL WEAKNESS: 0
INSOMNIA: 0
NAUSEA: 0
DEPRESSION: 0
BLURRED VISION: 0
NECK PAIN: 0
TREMORS: 0
MYALGIAS: 0
CHILLS: 0
FOCAL WEAKNESS: 0
CHILLS: 0
ORTHOPNEA: 0
MEMORY LOSS: 0
SHORTNESS OF BREATH: 1
COUGH: 0
TINGLING: 0
SHORTNESS OF BREATH: 0
VOMITING: 0
MEMORY LOSS: 0
FEVER: 0
HEMOPTYSIS: 0
TINGLING: 0
FEVER: 0
EYE DISCHARGE: 0
SPEECH CHANGE: 0
POLYDIPSIA: 0
COUGH: 0
TINGLING: 0
FOCAL WEAKNESS: 0
DIARRHEA: 0
DEPRESSION: 0
PALPITATIONS: 0
EYES NEGATIVE: 1
NECK PAIN: 0
TREMORS: 0
NERVOUS/ANXIOUS: 1
FOCAL WEAKNESS: 0
BRUISES/BLEEDS EASILY: 0
SHORTNESS OF BREATH: 1
PALPITATIONS: 0
FEVER: 0
EYES NEGATIVE: 1
CHILLS: 0
POLYDIPSIA: 0
CONSTIPATION: 0
TREMORS: 0
SHORTNESS OF BREATH: 1
PSYCHIATRIC NEGATIVE: 1
NAUSEA: 0
NEUROLOGICAL NEGATIVE: 1
NEUROLOGICAL NEGATIVE: 1
SENSORY CHANGE: 0
POLYDIPSIA: 0
NEUROLOGICAL NEGATIVE: 1
FEVER: 0
FOCAL WEAKNESS: 0
PSYCHIATRIC NEGATIVE: 1
VOMITING: 0
DIARRHEA: 0
TINGLING: 0
BRUISES/BLEEDS EASILY: 0
DIZZINESS: 0
BACK PAIN: 1
TINGLING: 0
COUGH: 0
DEPRESSION: 0
CARDIOVASCULAR NEGATIVE: 1
VOMITING: 0
DIZZINESS: 0
ABDOMINAL PAIN: 0
SHORTNESS OF BREATH: 1
WEIGHT LOSS: 0
WEAKNESS: 0
WHEEZING: 0
SHORTNESS OF BREATH: 1
DIZZINESS: 0
NERVOUS/ANXIOUS: 0
WEIGHT LOSS: 0
HEADACHES: 0
DIZZINESS: 0
PSYCHIATRIC NEGATIVE: 1
NEUROLOGICAL NEGATIVE: 1
WEAKNESS: 0
ORTHOPNEA: 0
SHORTNESS OF BREATH: 1
ABDOMINAL PAIN: 0
SENSORY CHANGE: 0
EYE PAIN: 0
CHILLS: 0
DEPRESSION: 0
PND: 0
SORE THROAT: 0
FEVER: 0
BRUISES/BLEEDS EASILY: 0
SHORTNESS OF BREATH: 1
ABDOMINAL PAIN: 0
DIZZINESS: 0
NEUROLOGICAL NEGATIVE: 1
NEUROLOGICAL NEGATIVE: 1
HEMOPTYSIS: 0
SORE THROAT: 0
ABDOMINAL PAIN: 0
HEADACHES: 1
ABDOMINAL PAIN: 0
HEADACHES: 0
HEARTBURN: 0
WHEEZING: 0
SPUTUM PRODUCTION: 0
PALPITATIONS: 0
PSYCHIATRIC NEGATIVE: 1
ABDOMINAL PAIN: 0
SENSORY CHANGE: 0
BLURRED VISION: 0
BACK PAIN: 1
DIZZINESS: 0
CHILLS: 0
ABDOMINAL PAIN: 0
TINGLING: 0
SHORTNESS OF BREATH: 1
HEARTBURN: 0
CARDIOVASCULAR NEGATIVE: 1
NEUROLOGICAL NEGATIVE: 1
PHOTOPHOBIA: 0
SPUTUM PRODUCTION: 0
BLURRED VISION: 0
PALPITATIONS: 0
EYES NEGATIVE: 1
DEPRESSION: 0
CONSTIPATION: 0
DEPRESSION: 0
NECK PAIN: 0
WHEEZING: 0
FEVER: 0
NERVOUS/ANXIOUS: 0
NERVOUS/ANXIOUS: 1
TINGLING: 0
HEMOPTYSIS: 0
HEARTBURN: 0
BRUISES/BLEEDS EASILY: 0
SHORTNESS OF BREATH: 0
POLYDIPSIA: 0
EYES NEGATIVE: 1
HEADACHES: 0
NAUSEA: 0
PALPITATIONS: 0
WEIGHT LOSS: 0
SHORTNESS OF BREATH: 1
WHEEZING: 0
NAUSEA: 0
DIZZINESS: 0
CHILLS: 0
SHORTNESS OF BREATH: 0
NECK PAIN: 0
PSYCHIATRIC NEGATIVE: 1
ORTHOPNEA: 0
CARDIOVASCULAR NEGATIVE: 1
DEPRESSION: 0
DEPRESSION: 0
CHILLS: 0
SORE THROAT: 0
COUGH: 1
BLURRED VISION: 0
SHORTNESS OF BREATH: 0
NAUSEA: 0
CONSTIPATION: 0
PHOTOPHOBIA: 0
WEAKNESS: 0

## 2023-01-01 ASSESSMENT — COGNITIVE AND FUNCTIONAL STATUS - GENERAL
MOVING FROM LYING ON BACK TO SITTING ON SIDE OF FLAT BED: A LITTLE
PERSONAL GROOMING: A LITTLE
MOVING FROM LYING ON BACK TO SITTING ON SIDE OF FLAT BED: A LITTLE
MOBILITY SCORE: 23
TOILETING: A LITTLE
DRESSING REGULAR UPPER BODY CLOTHING: A LITTLE
SUGGESTED CMS G CODE MODIFIER MOBILITY: CI
DAILY ACTIVITIY SCORE: 24
SUGGESTED CMS G CODE MODIFIER DAILY ACTIVITY: CJ
DRESSING REGULAR LOWER BODY CLOTHING: A LITTLE
STANDING UP FROM CHAIR USING ARMS: A LITTLE
CLIMB 3 TO 5 STEPS WITH RAILING: A LOT
STANDING UP FROM CHAIR USING ARMS: A LITTLE
SUGGESTED CMS G CODE MODIFIER MOBILITY: CI
DAILY ACTIVITIY SCORE: 24
SUGGESTED CMS G CODE MODIFIER DAILY ACTIVITY: CH
MOBILITY SCORE: 19
DAILY ACTIVITIY SCORE: 24
MOBILITY SCORE: 19
SUGGESTED CMS G CODE MODIFIER MOBILITY: CK
DAILY ACTIVITIY SCORE: 20
SUGGESTED CMS G CODE MODIFIER MOBILITY: CK
DAILY ACTIVITIY SCORE: 21
SUGGESTED CMS G CODE MODIFIER MOBILITY: CJ
WALKING IN HOSPITAL ROOM: A LITTLE
CLIMB 3 TO 5 STEPS WITH RAILING: A LITTLE
CLIMB 3 TO 5 STEPS WITH RAILING: A LOT
WALKING IN HOSPITAL ROOM: A LITTLE
SUGGESTED CMS G CODE MODIFIER DAILY ACTIVITY: CJ
DRESSING REGULAR LOWER BODY CLOTHING: A LITTLE
CLIMB 3 TO 5 STEPS WITH RAILING: A LITTLE
TOILETING: A LITTLE
MOBILITY SCORE: 22
WALKING IN HOSPITAL ROOM: A LITTLE
SUGGESTED CMS G CODE MODIFIER DAILY ACTIVITY: CH
CLIMB 3 TO 5 STEPS WITH RAILING: A LOT
TOILETING: A LITTLE
DRESSING REGULAR UPPER BODY CLOTHING: A LITTLE
MOBILITY SCORE: 20
DAILY ACTIVITIY SCORE: 20
STANDING UP FROM CHAIR USING ARMS: A LITTLE
WALKING IN HOSPITAL ROOM: A LITTLE
DRESSING REGULAR UPPER BODY CLOTHING: A LITTLE
SUGGESTED CMS G CODE MODIFIER DAILY ACTIVITY: CH
HELP NEEDED FOR BATHING: A LITTLE
SUGGESTED CMS G CODE MODIFIER MOBILITY: CJ
HELP NEEDED FOR BATHING: A LITTLE
CLIMB 3 TO 5 STEPS WITH RAILING: A LITTLE
MOBILITY SCORE: 23
SUGGESTED CMS G CODE MODIFIER DAILY ACTIVITY: CJ

## 2023-01-01 ASSESSMENT — PAIN DESCRIPTION - PAIN TYPE
TYPE: ACUTE PAIN
TYPE: ACUTE PAIN;CHRONIC PAIN
TYPE: ACUTE PAIN
TYPE: ACUTE PAIN
TYPE: ACUTE PAIN;CHRONIC PAIN
TYPE: ACUTE PAIN
TYPE: CHRONIC PAIN
TYPE: ACUTE PAIN;CHRONIC PAIN
TYPE: ACUTE PAIN;SURGICAL PAIN
TYPE: ACUTE PAIN;CHRONIC PAIN
TYPE: CHRONIC PAIN
TYPE: ACUTE PAIN
TYPE: CHRONIC PAIN
TYPE: ACUTE PAIN
TYPE: ACUTE PAIN
TYPE: ACUTE PAIN;CHRONIC PAIN
TYPE: ACUTE PAIN
TYPE: CHRONIC PAIN
TYPE: CHRONIC PAIN
TYPE: ACUTE PAIN
TYPE: ACUTE PAIN;CHRONIC PAIN
TYPE: ACUTE PAIN
TYPE: ACUTE PAIN
TYPE: ACUTE PAIN;CHRONIC PAIN
TYPE: ACUTE PAIN
TYPE: ACUTE PAIN;CHRONIC PAIN
TYPE: ACUTE PAIN
TYPE: ACUTE PAIN
TYPE: CHRONIC PAIN;ACUTE PAIN
TYPE: ACUTE PAIN
TYPE: CHRONIC PAIN
TYPE: ACUTE PAIN
TYPE: ACUTE PAIN
TYPE: CHRONIC PAIN
TYPE: ACUTE PAIN
TYPE: CHRONIC PAIN
TYPE: CHRONIC PAIN
TYPE: ACUTE PAIN
TYPE: ACUTE PAIN
TYPE: ACUTE PAIN;CHRONIC PAIN
TYPE: ACUTE PAIN
TYPE: ACUTE PAIN;SURGICAL PAIN
TYPE: ACUTE PAIN
TYPE: CHRONIC PAIN
TYPE: ACUTE PAIN;CHRONIC PAIN
TYPE: ACUTE PAIN;CHRONIC PAIN
TYPE: ACUTE PAIN
TYPE: CHRONIC PAIN
TYPE: ACUTE PAIN
TYPE: ACUTE PAIN;SURGICAL PAIN
TYPE: ACUTE PAIN
TYPE: CHRONIC PAIN
TYPE: ACUTE PAIN
TYPE: CHRONIC PAIN
TYPE: ACUTE PAIN
TYPE: ACUTE PAIN
TYPE: CHRONIC PAIN
TYPE: ACUTE PAIN
TYPE: ACUTE PAIN
TYPE: ACUTE PAIN;CHRONIC PAIN
TYPE: ACUTE PAIN
TYPE: ACUTE PAIN;CHRONIC PAIN
TYPE: ACUTE PAIN
TYPE: CHRONIC PAIN;ACUTE PAIN
TYPE: ACUTE PAIN
TYPE: ACUTE PAIN
TYPE: CHRONIC PAIN
TYPE: ACUTE PAIN
TYPE: CHRONIC PAIN
TYPE: ACUTE PAIN
TYPE: ACUTE PAIN;CHRONIC PAIN
TYPE: ACUTE PAIN;CHRONIC PAIN
TYPE: ACUTE PAIN
TYPE: CHRONIC PAIN
TYPE: ACUTE PAIN
TYPE: ACUTE PAIN;CHRONIC PAIN
TYPE: ACUTE PAIN
TYPE: ACUTE PAIN;CHRONIC PAIN
TYPE: CHRONIC PAIN
TYPE: ACUTE PAIN
TYPE: ACUTE PAIN
TYPE: ACUTE PAIN;CHRONIC PAIN
TYPE: ACUTE PAIN
TYPE: ACUTE PAIN;SURGICAL PAIN
TYPE: CHRONIC PAIN
TYPE: ACUTE PAIN
TYPE: ACUTE PAIN
TYPE: ACUTE PAIN;SURGICAL PAIN
TYPE: ACUTE PAIN
TYPE: SURGICAL PAIN
TYPE: ACUTE PAIN
TYPE: ACUTE PAIN;CHRONIC PAIN
TYPE: ACUTE PAIN
TYPE: CHRONIC PAIN
TYPE: ACUTE PAIN;CHRONIC PAIN
TYPE: ACUTE PAIN
TYPE: CHRONIC PAIN
TYPE: ACUTE PAIN
TYPE: ACUTE PAIN;SURGICAL PAIN
TYPE: ACUTE PAIN;CHRONIC PAIN
TYPE: ACUTE PAIN
TYPE: CHRONIC PAIN
TYPE: ACUTE PAIN
TYPE: CHRONIC PAIN
TYPE: ACUTE PAIN
TYPE: ACUTE PAIN;SURGICAL PAIN
TYPE: ACUTE PAIN
TYPE: ACUTE PAIN
TYPE: ACUTE PAIN;CHRONIC PAIN
TYPE: CHRONIC PAIN
TYPE: ACUTE PAIN
TYPE: ACUTE PAIN
TYPE: ACUTE PAIN;CHRONIC PAIN
TYPE: ACUTE PAIN
TYPE: ACUTE PAIN;SURGICAL PAIN
TYPE: CHRONIC PAIN;ACUTE PAIN
TYPE: ACUTE PAIN
TYPE: ACUTE PAIN
TYPE: ACUTE PAIN;CHRONIC PAIN
TYPE: CHRONIC PAIN
TYPE: ACUTE PAIN
TYPE: ACUTE PAIN
TYPE: ACUTE PAIN;CHRONIC PAIN
TYPE: ACUTE PAIN
TYPE: CHRONIC PAIN
TYPE: ACUTE PAIN
TYPE: ACUTE PAIN;CHRONIC PAIN
TYPE: CHRONIC PAIN;ACUTE PAIN
TYPE: ACUTE PAIN
TYPE: ACUTE PAIN;CHRONIC PAIN
TYPE: CHRONIC PAIN
TYPE: ACUTE PAIN
TYPE: CHRONIC PAIN;ACUTE PAIN
TYPE: ACUTE PAIN;CHRONIC PAIN
TYPE: ACUTE PAIN
TYPE: ACUTE PAIN
TYPE: CHRONIC PAIN
TYPE: ACUTE PAIN
TYPE: CHRONIC PAIN
TYPE: ACUTE PAIN;CHRONIC PAIN
TYPE: ACUTE PAIN
TYPE: ACUTE PAIN;CHRONIC PAIN
TYPE: ACUTE PAIN;CHRONIC PAIN
TYPE: ACUTE PAIN
TYPE: ACUTE PAIN;CHRONIC PAIN
TYPE: ACUTE PAIN;CHRONIC PAIN;OTHER (COMMENT)
TYPE: ACUTE PAIN
TYPE: ACUTE PAIN;CHRONIC PAIN
TYPE: ACUTE PAIN
TYPE: ACUTE PAIN;CHRONIC PAIN
TYPE: ACUTE PAIN;CHRONIC PAIN
TYPE: CHRONIC PAIN
TYPE: CHRONIC PAIN
TYPE: ACUTE PAIN
TYPE: ACUTE PAIN;CHRONIC PAIN
TYPE: ACUTE PAIN
TYPE: CHRONIC PAIN;ACUTE PAIN
TYPE: ACUTE PAIN;CHRONIC PAIN
TYPE: CHRONIC PAIN
TYPE: ACUTE PAIN
TYPE: ACUTE PAIN;CHRONIC PAIN
TYPE: CHRONIC PAIN
TYPE: ACUTE PAIN
TYPE: ACUTE PAIN;SURGICAL PAIN
TYPE: CHRONIC PAIN;SURGICAL PAIN
TYPE: CHRONIC PAIN
TYPE: ACUTE PAIN
TYPE: CHRONIC PAIN
TYPE: ACUTE PAIN
TYPE: ACUTE PAIN
TYPE: CHRONIC PAIN
TYPE: CHRONIC PAIN;ACUTE PAIN
TYPE: ACUTE PAIN
TYPE: ACUTE PAIN;CHRONIC PAIN
TYPE: ACUTE PAIN
TYPE: CHRONIC PAIN
TYPE: ACUTE PAIN
TYPE: ACUTE PAIN;CHRONIC PAIN
TYPE: SURGICAL PAIN
TYPE: CHRONIC PAIN
TYPE: CHRONIC PAIN
TYPE: ACUTE PAIN
TYPE: ACUTE PAIN;CHRONIC PAIN
TYPE: ACUTE PAIN
TYPE: ACUTE PAIN
TYPE: CHRONIC PAIN;ACUTE PAIN
TYPE: CHRONIC PAIN;ACUTE PAIN
TYPE: ACUTE PAIN
TYPE: ACUTE PAIN;CHRONIC PAIN
TYPE: ACUTE PAIN
TYPE: SURGICAL PAIN
TYPE: ACUTE PAIN
TYPE: CHRONIC PAIN
TYPE: ACUTE PAIN;CHRONIC PAIN
TYPE: ACUTE PAIN
TYPE: CHRONIC PAIN
TYPE: ACUTE PAIN
TYPE: CHRONIC PAIN;ACUTE PAIN
TYPE: ACUTE PAIN
TYPE: CHRONIC PAIN
TYPE: CHRONIC PAIN
TYPE: ACUTE PAIN
TYPE: ACUTE PAIN;CHRONIC PAIN
TYPE: ACUTE PAIN
TYPE: ACUTE PAIN
TYPE: ACUTE PAIN;CHRONIC PAIN
TYPE: CHRONIC PAIN;SURGICAL PAIN
TYPE: ACUTE PAIN
TYPE: CHRONIC PAIN
TYPE: ACUTE PAIN
TYPE: ACUTE PAIN;CHRONIC PAIN
TYPE: ACUTE PAIN
TYPE: ACUTE PAIN
TYPE: ACUTE PAIN;CHRONIC PAIN
TYPE: ACUTE PAIN
TYPE: ACUTE PAIN
TYPE: CHRONIC PAIN
TYPE: ACUTE PAIN
TYPE: CHRONIC PAIN
TYPE: ACUTE PAIN
TYPE: CHRONIC PAIN
TYPE: ACUTE PAIN
TYPE: ACUTE PAIN;CHRONIC PAIN
TYPE: ACUTE PAIN
TYPE: ACUTE PAIN
TYPE: ACUTE PAIN;CHRONIC PAIN
TYPE: ACUTE PAIN
TYPE: ACUTE PAIN;CHRONIC PAIN
TYPE: ACUTE PAIN
TYPE: ACUTE PAIN
TYPE: CHRONIC PAIN;SURGICAL PAIN
TYPE: ACUTE PAIN;CHRONIC PAIN
TYPE: ACUTE PAIN
TYPE: ACUTE PAIN
TYPE: CHRONIC PAIN
TYPE: ACUTE PAIN;SURGICAL PAIN
TYPE: ACUTE PAIN
TYPE: ACUTE PAIN
TYPE: CHRONIC PAIN
TYPE: CHRONIC PAIN
TYPE: ACUTE PAIN
TYPE: CHRONIC PAIN
TYPE: ACUTE PAIN

## 2023-01-01 ASSESSMENT — PAIN SCALES - PAIN ASSESSMENT IN ADVANCED DEMENTIA (PAINAD)
CONSOLABILITY: DISTRACTED OR REASSURED BY VOICE/TOUCH

## 2023-01-01 ASSESSMENT — LIFESTYLE VARIABLES
SUBSTANCE_ABUSE: 0
HAVE YOU EVER FELT YOU SHOULD CUT DOWN ON YOUR DRINKING: NO
AVERAGE NUMBER OF DAYS PER WEEK YOU HAVE A DRINK CONTAINING ALCOHOL: 2
SUBSTANCE_ABUSE: 0
TOTAL SCORE: 0
SMOKING_STATUS: YES
TOTAL SCORE: 0
HOW MANY TIMES IN THE PAST YEAR HAVE YOU HAD 5 OR MORE DRINKS IN A DAY: 0
TOTAL SCORE: 0
CONSUMPTION TOTAL: NEGATIVE
SUBSTANCE_ABUSE: 0
HOW OFTEN DO YOU HAVE A DRINK CONTAINING ALCOHOL: MONTHLY OR LESS
EVER HAD A DRINK FIRST THING IN THE MORNING TO STEADY YOUR NERVES TO GET RID OF A HANGOVER: NO
EVER FELT BAD OR GUILTY ABOUT YOUR DRINKING: NO
SUBSTANCE_ABUSE: 0
SUBSTANCE_ABUSE: 0
ALCOHOL_USE: YES
TOTAL SCORE: 0
SUBSTANCE_ABUSE: 0
HOW MANY TIMES IN THE PAST YEAR HAVE YOU HAD 5 OR MORE DRINKS IN A DAY: 0
SUBSTANCE_ABUSE: 0
SMOKING_YEARS: 0
EVER HAD A DRINK FIRST THING IN THE MORNING TO STEADY YOUR NERVES TO GET RID OF A HANGOVER: NO
ALCOHOL_USE: YES
TOTAL SCORE: 0
EVER FELT BAD OR GUILTY ABOUT YOUR DRINKING: NO
AVERAGE NUMBER OF DAYS PER WEEK YOU HAVE A DRINK CONTAINING ALCOHOL: 0
SUBSTANCE_ABUSE: 0
SUBSTANCE_ABUSE: 0
AVERAGE NUMBER OF DAYS PER WEEK YOU HAVE A DRINK CONTAINING ALCOHOL: 3
ON A TYPICAL DAY WHEN YOU DRINK ALCOHOL HOW MANY DRINKS DO YOU HAVE: 1
ON A TYPICAL DAY WHEN YOU DRINK ALCOHOL HOW MANY DRINKS DO YOU HAVE: 1
CONSUMPTION TOTAL: NEGATIVE
SUBSTANCE_ABUSE: 0
EVER FELT BAD OR GUILTY ABOUT YOUR DRINKING: NO
AUDIT-C TOTAL SCORE: 1
SUBSTANCE_ABUSE: 0
HAVE PEOPLE ANNOYED YOU BY CRITICIZING YOUR DRINKING: NO
HOW OFTEN DO YOU HAVE SIX OR MORE DRINKS ON ONE OCCASION: NEVER
TOTAL SCORE: 0
SUBSTANCE_ABUSE: 0
TOBACCO_USE: NO
AVERAGE NUMBER OF DAYS PER WEEK YOU HAVE A DRINK CONTAINING ALCOHOL: 0
TOTAL SCORE: 0
DO YOU DRINK ALCOHOL: NO
HAVE PEOPLE ANNOYED YOU BY CRITICIZING YOUR DRINKING: NO
SUBSTANCE_ABUSE: 0
SUBSTANCE_ABUSE: 0
HAVE PEOPLE ANNOYED YOU BY CRITICIZING YOUR DRINKING: NO
TOTAL SCORE: 0
CONSUMPTION TOTAL: NEGATIVE
EVER HAD A DRINK FIRST THING IN THE MORNING TO STEADY YOUR NERVES TO GET RID OF A HANGOVER: NO
TOBACCO_USE: YES
ALCOHOL_USE: NO
SUBSTANCE_ABUSE: 0
CONSUMPTION TOTAL: NEGATIVE
HAVE PEOPLE ANNOYED YOU BY CRITICIZING YOUR DRINKING: NO
HOW MANY STANDARD DRINKS CONTAINING ALCOHOL DO YOU HAVE ON A TYPICAL DAY: 1 OR 2
SMOKING_STATUS: YES
ON A TYPICAL DAY WHEN YOU DRINK ALCOHOL HOW MANY DRINKS DO YOU HAVE: 0
TOTAL SCORE: 0
EVER FELT BAD OR GUILTY ABOUT YOUR DRINKING: NO
HAVE YOU EVER FELT YOU SHOULD CUT DOWN ON YOUR DRINKING: NO
HAVE YOU EVER FELT YOU SHOULD CUT DOWN ON YOUR DRINKING: NO
SKIP TO QUESTIONS 9-10: 1
SUBSTANCE_ABUSE: 0
EVER HAD A DRINK FIRST THING IN THE MORNING TO STEADY YOUR NERVES TO GET RID OF A HANGOVER: NO
HOW MANY TIMES IN THE PAST YEAR HAVE YOU HAD 5 OR MORE DRINKS IN A DAY: 0
TOTAL SCORE: 0
TOTAL SCORE: 0
SUBSTANCE_ABUSE: 0
TOTAL SCORE: 0
SUBSTANCE_ABUSE: 0
TOBACCO_USE: NO
ON A TYPICAL DAY WHEN YOU DRINK ALCOHOL HOW MANY DRINKS DO YOU HAVE: 0
PACK_YEARS: 10
HAVE YOU EVER FELT YOU SHOULD CUT DOWN ON YOUR DRINKING: NO
HOW MANY TIMES IN THE PAST YEAR HAVE YOU HAD 5 OR MORE DRINKS IN A DAY: 0
SMOKING_STATUS: YES

## 2023-01-01 ASSESSMENT — FIBROSIS 4 INDEX
FIB4 SCORE: 0.72
FIB4 SCORE: 1.969696969696969697
FIB4 SCORE: 3.78
FIB4 SCORE: 1.63
FIB4 SCORE: 0.74
FIB4 SCORE: 2.08
FIB4 SCORE: 1.18
FIB4 SCORE: 1.41
FIB4 SCORE: 1.41
FIB4 SCORE: 0.8
FIB4 SCORE: 1.969696969696969697
FIB4 SCORE: 0.86
FIB4 SCORE: 0.86
FIB4 SCORE: 0.87
FIB4 SCORE: 0.74
FIB4 SCORE: 0.87
FIB4 SCORE: 0.86
FIB4 SCORE: 1.34
FIB4 SCORE: 1.969696969696969697
FIB4 SCORE: 0.87
FIB4 SCORE: 0.87
FIB4 SCORE: 0.86
FIB4 SCORE: 1.18
FIB4 SCORE: 1.18
FIB4 SCORE: 0.99
FIB4 SCORE: 0.86
FIB4 SCORE: 1.969696969696969697
FIB4 SCORE: 1.82
FIB4 SCORE: 1.18
FIB4 SCORE: 0.86
FIB4 SCORE: 0.86
FIB4 SCORE: 1.9
FIB4 SCORE: 0.8
FIB4 SCORE: 0.8
FIB4 SCORE: 0.87
FIB4 SCORE: 1.969696969696969697
FIB4 SCORE: 1.54
FIB4 SCORE: 1.969696969696969697
FIB4 SCORE: 0.8
FIB4 SCORE: 1.18
FIB4 SCORE: 0.75

## 2023-01-01 ASSESSMENT — PULMONARY FUNCTION TESTS
FVC: 3.02
FEV1/FVC_PERCENT_PREDICTED: 74
FEV1: 1.79
FEV1/FVC_PREDICTED: 80
FEV1: 2.09
FVC: 3.85
FEV1/FVC_PERCENT_PREDICTED: 68
FEV1/FVC_PERCENT_CHANGE: -7
FEV1/FVC: 59
FEV1/FVC_PERCENT_LLN: 67
FEV1/FVC_PERCENT_PREDICTED: 73
FEV1/FVC_PERCENT_CHANGE: 63
FEV1_PERCENT_CHANGE: 27
FEV1/FVC_PERCENT_PREDICTED: 80
FVC_PERCENT_PREDICTED: 102
FVC_PERCENT_PREDICTED: 80
FEV1_LLN: 2.50
FEV1/FVC: 59
FEV1_PREDICTED: 3
FVC_PREDICTED: 3.77
FVC_LLN: 3.15
FEV1/FVC: 54
FEV1_PERCENT_PREDICTED: 69
FEV1_PERCENT_PREDICTED: 59
FEV1_PERCENT_CHANGE: 17
FEV1/FVC: 54.29
FEV1/FVC_PERCENT_PREDICTED: 67

## 2023-01-01 ASSESSMENT — COPD QUESTIONNAIRES
DURING THE PAST 4 WEEKS HOW MUCH DID YOU FEEL SHORT OF BREATH: MOST  OR ALL OF THE TIME
DO YOU EVER COUGH UP ANY MUCUS OR PHLEGM?: NO/ONLY WITH OCCASIONAL COLDS OR INFECTIONS
COPD SCREENING SCORE: 4
HAVE YOU SMOKED AT LEAST 100 CIGARETTES IN YOUR ENTIRE LIFE: NO/DON'T KNOW

## 2023-01-01 ASSESSMENT — SOCIAL DETERMINANTS OF HEALTH (SDOH)
DO YOU BELONG TO ANY CLUBS OR ORGANIZATIONS SUCH AS CHURCH GROUPS UNIONS, FRATERNAL OR ATHLETIC GROUPS, OR SCHOOL GROUPS?: PATIENT DECLINED
HOW OFTEN DO YOU GET TOGETHER WITH FRIENDS OR RELATIVES?: TWICE A WEEK
WITHIN THE PAST 12 MONTHS, YOU WORRIED THAT YOUR FOOD WOULD RUN OUT BEFORE YOU GOT THE MONEY TO BUY MORE: SOMETIMES TRUE
HOW OFTEN DO YOU ATTEND CHURCH OR RELIGIOUS SERVICES?: MORE THAN 4 TIMES PER YEAR
IN A TYPICAL WEEK, HOW MANY TIMES DO YOU TALK ON THE PHONE WITH FAMILY, FRIENDS, OR NEIGHBORS?: MORE THAN THREE TIMES A WEEK
HOW OFTEN DO YOU ATTENT MEETINGS OF THE CLUB OR ORGANIZATION YOU BELONG TO?: 1 TO 4 TIMES PER YEAR
HOW HARD IS IT FOR YOU TO PAY FOR THE VERY BASICS LIKE FOOD, HOUSING, MEDICAL CARE, AND HEATING?: SOMEWHAT HARD
HOW OFTEN DO YOU HAVE A DRINK CONTAINING ALCOHOL: MONTHLY OR LESS
HOW OFTEN DO YOU GET TOGETHER WITH FRIENDS OR RELATIVES?: TWICE A WEEK
HOW OFTEN DO YOU ATTENT MEETINGS OF THE CLUB OR ORGANIZATION YOU BELONG TO?: 1 TO 4 TIMES PER YEAR
HOW MANY DRINKS CONTAINING ALCOHOL DO YOU HAVE ON A TYPICAL DAY WHEN YOU ARE DRINKING: 1 OR 2
HOW OFTEN DO YOU ATTEND CHURCH OR RELIGIOUS SERVICES?: MORE THAN 4 TIMES PER YEAR
IN A TYPICAL WEEK, HOW MANY TIMES DO YOU TALK ON THE PHONE WITH FAMILY, FRIENDS, OR NEIGHBORS?: MORE THAN THREE TIMES A WEEK
DO YOU BELONG TO ANY CLUBS OR ORGANIZATIONS SUCH AS CHURCH GROUPS UNIONS, FRATERNAL OR ATHLETIC GROUPS, OR SCHOOL GROUPS?: PATIENT DECLINED
HOW OFTEN DO YOU HAVE SIX OR MORE DRINKS ON ONE OCCASION: NEVER

## 2023-01-01 ASSESSMENT — PATIENT HEALTH QUESTIONNAIRE - PHQ9
2. FEELING DOWN, DEPRESSED, IRRITABLE, OR HOPELESS: NOT AT ALL
SUM OF ALL RESPONSES TO PHQ9 QUESTIONS 1 AND 2: 0
2. FEELING DOWN, DEPRESSED, IRRITABLE, OR HOPELESS: NOT AT ALL
SUM OF ALL RESPONSES TO PHQ9 QUESTIONS 1 AND 2: 0
2. FEELING DOWN, DEPRESSED, IRRITABLE, OR HOPELESS: NOT AT ALL
SUM OF ALL RESPONSES TO PHQ9 QUESTIONS 1 AND 2: 0
SUM OF ALL RESPONSES TO PHQ9 QUESTIONS 1 AND 2: 0
1. LITTLE INTEREST OR PLEASURE IN DOING THINGS: NOT AT ALL
SUM OF ALL RESPONSES TO PHQ9 QUESTIONS 1 AND 2: 0
2. FEELING DOWN, DEPRESSED, IRRITABLE, OR HOPELESS: NOT AT ALL
2. FEELING DOWN, DEPRESSED, IRRITABLE, OR HOPELESS: NOT AT ALL
CLINICAL INTERPRETATION OF PHQ2 SCORE: 2
SUM OF ALL RESPONSES TO PHQ9 QUESTIONS 1 AND 2: 0
2. FEELING DOWN, DEPRESSED, IRRITABLE, OR HOPELESS: NOT AT ALL
1. LITTLE INTEREST OR PLEASURE IN DOING THINGS: NOT AT ALL
1. LITTLE INTEREST OR PLEASURE IN DOING THINGS: NOT AT ALL
2. FEELING DOWN, DEPRESSED, IRRITABLE, OR HOPELESS: NOT AT ALL
SUM OF ALL RESPONSES TO PHQ9 QUESTIONS 1 AND 2: 0
1. LITTLE INTEREST OR PLEASURE IN DOING THINGS: NOT AT ALL
SUM OF ALL RESPONSES TO PHQ QUESTIONS 1-9: 13
SUM OF ALL RESPONSES TO PHQ9 QUESTIONS 1 AND 2: 0
5. POOR APPETITE OR OVEREATING: 3 - NEARLY EVERY DAY
1. LITTLE INTEREST OR PLEASURE IN DOING THINGS: NOT AT ALL
1. LITTLE INTEREST OR PLEASURE IN DOING THINGS: NOT AT ALL
CLINICAL INTERPRETATION OF PHQ2 SCORE: 0
2. FEELING DOWN, DEPRESSED, IRRITABLE, OR HOPELESS: NOT AT ALL
1. LITTLE INTEREST OR PLEASURE IN DOING THINGS: NOT AT ALL
1. LITTLE INTEREST OR PLEASURE IN DOING THINGS: NOT AT ALL
SUM OF ALL RESPONSES TO PHQ9 QUESTIONS 1 AND 2: 0
1. LITTLE INTEREST OR PLEASURE IN DOING THINGS: NOT AT ALL
2. FEELING DOWN, DEPRESSED, IRRITABLE, OR HOPELESS: NOT AT ALL

## 2023-01-01 ASSESSMENT — PAIN SCALES - GENERAL
PAINLEVEL: 10=SEVERE PAIN
PAIN_LEVEL: 2
PAINLEVEL: 10=SEVERE PAIN
PAIN_LEVEL: 7
PAINLEVEL: 10=SEVERE PAIN
PAIN_LEVEL: 0
PAINLEVEL: 8=MODERATE-SEVERE PAIN

## 2023-01-01 ASSESSMENT — PAIN DESCRIPTION - DESCRIPTORS: DESCRIPTORS: ACHING

## 2023-01-10 NOTE — PROGRESS NOTES
Melly Shin  1975  [id number]    Consultation             Vitals:    01/10/23 0956   BP: (!) 154/95   BP Location: Left arm   Patient Position: Sitting   BP Cuff Size: Adult   Pulse: 86   Temp: 36.7 °C (98.1 °F)   TempSrc: Temporal   Weight: 95.3 kg (210 lb)   Height: 1.829 m (6')       Chief Complaint   Patient presents with    Follow-Up     ___________________________________________________________________            History of Present Illness (HPI) c/o growth on R medial eyelid for several months. Pt has had others removed on eyelids. Pt got one removed by PCP a few months ago.          Dr. Hdez has referred for a consultation to evaluate spots.     Current Outpatient Medications on File Prior to Visit   Medication Sig Dispense Refill    albuterol 108 (90 Base) MCG/ACT Aero Soln inhalation aerosol INHALE 2 PUFFS BY MOUTH EVERY 4 HOURS AS NEEDED FOR SHORTNESS OF BREATH 9 g 3    CHANTIX CONTINUING MONTH CINDY 1 MG tablet Take 1 mg by mouth 2 times a day.      loratadine (CLARITIN) 10 MG Tab Take 1 Tablet by mouth every day. 30 Tablet 0    EPINEPHrine (EPIPEN) 0.3 MG/0.3ML Solution Auto-injector solution for injection INJECT 0.3ML INTO THE SHOULDER, THIGH, OR BUTTOCKS ONE TIME FOR 1 DOSE      predniSONE (DELTASONE) 10 MG Tab Take 40mg x 4 days, then take 30mg x 4 days, then take 20mg x 4 days, then 10mg x 4 days with food, then discontinue. 40 Tablet 2    DULoxetine (CYMBALTA) 30 MG Cap DR Particles Take 30 mg by mouth at bedtime.      gabapentin (NEURONTIN) 100 MG Cap Take 100 mg by mouth 3 times a day.      hydrOXYzine HCl (ATARAX) 10 MG Tab Take 10 mg by mouth at bedtime as needed.      ipratropium-albuterol (DUONEB) 0.5-2.5 (3) MG/3ML nebulizer solution USE 1 AMPULE IN NEBULIZER 4 TIMES DAILY 180 mL 5    fluticasone (FLONASE) 50 MCG/ACT nasal spray Administer 2 Sprays into affected nostril(S) every day. 16 g 11    diclofenac sodium (VOLTAREN) 1 % Gel       HYDROcodone/acetaminophen (NORCO)   MG Tab Take 1-2 Tablets by mouth every 6 hours as needed.       No current facility-administered medications on file prior to visit.     Aspirin and Penicillins      Review of Systems:        General: Denies fever      Hematologic: no excessive bleeding problems              Past Medical History:   Diagnosis Date    Asthma     Chronic obstructive pulmonary disease (HCC)     Psychiatric disorder     Anxiety, Depression     Skin Cancer no h/o    Social History     Tobacco Use    Smoking status: Some Days     Packs/day: 1.00     Years: 20.00     Pack years: 20.00     Types: Cigarettes    Smokeless tobacco: Never    Tobacco comments:     on and off    Vaping Use    Vaping Use: Never used   Substance Use Topics    Alcohol use: Yes     Comment: occ, rare    Drug use: Not Currently     Comment: THC     Sunscreen Use:No     History reviewed. No pertinent surgical history.        Family History   Problem Relation Age of Onset    Diabetes Mother     Cancer Father          Physical Exam:   Constitutional: Well nourished, NAD, pleasant  alert and cooperative; normal mood and affect; normal attention span and concentration.    Skin focused exam   R medial canthus about 5 mm skin to brown colored pap  B/l eyelids with several brown pedunculated paps  B/l cheeks with several brown stuck on paps                Assessment and Plan:    1. Acrochordon  Explained pt can get tags around eyelids due to eyelids rubbing the skin.   Pt defers removal of spot on R medical canthus today due to driving in snow.   F/u 3 months for removal of spot on R medial canthus    2. 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    Explained spot is a wisdom spot. Recommend follow for changes. ABCD's of changing skin lesions were reviewed, and the appropriate use of sunscreen was outlined and recommended.          Sandra Arreola M.D.   Return in about 3 months (around 4/10/2023).

## 2023-01-24 NOTE — PROGRESS NOTES
Chief Complaint   Patient presents with    Follow-Up     Last seen 5/16/22    Results     Pft 1/24/23, cxr 11/29/22         HPI: This patient is a 47 y.o. female whom is followed in our clinic for COPD-asthma overalp last seen by me on 5/16/22.  Prior to that the pt was seen by me in August. PMHx is significant for chronic asthma diagnosed in infancy with possible element of COPD, degenerative joint disease followed in pain management on chronic opiates, history of iron deficiency anemia.  She is a current tobacco user and has smoked for the past 30 years up to 1 pack/day.    Patient presented to me to establish care after relocating from [facility] in August of 2020.  She reported being hospitalized almost once a month and on prednisone almost continuously prior to relocation.  She had been seen by allergy in the past and apparently was treated with allergy shot therapy which she felt contributed to her decline in respiratory status. She has had adverse rxn to Advair, Pulmicort, asmanex and is currently off all controller therapy using prednisone 20 mg daily for 3-4 days at a time to control sxs. She uses her JAVED and MDI multiple times daily. We tried to refer her to [place] but insurance was not accepted. She has not required hospitalization since I took over her care but symptom control has been poor with steroid dependence. She has fairly severe chronic sinusitis with associated nasal polyps as well as gastroesophageal reflux disease for which she has not been able to tolerate proton pump inhibitors or antihistamine therapy.  She has not had elevated IgE or eosinophil levels when checked last in 12/2020.  Pulmonary function testing from January 2021 showed a baseline FEV1/FVC ratio 59 with preserved FEV1 at 2.66 L or 87% predicted.  Postbronchodilator there was a 6% improvement in FEV1 to 2.84 L.Total lung capacity was within normal limits at 7.16 L of 117% predicted.  There was mild air trapping  with residual volume of 126% predicted.  Diffusion capacity within normal limits at 111% predicted.  CT chest from September 2021 showed 17 mm right lower lobe groundglass opacity stable compared to September 2020, 3 mm right lower lobe nodule also stable from 2020, mild emphysematous changes with diffuse bronchial wall thickening. Repeat PFTS today showed worsening airflow obstruction with BD response, normal DLCO. She is wheezing today.     Past Medical History:   Diagnosis Date    Asthma     Chronic obstructive pulmonary disease (HCC)     Psychiatric disorder     Anxiety, Depression       Social History     Socioeconomic History    Marital status:      Spouse name: Not on file    Number of children: Not on file    Years of education: Not on file    Highest education level: Associate degree: occupational, technical, or vocational program   Occupational History    Not on file   Tobacco Use    Smoking status: Some Days     Packs/day: 1.00     Years: 20.00     Pack years: 20.00     Types: Cigarettes     Passive exposure: Past    Smokeless tobacco: Never    Tobacco comments:     on and off    Vaping Use    Vaping Use: Never used   Substance and Sexual Activity    Alcohol use: Yes     Comment: occ, rare    Drug use: Not Currently     Comment: THC    Sexual activity: Not on file   Other Topics Concern    Not on file   Social History Narrative    Not on file     Social Determinants of Health     Financial Resource Strain: Medium Risk    Difficulty of Paying Living Expenses: Somewhat hard   Food Insecurity: Food Insecurity Present    Worried About Running Out of Food in the Last Year: Sometimes true    Ran Out of Food in the Last Year: Patient refused   Transportation Needs: Unmet Transportation Needs    Lack of Transportation (Medical): Yes    Lack of Transportation (Non-Medical): Yes   Physical Activity: Inactive    Days of Exercise per Week: 0 days    Minutes of Exercise per Session: 0 min   Stress: Stress  Concern Present    Feeling of Stress : To some extent   Social Connections: Moderately Integrated    Frequency of Communication with Friends and Family: More than three times a week    Frequency of Social Gatherings with Friends and Family: Twice a week    Attends Hoahaoism Services: More than 4 times per year    Active Member of Clubs or Organizations: Patient refused    Attends Club or Organization Meetings: 1 to 4 times per year    Marital Status:    Intimate Partner Violence: Not on file   Housing Stability: Low Risk     Unable to Pay for Housing in the Last Year: No    Number of Places Lived in the Last Year: 1    Unstable Housing in the Last Year: No       Family History   Problem Relation Age of Onset    Diabetes Mother     Cancer Father        Current Outpatient Medications on File Prior to Visit   Medication Sig Dispense Refill    fluticasone (FLONASE) 50 MCG/ACT nasal spray USE 2 SPRAY(S) INTO AFFECTED NOSTRILS ONCE DAILY 16 g 3    albuterol 108 (90 Base) MCG/ACT Aero Soln inhalation aerosol INHALE 2 PUFFS BY MOUTH EVERY 4 HOURS AS NEEDED FOR SHORTNESS OF BREATH 9 g 3    CHANTIX CONTINUING MONTH CINDY 1 MG tablet Take 1 mg by mouth 2 times a day.      loratadine (CLARITIN) 10 MG Tab Take 1 Tablet by mouth every day. 30 Tablet 0    EPINEPHrine (EPIPEN) 0.3 MG/0.3ML Solution Auto-injector solution for injection INJECT 0.3ML INTO THE SHOULDER, THIGH, OR BUTTOCKS ONE TIME FOR 1 DOSE      predniSONE (DELTASONE) 10 MG Tab Take 40mg x 4 days, then take 30mg x 4 days, then take 20mg x 4 days, then 10mg x 4 days with food, then discontinue. 40 Tablet 2    DULoxetine (CYMBALTA) 30 MG Cap DR Particles Take 30 mg by mouth at bedtime.      gabapentin (NEURONTIN) 100 MG Cap Take 100 mg by mouth 3 times a day.      hydrOXYzine HCl (ATARAX) 10 MG Tab Take 10 mg by mouth at bedtime as needed.      ipratropium-albuterol (DUONEB) 0.5-2.5 (3) MG/3ML nebulizer solution USE 1 AMPULE IN NEBULIZER 4 TIMES DAILY 180 mL 5     "diclofenac sodium (VOLTAREN) 1 % Gel       HYDROcodone/acetaminophen (NORCO)  MG Tab Take 1-2 Tablets by mouth every 6 hours as needed.       No current facility-administered medications on file prior to visit.       Aspirin and Penicillins      ROS:   Review of Systems   Constitutional:  Negative for chills, diaphoresis, fever, malaise/fatigue and weight loss.   HENT:  Negative for congestion, ear discharge, ear pain, hearing loss, nosebleeds, sinus pain, sore throat and tinnitus.    Eyes:  Negative for blurred vision, double vision, photophobia, pain, discharge and redness.   Respiratory:  Positive for cough, sputum production, shortness of breath and wheezing. Negative for hemoptysis and stridor.    Cardiovascular:  Negative for chest pain, palpitations, orthopnea, claudication, leg swelling and PND.   Gastrointestinal:  Negative for abdominal pain, constipation, diarrhea, heartburn, nausea and vomiting.   Genitourinary:  Negative for dysuria and urgency.   Musculoskeletal:  Negative for back pain, falls, joint pain, myalgias and neck pain.   Skin:  Negative for itching and rash.   Neurological:  Negative for dizziness, tremors, speech change, focal weakness, weakness and headaches.   Endo/Heme/Allergies:  Negative for environmental allergies.   Psychiatric/Behavioral:  Negative for depression.      /86   Pulse 97   Resp 16   Ht 1.803 m (5' 11\")   Wt 95.3 kg (210 lb)   SpO2 97%   Physical Exam  Constitutional:       General: She is not in acute distress.     Appearance: Normal appearance. She is well-developed and normal weight.   HENT:      Head: Normocephalic and atraumatic.      Right Ear: External ear normal.      Left Ear: External ear normal.      Nose: Nose normal. No congestion.      Mouth/Throat:      Mouth: Mucous membranes are moist.      Pharynx: Oropharynx is clear. No oropharyngeal exudate.   Eyes:      General: No scleral icterus.     Extraocular Movements: Extraocular movements " intact.      Conjunctiva/sclera: Conjunctivae normal.      Pupils: Pupils are equal, round, and reactive to light.   Neck:      Vascular: No JVD.      Trachea: No tracheal deviation.   Cardiovascular:      Rate and Rhythm: Normal rate and regular rhythm.      Heart sounds: Normal heart sounds. No murmur heard.    No friction rub. No gallop.   Pulmonary:      Effort: Pulmonary effort is normal. No accessory muscle usage or respiratory distress.      Breath sounds: Wheezing present. No rales.      Comments: Diffuse expiratory wheezing  Abdominal:      General: There is no distension.      Palpations: Abdomen is soft.      Tenderness: There is no abdominal tenderness.   Musculoskeletal:         General: No tenderness or deformity. Normal range of motion.      Cervical back: Neck supple.      Right lower leg: No edema.      Left lower leg: No edema.   Lymphadenopathy:      Cervical: No cervical adenopathy.   Skin:     General: Skin is warm and dry.      Findings: No rash.      Nails: There is no clubbing.   Neurological:      Mental Status: She is alert and oriented to person, place, and time.      Cranial Nerves: No cranial nerve deficit.      Gait: Gait normal.   Psychiatric:         Behavior: Behavior normal.       PFTs as reviewed by me personally: as per hPI    Imagaing as reviewed by me personally:  as per hPI    Assessment:  1. Severe persistent asthma without complication  Referral to Allergy    predniSONE (DELTASONE) 10 MG Tab    azithromycin (ZITHROMAX) 250 MG Tab      2. Tobacco use        3. Abnormal CT of the chest        4. History of anaphylaxis            Plan:  Chronic, severe, poorly controlled. I would like to try breztri on her and have her see allergy. I will tx her with prednisone and azithromycin acutely and we will consider Tezspire  Counseled on complete tobacco cessation   Needs repeat CT which was ordered; pt will schedule  Has epi pen; referral to allergy  Return in about 3 months (around  4/24/2023) for as soon as can be seen by Dr. Gil; place on cancellation .

## 2023-01-24 NOTE — PROCEDURES
Technician: JENNIFER Leone    Technician Comment:  Good patient effort & cooperation.  The results of this test meet the ATS/ERS standards for acceptability & reproducibility.  Test was performed on the Cal[employer] Body Plethysmograph-Elite DX system.  Predicted values were GLI-2012 for spirometry, GLI-2017 for DLCO, ITS for Lung Volumes.  The DLCO was uncorrected for Hgb.  A bronchodilator of Ventolin HFA -2puffs via spacer administered.  DLCO performed during dilation period.    Interpretation:   There is a moderately severe obstructive ventilatory defect on spirometry.  There is a significant bronchodilator response in nearly all measured forced volumes.    Compared to prior testing in 2021, the FEV1 is declined significantly from 2.66 L to 1.79 L, or 59% predicted.    Lung volumes and diffusion capacity are supranormal.    Flow volume loop is consistent with above interpretation.    ILuis M.D. am the author of this note (PFT interpretation).    __________  Luis Jin MD  Pulmonary and Critical Care Medicine  [facility]

## 2023-01-31 NOTE — TELEPHONE ENCOUNTER
----- Message from Dimitri Hdez Jr., M.D. sent at 1/30/2023  8:16 PM PST -----  Call the patient and let her know that her Pulmonologist, Dr. Gil, who ordered this CT, has concerning findings and the patient needs to schedule an appointment with her Pulmonologist to discuss.   ----- Message -----  From: Intf, Radiant In  Sent: 1/30/2023   6:12 PM PST  To: Dimitri Hdez Jr., M.D.

## 2023-02-08 NOTE — PREPROCEDURE INSTRUCTIONS
"Preadmit Phone appointment: \" Preparing for your Procedure information\" Instructions discussed with Patient.       Patient instructed to continue prescribed medications through the day before surgery, instructed to take the following medications the day of surgery per anesthesia protocol: inhalers as needed, epi pen if needed, Norco if needed, Atarax if needed, prednisone.        Pt states, \"no issues with anesthesia\".  Fasting guidelines discussed with Patient, patient will follow MD's instructions for Pre-Surgery Diet.      All Pt's questions and concerns answered or addressed.  Emailed all instructions.    "

## 2023-02-14 PROBLEM — R91.8 MULTIPLE NODULES OF LUNG: Status: ACTIVE | Noted: 2023-01-01

## 2023-02-14 NOTE — PROCEDURES
Fiberoptic Bronchoscopy and navigational bronchoscopy     Date/Time of Procedure:2/14/2023      Indication(s): rll growing lesion    Consent: Informed, written consent was obtained prior to the procedure; risks, benefits, & alternatives were discussed.    Universal Protocol/Time Out: A Time Out with checklist was performed prior to the procedure to ensure correct identification of the patient and procedure.    Pre-Procedure Diagnosis: r/po sarcoid versus maligancy    Allergies:Aspirin, Other environmental, and Penicillins     Sedation/Analgesia/Anesthesia: Sedation as per anesthesia.    Additional Modalities:    [x] Fluoroscopy  [x] Radial Ultrasound  [X] Rapid On-Site Evaluation    Route of Entry:  [_] Nasal [_] Oral [X] ET tube [_] Trach [_] LMA      Findings: After the patient is adequately anesthetized (see procedure for anesthesia), the flexible bronchoscope was passed through the endotracheal tube visualizing the distal trachea:  Trachea: normal appearing  Rosalind: sharp  Right lung: RUL, RML, and RLL all level one subsegments visualized.no endobronchial US  Left lung: STAN, Lingula and LLL , All level one subsegments visualized. No endobronchial US    Robotic Navigational Bronchoscopy    Robotic navigation bronchoscopy was performed with Ion platform.  Partial  registration was used.    Ion robotic catheter was used to engage the medial segment of right  lung.  Target lesion is about 1.7 cm in diameter.   Under navigational guidance the ion robotic catheter was advanced to 0.5 cm away from the planned target.     Radial EBUS was performed to confirm that the location of the nodule is  eccentric.      Transbronchial needle aspiration was performed with 21 gauge needle through the extended working channel catheter.  Total 6 samples were collected.  Samples sent for pathology.    Transbronchial biopsy was performed with forceps through the extended working channel catheter.  Total 6 samples were collected.   Samples sent for pathology    Bronchial alveolar lavage was performed the extended working channel catheter.  Instilled 20 cc of NS, suction returned with 12 cc of NS.  Samples sent for cytology and cultures.    DARRIUS findings: + tumor cells        Specimens:   [x] Bronchoalveolar Lavage (BAL): cytology and cultures  [x] Transbronchial Needle Aspiration (TBNA): pathology  [ x] Endobronchial Biopsy (Endo):  pathology    EBL: < 5 cc    No immediate complications    CXR pending    Impression and plan  RLL 1. 7 cm lesion that is cavitating and with uncontrolled asthma  Bx to rule out sarcoid verus malignancy  DARRIUS suspicious for malignancy will await final  CXR pending

## 2023-02-14 NOTE — OR NURSING
"1550: Care assumed of awake pt who is c/o of dry throat but denies pain/nausea/dyspnea. Awaiting CXR  1600: Xray at bedside  1615: Pt c/o \"tightness\" and requesting breathing treatment - same planned  1622: Pt resting quietly in no distress with eyes  closed, eupneic, inhaling albuterol.  1628: Albuterol complete, pt verbalizes resolution of tightness.  1630: Dozing, awaiting d/c order.  1645: No change  1653: s/b Dr Prado. Meets criteria to transfer to Stage 2             "

## 2023-02-14 NOTE — DISCHARGE INSTRUCTIONS
Bronchoscopy Discharge Instructions  Home Care Instructions    ACTIVITY: Rest and take it easy for the first 24 hours.  A responsible adult is recommended to remain with you during that time.  It is normal to feel sleepy.  We encourage you to not do anything that requires balance, judgment or coordination.    The medicine you had during the bronchoscopy will make you sleepy.    FOR 24 HOURS DO NOT:  Drive, operate machinery or run household appliances.  Drink beer or alcoholic beverages.  Make important decisions or sign legal documents.  Engage in activity that requires sharp judgment and reflexes for 24 hours    SPECIAL INSTRUCTIONS:   -Call you doctor and go to the ER if you are coughing up more than 2 tablespoons of bright red blood.   -Call your doctor and go to the ER if you experience acute onset of shortness of breath and/or increased chest pain.   -Call your doctor and go to the ER if you develop a fever greater than 101.5 degrees Fahrenheit.    Bronchoscopy is a procedure to look inside your windpipe and bronchial tubes.  An anesthetic solution is sprayed in your throat to make it numb.    You may experience a mild sore throat, hoarseness, fever up to 101?F, and /or coughing up small amounts of blood immediately following your bronchoscopy, especially if a biopsy was performed.  The discomfort should subside in 24-48 hours.    Do not smoke for 6-8 hours after the procedure to decrease your risk of coughing and /or bleeding.    Do not drink fluids or eat until your gag reflex returns, for two hours after the bronchoscopy.  Otherwise you will not feel the food or fluid in your throat, and it may go down your windpipe and cause you to choke.    Take ice chips or slowly sip cool fluids to make sure your gag reflex has returned.  Avoid hot fluids from the microwave for several hours.    After 2 hours or when you get home you may take throat lozenges or gargle with salt water if your throat is sore.  Drink  liquids to help dryness in your mouth and throat.    Resume your normal activities the following day.    MEDICATIONS: Resume taking daily medication as directed by your doctor.     A follow-up appointment should be arranged with your doctor in 1 week to get the results of the bronchoscopy and any tests done during the procedure; call to schedule.      You should CALL YOUR PHYSICIAN if you develop:  Fever greater than 101?F  You cough up more than a teaspoon of blood other than blood-tinged mucus  You have increasing amounts of bleeding from coughing after the bronchoscopy  You are wheezing  You develop any unusual signs or symptoms or have any questions                You should call 911 if you develop problems with breathing or chest pain.    If you are unable to contact your doctor or surgical center, you should go to the nearest emergency room or urgent care center.  Physician's telephone #: [phone]      If any questions arise, call your doctor.  If your doctor is not available, please feel free to call the Surgical Center at [phone].  The Center is open Monday through Friday from 7AM to 7PM.  You can also call the [facility] HOTLINE open 24 hours/day, 7 days/week and speak to a nurse at [phone], or toll free at [phone].    You may receive a survey in the mail within the next two weeks and we ask that you take a few moments to complete the survey and return it to us.  Our goal is to provide you with very good care and we value your comments.    Discharge Education for patients on MICHELL (Obstructive Sleep Apnea) Protocol    Prior to receiving sedation or anesthesia, we screen all patients for Obstructive Sleep Apnea.  During your screening, you were identified as having suspected, but not confirmed Obstructive Sleep Apnea(MICHELL).    What is Obstructive Sleep Apnea?  Sleep apnea (AP-ne-ah) is a common disorder which involves breathing pauses that occur during sleep.  These can last from 10 seconds to a  minute or longer.  Normal breathing resumes often with a loud snort or choking sound.    Sleep apnea occurs in all age groups and both genders but is more common in men and people over 40 years of age.  It has been estimated that as many as 18 million Americans have sleep apnea.  Most people who have sleep apnea don’t know they have it because it only occurs during sleep.  A family member and/or bed partner may first notice the signs of sleep apnea.  Sleep apnea is a chronic (ongoing) condition that disrupts the quality and quantity of your sleep repeatedly throughout the night.  This often results in excessive daytime sleepiness or fatigue during the day.  It may also contribute to high blood pressure, heart problems, and complications following medications used for surgery and procedures.    To establish a definitive diagnosis, further testing from a specialist would be needed.  We recommend that you follow up with your primary care physician.    We recommend that you should be with an adult observer for at least 24 hours after your sedation/anesthesia.  If you have a CPAP machine, you should wear it during any sleep period (day or night) for the week following your procedure.  We encourage you to sleep on your side or in a sitting position, even with napping.  Lying flat on your back increases the risk of apnea and airway obstruction during your post procedure recovery period.    It is important to prevent over-sedation that could increase your risk for apnea.  Please take all pain medication as directed by your physician.  If you are not getting pain relief, please contact your physician to discuss possible approaches to relieving pain while minimizing medications that can affect your breathing and oxygen levels.

## 2023-02-14 NOTE — ANESTHESIA POSTPROCEDURE EVALUATION
Patient: Melly Hendrickson    Procedure Summary     Date: 02/14/23 Room / Location:  PROCEDURE ROOM / SURGERY [facility]    Anesthesia Start: 1432 Anesthesia Stop: 1542    Procedures:       FIBER OPTIC BRONCHOSCOPY WITH POSSIBLE WASH, BRUSH, BRONCHOALVEOLAR LAVAGE, BIOPSY AND FINE NEEDLE ASPIRATION AND NAVIGATION, ROBOTICS      ENDOBRONCHIAL ULTRASOUND (EBUS) Diagnosis:       Lung nodule      (LUNG NODULE)    Surgeons: Guicho Ellis M.D. Responsible Provider: Wesley Colmenares M.D.    Anesthesia Type: general ASA Status: 3          Final Anesthesia Type: general  Last vitals  BP   Blood Pressure: (!) 146/97    Temp   36.7 °C (98 °F)    Pulse   (!) 101   Resp        SpO2   98 %      Anesthesia Post Evaluation    Patient location during evaluation: PACU  Patient participation: complete - patient participated  Level of consciousness: awake and alert  Pain score: 2    Airway patency: patent  Anesthetic complications: no  Cardiovascular status: hemodynamically stable  Respiratory status: acceptable  Hydration status: euvolemic    PONV: none          No notable events documented.     Nurse Pain Score: 0 (NPRS)

## 2023-02-14 NOTE — ANESTHESIA PROCEDURE NOTES
Airway    Date/Time: 2/14/2023 2:36 PM  Performed by: Wesley Colmenares M.D.  Authorized by: Wesley Colmenares M.D.     Location:  OR  Urgency:  Elective  Indications for Airway Management:  Anesthesia      Spontaneous Ventilation: absent    Sedation Level:  Deep  Preoxygenated: Yes    Patient Position:  Sniffing  Final Airway Type:  Endotracheal airway  Final Endotracheal Airway:  ETT  Cuffed: Yes    Technique Used for Successful ETT Placement:  Direct laryngoscopy    Insertion Site:  Oral  Blade Type:  Mckayla  Laryngoscope Blade/Videolaryngoscope Blade Size:  3  ETT Size (mm):  8.0  Measured from:  Teeth  ETT to Teeth (cm):  22  Placement Verified by: auscultation and capnometry    Cormack-Lehane Classification:  Grade I - full view of glottis  Number of Attempts at Approach:  1

## 2023-02-14 NOTE — ANESTHESIA PREPROCEDURE EVALUATION
Case: [id number] Date/Time: 02/14/23 1415    Procedures:       FIBER OPTIC BRONCHOSCOPY WITH POSSIBLE WASH, BRUSH, BRONCHOALVEOLAR LAVAGE, BIOPSY AND FINE NEEDLE ASPIRATION AND NAVIGATION, ROBOTICS      ENDOBRONCHIAL ULTRASOUND (EBUS)    Pre-op diagnosis: LUNG NODULE    Location:  PROCEDURE ROOM / SURGERY [facility]    Surgeons: Guicho Ellis M.D.          Relevant Problems   ANESTHESIA   (positive) Obstructive sleep apnea      PULMONARY   (positive) Chronic obstructive pulmonary disease, unspecified (HCC)      CARDIAC   (positive) Hypertension      GI   (positive) Gastroesophageal reflux disease with esophagitis       Physical Exam    Airway   Mallampati: II  TM distance: >3 FB  Neck ROM: full       Cardiovascular - normal exam  Rhythm: regular  Rate: normal  (-) murmur     Dental - normal exam           Pulmonary - normal exam  Breath sounds clear to auscultation     Abdominal    Neurological - normal exam                 Anesthesia Plan    ASA 3   ASA physical status 3 criteria: respiratory insufficiency or compromise    Plan - general       Airway plan will be ETT          Induction: intravenous    Postoperative Plan: Postoperative administration of opioids is intended.    Pertinent diagnostic labs and testing reviewed    Informed Consent:    Anesthetic plan and risks discussed with patient.    Use of blood products discussed with: patient whom consented to blood products.

## 2023-02-14 NOTE — OR NURSING
1538: To PACU from special procedures via shmuel, report received from anesthesia and RN. Respirations are spontaneous and unlabored. VSS on 10L. 1 hour stay in pacu waived per MD Colmenares.    1545: Pt states throat dry, otherwise no pain. Pt denies nausea. Suction at bedside for moderate sputum. Pt weaned to room air.     1550: Report to ANA LUISA Ozuna.

## 2023-02-14 NOTE — ANESTHESIA TIME REPORT
Anesthesia Start and Stop Event Times     Date Time Event    2/14/2023 1355 Ready for Procedure     1432 Anesthesia Start     1542 Anesthesia Stop        Responsible Staff  02/14/23    Name Role Begin End    Wesley Colmenares M.D. Anesth 1432 1542        Overtime Reason:  no overtime (within assigned shift)    Comments: JOHNNIE LATE CALL

## 2023-02-14 NOTE — OR NURSING
1244 Procedure, patient allergies and NPO status verified. Home med rec completed and belongings secured. Patient verbalizes understanding of pain scale, expected course of stay and plan of care. IV access established.     1344 Preop complete.

## 2023-02-15 NOTE — OR NURSING
1654: To stage ll. Up to bathroom w/ CNA assist.    1712: Home care instructions reviewed w/ pt and friend. No questions. Meets criteria for discharge.

## 2023-02-17 NOTE — PROGRESS NOTES
Reviewed pathology results from ion bronchoscopy which shows moderately differentiated adenocarcinoma of lung origin. Spoke with patient by phone and reviewed results. Pt is hesitant to consider chemotherapy or radiation. She would like to see if she is surgical candidate. No LAD on CT and other than RAD pt has mostly preserved lung function. Will place urgent consult to [facility]

## 2023-02-22 PROBLEM — D17.1 LIPOMA OF ABDOMINAL WALL: Status: RESOLVED | Noted: 2021-07-12 | Resolved: 2023-01-01

## 2023-02-23 PROBLEM — C34.91 PRIMARY ADENOCARCINOMA OF RIGHT LUNG (HCC): Status: ACTIVE | Noted: 2023-01-01

## 2023-02-23 PROBLEM — J30.2 SEASONAL ALLERGIES: Status: ACTIVE | Noted: 2023-01-01

## 2023-02-23 NOTE — PROGRESS NOTES
Teaching Physician Attestation      Level of Participation    I have personally interviewed and examined the patient.  In addition, I discussed with the resident physician the patient's history, exam, assessment and plan in detail.  Topics listed in my addendum were the focus of the visit.  Healthcare maintenance was not addressed this visit unless listed as a topic in my addendum.  I agree with the plan as written along with the following additions/modifications:      Lung adendocarcinoma, biopsy proven  -dx by pulm, appreciate pulm and oncology support  -psychology referral for related stress/copings skills, appreciate support  -supportively counseled    Tobacco use  -discussed link to above.  Encouranged cessation.  -1800 quit now discussed  -has chantix, no SI/HI.    Rtc 5 weeks for f/u.

## 2023-02-23 NOTE — PATIENT INSTRUCTIONS
-Psychology: [phone] to schedule appointment  -contact the surgical center as well as Pulmonology with Dr. Gil to get referred to Oncology  -call the pharmacy to see if can get Chantix, if not let me know if you are wlling to start nicotine patches  -taper down on cigarette use a day by 1 less cigarette a day  -face to face appointment with Dr. Hdez 5 weeks

## 2023-02-23 NOTE — PROGRESS NOTES
Established Patient    This evaluation was conducted via Zoom using secure and encrypted videoconferencing technology. The patient was in their home in the [facility].    The patient's identity was confirmed and verbal consent was obtained for this virtual visit.      Melly presents today with the following:    CC: Follow-up    HPI: 47-year-old female presents for a follow-up.      CT scan of the chest demonstrated 1.4 cm mass in the right lower lobe, 1.9 cm groundglass opacity right lower lobe similar to recent examination and 8.1 mm thin-walled cavity in the left upper lobe unchanged.  This led to the patient having a  bronchoscopy with bronchoalveolar velar lavage and biopsy and fine-needle aspiration with robotic endobronchial ultrasound this month by pulmonology.  Pathology demonstrated for moderately differentiated lung adenocarcinoma.Notified by Dr. Gil from Pulmonology recently with pathology results. Called by [facility], but has not got back in touch with them. The patient reports she is being referred to Oncology from Pulmonology.  Reports she feels overwhelmed with the new diagnosis of cancer.    Smoker: 7-8 cigarettes/day for 20 years. Prescribed Chantix, not started yet due to recall. Reports she is quitting now.      Patient Active Problem List    Diagnosis Date Noted    Primary adenocarcinoma of right lung (HCC) 02/23/2023    Seasonal allergies 02/23/2023    Multiple nodules of lung 02/14/2023    Obesity (BMI 30-39.9) 10/13/2022    Obstructive sleep apnea 09/08/2022    Cigarette nicotine dependence without complication 09/08/2022    Generalized anxiety disorder 07/12/2021    Hyperlipidemia 07/12/2021    Hypertension 07/12/2021    Other ovarian cyst, left side 06/15/2021    Prediabetes 06/15/2021    Gastroesophageal reflux disease with esophagitis 02/11/2021    Chronic obstructive pulmonary disease, unspecified (HCC) 09/21/2020    Chronic sinusitis, unspecified 09/21/2020     Degeneration of intervertebral disc of lumbosacral region 09/21/2020       Social History     Tobacco Use    Smoking status: Some Days     Packs/day: 1.00     Years: 20.00     Pack years: 20.00     Types: Cigarettes     Passive exposure: Past    Smokeless tobacco: Never    Tobacco comments:     on and off    Vaping Use    Vaping Use: Never used   Substance Use Topics    Alcohol use: Not Currently     Comment: occ, rare    Drug use: Not Currently     Comment: THC       Current Outpatient Medications   Medication Sig Dispense Refill    loratadine (CLARITIN) 10 MG Tab Take 1 Tablet by mouth every day for 90 days. 90 Tablet 1    Budeson-Glycopyrrol-Formoterol (BREZTRI AEROSPHERE) 160-9-4.8 MCG/ACT Aerosol Inhale 2 Puffs 2 times a day. 10.7 g 0    fluticasone (FLONASE) 50 MCG/ACT nasal spray USE 2 SPRAY(S) INTO AFFECTED NOSTRILS ONCE DAILY 16 g 3    albuterol 108 (90 Base) MCG/ACT Aero Soln inhalation aerosol INHALE 2 PUFFS BY MOUTH EVERY 4 HOURS AS NEEDED FOR SHORTNESS OF BREATH 9 g 3    EPINEPHrine (EPIPEN) 0.3 MG/0.3ML Solution Auto-injector solution for injection INJECT 0.3ML INTO THE SHOULDER, THIGH, OR BUTTOCKS ONE TIME FOR 1 DOSE      predniSONE (DELTASONE) 10 MG Tab Take 40mg x 4 days, then take 30mg x 4 days, then take 20mg x 4 days, then 10mg x 4 days with food, then discontinue. 40 Tablet 2    DULoxetine (CYMBALTA) 30 MG Cap DR Particles Take 30 mg by mouth at bedtime.      gabapentin (NEURONTIN) 100 MG Cap Take 100 mg by mouth every evening.      hydrOXYzine HCl (ATARAX) 10 MG Tab Take 10 mg by mouth at bedtime as needed.      ipratropium-albuterol (DUONEB) 0.5-2.5 (3) MG/3ML nebulizer solution USE 1 AMPULE IN NEBULIZER 4 TIMES DAILY 180 mL 5    diclofenac sodium (VOLTAREN) 1 % Gel       HYDROcodone/acetaminophen (NORCO)  MG Tab Take 1-2 Tablets by mouth every 6 hours as needed.      traMADol (ULTRAM) 50 MG Tab       predniSONE (DELTASONE) 10 MG Tab Take 30mg x 3 days, then take 20mg x 3 days, then  take 10mg x 3 days, with food, then discontinue. 18 Tablet 0     No current facility-administered medications for this visit.       Review of Systems   Constitutional:  Negative for chills, fever, malaise/fatigue and weight loss.   HENT:  Negative for congestion and sore throat.    Eyes:  Negative for blurred vision and double vision.   Respiratory:  Negative for cough, shortness of breath and wheezing.    Cardiovascular:  Negative for chest pain and palpitations.   Gastrointestinal:  Negative for abdominal pain, constipation, diarrhea, heartburn, nausea and vomiting.   Genitourinary:  Negative for dysuria, frequency and urgency.   Musculoskeletal:  Negative for falls, joint pain and myalgias.   Skin:  Negative for rash.   Neurological:  Negative for dizziness, weakness and headaches.   Psychiatric/Behavioral:  Negative for depression, substance abuse and suicidal ideas. The patient is nervous/anxious and has insomnia.        Ht 1.829 m (6')   Wt 93 kg (205 lb)   BMI 27.80 kg/m²       PHYSICAL EXAM:  Physical Exam  Vitals and nursing note reviewed.   Constitutional:       General: She is not in acute distress.     Appearance: Normal appearance. She is not ill-appearing.   HENT:      Head: Normocephalic and atraumatic.   Neurological:      Mental Status: She is alert.   Psychiatric:         Mood and Affect: Mood is anxious. Affect is tearful.         Behavior: Behavior normal.         Thought Content: Thought content does not include homicidal or suicidal ideation.       Note: I have reviewed all pertinent labs and diagnostic tests associated with this visit with specific comments listed under the assessment and plan below    Assessment and Plan    1. Primary adenocarcinoma of right lung (HCC)  Discussed with the patient the need to see oncology and to contact [facility] today.  She reports after discussion with me that she will contact Dr. Gil with Pulmonology and agrees to see Oncology and will  also call the [facility] today to set up an appointment.  Discussed with the patient for the need to see psychology for grief counseling given the recent diagnosis which the patient agreed to.  - Referral to Psychology    2. Cigarette nicotine dependence without complication  Patient smokes 7-8 cigarettes daily for 20 years.    I discussed cessation with patient including starting on nicotine patch and/or gum on discharge.  I also discussed medications to help with cessation with patient including Wellbutrin and Chantix, patches .  Smoking cessation discussed with patient for 4 minutes.Previous attempts to quit: yes.  Patient reports she is willing to quit but reports that she when she went to  her Chantix that the pharmacy said it was recalled so she has not started.  She is willing to start patches.  I instructed the patient to call the pharmacy and see if we can have her able to start the Chantix if not I told the patient to contact me via [employer] and I will prescribe patches.  Patient verbalized understanding of these instructions.  Patient does have a history of general anxiety disorder with PHQ-9 score of 13 today but denies suicidal or homicidal ideation.  There would be no contraindication to starting Chantix or nicotine patches at this time.      3. Grief counseling  See diagnosis #1  - Referral to Psychology    4. Seasonal allergies  Refilled Claritin  - loratadine (CLARITIN) 10 MG Tab; Take 1 Tablet by mouth every day for 90 days.  Dispense: 90 Tablet; Refill: 1         Followup: Return in about 5 weeks (around 3/30/2023).      Signed by: Dimitri Hdez Jr., M.D.

## 2023-02-24 NOTE — TELEPHONE ENCOUNTER
I called the patient and informed her that I reached out to Dr. Gil her pulmonologist today via the electronic medical record to reach back out to the patient.  The patient was very appreciative of that.  I also informed the patient that if she takes the Chantix that she experiences suicidal thoughts or thoughts of wanting to hurt others as well as depressive thoughts to stop the medication and call me immediately.  Patient verbalized understanding of these instructions.  Patient for me she is scheduled to see the surgery clinic for initial consultation on March 9 and would like to see me prior to her imminent surgery.  I told the patient that the soonest I could see her would be March 27, 2023 and the patient said that would be fine.  Informed the patient I would let the  know to schedule her and that they will give her a call to set up a time on that day to see me.  The patient was very happy about that and all questions were answered.

## 2023-03-30 NOTE — CONSULTS
RADIATION ONCOLOGY CONSULT    Patient name:  Melly Hendrickson    Primary Physician:  Dimitri Hdez Jr., M.D. MRN: [id number]  [facility]: [id number]   Referring physician:  Ganser, John H, M.D.  : 1975, 47 y.o.     DATE OF SERVICE: 3/30/2023    IDENTIFICATION: A 47 y.o. female with   Primary adenocarcinoma of right lung (HCC)  Staging form: Lung, AJCC 8th Edition  - Clinical stage from 3/30/2023: Stage IA2 (cT1b, cN0, cM0) - Signed by Hai Barrera M.D. on 3/30/2023  Histopathologic type: Adenocarcinoma, NOS  Stage prefix: Initial diagnosis        She is here at the kind request of Dr. Ganser, John H, M.D.        HISTORY OF PRESENT ILLNESS:  Subjective     This is a 47-year-old lady, with a 25-pack-year smoking history, current smoker, who presents for consideration of stereotactic radiation for recently diagnosed stage I right lower lobe adenocarcinoma.  Overall, she was doing relatively well until chest x-ray performed in February revealed an abnormality in the right lung.  This led to a chest CT that was done on  that demonstrated 1.4 cm mass in the right lower lobe, as well as a 1.9 cm groundglass opacity also in the right lower lobe.  The groundglass opacity was relatively similar, but the mass is new from prior exams.  This led to work-up with thoracic surgery consultation as well as PFTs and a PET scan and discussion tumor board.  Her FEV1 is 1.79 L, 59%, with DLCO preserved.  However she does have significant dyspnea, and her lungs are quite emphysematous and hyperexpanded on her CT.  She did meet with thoracic surgery and was felt not to be a good candidate for resection given her already significant dyspnea despite her preserved PFTs.  PET scan and biopsy were also completed that showed intense FDG uptake at this mass with an SUV of 22 and no other distant sites of disease.  Biopsy confirmed primary lung adenocarcinoma.  She was discussed at tumor board and felt to be a better  candidate for stereotactic radiation for which she presents today to discuss further.    Currently, she feels relatively well, has no specific cardiopulmonary complaints except for the occasional cough and dyspnea with exertion.  She does report occasional headaches, as well as significant sleep disturbance and anxiety ever since her diagnosis.  Overall, she certainly has difficulty coping with her diagnosis, she has significant anxiety, and continues to smoke as a way to cope with this.  She has not seen smoking cessation as yet.      PROBLEM LIST:  Patient Active Problem List   Diagnosis    Chronic obstructive pulmonary disease, unspecified (HCC)    Chronic sinusitis, unspecified    Degeneration of intervertebral disc of lumbosacral region    Gastroesophageal reflux disease with esophagitis    Generalized anxiety disorder    Hyperlipidemia    Hypertension    Other ovarian cyst, left side    Prediabetes    Obstructive sleep apnea    Cigarette nicotine dependence without complication    Obesity (BMI 30-39.9)    Multiple nodules of lung    Primary adenocarcinoma of right lung (HCC)    Seasonal allergies        PAST SURGICAL HISTORY:  Past Surgical History:   Procedure Laterality Date    CO BRONCHOSCOPY,DIAGNOSTIC N/A 02/14/2023    Procedure: FIBER OPTIC BRONCHOSCOPY WASH, BRUSH, BRONCHOALVEOLAR LAVAGE, BIOPSY AND FINE NEEDLE ASPIRATION AND NAVIGATION, ROBOTICS;  Surgeon: Guicho Ellis M.D.;  Location: SURGERY [facility];  Service: Pulmonary Robotic    ENDOBRONCHIAL US ADD-ON N/A 02/14/2023    Procedure: ENDOBRONCHIAL ULTRASOUND (EBUS);  Surgeon: Guicho Ellis M.D.;  Location: SURGERY [facility];  Service: Pulmonary Robotic    MAMMOPLASTY REDUCTION Bilateral     SEPTOPLASTY      TUBE & ECTOPIC PREG., REMOVAL      x 2       CURRENT MEDICATIONS:  Current Outpatient Medications   Medication Sig Dispense Refill    traMADol (ULTRAM) 50 MG Tab       loratadine (CLARITIN) 10 MG Tab Take 1 Tablet by  mouth every day for 90 days. 90 Tablet 1    fluticasone (FLONASE) 50 MCG/ACT nasal spray USE 2 SPRAY(S) INTO AFFECTED NOSTRILS ONCE DAILY 16 g 3    albuterol 108 (90 Base) MCG/ACT Aero Soln inhalation aerosol INHALE 2 PUFFS BY MOUTH EVERY 4 HOURS AS NEEDED FOR SHORTNESS OF BREATH 9 g 3    EPINEPHrine (EPIPEN) 0.3 MG/0.3ML Solution Auto-injector solution for injection INJECT 0.3ML INTO THE SHOULDER, THIGH, OR BUTTOCKS ONE TIME FOR 1 DOSE      predniSONE (DELTASONE) 10 MG Tab Take 40mg x 4 days, then take 30mg x 4 days, then take 20mg x 4 days, then 10mg x 4 days with food, then discontinue. 40 Tablet 2    DULoxetine (CYMBALTA) 30 MG Cap DR Particles Take 30 mg by mouth at bedtime.      gabapentin (NEURONTIN) 100 MG Cap Take 100 mg by mouth every evening.      hydrOXYzine HCl (ATARAX) 10 MG Tab Take 10 mg by mouth at bedtime as needed.      ipratropium-albuterol (DUONEB) 0.5-2.5 (3) MG/3ML nebulizer solution USE 1 AMPULE IN NEBULIZER 4 TIMES DAILY 180 mL 5    diclofenac sodium (VOLTAREN) 1 % Gel       HYDROcodone/acetaminophen (NORCO)  MG Tab Take 1-2 Tablets by mouth every 6 hours as needed.      Budeson-Glycopyrrol-Formoterol (BREZTRI AEROSPHERE) 160-9-4.8 MCG/ACT Aerosol Inhale 2 Puffs 2 times a day. (Patient not taking: Reported on 3/30/2023) 10.7 g 0    predniSONE (DELTASONE) 10 MG Tab Take 30mg x 3 days, then take 20mg x 3 days, then take 10mg x 3 days, with food, then discontinue. (Patient not taking: Reported on 3/30/2023) 18 Tablet 0     No current facility-administered medications for this encounter.       ALLERGIES:    Aspirin, Other environmental, and Penicillins    FAMILY HISTORY:    family history includes Cancer in her father, paternal grandfather, and paternal grandmother; Diabetes in her mother.    SOCIAL HISTORY:     reports that she has been smoking cigarettes. She has a 10.00 pack-year smoking history. She has been exposed to tobacco smoke. She has never used smokeless tobacco. She reports  "that she does not currently use alcohol. She reports that she does not currently use drugs. Patient is a 47 year old disabled female who resides in [place] alone.     REVIEW OF SYSTEMS:    A complete review of systems taken. Pertinent items in HPI. All others negative.    PHYSICAL EXAM:    PERFORMANCE STATUS:      3/30/2023     9:40 AM   ECOG Performance Review   ECOG Performance Status Restricted in physically strenuous activity but ambulatory and able to carry out work of a light or sedentary nature, e.g., light house work, office work         3/30/2023     9:40 AM   Karnofsky Score   Karnofsky Score 80     BP (!) 153/104 (BP Location: Right arm, Patient Position: Sitting, BP Cuff Size: Adult long) Comment: \"White coat syndrome.\"  Pulse 74   Ht 1.829 m (6')   Wt 96.6 kg (213 lb)   SpO2 97%   BMI 28.89 kg/m²   Physical Exam  Constitutional:       Appearance: Normal appearance.   HENT:      Head: Normocephalic and atraumatic.   Eyes:      Extraocular Movements: Extraocular movements intact.      Conjunctiva/sclera: Conjunctivae normal.   Cardiovascular:      Rate and Rhythm: Normal rate and regular rhythm.   Pulmonary:      Effort: Pulmonary effort is normal. No respiratory distress.      Breath sounds: Normal breath sounds. No wheezing or rales.   Abdominal:      General: Abdomen is flat.      Palpations: Abdomen is soft.   Neurological:      General: No focal deficit present.      Mental Status: She is alert and oriented to person, place, and time.      Sensory: No sensory deficit.      Motor: No weakness.        LABORATORY DATA:   Lab Results   Component Value Date/Time    WBC 6.7 06/15/2022 06:37 AM    RBC 5.10 06/15/2022 06:37 AM    HEMOGLOBIN 14.1 06/15/2022 06:37 AM    HEMATOCRIT 45.1 06/15/2022 06:37 AM    MCV 88.4 06/15/2022 06:37 AM    MCH 27.6 06/15/2022 06:37 AM    MCHC 31.3 (L) 06/15/2022 06:37 AM    RDW 52.4 (H) 06/15/2022 06:37 AM    PLATELETCT 304 06/15/2022 06:37 AM    MPV 10.0 06/15/2022 06:37 AM "    NEUTSPOLYS 48.70 06/15/2022 06:37 AM    LYMPHOCYTES 39.10 06/15/2022 06:37 AM    MONOCYTES 9.20 06/15/2022 06:37 AM    EOSINOPHILS 2.40 06/15/2022 06:37 AM    BASOPHILS 0.30 06/15/2022 06:37 AM      Lab Results   Component Value Date/Time    SODIUM 139 06/15/2022 06:37 AM    POTASSIUM 4.2 06/15/2022 06:37 AM    CHLORIDE 105 06/15/2022 06:37 AM    CO2 23 06/15/2022 06:37 AM    GLUCOSE 95 06/15/2022 06:37 AM    BUN 6 (L) 06/15/2022 06:37 AM    CREATININE 0.81 06/15/2022 06:37 AM           RADIOLOGY DATA:  CT-CHEST (THORAX) W/O    Result Date: 2/13/2023  1.  1.4 cm mass right lower lobe. 2.  1.9 cm groundglass opacity right lower lobe similar to recent examination. 3.  8.1 mm thin-walled cavity left upper lobe unchanged. Single nodule greater than 8 mm: Fleischner Society pulmonary nodule recommendations: Low and High Risk: Consider CT at 3 months, PET/CT, or tissue sampling. Low Risk - Minimal or absent history of smoking and of other known risk factors. High Risk - History of smoking or of other known risk factors. Note: These recommendations do not apply to lung cancer screening, patients with immunosuppression, or patients with known primary cancer. Fleischner Society 2017 Guidelines for Management of Incidentally Detected Pulmonary Nodules in Adults     CT-CHEST (THORAX) W/O    Result Date: 1/30/2023  1.  Hyperexpanded emphysematous lungs. 2.  1.4 cm mass right lower lobe new since previous examination. Suspicious for malignancy. Recommend PET/CT and/or biopsy. 3.  1.9 cm groundglass opacity right lower lobe previously measured 1.7 cm. 4.  Diffuse bronchial wall thickening consistent with bronchitis. Single nodule greater than 8 mm: Fleischner Society pulmonary nodule recommendations: Low and High Risk: Consider CT at 3 months, PET/CT, or tissue sampling. Low Risk - Minimal or absent history of smoking and of other known risk factors. High Risk - History of smoking or of other known risk factors. Note: These  recommendations do not apply to lung cancer screening, patients with immunosuppression, or patients with known primary cancer. Fleischner Society 2017 Guidelines for Management of Incidentally Detected Pulmonary Nodules in Adults     DX-CHEST-LIMITED (1 VIEW)    Result Date: 2/14/2023  No appreciable pneumothorax.    DX-PORTABLE FLUOROSCOPY < 1 HOUR Is the patient pregnant? No    Result Date: 2/14/2023  Digitized intraoperative radiograph is submitted for review.  This examination is not for diagnostic purpose but for guidance during a surgical procedure.     QK-DYLHY-NDYWN BASE TO MID-THIGH    Result Date: 3/29/2023  1.  Intense uptake corresponds to RIGHT lower lobe lung mass consistent with known malignancy. 2.  No definitive metastatic disease. 3.  Heterogeneous increased activity in the LEFT thyroid lobe may be associated with benign or malignant processes.  Consider ultrasound evaluation.      IMPRESSION:    A 47 y.o. with  Primary adenocarcinoma of right lung (HCC)  Staging form: Lung, AJCC 8th Edition  - Clinical stage from 3/30/2023: Stage IA2 (cT1b, cN0, cM0) - Signed by Hai Barrera M.D. on 3/30/2023  Histopathologic type: Adenocarcinoma, NOS  Stage prefix: Initial diagnosis        RECOMMENDATIONS:   I had a long discussion with Ms. Hendrickson regarding her new diagnosis of non-small cell lung cancer.  We reviewed the natural history and risk factors for non-small cell lung cancer, I reviewed the staging system that we use for this, and her diagnosis of relatively early stage, specifically stage I A2 disease.  We discussed the appropriate work-up that has already been done with PFTs, PET scan, biopsy and a thoracic surgery consultation.  At this point given that she has no CNS related symptoms, she does not need a brain MRI specifically for this.  We next discussed definitive management including a surgical resection with lobectomy versus definitive SBRT.  Given that she is not felt to be a good candidate  for surgical resection, certainly at this point I favor proceeding with definitive SBRT with curative intent.  I reviewed with her the logistics and set up of daily stereotactic radiation over the course of 3-5 fractions, the high likelihood of local regional control, the need for ongoing surveillance with serial CT scans over the next few years, and options for salvage treatments should she have recurrence.  We reviewed all of this in quite some detail, as well as the expected acute and long-term toxicities, including fibrosis, dyspnea, chest wall tenderness, rib injury, and in general injury to any of the organs in the radiation field.  After thorough discussion she is in agreement with the plan as outlined and wants to proceed with treatment.  CT simulation scheduled for early next week.    We also turned our attention to her ongoing smoking, and I strongly stressed the importance of smoking cessation.  Referral to smoking cessation has been placed as well.  She is going to work on this with her therapist as well as with her primary care physician as well.    Thank you for the opportunity to participate in her care.  If any questions or comments, please do not hesitate in calling.  Approximately 65 minutes were spent on this visit, including face-to-face visit, imaging review, and postvisit care coordination.    Orders Placed This Encounter    Rad Onc Treatment Planning CT Simulation    Referral to Oncology Psychosocial Screening for Distress    Referral to Tobacco Cessation Program    DISCONTD: hydrOXYzine HCl (ATARAX) 10 MG Tab

## 2023-03-30 NOTE — CT SIMULATION
PATIENT NAME Melly Shin Richards   PRIMARY PHYSICIAN Dimitri Hdez Jr. [id number]   REFERRING PHYSICIAN Ganser, John H, M.D. 1975     No matching staging information was found for the patient.       Treatment Planning CT Simulation      Order Questions     Question Answer Comment    Is this for a new course of treatment? Yes     Is this an Addendum? No     Simulation Status Initial     Planned Start Date 4/17/2023     Treatment Site Lung - Lower lobe     Laterality Right     Treatment Technique SBRT     Treatment Pattern/Frequency Daily 5 fx daily    Concurrent Chemotherapy No     CT Technique 4D     Slice Thickness 2mm     Scan Extent Chest     Treatment Device(s) Vac Tess     Patient Position Supine     Patient Orientation Head First     Arm Position Up     Treatment Machine No preference     Treatment Image Guidance CBCT     Frequency (CBCT) Daily     Image Guidance Match Bone      PTV - Soft Tissue     Special Physics Consult Stereotactic     Other Orders Weekly Physics Check      Special Tx Procedure     Release to patient Immediate

## 2023-03-30 NOTE — PROGRESS NOTES
Patient was seen today in clinic with Dr. Barrera for consultation.  Vitals signs and weight were obtained and pain assessment was completed.  Allergies and medications were reviewed with the patient.      Vitals/Pain:  Vitals:    03/30/23 0931   BP: (!) 153/104   BP Location: Right arm   Patient Position: Sitting   BP Cuff Size: Adult long   Pulse: 74   SpO2: 97%   Weight: 96.6 kg (213 lb)   Height: 1.829 m (6')   Pain Score: 8=Moderate-Severe Pain  Pain Scale: 0-10  Pain Assessement: Initial  Pain Location, Orientation and Scale: Back: Mid, Upper, and Lower : Chronic  : 8  What makes the pain better: Norco/ Tramadol  What makes the pain worse: n/a chronic       Allergies:   Aspirin, Other environmental, and Penicillins    Current Medications:  Current Outpatient Medications   Medication Sig Dispense Refill    traMADol (ULTRAM) 50 MG Tab       loratadine (CLARITIN) 10 MG Tab Take 1 Tablet by mouth every day for 90 days. 90 Tablet 1    fluticasone (FLONASE) 50 MCG/ACT nasal spray USE 2 SPRAY(S) INTO AFFECTED NOSTRILS ONCE DAILY 16 g 3    albuterol 108 (90 Base) MCG/ACT Aero Soln inhalation aerosol INHALE 2 PUFFS BY MOUTH EVERY 4 HOURS AS NEEDED FOR SHORTNESS OF BREATH 9 g 3    EPINEPHrine (EPIPEN) 0.3 MG/0.3ML Solution Auto-injector solution for injection INJECT 0.3ML INTO THE SHOULDER, THIGH, OR BUTTOCKS ONE TIME FOR 1 DOSE      predniSONE (DELTASONE) 10 MG Tab Take 40mg x 4 days, then take 30mg x 4 days, then take 20mg x 4 days, then 10mg x 4 days with food, then discontinue. 40 Tablet 2    DULoxetine (CYMBALTA) 30 MG Cap DR Particles Take 30 mg by mouth at bedtime.      gabapentin (NEURONTIN) 100 MG Cap Take 100 mg by mouth every evening.      hydrOXYzine HCl (ATARAX) 10 MG Tab Take 10 mg by mouth at bedtime as needed.      ipratropium-albuterol (DUONEB) 0.5-2.5 (3) MG/3ML nebulizer solution USE 1 AMPULE IN NEBULIZER 4 TIMES DAILY 180 mL 5    diclofenac sodium (VOLTAREN) 1 % Gel       HYDROcodone/acetaminophen  (NORCO)  MG Tab Take 1-2 Tablets by mouth every 6 hours as needed.      Budeson-Glycopyrrol-Formoterol (BREZTRI AEROSPHERE) 160-9-4.8 MCG/ACT Aerosol Inhale 2 Puffs 2 times a day. (Patient not taking: Reported on 3/30/2023) 10.7 g 0    predniSONE (DELTASONE) 10 MG Tab Take 30mg x 3 days, then take 20mg x 3 days, then take 10mg x 3 days, with food, then discontinue. (Patient not taking: Reported on 3/30/2023) 18 Tablet 0     No current facility-administered medications for this encounter.         PCP:  Lemuel Hernandez R.N.

## 2023-04-04 NOTE — RADIATION PLANNING NOTES
Clinical Treatment Planning Note    DATE OF SERVICE: 4/3/2023    DIAGNOSIS:  Primary adenocarcinoma of right lung (HCC)  Staging form: Lung, AJCC 8th Edition  - Clinical stage from 3/30/2023: Stage IA2 (cT1b, cN0, cM0) - Signed by Hai Barrera M.D. on 3/30/2023  Histopathologic type: Adenocarcinoma, NOS  Stage prefix: Initial diagnosis         IMAGING REVIEWED:  [x] CT     [] MRI     [x] PET/CT     [] BONE SCAN     [] MAMMO     [] OTHER      TREATMENT INTENT:   [x] CURATIVE     [] MAINTENANCE     []  PALLIATIVE      []  SUPPORTIVE     []  PROPHYLACTIC     [] BENIGN     []  CONSOLIDATIVE      [] DEFINITIVE   []  OLOGIMETASTATIC      LINE OF TREATMENT:  [] ADJUVANT   [x] DEFINITIVE   [] NEOADJUVANT   [] RE-TREATMENT      TECHNIQUE PLANNED:  [] IMRT   [] 3D   [x] SBRT   [] SRS/SRT   [] HDR   [] ELECTRON       IMRT JUSTIFICATION:  []   An immediately adjacent area has been previously irradiated and abutting portals must be established with high precision.    []  Dose escalation is planned to deliver radiation doses in excess of those commonly utilized for similar tumors with conventional treatment.    []  The target volume is concave or convex, and the critical normal tissues are within or around that convexity or concavity.    []  The target volume is in close proximity to critical structures that must be protected.    []  The volume of interest must be covered with narrow margins to adequately protect  immediately adjacent structures.      FIELDS & BLOCKING:  [x] COMPLEX BLOCKS     []  = 3 TX AREAS     []  ARCS     []  CUSTOM SHEILD        []  SIMPLE BLOCK      CHEMOTHERAPY:  []  CONCURRENT     []  INDUCTION     [] SEQUENTIAL     []  <30 DAYS FROM XRT      NOTES:  OAR CONSTRAINTS: (GUIDELINES ONLY NOT ABSOLUTE)  Target Prescribed Coverage   PTV 95% of PTV covered by 100% (Gy) of RX Dose     PTV 99% of PTV covered by 90% (Gy) of RX Dose       YURI Goal   Total Lung Vol. (cc) critical structure   Total Lung (cc)  V12.5Gy < 1500cc   Total Lung (cc) V13.5Gy < 1000cc   Liver V21Gy < 700cc   Cord V22Gy < 0.35cc   Cord V14.5Gy <1.2cc   Cord Max Dose < 28Gy   Ipsilateral Brachial Plexus V27Gy < 3cc   Ipsilateral Brachial Plexus Max Dose < 32.5Gy   Esophagus V19.5Gy < 5cc   Esophagus Max Dose < 35Gy   Heart V32Gy < 15cc   Heart Max Dose < 38Gy   Great Vessels V47Gy < 10cc   Great Vessels Max Dose < 53Gy   Trachea/Large Bronchus V32Gy < 5cc   Trachea/Large Bronchus Max Dose < 40Gy   Small Bronchus V21Gy < 0.5cc   Small Bronchus Max Dose < 33Gy   Skin V36.5Gy < 10cc   Skin Max Dose < 38.5Gy   Ribs V45Gy < 5cc   Ribs Max Dose < 57Gy   *RTOG [id number]

## 2023-04-04 NOTE — RADIATION PLANNING NOTES
DATE OF SERVICE: 4/3/2023    DIAGNOSIS:  Primary adenocarcinoma of right lung (HCC)  Staging form: Lung, AJCC 8th Edition  - Clinical stage from 3/30/2023: Stage IA2 (cT1b, cN0, cM0) - Signed by Hai Barrera M.D. on 3/30/2023  Histopathologic type: Adenocarcinoma, NOS  Stage prefix: Initial diagnosis       DATE OF SERVICE: 4/3/2023    TYPE OF SIMULATION: SBRT    GOAL OF TREATMENT:   [x] Curative  [] Palliative  [] Oligometastatic    CONTRAST:    [] IV Contrast*  [] Oral Contrast               POSITION:    [x]  Supine  [] Prone     IMMOBILIZATION DEVICE: [x]  Body-Fix  [] Omniboard  []  Bellyboard    PROCEDURE: Patient placed in immobilization device. 4D gated and non-gated CT obtained to assess organ motion.  Images viewed and approved.  Images reconstructed as need.  Image data sets transferred to [facility] for planning.    I have personally reviewed the relevant data, performed the target localization, and determined all relevant factors for this patient’s simulation.    *Omnipaque 80 -100cc IVP in conjunction with 500cc NS

## 2023-04-04 NOTE — PROGRESS NOTES
On April 4, 2023, Oncology Social Worker Yamila Clark attempted telephone contact with pt. to follow up on psychosocial distress screening.  OSW Eduardo left voicemail message for pt. requesting pt. call back at earliest convenience.  OSW Eduardo left contact information in voicemail message.

## 2023-04-04 NOTE — RADIATION PLANNING NOTES
PATIENT NAME Melly Shin Powell   PRIMARY PHYSICIAN Dimitri Hdez Jr. [id number]   REFERRING PHYSICIAN No ref. provider found 1975     DATE OF SERVICE: 4/3/2023    DIAGNOSIS:  Primary adenocarcinoma of right lung (HCC)  Staging form: Lung, AJCC 8th Edition  - Clinical stage from 3/30/2023: Stage IA2 (cT1b, cN0, cM0) - Signed by Hai Barrera M.D. on 3/30/2023  Histopathologic type: Adenocarcinoma, NOS  Stage prefix: Initial diagnosis         SPECIAL TREATMENT PROCEDURE NOTE:  Considerable additional effort required in the management of this case because of administration of Stereotactic Radiotherapy, which may result in increased normal tissue toxicity and require greater effort in contouring and treatment because of greater precision.

## 2023-04-04 NOTE — LETTER
Luis MA[facility]    [address]  [place] [address]  Phone: [phone] - Fax: [phone]              Melly Aleida Hendrickson  [address]  [address]  [place] [address]     Date: 04/04/23      Dear Melly    I am a Cancer Nurse Navigator, a certified oncology nurse. My role is to assess any needs you may have with education, guidance and support. I am available to you and your family through your treatment at [facility].       I am available to address your needs during your journey with the following services:     Care Coordination  I can assist you in facilitating communication between your cancer care treatment team to ensure timely treatment and follow-up.  I can also assist with transition of care back to your primary care provider, or other specialist, as needed.  My goal is to bridge gaps for you throughout the course of your active treatment.       Education Services  Understanding the recommended treatment course by your physician is key. I can provide educational resources personalized to your cancer diagnosis to help you understand your diagnosis and treatment. Please let me know if you would like to receive information about your diagnosis and treatment plan.  I am here to help.     Support Services/Resource Information  [facility] we offer a full scope of support services.  I can assist you with referral information to:  Cancer Clinical Trials & Research  Nutrition counseling  Support groups  Complementary Therapies such as Mind-Body Techniques Meditation  Patient Financial Advocates    [facility], an [employer] affiliate office, our volunteers can assist you with accessing our [employer] library, support services information, head coverings and comfort items  Community and national resources, included eligibility based chuyita assistance and pharmaceutical access programs, if you are in need of additional  information.     [facility] offers services that include:  Behavioral Health  Genetic counseling & testing  Acupuncture  Lymphedema prevention/treatment program  Palliative care services.        I hope you have an excellent patient experience.  Please feel free to share with me your comments regarding the care you have received- we value your feedback.    Sincerely,     Marylu Lindsey R.N.  Cancer Nurse Navigator    Office: [phone]  Main:  [phone]   Email:  [email or web address]

## 2023-04-05 NOTE — PROGRESS NOTES
"Oncology [facility] Specialist Intake    Diagnosis Type: Lung   Preferred Language: English   Insurance: Medicaid   Dental Insurance:\"unsure\" Last Appointment Date:\"Fall of 2020\"   Primary Care Provider Reviewed: yes  Last Appointment Date: \"February of 2023 with Dr. Hdez\"  Introduced Melly Hendrickson to Oncology Support Services and provided contact information on 4/5/2023 .  Current Barriers Identified Include: None at this time  MyChart Status: Activated  Communication Preferences: Emails Best Contact Number: [phone]  Patient Contact's Reviewed: yes     Inbound call from patient returning voice message. Pt. States she is \"Putting it together\" a support system at this time. Educated pt. [email or web address]/events and the Resource Center. Pt. Requested TATIANNA resource folder, including APRIL Valero's and APRIL Leung's cards for contact information, be mailed. When asked distress screening patient stated \"I do not know, I'm already an over thinker\".    Outcome:  No further needs at this time.          "

## 2023-04-13 NOTE — TELEPHONE ENCOUNTER
Caller Name: Melly Hendrickson                 Call Back Number: [phone] (home)         Patient approves a detailed voicemail message: N\A    Have we ever prescribed this med? Yes.  If yes, what date? 4/7/22    Last OV: 1/24/23 Dr. Gil    Next OV: 6/26/23 Dr. Gil     DX: Severe persistent asthma without complication     Medications:  Current Outpatient Medications   Medication Sig Dispense Refill    traMADol (ULTRAM) 50 MG Tab       loratadine (CLARITIN) 10 MG Tab Take 1 Tablet by mouth every day for 90 days. 90 Tablet 1    Budeson-Glycopyrrol-Formoterol (BREZTRI AEROSPHERE) 160-9-4.8 MCG/ACT Aerosol Inhale 2 Puffs 2 times a day. (Patient not taking: Reported on 3/30/2023) 10.7 g 0    predniSONE (DELTASONE) 10 MG Tab Take 30mg x 3 days, then take 20mg x 3 days, then take 10mg x 3 days, with food, then discontinue. (Patient not taking: Reported on 3/30/2023) 18 Tablet 0    fluticasone (FLONASE) 50 MCG/ACT nasal spray USE 2 SPRAY(S) INTO AFFECTED NOSTRILS ONCE DAILY 16 g 3    albuterol 108 (90 Base) MCG/ACT Aero Soln inhalation aerosol INHALE 2 PUFFS BY MOUTH EVERY 4 HOURS AS NEEDED FOR SHORTNESS OF BREATH 9 g 3    EPINEPHrine (EPIPEN) 0.3 MG/0.3ML Solution Auto-injector solution for injection INJECT 0.3ML INTO THE SHOULDER, THIGH, OR BUTTOCKS ONE TIME FOR 1 DOSE      predniSONE (DELTASONE) 10 MG Tab Take 40mg x 4 days, then take 30mg x 4 days, then take 20mg x 4 days, then 10mg x 4 days with food, then discontinue. 40 Tablet 2    DULoxetine (CYMBALTA) 30 MG Cap DR Particles Take 30 mg by mouth at bedtime.      gabapentin (NEURONTIN) 100 MG Cap Take 100 mg by mouth every evening.      hydrOXYzine HCl (ATARAX) 10 MG Tab Take 10 mg by mouth at bedtime as needed.      ipratropium-albuterol (DUONEB) 0.5-2.5 (3) MG/3ML nebulizer solution USE 1 AMPULE IN NEBULIZER 4 TIMES DAILY 180 mL 5    diclofenac sodium (VOLTAREN) 1 % Gel       HYDROcodone/acetaminophen (NORCO)  MG Tab Take 1-2 Tablets by  mouth every 6 hours as needed.       No current facility-administered medications for this visit.

## 2023-04-17 NOTE — PROCEDURES
DATE OF SERVICE: 4/17/2023    DIAGNOSIS:  Primary adenocarcinoma of right lung (HCC)  Staging form: Lung, AJCC 8th Edition  - Clinical stage from 3/30/2023: Stage IA2 (cT1b, cN0, cM0) - Signed by Hai Barrera M.D. on 3/30/2023  Histopathologic type: Adenocarcinoma, NOS  Stage prefix: Initial diagnosis       TREATMENT:  Radiation Therapy Episodes       Active Episodes       Radiation Therapy: SBRT (4/17/2023)                   Radiation Treatments         Plan Last Treated On Elapsed Days Fractions Treated Prescribed Fraction Dose (cGy) Prescribed Total Dose (cGy)    RLL Lung SBRT 4/17/2023 0 @ [id number] 1 of 5 1,000 5,000                  Reference Point Last Treated On Elapsed Days Most Recent Session Dose (cGy) Total Dose (cGy)    PTV 4/17/2023 0 @ [id number] 1,000 1,000    RLL cp 4/17/2023 0 @ [id number] 1,246 1,246                            STEREOTACTIC PROCEDURE NOTE:    Called by [employer] machine to verify treatment parameters including:  treatment site, treatment dose, and treatment setup prior to stereotactic treatment..    Patient was placed in the treatment position with use of immobilization device and  laser guidance. CBCT images were acquired for target localization.  Images were reviewed in the axial, coronal, and saggital views and shifts were made as necessary to ensure that patient position matched simulation position.      Treatment delivered per  prescription.  The medical physicist was present throughout the set-up, verification and treatment delivery to oversee the procedure and ensure all parameters agreed with the computerized plan.    I have personally reviewed the relevant data, performed the target localization, and determined all relevant factors for this patient’s simulation.

## 2023-04-18 NOTE — PROCEDURES
DATE OF SERVICE: 4/18/2023    DIAGNOSIS:  Primary adenocarcinoma of right lung (HCC)  Staging form: Lung, AJCC 8th Edition  - Clinical stage from 3/30/2023: Stage IA2 (cT1b, cN0, cM0) - Signed by Hai Barrera M.D. on 3/30/2023  Histopathologic type: Adenocarcinoma, NOS  Stage prefix: Initial diagnosis       TREATMENT:  Radiation Therapy Episodes       Active Episodes       Radiation Therapy: SBRT (4/17/2023)                   Radiation Treatments         Plan Last Treated On Elapsed Days Fractions Treated Prescribed Fraction Dose (cGy) Prescribed Total Dose (cGy)    RLL Lung SBRT 4/18/2023 1 @ [id number] 2 of 5 1,000 5,000                  Reference Point Last Treated On Elapsed Days Most Recent Session Dose (cGy) Total Dose (cGy)    PTV 4/18/2023 1 @ [id number] 1,000 2,000    RLL cp 4/18/2023 1 @ [id number] 1,246 2,492                            STEREOTACTIC PROCEDURE NOTE:    Called by [employer] machine to verify treatment parameters including:  treatment site, treatment dose, and treatment setup prior to stereotactic treatment..    Patient was placed in the treatment position with use of immobilization device and  laser guidance. CBCT images were acquired for target localization.  Images were reviewed in the axial, coronal, and saggital views and shifts were made as necessary to ensure that patient position matched simulation position.      Treatment delivered per  prescription.  The medical physicist was present throughout the set-up, verification and treatment delivery to oversee the procedure and ensure all parameters agreed with the computerized plan.    I have personally reviewed the relevant data, performed the target localization, and determined all relevant factors for this patient’s simulation.

## 2023-04-20 NOTE — PROCEDURES
DATE OF SERVICE: 4/20/2023    DIAGNOSIS:  Primary adenocarcinoma of right lung (HCC)  Staging form: Lung, AJCC 8th Edition  - Clinical stage from 3/30/2023: Stage IA2 (cT1b, cN0, cM0) - Signed by Hai Barrera M.D. on 3/30/2023  Histopathologic type: Adenocarcinoma, NOS  Stage prefix: Initial diagnosis       TREATMENT:  Radiation Therapy Episodes       Active Episodes       Radiation Therapy: SBRT (4/17/2023)                   Radiation Treatments         Plan Last Treated On Elapsed Days Fractions Treated Prescribed Fraction Dose (cGy) Prescribed Total Dose (cGy)    RLL Lung SBRT 4/20/2023 3 @ [id number] 4 of 5 1,000 5,000                  Reference Point Last Treated On Elapsed Days Most Recent Session Dose (cGy) Total Dose (cGy)    PTV 4/20/2023 3 @ [id number] 1,000 4,000    RLL cp 4/20/2023 3 @ [id number] 1,246 4,985                            STEREOTACTIC PROCEDURE NOTE:    Called by [employer] machine to verify treatment parameters including:  treatment site, treatment dose, and treatment setup prior to stereotactic treatment..    Patient was placed in the treatment position with use of immobilization device and  laser guidance. CBCT images were acquired for target localization.  Images were reviewed in the axial, coronal, and saggital views and shifts were made as necessary to ensure that patient position matched simulation position.      Treatment delivered per  prescription.  The medical physicist was present throughout the set-up, verification and treatment delivery to oversee the procedure and ensure all parameters agreed with the computerized plan.    I have personally reviewed the relevant data, performed the target localization, and determined all relevant factors for this patient’s simulation.

## 2023-04-21 NOTE — RADIATION COMPLETION NOTES
END OF TREATMENT SUMMARY    Patient name:  Melly Hendrickson    Primary Physician:  Dimitri Hdez Jr., M.D. MRN: [id number]  [facility]: [id number]   Referring physician:  Ganser, John H, M.D.   : 1975, 47 y.o.       TREATMENT SUMMARY:        Course First Treatment Date 2023    Course Last Treatment Date 2023   Course Elapsed Days 4 @ [id number]   Course Intent Curative     Radiation Therapy Episodes       Active Episodes       Radiation Therapy: SBRT (2023)                   Radiation Treatments         Plan Last Treated On Elapsed Days Fractions Treated Prescribed Fraction Dose (cGy) Prescribed Total Dose (cGy)    RLL Lung SBRT 2023 4 @ [id number] 5 of 5 1,000 5,000                  Reference Point Last Treated On Elapsed Days Most Recent Session Dose (cGy) Total Dose (cGy)    PTV 2023 4 @ [id number] 1,000 5,000    RLL cp 2023 4 @ [id number] 1,246 6,231                                     STAGE:   Primary adenocarcinoma of right lung (HCC)  Staging form: Lung, AJCC 8th Edition  - Clinical stage from 3/30/2023: Stage IA2 (cT1b, cN0, cM0) - Signed by Hai Barrera M.D. on 3/30/2023  Histopathologic type: Adenocarcinoma, NOS  Stage prefix: Initial diagnosis       TREATMENT INDICATION:   Definitive SBRT     CONCURRENT SYSTEMIC TREATMENT:   None     RT COURSE DISCONTINUED EARLY:   No     PATIENT EXPERIENCE:        View : No data to display.                 FOLLOW-UP PLAN:   8 Weeks     COMMENT:          ANATOMIC TARGET SUMMARY    ANATOMIC TARGET MODALITY TECHNIQUE   Right lung primary lesion   External beam, photons SBRT            COMMENT:         DIAGRAMS:      DOSE VOLUME HISTOGRAMS:

## 2023-04-21 NOTE — ADDENDUM NOTE
Encounter addended by: Laura Juarez, Med Ass't on: 4/21/2023 1:06 PM   Actions taken: Pend clinical note

## 2023-04-21 NOTE — PROCEDURES
DATE OF SERVICE: 4/21/2023    DIAGNOSIS:  Primary adenocarcinoma of right lung (HCC)  Staging form: Lung, AJCC 8th Edition  - Clinical stage from 3/30/2023: Stage IA2 (cT1b, cN0, cM0) - Signed by Hai Barrera M.D. on 3/30/2023  Histopathologic type: Adenocarcinoma, NOS  Stage prefix: Initial diagnosis       TREATMENT:  Radiation Therapy Episodes       Active Episodes       Radiation Therapy: SBRT (4/17/2023)                   Radiation Treatments         Plan Last Treated On Elapsed Days Fractions Treated Prescribed Fraction Dose (cGy) Prescribed Total Dose (cGy)    RLL Lung SBRT 4/21/2023 4 @ [id number] 5 of 5 1,000 5,000                  Reference Point Last Treated On Elapsed Days Most Recent Session Dose (cGy) Total Dose (cGy)    PTV 4/21/2023 4 @ [id number] 1,000 5,000                                    STEREOTACTIC PROCEDURE NOTE:    Called by [employer] machine to verify treatment parameters including:  treatment site, treatment dose, and treatment setup prior to stereotactic treatment..    Patient was placed in the treatment position with use of immobilization device and  laser guidance. CBCT images were acquired for target localization.  Images were reviewed in the axial, coronal, and saggital views and shifts were made as necessary to ensure that patient position matched simulation position.      Treatment delivered per  prescription.  The medical physicist was present throughout the set-up, verification and treatment delivery to oversee the procedure and ensure all parameters agreed with the computerized plan.    I have personally reviewed the relevant data, performed the target localization, and determined all relevant factors for this patient’s simulation.

## 2023-04-24 NOTE — TELEPHONE ENCOUNTER
[facility]    Received tobacco cessation referral from Dr Barrera on 3/30    Pt missed appt 4/20  R/s for 5/2    Insurance: Medicaid  PCP: [facility]  Locations to be seen: Any    [facility] at [phone], fax [phone]    Angela Mireles, PharmD

## 2023-05-04 NOTE — PROGRESS NOTES
Outpatient Pharmacotherapy Program    Smoking Cessation Note    Time In 1115  Time Out 1130    Reason for Visit: Patient presents for smoking cessation pharmacotherapy  Initial Visit    HPI:    Age at Which Started Smokin  Average number of cigarettes smoked per day: Varies; 6 cigarettes to a whole pack (20 cigarettes)  Additional Smoking Hx: none  Pertinent Psych Hx:  VIVIANA    Recent quit attempts: 1-2x month, but reverts back. Pt is motivated to quit d/t recent cancer diagnosis  Medications reviewed and reconciled     Vitals:  BP:             HR:       Assessment:    Tobacco use disorder, willing to make quit attempt with a combination of pharmacological and behavioral therapy.    Plan:    Behavioral Modification Recommended: Specifically  restarting therapy    Quit Date: or other plans to reduce cigarette usage: aim to reduce usage by half at the next visit    Nicotine Replacement Therapy            Patch Dose 21 mg  - instructed to use during day but take off each night    Potential side-effects of all prescribed pharmacotherapy reviewed with patient who verbalizes understanding of the risks and benefits of this therapy.    Follow-up :  In pharmacotherapy clinic:  Date: phone call early     Angela Mireles, MerrittD

## 2023-05-15 PROBLEM — J45.909 ASTHMA: Status: ACTIVE | Noted: 2023-01-01

## 2023-05-15 PROBLEM — I10 HYPERTENSION: Status: RESOLVED | Noted: 2021-07-12 | Resolved: 2023-01-01

## 2023-05-15 PROBLEM — K21.9 GASTROESOPHAGEAL REFLUX DISEASE: Status: ACTIVE | Noted: 2023-01-01

## 2023-05-15 NOTE — PROGRESS NOTES
"Teaching Physician Attestation      Level of Participation    I have personally interviewed and examined the patient.  In addition, I discussed with the resident physician the patient's history, exam, assessment and plan in detail.  Topics listed in my addendum were the focus of the visit.  Healthcare maintenance was not addressed this visit unless listed as a topic in my addendum.  I agree with the plan as written along with the following additions/modifications:      Left rib pain with concurrent mass in the setting of prior radiation for right lung adenocarcinoma, needs further evaluation  -Patient reports spontaneous onset of left-sided discomfort on the anterior lateral rib, she states it initially felt like a \"pimple\" but grew in size.  On exam she has an approximately 1 cm smooth well-circumscribed mass on the anterior lateral aspect of the ribs, approximately T7-8, it is mobile, without any overlying erythema or irritation on the skin.  Her rib expansion is asymmetrical, slightly limited on the left side compared to the right.    Plan  -Needs imaging, will discuss with Dr. Barrera to determine optimal imaging and follow-up given treatment history      Elevated bp  -moderately elevated, no sx, monitor all at home while evaluating rib discomfort as above.    Rtc 5 weeks.  "

## 2023-05-15 NOTE — PATIENT INSTRUCTIONS
-will try to send a computer message in the system to contact Dr. Barrera and reach back in touch with you.  -Follow up with Dr. Hdez in 5 weeks

## 2023-05-15 NOTE — PROGRESS NOTES
"      Established Patient    Melly presents today with the following:    CC: follow up    HPI: 47-year-old female presents for left rib pain.Began radiation therapy about midway between radiation for right lung cancer (adenocarcinoma--Stage IA2 (cT1b, cN0, cM0) and completed April 17-21. Since then pain has persisted left rib pain, \"irritating\", worse with touch.  Patient reports she has a solid like nodule or mass along her rib that she has noticed since having radiation last month.  Patient has not notified Dr. Barrera her oncologist about this but did discuss it with Dr. Liang her pain specialist who recommended discussing this with her primary care doctor.  Next appiontment with Oncologist, Dr. Hai Barrera, June 8, 2023.  The patient denies any trauma or injury to the affected region.    Followed by Dr. Jacqueline Liang with pain management, [facility]. Is not being controlled with medication.    Patient Active Problem List    Diagnosis Date Noted    Gastroesophageal reflux disease 03/09/2023    Asthma 03/09/2023    Primary adenocarcinoma of right lung (HCC) 02/23/2023    Seasonal allergies 02/23/2023    Multiple nodules of lung 02/14/2023    Obesity (BMI 30-39.9) 10/13/2022    Obstructive sleep apnea 09/08/2022    Cigarette nicotine dependence without complication 09/08/2022    Generalized anxiety disorder 07/12/2021    Hyperlipidemia 07/12/2021    Hypertension 07/12/2021    Other ovarian cyst, left side 06/15/2021    Prediabetes 06/15/2021    Gastroesophageal reflux disease with esophagitis 02/11/2021    Chronic obstructive pulmonary disease, unspecified (HCC) 09/21/2020    Chronic sinusitis, unspecified 09/21/2020    Degeneration of intervertebral disc of lumbosacral region 09/21/2020       Social History     Tobacco Use    Smoking status: Some Days     Packs/day: 0.50     Years: 20.00     Pack years: 10.00     Types: Cigarettes     Passive exposure: Past    Smokeless tobacco: Never    Tobacco comments:     " on and off    Vaping Use    Vaping Use: Never used   Substance Use Topics    Alcohol use: Not Currently     Comment: occ, rare    Drug use: Not Currently     Comment: THC       Current Outpatient Medications   Medication Sig Dispense Refill    loratadine (CLARITIN) 10 MG Tab 10 mg.      nortriptyline (PAMELOR) 10 MG Cap Take 10 mg by mouth at bedtime as needed.      nicotine (NICODERM) 21 MG/24HR PATCH 24 HR Place 1 Patch on the skin every 24 hours. 14 Patch 0    ipratropium-albuterol (DUONEB) 0.5-2.5 (3) MG/3ML nebulizer solution USE 1 AMPULE IN NEBULIZER 4 TIMES DAILY 180 mL 11    traMADol (ULTRAM) 50 MG Tab Take 50 mg by mouth every 6 hours as needed.      loratadine (CLARITIN) 10 MG Tab Take 1 Tablet by mouth every day for 90 days. 90 Tablet 1    fluticasone (FLONASE) 50 MCG/ACT nasal spray USE 2 SPRAY(S) INTO AFFECTED NOSTRILS ONCE DAILY 16 g 3    albuterol 108 (90 Base) MCG/ACT Aero Soln inhalation aerosol INHALE 2 PUFFS BY MOUTH EVERY 4 HOURS AS NEEDED FOR SHORTNESS OF BREATH 9 g 3    EPINEPHrine (EPIPEN) 0.3 MG/0.3ML Solution Auto-injector solution for injection INJECT 0.3ML INTO THE SHOULDER, THIGH, OR BUTTOCKS ONE TIME FOR 1 DOSE      hydrOXYzine HCl (ATARAX) 10 MG Tab Take 10 mg by mouth at bedtime as needed.      diclofenac sodium (VOLTAREN) 1 % Gel       HYDROcodone/acetaminophen (NORCO)  MG Tab Take 1-2 Tablets by mouth every 6 hours as needed.       No current facility-administered medications for this visit.       Review of Systems   Constitutional:  Negative for chills, fever, malaise/fatigue and weight loss.   HENT:  Negative for congestion and sore throat.    Eyes:  Negative for blurred vision and double vision.   Respiratory:  Negative for cough, shortness of breath and wheezing.    Cardiovascular:  Negative for chest pain and palpitations.   Gastrointestinal:  Negative for abdominal pain, constipation, diarrhea, heartburn, nausea and vomiting.   Genitourinary:  Negative for dysuria,  frequency and urgency.   Musculoskeletal:  Negative for falls, joint pain and myalgias.   Skin:  Negative for rash.   Neurological:  Negative for dizziness, weakness and headaches.   Psychiatric/Behavioral:  Negative for depression, substance abuse and suicidal ideas. The patient is not nervous/anxious.          BP (!) 162/96 (BP Location: Left arm, Patient Position: Sitting, BP Cuff Size: Adult)   Pulse 92   Temp 36.7 °C (98 °F) (Temporal)   Ht 1.829 m (6')   Wt 92.2 kg (203 lb 3.2 oz)   SpO2 95%   BMI 27.56 kg/m²       PHYSICAL EXAM:  Physical Exam  Vitals reviewed. Exam conducted with a chaperone present (Dr. Nicolas was chaperone).   Constitutional:       General: She is not in acute distress.     Appearance: She is not ill-appearing.   HENT:      Head: Normocephalic and atraumatic.   Musculoskeletal:      Comments: Proximally left anterior lateral seventh eighth rib; tenderness most prominent over a bony solid nodule with no erythema, deformity or flail chest appreciated.  No obvious injury or trauma.  Appreciated scar from past breast reduction surgery; clean dry and intact.  Fidencio also did physical exam and appreciated the distinct nodule that is approximately 1 cm x 1 cm.   Neurological:      General: No focal deficit present.      Mental Status: She is oriented to person, place, and time.   Psychiatric:         Mood and Affect: Mood normal.         Behavior: Behavior normal.         Note: I have reviewed all pertinent labs and diagnostic tests associated with this visit with specific comments listed under the assessment and plan below    Assessment and Plan    1. Rib pain on left side  We will reach out to Dr. Barrera to see if he recommends imaging or other active recommendations and contact the patient.  We will reevaluate the patient face-to-face in approximately 5 weeks.  followed by pain management with Dr. Liang if there is any adjustment to pain regimen.    2. Primary adenocarcinoma of right lung  (HCC)  Followed by oncology.    3. Transient elevated blood pressure  Has a history of elevated blood pressure and we will have the patient keep a log of her blood pressure and reevaluate on follow-up.         Followup: Return in about 5 weeks (around 6/19/2023).      Signed by: Dimitri Hdez Jr., M.D.

## 2023-05-17 NOTE — TELEPHONE ENCOUNTER
I reached out to Dr. Barrera the patient's radiation oncologist via [employer], discussed my concern with the patient having left rib pain with a bony nodule on that left side when examined 2 days ago in the clinic.  Discussed with Dr. Barrera if there was a concern for avascular necrosis or malignancy or fracture and if he had any recommendations for imaging.  Dr. Barrera relayed to me that her left rib pain would not be related to her right-sided radiation she received and left it up to me to work-up for other testing or considerations.  Called the patient just now and let her know about the discussion I had with Dr. Barrera and that I will order a left-sided rib x-ray which the patient stated she will get done at [facility] tomorrow and then we will follow-up with her on the results.  The patient was then full for the call and verbalized understanding of these instructions.

## 2023-05-22 PROBLEM — R91.8 MULTIPLE NODULES OF LUNG: Status: RESOLVED | Noted: 2023-01-01 | Resolved: 2023-01-01

## 2023-05-31 PROBLEM — M79.89 NODULE OF SOFT TISSUE: Status: ACTIVE | Noted: 2023-01-01

## 2023-05-31 NOTE — PROGRESS NOTES
CC:   Chief Complaint   Patient presents with    Cyst     Patient has a cyst on left upper quadrant painful-refill birth control    Menstrual Problem     Patient requesting pills to stop irregular bleeding                                                                                                                                           HPI:   Melly presents today with the following.    Presents today with two primary concerns: menorrhagia of 3-5 days, and chronic L rib soft tissue nodule, now painful.    Menorrhagia has been present for approximately 3-5 days. She has been using up to 2-3 pads/hour. A similar episode occurred approximately 1 year ago, and she was given provera with improvement in the menorrhagia. She did not have any additional work up after the episode due to miscommunications with previous GYN office. Does have a recent pulmonary adenocarcinoma treated with radiation, but otherwise has not had significant weight changes, fever/chills, excessive sweating.    The left lump overlying the ribs and lateral to breast has been present for approximately 3-4 months. It has been increasing in size over this time period, and is now causing significant pain. There is some over lying redness, but has not had any purulence or discharge from this area.      Patient Active Problem List    Diagnosis Date Noted    Nodule of soft tissue 05/31/2023    Gastroesophageal reflux disease 03/09/2023    Asthma 03/09/2023    Primary adenocarcinoma of right lung (HCC) 02/23/2023    Seasonal allergies 02/23/2023    Obesity (BMI 30-39.9) 10/13/2022    Obstructive sleep apnea 09/08/2022    Cigarette nicotine dependence without complication 09/08/2022    Menorrhagia with irregular cycle 03/01/2022    Generalized anxiety disorder 07/12/2021    Hyperlipidemia 07/12/2021    Other ovarian cyst, left side 06/15/2021    Prediabetes 06/15/2021    Gastroesophageal reflux disease with esophagitis 02/11/2021    Chronic  "obstructive pulmonary disease, unspecified (HCC) 09/21/2020    Chronic sinusitis, unspecified 09/21/2020    Degeneration of intervertebral disc of lumbosacral region 09/21/2020       Current Outpatient Medications   Medication Sig Dispense Refill    medroxyPROGESTERone (PROVERA) 10 MG Tab Take 1 Tablet by mouth every day. 10 Tablet 0    loratadine (CLARITIN) 10 MG Tab 10 mg.      nortriptyline (PAMELOR) 10 MG Cap Take 10 mg by mouth at bedtime as needed.      nicotine (NICODERM) 21 MG/24HR PATCH 24 HR Place 1 Patch on the skin every 24 hours. 14 Patch 0    ipratropium-albuterol (DUONEB) 0.5-2.5 (3) MG/3ML nebulizer solution USE 1 AMPULE IN NEBULIZER 4 TIMES DAILY 180 mL 11    traMADol (ULTRAM) 50 MG Tab Take 50 mg by mouth every 6 hours as needed.      fluticasone (FLONASE) 50 MCG/ACT nasal spray USE 2 SPRAY(S) INTO AFFECTED NOSTRILS ONCE DAILY 16 g 3    albuterol 108 (90 Base) MCG/ACT Aero Soln inhalation aerosol INHALE 2 PUFFS BY MOUTH EVERY 4 HOURS AS NEEDED FOR SHORTNESS OF BREATH 9 g 3    EPINEPHrine (EPIPEN) 0.3 MG/0.3ML Solution Auto-injector solution for injection INJECT 0.3ML INTO THE SHOULDER, THIGH, OR BUTTOCKS ONE TIME FOR 1 DOSE      hydrOXYzine HCl (ATARAX) 10 MG Tab Take 10 mg by mouth at bedtime as needed.      diclofenac sodium (VOLTAREN) 1 % Gel       HYDROcodone/acetaminophen (NORCO)  MG Tab Take 1-2 Tablets by mouth every 6 hours as needed.       No current facility-administered medications for this visit.         Allergies as of 05/31/2023 - Reviewed 05/31/2023   Allergen Reaction Noted    Aspirin Rash and Hives 09/27/2020    Other environmental Shortness of Breath 02/08/2023    Penicillins Rash, Swelling, and Hives 09/27/2020          /86 (BP Location: Left arm, Patient Position: Sitting, BP Cuff Size: Adult)   Pulse (!) 108   Temp 36.3 °C (97.3 °F) (Temporal)   Ht 1.803 m (5' 11\")   Wt 92.1 kg (203 lb)   SpO2 96%   BMI 28.31 kg/m²     ROS     Physical Exam:  Gen:  Alert and " oriented, No apparent distress.  Neuro:  CN II-XII intact, no focal deficits  Lungs:  CTAB  CV:  RRR no m/g/r  Abd:  Soft, nontender, nondistended  Skin:  L superficial nodule estimated approximately 7w5n1dc that is solid, mobile, tender, with localised erythema.  Ext:  Shoulder/elbow flexion/extension 5/5 bilaterally and symmetric; ambulates independently with steady gait.      Assessment and Plan.   Melly Hendrickson is a 48 y.o. female with the following issues:    Menorrhagia with irregular cycle  Metomenorrhagia, sporadic, for the past 2-3 days has been using an estimated 2-3 pads/tampons/hour. Has pulmonary adenocarcinoma, radiation therapy treated.  -Urgent referral to gynaecology  -Provera 10mg PO qD for 10 days  -highly recommended close/soon follow up with gynaecology for additional evaulation including ultrasound, consideration of endometrial biopsy    Nodule of soft tissue  Called to [facility]. Initial suspected superficial abscess/infected sebaceous cyst. Attempted office incision and drainage did NOT yield any purulent fluid, and increased suspicion of solid soft tissue neoplasm.  -I&D attempted in office  -lidocaine 1% SQ 2.5mL administered by me  -Referral placed/called to schedule with [facility] for requested excisional biopsy (6/1 @ 3pm)       Incision and Drainage:    [place] consent was obtained prior to any interventions. Ample time for questions was given, and risks/benefits were directed discussed. Patient identity was confirmed prior to beginning. Clean techniques were utilised during this procedure. Area was prepared with iodine solution in circular motion x3. Clean drape was placed. 2.5mL of 1% lidocaine was injected using no-touch technique, requiring 3 injections due to patient discomfort/intolerance of injections. Scalpel was introduced and carefully guided to a depth of 1.5-2cm into suspected abscess. Attempted to express mass without return of purulence  or tissue. Forceps were introduced for attempted blunt dissection of pocket. No purulence or tissue was expressed. Solid tissue nodule/neoplasm suspicion increased, and further intervention was stopped. Wound was covered with gauze and medium-large bandage.     Follow up with Dr. Castro for metomenorrhagia, specialist care completion (GYN follow up of menorrhagia, surgical care of excision nodule)

## 2023-05-31 NOTE — ASSESSMENT & PLAN NOTE
Metomenorrhagia, sporadic, for the past 2-3 days has been using an estimated 2-3 pads/tampons/hour. Has pulmonary adenocarcinoma, radiation therapy treated.  -Urgent referral to gynaecology  -Provera 10mg PO qD for 10 days  -highly recommended close/soon follow up with gynaecology for additional evaulation including ultrasound, consideration of endometrial biopsy

## 2023-05-31 NOTE — ASSESSMENT & PLAN NOTE
Called to [facility]. Initial suspected superficial abscess/infected sebaceous cyst. Attempted office incision and drainage did NOT yield any purulent fluid, and increased suspicion of solid soft tissue neoplasm.  -I&D attempted in office  -lidocaine 1% SQ 2.5mL administered by me  -Referral placed/called to schedule with [facility] for requested excisional biopsy (6/1 @ 3pm)

## 2023-06-07 NOTE — OR NURSING
Patient's pregnancy test in preop resulted positive. Order for quantitative hcg obtained and lab in process.    Updated Dr. Becerra and kylah to proceed. Spoke with Dr. De La O, and kylah to proceed. Will discuss result with the patient.    Qualitative result negative. Dr. De La O discussed results with the patient.

## 2023-06-07 NOTE — ANESTHESIA PROCEDURE NOTES
Airway    Date/Time: 6/7/2023 3:45 PM    Performed by: Madhu De La O M.D.  Authorized by: Madhu De La O M.D.    Location:  OR  Urgency:  Elective  Indications for Airway Management:  Anesthesia      Spontaneous Ventilation: absent    Sedation Level:  Deep  Preoxygenated: Yes    Patient Position:  Sniffing  Mask Difficulty Assessment:  1 - vent by mask  Final Airway Type:  Endotracheal airway  Final Endotracheal Airway:  ETT  Cuffed: Yes    Technique Used for Successful ETT Placement:  Direct laryngoscopy    Insertion Site:  Oral  Blade Type:  Mckayla  Laryngoscope Blade/Videolaryngoscope Blade Size:  3  ETT Size (mm):  6.5  Measured from:  Teeth  ETT to Teeth (cm):  22  Placement Verified by: auscultation and capnometry    Cormack-Lehane Classification:  Grade I - full view of glottis  Number of Attempts at Approach:  1

## 2023-06-07 NOTE — ANESTHESIA PREPROCEDURE EVALUATION
Case: [id number] Date/Time: 06/07/23 1515    Procedure: EXCISION OF SOFT TISSUE MASS LEFT CHEST WALL    Pre-op diagnosis: MASS OF CHEST WALL    Location: [facility] OR 11 / SURGERY [facility]    Surgeons: Nasir Becerra M.D.        49 yo F w/ COPD and h/o R lung adenocarcinoma    Uses albuterol 2-5x per day    Relevant Problems   ANESTHESIA   (positive) Obstructive sleep apnea      PULMONARY   (positive) Asthma   (positive) Chronic obstructive pulmonary disease, unspecified (HCC)      GI   (positive) Gastroesophageal reflux disease   (positive) Gastroesophageal reflux disease with esophagitis       Physical Exam    Airway   Mallampati: II  TM distance: >3 FB  Neck ROM: full       Cardiovascular - normal exam  Rhythm: regular  Rate: normal  (-) murmur     Dental - normal exam           Pulmonary - normal exam  Breath sounds clear to auscultation     Abdominal    Neurological - normal exam                 Anesthesia Plan    ASA 3   ASA physical status 3 criteria: COPD - poorly controlled    Plan - general       Airway plan will be ETT          Induction: intravenous    Postoperative Plan: Postoperative administration of opioids is intended.    Pertinent diagnostic labs and testing reviewed    Informed Consent:    Anesthetic plan and risks discussed with patient.    Use of blood products discussed with: patient whom consented to blood products.

## 2023-06-07 NOTE — PROGRESS NOTES
Med rec updated and complete, per pt   Allergies reviewed, per pt  Interviewed pt with daughter at bedside with permission from pt.  Pt reports that she had radiation on 4/17/2023 through 4/21/2023.  Pt reports that she only took her MEDROXYPROGESTERONE 10MG for 4 days and stopped on 6/3/2023.

## 2023-06-07 NOTE — ANESTHESIA TIME REPORT
Anesthesia Start and Stop Event Times     Date Time Event    6/7/2023 1503 Ready for Procedure     1539 Anesthesia Start     1618 Anesthesia Stop        Responsible Staff  06/07/23    Name Role Begin End    Madhu De La O M.D. Anesth 1539 1618        Overtime Reason:  no overtime (within assigned shift)    Comments:

## 2023-06-07 NOTE — DISCHARGE INSTRUCTIONS
HOME CARE INSTRUCTIONS    ACTIVITY: Rest and take it easy for the first 24 hours.  A responsible adult is recommended to remain with you during that time.  It is normal to feel sleepy.  We encourage you to not do anything that requires balance, judgment or coordination.    FOR 24 HOURS DO NOT:  Drive, operate machinery or run household appliances.  Drink beer or alcoholic beverages.  Make important decisions or sign legal documents.    SPECIAL INSTRUCTIONS: apply ice pack to area 20 minutes on/off for next 24 hours as needed for pain and swelling    DIET: To avoid nausea, slowly advance diet as tolerated, avoiding spicy or greasy foods for the first day.  Add more substantial food to your diet according to your physician's instructions. INCREASE FLUIDS AND FIBER TO AVOID CONSTIPATION.    SURGICAL DRESSING/BATHING: you may shower in 24 hours, no soaking in tubs, baths or hot tubs until OK by Dr Becerra    MEDICATIONS: Resume taking daily medication.  Take prescribed pain medication with food.  If no medication is prescribed, you may take non-aspirin pain medication if needed.  PAIN MEDICATION CAN BE VERY CONSTIPATING.  Take a stool softener or laxative such as senokot, pericolace, or milk of magnesia if needed.    Prescription given for Percocet to [facility].      Last pain medication given at 4:32pm Oxycodone 10mg    A follow-up appointment should be arranged with your doctor in 1-2 weeks; call to schedule.    You should CALL YOUR PHYSICIAN if you develop:  Fever greater than 101 degrees F.  Pain not relieved by medication, or persistent nausea or vomiting.  Excessive bleeding (blood soaking through dressing) or unexpected drainage from the wound.  Extreme redness or swelling around the incision site, drainage of pus or foul smelling drainage.  Inability to urinate or empty your bladder within 8 hours.  Problems with breathing or chest pain.    You should call 911 if you develop problems with breathing or chest  pain.  If you are unable to contact your doctor or surgical center, you should go to the nearest emergency room or urgent care center.  Physician's telephone #: [phone] Dr Becerra    MILD FLU-LIKE SYMPTOMS ARE NORMAL.  YOU MAY EXPERIENCE GENERALIZED MUSCLE ACHES, THROAT IRRITATION, HEADACHE AND/OR SOME NAUSEA.    If any questions arise, call your doctor.  If your doctor is not available, please feel free to call the Surgical Center at [phone].  The Center is open Monday through Friday from 7AM to 7PM.      A registered nurse may call you a few days after your surgery to see how you are doing after your procedure.    You may also receive a survey in the mail within the next two weeks and we ask that you take a few moments to complete the survey and return it to us.  Our goal is to provide you with very good care and we value your comments.     Depression / Suicide Risk    As you are discharged from this [facility] facility, it is important to learn how to keep safe from harming yourself.    Recognize the warning signs:  Abrupt changes in personality, positive or negative- including increase in energy   Giving away possessions  Change in eating patterns- significant weight changes-  positive or negative  Change in sleeping patterns- unable to sleep or sleeping all the time   Unwillingness or inability to communicate  Depression  Unusual sadness, discouragement and loneliness  Talk of wanting to die  Neglect of personal appearance   Rebelliousness- reckless behavior  Withdrawal from people/activities they love  Confusion- inability to concentrate     If you or a loved one observes any of these behaviors or has concerns about self-harm, here's what you can do:  Talk about it- your feelings and reasons for harming yourself  Remove any means that you might use to hurt yourself (examples: pills, rope, extension cords, firearm)  Get professional help from the community (Mental Health, Substance Abuse,  psychological counseling)  Do not be alone:Call your Safe Contact- someone whom you trust who will be there for you.  Call your local CRISIS HOTLINE 828-0310 or 518-420-9938  Call your local Children's Mobile Crisis Response Team [place] (297) 880-4668 or www.[employer]  Call the toll free National Suicide Prevention Hotlines   National Suicide Prevention Lifeline 772-473-YMIM (0488)  [place] [employer] Line Network 800-SUICIDE (811-5447)    I acknowledge receipt and understanding of these Home Care instructions.

## 2023-06-07 NOTE — ANESTHESIA POSTPROCEDURE EVALUATION
Patient: Melly Hendrickson    Procedure Summary     Date: 06/07/23 Room / Location: [facility] 11 / SURGERY [facility]    Anesthesia Start: 1539 Anesthesia Stop: 1618    Procedure: EXCISION OF SOFT TISSUE MASS LEFT CHEST WALL (Left: Chest) Diagnosis: (MASS OF CHEST WALL)    Surgeons: Nasir Becerra M.D. Responsible Provider: Madhu D eLa O M.D.    Anesthesia Type: general ASA Status: 3          Final Anesthesia Type: general  Last vitals  BP   Blood Pressure: (!) 159/96    Temp   36.7 °C (98.1 °F)    Pulse   (!) 114   Resp   16    SpO2   92 %      Anesthesia Post Evaluation    Patient location during evaluation: PACU  Patient participation: complete - patient participated  Level of consciousness: awake and alert    Airway patency: patent  Anesthetic complications: no  Cardiovascular status: hemodynamically stable  Respiratory status: acceptable  Hydration status: euvolemic    PONV: none          No notable events documented.     Nurse Pain Score: 8 (NPRS)

## 2023-06-07 NOTE — OP REPORT
Surgeon: Nasir Becerra MD  Assist: Marilyn WADE  Preoperative diagnosis: chest wall mass  Postoperative diagnosis: Chest wall mass  Procedure: Excision of chest wall mass measuring approximately 4 cm  Anesthesia: General LMA anesthesia  Anesthesiologist: Madhu De La O MD  Indications: 48-year-old woman with a chest wall mass.  This is on her left chest.  It is firm and excision is indicated.  She does have a history of lung cancer this may represent metastatic disease but also may represent a infectious process.  Narrative: The procedure was discussed in detail with the patient including the risks of bleeding, infection, abscess, and hematoma.  She understood all the above and wished to proceed.  Her left chest was prepped with chlorhexidine prep draped sterilely.  An elliptical incision was made over the palpable mass.  The mass was excised in its entirety with sharp dissection and Bovie electrocautery.  The mass was noted to extend down to the level of the muscle but did not invade the muscle.  The mass measured approximately 4 cm.  Hemostasis was assured.  The incision was then closed in 2 layers with 3-0 Vicryl pops and a 4-O Monocryl.  Sterile dressings were placed.  The patient tolerated the procedure well without apparent complication.  Final counts were reported as correct.

## 2023-06-08 NOTE — PROGRESS NOTES
Patient was seen today in clinic with Dr. Barrera for a follow up.  Vitals signs and weight were obtained and pain assessment was completed.  Allergies and medications were reviewed with the patient.  Toxicities of treatment assessed.     Vitals/Pain:  Vitals:    06/08/23 0837   BP: (!) 150/99   BP Location: Right arm   Patient Position: Sitting   BP Cuff Size: Large adult   Pulse: (!) 101   SpO2: 98%   Pain Score: 10=Severe Pain (surgery yesterday)        Allergies:   Aspirin, Other environmental, and Penicillins    Current Medications:  Current Outpatient Medications   Medication Sig Dispense Refill    NON SPECIFIED Inject  as directed see administration instructions. Pt received CORTISONE injection on right knee  about 3 weeks ago (5/17/2023)      ipratropium-albuterol (DUONEB) 0.5-2.5 (3) MG/3ML nebulizer solution Take 3 mL by nebulization 2 times a day.      sulfamethoxazole-trimethoprim (BACTRIM DS) 800-160 MG tablet Take 1 Tablet by mouth 2 times a day. 10 Tablet 0    oxyCODONE-acetaminophen (PERCOCET) 5-325 MG Tab Take 1-2 Tablets by mouth every four hours as needed for Moderate Pain for up to 5 days. 15 Tablet 0    medroxyPROGESTERone (PROVERA) 10 MG Tab Take 1 Tablet by mouth every day. 10 Tablet 0    loratadine (CLARITIN) 10 MG Tab Take 10 mg by mouth every day.      nortriptyline (PAMELOR) 10 MG Cap Take 10-20 mg by mouth at bedtime as needed.      fluticasone (FLONASE) 50 MCG/ACT nasal spray USE 2 SPRAY(S) INTO AFFECTED NOSTRILS ONCE DAILY (Patient taking differently: Administer 2 Sprays into affected nostril(S) every day.) 16 g 3    albuterol 108 (90 Base) MCG/ACT Aero Soln inhalation aerosol INHALE 2 PUFFS BY MOUTH EVERY 4 HOURS AS NEEDED FOR SHORTNESS OF BREATH (Patient taking differently: Inhale 2 Puffs every four hours as needed for Shortness of Breath.) 9 g 3    EPINEPHrine (EPIPEN) 0.3 MG/0.3ML Solution Auto-injector solution for injection Inject 0.3 mg into the shoulder, thigh, or buttocks one  time. Indications: Life-Threatening Hypersensitivity Reaction      hydrOXYzine HCl (ATARAX) 10 MG Tab Take 10 mg by mouth at bedtime as needed for Anxiety.      diclofenac sodium (VOLTAREN) 1 % Gel Apply 2 g topically 4 times a day as needed (Apply's on on neck for pain).       No current facility-administered medications for this encounter.         PCP:  Lemuel Juarez, Bryan Ass't

## 2023-06-08 NOTE — OR NURSING
Received pt A x 4  VSS in RA  SBP elevated since baseline reading from am, but pt denies ever using anti HTN meds, claims to have white coat syndrome, denies h/a or blurring of vision or s/sx of elevated BP  Drinking cran grape well, nil n/v  Ambulatory and independent, voided in ph 2  Pain 5, seems to be chronic as it is her hip and back, not the operative site  CDI x 1 under left breast, dermabonded, nil ooze  Dc instructions given to pt and daughter and verbalized understanding  Belongings returned

## 2023-06-08 NOTE — OR NURSING
PACU note- Respirations easy. No bleeding from incision.  Pain meds with good effect.  Patient requested Benadryl for itching, no rash noted.  Report to PACU 2.

## 2023-06-08 NOTE — PROGRESS NOTES
RADIATION ONCOLOGY FOLLOW-UP    Patient name:  Melly Hendrickson    Primary Physician:  Dimitri Hdez Jr., M.D. MRN: [id number]  [facility]: [id number]   Referring physician:  Ganser, John H, M.D.  : 1975, 48 y.o.     DATE OF SERVICE: 2023    IDENTIFICATION:   A 48 y.o. female with    Primary adenocarcinoma of right lung (HCC)  Staging form: Lung, AJCC 8th Edition  - Clinical stage from 3/30/2023: Stage IA2 (cT1b, cN0, cM0) - Signed by Hai Barrera M.D. on 3/30/2023  Histopathologic type: Adenocarcinoma, NOS  Stage prefix: Initial diagnosis      RADIATION SUMMARY:  Radiation Oncology          2023   Aria Course Treatment Dates   Course First Treatment Date 2023    Course Last Treatment Date 2023    Aria Treatment Summary   RLL Lung SBRT  Plan from Course C1_RLL_SBRT   Fraction 4 of 5 5 of 5    5 of 5   Elapsed Course Days 3 @  4 @ [id number]    4 @ [id number]   Prescribed Fraction Dose 1,000 cGy 1,000 cGy    1,000 cGy   Prescribed Total Dose 5,000 cGy 5,000 cGy    5,000 cGy   PTV  Reference Point from Course C1_RLL_[id number]   Elapsed Course Days 3 @  4 @ [id number]    4 @ [id number]   Session Dose 1,000 cGy 1,000 cGy    --   Total Dose 4,000 cGy 5,000 cGy    5,000 cGy   RLL cp  Reference Point from Course C1_RLL_SBRT   Elapsed Course Days 3 @  4 @ [id number]    4 @ [id number]   Session Dose 1,246 cGy 1,246 cGy    --   Total Dose 4,985 cGy 6,231 cGy    6,231 cGy      Details          More values are hidden. Newest values shown. Go to activity for more data.                HISTORY OF PRESENT ILLNESS:  Subjective    Subjective     This is a 47-year-old lady, with a 25-pack-year smoking history, current smoker, who presents for consideration of stereotactic radiation for recently diagnosed stage I right lower lobe adenocarcinoma.  Overall, she was doing relatively well until chest x-ray performed in  February revealed an abnormality in the right lung.  This led to a chest CT that was done on February 13 that demonstrated 1.4 cm mass in the right lower lobe, as well as a 1.9 cm groundglass opacity also in the right lower lobe.  The groundglass opacity was relatively similar, but the mass is new from prior exams.  This led to work-up with thoracic surgery consultation as well as PFTs and a PET scan and discussion tumor board.  Her FEV1 is 1.79 L, 59%, with DLCO preserved.  However she does have significant dyspnea, and her lungs are quite emphysematous and hyperexpanded on her CT.  She did meet with thoracic surgery and was felt not to be a good candidate for resection given her already significant dyspnea despite her preserved PFTs.  PET scan and biopsy were also completed that showed intense FDG uptake at this mass with an SUV of 22 and no other distant sites of disease.  Biopsy confirmed primary lung adenocarcinoma.  She was discussed at tumor board and felt to be a better candidate for stereotactic radiation for which she presents today to discuss further.     Currently, she feels relatively well, has no specific cardiopulmonary complaints except for the occasional cough and dyspnea with exertion.  She does report occasional headaches, as well as significant sleep disturbance and anxiety ever since her diagnosis.  Overall, she certainly has difficulty coping with her diagnosis, she has significant anxiety, and continues to smoke as a way to cope with this.  She has not seen smoking cessation as yet.              INTERVAL HISTORY:    6/7/23: She is coming today for her first follow-up after radiation therapy.  Specifically with regards to radiation, she has done reasonably well, and did not appear to have any significant pulmonary or chest wall complaints in the treated area.  However, few days after radiation, she says she felt jittery and looked up on the Internet and decided to do Epsom salt baths to help.   About a day after that, she noticed a small lump on her left chest wall underneath her breast scar line from her reduction.  It became progressively tender, warm and erythematous over several days, and ultimately an I&D was attempted, though was unsuccessful.  Therefore yesterday, she had a surgical resection of this performed.  Pathology is pending.  She has some residual tenderness in the area.  Otherwise, she is doing reasonably well.    PROBLEM LIST:  Patient Active Problem List   Diagnosis    Chronic obstructive pulmonary disease, unspecified (HCC)    Chronic sinusitis, unspecified    Degeneration of intervertebral disc of lumbosacral region    Gastroesophageal reflux disease with esophagitis    Generalized anxiety disorder    Hyperlipidemia    Other ovarian cyst, left side    Prediabetes    Menorrhagia with irregular cycle    Obstructive sleep apnea    Cigarette nicotine dependence without complication    Obesity (BMI 30-39.9)    Primary adenocarcinoma of right lung (HCC)    Seasonal allergies    Gastroesophageal reflux disease    Asthma    Nodule of soft tissue       CURRENT MEDICATIONS:  Current Outpatient Medications   Medication Sig Dispense Refill    NON SPECIFIED Inject  as directed see administration instructions. Pt received CORTISONE injection on right knee  about 3 weeks ago (5/17/2023)      ipratropium-albuterol (DUONEB) 0.5-2.5 (3) MG/3ML nebulizer solution Take 3 mL by nebulization 2 times a day.      sulfamethoxazole-trimethoprim (BACTRIM DS) 800-160 MG tablet Take 1 Tablet by mouth 2 times a day. 10 Tablet 0    oxyCODONE-acetaminophen (PERCOCET) 5-325 MG Tab Take 1-2 Tablets by mouth every four hours as needed for Moderate Pain for up to 5 days. 15 Tablet 0    medroxyPROGESTERone (PROVERA) 10 MG Tab Take 1 Tablet by mouth every day. 10 Tablet 0    loratadine (CLARITIN) 10 MG Tab Take 10 mg by mouth every day.      nortriptyline (PAMELOR) 10 MG Cap Take 10-20 mg by mouth at bedtime as needed.       fluticasone (FLONASE) 50 MCG/ACT nasal spray USE 2 SPRAY(S) INTO AFFECTED NOSTRILS ONCE DAILY (Patient taking differently: Administer 2 Sprays into affected nostril(S) every day.) 16 g 3    albuterol 108 (90 Base) MCG/ACT Aero Soln inhalation aerosol INHALE 2 PUFFS BY MOUTH EVERY 4 HOURS AS NEEDED FOR SHORTNESS OF BREATH (Patient taking differently: Inhale 2 Puffs every four hours as needed for Shortness of Breath.) 9 g 3    EPINEPHrine (EPIPEN) 0.3 MG/0.3ML Solution Auto-injector solution for injection Inject 0.3 mg into the shoulder, thigh, or buttocks one time. Indications: Life-Threatening Hypersensitivity Reaction      hydrOXYzine HCl (ATARAX) 10 MG Tab Take 10 mg by mouth at bedtime as needed for Anxiety.      diclofenac sodium (VOLTAREN) 1 % Gel Apply 2 g topically 4 times a day as needed (Apply's on on neck for pain).       No current facility-administered medications for this encounter.       ALLERGIES:  Aspirin, Other environmental, and Penicillins    REVIEW OF SYSTEMS:    A complete review of system taken. Pertinent items in HPI.  All others negative.    PHYSICAL EXAM:  PERFORMANCE STATUS:      3/30/2023     9:40 AM   ECOG Performance Review   ECOG Performance Status Restricted in physically strenuous activity but ambulatory and able to carry out work of a light or sedentary nature, e.g., light house work, office work         3/30/2023     9:40 AM   Karnofsky Score   Karnofsky Score 80     BP (!) 150/99 (BP Location: Right arm, Patient Position: Sitting, BP Cuff Size: Large adult)   Pulse (!) 101   LMP 06/02/2023 (Approximate)   SpO2 98%   Physical Exam  Constitutional:       Appearance: Normal appearance.   Eyes:      Extraocular Movements: Extraocular movements intact.      Conjunctiva/sclera: Conjunctivae normal.   Cardiovascular:      Rate and Rhythm: Normal rate and regular rhythm.   Pulmonary:      Effort: Pulmonary effort is normal.      Breath sounds: Normal breath sounds.   Chest:       Comments: Healing scar running inferior to the left inframammary fold from recent resection.  No obvious lesions nearby.  Neurological:      General: No focal deficit present.      Mental Status: She is alert and oriented to person, place, and time.      Cranial Nerves: No cranial nerve deficit.      Sensory: Sensation is intact.      Motor: Motor function is intact. No weakness.         LABORATORY DATA:   Lab Results   Component Value Date/Time    WBC 6.7 06/15/2022 06:37 AM    RBC 5.10 06/15/2022 06:37 AM    HEMOGLOBIN 14.1 06/15/2022 06:37 AM    HEMATOCRIT 45.1 06/15/2022 06:37 AM    MCV 88.4 06/15/2022 06:37 AM    MCH 27.6 06/15/2022 06:37 AM    MCHC 31.3 (L) 06/15/2022 06:37 AM    RDW 52.4 (H) 06/15/2022 06:37 AM    PLATELETCT 304 06/15/2022 06:37 AM    MPV 10.0 06/15/2022 06:37 AM    NEUTSPOLYS 48.70 06/15/2022 06:37 AM    LYMPHOCYTES 39.10 06/15/2022 06:37 AM    MONOCYTES 9.20 06/15/2022 06:37 AM    EOSINOPHILS 2.40 06/15/2022 06:37 AM    BASOPHILS 0.30 06/15/2022 06:37 AM      Lab Results   Component Value Date/Time    SODIUM 139 06/15/2022 06:37 AM    POTASSIUM 4.2 06/15/2022 06:37 AM    CHLORIDE 105 06/15/2022 06:37 AM    CO2 23 06/15/2022 06:37 AM    GLUCOSE 95 06/15/2022 06:37 AM    BUN 6 (L) 06/15/2022 06:37 AM    CREATININE 0.81 06/15/2022 06:37 AM         RADIOLOGY DATA:  DX-KNEE 3 VIEWS RIGHT    Result Date: 4/13/2023  1.  Probable mild patellofemoral compartment osteoarthritis 2.  Bone infarct in the proximal tibial metaphysis and the distal femoral metadiaphysis 3.  Foreign body tract within the proximal tibia, old    [id number] (WITH 1-VIEW CXR) LEFT    Result Date: 5/18/2023  Normal rib series.      IMPRESSION:    A 48 y.o. with   Primary adenocarcinoma of right lung (HCC)  Staging form: Lung, AJCC 8th Edition  - Clinical stage from 3/30/2023: Stage IA2 (cT1b, cN0, cM0) - Signed by Hai Barrera M.D. on 3/30/2023  Histopathologic type: Adenocarcinoma, NOS  Stage prefix: Initial  diagnosis      CANCER STATUS:  Therapy Completed, Status Pending Restaging Work-up    RECOMMENDATIONS:   I had a long discussion with the patient today reassuring her that it is unlikely that this chest wall lesion was metastatic carcinoma from her right-sided lung cancer,.  Though it is not impossible, certainly based on the erythema, warmth and tenderness, I suspect this is perhaps an abscess or infectious process.  We will await the final pathology certainly before making neck steps.  I advised her that I will plan to call her once the pathology results have returned.  In the meantime, with regards to surveillance for the lung cancer, we will plan to do CT surveillance as usual, with her next scan to be scheduled in approximately 5-6 weeks from now, and we will plan to see her in the office thereafter.  If for some reason the resection findings are concerning and she needs to be sooner, then certainly I will let her know and we will plan to see her sooner as needed.    Thank you for the opportunity to participate in her care.  If any questions or comments, please do not hesitate in calling.    Orders Placed This Encounter    CT-CHEST (THORAX) W/O

## 2023-06-12 NOTE — TELEPHONE ENCOUNTER
See pharmacist note 5/4/23.   Attempted to call pt to f/u on smoking cessation. She was unavailable at the time of my call and requested I call back later.

## 2023-06-19 PROBLEM — G89.3 CHRONIC PAIN DUE TO NEOPLASM: Status: ACTIVE | Noted: 2023-01-01

## 2023-06-19 NOTE — PATIENT INSTRUCTIONS
-recommend to see pain management for medical marijuana  -next appointment with Dr. Baker to establish care with new physician in 5 weeks

## 2023-06-19 NOTE — TELEPHONE ENCOUNTER
Have we ever prescribed this med? Yes.  If yes, what date? 4/13/2023    Last OV: 1/24/2023 NICOLASA Gil MD     Next OV: 7/17/2023 NICOLASA Gil MD     DX: Severe persistent asthma without complication [J45.50]    Medications: ipratropium-albuterol (DUONEB) 0.5-2.5 (3) MG/3ML nebulizer solution

## 2023-06-19 NOTE — PROGRESS NOTES
Established Patient    Melly presents today with the following:    CC: established    HPI: 48-year-old female presents for routine appointment.    Dr. Liang, with [facility], is prescribing Norco 10/325 every six hours as needed. Patient would like to get a medical marijuana for pain control. Sees Dr. Liang every two months, next appointment August 2023.    Seen in the clinic last May 31, 2023.  Patient was concern for a nodule or skin lump over the left rib area for 3 to 4 months that is increasing in size.  It incision and drainage attempted in the office at that time and referred to [facility] for excisional biopsy.skin biopsy 6/7/23: Metastatic poorly differentiated malignancy favoring carcinoma without obvious involvement of overlying skin and a non-specific IHC profile of some keratins positive . Patient has a recent diagnosis of right lower lung lobe adenocarcinoma, [id number], dated 2/14/23.  Has a CT scan 7/10/23and follow up with radiation Oncology Dr. Barrera. Dr. Becerra, is the surgeon that excised the skin mass.Not followed by medical Oncology at this time. Per PET below, patient reports Dr. Barrera is not concerned about thyroid increased activity.    PET most recent 3/29/23:   Intense uptake corresponds to RIGHT lower lobe lung mass consistent with known malignancy. No definitive metastatic disease. Heterogeneous increased activity in the LEFT thyroid lobe may be associated with benign or malignant processes.  Consider ultrasound evaluation.      Patient Active Problem List    Diagnosis Date Noted    Chronic pain due to neoplasm 06/19/2023    Nodule of soft tissue 05/31/2023    Gastroesophageal reflux disease 03/09/2023    Asthma 03/09/2023    Primary adenocarcinoma of right lung (HCC) 02/23/2023    Seasonal allergies 02/23/2023    Obesity (BMI 30-39.9) 10/13/2022    Obstructive sleep apnea 09/08/2022    Cigarette nicotine dependence without complication 09/08/2022    Menorrhagia with irregular  cycle 03/01/2022    Generalized anxiety disorder 07/12/2021    Hyperlipidemia 07/12/2021    Other ovarian cyst, left side 06/15/2021    Prediabetes 06/15/2021    Gastroesophageal reflux disease with esophagitis 02/11/2021    Chronic obstructive pulmonary disease, unspecified (HCC) 09/21/2020    Chronic sinusitis, unspecified 09/21/2020    Degeneration of intervertebral disc of lumbosacral region 09/21/2020       Social History     Tobacco Use    Smoking status: Some Days     Packs/day: 0.50     Years: 20.00     Pack years: 10.00     Types: Cigarettes     Passive exposure: Past    Smokeless tobacco: Never    Tobacco comments:     on and off    Vaping Use    Vaping Use: Never used   Substance Use Topics    Alcohol use: Not Currently     Comment: occ, rare    Drug use: Not Currently     Comment: THC       Current Outpatient Medications   Medication Sig Dispense Refill    NON SPECIFIED Inject  as directed see administration instructions. Pt received CORTISONE injection on right knee  about 3 weeks ago (5/17/2023)      ipratropium-albuterol (DUONEB) 0.5-2.5 (3) MG/3ML nebulizer solution Take 3 mL by nebulization 2 times a day.      medroxyPROGESTERone (PROVERA) 10 MG Tab Take 1 Tablet by mouth every day. 10 Tablet 0    loratadine (CLARITIN) 10 MG Tab Take 10 mg by mouth every day.      nortriptyline (PAMELOR) 10 MG Cap Take 10-20 mg by mouth at bedtime as needed.      fluticasone (FLONASE) 50 MCG/ACT nasal spray USE 2 SPRAY(S) INTO AFFECTED NOSTRILS ONCE DAILY (Patient taking differently: Administer 2 Sprays into affected nostril(S) every day.) 16 g 3    EPINEPHrine (EPIPEN) 0.3 MG/0.3ML Solution Auto-injector solution for injection Inject 0.3 mg into the shoulder, thigh, or buttocks one time. Indications: Life-Threatening Hypersensitivity Reaction      hydrOXYzine HCl (ATARAX) 10 MG Tab Take 10 mg by mouth at bedtime as needed for Anxiety.      diclofenac sodium (VOLTAREN) 1 % Gel Apply 2 g topically 4 times a day as  needed (Apply's on on neck for pain).      HYDROcodone/acetaminophen (NORCO)  MG Tab TAKE 1 TABLET BY MOUTH EVERY 6 HOURS AS NEEDED FOR 28 DAYS.      VENTOLIN  (90 Base) MCG/ACT Aero Soln inhalation aerosol INHALE 2 PUFFS BY MOUTH EVERY 4 HOURS AS NEEDED FOR SHORTNESS OF BREATH 18 g 2     No current facility-administered medications for this visit.       Review of Systems   Constitutional:  Negative for chills and fever.   Musculoskeletal:  Positive for joint pain.         /82 (BP Location: Left arm, Patient Position: Sitting, BP Cuff Size: Adult long)   Pulse 88   Temp 37.1 °C (98.8 °F) (Temporal)   Ht 1.829 m (6')   Wt 92.9 kg (204 lb 12.8 oz)   LMP 06/02/2023 (Approximate)   SpO2 96%   BMI 27.78 kg/m²       PHYSICAL EXAM:  Physical Exam  Vitals and nursing note reviewed.   Constitutional:       Appearance: Normal appearance.   HENT:      Head: Normocephalic and atraumatic.   Skin:     Comments: Left lateral rib area where excisional biopsy was done of mass; clean/dry/intact with no signs of infection appreciated.   Neurological:      Mental Status: She is alert.         Note: I have reviewed all pertinent labs and diagnostic tests associated with this visit with specific comments listed under the assessment and plan below    Assessment and Plan      1. Chronic pain due to neoplasm  Followed by pain management.  Reported the patient that recommended if she wants medical marijuana approval to follow-up with pain management on this.  Patient verbalized understanding of these instructions.    2. Primary adenocarcinoma of right lung (HCC)  Followed by general surgery and radiation oncology.  Currently not followed by medical oncology.  Will defer management to specialist regarding this.    3.  Chest wall mass  -per biopsy 6/7/23: Metastatic poorly differentiated malignancy favoring carcinoma   without obvious involvement of overlying skin and a non-specific IHC profile of some keratins  positive.  [facility]ed by [facility].  Jekyll scar looks good without signs of infection appreciated.        Followup: Return in about 5 weeks (around 7/24/2023), or Dr. Checo Baker to establish care on next appointment.      Signed by: Dimitri Hdez Jr., M.D.

## 2023-06-20 PROBLEM — J15.9 BACTERIAL PNEUMONIA: Status: ACTIVE | Noted: 2023-01-01

## 2023-06-21 PROBLEM — M53.3 MASS OF SACRUM: Status: ACTIVE | Noted: 2023-01-01

## 2023-06-21 PROBLEM — J44.1 COPD EXACERBATION (HCC): Status: ACTIVE | Noted: 2020-09-21

## 2023-06-21 PROBLEM — M54.50 LOWER BACK PAIN: Status: ACTIVE | Noted: 2020-09-21

## 2023-06-21 PROBLEM — J18.9 PNEUMONIA: Status: ACTIVE | Noted: 2023-01-01

## 2023-06-21 PROBLEM — C34.90 LUNG CANCER (HCC): Status: ACTIVE | Noted: 2023-01-01

## 2023-06-21 PROBLEM — E11.65 TYPE 2 DIABETES MELLITUS WITH HYPERGLYCEMIA, WITHOUT LONG-TERM CURRENT USE OF INSULIN (HCC): Status: ACTIVE | Noted: 2023-01-01

## 2023-06-21 NOTE — ED PROVIDER NOTES
ED Provider Note    CHIEF COMPLAINT  Chief Complaint   Patient presents with    Pain    Post-Op Pain       EXTERNAL RECORDS REVIEWED  Outpatient Notes patient seen yesterday for routine appointment.  Patient has recently had a skin biopsy that showed metastatic poorly differentiated malignancy she had a recent diagnosis of right lower lung adenocarcinoma.  She is due to follow-up with radiation oncology.  She is followed by [facility] for the chest wall mass    HPI/ROS  LIMITATION TO HISTORY   Select: : None  OUTSIDE HISTORIAN(S):  none    Melly Hendrickson is a 48 y.o. female who presents for evaluation of fevers cough and back pain.  Patient is recently postop from excisional biopsy of her left chest wall.  She also has current right lung adenocarcinoma.  The biopsy of her left chest wall mass showed poorly differentiated metastatic process.  She reports that ever since the biopsy she started having right-sided low back pain that shoots into her right leg.  It is worse with any kind of movement.  She really cannot be comfortable.  She denies any saddle anesthesia or lower extremity weakness.  She did not have this back pain prior to a few days after the surgery.  She also reports having fever since then as well.  She reports having increased cough that really will not improve with her inhalers.    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    SURGICAL HISTORY   has a past surgical  history that includes bronchoscopy,diagnostic (N/A, 02/14/2023); endobronchial us add-on (N/A, 02/14/2023); septoplasty; mammoplasty reduction (Bilateral); tube & ectopic preg., removal; and exc skin benig >4cm trunk,arm,leg (Left, 6/7/2023).    FAMILY HISTORY  Family History   Problem Relation Age of Onset    Diabetes Mother     Cancer Father         Prostate    Cancer Paternal Grandmother         Cervical    Cancer Paternal Grandfather         Unk       SOCIAL HISTORY  Social History     Tobacco Use    Smoking status: Some Days     Packs/day: 0.50     Years: 20.00     Pack years: 10.00     Types: Cigarettes     Passive exposure: Past    Smokeless tobacco: Never    Tobacco comments:     on and off    Vaping Use    Vaping Use: Never used   Substance and Sexual Activity    Alcohol use: Not Currently     Comment: occ, rare    Drug use: Not Currently     Comment: THC    Sexual activity: Not on file       CURRENT MEDICATIONS  Home Medications       Reviewed by Laura Rosenthal, PharmD (Pharmacist) on 06/20/23 at 2056  Med List Status: Complete     Medication Last Dose Status   diclofenac sodium (VOLTAREN) 1 % Gel 6/6/2023 Active   EPINEPHrine (EPIPEN) 0.3 MG/0.3ML Solution Auto-injector solution for injection PRN Active   fluticasone (FLONASE) 50 MCG/ACT nasal spray 6/20/2023 Active   HYDROcodone/acetaminophen (NORCO)  MG Tab 6/20/2023 Active   hydrOXYzine HCl (ATARAX) 10 MG Tab 1 week Active   ipratropium-albuterol (DUONEB) 0.5-2.5 (3) MG/3ML nebulizer solution Refilled today Active   loratadine (CLARITIN) 10 MG Tab 6/20/2023 Active   Misc Natural Products (COLON CLEANSE PO) 6/20/2023 Active   NON SPECIFIED 5/17/2023 Active   TURMERIC PO 6/20/2023 Active   VENTOLIN  (90 Base) MCG/ACT Aero Soln inhalation aerosol 6/20/2023 Active                    ALLERGIES  Allergies   Allergen Reactions    Aspirin Rash and Hives     Pt reports that she received a rash on face, pt reports it ok if she takes NORCO     Other Environmental Shortness of Breath     Perfumes, dander, scents    Penicillins Hives, Rash and Swelling     Pt reports that she gets swelling in throat and face, rash all over face and arms.   Tolerated cefazolin       PHYSICAL EXAM  VITAL SIGNS: BP (!) 136/94   Pulse (!) 140   Temp (!) 38.1 °C (100.6 °F) (Temporal)   Resp (!) 26   Ht 1.829 m (6')   Wt 91.6 kg (201 lb 15.1 oz)   LMP 06/02/2023 (Approximate)   SpO2 94%   BMI 27.39 kg/m²    Constitutional: Alert in no apparent distress.  HENT: No signs of trauma, Bilateral external ears normal, Nose normal.   Eyes: Pupils are equal and reactive, Conjunctiva normal, Non-icteric.   Neck: Normal range of motion, No tenderness, Supple, No stridor.   Cardiovascular: Tachycardic, no murmurs.   Thorax & Lungs: Coarse breath sounds throughout with diffuse wheezing  Abdomen: Bowel sounds normal, Soft, No tenderness, No masses, No peritoneal signs.  Skin: Warm, Dry, No erythema, No rash.   Back: No bony tenderness in the midline tenderness is over her right SI area  Musculoskeletal:  No major deformities noted.   Neurologic: Alert, moving all extremities without difficulty, no focal deficits.      DIAGNOSTIC STUDIES / PROCEDURES      LABS  Results for orders placed or performed during the hospital encounter of 06/20/23   Lactic acid (lactate)   Result Value Ref Range    Lactic Acid 1.2 0.5 - 2.0 mmol/L   CBC With Differential   Result Value Ref Range    WBC 11.4 (H) 4.8 - 10.8 K/uL    RBC 4.81 4.20 - 5.40 M/uL    Hemoglobin 13.3 12.0 - 16.0 g/dL    Hematocrit 41.3 37.0 - 47.0 %    MCV 85.9 81.4 - 97.8 fL    MCH 27.7 27.0 - 33.0 pg    MCHC 32.2 32.2 - 35.5 g/dL    RDW 51.4 (H) 35.9 - 50.0 fL    Platelet Count 218 164 - 446 K/uL    MPV 10.0 9.0 - 12.9 fL    Neutrophils-Polys 78.60 (H) 44.00 - 72.00 %    Lymphocytes 12.10 (L) 22.00 - 41.00 %    Monocytes 7.70 0.00 - 13.40 %    Eosinophils 1.00 0.00 - 6.90 %    Basophils 0.30 0.00 - 1.80 %    Immature Granulocytes 0.30  0.00 - 0.90 %    Nucleated RBC 0.00 0.00 - 0.20 /100 WBC    Neutrophils (Absolute) 9.00 (H) 1.82 - 7.42 K/uL    Lymphs (Absolute) 1.38 1.00 - 4.80 K/uL    Monos (Absolute) 0.88 (H) 0.00 - 0.85 K/uL    Eos (Absolute) 0.11 0.00 - 0.51 K/uL    Baso (Absolute) 0.03 0.00 - 0.12 K/uL    Immature Granulocytes (abs) 0.04 0.00 - 0.11 K/uL    NRBC (Absolute) 0.00 K/uL   Comp Metabolic Panel   Result Value Ref Range    Sodium 136 135 - 145 mmol/L    Potassium 4.2 3.6 - 5.5 mmol/L    Chloride 104 96 - 112 mmol/L    Co2 20 20 - 33 mmol/L    Anion Gap 12.0 7.0 - 16.0    Glucose 157 (H) 65 - 99 mg/dL    Bun 6 (L) 8 - 22 mg/dL    Creatinine 0.82 0.50 - 1.40 mg/dL    Calcium 9.2 8.4 - 10.2 mg/dL    AST(SGOT) 13 12 - 45 U/L    ALT(SGPT) 9 2 - 50 U/L    Alkaline Phosphatase 62 30 - 99 U/L    Total Bilirubin 0.2 0.1 - 1.5 mg/dL    Albumin 3.8 3.2 - 4.9 g/dL    Total Protein 7.0 6.0 - 8.2 g/dL    Globulin 3.2 1.9 - 3.5 g/dL    A-G Ratio 1.2 g/dL   Urinalysis    Specimen: Urine   Result Value Ref Range    Color Yellow     Character Clear     Specific Gravity <=1.005 <1.035    Ph 6.0 5.0 - 8.0    Glucose Negative Negative mg/dL    Ketones Negative Negative mg/dL    Protein Negative Negative mg/dL    Bilirubin Negative Negative    Nitrite Negative Negative    Leukocyte Esterase Negative Negative    Occult Blood Negative Negative    Micro Urine Req see below    CoV-2, FLU A/B, and RSV by PCR (2-4 Hours CEPHEID) : Collect NP swab in VTM    Specimen: Respirate   Result Value Ref Range    Influenza virus A RNA Negative Negative    Influenza virus B, PCR Negative Negative    RSV, PCR Negative Negative    SARS-CoV-2 by PCR NotDetected     SARS-CoV-2 Source NP Swab    CORRECTED CALCIUM   Result Value Ref Range    Correct Calcium 9.4 8.5 - 10.5 mg/dL   ESTIMATED GFR   Result Value Ref Range    GFR (CKD-EPI) 87 >60 mL/min/1.73 m 2   PROCALCITONIN   Result Value Ref Range    Procalcitonin 0.04 <0.25 ng/mL   CRP QUANTITIVE (NON-CARDIAC)   Result  Value Ref Range    Stat C-Reactive Protein 2.94 (H) 0.00 - 0.75 mg/dL   EKG   Result Value Ref Range    Report       [facility] Emergency Dept.    Test Date:  2023  Pt Name:    JESSICA GREEN       Department: E[facility]  MRN:        [id number]                      [facility]  Gender:     Female                       Technician: [id number]  :        [id number]                   Requested By:ER TRIAGE PROTOCOL  Order #:    [id number]                    Reading MD: SPARKLE ANDREA    Measurements  Intervals                                Axis  Rate:       133                          P:          75  UT:         143                          QRS:        51  QRSD:       71                           T:          2  QT:         303  QTc:        451    Interpretive Statements  Sinus tachycardia  Borderline T abnormalities, inferior leads  Compared to ECG 2020 06:32:30  T-wave abnormality now present  Sinus tachycardia without evidence of ischemia  Electronically Signed On 2023 23:52:46 PDT by SPARKLE ANDREA           RADIOLOGY  I have independently interpreted the diagnostic imaging associated with this visit and am waiting the final reading from the radiologist.   My preliminary interpretation is as follows: Slight reactive soft tissue around the excisional biopsy site otherwise no significant abnormalities.  Radiologist interpretation:   CT-CHEST,ABDOMEN,PELVIS WITH   Final Result         1.  Scattered peribronchial reticular opacities favoring underlying atypical infiltrates.   2.  Paraseptal emphysema   3.  Hepatomegaly   4.  Atherosclerosis   5.  Trace free fluid in the pelvis      DX-CHEST-PORTABLE (1 VIEW)   Final Result      Negative single view of the chest.      MR-LUMBAR SPINE-WITH & W/O    (Results Pending)       COURSE & MEDICAL DECISION MAKING    ED Observation Status? Yes; I am placing the patient in to an observation status due to a diagnostic uncertainty as well  as therapeutic intensity. Patient placed in observation status at 6:30 PM, 6/20/2023.     Observation plan is as follows: labs and imaging    Upon Reevaluation, the patient's condition has: not improved; and will be escalated to hospitalization.    Patient discharged from ED Observation status at 11:53 PM (Time) 6/20/2023 (Date).     INITIAL ASSESSMENT, COURSE AND PLAN  Care Narrative: This is a 48-year-old female who presents with fever cough.  She was quite wheezy initially on exam she was given a DuoNeb for this.  It was concern for pneumonia, viral illness, site infection.  She has a recent history of right lower lobe lung cancer and this excisional biopsy of an undifferentiated metastatic cancer.  She also has this back pain and fever.  Her back pain does seem peripheral and consistent with sciatica with radiation down the back of her legs she has no lower extremity weakness or incontinence or urinary retention concerning for cauda equina syndrome.  Work-up was initiated to find a possible source of sepsis she was empirically given ceftriaxone IV fluids.    Labs show a slightly elevated white count just over the upper limit of normal.  Slightly elevated CRP.  Pro-Wyatt was normal.  Lactate was normal.  Viral testing was negative.  I did perform CT chest abdomen pelvis to look for other source of infection.  There is some peribronchial opacities more consistent with an atypical pneumonia presentation.  I do think this fits with her symptoms given her cough fevers.  She was given additional azithromycin.  I do think given SIRS criteria and source of infection she does meet criteria for hospitalization.  I spoke with Dr. St who is agreeable to consult for hospitalization patient will be hospitalized in guarded condition.      HYDRATION: Based on the patient's presentation of Tachycardia the patient was given IV fluids. IV Hydration was used because oral hydration was not as rapid as required. Upon recheck  following hydration, the patient was improved.      ADDITIONAL PROBLEM LIST  Lung ca    DISPOSITION AND DISCUSSIONS  I have discussed management of the patient with the following physicians and MONO's:  Maciel    Discussion of management with other QHP or appropriate source(s): None     Escalation of care considered, and ultimately not performed:diagnostic imaging    Barriers to care at this time, including but not limited to:  none .     Decision tools and prescription drugs considered including, but not limited to:  antibiotics .    FINAL DIAGNOSIS  1. Sepsis without acute organ dysfunction, due to unspecified organism (HCC)    2. Bacterial pneumonia    3. Pneumonia of both lungs due to infectious organism, unspecified part of lung           Electronically signed by: Aleyda Ledezma M.D., 6/20/2023 6:16 PM

## 2023-06-21 NOTE — CARE PLAN
The patient is Stable - Low risk of patient condition declining or worsening    Shift Goals  Clinical Goals: monitor oxygen, maintain saturation aobve 90%  Patient Goals: maintain pain level below a 4  Family Goals: n/a    Progress made toward(s) clinical / shift goals:  Pt maintained oxygen saturation above 90% today.     Patient is not progressing towards the following goals:    Pt's pain was not well controlled below a 4 today.

## 2023-06-21 NOTE — PROGRESS NOTES
4 Eyes Skin Assessment Completed by Monique RN and Ev RN.    Head WDL  Ears WDL  Nose WDL  Mouth WDL  Neck WDL  Breast/Chest Scar to left chest, under breast  Shoulder Blades WDL  Spine WDL  (R) Arm/Elbow/Hand WDL  (L) Arm/Elbow/Hand WDL  Abdomen WDL  Groin WDL  Scrotum/Coccyx/Buttocks WDL  (R) Leg WDL  (L) Leg WDL  (R) Heel/Foot/Toe WDL  (L) Heel/Foot/Toe WDL          Devices In Places Blood Pressure Cuff and Pulse Ox      Interventions In Place Pillows    Possible Skin Injury No    Pictures Uploaded Into Epic N/A  Wound Consult Placed N/A  RN Wound Prevention Protocol Ordered No

## 2023-06-21 NOTE — ASSESSMENT & PLAN NOTE
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    6/21:  -- I contacted IR, approved for outpatient sacral tumor biopsy. Placed call for IR  Constanza, will await call back tomorrow.  -- I spoke with on-call Oncologist Dr. Best (Located within [facility]), who will help make an establishing appointment for patient, will let me know tomorrow.    6/22:  Referral for oncology given, patient made aware to follow-up with oncology referral.  - I have contacted Kim with IR scheduling, who will contact the patient on Monday to schedule an outpatient sacral tumor biopsy.    I spent extra time on patient's case. I Voalte messaged Dr. Hai Barrera, he is currently out of town, but he agreed with our current plan.  I have contacted on-call Rad-Onc Dr. Ramirez via voalte and awaiting response.  I also spoke with Dr. Jin who is concerned given this may be a lung cancer origin.

## 2023-06-21 NOTE — ED NOTES
BC x 2 drawn and sent to lab.  Pt medicated as ordered.  Repositioned on gurney for comfort.  Report to ANA LUISA Ferraro.

## 2023-06-21 NOTE — HOSPITAL COURSE
48-year-old female with past medical history of asthma, anxiety, lung cancer, COPD/emphysema, pericarditis, psychiatric disorder, admitted on 6/20/2023 for increasing right leg pain and increasing shortness of breath.  In the ED patient was found to have fever of 100.6 F, tachycardic and tachypneic.  Labs showed leukocytosis 11.4 with neutrophil predominance, negative for influenza/RSV/COVID, CRP elevated at 2.94, procalcitonin 0.04.  Patient was found to have COPD exacerbation due to acute bacterial pneumonia.  Patient was started on ceftriaxone and azithromycin.    Of note, on 6/7/2023 patient had excision of soft tissue mass on the left chest wall.  On 04/17 and 0421/2023 patient had lung radiation SBRT for primary adenocarcinoma of the right lung with Dr. Barrera.  Stage was IA2 (cT1b, cN0, cM0)  On 02/17/2023, pulmonologist Dr. Paulina Gil notified patient of bronchoscopy pathology results (performed 02/14/23 with Dr. Prado) which showed moderately differentiated adenocarcinoma of the lung.    On 01/24/2023 patient was evaluated with PFTs, confirm diagnosis of COPD.

## 2023-06-21 NOTE — ED NOTES
1846:  PIV placed in RAC, blood and BC x 1 drawn and sent to lab.  Covid culture collected and sent to lab.  Pt updated on POC including pending tests and chart review by ERP.  Radiology at bedside.

## 2023-06-21 NOTE — ED NOTES
Pt complaining of back pain going to her tail bone, Erp aware, verbally ordered to give Morphine 4mg IV

## 2023-06-21 NOTE — H&P
Hospital Medicine History & Physical Note    Date of Service  6/20/2023    Primary Care Physician  Dimitri Hdez Jr., M.D.    Consultants  None    Specialist Names: None    Code Status  Full Code    Chief Complaint  Chief Complaint   Patient presents with    Pain    Post-Op Pain       History of Presenting Illness  Melly Hendrickson is a 48 y.o. female who presented 6/20/2023 with fever, chills, cough, shortness of breath as well as back and right leg pain.  Patient does have known lung cancer, has received radiation this year.  She also recently had a superficial mass removed on the left side, her lung cancer is on the right side, this also came back as undifferentiated cancer.  Patient states she does not know the primary site of her cancer.  She states she began having worsening shortness of breath, stated she always has some amount of shortness of breath.  Also had cough, fever and chills.  She states she had her superficial cancer removed on 6/7, a couple days later she began having lower back/pelvic pain that radiated down her right leg.  She does have difficulty describing his pain but it sounds like it is sharp, burning, severe at times.  It is worse with laying on the right side as well as certain movements.  It starts at times in the mid gluteal region, other times she thinks it is closer to the tailbone, it frequently radiates down her right leg, occasionally radiates up to her lumbar spine.  She states sometimes she has associated right leg numbness.  She has no change in bowel or bladder.  I did discuss the case going lab and imaging with the ER physician.    I discussed the plan of care with patient.    Review of Systems  Review of Systems   Constitutional:  Positive for chills, fever and malaise/fatigue.   HENT:  Negative for congestion.    Respiratory:  Positive for cough and shortness of breath. Negative for sputum production and stridor.    Cardiovascular:  Negative for chest  pain, palpitations and leg swelling.   Gastrointestinal:  Negative for abdominal pain, constipation, diarrhea, nausea and vomiting.   Genitourinary:  Negative for dysuria and urgency.   Musculoskeletal:  Positive for back pain. Negative for falls and myalgias.   Neurological:  Negative for dizziness, tingling, loss of consciousness, weakness and headaches.   Psychiatric/Behavioral:  Negative for depression and suicidal ideas.    All other systems reviewed and are negative.      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    Surgical History   has a past surgical history that includes pr bronchoscopy,diagnostic (N/A, 02/14/2023); endobronchial us add-on (N/A, 02/14/2023); septoplasty; mammoplasty reduction (Bilateral); tube & ectopic preg., removal; and pr exc skin benig >4cm trunk,arm,leg (Left, 6/7/2023).     Family History  family history includes Cancer in her father, paternal grandfather, and paternal grandmother; Diabetes in her mother.   Family history reviewed with patient. There is no family history that is pertinent to the chief complaint.     Social History   reports that she has been smoking cigarettes. She has a 10.00 pack-year smoking history. She has been exposed to tobacco smoke. She has never used smokeless tobacco. She reports that she does not currently use alcohol. She reports that she does not currently use drugs.    Allergies  Allergies   Allergen Reactions    Aspirin Rash and Hives      Pt reports that she received a rash on face, pt reports it ok if she takes NORCO    Other Environmental Shortness of Breath     Perfumes, dander, scents    Penicillins Hives, Rash and Swelling     Pt reports that she gets swelling in throat and face, rash all over face and arms.   Tolerated cefazolin       Medications  Prior to Admission Medications   Prescriptions Last Dose Informant Patient Reported? Taking?   EPINEPHrine (EPIPEN) 0.3 MG/0.3ML Solution Auto-injector solution for injection PRN at PRN Patient Yes No   Sig: Inject 0.3 mg into the shoulder, thigh, or buttocks one time. Indications: Life-Threatening Hypersensitivity Reaction   HYDROcodone/acetaminophen (NORCO)  MG Tab 6/20/2023 at 1500  Yes No   Sig: TAKE 1 TABLET BY MOUTH EVERY 6 HOURS AS NEEDED FOR 28 DAYS.   Misc Natural Products (COLON CLEANSE PO) 6/20/2023 at AM  Yes Yes   Sig: Take  by mouth every day.   NON SPECIFIED 5/17/2023 Patient Yes No   Sig: Inject  as directed see administration instructions. Pt received CORTISONE injection on right knee  about 3 weeks ago (5/17/2023)   TURMERIC PO 6/20/2023 at AM  Yes Yes   Sig: Take  by mouth every day.   VENTOLIN  (90 Base) MCG/ACT Aero Soln inhalation aerosol 6/20/2023 at afternoon  No No   Sig: INHALE 2 PUFFS BY MOUTH EVERY 4 HOURS AS NEEDED FOR SHORTNESS OF BREATH   diclofenac sodium (VOLTAREN) 1 % Gel 6/6/2023 at PRN Patient Yes No   Sig: Apply 2 g topically 4 times a day as needed (Apply's on on neck for pain).   fluticasone (FLONASE) 50 MCG/ACT nasal spray 6/20/2023 at AM Patient No No   Sig: USE 2 SPRAY(S) INTO AFFECTED NOSTRILS ONCE DAILY   hydrOXYzine HCl (ATARAX) 10 MG Tab 1 week at PRN Patient Yes No   Sig: Take 10 mg by mouth at bedtime as needed for Anxiety.   ipratropium-albuterol (DUONEB) 0.5-2.5 (3) MG/3ML nebulizer solution Refilled today  No No   Sig: USE 1 AMPULE IN NEBULIZER 4 TIMES DAILY   Patient taking differently: 3 mL 2 times a day.   loratadine (CLARITIN) 10  MG Tab 6/20/2023 at AM Patient Yes No   Sig: Take 10 mg by mouth every day.      Facility-Administered Medications: None       Physical Exam  Temp:  [36.6 °C (97.9 °F)-38.1 °C (100.6 °F)] 36.6 °C (97.9 °F)  Pulse:  [108-140] 108  Resp:  [14-26] 16  BP: (109-136)/(61-94) 121/73  SpO2:  [90 %-97 %] 92 %  Blood Pressure: 121/73   Temperature: 36.6 °C (97.9 °F)   Pulse: (!) 108   Respiration: 16   Pulse Oximetry: 92 %       Physical Exam  Vitals and nursing note reviewed.   Constitutional:       General: She is not in acute distress.     Appearance: She is well-developed. She is not diaphoretic.   HENT:      Head: Normocephalic and atraumatic.      Right Ear: External ear normal.      Left Ear: External ear normal.      Nose: Nose normal. No congestion or rhinorrhea.      Mouth/Throat:      Mouth: Mucous membranes are dry.      Pharynx: No oropharyngeal exudate.   Eyes:      General:         Right eye: No discharge.         Left eye: No discharge.   Neck:      Trachea: No tracheal deviation.   Cardiovascular:      Rate and Rhythm: Normal rate and regular rhythm.      Heart sounds: No murmur heard.     No friction rub. No gallop.   Pulmonary:      Effort: Pulmonary effort is normal. Tachypnea present. No respiratory distress.      Breath sounds: No stridor. Decreased breath sounds, wheezing and rales present.   Chest:      Chest wall: No tenderness.   Abdominal:      General: Bowel sounds are normal. There is no distension.      Palpations: Abdomen is soft.      Tenderness: There is no abdominal tenderness.   Musculoskeletal:         General: No tenderness. Normal range of motion.      Cervical back: Neck supple.      Right lower leg: No edema.      Left lower leg: No edema.   Lymphadenopathy:      Cervical: No cervical adenopathy.   Skin:     General: Skin is warm and dry.      Findings: No erythema or rash.   Neurological:      General: No focal deficit present.      Mental Status: She is alert and oriented to person,  place, and time.      Cranial Nerves: No cranial nerve deficit.      Sensory: No sensory deficit.   Psychiatric:         Mood and Affect: Mood normal.         Behavior: Behavior normal.         Thought Content: Thought content normal.         Judgment: Judgment normal.         Laboratory:  Recent Labs     06/20/23  1842   WBC 11.4*   RBC 4.81   HEMOGLOBIN 13.3   HEMATOCRIT 41.3   MCV 85.9   MCH 27.7   MCHC 32.2   RDW 51.4*   PLATELETCT 218   MPV 10.0     Recent Labs     06/20/23  1842   SODIUM 136   POTASSIUM 4.2   CHLORIDE 104   CO2 20   GLUCOSE 157*   BUN 6*   CREATININE 0.82   CALCIUM 9.2     Recent Labs     06/20/23  1842   ALTSGPT 9   ASTSGOT 13   ALKPHOSPHAT 62   TBILIRUBIN 0.2   GLUCOSE 157*         No results for input(s): NTPROBNP in the last 72 hours.      No results for input(s): TROPONINT in the last 72 hours.    Imaging:  CT-CHEST,ABDOMEN,PELVIS WITH   Final Result         1.  Scattered peribronchial reticular opacities favoring underlying atypical infiltrates.   2.  Paraseptal emphysema   3.  Hepatomegaly   4.  Atherosclerosis   5.  Trace free fluid in the pelvis      DX-CHEST-PORTABLE (1 VIEW)   Final Result      Negative single view of the chest.      MR-LUMBAR SPINE-WITH & W/O    (Results Pending)       X-Ray:  I have personally reviewed the images and compared with prior images.    Assessment/Plan:  Justification for Admission Status  I anticipate this patient will require at least two midnights for appropriate medical management, necessitating inpatient admission because COPD exacerbation due to pneumonia    Patient will need a Med/Surg bed on MEDICAL service .  The need is secondary to COPD exacerbation due to pneumonia.    * Bacterial pneumonia- (present on admission)  Assessment & Plan  Noted on imaging  Start Rocephin and azithromycin  Causing COPD exacerbation  Await culture result  Obtain procalcitonin  Start as needed Robitussin  Respiratory care per protocol    COPD exacerbation (HCC)-  (present on admission)  Assessment & Plan  Patient is a chronic smoker, there is COPD and the chart, obtain PFTs  Patient only admits to asthma   she does have decreased breath sounds as well as a significant wheeze  Start oral prednisone  Respiratory care per protocol  Not causing hypoxia at this time    Lung cancer (HCC)- (present on admission)  Assessment & Plan  I am unsure what the primary is however she does have pathology supporting malignancy on the right lung as well as the superficial tumor on the left chest wall  Continue outpatient follow-up  Await for MRI lumbar spine    Type 2 diabetes mellitus with hyperglycemia, without long-term current use of insulin (HCC)- (present on admission)  Assessment & Plan  Start insulin sliding scale  Adjust needed, certainly may worsen with oral steroids    Cigarette nicotine dependence without complication- (present on admission)  Assessment & Plan  Tobacco cessation counseling and education provided for more than 4 minutes. Nicotine replacement options provided including patch, and further medical treatments including Wellbutrin and chantix.  As well as over the counter options of lozenges and gum.    Lower back pain- (present on admission)  Assessment & Plan  I do think it more likely this is sciatica however she does have a history of potentially metastatic cancer, because of this I will obtain a MRI of the lumbar spine for further evaluation  This is not epidural abscess, started approximately 12 days ago with no significant change other than ongoing pain        VTE prophylaxis: SCDs/TEDs and Xarelto 10 mg daily as prophylaxis

## 2023-06-21 NOTE — ASSESSMENT & PLAN NOTE
I reviewed CT scan which shows emphysema and multiple robin-bronchial opacifications.    Continue azithromycin, QTc 451 ms  Causing COPD exacerbation  Await culture result  Procalcitonin 0.04 and normal WBC, not convinced this is bacterial. Ruling out viral cause with PCR panel.  Respiratory care per protocol    6/22: I reviewed viral panel which was negative.  The patient able to wean off today.  Unclear if patient had bacterial viral pneumonia at this point.  Findings may be related to radiation and no true pneumonia was present.  This is unable to be confirmed.

## 2023-06-21 NOTE — PROGRESS NOTES
Report from ANA LUISA Amaya. Patient currently asleep, equal rise and fall of chest noted. Hourly rounding in place, call light within reach.

## 2023-06-21 NOTE — CARE PLAN
"The patient is Stable - Low risk of patient condition declining or worsening    Shift Goals  Clinical Goals: Monitor oxygen status, safety, pain control with PO meds  Patient Goals: \"not be in so much pain and figure out what is going on\"    Progress made toward(s) clinical / shift goals:  Intermittently using 2L/NC while sleeping. Continues to have severe pain, pain medications adjusted per MD. MRI form completed.      Problem: Pain - Standard  Goal: Alleviation of pain or a reduction in pain to the patient’s comfort goal  Outcome: Progressing     Problem: Self Care  Goal: Patient will have the ability to perform ADLs independently or with assistance (bathe, groom, dress, toilet and feed)  Outcome: Progressing     Problem: Fall Risk  Goal: Patient will remain free from falls  Outcome: Progressing     Problem: Bowel Elimination  Goal: Establish and maintain regular bowel function  Outcome: Progressing     Problem: Infection - Standard  Goal: Patient will remain free from infection  Outcome: Progressing     "

## 2023-06-21 NOTE — ED NOTES
Medication history reviewed with patient. Med rec is complete.    Allergies reviewed.     States that she has not been on Duoneb due to national shortage at pharmacy, but was finally able to fill the medication today. Prior to shortage was using Duoneb twice daily.    Laura Rosenthal, PharmD

## 2023-06-21 NOTE — PROGRESS NOTES
Pt admitted from ED. A&Ox4. Ambulated from gurney to bathroom to bed, generalized weakness and pain noted. Oriented to room and reviewed plan of care for the night. RT at bedside, pt was given IS.

## 2023-06-21 NOTE — ASSESSMENT & PLAN NOTE
Patient is a chronic smoker, there is COPD and the chart, obtain PFTs  Patient only admits to asthma   she does have decreased breath sounds as well as a significant wheeze    I reviewed CT scan which shows emphysema    Continue steroids but will change prednisone to Decadron this may help patient's sacral tumor swelling.  - Able to wean off of oxygen today.

## 2023-06-21 NOTE — ED TRIAGE NOTES
Pt ambulates to triage  Chief Complaint   Patient presents with    Pain    Post-Op Pain   No relief with home pain medications    Temp 100.6 orally in triage  Sepsis protocol ordered    Last radiation in April    Cancerous chest wall mass removed June 7th    Pt back for EKG

## 2023-06-21 NOTE — RESPIRATORY CARE
"COPD EDUCATION by COPD CLINICAL EDUCATOR  6/21/2023  at  11:56 AM by Shasta Hdz, RRT     Patient interviewed by COPD education team.  Patient unable to participate in full program.  A short intervention has been conducted.  A comprehensive packet including information about COPD, types of treatments to manage their disease and safe home Oxygen usage was provided and reviewed with patient at the bedside.  Went over how to clean nebulizer and spacer instruction provided.     Patient states she has been feeling ill the last few days and did not follow up with outpatient physician. Discussed how to get ahead of an exacerbation utilizing outpatient pulmonary, urgent care and PCP. [facility] information given and placed in referral que.     Smoking Cessation Intervention and education completed, 3 minutes spent on smoking cessation education with patient.  Provided smoking cessation packet with \"Tips to Quit\" and brochure for \"Free Smoking Cessation Classes\". Patient states she plans to be smoke free after admission. Patient has patches at home and states she will continue to try to use them.       COPD Screen  COPD Risk Screening  Do you have a history of COPD?: Yes  Do you have a Pulmonologist?: Yes    COPD Assessment  COPD Clinical Specialists ONLY  COPD Education Initiated: Yes--Short Intervention (COPD exacerbation, established with [facility]. Notified IP pulmonary, spacer instruction and smoking cessation provided.)  Is this a COPD exacerbation patient?: Yes  DME Company: none  DME Equipment Type: none  Physician Follow Up Appointment: 06/27/23  Appt Time: 0830  Physician Name: Dimitri Hdez  Pulmonary Follow Up Appointment: 06/28/23  Appt Time: 1500  Pulmonologist Name: Ash Chen  Referrals Initiated: Yes  Pulmonary Rehab: Yes  Smoking Cessation: Yes  $ Smoking Cessation 3-10 Minutes: Symptomatic  Smoking Pack Years: 10  Hospice: N/A  Home Health Care: No  Mobile Urgent Care Services: Yes " "([facility])  Geriatric Specialty Group: N/A  Private In-Home Care Agency: N/A  $ Demo/Eval of SVN's, MDI's and Aerosols: Yes  (OP) Pulmonary Function Testing: Yes (59% ratio)  Interdisciplinary Rounds: Attendance at Rounds (30 Min)    PFT Results    No results found for: PFT    Meds to Beds  Would the patient like to opt in for Bedside Medication Delivery at Discharge?: No     MY COPD ACTION PLAN     It is recommended that patients and physicians /healthcare providers complete this action plan together. This plan should be discussed at each physician visit and updated as needed.    The green, yellow and red zones show groups of symptoms of COPD. This list of symptoms is not comprehensive, and you may experience other symptoms. In the \"Actions\" column, your healthcare provider has recommended actions for you to take based on your symptoms.    Patient Name: Melly Hendrickson   YOB: 1975   Last Updated on: 6/21/2023 11:56 AM   Green Zone:  I am doing well today Actions     Usual activitiy and exercise level   Take daily medications     Usual amounts of cough and phlegm/mucus   Use oxygen as prescribed     Sleep well at night   Continue regular exercise/diet plan     Appetite is good   At all times avoid cigarette smoke, inhaled irritants     Daily Medications (these medications are taken every day):   Albuterol/Ipratropium (Combivent, Duoneb) 2 Puffs Four Times daily     Additional Information:  Rinse nebulizer after use    Yellow Zone:  I am having a bad day or a COPD flare Actions     More breathless than usual   Continue daily medications     I have less energy for my daily activities   Use quick relief inhaler as ordered     Increased or thicker phlegm/mucus   Use oxygen as prescribed     Using quick relief inhaler/nebulizer more often   Get plenty of rest     Swelling of ankles more than usual   Use pursed lip breathing     More coughing than usual   At all times avoid cigarette smoke, " "inhaled irritants     I feel like I have a \"chest cold\"     Poor sleep and my symptoms woke me up     My appetite is not good     My medicine is not helping      Call provider immediately if symptoms don’t improve     Continue daily medications, add rescue medications:   Albuterol  Albuterol/Ipratropium (Combivent, Duoneb) 2 Puffs  3mL via nebulizer Every 4 hours PRN  Every 4 hours PRN       Medications to be used during a flare up, (as Discussed with Provider):           Additional Information:  Use albuterol inhaler with spacer    Red Zone:  I need urgent medical care Actions     Severe shortness of breath even at rest   Call 911 or seek medical care immediately     Not able to do any activity because of breathing      Fever or shaking chills      Feeling confused or very drowsy       Chest pains      Coughing up blood                  "

## 2023-06-21 NOTE — ASSESSMENT & PLAN NOTE
Tobacco cessation counseling and education provided for more than 4 minutes. Nicotine replacement options provided including patch, and further medical treatments including Wellbutrin and chantix.  As well as over the counter options of lozenges and gum.

## 2023-06-21 NOTE — ASSESSMENT & PLAN NOTE
I am unsure what the primary is however she does have pathology supporting malignancy on the right lung as well as the superficial tumor on the left chest wall  Continue outpatient follow-up  Await for MRI lumbar spine    6/21:  -I reviewed patient's pathology from 6/7/2023 for a chest wall mass which showed metastatic poorly differentiated malignancy. This may be due to a breast carcinoma more than lung cancer, as per pathologist.  -I reviewed patient's CT scan which shows the right lower lobe lung cancer.    6/22: I reviewed with pulmonologist, I messaged pt's outpatient rad-onc.

## 2023-06-21 NOTE — ED NOTES
Pt rounding, pt alert and oriented, not in any form of distress, pt asking for additional pillow. No other identified request noted. Call bell within reach.

## 2023-06-21 NOTE — ASSESSMENT & PLAN NOTE
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    We will start patient on Decadron in place of prednisone.  Changed Norco to Dilaudid.

## 2023-06-21 NOTE — PROGRESS NOTES
[facility] Daily Progress Note    Date of Service  6/21/2023    Chief Complaint  Melly Hendrickson is a 48 y.o. female admitted 6/20/2023 with   Chief Complaint   Patient presents with    Pain    Post-Op Pain       Hospital Course  48-year-old female with past medical history of asthma, anxiety, lung cancer, COPD/emphysema, pericarditis, psychiatric disorder, admitted on 6/20/2023 for increasing right leg pain and increasing shortness of breath.  In the ED patient was found to have fever of 100.6 F, tachycardic and tachypneic.  Labs showed leukocytosis 11.4 with neutrophil predominance, negative for influenza/RSV/COVID, CRP elevated at 2.94, procalcitonin 0.04.  Patient was found to have COPD exacerbation due to acute bacterial pneumonia.  Patient was started on ceftriaxone and azithromycin.    Of note, on 6/7/2023 patient had excision of soft tissue mass on the left chest wall.  On 04/17 and 0421/2023 patient had lung radiation SBRT for primary adenocarcinoma of the right lung with Dr. Barrera.  Stage was IA2 (cT1b, cN0, cM0)  On 02/17/2023, pulmonologist Dr. Paulina Gil notified patient of bronchoscopy pathology results (performed 02/14/23 with Dr. Prado) which showed moderately differentiated adenocarcinoma of the lung.    On 01/24/2023 patient was evaluated with PFTs, confirm diagnosis of COPD.      Interval Problem Update  Pt complaining of severe lower back pain, stressed due to ongoing severity.  Denied urinary or stool incontinence, denied loss of motor function.  I discussed MRI results at bedside with RN and patient.   - Pt on 2LNC  - Continued on Azithromycin. WBC 8.8. Checking respiratory viral panel PCR.  - Afebrile for over 12 hours since admission. Remains tachycardic.  -I reviewed patient's pathology from 6/7/2023 for a chest wall mass which showed metastatic poorly differentiated malignancy. This may be due to a breast carcinoma more than lung cancer, as per pathologist.  -Her  lung cancer had a recent diagnosis on 2/14/2023 for right lower lobe adenocarcinoma.  -- contacted IR, approved for outpatient sacral tumor biopsy. Placed call for IR  Constanza, will await call back tomorrow.    I have discussed this patient's plan of care and discharge plan at IDT rounds today with Case Management, Nursing, Nursing leadership, and other members of the IDT team.    Consultants/Specialty  pulmonary    Code Status  Full Code    Disposition  The patient is not medically cleared for discharge to home or a post-acute facility.  Anticipate discharge to: home with close outpatient follow-up    I have placed the appropriate orders for post-discharge needs.    Review of Systems  Review of Systems   Constitutional:  Negative for chills, fever and malaise/fatigue.   Respiratory:  Positive for cough and shortness of breath.    Cardiovascular:  Negative for chest pain and palpitations.   Gastrointestinal:  Negative for abdominal pain, constipation, diarrhea, nausea and vomiting.   Musculoskeletal:  Positive for back pain and joint pain.   Neurological:  Negative for dizziness and headaches.   All other systems reviewed and are negative.       Physical Exam  Temp:  [36.6 °C (97.9 °F)-38.1 °C (100.6 °F)] 36.9 °C (98.4 °F)  Pulse:  [] 100  Resp:  [14-26] 18  BP: (109-160)/(61-94) 131/76  SpO2:  [89 %-97 %] 93 %    Physical Exam  Vitals and nursing note reviewed.   Constitutional:       General: She is not in acute distress.     Appearance: Normal appearance. She is obese. She is not ill-appearing.   HENT:      Head: Normocephalic and atraumatic.   Eyes:      General: No scleral icterus.     Extraocular Movements: Extraocular movements intact.   Cardiovascular:      Rate and Rhythm: Normal rate.      Pulses: Normal pulses.      Heart sounds: Normal heart sounds. No murmur heard.     Comments: Difficult to completely auscultate due to body habitus  Pulmonary:      Effort: Pulmonary effort is normal. No  respiratory distress.      Breath sounds: Normal breath sounds. No wheezing.      Comments: Difficult to completely auscultate due to body habitus  Abdominal:      General: Abdomen is flat. Bowel sounds are normal. There is no distension.      Palpations: Abdomen is soft.   Musculoskeletal:         General: Tenderness (lower back) present. No swelling. Normal range of motion.      Cervical back: Normal range of motion and neck supple.   Skin:     General: Skin is warm.      Capillary Refill: Capillary refill takes less than 2 seconds.      Coloration: Skin is not jaundiced.      Findings: No erythema.   Neurological:      General: No focal deficit present.      Mental Status: She is alert and oriented to person, place, and time. Mental status is at baseline.      Motor: No weakness.   Psychiatric:         Mood and Affect: Mood normal.         Behavior: Behavior normal.         Thought Content: Thought content normal.         Judgment: Judgment normal.         Fluids    Intake/Output Summary (Last 24 hours) at 6/21/2023 1627  Last data filed at 6/21/2023 1300  Gross per 24 hour   Intake 940 ml   Output --   Net 940 ml       Laboratory  Recent Labs     06/20/23  1842 06/21/23  0226   WBC 11.4* 8.8   RBC 4.81 4.40   HEMOGLOBIN 13.3 12.0   HEMATOCRIT 41.3 38.6   MCV 85.9 87.7   MCH 27.7 27.3   MCHC 32.2 31.1*   RDW 51.4* 54.8*   PLATELETCT 218 191   MPV 10.0 10.2     Recent Labs     06/20/23  1842 06/21/23  0226   SODIUM 136 138   POTASSIUM 4.2 4.0   CHLORIDE 104 108   CO2 20 18*   GLUCOSE 157* 78   BUN 6* 7*   CREATININE 0.82 0.68   CALCIUM 9.2 8.8                   Imaging  MR-LUMBAR SPINE-WITH & W/O   Final Result      1.  There is destructive,expansile irregular enhancing lesion in the right ala of sacrum with extension into the right lateral epidural space representing metastasis. The right S1 neural foramina and the nerve root appear to be involved by this tumor.    This lesion does not fully imaged in the  previous MRI dated 5/4/2021.   2.  Mild degenerative disease as described above.      CT-CHEST,ABDOMEN,PELVIS WITH   Final Result         1.  Scattered peribronchial reticular opacities favoring underlying atypical infiltrates.   2.  Paraseptal emphysema   3.  Hepatomegaly   4.  Atherosclerosis   5.  Trace free fluid in the pelvis      DX-CHEST-PORTABLE (1 VIEW)   Final Result      Negative single view of the chest.      IR-CONSULT AND TREAT    (Results Pending)        Assessment/Plan  * Pneumonia- (present on admission)  Assessment & Plan  I reviewed CT scan which shows emphysema and multiple robin-bronchial opacifications.    Continue azithromycin, QTc 451 ms  Causing COPD exacerbation  Await culture result  Procalcitonin 0.04 and normal WBC, not convinced this is bacterial. Ruling out viral cause with PCR panel.  Respiratory care per protocol    COPD exacerbation (HCC)- (present on admission)  Assessment & Plan  Patient is a chronic smoker, there is COPD and the chart, obtain PFTs  Patient only admits to asthma   she does have decreased breath sounds as well as a significant wheeze    I reviewed CT scan which shows emphysema  Continue PO prednisone  Respiratory care per protocol  Pending pulmonary consult    Lung cancer (HCC)- (present on admission)  Assessment & Plan  I am unsure what the primary is however she does have pathology supporting malignancy on the right lung as well as the superficial tumor on the left chest wall  Continue outpatient follow-up  Await for MRI lumbar spine    6/21:  -I reviewed patient's pathology from 6/7/2023 for a chest wall mass which showed metastatic poorly differentiated malignancy. This may be due to a breast carcinoma more than lung cancer, as per pathologist.  -I reviewed patient's CT scan which shows the right lower lobe lung cancer.    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    -- I contacted IR, approved for outpatient sacral tumor biopsy. Placed call for IR  Constanza, will await call back tomorrow.  -- I spoke with on-call Oncologist Dr. Best ([facility] covering for [facility]), who will help make an establishing appointment for patient, will let me know tomorrow.    Type 2 diabetes mellitus with hyperglycemia, without long-term current use of insulin (HCC)- (present on admission)  Assessment & Plan  Start insulin sliding scale  Adjust needed, certainly may worsen with oral steroids    Cigarette nicotine dependence without complication- (present on admission)  Assessment & Plan  Tobacco cessation counseling and education provided for more than 4 minutes. Nicotine replacement options provided including patch, and further medical treatments including Wellbutrin and chantix.  As well as over the counter options of lozenges and gum.         VTE prophylaxis: Xarelto 10 mg daily as prophylaxis    I have performed a physical exam and reviewed and updated ROS and Plan today (6/21/2023). In review of yesterday's note (6/20/2023), there are no changes except as documented above.      Total time spent 51 minutes. I spent greater than 50% of the time for patient care, counseling, and coordination on this date, including unit/floor time, and face-to-face time with the patient as per interval events, my own review of patient's imaging and lab  analysis and developing my assessment and plan above. I spoke with oncologist and IR.

## 2023-06-22 PROBLEM — R93.89 ABNORMAL CT OF THE CHEST: Status: ACTIVE | Noted: 2023-01-01

## 2023-06-22 PROBLEM — R22.2 CHEST WALL MASS: Status: ACTIVE | Noted: 2023-01-01

## 2023-06-22 NOTE — PROGRESS NOTES
Pt is supine in bed when received. Complains of 10\10 pain, medications given per scale. Verbalizes needs well and demonstrates use of call bell. Call bell in reach    Chart check completed

## 2023-06-22 NOTE — CONSULTS
Pulmonary Consultation    Date of consult: 6/22/2023    Referring Physician  Fabian Hoskins M.D.    Reason for Consultation  Acute exacerbation of COPD/asthma    History of Presenting Illness    48 y.o. female who presented 6/20/2023 with right leg pain fevers, cough and back pain.  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  She presented to the emergency room with complaints that ever since the biopsy she developed right-sided low back pain radiating to the right leg.  Also endorsing fevers and worsening cough.    In the ED she was found to be febrile Tmax of 100.6.  lactic acid of 1.2, WBC 11.4, normal renal function, flu/RSV/COVID and full resp PCR swab negative, CT chest/abdomen/pelvis demonstrating scattered peribronchial reticular opacities suggestive of atypical infiltrates and paraseptal emphysema.  She was started on ceftriaxone and azithromycin for suspected bacterial pneumonia/COPD exacerbation.    With regards to her malignancy history, she was stage IA2 treatment with SBRT completed in April until this recent chest wall biopsy which demonstrated metastatic poorly differentiated malignancy favoring carcinoma, unclear organ of origin based on IHC profile  Per the op report by Dr. Becerra, mass was noted to extend down to the level of the muscle but did not invade the muscle.  The context of her back pain she underwent a MRI of the lumbar spine showing a destructive expansile irregular enhancing lesion in the sacrum with involvement of the S1 nerve root.  Dr. Hoskins hospitalist is coordinating for a biopsy of this lesion as an outpatient.    She has a past medical history of COPD/asthma overlap followed in the pulmonary clinic by Dr. Gil.  She relocated to [place] in 2020 and has been hospitalized frequently for asthma exacerbations since that  time.  She is followed by an allergist has been on immunotherapy in the past.  Multiple intolerances/adverse reactions to inhalers including Advair, Pulmicort, Asmanex.  At her last clinic visit with Dr. Gil, she was exclusively on oral steroids prednisone 20 mg daily and JAVED only when she was trialed on Breztri.  Exacerbating symptoms include nasal polyps, chronic sinusitis, GERD.  Unable to tolerate antihistamines or PPIs as well.  No elevated IgE or eosinophil levels in the past.  Further complicating matters is she smokes about 1/2 pack/day.  PFTs 2023 FEV1 1.79 L, 59% predicted with significant bronchodilator response.    She has been weaned to room air  Sputum cultures and blood cultures no growth to date  Afebrile since admission, remains on azithromycin  CT chest personally reviewed from this admission demonstrating scattered peribronchiolar groundglass opacities.  In comparison of her prior CT from 2/2023, these are more pronounced.    Code Status  Full Code    Review of Systems  Review of Systems   Constitutional:  Positive for chills, fever and malaise/fatigue.   Respiratory:  Positive for cough and sputum production.    Musculoskeletal:  Positive for back pain.   Psychiatric/Behavioral:  The patient is nervous/anxious.    All other systems reviewed and are negative.    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    Surgical History   has a past surgical history that includes pr  bronchoscopy,diagnostic (N/A, 02/14/2023); endobronchial us add-on (N/A, 02/14/2023); septoplasty; mammoplasty reduction (Bilateral); tube & ectopic preg., removal; and pr exc skin benig >4cm trunk,arm,leg (Left, 6/7/2023).    Family History  family history includes Cancer in her father, paternal grandfather, and paternal grandmother; Diabetes in her mother.    Social History   reports that she has been smoking cigarettes. She has a 10.00 pack-year smoking history. She has been exposed to tobacco smoke. She has never used smokeless tobacco. She reports that she does not currently use alcohol. She reports that she does not currently use drugs.    Medications  Home Medications       Reviewed by Merritt MackD (Pharmacist) on 06/20/23 at 2056  Med List Status: Complete     Medication Last Dose Status   diclofenac sodium (VOLTAREN) 1 % Gel 6/6/2023 Active   EPINEPHrine (EPIPEN) 0.3 MG/0.3ML Solution Auto-injector solution for injection PRN Active   fluticasone (FLONASE) 50 MCG/ACT nasal spray 6/20/2023 Active   HYDROcodone/acetaminophen (NORCO)  MG Tab 6/20/2023 Active   hydrOXYzine HCl (ATARAX) 10 MG Tab 1 week Active   ipratropium-albuterol (DUONEB) 0.5-2.5 (3) MG/3ML nebulizer solution Refilled today Active   loratadine (CLARITIN) 10 MG Tab 6/20/2023 Active   Misc Natural Products (COLON CLEANSE PO) 6/20/2023 Active   NON SPECIFIED 5/17/2023 Active   TURMERIC PO 6/20/2023 Active   VENTOLIN  (90 Base) MCG/ACT Aero Soln inhalation aerosol 6/20/2023 Active                  Current Facility-Administered Medications   Medication Dose Route Frequency Provider Last Rate Last Admin    Pharmacy Consult Request ...Pain Management Review 1 Each  1 Each Other PHARMACY TO DOSE Fabian Hoskins M.D.        HYDROmorphone (DILAUDID) tablet 2 mg  2 mg Oral Q3HRS PRN Fabian Hoskins M.D.        Or    HYDROmorphone (DILAUDID) tablet 4 mg  4 mg Oral Q3HRS PRN DIANE MorenoD.   4 mg at 06/22/23  1802    Or    HYDROmorphone (Dilaudid) injection 1 mg  1 mg Intravenous Q3HRS PRN Fabian Hoskins M.D.   1 mg at 06/22/23 1411    [START ON 6/23/2023] dexamethasone (DECADRON) tablet 6 mg  6 mg Oral DAILY Fabian Hoskins M.D.        diphenhydrAMINE (BENADRYL) tablet/capsule 25 mg  25 mg Oral Q4HRS PRN [facility]ny [facility] D.O.   25 mg at 06/22/23 0742    gabapentin (NEURONTIN) capsule 200 mg  200 mg Oral TID Fabian Hoskins M.D.   200 mg at 06/22/23 1759    lidocaine (LIDODERM) 5 % 1 Patch  1 Patch Transdermal Q24HR [facility] D.O.   1 Patch at 06/21/23 2357    hydrOXYzine HCl (ATARAX) tablet 10 mg  10 mg Oral QHS PRN [facility] D.O.        loratadine (CLARITIN) tablet 10 mg  10 mg Oral DAILY [facility] D.O.   10 mg at 06/22/23 0535    Respiratory Therapy Consult   Nebulization Continuous RT [facility] D.O.        ipratropium-albuterol (DUONEB) nebulizer solution  3 mL Nebulization Q4HRS (RT) [facility] D.O.   3 mL at 06/22/23 1640    ipratropium-albuterol (DUONEB) nebulizer solution  3 mL Nebulization Q2HRS PRN (RT) [facility] D.O.        senna-docusate (PERICOLACE or SENOKOT S) 8.6-50 MG per tablet 2 Tablet  2 Tablet Oral BID [facility] D.O.   2 Tablet at 06/22/23 1759    And    polyethylene glycol/lytes (MIRALAX) PACKET 1 Packet  1 Packet Oral QDAY PRN [facility] D.O.        And    magnesium hydroxide (MILK OF MAGNESIA) suspension 30 mL  30 mL Oral QDAY PRN [facility] D.O.        And    bisacodyl (DULCOLAX) suppository 10 mg  10 mg Rectal QDAY PRN [facility] D.O.        NS infusion   Intravenous Continuous [facility] D.O.   Stopped at 06/22/23 1553    rivaroxaban (XARELTO) tablet 10 mg  10 mg Oral DAILY AT 1800 SANJU MonroeOEstrellita   10 mg at 06/22/23 1759    acetaminophen (Tylenol) tablet 650 mg  650 mg Oral Q6HRS PRN [facility] D.O.        morphine 4 MG/ML injection 4 mg  4 mg Intravenous Q3HRS PRN [facility]ny [facility] D.O.   4 mg at  06/21/23 0646    labetalol (NORMODYNE/TRANDATE) injection 10 mg  10 mg Intravenous Q4HRS PRN [facility] D.O.        ondansetron (ZOFRAN) syringe/vial injection 4 mg  4 mg Intravenous Q4HRS PRN [facility] D.O.        ondansetron (ZOFRAN ODT) dispertab 4 mg  4 mg Oral Q4HRS PRN [facility] D.O.        promethazine (PHENERGAN) tablet 12.5-25 mg  12.5-25 mg Oral Q4HRS PRN SANJU MonroeO.        promethazine (PHENERGAN) suppository 12.5-25 mg  12.5-25 mg Rectal Q4HRS PRN [facility] D.O.        prochlorperazine (COMPAZINE) injection 5-10 mg  5-10 mg Intravenous Q4HRS PRN [facility] D.O.        insulin regular (HumuLIN R,NovoLIN R) injection  1-6 Units Subcutaneous 4X/DAY ACHS [facility] D.O.        And    dextrose 10 % BOLUS 25 g  25 g Intravenous Q15 MIN PRN [facility] D.O.        nicotine (NICODERM) 14 MG/24HR 14 mg  14 mg Transdermal Daily-0600 [facility]ny [facility] D.O.        And    nicotine polacrilex (NICORETTE) 2 MG piece 2 mg  2 mg Oral Q HOUR PRN [facility] D.O.        guaiFENesin dextromethorphan (ROBITUSSIN DM) 100-10 MG/5ML syrup 10 mL  10 mL Oral Q6HRS PRN [facility] D.O.        Respiratory Therapy Consult   Nebulization Continuous RT [facility] D.O.           Allergies  Allergies   Allergen Reactions    Aspirin Rash and Hives     Pt reports that she received a rash on face, pt reports it ok if she takes NORCO    Other Environmental Shortness of Breath     Perfumes, dander, scents    Penicillins Hives, Rash and Swelling     Pt reports that she gets swelling in throat and face, rash all over face and arms.   Tolerated cefazolin       Vital Signs last 24 hours  Temp:  [36.9 °C (98.4 °F)-37.3 °C (99.1 °F)] 37 °C (98.6 °F)  Pulse:  [] 96  Resp:  [17-29] 20  BP: (139-152)/(87-94) 140/89  SpO2:  [90 %-99 %] 95 %    Physical Exam  Physical Exam  Vitals and nursing note reviewed.   Constitutional:       General: She is not in acute distress.      Appearance: Normal appearance. She is well-developed. She is not diaphoretic.      Comments: Very pleasant   Eyes:      General: No scleral icterus.        Right eye: No discharge.         Left eye: No discharge.      Conjunctiva/sclera: Conjunctivae normal.      Pupils: Pupils are equal, round, and reactive to light.   Neck:      Thyroid: No thyromegaly.      Vascular: No JVD.   Cardiovascular:      Rate and Rhythm: Normal rate and regular rhythm.      Heart sounds: Normal heart sounds. No murmur heard.     No gallop.   Pulmonary:      Effort: Pulmonary effort is normal. No respiratory distress.      Breath sounds: Wheezing present. No rales.   Abdominal:      General: There is no distension.      Palpations: Abdomen is soft.      Tenderness: There is no abdominal tenderness. There is no guarding.   Musculoskeletal:         General: No tenderness.   Lymphadenopathy:      Cervical: No cervical adenopathy.   Skin:     General: Skin is warm.      Capillary Refill: Capillary refill takes less than 2 seconds.      Findings: No erythema or rash.   Neurological:      Mental Status: She is alert and oriented to person, place, and time.      Cranial Nerves: No cranial nerve deficit.      Sensory: No sensory deficit.      Motor: No abnormal muscle tone.   Psychiatric:      Comments: Appropriately anxious       Fluids    Intake/Output Summary (Last 24 hours) at 6/22/2023 1820  Last data filed at 6/22/2023 1227  Gross per 24 hour   Intake 480 ml   Output --   Net 480 ml       Laboratory  Recent Results (from the past 48 hour(s))   Lactic acid (lactate)    Collection Time: 06/20/23  6:42 PM   Result Value Ref Range    Lactic Acid 1.2 0.5 - 2.0 mmol/L   CBC With Differential    Collection Time: 06/20/23  6:42 PM   Result Value Ref Range    WBC 11.4 (H) 4.8 - 10.8 K/uL    RBC 4.81 4.20 - 5.40 M/uL    Hemoglobin 13.3 12.0 - 16.0 g/dL    Hematocrit 41.3 37.0 - 47.0 %    MCV 85.9 81.4 - 97.8 fL    MCH 27.7 27.0 - 33.0 pg    MCHC  32.2 32.2 - 35.5 g/dL    RDW 51.4 (H) 35.9 - 50.0 fL    Platelet Count 218 164 - 446 K/uL    MPV 10.0 9.0 - 12.9 fL    Neutrophils-Polys 78.60 (H) 44.00 - 72.00 %    Lymphocytes 12.10 (L) 22.00 - 41.00 %    Monocytes 7.70 0.00 - 13.40 %    Eosinophils 1.00 0.00 - 6.90 %    Basophils 0.30 0.00 - 1.80 %    Immature Granulocytes 0.30 0.00 - 0.90 %    Nucleated RBC 0.00 0.00 - 0.20 /100 WBC    Neutrophils (Absolute) 9.00 (H) 1.82 - 7.42 K/uL    Lymphs (Absolute) 1.38 1.00 - 4.80 K/uL    Monos (Absolute) 0.88 (H) 0.00 - 0.85 K/uL    Eos (Absolute) 0.11 0.00 - 0.51 K/uL    Baso (Absolute) 0.03 0.00 - 0.12 K/uL    Immature Granulocytes (abs) 0.04 0.00 - 0.11 K/uL    NRBC (Absolute) 0.00 K/uL   Comp Metabolic Panel    Collection Time: 06/20/23  6:42 PM   Result Value Ref Range    Sodium 136 135 - 145 mmol/L    Potassium 4.2 3.6 - 5.5 mmol/L    Chloride 104 96 - 112 mmol/L    Co2 20 20 - 33 mmol/L    Anion Gap 12.0 7.0 - 16.0    Glucose 157 (H) 65 - 99 mg/dL    Bun 6 (L) 8 - 22 mg/dL    Creatinine 0.82 0.50 - 1.40 mg/dL    Calcium 9.2 8.4 - 10.2 mg/dL    AST(SGOT) 13 12 - 45 U/L    ALT(SGPT) 9 2 - 50 U/L    Alkaline Phosphatase 62 30 - 99 U/L    Total Bilirubin 0.2 0.1 - 1.5 mg/dL    Albumin 3.8 3.2 - 4.9 g/dL    Total Protein 7.0 6.0 - 8.2 g/dL    Globulin 3.2 1.9 - 3.5 g/dL    A-G Ratio 1.2 g/dL   Blood Culture    Collection Time: 06/20/23  6:42 PM    Specimen: Peripheral; Blood   Result Value Ref Range    Significant Indicator NEG     Source BLD     Site PERIPHERAL     Culture Result       No Growth  Note: Blood cultures are incubated for 5 days and  are monitored continuously.Positive blood cultures  are called to the RN and reported as soon as  they are identified.  Blood culture testing and Gram stain, if indicated, are  performed at [facility], [address] [place] [address].  Positive blood cultures are  sent to [facility], [address] [place] [address], for  organism identification and  susceptibility testing.     CoV-2, FLU A/B, and RSV by PCR (2-4 Hours CEPHEID) : Collect NP swab in VTM    Collection Time: 06/20/23  6:42 PM    Specimen: Respirate   Result Value Ref Range    Influenza virus A RNA Negative Negative    Influenza virus B, PCR Negative Negative    RSV, PCR Negative Negative    SARS-CoV-2 by PCR NotDetected     SARS-CoV-2 Source NP Swab    CORRECTED CALCIUM    Collection Time: 06/20/23  6:42 PM   Result Value Ref Range    Correct Calcium 9.4 8.5 - 10.5 mg/dL   ESTIMATED GFR    Collection Time: 06/20/23  6:42 PM   Result Value Ref Range    GFR (CKD-EPI) 87 >60 mL/min/1.73 m 2   PROCALCITONIN    Collection Time: 06/20/23  6:42 PM   Result Value Ref Range    Procalcitonin 0.04 <0.25 ng/mL   CRP QUANTITIVE (NON-CARDIAC)    Collection Time: 06/20/23  6:42 PM   Result Value Ref Range    Stat C-Reactive Protein 2.94 (H) 0.00 - 0.75 mg/dL   Blood Culture    Collection Time: 06/20/23  7:08 PM    Specimen: Peripheral; Blood   Result Value Ref Range    Significant Indicator NEG     Source BLD     Site PERIPHERAL     Culture Result       No Growth  Note: Blood cultures are incubated for 5 days and  are monitored continuously.Positive blood cultures  are called to the RN and reported as soon as  they are identified.  Blood culture testing and Gram stain, if indicated, are  performed at [facility], [address] [place] [address].  Positive blood cultures are  sent to [facility], [address] [place] [address], for organism identification and  susceptibility testing.     Urinalysis    Collection Time: 06/20/23  9:45 PM    Specimen: Urine   Result Value Ref Range    Color Yellow     Character Clear     Specific Gravity <=1.005 <1.035    Ph 6.0 5.0 - 8.0    Glucose Negative Negative mg/dL    Ketones Negative Negative mg/dL    Protein Negative Negative mg/dL    Bilirubin Negative Negative    Nitrite Negative Negative     Leukocyte Esterase Negative Negative    Occult Blood Negative Negative    Micro Urine Req see below    Urine Culture (New)    Collection Time: 06/20/23  9:45 PM    Specimen: Urine   Result Value Ref Range    Significant Indicator NEG     Source UR     Site -     Culture Result Culture in progress.    Culture Respiratory W/ GRM STN    Collection Time: 06/21/23 12:40 AM    Specimen: Sputum; Respirate   Result Value Ref Range    Significant Indicator NEG     Source RESP     Site SPUTUM     Culture Result Culture in progress.     Gram Stain Result       Many WBCs.  Few epithelial cells.  Specimen Quality Score: 2+  Many Gram positive cocci.     GRAM STAIN    Collection Time: 06/21/23 12:40 AM    Specimen: Respirate   Result Value Ref Range    Significant Indicator .     Source RESP     Site SPUTUM     Gram Stain Result       Many WBCs.  Few epithelial cells.  Specimen Quality Score: 2+  Many Gram positive cocci.     CBC without Differential    Collection Time: 06/21/23  2:26 AM   Result Value Ref Range    WBC 8.8 4.8 - 10.8 K/uL    RBC 4.40 4.20 - 5.40 M/uL    Hemoglobin 12.0 12.0 - 16.0 g/dL    Hematocrit 38.6 37.0 - 47.0 %    MCV 87.7 81.4 - 97.8 fL    MCH 27.3 27.0 - 33.0 pg    MCHC 31.1 (L) 32.2 - 35.5 g/dL    RDW 54.8 (H) 35.9 - 50.0 fL    Platelet Count 191 164 - 446 K/uL    MPV 10.2 9.0 - 12.9 fL   Basic Metabolic Panel (BMP)    Collection Time: 06/21/23  2:26 AM   Result Value Ref Range    Sodium 138 135 - 145 mmol/L    Potassium 4.0 3.6 - 5.5 mmol/L    Chloride 108 96 - 112 mmol/L    Co2 18 (L) 20 - 33 mmol/L    Glucose 78 65 - 99 mg/dL    Bun 7 (L) 8 - 22 mg/dL    Creatinine 0.68 0.50 - 1.40 mg/dL    Calcium 8.8 8.4 - 10.2 mg/dL    Anion Gap 12.0 7.0 - 16.0   PROCALCITONIN    Collection Time: 06/21/23  2:26 AM   Result Value Ref Range    Procalcitonin 0.03 <0.25 ng/mL   ESTIMATED GFR    Collection Time: 06/21/23  2:26 AM   Result Value Ref Range    GFR (CKD-EPI) 106 >60 mL/min/1.73 m 2   POCT glucose device  results    Collection Time: 06/21/23  6:02 AM   Result Value Ref Range    POC Glucose, Blood 102 (H) 65 - 99 mg/dL   POCT glucose device results    Collection Time: 06/21/23 10:59 AM   Result Value Ref Range    POC Glucose, Blood 174 (H) 65 - 99 mg/dL   Respiratory Panel by PCR (Inpatient ONLY)    Collection Time: 06/21/23  1:38 PM    Specimen: Nasopharyngeal; Respirate   Result Value Ref Range    Adenovirus, PCR Not Detected     SARS-CoV-2 (COVID-19) RNA by COURTNEY NotDetected     Coronavirus 229E, PCR Not Detected     Coronavirus HKU1, PCR Not Detected     Coronavirus NL63, PCR Not Detected     Coronavirus OC43, PCR Not Detected     Human Metapneumovirus, PCR Not Detected     Rhino/Enterovirus, PCR Not Detected     Influenza A, PCR Not Detected     Influenza B, PCR Not Detected     Parainfluenza 1, PCR Not Detected     Parainfluenza 2, PCR Not Detected     Parainfluenza 3, PCR Not Detected     Parainfluenza 4, PCR Not Detected     RSV (Respiratory Syncytial Virus), PCR Not Detected     Bordetella parapertussis (CV3721), PCR Not Detected     Bordetella pertussis (ptxP), PCR Not Detected     Mycoplasma pneumoniae, PCR Not Detected     Chlamydia pneumoniae, PCR Not Detected    POCT glucose device results    Collection Time: 06/21/23  8:16 PM   Result Value Ref Range    POC Glucose, Blood 143 (H) 65 - 99 mg/dL   Renal Function Panel    Collection Time: 06/22/23  2:09 AM   Result Value Ref Range    Sodium 140 135 - 145 mmol/L    Potassium 3.9 3.6 - 5.5 mmol/L    Chloride 109 96 - 112 mmol/L    Co2 20 20 - 33 mmol/L    Glucose 96 65 - 99 mg/dL    Creatinine 0.61 0.50 - 1.40 mg/dL    Bun 8 8 - 22 mg/dL    Calcium 8.7 8.4 - 10.2 mg/dL    Phosphorus 3.2 2.5 - 4.5 mg/dL    Albumin 3.2 3.2 - 4.9 g/dL   MAGNESIUM    Collection Time: 06/22/23  2:09 AM   Result Value Ref Range    Magnesium 2.0 1.5 - 2.5 mg/dL   CBC WITHOUT DIFFERENTIAL    Collection Time: 06/22/23  2:09 AM   Result Value Ref Range    WBC 8.6 4.8 - 10.8 K/uL    RBC  3.95 (L) 4.20 - 5.40 M/uL    Hemoglobin 10.9 (L) 12.0 - 16.0 g/dL    Hematocrit 34.7 (L) 37.0 - 47.0 %    MCV 87.8 81.4 - 97.8 fL    MCH 27.6 27.0 - 33.0 pg    MCHC 31.4 (L) 32.2 - 35.5 g/dL    RDW 54.8 (H) 35.9 - 50.0 fL    Platelet Count 183 164 - 446 K/uL    MPV 9.7 9.0 - 12.9 fL   CORRECTED CALCIUM    Collection Time: 06/22/23  2:09 AM   Result Value Ref Range    Correct Calcium 9.3 8.5 - 10.5 mg/dL   ESTIMATED GFR    Collection Time: 06/22/23  2:09 AM   Result Value Ref Range    GFR (CKD-EPI) 109 >60 mL/min/1.73 m 2   POCT glucose device results    Collection Time: 06/22/23  5:42 AM   Result Value Ref Range    POC Glucose, Blood 95 65 - 99 mg/dL     Imaging  MR-LUMBAR SPINE-WITH & W/O   Final Result      1.  There is destructive,expansile irregular enhancing lesion in the right ala of sacrum with extension into the right lateral epidural space representing metastasis. The right S1 neural foramina and the nerve root appear to be involved by this tumor.    This lesion does not fully imaged in the previous MRI dated 5/4/2021.   2.  Mild degenerative disease as described above.      CT-CHEST,ABDOMEN,PELVIS WITH   Final Result         1.  Scattered peribronchial reticular opacities favoring underlying atypical infiltrates.   2.  Paraseptal emphysema   3.  Hepatomegaly   4.  Atherosclerosis   5.  Trace free fluid in the pelvis      DX-CHEST-PORTABLE (1 VIEW)   Final Result      Negative single view of the chest.      CT-NEEDLE BX-DEEP BONE    (Results Pending)     Assessment/Plan    * Abnormal CT of the chest  Assessment & Plan  CT chest demonstrates scattered peribronchial reticular opacities, cystic changes with adjacent punctate groundglass opacities.  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  If these issues are able to be stabilized, can consider  outpatient work-up for PL[facility] with serologies for BRAF V600E, MAPK and/or bronch for CD1a level.   Counseled about smoking cessation. Followup as scheduled with Dr Gil in 7/2023    COPD exacerbation (HCC)- (present on admission)  Assessment & Plan  Triggered by PNA  - Continuous pulse Ox  - O2 via NC to maintain SpO2 between 88-92%, Consider NIPPV if patient declines  - Duonebs q4hrs ATC and prn  - agree with Prednisone x 5 days  - Rx Breztri meds-to-beds prior to discharge    Pneumonia- (present on admission)  Assessment & Plan  Community acquired pneumonia vs PLCH, see discussion above  Respiratory pathogen panel negative  Scattered peribronchial reticular opacities suggestive of atypical infiltrates  Complete course of azithromycin    Chest wall mass  Assessment & Plan  Recently diagnosed chest wall mass demonstrating poorly differentiated carcinoma unclear if lung versus breast or other tissue; in setting of destructive expansile irregular enhancing lesion in the sacrum, concern for metastatic disease/sarcoma  -- agree with medical oncology consultation, IR biopsy  -- consider rad onc referral to determine if inpatient radiation appropriate    Mass of sacrum- (present on admission)  Assessment & Plan  Plan of care as outlined above.    Lung cancer (HCC)- (present on admission)  Assessment & Plan  stage 1A2 adenocarcinoma with primary in the right lower lobe s/p SBRT completed in April    Cigarette nicotine dependence without complication- (present on admission)  Assessment & Plan  - Patient was advised of importance of smoking cessation and counseled about the importance of smoking cessation for improved morbidity, mortality, and overall well-being. Patient was presented pharmacologic and non-pharmacologic methods for quitting.      Discussed patient condition and risk of morbidity and/or mortality with Hospitalist, RN, and Patient.      This note was generated using voice recognition software which has a  chance of producing errors of grammar and content.  I have made every reasonable attempt to find and correct any errors, but it should be expected that some may not be found prior to finalization of this note.  __________  Luis Jin MD  Pulmonary and Critical Care Medicine  [facility]

## 2023-06-22 NOTE — DIETARY
Nutrition services: Day 2 of admit.  Melly Hendrickson is a 48 y.o. female with admitting DX of Bacterial pneumonia [J15.9]    Consult received for MST 3: 14-23 lb wt loss x 3 months, poor PO intake PTA. RD visited pt at bedside. She reports no changes in PO intake frequency or portion sizes; states her long-term normal intake is one large meal with snacks throughout the day. Pt also reports she had started regularly exercising 09/2022 with the goal of losing wt down to 200 lb. Reports wt prior to wt loss was 226 lb. She reports good appetite this admit; would like to speak to Nutrition Representative (NR) to order preferred meal items. NR outside of pt's room when this RD completed interview; introduced pt to NR.    Assessment:  Height: 182.9 cm (6')  Weight: 95.2 kg (209 lb 14.1 oz)  Body mass index is 28.46 kg/m²., BMI classification: Overweight  Diet/Intake: Regular: PO intake % x 5 meals per ADLs    Evaluation:   Dx list includes PNA, cigarette nicotine dependence without complication, COPD exacerbation, lower back pain, lung cancer, mass of sacrum, T2DM  Labs: A1c (05/27/21) 5.8%, POC glu   MAR: Dilaudid, SSI (refused), prednisone, senna  GI: 6/21, type 1  Wt loss as reported by pt is consistent with chart wt hx, 9.5-10.3% x 8-9 months, consistent with chart hx review.  Pt declined nutrition-focused physical exam. No overt signs of fat or muscle wasting observed.    Malnutrition Risk: Wt loss is notable but does not meet ASPEN criteria. Unclear if wt change is related to chronic disease vs intentional lifestyle changes made with the goal of losing wt.    Recommendations/Plan:  Encourage intake of meals >75%.  Document intake of all PO as % taken in ADL's to provide interdisciplinary communication across all shifts.   Monitor weight.  Nutrition rep will continue to see patient for ongoing meal and snack preferences.     RD available PRN.

## 2023-06-22 NOTE — CARE PLAN
"Bal Junior is a 62 year old male who is being evaluated via a billable telephone visit.      The patient has been notified of following:     \"This telephone visit will be conducted via a call between you and your physician/provider. We have found that certain health care needs can be provided without the need for a physical exam.  This service lets us provide the care you need with a short phone conversation.  If a prescription is necessary we can send it directly to your pharmacy.  If lab work is needed we can place an order for that and you can then stop by our lab to have the test done at a later time.    Telephone visits are billed at different rates depending on your insurance coverage. During this emergency period, for some insurers they may be billed the same as an in-person visit.  Please reach out to your insurance provider with any questions.    If during the course of the call the physician/provider feels a telephone visit is not appropriate, you will not be charged for this service.\"    Patient has given verbal consent for Telephone visit?  Yes    What phone number would you like to be contacted at? [phone]    How would you like to obtain your AVS? [employer]    Phone call duration: 45 minutes  Call start time: 9:01 am  Call end time: 9:46 am    Tia Neal MS, RD, [facility] Outpatient Medical Nutrition Therapy      Time Spent:  45 minutes  Session Type:  Initial telephone Session  Referring Physician:  Dr. Enid Quezada  Reason for RD Visit:   Nutritional counseling, post prandial hypoglycemia, GERD, hx Nissen fundoplication.    Nutrition Assessment:  Patient is a 62 year old male with history of hiatal hernia, GERD s/p Nissen fundoplication and noted to be loosened based on recent outside EGD, hypertension, post prandial hypoglycemia, sometimes loose stools and reported hx of B12 deficiency leading to neuropathy previously felt to be related to PPI use.      Patient stated about a month " The patient is Stable - Low risk of patient condition declining or worsening    Shift Goals  Clinical Goals: maintain pain below a 4 today  Patient Goals: rest, maintain pain level  Family Goals: n/a    Progress made toward(s) clinical / shift goals:  Pt was able to rest    Patient is not progressing towards the following goals:    Pt had continued pain above a 4 today.    "ago he had a day where he felt like he \"got punched in the stomach\" where he could not eat or do anything until the next day.  He believes his Nissen slipped at that time.  He stated after last visit he started keeping some food journals for a few weeks and found that greater volumes of food and more dense foods like a large amount of red meat caused more loose stools and postprandial hypo-glycemia reactions.  He especially noticed this at lunch meal and believes lunch meal is little bit larger due to having several courses such as homemade soup as well as salad and sandwich or homemade soup and other leftovers at lunch.  He stated his lower blood sugars are more prevalent now after lunch meals now. One our of 3 days, he is now having issues with more immediate loose stool after breakfast meal exclusively, and then the rest of the day stools are normal.  He continues to take B12, and stated if he does not take his B12 for a few days then he will get more neuropathy symptoms.  He is looking forward to his follow-up visit with AYDEE next week to discuss his symptoms and issue last month where he believes his Nissen slipped.  See patient's progress towards previous goals as well as diet recall below.    Progress towards previous goals:  1. Aim for eating a healthier afternoon snack and also if having an evening snack make a healthy snack option (can try decreasing the number of nights you have ice cream for example).-Meeting.  He is having a half of sandwich as an afternoon snack instead of cookies and sweets.  He is not having ice cream every night, reduced and other nights will have half a cup of plain unsweetened Greek yogurt with some fruit.    2. Aim for choosing complex carbohydrates at meals and snacks along with lean protein sources. Complex carbohydrates are vegetables, fruit, whole grain starches, lowfat unsweetened dairy. Limit or avoid simplex carbohydrates such as added sugars, turbinado sugar, honey, agave " syrup, sweetened beverages/sweetened teas/sodas.-Not consistently having some protein at breakfast meal. Uses whole milk in oatmeal and turbinado sugar but ~less sugar than previously was.    3. Eat slowly, chew food very well before swallowing and stop eating when satisfied not full.-has been paying more attention to this.    4. Some potential food and beverage triggers for reflux include chocolate, caffeine, peppermint and spearmint, alcohol, spicy foods and fatty foods/high amounts of fat in diet.-reported that currently his reflux symptoms are well controlled.    5. Some lifestyle tips/recommendations for decreasing reflux issues include getting regular exercise at least 3-4 times per week, can raise the head of the bed by using a foam wedge under top part of mattress for example, eating slowly, sitting while eating and eating in a calm and relaxed place and stop eating when satisfied/don't eat until full. Avoid straws, chewing gum, hard candies. Additionally recommended not eat at least 3 hours before bedtime or lying down. Maintaining a healthy body weight/weight loss.-reported that currently his reflux symptoms are well controlled.    6. Can retry keeping daily food and beverage journals and track symptoms. Can use an betsy such as My Symptoms tracker or Lesa betsy which are helpful for tracking symptoms and identifying any potential food/beverage triggers for symptoms. Otherwise some other apps such as My Fitness pal, Lose it apps, track amount of added sugars eaten, calories, total carbohydrates, total grams protein, fat, fiber for example which can be helpful for reducing intake/hidden intake of added sugars that can lead to dumping/low blood sugar issues. -kept a journal for a few weeks after last visit. Volume and density food causes more symptoms (hypoglycemia and looser stools).    7. Continue to limit the amount of fluids that you drink with meals and aim for drinking most fluids in between meals as  "able.-Meeting. He stated this can be difficult when they have soup at meals, but he is trying to eat less of the broth. He stated that his wife is polish traditionally they have homemade soups at many lunch and dinner meals.    8. Recommend drinking at least 48 oz-64 oz water/sugar free flavored water per day.-Increased since last visit but sometimes less than recommended at 32-48 oz water per day.    Height:   Ht Readings from Last 1 Encounters:   03/11/20 1.803 m (5' 11\")     Weight:      Wt Readings from Last 10 Encounters:   03/11/20 102.1 kg (225 lb 1.6 oz)        BMI: 31.39    Diet Recall:  (some usual meals and snacks and beverages):  Meal Food    Breakfast Oatmeal, 12 strawberries, with whole milk, 1/4 tsp turbinado sugar   Lunch 11:30-1:45: dinner leftovers or homemade soup, salad, 1/2-1 sandwich with deli meat and cheese, lettuce and peppers and pickle with chips and salsa OR if out monster noodle bowl OR 1/2 burrito bowl    Dinner 1/2 leftover pork tenderloin with cucumber salad with bell peppers and balsalmic with potato OR homemade soup OR BBQ ribs, kasha, pickles with vinegar and sour cream OR chicken/pork/steak/fish with veg and salad and grain or potato   Snacks Afternoon: 1/2 sandwich (ham/turkeycheese) and PM: single scoop ice cream or 1/2 c unsweetened greek plain yogurt with fruit (kiwi and banana)    Beverages Can of Diet dr. 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     Labs:  Reviewed EMR. Pt is planning on getting labs/rechecking B12 but was unable to get labs easily due to current COVID 19 pandemic limiting visits/in person at his usual " clinic.    Pertinent Medications/vitamin and mineral supplements:   He reported taking B12 (500 mg) per day, ranitidine. Reviewed EMR.  Food Allergies:  NKFA    Intolerances: tomato based, citrus, occas wine other times okay. Sweetened greek yogurt and sweetened oatmeal packets cause low blood sugars as well as eggs.    Physical Activity:   He took the month of March off from exercising. He Skis in winter. Just started 30-45 minutes again on elliptical every other day. Will do some spinning classes at work when can. In warmer weather does outdoor biking and likes to hike on weekends.    Estimated Nutrition Needs based on ideal body weight of 78 k-2340 calories (25-30 kcals/kg), 78g protein (1g/kg), ~1 ml/kcal or total fluids per MD.     MALNUTRITION:  % Weight Loss:  None noted  % Intake:  No decreased intake noted  Subcutaneous Fat Loss and Muscle Loss:  Note due to virtual visit did not perform nutrition focused physical exam today.    Fluid Retention:  None noted    Malnutrition Diagnosis: Patient does not meet two of the above criteria necessary for diagnosing malnutrition  In Context of:  Chronic illness or disease    Nutrition Diagnosis:    Food and nutrition related knowledge deficit related to lack of previous diet education/lack of complete recall of previous diet education as evidenced by pt report and interest in diet education/review. -Ongoing    Nutrition Prescription: continue GERD And anti-dumping diet recommendations    Nutrition Intervention:    Nutrition Education/Counseling:  Discussed pt's progress towards previous goals, obtained some recent diet recall. Provided diet education review for pt with issues of post prandial hypoglycemia, possible dumping syndrome/loose stools in am and some tips/suggestions with hx nissen.  Patient verbalized understanding of education provided. See Goals below.     Goals:  1. Include some protein/lean protein source along with breakfast meal.   -can try  lowfat Fairlife milk (higher in protein and lactose free) or skim or 1% milk) in oatmeal instead of whole milk.  -discontinue adding sugar into oatmeal  -Can add a small amount (1 tsp-1T range) of almond or cashew butter into oatmeal for some protein.  -Can have a lowfat cheese stick or 1 oz of lean meat on the side with oatmeal.    2. Continue to limit portion size of lunch meals. Can try 1/2 portions of your usual lunch meals to see if better tolerated.    3. For soup, can drain most of the liquid and just eat more of the solid contents of your soups.    4. Stop eating when satisfied, do not eat until full.    5. If you will eat a later lunch meal, then aim for having a healthy planned snack before lunch time (to help prevent getting too hungry and overeating at lunch meal and to help potential for post meal low blood sugars).    6. Continue eating a healthy/low sugar planned afternoon snack and limiting the number of nights that you have ice cream in the evening.  Some example snacks include:  -Greek yogurt for afternoon or evening snack (lowfat plain with frozen berries or Dannon Triple zero SF yogurt).  -Can have some non starchy vegetables such as cut up carrots, peppers or other vegetables you like for a snack.  -1/2 sandwich  -protein drink or 1/2 protein drink (low sugar, low fat. Some examples include Premier protein, Pure protein, muscle milk, Ensure Max or Boost high protein). Note: for the symptoms you are experiencing, I do not recommend ensure original or ensure plus or boost original because they are higher in fat and sugar.    7. Continue to limit the amount of fluids that you drink with meals and aim for drinking most fluids in between meals as able.    8. Recommend drinking at least 48 oz-64 oz water/sugar free flavored water per day.    Nutrition Monitoring and Evaluation: Will monitor adherence to nutrition recommendations at future RD visits.     Further Medical Nutrition Therapy:  Can follow  up as needed.     Patient was encouraged to call/contact RD with any further questions.    Tia Neal, MS, RD, LD     abdomen soft, and non-distended. Bowel sounds present. +periumbilical and suprapubic TTP

## 2023-06-22 NOTE — PROGRESS NOTES
Report from ANA LUISA Mcnamara. Patient currently awake, A&O X4. Medicated for pain, 9/10. Hourly rounding in place, call light within reach.

## 2023-06-22 NOTE — CARE PLAN
Problem: Bronchoconstriction  Goal: Improve in air movement and diminished wheezing  Description: Target End Date:  2 to 3 days    1.  Implement inhaled treatments  2.  Evaluate and manage medication effects  Outcome: Progressing     Problem: Hyperinflation  Goal: Prevent or improve atelectasis  Description: Target End Date:  3 to 4 days    1. Instruct incentive spirometry usage  2.  Perform hyperinflation therapy as indicated  Outcome: Progressing     Patient feels benefit from duonebs every 4 hours, flutter 4 times daily-IS shows 2250mls (best) and others @ 1750 mls

## 2023-06-23 PROBLEM — J18.9 PNEUMONIA: Status: RESOLVED | Noted: 2023-01-01 | Resolved: 2023-01-01

## 2023-06-23 PROBLEM — J44.1 COPD EXACERBATION (HCC): Status: RESOLVED | Noted: 2020-09-21 | Resolved: 2023-01-01

## 2023-06-23 NOTE — CARE PLAN
The patient is Stable - Low risk of patient condition declining or worsening    Shift Goals  Clinical Goals: maintain pain below a 6  Patient Goals: d/c home  Family Goals: n/a    Progress made toward(s) clinical / shift goals:  Pt will d/c home    Patient is not progressing towards the following goals:  Pt having continued complaints of pain above a 6.

## 2023-06-23 NOTE — ASSESSMENT & PLAN NOTE
Community acquired pneumonia vs PLCH, see discussion above  Respiratory pathogen panel negative  Scattered peribronchial reticular opacities suggestive of atypical infiltrates  Complete course of azithromycin

## 2023-06-23 NOTE — PROGRESS NOTES
Report from AN ALUISA Lam. Patient awake, A&O X4. Updated on plan of care for today and intent to discharge. Hourly rounding in place, call light within reach.

## 2023-06-23 NOTE — ASSESSMENT & PLAN NOTE
Recently diagnosed chest wall mass demonstrating poorly differentiated carcinoma unclear if lung versus breast or other tissue; in setting of destructive expansile irregular enhancing lesion in the sacrum, concern for metastatic disease/sarcoma  -- agree with medical oncology consultation, IR biopsy  -- consider rad onc referral to determine if inpatient radiation appropriate

## 2023-06-23 NOTE — ASSESSMENT & PLAN NOTE
Triggered by PNA  - Continuous pulse Ox  - O2 via NC to maintain SpO2 between 88-92%, Consider NIPPV if patient declines  - Duonebs q4hrs ATC and prn  - agree with Prednisone x 5 days  - Rx Breztri meds-to-beds prior to discharge

## 2023-06-23 NOTE — PROGRESS NOTES
[facility] Medicine Daily Progress Note    Date of Service  6/22/2023    Chief Complaint  Melly Hendrickson is a 48 y.o. female admitted 6/20/2023 with   Chief Complaint   Patient presents with    Pain    Post-Op Pain       Hospital Course  48-year-old female with past medical history of asthma, anxiety, lung cancer, COPD/emphysema, pericarditis, psychiatric disorder, admitted on 6/20/2023 for increasing right leg pain and increasing shortness of breath.  In the ED patient was found to have fever of 100.6 F, tachycardic and tachypneic.  Labs showed leukocytosis 11.4 with neutrophil predominance, negative for influenza/RSV/COVID, CRP elevated at 2.94, procalcitonin 0.04.  Patient was found to have COPD exacerbation due to acute bacterial pneumonia.  Patient was started on ceftriaxone and azithromycin.    Of note, on 6/7/2023 patient had excision of soft tissue mass on the left chest wall.  On 04/17 and 0421/2023 patient had lung radiation SBRT for primary adenocarcinoma of the right lung with Dr. Barrera.  Stage was IA2 (cT1b, cN0, cM0)  On 02/17/2023, pulmonologist Dr. Paulina Gil notified patient of bronchoscopy pathology results (performed 02/14/23 with Dr. Prado) which showed moderately differentiated adenocarcinoma of the lung.    On 01/24/2023 patient was evaluated with PFTs, confirm diagnosis of COPD.      Interval Problem Update  Pt complaining of severe lower back pain, stressed due to ongoing severity.  Denied urinary or stool incontinence, denied loss of motor function.  I discussed MRI results at bedside with RN and patient.   - Pt on 2LNC  - Continued on Azithromycin. WBC 8.8. Checking respiratory viral panel PCR.  - Afebrile for over 12 hours since admission. Remains tachycardic.  -I reviewed patient's pathology from 6/7/2023 for a chest wall mass which showed metastatic poorly differentiated malignancy. This may be due to a breast carcinoma more than lung cancer, as per pathologist.  -Her  lung cancer had a recent diagnosis on 2/14/2023 for right lower lobe adenocarcinoma.  -- contacted IR, approved for outpatient sacral tumor biopsy. Placed call for IR  Constanza, will await call back tomorrow.    6/22:  Today patient stated she had ongoing severe lower back pain.  Currents Norco treatment was not helping.  I change patient to oral Dilaudid for increased frequency medication along with IV Dilaudid.  Patient stated she still has a full bottle of new Norco prescription at home.  I fear that I will not be able to prescribe her new narcotics as this is a new prescription for her.  I explained that she will need to follow-up with her pain specialist to change her medications around given new information of a potential metastatic sacral tumor.  - I explained to her I have contacted [facility] oncology, I have given a referral and we are working on trying to set up an establishing appointment once the referral is approved by her insurance.  - I have contacted Kim with IR scheduling, who will contact the patient on Monday to schedule an outpatient sacral tumor biopsy.  - Patient able to wean off oxygen today    I have discussed this patient's plan of care and discharge plan at IDT rounds today with Case Management, Nursing, Nursing leadership, and other members of the IDT team.    Consultants/Specialty  pulmonary    Code Status  Full Code    Disposition  The patient is not medically cleared for discharge to home or a post-acute facility.  Anticipate discharge to: home with close outpatient follow-up    I have placed the appropriate orders for post-discharge needs.    Review of Systems  Review of Systems   Constitutional:  Negative for chills, fever and malaise/fatigue.   Respiratory:  Negative for cough and shortness of breath.    Cardiovascular:  Negative for chest pain and palpitations.   Gastrointestinal:  Negative for abdominal pain, constipation, diarrhea, nausea and vomiting.   Musculoskeletal:   Positive for back pain and joint pain.   Neurological:  Negative for dizziness and headaches.   All other systems reviewed and are negative.       Physical Exam  Temp:  [36.9 °C (98.4 °F)-37.3 °C (99.1 °F)] 37 °C (98.6 °F)  Pulse:  [] 96  Resp:  [17-29] 20  BP: (139-152)/(87-94) 140/89  SpO2:  [90 %-99 %] 95 %    Physical Exam  Vitals and nursing note reviewed.   Constitutional:       General: She is not in acute distress.     Appearance: Normal appearance. She is obese. She is not ill-appearing.   HENT:      Head: Normocephalic and atraumatic.   Eyes:      General: No scleral icterus.     Extraocular Movements: Extraocular movements intact.   Cardiovascular:      Rate and Rhythm: Normal rate.      Pulses: Normal pulses.      Heart sounds: Normal heart sounds. No murmur heard.     Comments: Difficult to completely auscultate due to body habitus  Pulmonary:      Effort: Pulmonary effort is normal. No respiratory distress.      Breath sounds: Normal breath sounds. No wheezing.      Comments: Difficult to completely auscultate due to body habitus  Abdominal:      General: Abdomen is flat. Bowel sounds are normal. There is no distension.      Palpations: Abdomen is soft.   Musculoskeletal:         General: Tenderness (lower back) present. No swelling. Normal range of motion.      Cervical back: Normal range of motion and neck supple.   Skin:     General: Skin is warm.      Capillary Refill: Capillary refill takes less than 2 seconds.      Coloration: Skin is not jaundiced.      Findings: No erythema.   Neurological:      General: No focal deficit present.      Mental Status: She is alert and oriented to person, place, and time. Mental status is at baseline.      Motor: No weakness.   Psychiatric:         Mood and Affect: Mood normal.         Behavior: Behavior normal.         Thought Content: Thought content normal.         Judgment: Judgment normal.         Fluids    Intake/Output Summary (Last 24 hours) at  6/22/2023 1840  Last data filed at 6/22/2023 1227  Gross per 24 hour   Intake 480 ml   Output --   Net 480 ml       Laboratory  Recent Labs     06/20/23 1842 06/21/23 0226 06/22/23  0209   WBC 11.4* 8.8 8.6   RBC 4.81 4.40 3.95*   HEMOGLOBIN 13.3 12.0 10.9*   HEMATOCRIT 41.3 38.6 34.7*   MCV 85.9 87.7 87.8   MCH 27.7 27.3 27.6   MCHC 32.2 31.1* 31.4*   RDW 51.4* 54.8* 54.8*   PLATELETCT 218 191 183   MPV 10.0 10.2 9.7     Recent Labs     06/20/23 1842 06/21/23 0226 06/22/23  0209   SODIUM 136 138 140   POTASSIUM 4.2 4.0 3.9   CHLORIDE 104 108 109   CO2 20 18* 20   GLUCOSE 157* 78 96   BUN 6* 7* 8   CREATININE 0.82 0.68 0.61   CALCIUM 9.2 8.8 8.7                   Imaging  MR-LUMBAR SPINE-WITH & W/O   Final Result      1.  There is destructive,expansile irregular enhancing lesion in the right ala of sacrum with extension into the right lateral epidural space representing metastasis. The right S1 neural foramina and the nerve root appear to be involved by this tumor.    This lesion does not fully imaged in the previous MRI dated 5/4/2021.   2.  Mild degenerative disease as described above.      CT-CHEST,ABDOMEN,PELVIS WITH   Final Result         1.  Scattered peribronchial reticular opacities favoring underlying atypical infiltrates.   2.  Paraseptal emphysema   3.  Hepatomegaly   4.  Atherosclerosis   5.  Trace free fluid in the pelvis      DX-CHEST-PORTABLE (1 VIEW)   Final Result      Negative single view of the chest.      CT-NEEDLE BX-DEEP BONE    (Results Pending)        Assessment/Plan  Pneumonia- (present on admission)  Assessment & Plan  I reviewed CT scan which shows emphysema and multiple robin-bronchial opacifications.    Continue azithromycin, QTc 451 ms  Causing COPD exacerbation  Await culture result  Procalcitonin 0.04 and normal WBC, not convinced this is bacterial. Ruling out viral cause with PCR panel.  Respiratory care per protocol    6/22: I reviewed viral panel which was negative.  The patient  able to wean off today.  Unclear if patient had bacterial viral pneumonia at this point.  Findings may be related to radiation and no true pneumonia was present.  This is unable to be confirmed.    COPD exacerbation (HCC)- (present on admission)  Assessment & Plan  Patient is a chronic smoker, there is COPD and the chart, obtain PFTs  Patient only admits to asthma   she does have decreased breath sounds as well as a significant wheeze    I reviewed CT scan which shows emphysema    Continue steroids but will change prednisone to Decadron this may help patient's sacral tumor swelling.  - Able to wean off of oxygen today.    Lung cancer (HCC)- (present on admission)  Assessment & Plan  I am unsure what the primary is however she does have pathology supporting malignancy on the right lung as well as the superficial tumor on the left chest wall  Continue outpatient follow-up  Await for MRI lumbar spine    6/21:  -I reviewed patient's pathology from 6/7/2023 for a chest wall mass which showed metastatic poorly differentiated malignancy. This may be due to a breast carcinoma more than lung cancer, as per pathologist.  -I reviewed patient's CT scan which shows the right lower lobe lung cancer.    6/22: I reviewed with pulmonologist, I messaged pt's outpatient rad-onc.    Lower back pain- (present on admission)  Assessment & Plan  I do think it more likely this is sciatica however she does have a history of potentially metastatic cancer, because of this I will obtain a MRI of the lumbar spine for further evaluation  This is not epidural abscess, started approximately 12 days ago with no significant change other than ongoing pain    -- I reviewed MRI with patient at bedside with RN, lower back pain and RLE pain due to a metastatic sacral tumor with right S1 impingement.    We will start patient on Decadron in place of prednisone.  Changed Norco to Dilaudid.    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    6/21:  -- I contacted IR, approved for outpatient sacral tumor biopsy. Placed call for IR  Constanza, will await call back tomorrow.  -- I spoke with on-call Oncologist Dr. Best ([facility]), who will help make an establishing appointment for patient, will let me know tomorrow.    6/22:  Referral for oncology given, patient made aware to follow-up with oncology referral.  - I have contacted Kim with IR scheduling, who will contact the patient on Monday to schedule an outpatient sacral tumor biopsy.    I spent extra time on patient's case. I Voalte messaged Dr. Hai Barrera, he is currently out of town, but he agreed with our current plan.  I have contacted on-call Rad-Onc Dr. Ramirez via voalte and awaiting response.  I also spoke with Dr. Jin who is concerned given this may be a lung cancer origin.    Type 2 diabetes mellitus with hyperglycemia, without long-term current use of insulin (HCC)- (present on admission)  Assessment & Plan  Start insulin sliding scale  Adjust needed, certainly may worsen with oral steroids    Cigarette nicotine dependence without complication- (present on admission)  Assessment & Plan  Tobacco cessation counseling and education provided for more than 4 minutes. Nicotine replacement options provided including patch, and further medical treatments including Wellbutrin and chantix.  As well as over the counter options of lozenges and gum.         VTE prophylaxis: Xarelto 10 mg daily as prophylaxis    I have performed a physical exam and reviewed and updated ROS and Plan today (6/22/2023). In review of yesterday's note (6/21/2023), there are no changes except as documented above.      Total time spent 52 minutes.    This included my review of patient's overnight RN notes, face to face interview, physical examination, lab analysis.  Also  includes my documented assessments and interventions above.  I spoke with specialist pulmonology for COPD, IR team.  In addition, I spoke with entire care team on patient's treatment plan and DC planning on IDT rounds.

## 2023-06-23 NOTE — ASSESSMENT & PLAN NOTE
CT chest demonstrates scattered peribronchial reticular opacities, cystic changes with adjacent punctate groundglass opacities.  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  If these issues are able to be stabilized, can consider outpatient work-up for PLCH with serologies for BRAF V600E, MAPK and/or bronch for CD1a level.   Counseled about smoking cessation. Followup as scheduled with Dr Gil in 7/2023

## 2023-06-23 NOTE — DISCHARGE SUMMARY
Discharge Summary    CHIEF COMPLAINT ON ADMISSION  Chief Complaint   Patient presents with    Pain    Post-Op Pain       Reason for Admission  13 days post op pain everywhere     Admission Date  6/20/2023    CODE STATUS  Full Code    HPI & HOSPITAL COURSE  This is a 48-year-old female with past medical history of asthma, anxiety, lung cancer, COPD/emphysema, pericarditis, psychiatric disorder, admitted on 6/20/2023 for increasing right leg pain and increasing shortness of breath.  In the ED patient was found to have fever of 100.6 F, tachycardic and tachypneic.  Labs showed leukocytosis 11.4 with neutrophil predominance, negative for influenza/RSV/COVID, CRP elevated at 2.94, procalcitonin 0.04.  Patient was found to have COPD exacerbation due to acute bacterial pneumonia.  Patient was started on ceftriaxone and azithromycin.    Of note, on 6/7/2023 patient had excision of soft tissue mass on the left chest wall.  On 04/17 and 0421/2023 patient had lung radiation SBRT for primary adenocarcinoma of the right lung with Dr. Barrera.  Stage was IA2 (cT1b, cN0, cM0)  On 02/17/2023, pulmonologist Dr. Paulina Gil notified patient of bronchoscopy pathology results (performed 02/14/23 with Dr. Prado) which showed moderately differentiated adenocarcinoma of the lung.    On 01/24/2023 patient was evaluated with PFTs, confirm diagnosis of COPD.    Patient has had extensive lower back pain, gluteal pain for a while now.  She was visiting a pain specialist who was going to give her cortisone shot for pain.  Patient had deferred this and came to the hospital.  On MRI unfortunately it was found that show new metastatic sacral tumor which is the cause of patient's intractable pain.    I did review patient's meeting with pulmonologist Dr. Gil, who had offered oncology services.  Unfortunately the patient did not want any chemotherapy at this time for her lung cancer and she did was not started with an oncologist.  I have provided  referral to an oncologist, I have called  to help expedite authorization for this referral.    During this hospital stay, I have spoken with Dr. Ramirez associate of Dr. Barrera, who agreed that patient may need treatment but at this time would be an outpatient service.  He did state patient may not need tissue as this is likely metastatic in nature from the lung cancer.  Patient does have an appointment on 6/27/2023 with Dr. Barrera, which I encouraged her to continue and speak with Dr. Barrera on needed treatment while she awaits for an oncologist.    As for patient's COPD exacerbation, unfortunately we have not been able to determine if she has had a bacterial pneumonia or viral pneumonia.  All test have been negative.  Upon discharge patient mention she did go to [employer] and she was sitting next to someone with a really strong bad perfume which she may have been exposed to a chemical cause to her COPD exacerbation.     ADDENDUM 6/24/23: after discharge, patient's sputum culture returned positive for Streptococcus pneumonia. This is cause to patient's COPD due to lower respiratory tract infection.  Bacterial community acquired pneumonia ruled in present on admission.  She was given Ceftriaxone 2g dose on admission, completed azithromycin. I have sent a prescription for Levofloxacin 750mg for 5 days to her pharmacy.  I called Mrs. Shin, gave her updates and she is aware of her medication prescription.    Therefore, she is discharged in good and stable condition to home with close outpatient follow-up.    The patient met 2-midnight criteria for an inpatient stay at the time of discharge.    Discharge Date  06/23/23    FOLLOW UP ITEMS POST DISCHARGE  With Rad-Onc, Oncologist, PCP, pulmonologist    DISCHARGE DIAGNOSES  Principal Problem:    Abnormal CT of the chest (POA: Yes)  Active Problems:    Lower back pain (POA: Yes)    Lung cancer (HCC) (POA: Yes)    Cigarette nicotine dependence without complication (POA:  Yes)    Type 2 diabetes mellitus with hyperglycemia, without long-term current use of insulin (HCC) (POA: Yes)    Mass of sacrum (POA: Yes)    Chest wall mass (POA: Yes)  Resolved Problems:    COPD exacerbation (HCC) (POA: Yes)    Pneumonia (POA: YES) due to Streptococcus pneumoniae      FOLLOW UP  Future Appointments   Date Time Provider Department Center   6/26/2023  3:00 PM Hai Barrera M.D. [facility] None   6/27/2023  8:30 AM Otis Peter D.O. [facility]   6/28/2023  3:00 PM AMNA Reed [facility] None   7/10/2023  1:40 PM [address] [place] [address] CT 1 OCT [facility]   7/14/2023  2:00 PM Hai Barrera M.D. [facility] None   7/17/2023  3:50 PM Paulina Gil M.D. [facility] None   7/31/2023  8:30 AM Checo Baker M.D. [facility]     [facility] IMAGING - PET/CT - [address] [id number] [phone]  Go to  please go to your next appointment for possible sacral tumor biopsy.    [facility] HEMATOLOGY/ONCOLOGY  [address] [place] [address] [id number]  [facility] [address]  [phone]  Call in 1 week(s)  Please call to ensure first establishing appointment with [facility], referral was made and  is awaiting insurance authorization.    Hai Barrera M.D.  [address] [facility] [id number] [phone]    Go on 6/27/2023  Please go to your next scheduled appointment. Team is aware of your new spinal findings.    Paulina Gil M.D.  [address] [id number] [phone]    Go on 7/17/2023      AMNA Reed  [address]  [address] [id number] [phone]    Go on 6/28/2023  Go to appointment for COPD 7 day follow up after hospitalization with exacerbation.      MEDICATIONS ON DISCHARGE     Medication List        START taking these medications        Instructions   dexamethasone 4 MG Tabs  Commonly known as: DECADRON   Take 1 Tablet by mouth every 12 hours for 4 days.  Dose: 4 mg     gabapentin 300 MG Caps  Commonly known  as: NEURONTIN   Take 1 Capsule by mouth 3 times a day for 30 days.  Dose: 300 mg     levoFLOXacin 750 MG tablet  Commonly known as: LEVAQUIN   Take 1 Tablet by mouth every day for 5 days.  Dose: 750 mg            CHANGE how you take these medications        Instructions   ipratropium-albuterol 0.5-2.5 (3) MG/3ML nebulizer solution  What changed: See the new instructions.  Commonly known as: DUONEB   USE 1 AMPULE IN NEBULIZER 4 TIMES DAILY            CONTINUE taking these medications        Instructions   COLON CLEANSE PO   Take  by mouth every day.     diclofenac sodium 1 % Gel  Commonly known as: Voltaren   Apply 2 g topically 4 times a day as needed (Apply's on on neck for pain).  Dose: 2 g     EPINEPHrine 0.3 MG/0.3ML Soaj solution for injection  Commonly known as: EPIPEN   Inject 0.3 mg into the shoulder, thigh, or buttocks one time. Indications: Life-Threatening Hypersensitivity Reaction  Dose: 0.3 mg     fluticasone 50 MCG/ACT nasal spray  Commonly known as: FLONASE   USE 2 SPRAY(S) INTO AFFECTED NOSTRILS ONCE DAILY     HYDROcodone/acetaminophen  MG Tabs  Commonly known as: NORCO   TAKE 1 TABLET BY MOUTH EVERY 6 HOURS AS NEEDED FOR 28 DAYS.     hydrOXYzine HCl 10 MG Tabs  Commonly known as: ATARAX   Take 10 mg by mouth at bedtime as needed for Anxiety.  Dose: 10 mg     loratadine 10 MG Tabs  Commonly known as: CLARITIN   Take 10 mg by mouth every day.  Dose: 10 mg     NON SPECIFIED   Inject  as directed see administration instructions. Pt received CORTISONE injection on right knee  about 3 weeks ago (5/17/2023)     TURMERIC PO   Take  by mouth every day.     Ventolin  (90 Base) MCG/ACT Aers inhalation aerosol  Generic drug: albuterol   INHALE 2 PUFFS BY MOUTH EVERY 4 HOURS AS NEEDED FOR SHORTNESS OF BREATH              Allergies  Allergies   Allergen Reactions    Aspirin Rash and Hives     Pt reports that she received a rash on face, pt reports it ok if she takes NORCO    Other Environmental  Shortness of Breath     Perfumes, dander, scents    Penicillins Hives, Rash and Swelling     Pt reports that she gets swelling in throat and face, rash all over face and arms.   Tolerated cefazolin       DIET  Orders Placed This Encounter   Procedures    Diet Order Diet: Regular     Standing Status:   Standing     Number of Occurrences:   1     Order Specific Question:   Diet:     Answer:   Regular [1]       ACTIVITY  As tolerated.  Weight bearing as tolerated    CONSULTATIONS  Pulmonology  Called rad-onc    PROCEDURES  none    LABORATORY  Lab Results   Component Value Date    SODIUM 140 06/22/2023    POTASSIUM 3.9 06/22/2023    CHLORIDE 109 06/22/2023    CO2 20 06/22/2023    GLUCOSE 96 06/22/2023    BUN 8 06/22/2023    CREATININE 0.61 06/22/2023        Lab Results   Component Value Date    WBC 8.6 06/22/2023    HEMOGLOBIN 10.9 (L) 06/22/2023    HEMATOCRIT 34.7 (L) 06/22/2023    PLATELETCT 183 06/22/2023        Total time of the discharge process exceeds 40 minutes.  More than 50% of time was spent face to face with patient.  This included but not limited to review of hospital course with patient, treatment goals upon discharge, recommendations to PCP, continued and new medications and their adverse reactions, and nursing instructions for patient.        MR-LUMBAR SPINE-WITH & W/O   Final Result      1.  There is destructive,expansile irregular enhancing lesion in the right ala of sacrum with extension into the right lateral epidural space representing metastasis. The right S1 neural foramina and the nerve root appear to be involved by this tumor.    This lesion does not fully imaged in the previous MRI dated 5/4/2021.   2.  Mild degenerative disease as described above.      CT-CHEST,ABDOMEN,PELVIS WITH   Final Result         1.  Scattered peribronchial reticular opacities favoring underlying atypical infiltrates.   2.  Paraseptal emphysema   3.  Hepatomegaly   4.  Atherosclerosis   5.  Trace free fluid in the  pelvis      DX-CHEST-PORTABLE (1 VIEW)   Final Result      Negative single view of the chest.

## 2023-06-23 NOTE — CARE PLAN
The patient is Stable - Low risk of patient condition declining or worsening    Shift Goals  Clinical Goals: pain <4\10 throughout shift  Patient Goals: rest and comfort  Family Goals: n/a    Progress made toward(s) clinical / shift goals:  medications given per scale    Problem: Pain - Standard  Goal: Alleviation of pain or a reduction in pain to the patient’s comfort goal  Outcome: Progressing       Patient is not progressing towards the following goals:

## 2023-06-23 NOTE — ASSESSMENT & PLAN NOTE
- Patient was advised of importance of smoking cessation and counseled about the importance of smoking cessation for improved morbidity, mortality, and overall well-being. Patient was presented pharmacologic and non-pharmacologic methods for quitting.

## 2023-06-26 NOTE — PROGRESS NOTES
This visit is being conducted by telephone. This telephone visit was initiated by the patient and they verbally consented. Patient at home in [facility].      Reason for Call:  Follow up for chest wall lesion and sacral lesion    Treatment History:     Radiation Oncology          4/20/2023 4/21/2023   Aria Course Treatment Dates   Course First Treatment Date 04/17/2023 04/17/2023    Course Last Treatment Date 04/20/2023 04/21/2023    Aria Treatment Summary   RLL Lung SBRT  Plan from Course [id number]_RLL_[id number]   Fraction 4 of 5 5 of 5    5 of 5   Elapsed Course Days 3 @ [id number] 4 @ [id number]    4 @ [id number]   Prescribed Fraction Dose 1,000 cGy 1,000 cGy    1,000 cGy   Prescribed Total Dose 5,000 cGy 5,000 cGy    5,000 cGy   PTV  Reference Point from Course [id number]_RLL_[id number]   Elapsed Course Days 3 @ [id number] 4 @ [id number]    4 @ [id number]   Session Dose 1,000 cGy 1,000 cGy    --   Total Dose 4,000 cGy 5,000 cGy    5,000 cGy   RLL cp  Reference Point from Course [id number]_RLL_[id number]   Elapsed Course Days 3 @ [id number] 4 @ [id number]    4 @ [id number]   Session Dose 1,246 cGy 1,246 cGy    --   Total Dose 4,985 cGy 6,231 cGy    6,231 cGy      Details          More values are hidden. Newest values shown. Go to activity for more data.                HPI:    Subjective    This is a 47-year-old lady, with a 25-pack-year smoking history, current smoker, who presents for consideration of stereotactic radiation for recently diagnosed stage I right lower lobe adenocarcinoma.  Overall, she was doing relatively well until chest x-ray performed in February revealed an abnormality in the right lung.  This led to a chest CT that was done on February 13 that demonstrated 1.4 cm mass in the right lower lobe, as well as a 1.9 cm groundglass opacity also in the right lower lobe.  The groundglass opacity was relatively similar, but the mass is new from prior exams.  This led to work-up with thoracic surgery consultation as  well as PFTs and a PET scan and discussion tumor board.  Her FEV1 is 1.79 L, 59%, with DLCO preserved.  However she does have significant dyspnea, and her lungs are quite emphysematous and hyperexpanded on her CT.  She did meet with thoracic surgery and was felt not to be a good candidate for resection given her already significant dyspnea despite her preserved PFTs.  PET scan and biopsy were also completed that showed intense FDG uptake at this mass with an SUV of 22 and no other distant sites of disease.  Biopsy confirmed primary lung adenocarcinoma.  She was discussed at tumor board and felt to be a better candidate for stereotactic radiation for which she presents today to discuss further.        Interval History:   6/7/23: She is coming today for her first follow-up after radiation therapy.  Specifically with regards to radiation, she has done reasonably well, and did not appear to have any significant pulmonary or chest wall complaints in the treated area.  However, few days after radiation, she says she felt jittery and looked up on the Internet and decided to do Epsom salt baths to help.  About a day after that, she noticed a small lump on her left chest wall underneath her breast scar line from her reduction.  It became progressively tender, warm and erythematous over several days, and ultimately an I&D was attempted, though was unsuccessful.  Therefore yesterday, she had a surgical resection of this performed.  Pathology is pending.  She has some residual tenderness in the area.  Otherwise, she is doing reasonably well.    6/28/23: Unfortunately, the pathology from her chest wall resection revealed metastatic poorly differentiated malignancy favoring carcinoma, histologically different than her prior lung adenocarcinoma, appearing is a more spindled poorly differentiated almost sarcomatoid pattern.  I discussed her at the thoracic tumor board and given these findings, we are working it up with cancer type  ID.  It was sent out last week and the results are pending.  In the interim, unfortunately she had an acute worsening of what she reports as chronic sacral pain, and she was briefly admitted for pain control to the hospital.  In the course of this, work-up with imaging revealed a relatively large right sacral lesion concerning for malignancy.  Of note, there are some sclerotic changes present on her previous PET scan in this area, though it is not PET avid and was not reported on in the actual scan report.  As a result, radiation was consulted, and given these findings, as well as possible biopsy of the sacral lesion, radiation was held until further work-up.  She still complains of very significant pain in this area, poorly controlled with her current pain regimen, though she has an appointment with pain management on July 8.    Labs / Images Reviewed:   CT-CHEST,ABDOMEN,PELVIS WITH    Result Date: 6/20/2023  1.  Scattered peribronchial reticular opacities favoring underlying atypical infiltrates. 2.  Paraseptal emphysema 3.  Hepatomegaly 4.  Atherosclerosis 5.  Trace free fluid in the pelvis    DX-CHEST-PORTABLE (1 VIEW)    Result Date: 6/20/2023  Negative single view of the chest.    [id number] (WITH 1-VIEW CXR) LEFT    Result Date: 5/18/2023  Normal rib series.    MR-LUMBAR SPINE-WITH & W/O    Result Date: 6/21/2023  1.  There is destructive,expansile irregular enhancing lesion in the right ala of sacrum with extension into the right lateral epidural space representing metastasis. The right S1 neural foramina and the nerve root appear to be involved by this tumor. This lesion does not fully imaged in the previous MRI dated 5/4/2021. 2.  Mild degenerative disease as described above.      Assessment:   Primary adenocarcinoma of right lung (HCC)  Staging form: Lung, AJCC 8th Edition  - Clinical stage from 3/30/2023: Stage IA2 (cT1b, cN0, cM0) - Signed by Hai Barrera M.D. on 3/30/2023  Histopathologic  type: Adenocarcinoma, NOS  Stage prefix: Initial diagnosis      Cancer Status:   Pending Work-up    Plan:   At this point, she needs further work-up of the chest wall lesion.  I explained to her at length the findings from the chest wall excision, as well as the imaging findings of her sacral mass, and we reviewed the previous scans as well.  She is very concerned about all of these findings, and has many questions about whether she has metastatic lung cancer or additional cancers.  Her anxiety makes it very difficult for her to process all of the discussions that we had, but I explained that we need to wait for the cancer type ID. At this point, I would like to wait for that before the sacral biopsy, as it may obviate the need for the sacrum tissue. Therefore, I will plan to follow up with her in about two weeks after the results are back and next steps to follow.    Time Spent on Call: 15 min      Time Spent in Preparation: 10 minutes    Total Time Spent: 25 minutes    Hai Barrera M.D.   Energy (Optional-Please Include Units): 70 kV

## 2023-06-27 PROBLEM — E11.65 TYPE 2 DIABETES MELLITUS WITH HYPERGLYCEMIA, WITHOUT LONG-TERM CURRENT USE OF INSULIN (HCC): Status: RESOLVED | Noted: 2023-01-01 | Resolved: 2023-01-01

## 2023-06-27 NOTE — PROGRESS NOTES
Established Patient    Melly Hendrickson is a 48 y.o. female asthma, anxiety, lung cancer, COPD/emphysema who presents today with the following Chief Complaint(s): Follow up for Diagnoses of Primary adenocarcinoma of right lung (HCC) and Elevated blood pressure reading were pertinent to this visit.    HPI:  Primary adenocarcinoma R lung:  Left chest wall mass:   On 02/17/2023, pulmonologist Dr. Paulina Gil notified patient of bronchoscopy pathology results (performed 02/14/23 with Dr. Prado) which showed moderately differentiated adenocarcinoma of the lung. On 04/17 and 0421/2023 patient had lung radiation SBRT for primary adenocarcinoma of the right lung with Dr. Barrera.  Stage was IA2 (cT1b, cN0, cM0). On 6/7/2023 patient had excision of soft tissue mass on the left chest wall, pathology currently pending.     Chronic pain due to neoplasm:   She reported extensive lower back pain, gluteal pain, and had been seeing pain management. She had tried gabapentin and norco without relief. They were planing to give her a cortisone shot for pain, however this was deferred and later MRI noted metastatic sacral tumor as likely source of her pain. She has an appointment with pain management July 8th.     Anxiety/Depression:   Patient has hx of anxiety/depression that is worsened in setting of metastatic lung cancer. She said that she is currently using Hydroxyzine nightly and sometimes in the morning as well. She declined any new medications, dose changes, or referrals to therapy at this time.      COPD:   01/24/2023 patient was evaluated with PFTs, confirmed diagnosis of COPD.  She was admitted to the hospital recently from 6/20 to 6/23 for COPD exacerbation secondary to streptococcus pneumonia. She reported that she used to use 2L of oxygen about 5-6 years ago but hadn't needed it since then.     Sleep Apnea:   Hx of sleep apnea. Has appointment with sleep medicine 6/28 for further assessment.     GERD:    Stable, she said this is not a priority right now     Prediabetes:   Hx of A1C of 5.8 on 5/27/21 and did have elevated glucose while in the hospital, however it was likely elevated in setting of steroid use.       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    Chronic obstructive pulmonary disease (HCC)     COPD (chronic obstructive pulmonary disease) (HCC)     DDD (degenerative disc disease), lumbar     Depression     Emphysema of lung (HCC)     MRSA (methicillin resistant staph aureus) culture positive     Pain     back - cervical, scapula, lower    Pericarditis     Primary adenocarcinoma of lower lobe of right lung (HCC)     Psychiatric disorder     Anxiety, Depression    Sleep apnea     Snoring     Urinary incontinence      Social History     Tobacco Use    Smoking status: Some Days     Packs/day: 0.50     Years: 20.00     Pack years: 10.00     Types: Cigarettes     Passive exposure: Past    Smokeless tobacco: Never    Tobacco comments:     on and off    Vaping Use    Vaping Use: Never used   Substance Use Topics    Alcohol use: Not Currently     Comment: occ, rare    Drug use: Not Currently     Comment: THC     Current Outpatient Medications   Medication Sig Dispense Refill    levoFLOXacin (LEVAQUIN) 750 MG tablet Take 1 Tablet by mouth every day for 5 days. 5 Tablet 0    dexamethasone (DECADRON) 4 MG Tab Take 1 Tablet by mouth every 12 hours for 4 days. 8 Tablet 0    gabapentin (NEURONTIN) 300 MG Cap Take 1 Capsule by mouth 3 times a day for 30 days. 90 Capsule 1    TURMERIC PO Take  by mouth every day.      Misc Natural Products (COLON CLEANSE PO) Take  by mouth every day.      HYDROcodone/acetaminophen (NORCO)  MG Tab TAKE 1 TABLET BY MOUTH EVERY 6 HOURS AS NEEDED FOR 28 DAYS.      ipratropium-albuterol (DUONEB) 0.5-2.5 (3) MG/3ML nebulizer solution  USE 1 AMPULE IN NEBULIZER 4 TIMES DAILY (Patient taking differently: 3 mL 2 times a day.) 180 mL 0    VENTOLIN  (90 Base) MCG/ACT Aero Soln inhalation aerosol INHALE 2 PUFFS BY MOUTH EVERY 4 HOURS AS NEEDED FOR SHORTNESS OF BREATH 18 g 2    NON SPECIFIED Inject  as directed see administration instructions. Pt received CORTISONE injection on right knee  about 3 weeks ago (5/17/2023)      loratadine (CLARITIN) 10 MG Tab Take 10 mg by mouth every day.      fluticasone (FLONASE) 50 MCG/ACT nasal spray USE 2 SPRAY(S) INTO AFFECTED NOSTRILS ONCE DAILY 16 g 3    EPINEPHrine (EPIPEN) 0.3 MG/0.3ML Solution Auto-injector solution for injection Inject 0.3 mg into the shoulder, thigh, or buttocks one time. Indications: Life-Threatening Hypersensitivity Reaction      hydrOXYzine HCl (ATARAX) 10 MG Tab Take 10 mg by mouth at bedtime as needed for Anxiety.      diclofenac sodium (VOLTAREN) 1 % Gel Apply 2 g topically 4 times a day as needed (Apply's on on neck for pain).       No current facility-administered medications for this visit.       ROS: As per HPI. Additional pertinent systems as noted below.  Constitutional: Negative for chills and fever.   Respiratory: Negative for cough. Positive for wheezing and shortness of breath (patient said she was doing well for her though).    Cardiovascular: Negative for palpitations and leg swelling. Positive for chest pain (in setting of chest wall mass)  Gastrointestinal: Negative for abdominal pain, constipation, diarrhea, heartburn, nausea and vomiting.   Genitourinary: Negative for dysuria, flank pain and hematuria.   Musculoskeletal: Negative for falls and myalgias. Positive for back pain, R leg pain, R knee pain.   Skin: Negative for itching and rash.   Neurological: Negative for dizziness, seizures, loss of consciousness and headaches.     Physical Exam  BP (!) 142/88 (BP Location: Left arm, Patient Position: Sitting, BP Cuff Size: Adult)   Pulse (!) 103   Temp 36.6 °C  (97.8 °F) (Temporal)   Ht 1.829 m (6')   Wt 90.7 kg (200 lb)   LMP 06/02/2023 (Approximate)   SpO2 95%   BMI 27.12 kg/m²   General:  Alert and oriented.     HEENT: Normocephalic, atraumatic. No scleral icterus. Mucous membranes clear and moist without erythema or exudates.     Lungs: Extensive end expiratory wheezing at all auscultation posts bilaterally. No rales, or rhonchi.    Cardiovascular: Regular rate and rhythm. No murmurs, rubs or gallops.    Abdomen:  Regular bowel sounds, nontender, no rebound or guarding noted.    Neuro: Aox4, strength 5/5 bilat upper and lower extremities.       Assessment and Plan:   1. Primary adenocarcinoma of right lung (HCC)  -02/17/2023, pulmonologist Dr. Paulina Gil notified patient of bronchoscopy pathology results (performed 02/14/23 with Dr. Prado)  -At that time stage was IA2 (cT1b, cN0, cM0).  -On 6/7/2023 patient had excision of soft tissue mass on the left chest wall, pathology currently pending.   Plan:   -Recommended establishing with nurse navigator   -Offered mediations for anxiety/depression, however she declined at this time.     2. Elevated blood pressure reading  -Likely elevated in setting of pain and anxiety.   -only mildly elevated at prior appointments   -follow at future appointments.       Follow up: 1 month to establish care with Dr. Baker.     Otis Peter D.O. PGY I  [facility]

## 2023-07-06 NOTE — OR NURSING
Informed by cath lab RN that pt's procedure is cancelled today because pt requested anesthesia. Anesthesia is unavailable. Pt will be re-scheduled.

## 2023-07-12 NOTE — RADIATION PLANNING NOTES
PATIENT NAME Melly Shin [place]   PRIMARY PHYSICIAN Checo Baker INDERJIT [id number]   REFERRING PHYSICIAN No ref. provider found 1975     DATE OF SERVICE: 7/12/2023    DIAGNOSIS:  Primary adenocarcinoma of right lung (HCC)  Staging form: Lung, AJCC 8th Edition  - Clinical stage from 3/30/2023: Stage IA2 (cT1b, cN0, cM0) - Signed by Hai Barrera M.D. on 3/30/2023  Histopathologic type: Adenocarcinoma, NOS  Stage prefix: Initial diagnosis         SPECIAL TREATMENT PROCEDURE NOTE:  Considerable additional effort required in the management of this case because of administration of Stereotactic Radiotherapy, which may result in increased normal tissue toxicity and require greater effort in contouring and treatment because of greater precision.

## 2023-07-12 NOTE — CT SIMULATION
PATIENT NAME Melly Shin [place]   PRIMARY PHYSICIAN OliviaCheco mann INDERJIT [id number]   REFERRING PHYSICIAN No ref. provider found 1975     Primary adenocarcinoma of right lung (HCC)  Staging form: Lung, AJCC 8th Edition  - Clinical stage from 3/30/2023: Stage IA2 (cT1b, cN0, cM0) - Signed by Hai Barrera M.D. on 3/30/2023  Histopathologic type: Adenocarcinoma, NOS  Stage prefix: Initial diagnosis         Treatment Planning CT Simulation      Order Questions     Question Answer    Is this for a new course of treatment? Yes    Is this an Addendum? No    Implanted Device/Pacemaker No    Simulation Status Initial    Treatment Site Bone - Sacrum    Laterality Right    Treatment Technique SBRT    Treatment Pattern/Frequency Daily    Number of fractions: 5    Concurrent Chemotherapy No    CT Technique 3D    Slice Thickness 2mm    Scan Extent Pelvis    Treatment Device(s) OmniBoard    Patient Attire Gown    Patient Position Supine    Patient Orientation Head First    Arm Position Up    Treatment Machine TB1 - STx    Treatment Image Guidance CBCT    Frequency (CBCT) Daily    Image Guidance Match PTV - Soft Tissue    Fiducial Tracking 4D CBCT    Treatment Planning Image Fusion CT/MR    Special Physics Consult Stereotactic    Other Orders Weekly Physics Check     Special Tx Procedure    Release to patient Immediate

## 2023-07-12 NOTE — RADIATION PLANNING NOTES
DATE OF SERVICE: 7/12/2023    DIAGNOSIS:  Primary adenocarcinoma of right lung (HCC)  Staging form: Lung, AJCC 8th Edition  - Clinical stage from 3/30/2023: Stage IA2 (cT1b, cN0, cM0) - Signed by Hai Barrera M.D. on 3/30/2023  Histopathologic type: Adenocarcinoma, NOS  Stage prefix: Initial diagnosis       DATE OF SERVICE: 7/12/2023    TYPE OF SIMULATION: Pelvis    GOAL OF TREATMENT:   [] Curative  [x] Palliative  [] Oligometastatic    CONTRAST:    [] IV Contrast*  [] Small Bowel  [] Rectal  [] Urethral              POSITION:    [x]  Supine  [] Prone with belly board    COMPLEX:  [x] Complex Blocking   []Arcs  [] Custom Blocks  [] >3 Sites    PROCEDURE: Patient positioned on CT table in Vac-Tess immobilization device with or without belly board depending on position. CT acquired thorough the entire volume of interest.  Images reviewed and exported to treatment planning system.    I have personally reviewed the relevant data, performed the target localization, and determined all relevant factors for this patient’s simulation.    *Omnipaque 80 -100cc IVP in conjunction with 500cc NS

## 2023-07-12 NOTE — RADIATION PLANNING NOTES
Clinical Treatment Planning Note    DATE OF SERVICE: 7/12/2023    DIAGNOSIS:  Primary adenocarcinoma of right lung (HCC)  Staging form: Lung, AJCC 8th Edition  - Clinical stage from 3/30/2023: Stage IA2 (cT1b, cN0, cM0) - Signed by Hai Barrera M.D. on 3/30/2023  Histopathologic type: Adenocarcinoma, NOS  Stage prefix: Initial diagnosis         IMAGING REVIEWED:  [x] CT     [x] MRI     [x] PET/CT     [] BONE SCAN     [] MAMMO     [] OTHER      TREATMENT INTENT:   [] CURATIVE     [] MAINTENANCE     []  PALLIATIVE      []  SUPPORTIVE     []  PROPHYLACTIC     [] BENIGN     []  CONSOLIDATIVE      [x] DEFINITIVE   []  OLOGIMETASTATIC      LINE OF TREATMENT:  [] ADJUVANT   [x] DEFINITIVE   [] NEOADJUVANT   [] RE-TREATMENT      TECHNIQUE PLANNED:  [] IMRT   [] 3D   [x] SBRT   [] SRS/SRT   [] HDR   [] ELECTRON       IMRT JUSTIFICATION:  []   An immediately adjacent area has been previously irradiated and abutting portals must be established with high precision.    []  Dose escalation is planned to deliver radiation doses in excess of those commonly utilized for similar tumors with conventional treatment.    []  The target volume is concave or convex, and the critical normal tissues are within or around that convexity or concavity.    []  The target volume is in close proximity to critical structures that must be protected.    []  The volume of interest must be covered with narrow margins to adequately protect  immediately adjacent structures.      FIELDS & BLOCKING:  [x] COMPLEX BLOCKS     []  = 3 TX AREAS     []  ARCS     []  CUSTOM SHEILD        []  SIMPLE BLOCK      CHEMOTHERAPY:  []  CONCURRENT     []  INDUCTION     [] SEQUENTIAL     []  <30 DAYS FROM XRT      NOTES:  OAR CONSTRAINTS: (GUIDELINES ONLY NOT ABSOLUTE) QUANTEC OR AS STATED     One fraction Three fractions Five fractions    Serial Issue Max critical volume above threshold Threshold dose    (Gy) Max point dose (Gy)^a  Threshold dose   (Gy) Max point dose  (Gy)^a Threshold dose   (Gy) Max point dose (Gy)^a End point (greater than or equal to Grade3)   Optic pathway <0.2 cc 8 10 15.3 (5.1 Gy/fx) 17.4 (5.8 Gy/fx) 23 (4.6 Gy/fx) 25 (5 Gy/fx) Neuritis    [place]      9  17.1 (5.7 Gy/fx)  25 (5 Gy/fx) Hearing loss    Brainstem  (non medulla) <0.5 cc 10 15 18 (6 Gy/fx) 23.1 (7.7 Gy/fx) 23 (4.6 Gy/fx)   31 (6.2 Gy/fx) Cranial neuropathy   Spinal chord   and medulla <0.35 cc      <1.2 cc 10      7 14 18 (6 Gy/fx)    12.3 (4.1 Gy/fx) 21.9 (7.3 Gy/fx) 23 (4.6 Gy/fx)    14.5 (2.9 Gy/fx) 30 (6 Gy/fx) Myelitis   Spinal cord subvolume (5-6mm above and below level treated per [place]) <10% of  subvolume 10 14 18 (6 Gy/fx) 21.9 (7.3 Gy/fx) 23 (4.6 Gy/fx) 30 (6 Gy/fx) Myelitis   Cauda equina  <5 cc 14 16 21.9 (7.3 Gy/fx) 24 (8 Gy/fx) 30 (6 Gy/fx) 32 (6.4 Gy/fx) Neuritis   Sacral plexus <5 cc 14.4 16 22.5 (7.5 Gy/fx) 24 (8 (Gy/fx) 30 (6 Gy/fx) 32 (6.4 Gy/fx) Neuropathy   Esophagus^b <5 cc 11.9 15.4 17.7 (5.9 Gy/fx) 25.2 (8.4 Gy/fx) 19.5 (3.9 Gy/fx) 35 (7 Gy/fx) Stenosis/fistula   Brachial plexus <3 cc 14 17.5 20.4 (6.8 Gy/fx 24 (8 Gy/fx) 27 (5.4 Gy/fx) 30.5 (6.1 Gy/fx) Neuropathy   Heart/ pericardium <15 cc 16 22 24 (8 Gy/fx) 30 (10 Gy/fx) 32 (6.4 Gy/fx) 38 (7.6 Gy/fx) Pericarditis   Great vessels <10 cc 31 37 39 (13 Gy/fx) 45 (15 Gy/fx) 47 (9.4 Gy/fx) 53 (10.6 Gy/fx) Aneurysm   Trachea and large bronchus^b <4 cc 10.5 20.2 15 (5 Gy/fx) 30 (10 Gy/fx) 16.5 (3.3 Gy/fx) 40 (8 Gy/fx) Stenosis/ fistula   Bronchus- smaller airways <0.5 cc 12.4 13.3 18.9 (6.3 Gy/fx) 23.1 (7.7 Gy/fx) 21 (4.2 Gy/fx) 33 (6.6 Gy/fx) Stenosis with atelectasis   Rib <1 cc      <30 cc 22 30 28.8 (9.6 Gy/fx)    30.0 (10.0 Gy/fx) 36.9 (12.3 Gy/fx) 35 (7 Gy/fx) 43 (8.6 Gy/fx) Pain or fracture   Skin <10 cc 23 26 30 (10 Gy/fx) 33 (11 Gy/fx) 36.5 (7.3 Gy/fx) 39.5 (7.9 Gy/fx) Ulceration   Stomach <10 cc 11.2 12.4 16.5 (5.5 Gy/fx) 22.2 (7.4 Gy/fx) 18 (3.6 Gy/fx) 32 (6.4 Gy/fx) Ulceration/fistula   Duodenum^b <5 cc      <10 cc  11.2      9 12.4 16.5 (5.5 Gy/fx)    11.4 (3.8 Gy/fx) 22.2 (7.4 Gy/fx) 18 (3.6 Gy/fx)    12.5 (2.5 Gy/fx) 32 (6.4 Gy/fx) Ulceration   Jejunum/ Ileum^b  <5 cc 11.9 15.4 17.7 (5.9 Gy/fx) 25.2 (8.4 Gy/fx) 19.5 (3.9 Gy/fx) 35 (7 Gy/fx) Enteritis/ obstruction   Colon^b <20 cc 14.3 18.4 24 (8 Gy/fx) 28.2 (9.4 gy/fx) 25 (5 Gy/fx) 38 (7.6 Gy/fx) Colitis/ fistula   Rectum^b <20 cc 14.3 18.4 24 (8 Gy/fx) 28.2 (9.4 gy/fx) 25 (5 Gy/fx) 38 (7.6 Gy/fx) Proctitis/ fistula   Bladder wall <15 cc 11.4 18.4 16.8 (5.6 Gy/fx) 28.2 (9.4 Gy/fx) 18.3 (3.65 Gy/fx) 38 (7.6 Gy/fx) Cystitis/ fistula   Penile bulb <3 cc 14 34 21.9 (7.3 Gy/fx) 42 (14 Gy/fx) 30 (6 Gy/fx) 50 (10 Gy/fx) Impotence   Femoral heads (right and left) <10 cc 14  21.9 (7.3 Gy/fx)  30 (6 Gy/fx)  Necrosis   Renal hilium/ vascular trunk <2/3 volume 1.6 18.6 (6.2 Gy/fx)   23 (4.6 Gy/fx)  Malignant hypertension         One fraction Three fractions Five fractions    Parallel tissue Max critical volume below threshold Threshold dose (Gy) Max point dose (Gy)^a Threshold dose (Gy) Max point dose (Gy)^a Threshold dose (Gy) Max point dose (Gy)^a End point (greater than or equal to Grade 3)   Lung (right and left) 1500 cc 7 NA- Parallel tissue 11.6 (2.96 Gy/fx) NA- Parallel tissue 12.5 (2.5 Gy/fx) NA- Parallel tissue Basic lung function    Lung (right and left) 1000 cc 7.4 NA- Parallel tissue 12.4 (3.1 Gy/fx) NA- Parallel tissue 13.5 (2.7 Gy/fx) NA- Parallel tissue Pneumonitis   Liver 700 cc 9.1 NA- Parallel tissue 19.2 (4.8 Gy/fx) NA- Parallel tissue 21 (4.2 Gy/fx) NA- Parallel tissue Basic liver function   Renal Cortex (right and left) 200 cc 8.4 NA- Parallel tissue 16 (4 Gy/fx) NA- Parallel tissue 17.5 (3.5 Gy/fx) NA- Parallel tissue Basic renal function   ^a “Point” defined as 0.035 cc or less.  ^b Avoid circumferential irradiation.

## 2023-07-14 NOTE — ADDENDUM NOTE
Encounter addended by: Jeffery Salinas on: 7/14/2023 9:30 AM   Actions taken: Charge Capture section accepted

## 2023-07-14 NOTE — PROGRESS NOTES
Consult Note: Hematology/Oncology     Primary Care:  Checo Baker M.D.    Chief Complaint   Patient presents with    New Patient     Malignant neoplasm of bronchus and lung        Current Treatment: None    Prior Treatment:     4/17/2023-4/21/2023: RLL Lung SBRT    Subjective:   History of Presenting Illness:    Melly Hendrickson is a 48 y.o. 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    Patient history dates back to July 2023 when she had a chest x-ray performed that revealed a potential mass in her right lung.  This led to a chest CT performed on February 13, 2023.  This demonstrated 1.4 cm mass in the right lower lobe and a 1.9 groundglass opacity also in the right lower lobe.  The groundglass opacity appeared stable from previous scans but the mass was thought to be new.      A biopsy was performed and confirmed lung adenocarcinoma.    Patient saw thoracic surgery and obtained PFTs as well as a PET scan.  She was discussed at tumor board and given her significant dyspnea and her underlying COPD it was thought that she would be a better candidate for SBRT rather than surgical resection.    April 17, 2023 patient began SBRT.  Completed this treatment without many issues.    After this patient did notice a small lump in her breast reduction scar line.  She said that over the next few days it grew and she ultimately went to her PCP    PCP tried to drain the mass; however it just bled significantly during the I&D attempt.  Thus she was sent to Dr. Becerra at [facility] for excision.     Pathology from this excision demonstrated poorly differentiated malignancy favoring carcinoma without obvious involvement of underlying skin and nonspecific IHC profile.  Based on his diagnosis cancer type ID was sent off for.  Results showed 90% probability of sarcoma    Over the past several months she has noted severe  pain in her sacral region.  She does have sacral lesions that are concerning for metastatic disease.  Patient has biopsy of sacral mass on 7/18/2023.     Of note patient has 5 children and 4 grandchildren.    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    Chronic obstructive pulmonary disease (HCC)     COPD (chronic obstructive pulmonary disease) (HCC)     DDD (degenerative disc disease), lumbar     Depression     Emphysema of lung (HCC)     MRSA (methicillin resistant staph aureus) culture positive     Pain     back - cervical, scapula, lower    Pericarditis     Primary adenocarcinoma of lower lobe of right lung (HCC)     Psychiatric disorder     Anxiety, Depression    Sleep apnea     Snoring     Urinary incontinence         Past Surgical History:   Procedure Laterality Date    HI EXC SKIN BENIG >4CM TRUNK,ARM,LEG Left 6/7/2023    Procedure: EXCISION OF SOFT TISSUE MASS LEFT CHEST WALL;  Surgeon: Nasri Becerra M.D.;  Location: SURGERY [facility];  Service: General    HI BRONCHOSCOPY,DIAGNOSTIC N/A 02/14/2023    Procedure: FIBER OPTIC BRONCHOSCOPY WASH, BRUSH, BRONCHOALVEOLAR LAVAGE, BIOPSY AND FINE NEEDLE ASPIRATION AND NAVIGATION, ROBOTICS;  Surgeon: Guicho Ellis M.D.;  Location: SURGERY [facility];  Service: Pulmonary Robotic    ENDOBRONCHIAL US ADD-ON N/A 02/14/2023    Procedure: ENDOBRONCHIAL ULTRASOUND (EBUS);  Surgeon: Guicho Ellis M.D.;  Location: SURGERY [facility];  Service: Pulmonary Robotic    MAMMOPLASTY REDUCTION Bilateral     SEPTOPLASTY      TUBE & ECTOPIC PREG., REMOVAL      x 2       Social History     Tobacco Use    Smoking status: Some Days     Packs/day: 0.50     Years: 20.00     Pack years: 10.00     Types: Cigarettes     Passive exposure: Past    Smokeless tobacco: Never     Tobacco comments:     on and off    Vaping Use    Vaping Use: Never used   Substance Use Topics    Alcohol use: Not Currently     Comment: occ, rare    Drug use: Not Currently     Comment: THC        Family History   Problem Relation Age of Onset    Diabetes Mother     Cancer Father         Prostate    Cancer Paternal Grandmother         Cervical    Cancer Paternal Grandfather         Unk       Allergies   Allergen Reactions    Penicillins Hives, Rash and Swelling     Pt reports that she gets swelling in throat and face, rash all over face and arms.   Tolerated cefazolin    Aspirin Rash and Hives     Pt reports that she received a rash on face, pt reports it ok if she takes NORCO    Other Environmental Shortness of Breath     Perfumes, dander, scents       Current Outpatient Medications   Medication Sig Dispense Refill    gabapentin (NEURONTIN) 300 MG Cap Take 1 Capsule by mouth 3 times a day for 30 days. 90 Capsule 1    TURMERIC PO Take  by mouth every day.      Misc Natural Products (COLON CLEANSE PO) Take  by mouth every day.      HYDROcodone/acetaminophen (NORCO)  MG Tab TAKE 1 TABLET BY MOUTH EVERY 6 HOURS AS NEEDED FOR 28 DAYS.      ipratropium-albuterol (DUONEB) 0.5-2.5 (3) MG/3ML nebulizer solution USE 1 AMPULE IN NEBULIZER 4 TIMES DAILY (Patient taking differently: 3 mL 2 times a day.) 180 mL 0    VENTOLIN  (90 Base) MCG/ACT Aero Soln inhalation aerosol INHALE 2 PUFFS BY MOUTH EVERY 4 HOURS AS NEEDED FOR SHORTNESS OF BREATH 18 g 2    NON SPECIFIED Inject  as directed see administration instructions. Pt received CORTISONE injection on right knee  about 3 weeks ago (5/17/2023)      loratadine (CLARITIN) 10 MG Tab Take 10 mg by mouth every day.      fluticasone (FLONASE) 50 MCG/ACT nasal spray USE 2 SPRAY(S) INTO AFFECTED NOSTRILS ONCE DAILY 16 g 3    EPINEPHrine (EPIPEN) 0.3 MG/0.3ML Solution Auto-injector solution for injection Inject 0.3 mg into the shoulder, thigh, or buttocks one time.  "Indications: Life-Threatening Hypersensitivity Reaction      hydrOXYzine HCl (ATARAX) 10 MG Tab Take 10 mg by mouth at bedtime as needed for Anxiety.      diclofenac sodium (VOLTAREN) 1 % Gel Apply 2 g topically 4 times a day as needed (Apply's on on neck for pain).       No current facility-administered medications for this encounter.       Review of Systems   Constitutional:  Positive for malaise/fatigue. Negative for chills, fever and weight loss.   HENT:  Negative for congestion, ear pain, nosebleeds and sore throat.    Eyes:  Negative for blurred vision.   Respiratory:  Negative for cough, sputum production, shortness of breath and wheezing.    Cardiovascular:  Negative for chest pain, palpitations, orthopnea and leg swelling.   Gastrointestinal:  Negative for abdominal pain, heartburn, nausea and vomiting.   Genitourinary:  Negative for dysuria, frequency and urgency.   Musculoskeletal:  Positive for back pain and joint pain. Negative for neck pain.   Neurological:  Negative for dizziness, tingling, tremors, sensory change, focal weakness and headaches.   Endo/Heme/Allergies:  Does not bruise/bleed easily.   Psychiatric/Behavioral:  Negative for depression, memory loss and suicidal ideas.    All other systems reviewed and are negative.      Problem list, medications, and allergies reviewed by myself today in Epic.     Objective:     Vitals:    07/14/23 0755   BP: (!) 142/86   BP Location: Left arm   Patient Position: Sitting   BP Cuff Size: Adult   Pulse: (!) 104   Resp: 16   Temp: 36.1 °C (97 °F)   TempSrc: Temporal   SpO2: 96%   Weight: 91.3 kg (201 lb 2.7 oz)   Height: 1.829 m (6' 0.01\")       DESC; KARNOFSKY SCALE WITH ECOG EQUIVALENT: 90, Able to carry on normal activity; minor signs or symptoms of disease (ECOG equivalent 0)    DISTRESS LEVEL: no acute distress    Physical Exam  Constitutional:       General: She is not in acute distress.     Appearance: Normal appearance. She is not ill-appearing. "   HENT:      Head: Normocephalic and atraumatic.      Nose: Nose normal.      Mouth/Throat:      Mouth: Mucous membranes are moist.      Pharynx: No oropharyngeal exudate or posterior oropharyngeal erythema.   Eyes:      General: No scleral icterus.     Conjunctiva/sclera: Conjunctivae normal.      Pupils: Pupils are equal, round, and reactive to light.   Abdominal:      General: Abdomen is flat. Bowel sounds are normal. There is distension.      Palpations: Abdomen is soft. There is no mass.      Tenderness: There is abdominal tenderness. There is no guarding.   Musculoskeletal:         General: No swelling, tenderness or deformity. Normal range of motion.      Cervical back: Normal range of motion and neck supple. No rigidity or tenderness.      Right lower leg: No edema.      Left lower leg: No edema.   Skin:     General: Skin is warm and dry.      Coloration: Skin is not jaundiced or pale.      Findings: No bruising, erythema or rash.   Neurological:      General: No focal deficit present.      Mental Status: She is alert and oriented to person, place, and time. Mental status is at baseline.      Sensory: No sensory deficit.      Motor: No weakness.      Coordination: Coordination normal.      Gait: Gait normal.   Psychiatric:         Mood and Affect: Mood normal.         Behavior: Behavior normal.         Thought Content: Thought content normal.         Judgment: Judgment normal.         Labs:   Most recent labs reviewed.  Please see the lab tab of chart review    Imaging:   Most recent images below have been independently reviewed by me.  Please see the imaging tab of chart review    Pathology:    7/14/2023: Pathology of Chest wall  A. Chest wall mass:          Metastatic poorly differentiated malignancy favoring carcinoma           without obvious involvement of overlying skin and a           non-specific IHC profile of some keratins positive (see           microscopic description).          See comment.    Main Cancer Type: Sarcoma   Probability: 90%     Subtype: Undifferentiated Sarcoma ([facility])   Probability: 90%     2/14/2023: Pathology of Lung Mass  A. Lung, right lower lobe fine needle aspiration slides:          Positive for carcinoma.   B. Core, right lower lobe lung:          Moderately differentiated lung adenocarcinoma.   C. Lung, right lower lobe biopsy core and touchprep slides:          Moderately differentiated lung adenocarcinoma.   D. Bronchoalveolar lavage right lower lobe:          Rare atypical cells, malignant cells vs. reactive bronchial           cells.     Assessment/Plan:     Cancer Staging   Primary adenocarcinoma of right lung (HCC)  Staging form: Lung, AJCC 8th Edition  - Clinical stage from 3/30/2023: Stage IA2 (cT1b, cN0, cM0) - Signed by Hai Barrera M.D. on 3/30/2023       Ms. Aleida Hendrickson is a 49 yo F who presents today with a recent diagnosis of stage I A2 adenocarcinoma of the right lung status post SBRT male with sarcoma of the chest wall.    I explained to the patient that unfortunately we do not have a subtype of sarcoma and are awaiting the sacral biopsy for more information.    Discussed with the patient that given the rapid growth of her lesions I would like to get an updated whole-body PET scan.    We discussed that her case is rare and likely would benefit from a second opinion at [facility].  She agrees and requested the referral.    Additionally patient is in significant pain secondary to her cancer.  She is following with pain management and I instructed her to contact them for pain medication bridge while her fentanyl patches are improved.    1) Sarcoma of Chest wall, with sacral mets  -PET whole body scan 7/27  -awaiting sacral biopsy to determine what type of sarcoma patient has  -referral to [facility] for 2 opinion by a sarcoma specialist  -RTC on 7/28     2) Stage 1A2 Adenocarcinoma of the Lung  -s/p SBRT  -monitor per NCCN Guidelines  -H&P and chest CT contrast  every 6 mo for 2-3 y,   -H&P and a low-dose non-contrast-enhanced chest CT annually      3) HCG Positive  -Hcg in 6/2023 was <1  -likely false positive  -will get hcg levels today. If <1, flag as false +    4) Cancer related Pain  Likely secondary to her sacral metastases  Patient follows with pain management  Being fentanyl patch prior authorization  Asked for pain medication from me and although I do feel that pain medication is warranted given her metastatic lesions I will defer to her pain management specialist to bridge her until the fentanyl patches are improved.    No follow-ups on file.     Any questions and concerns raised by the patient were addressed and answered. Patient denies any further questions.  Patient encouraged to call the office with any concerns or issues.     Alma Newton M.D.  Hematology/Oncology    70 minutes was spent on this case

## 2023-07-17 NOTE — PROGRESS NOTES
Chief Complaint   Patient presents with    Asthma     LAST SEEN 1/24/23     This evaluation was conducted via Zoom using secure and encrypted videoconferencing technology. The patient was in their home in the [facility].    The patient's identity was confirmed and verbal consent was obtained for this virtual visit.     HPI: This patient is a 48 y.o. female whom is followed in our clinic for COPD last seen by me on 1/24/23.  PMHx is significant for chronic asthma diagnosed in infancy with possible element of COPD, degenerative joint disease followed in pain management on chronic opiates, history of iron deficiency anemia.  She is a current tobacco user and has smoked for the past 30 years up to 1 pack/day.    Patient presented to me to establish care after relocating from [place] to [facility] in August of 2020.  She reported being hospitalized almost once a month and on prednisone almost continuously prior to relocation. She was apparently tx with allergy IT with worsening of respiratory status. She has had adverse rxn to Advair, Pulmicort, asmanex and was on prednisone 20 mg which she would take 3-4 days at a time to control sxs. She also has  fairly severe chronic sinusitis with associated nasal polyps as well as gastroesophageal reflux disease for which she has not been able to tolerate proton pump inhibitors or antihistamine therapy.  She has not had elevated IgE or eosinophil levels. PFT  from our last visit in January showed air flow obstruction with FEV1 of 69% post BD, +BD response, DLCO of 132% pred. CT chest from September 2021 showed 17 mm right lower lobe groundglass opacity stable compared to September 2020, 3 mm right lower lobe nodule also stable from 2020, mild emphysematous changes with diffuse bronchial wall thickening. At our last visit, we referred her to allergy and trialed her on Breztri which was tolerated. Repeat CT chest showed new solid component to her GGO and she underwent  bronchoscopic bx which revealed adenoCa. She had surgical consult, deemed not a good surgical candidate and PET showed no distant mets and she started XRT in April.  In May she presented with L sided subcutneous swelling which was biopsied and found to be sarcoma. IN June she presented with fever, and new Low back pain with radicular pain and she was found to have sacral metastasis of sarcoma. She is seeing oncology and completing XRT for RLL adenoCa however she is having issues with ongoing sacral pain. She has been referred to [facility]. In the meantime, she is not on any controller therapy and having to use her nebulizer nightly which increased her HR and makes sleeping difficult along with the pain. CT chest from 7/10/23 showed decrease in sz of RLL lesion.     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    Chronic obstructive pulmonary disease (HCC)     COPD (chronic obstructive pulmonary disease) (HCC)     DDD (degenerative disc disease), lumbar     Depression     Emphysema of lung (HCC)     MRSA (methicillin resistant staph aureus) culture positive     Pain     back - cervical, scapula, lower    Pericarditis     Primary adenocarcinoma of lower lobe of right lung (HCC)     Psychiatric disorder     Anxiety, Depression    Sleep apnea     Snoring     Urinary incontinence        Social History     Socioeconomic History    Marital status: Legally      Spouse name: Not on file    Number of children: Not on file    Years of education: Not on file    Highest education level: Associate degree: occupational, technical, or vocational program   Occupational History    Not on file   Tobacco Use    Smoking status: Some Days     Packs/day: 0.50     Years: 20.00     Pack years: 10.00     Types: Cigarettes     Passive exposure: Past    Smokeless  tobacco: Never    Tobacco comments:     on and off    Vaping Use    Vaping Use: Never used   Substance and Sexual Activity    Alcohol use: Not Currently     Comment: occ, rare    Drug use: Not Currently     Types: Marijuana     Comment: THC gummies    Sexual activity: Not on file   Other Topics Concern    Not on file   Social History Narrative    Not on file     Social Determinants of Health     Financial Resource Strain: Medium Risk (1/10/2023)    Overall Financial Resource Strain (CARDIA)     Difficulty of Paying Living Expenses: Somewhat hard   Food Insecurity: Food Insecurity Present (1/10/2023)    Hunger Vital Sign     Worried About Running Out of Food in the Last Year: Sometimes true     Ran Out of Food in the Last Year: Patient refused   Transportation Needs: Unmet Transportation Needs (1/10/2023)    PRAPARE - Transportation     Lack of Transportation (Medical): Yes     Lack of Transportation (Non-Medical): Yes   Physical Activity: Inactive (1/10/2023)    Exercise Vital Sign     Days of Exercise per Week: 0 days     Minutes of Exercise per Session: 0 min   Stress: Stress Concern Present (1/10/2023)    [employer] [place] [employer] - Occupational Stress Questionnaire     Feeling of Stress : To some extent   Social Connections: Moderately Integrated (1/10/2023)    Social Connection and Isolation Panel [NHANES]     Frequency of Communication with Friends and Family: More than three times a week     Frequency of Social Gatherings with Friends and Family: Twice a week     Attends Mandaen Services: More than 4 times per year     Active Member of Clubs or Organizations: Patient refused     Attends Club or Organization Meetings: 1 to 4 times per year     Marital Status:    Intimate Partner Violence: Not on file   Housing Stability: Low Risk  (1/10/2023)    Housing Stability Vital Sign     Unable to Pay for Housing in the Last Year: No     Number of Places Lived in the Last Year: 1     Unstable  Housing in the Last Year: No       Family History   Problem Relation Age of Onset    Diabetes Mother     Cancer Father         Prostate    Cancer Paternal Grandmother         Cervical    Cancer Paternal Grandfather         Unk       Current Outpatient Medications on File Prior to Visit   Medication Sig Dispense Refill    gabapentin (NEURONTIN) 300 MG Cap Take 1 Capsule by mouth 3 times a day for 30 days. 90 Capsule 1    TURMERIC PO Take  by mouth every day.      Misc Natural Products (COLON CLEANSE PO) Take  by mouth every day.      HYDROcodone/acetaminophen (NORCO)  MG Tab TAKE 1 TABLET BY MOUTH EVERY 6 HOURS AS NEEDED FOR 28 DAYS.      ipratropium-albuterol (DUONEB) 0.5-2.5 (3) MG/3ML nebulizer solution USE 1 AMPULE IN NEBULIZER 4 TIMES DAILY (Patient taking differently: 3 mL 2 times a day.) 180 mL 0    VENTOLIN  (90 Base) MCG/ACT Aero Soln inhalation aerosol INHALE 2 PUFFS BY MOUTH EVERY 4 HOURS AS NEEDED FOR SHORTNESS OF BREATH 18 g 2    NON SPECIFIED Inject  as directed see administration instructions. Pt received CORTISONE injection on right knee  about 3 weeks ago (5/17/2023)      loratadine (CLARITIN) 10 MG Tab Take 10 mg by mouth every day.      fluticasone (FLONASE) 50 MCG/ACT nasal spray USE 2 SPRAY(S) INTO AFFECTED NOSTRILS ONCE DAILY 16 g 3    EPINEPHrine (EPIPEN) 0.3 MG/0.3ML Solution Auto-injector solution for injection Inject 0.3 mg into the shoulder, thigh, or buttocks one time. Indications: Life-Threatening Hypersensitivity Reaction      hydrOXYzine HCl (ATARAX) 10 MG Tab Take 10 mg by mouth at bedtime as needed for Anxiety.      diclofenac sodium (VOLTAREN) 1 % Gel Apply 2 g topically 4 times a day as needed (Apply's on on neck for pain).       No current facility-administered medications on file prior to visit.       Penicillins, Aspirin, and Other environmental      ROS:   Review of Systems   Constitutional:  Negative for chills, diaphoresis, fever, malaise/fatigue and weight loss.    HENT:  Negative for congestion, ear discharge, ear pain, hearing loss, nosebleeds, sinus pain, sore throat and tinnitus.    Eyes:  Negative for blurred vision, double vision, photophobia, pain, discharge and redness.   Respiratory:  Positive for shortness of breath and wheezing. Negative for cough, hemoptysis, sputum production and stridor.    Cardiovascular:  Negative for chest pain, palpitations, orthopnea, claudication, leg swelling and PND.   Gastrointestinal:  Negative for abdominal pain, constipation, diarrhea, heartburn, nausea and vomiting.   Genitourinary:  Negative for dysuria and urgency.   Musculoskeletal:  Negative for back pain, falls, joint pain, myalgias and neck pain.        Sacral pain   Skin:  Negative for itching and rash.   Neurological:  Negative for dizziness, tremors, speech change, focal weakness, weakness and headaches.   Endo/Heme/Allergies:  Negative for environmental allergies.   Psychiatric/Behavioral:  Negative for depression.        Ht 1.829 m (6')   Wt 90.7 kg (200 lb)   Physical Exam  Constitutional:       General: She is not in acute distress.     Appearance: Normal appearance.   HENT:      Head: Normocephalic and atraumatic.      Right Ear: External ear normal.      Left Ear: External ear normal.      Nose: Nose normal. No congestion.      Mouth/Throat:      Mouth: Mucous membranes are moist.      Pharynx: Oropharynx is clear. No oropharyngeal exudate.   Eyes:      General: No scleral icterus.     Extraocular Movements: Extraocular movements intact.      Conjunctiva/sclera: Conjunctivae normal.      Pupils: Pupils are equal, round, and reactive to light.   Neurological:      Mental Status: She is alert and oriented to person, place, and time.      Cranial Nerves: No cranial nerve deficit.   Psychiatric:         Mood and Affect: Mood normal.         Behavior: Behavior normal.         PFTs as reviewed by me personally: as per hPI    Imaging as reviewed by me personally:  as per  hPI    Assessment:  1. Severe persistent asthma without complication  Budeson-Glycopyrrol-Formoterol (BREZTRI AEROSPHERE) 160-9-4.8 MCG/ACT Aerosol    DISCONTINUED: Budesonide, Inhalation, (PULMICORT FLEXHALER) 180 MCG/ACT AEROSOL POWDER, BREATH ACTIVATED      2. Adenocarcinoma of right lung (HCC)        3. Sarcoma (HCC)        4. Tobacco use            Plan:  Chronic, severe. We have not ever really achieved good control. Resume Breztri given she tolerated this. Continue prn JAVED. Counseled on tobacco cessation.   Good radiographic response to XRT; following with rad onc  New dx; sees oncology and has been referred to [facility]; repeat PET pending  Counseled on complete tobacco cessation  Return in about 4 months (around 11/17/2023) for copd.

## 2023-07-18 NOTE — ANESTHESIA POSTPROCEDURE EVALUATION
Patient: Melly Hendrickson    Procedure Summary     Date: 07/18/23 Room / Location: [facility] - Interventional - [facility]    Anesthesia Start: 1458 Anesthesia Stop: 1554    Procedure: CT-NEEDLE BX-DEEP BONE Diagnosis:       Sacral mass      Sacrococcygeal disorders, not elsewhere classified      (MRI showing sacral metastatic tumor, consulting for biopsy)      (CT Guided biopsy Right sacral mass**Per Dr. Sparks**)    Scheduled Providers: Yon Sparks M.D.; Socorro Noyola M.D. Responsible Provider: Socorro Noyola M.D.    Anesthesia Type: general ASA Status: 3          Final Anesthesia Type: general  Last vitals  BP   Blood Pressure: (!) 174/81    Temp   36.3 °C (97.3 °F)    Pulse   (!) 106   Resp   16    SpO2   95 %      Anesthesia Post Evaluation    Patient location during evaluation: PACU  Patient participation: complete - patient participated  Level of consciousness: awake  Pain score: 0    Airway patency: patent  Anesthetic complications: no  Cardiovascular status: hemodynamically stable  Respiratory status: acceptable  Hydration status: euvolemic    PONV: none          No notable events documented.     Nurse Pain Score: 6 (NPRS)

## 2023-07-18 NOTE — OR SURGEON
Immediate Post- Operative Note        Findings: R sacral mass      Procedure(s): CT biopsy of same      Estimated Blood Loss: Less than 5 ml        Complications: None            7/18/2023     3:30 PM     Yon Sparks M.D.

## 2023-07-18 NOTE — DISCHARGE INSTRUCTIONS
HOME CARE INSTRUCTIONS    ACTIVITY: Rest and take it easy for the first 24 hours.  A responsible adult is recommended to remain with you during that time.  It is normal to feel sleepy.  We encourage you to not do anything that requires balance, judgment or coordination.    FOR 24 HOURS DO NOT:  Drive, operate machinery or run household appliances.  Drink beer or alcoholic beverages.  Make important decisions or sign legal documents.    SPECIAL INSTRUCTIONS:     Needle Biopsy of the Bone, Care After  This sheet gives you information about how to care for yourself after your procedure. Your health care provider may also give you more specific instructions. If you have problems or questions, contact your health care provider.  What can I expect after the procedure?  After the procedure, it is common to have soreness or tenderness at the puncture site.  Follow these instructions at home:  Puncture care  Follow instructions from your health care provider about how to take care of your puncture site. Make sure you:  Wash your hands with soap and water before and after you change your bandage (dressing). If soap and water are not available, use hand .  Change your dressing as told by your health care provider.  Check your puncture site every day for signs of infection. Check for:  Redness, swelling, or worsening pain.  Fluid or blood.  Warmth.  Pus or a bad smell.  General instructions  Take over-the-counter and prescription medicines only as told by your health care provider.  Do not drive for 24 hours if you were given a sedative during your procedure.  Return to your normal activities as told by your health care provider.  Keep all follow-up visits as told by your health care provider. This is important.  Contact a health care provider if:  You have redness, swelling, or worsening pain at the site of your puncture.  You have fluid or blood coming from your puncture site.  Your puncture site feels warm to the  touch.  You have pus or a bad smell coming from your puncture site.  You have a fever.  You have persistent nausea or vomiting.  Get help right away if:  You develop a rash.  You have difficulty breathing.  Summary  After the procedure, it is common to have soreness or tenderness at the puncture site.  Follow instructions from your health care provider about how to take care of your puncture site.  Contact a health care provider if you have any signs of infection.  Keep all follow-up visits as told by your health care provider. This is important.  This information is not intended to replace advice given to you by your health care provider. Make sure you discuss any questions you have with your health care provider.  Document Revised: 03/03/2022 Document Reviewed: 03/03/2022  [employer] Patient Education © 2023 [employer]      DIET: To avoid nausea, slowly advance diet as tolerated, avoiding spicy or greasy foods for the first day.  Add more substantial food to your diet according to your physician's instructions.  Babies can be fed formula or breast milk as soon as they are hungry.  INCREASE FLUIDS AND FIBER TO AVOID CONSTIPATION.    SURGICAL DRESSING/BATHING: keep dressing on for 24 hours, you may remove tomorrow and shower. No submerging in water for 7 days.    MEDICATIONS: Resume taking daily medication.  Take prescribed pain medication with food.  If no medication is prescribed, you may take non-aspirin pain medication if needed.  PAIN MEDICATION CAN BE VERY CONSTIPATING.  Take a stool softener or laxative such as senokot, pericolace, or milk of magnesia if needed.    Last pain medication given at 4:21pm (oxycodone).    A follow-up appointment should be arranged with your doctor; call to schedule.    You should CALL YOUR PHYSICIAN if you develop:  Fever greater than 101 degrees F.  Pain not relieved by medication, or persistent nausea or vomiting.  Excessive bleeding (blood soaking through dressing) or  unexpected drainage from the wound.  Extreme redness or swelling around the incision site, drainage of pus or foul smelling drainage.  Inability to urinate or empty your bladder within 8 hours.  Problems with breathing or chest pain.    You should call 911 if you develop problems with breathing or chest pain.  If you are unable to contact your doctor or surgical center, you should go to the nearest emergency room or urgent care center.    Physician's telephone #: [phone] Dr Baker     MILD FLU-LIKE SYMPTOMS ARE NORMAL.  YOU MAY EXPERIENCE GENERALIZED MUSCLE ACHES, THROAT IRRITATION, HEADACHE AND/OR SOME NAUSEA.    If any questions arise, call your doctor.  If your doctor is not available, please feel free to call the Surgical Center at [phone].  The Center is open Monday through Friday from 7AM to 7PM.      A registered nurse may call you a few days after your surgery to see how you are doing after your procedure.    You may also receive a survey in the mail within the next two weeks and we ask that you take a few moments to complete the survey and return it to us.  Our goal is to provide you with very good care and we value your comments.     Depression / Suicide Risk    As you are discharged from this [facility] facility, it is important to learn how to keep safe from harming yourself.    Recognize the warning signs:  Abrupt changes in personality, positive or negative- including increase in energy   Giving away possessions  Change in eating patterns- significant weight changes-  positive or negative  Change in sleeping patterns- unable to sleep or sleeping all the time   Unwillingness or inability to communicate  Depression  Unusual sadness, discouragement and loneliness  Talk of wanting to die  Neglect of personal appearance   Rebelliousness- reckless behavior  Withdrawal from people/activities they love  Confusion- inability to concentrate     If you or a loved one observes any of these  behaviors or has concerns about self-harm, here's what you can do:  Talk about it- your feelings and reasons for harming yourself  Remove any means that you might use to hurt yourself (examples: pills, rope, extension cords, firearm)  Get professional help from the community (Mental Health, Substance Abuse, psychological counseling)  Do not be alone:Call your Safe Contact- someone whom you trust who will be there for you.  Call your local CRISIS HOTLINE 528-7024 or 517-254-3551  Call your local Children's Mobile Crisis Response Team [place] (765) 395-5809 or www.[employer]  Call the toll free National Suicide Prevention Hotlines   National Suicide Prevention Lifeline 543-342-QSPN (5011)  National Hope Line Network 800-SUICIDE (967-4794)    I acknowledge receipt and understanding of these Home Care instructions.

## 2023-07-18 NOTE — H&P
History and Physical    Date: 7/18/2023    PCP: Checo Baker M.D.      CC: R sacral mass    HPI: This is a 48 y.o. 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    Chronic obstructive pulmonary disease (HCC)     COPD (chronic obstructive pulmonary disease) (HCC)     DDD (degenerative disc disease), lumbar     Depression     Emphysema of lung (HCC)     MRSA (methicillin resistant staph aureus) culture positive     Pain     back - cervical, scapula, lower    Pericarditis     Primary adenocarcinoma of lower lobe of right lung (HCC)     Psychiatric disorder     Anxiety, Depression    Sleep apnea     Snoring     Urinary incontinence        Past Surgical History:   Procedure Laterality Date    NV EXC SKIN BENIG >4CM TRUNK,ARM,LEG Left 6/7/2023    Procedure: EXCISION OF SOFT TISSUE MASS LEFT CHEST WALL;  Surgeon: Nasir Becerra M.D.;  Location: SURGERY [facility];  Service: General    NV BRONCHOSCOPY,DIAGNOSTIC N/A 02/14/2023    Procedure: FIBER OPTIC BRONCHOSCOPY WASH, BRUSH, BRONCHOALVEOLAR LAVAGE, BIOPSY AND FINE NEEDLE ASPIRATION AND NAVIGATION, ROBOTICS;  Surgeon: Guicho Ellis M.D.;  Location: SURGERY [facility];  Service: Pulmonary Robotic    ENDOBRONCHIAL US ADD-ON N/A 02/14/2023    Procedure: ENDOBRONCHIAL ULTRASOUND (EBUS);  Surgeon: Guicho Ellis M.D.;  Location: [facility];  Service: Pulmonary Robotic    MAMMOPLASTY REDUCTION Bilateral     SEPTOPLASTY      TUBE & ECTOPIC PREG., REMOVAL      x 2       No current facility-administered medications for this encounter.        Social History     Socioeconomic History    Marital status: Legally      Spouse name: Not on file    Number of children: Not on file    Years of education: Not on file    Highest education level: Associate  degree: occupational, technical, or vocational program   Occupational History    Not on file   Tobacco Use    Smoking status: Some Days     Packs/day: 0.50     Years: 20.00     Pack years: 10.00     Types: Cigarettes     Passive exposure: Past    Smokeless tobacco: Never    Tobacco comments:     on and off    Vaping Use    Vaping Use: Never used   Substance and Sexual Activity    Alcohol use: Not Currently     Comment: occ, rare    Drug use: Not Currently     Types: Marijuana     Comment: THC gummies    Sexual activity: Not on file   Other Topics Concern    Not on file   Social History Narrative    Not on file     Social Determinants of Health     Financial Resource Strain: Medium Risk (1/10/2023)    Overall Financial Resource Strain (CARDIA)     Difficulty of Paying Living Expenses: Somewhat hard   Food Insecurity: Food Insecurity Present (1/10/2023)    Hunger Vital Sign     Worried About Running Out of Food in the Last Year: Sometimes true     Ran Out of Food in the Last Year: Patient refused   Transportation Needs: Unmet Transportation Needs (1/10/2023)    PRAPARE - Transportation     Lack of Transportation (Medical): Yes     Lack of Transportation (Non-Medical): Yes   Physical Activity: Inactive (1/10/2023)    Exercise Vital Sign     Days of Exercise per Week: 0 days     Minutes of Exercise per Session: 0 min   Stress: Stress Concern Present (1/10/2023)    [employer] [employer] - Occupational Stress Questionnaire     Feeling of Stress : To some extent   Social Connections: Moderately Integrated (1/10/2023)    Social Connection and Isolation Panel [NHANES]     Frequency of Communication with Friends and Family: More than three times a week     Frequency of Social Gatherings with Friends and Family: Twice a week     Attends Hindu Services: More than 4 times per year     Active Member of Clubs or Organizations: Patient refused     Attends Club or Organization Meetings: 1 to 4 times per year      Marital Status:    Intimate Partner Violence: Not on file   Housing Stability: Low Risk  (1/10/2023)    Housing Stability Vital Sign     Unable to Pay for Housing in the Last Year: No     Number of Places Lived in the Last Year: 1     Unstable Housing in the Last Year: No       Family History   Problem Relation Age of Onset    Diabetes Mother     Cancer Father         Prostate    Cancer Paternal Grandmother         Cervical    Cancer Paternal Grandfather         Unk       Allergies:  Penicillins, Aspirin, and Other environmental    Review of Systems:  Negative except for MSK pain    Physical Exam  Constitutional:       General: She is not in acute distress.     Appearance: She is well-developed. She is not diaphoretic.   HENT:      Head: Normocephalic and atraumatic.      Mouth/Throat:      Pharynx: No oropharyngeal exudate.   Eyes:      General: No scleral icterus.        Right eye: No discharge.         Left eye: No discharge.   Cardiovascular:      Rate and Rhythm: Normal rate.   Pulmonary:      Effort: Pulmonary effort is normal.   Abdominal:      Palpations: Abdomen is soft.   Musculoskeletal:      Cervical back: Neck supple.   Skin:     General: Skin is warm and dry.   Neurological:      Mental Status: She is alert and oriented to person, place, and time.   Psychiatric:         Behavior: Behavior normal.         Thought Content: Thought content normal.         Judgment: Judgment normal.         Vital Signs  Blood Pressure: (!) 174/81   Temperature: 36.3 °C (97.3 °F)   Pulse: (!) 106   Respiration: 16   Pulse Oximetry: 95 %        L            Assessment and Plan:This is a 48 y.o. here for R sacral ct biopsy

## 2023-07-18 NOTE — ANESTHESIA TIME REPORT
Anesthesia Start and Stop Event Times     Date Time Event    7/18/2023 1411 Ready for Procedure     1458 Anesthesia Start     1554 Anesthesia Stop        Responsible Staff  07/18/23    Name Role Begin End    Socorro Noyola M.D. Anesth [id number]        Overtime Reason:  no overtime (within assigned shift)    Comments:

## 2023-07-18 NOTE — ANESTHESIA PROCEDURE NOTES
Airway    Date/Time: 7/18/2023 3:05 PM    Performed by: Socorro Noyola M.D.  Authorized by: Socorro Noyola M.D.    Location:  OR  Urgency:  Elective  Difficult Airway: No    Indications for Airway Management:  Anesthesia      Spontaneous Ventilation: absent    Sedation Level:  Deep  Preoxygenated: Yes    Patient Position:  Sniffing  Mask Difficulty Assessment:  1 - vent by mask  Final Airway Type:  Endotracheal airway  Final Endotracheal Airway:  ETT  Cuffed: Yes    Technique Used for Successful ETT Placement:  Direct laryngoscopy    Insertion Site:  Oral  Blade Type:  Mckayla  Laryngoscope Blade/Videolaryngoscope Blade Size:  3  ETT Size (mm):  7.0  Measured from:  Teeth  ETT to Teeth (cm):  21  Placement Verified by: auscultation and capnometry    Cormack-Lehane Classification:  Grade I - full view of glottis  Number of Attempts at Approach:  1

## 2023-07-18 NOTE — ANESTHESIA PREPROCEDURE EVALUATION
Date/Time: 07/18/23 1300    Scheduled providers: Yon Sparks M.D.; Socorro Noyola M.D.    Procedure: CT-NEEDLE BX-DEEP BONE    Diagnosis:       Sacral mass [M53.3]      Sacrococcygeal disorders, not elsewhere classified [M53.3]    Indications:       MRI showing sacral metastatic tumor, consulting for biopsy      CT Guided biopsy Right sacral mass**Per Dr. Sparks**    Location: [facility] - [facility]          Relevant Problems   ANESTHESIA   (positive) Obstructive sleep apnea      PULMONARY   (positive) Asthma      GI   (positive) Gastroesophageal reflux disease   (positive) Gastroesophageal reflux disease with esophagitis   COPD  Lung tumor  DDD    Physical Exam    Airway   Mallampati: II  TM distance: >3 FB  Neck ROM: full       Cardiovascular - normal exam  Rhythm: regular  Rate: normal  (-) murmur     Dental - normal exam           Pulmonary - normal exam  Breath sounds clear to auscultation     Abdominal    Neurological - normal exam                 Anesthesia Plan    ASA 3   ASA physical status 3 criteria: COPD - poorly controlled    Plan - general       Airway plan will be ETT          Induction: intravenous    Postoperative Plan: Postoperative administration of opioids is intended.    Pertinent diagnostic labs and testing reviewed    Informed Consent:    Anesthetic plan and risks discussed with patient.    Use of blood products discussed with: patient whom consented to blood products.

## 2023-07-19 NOTE — PROCEDURES
DATE OF SERVICE: 7/19/2023    DIAGNOSIS:  Primary adenocarcinoma of right lung (HCC)  Staging form: Lung, AJCC 8th Edition  - Clinical stage from 3/30/2023: Stage IA2 (cT1b, cN0, cM0) - Signed by Hai Barrera M.D. on 3/30/2023  Histopathologic type: Adenocarcinoma, NOS  Stage prefix: Initial diagnosis       TREATMENT:  Radiation Therapy Episodes       Active Episodes       Radiation Therapy: SBRT    Radiation Therapy: SBRT (4/17/2023)                   Radiation Treatments         Plan Last Treated On Elapsed Days Fractions Treated Prescribed Fraction Dose (cGy) Prescribed Total Dose (cGy)    R Sacrum SBRT 7/19/2023 0 @ [id number] 1 of 5 700 3,500                  Reference Point Last Treated On Elapsed Days Most Recent Session Dose (cGy) Total Dose (cGy)    Sacrum 7/19/2023 0 @ [id number] 700 700                      Historical Treatments (Plans: 1)      Plan Last Treated On Elapsed Days Fractions Treated Prescribed Fraction Dose (cGy) Prescribed Total Dose (cGy)   RLL Lung SBRT 4/21/2023 4 @ [id number] 5 of 5 1,000 5,000                Reference Point Last Treated On Elapsed Days Most Recent Session Dose (cGy) Total Dose (cGy)   PTV 4/21/2023 4 @ [id number] -- 5,000   RLL cp 4/21/2023 4 @ [id number] -- 6,231                            STEREOTACTIC PROCEDURE NOTE:    Called by True[employer] machine to verify treatment parameters including:  treatment site, treatment dose, and treatment setup prior to stereotactic treatment..    Patient was placed in the treatment position with use of immobilization device and  laser guidance. CBCT images were acquired for target localization.  Images were reviewed in the axial, coronal, and saggital views and shifts were made as necessary to ensure that patient position matched simulation position.      Treatment delivered per  prescription.  The medical physicist was present throughout the set-up, verification and treatment delivery to oversee the procedure and ensure all  parameters agreed with the computerized plan.    I have personally reviewed the relevant data, performed the target localization, and determined all relevant factors for this patient’s simulation.

## 2023-07-19 NOTE — OR NURSING
Arrived from PACU   Pt is A&O x4,  VSS; denies N/V; states pain is at tolerable level. Dressing CDI to sacrum; dry gauze and tegaderm - no bleeding noted.     D/c orders received. IV dc'd.   Pt changed into clothing with assistance.   Discharge reviewed  Pt and family verbalized understanding and questions answered.   Patient states ready to d/c home.   Pt dc'd in w/c with friend.

## 2023-07-20 NOTE — PROCEDURES
DATE OF SERVICE: 7/20/2023    DIAGNOSIS:  Primary adenocarcinoma of right lung (HCC)  Staging form: Lung, AJCC 8th Edition  - Clinical stage from 3/30/2023: Stage IA2 (cT1b, cN0, cM0) - Signed by Hai Barrera M.D. on 3/30/2023  Histopathologic type: Adenocarcinoma, NOS  Stage prefix: Initial diagnosis       TREATMENT:  Radiation Therapy Episodes       Active Episodes       Radiation Therapy: SBRT    Radiation Therapy: SBRT (4/17/2023)                   Radiation Treatments         Plan Last Treated On Elapsed Days Fractions Treated Prescribed Fraction Dose (cGy) Prescribed Total Dose (cGy)    R Sacrum SBRT 7/20/2023 1 @ [id number] 2 of 5 700 3,500                  Reference Point Last Treated On Elapsed Days Most Recent Session Dose (cGy) Total Dose (cGy)    Sacrum 7/20/2023 1 @ [id number] 700 1,400                      Historical Treatments (Plans: 1)      Plan Last Treated On Elapsed Days Fractions Treated Prescribed Fraction Dose (cGy) Prescribed Total Dose (cGy)   RLL Lung SBRT 4/21/2023 4 @ [id number] 5 of 5 1,000 5,000                Reference Point Last Treated On Elapsed Days Most Recent Session Dose (cGy) Total Dose (cGy)   PTV 4/21/2023 4 @ [id number] -- 5,000   RLL cp 4/21/2023 4 @ [id number] -- 6,231                            STEREOTACTIC PROCEDURE NOTE:    Called by [employer] machine to verify treatment parameters including:  treatment site, treatment dose, and treatment setup prior to stereotactic treatment..    Patient was placed in the treatment position with use of immobilization device and  laser guidance. CBCT images were acquired for target localization.  Images were reviewed in the axial, coronal, and saggital views and shifts were made as necessary to ensure that patient position matched simulation position.      Treatment delivered per  prescription.  The medical physicist was present throughout the set-up, verification and treatment delivery to oversee the procedure and ensure  all parameters agreed with the computerized plan.    I have personally reviewed the relevant data, performed the target localization, and determined all relevant factors for this patient’s simulation.

## 2023-07-21 NOTE — PROCEDURES
DATE OF SERVICE: 7/21/2023    DIAGNOSIS:  Primary adenocarcinoma of right lung (HCC)  Staging form: Lung, AJCC 8th Edition  - Clinical stage from 3/30/2023: Stage IA2 (cT1b, cN0, cM0) - Signed by Hai Barrera M.D. on 3/30/2023  Histopathologic type: Adenocarcinoma, NOS  Stage prefix: Initial diagnosis       TREATMENT:  Radiation Therapy Episodes       Active Episodes       Radiation Therapy: SBRT    Radiation Therapy: SBRT (4/17/2023)                   Radiation Treatments         Plan Last Treated On Elapsed Days Fractions Treated Prescribed Fraction Dose (cGy) Prescribed Total Dose (cGy)    R Sacrum SBRT 7/21/2023 2 @ [id number] 3 of 5 700 3,500                  Reference Point Last Treated On Elapsed Days Most Recent Session Dose (cGy) Total Dose (cGy)    Sacrum 7/21/2023 2 @ [id number] 700 2,100                      Historical Treatments (Plans: 1)      Plan Last Treated On Elapsed Days Fractions Treated Prescribed Fraction Dose (cGy) Prescribed Total Dose (cGy)   RLL Lung SBRT 4/21/2023 4 @ [id number] 5 of 5 1,000 5,000                Reference Point Last Treated On Elapsed Days Most Recent Session Dose (cGy) Total Dose (cGy)   PTV 4/21/2023 4 @ [id number] -- 5,000   RLL cp 4/21/2023 4 @ [id number] -- 6,231                            STEREOTACTIC PROCEDURE NOTE:    Called by [employer] machine to verify treatment parameters including:  treatment site, treatment dose, and treatment setup prior to stereotactic treatment..    Patient was placed in the treatment position with use of immobilization device and  laser guidance. CBCT images were acquired for target localization.  Images were reviewed in the axial, coronal, and saggital views and shifts were made as necessary to ensure that patient position matched simulation position.      Treatment delivered per  prescription.  The medical physicist was present throughout the set-up, verification and treatment delivery to oversee the procedure and ensure  all parameters agreed with the computerized plan.    I have personally reviewed the relevant data, performed the target localization, and determined all relevant factors for this patient’s simulation.

## 2023-07-21 NOTE — PROGRESS NOTES
Patient reports that she is still in significant pain, despite fentanyl patch, dilaudid, and norco.  She sees pain management but has not contacted them.  Patient does have an appointment on Monday.  But the pain is severe    She reports on that she has numbness in her leg and cannot get relief.  Is been worsening over the past 3 days.    I advised her to go to the emergency room for pain crises.  She agreed    Additionally I went over the results of her pathology which are consistent with her original biopsy of her chest wall mass.  I told her to focus on managing the pain and we will discuss her oncology plan at her next outpatient visit next week.  She agreed.    Alma Newton MD

## 2023-07-22 NOTE — ED PROVIDER NOTES
ER Provider Note    Scribed for Gerson Arce M.d. by Elvia Handy. 7/21/2023  6:49 PM    Primary Care Provider: Checo Baker M.D.    CHIEF COMPLAINT  Chief Complaint   Patient presents with    Pain     Had a biopsy on sacrum, from patients sacrum to right lower leg is painful. 3 days ago the biopsy was performed, PCP is concerned about a blood clot in her lower leg, she also has a blister on her right foot.     EXTERNAL RECORDS REVIEWED  Other Old records show that the patient's most recent MRI shows the S1 nerve root and lateral epidural space are included in the known sacral mass.   Records also show very high-dose narcotic dependence at home for cancer related pain.    HPI/ROS  LIMITATION TO HISTORY   Select: : None  OUTSIDE HISTORIAN(S):  None.    Melly Hendrickson is a 48 y.o. female who with known breast cancer metastatic to her spine who presents to the ED for evaluation of post-op complications onset 3 days ago. She describes that she is currently undergoing radiation on this known sacral mass, having completed session 3 of 5.  She reports that she has a biopsy on her sacrum on Tuesday and notes that since then her right leg has been feeling heavy, numb, and tingly. She reports that the pain, numbness and tingling extends from her low back to her right great toe. The patient states that when she was in the shower, she felt as though there was a blister on the bottom of her foot, but denies there being any actual blister --she recognizes this as a disturbance in sensation. She notes that she is unable to lay down on her right hip, stand or sit comfortably. The patient has a history of degenerative disc disease prior to learning about the breast cancer.  She is followed by pain management for chronic cancer pain.  Records show history of pain in cervical spine, lower back, scapula.    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    Chronic obstructive pulmonary disease (HCC)     COPD (chronic obstructive pulmonary disease) (HCC)     DDD (degenerative disc disease), lumbar     Depression     Emphysema of lung (HCC)     MRSA (methicillin resistant staph aureus) culture positive     Pain     back - cervical, scapula, lower    Pericarditis     Primary adenocarcinoma of lower lobe of right lung (HCC)     Psychiatric disorder     Anxiety, Depression    Sleep apnea     Snoring     Urinary incontinence      SURGICAL HISTORY  Past Surgical History:   Procedure Laterality Date    DC EXC SKIN BENIG >4CM TRUNK,ARM,LEG Left 6/7/2023    Procedure: EXCISION OF SOFT TISSUE MASS LEFT CHEST WALL;  Surgeon: Nasir Becerra M.D.;  Location: SURGERY [facility];  Service: General    DC BRONCHOSCOPY,DIAGNOSTIC N/A 02/14/2023    Procedure: FIBER OPTIC BRONCHOSCOPY WASH, BRUSH, BRONCHOALVEOLAR LAVAGE, BIOPSY AND FINE NEEDLE ASPIRATION AND NAVIGATION, ROBOTICS;  Surgeon: Guicho Ellis M.D.;  Location: SURGERY [facility];  Service: Pulmonary Robotic    ENDOBRONCHIAL US ADD-ON N/A 02/14/2023    Procedure: ENDOBRONCHIAL ULTRASOUND (EBUS);  Surgeon: Guicho Ellis M.D.;  Location: SURGERY [facility];  Service: Pulmonary Robotic    MAMMOPLASTY REDUCTION Bilateral     SEPTOPLASTY      TUBE & ECTOPIC PREG., REMOVAL      x 2     FAMILY HISTORY  Family History   Problem Relation Age of Onset    Diabetes Mother     Cancer Father         Prostate    Cancer Paternal Grandmother         Cervical    Cancer Paternal Grandfather         Unk     SOCIAL HISTORY   reports that she has been smoking cigarettes. She has a 10.00 pack-year smoking history. She has been exposed to tobacco smoke. She has never used smokeless tobacco. She reports that she does not currently use alcohol. She reports that she does not currently use drugs  after having used the following drugs: Marijuana.    CURRENT MEDICATIONS  Discharge Medication List as of 7/21/2023  9:57 PM        CONTINUE these medications which have NOT CHANGED    Details   dexamethasone (DECADRON) 4 MG Tab Take 1 Tablet by mouth 2 times a day for 10 days., Disp-20 Tablet, R-0, Normal      diphenhydrAMINE (BENADRYL) 25 MG Tab Take 25 mg by mouth every 6 hours as needed for Sleep., Historical Med      Buprenorphine 10 MCG/HR PATCH WEEKLY Place 1 Patch on the skin every 7 days., Historical Med      Budeson-Glycopyrrol-Formoterol (BREZTRI AEROSPHERE) 160-9-4.8 MCG/ACT Aerosol Inhale 2 Puffs 2 times a day., Disp-1.7 g, R-3, Normal      gabapentin (NEURONTIN) 300 MG Cap Take 1 Capsule by mouth 3 times a day for 30 days., Disp-90 Capsule, R-1, Normal      HYDROcodone/acetaminophen (NORCO)  MG Tab Take 1 Tablet by mouth every 6 hours as needed., Historical Med      ipratropium-albuterol (DUONEB) 0.5-2.5 (3) MG/3ML nebulizer solution USE 1 AMPULE IN NEBULIZER 4 TIMES DAILY, Disp-180 mL, R-0, Normal      VENTOLIN  (90 Base) MCG/ACT Aero Soln inhalation aerosol INHALE 2 PUFFS BY MOUTH EVERY 4 HOURS AS NEEDED FOR SHORTNESS OF BREATH, Disp-18 g, R-2, JULIO, Normal      loratadine (CLARITIN) 10 MG Tab Take 10 mg by mouth every day., Historical Med      fluticasone (FLONASE) 50 MCG/ACT nasal spray USE 2 SPRAY(S) INTO AFFECTED NOSTRILS ONCE DAILY, Disp-16 g, R-3, Normal      hydrOXYzine HCl (ATARAX) 10 MG Tab Take 10 mg by mouth at bedtime as needed for Anxiety., Historical Med      diclofenac sodium (VOLTAREN) 1 % Gel Apply 2 g topically 4 times a day as needed (Apply's on on neck for pain)., Historical Med           ALLERGIES  Penicillins, Aspirin, and Other environmental    PHYSICAL EXAM  VITAL SIGNS: BP (!) 169/110   Pulse (!) 129   Temp 36.3 °C (97.3 °F) (Temporal)   Resp 18   Ht 1.829 m (6')   Wt 90.2 kg (198 lb 13.7 oz)   LMP 07/21/2023   SpO2 96%   BMI 26.97 kg/m²     Pulse ox  interpretation: I interpret this pulse ox as normal.  Constitutional: Alert in moderate distress. Anxious and uncomfortable.   HENT: No signs of trauma, Bilateral external ears normal, Nose normal.   Eyes: Pupils are equal and reactive, Conjunctiva normal, Non-icteric.   Neck: Normal range of motion, Supple, No stridor.    Cardiovascular: Regular tachycardia, normal peripheral perfusion  Thorax & Lungs: Unlabored respirations, equal chest expansion, no accessory muscle use  Abdomen: Non-distended  Skin:  No erythema, No rash.   Back: Normal alignment and ROM. No midline tenderness. Right sacral biopsy sight shows a small scab. No abnormal tenderness, redness, or swelling.   Extremities: No gross deformity  Musculoskeletal: Good range of motion in all major joints.   Neurologic: Alert, Normal motor function, No focal deficits noted.   Psychiatric: Affect anxious and slightly agitated, judgment normal, Mood normal.     DIAGNOSTIC STUDIES    Labs:   Labs Reviewed   CBC WITH DIFFERENTIAL - Abnormal; Notable for the following components:       Result Value    MCHC 31.1 (*)     RDW 54.8 (*)     Platelet Count 110 (*)     Neutrophils-Polys 87.60 (*)     Lymphocytes 8.50 (*)     Lymphs (Absolute) 0.55 (*)     All other components within normal limits   BASIC METABOLIC PANEL - Abnormal; Notable for the following components:    Sodium 134 (*)     Glucose 141 (*)     Bun 7 (*)     All other components within normal limits   ESTIMATED GFR      Radiology:   The attending emergency physician has independently interpreted the diagnostic imaging associated with this visit and am waiting the final reading from the radiologist.   Preliminary interpretation is a follows: No DVT  Radiologist interpretation:   US-EXTREMITY VENOUS LOWER UNILAT RIGHT   Final Result         1.  No evidence of right lower extremity deep venous thrombosis.         COURSE & MEDICAL DECISION MAKING     ED Observation Status? Yes; I am placing the patient in to  an observation status due to a diagnostic uncertainty as well as therapeutic intensity. Patient placed in observation status at 7:00 PM, 7/21/2023.     Observation plan is as follows: Will perform lab work and imaging for further evaluation of her symptoms.     Upon Reevaluation, the patient's condition has: Improved; and will be discharged.    Patient discharged from ED Observation status at 9:37 PM (Time) 7/21/2023 (Date).     INITIAL ASSESSMENT, COURSE AND PLAN  Care Narrative:     6:49 PM - Patient seen and examined at bedside. The patient is a 48 year old female who with known breast cancer metastatic to her spine presents to the ED for lumbar radiculopathy, consistent with sciatica.  She describes that she is currently undergoing radiation for her lung cancer. She reports that she has a biopsy on her sacrum on Tuesday and notes that since then her right leg has been feeling heavy, numb, and tingly. She reports that the pain, numbness and tingling extends from her low back to her right great toe. Discussed plan of care, including performing lab work and imaging. Patient agrees to the plan of care. Ordered for BMP, CBC w/ Diff., and US-Extremity Venous Lower Unilat Right to evaluate her symptoms. Differential diagnoses strongly favors sciatic nerve pain. Also concerned for pathologic fracture or metastatic lesions irritating nerve roots.     6:56 PM - The patient will be treated with prednisone 40 mg and fentanyl 100 mcg.    7:16 PM - Paged Oncology.     7:28 PM - I spoke with the patient's nurse practitioner from her oncology team, letting her know that we put the patient on steroids for sciatic nerve pain. They confirmed that the patient is going to see them next week and has a pain management appointment.  We discussed the possibility of repeat MRI imaging, but agree that it can be deferred for reevaluation with oncology at the upcoming appointment this week, after a few days of steroids.    8:53 PM - The  patient will be treated with fentanyl 100 mcg.     9:36 PM - Patient was reevaluated at bedside. Discussed lab and radiology results with the patient and informed them that they are reassuring. There are no signs of a blood clot. I informed the patient of the prescription I will be sending her with. Discussed discharge instructions and return precautions with the patient and they were cleared for discharge. Patient was given the opportunity to ask any further questions. She is comfortable with discharge at this time.  She does not have any symptoms of cauda equina, has known and recent imaging which explain her symptoms, and has an appropriate symptom management and definitive treatment plan.        ADDITIONAL PROBLEM LIST      DISPOSITION AND DISCUSSIONS  I have discussed management of the patient with the following physicians and MONO's: The advanced practice provider from the patient's oncology group, who knows her and reviewed her records with me.      Escalation of care considered, and ultimately not performed: acute inpatient care management, however at this time, the patient is most appropriate for outpatient management.  I discussed with her oncology team that she has an appropriate outpatient plan of care.    Barriers to care at this time, including but not limited to:  None known .     Decision tools and prescription drugs considered including, but not limited to: Medication modification was performed at discharge to provide an appropriate burst of steroids for lumbar radiculopathy. .    The patient will return for new or worsening symptoms and is stable at the time of discharge.    DISPOSITION:  Patient will be discharged home in stable condition.    FOLLOW UP:  Your pain management appointment on Monday          Your oncology appointment on Friday          OUTPATIENT MEDICATIONS:  Discharge Medication List as of 7/21/2023  9:57 PM        START taking these medications    Details   predniSONE (DELTASONE)  20 MG Tab Take 2 Tablets by mouth every day for 4 days., Disp-8 Tablet, R-0, Normal            FINAL DIAGNOSIS  1. Sciatica of right side       IElvia (Scribe), am scribing for, and in the presence of, Gerson Arce M.D..    Electronically signed by: Elvia Handy (Scribe), 7/21/2023    IGerson M.D. personally performed the services described in this documentation, as scribed by Elvia Handy in my presence, and it is both accurate and complete.      The note accurately reflects work and decisions made by me.  Gerson Arce M.D.  7/22/2023  5:46 PM

## 2023-07-22 NOTE — ED TRIAGE NOTES
Chief Complaint   Patient presents with    Pain     Had a biopsy on sacrum, from patients sacrum to right lower leg is painful. 3 days ago the biopsy was performed, PCP is concerned about a blood clot in her lower leg, she also has a blister on her right foot.    Undergoing radiation for cancer.

## 2023-07-22 NOTE — TELEPHONE ENCOUNTER
On Call / After Hours Call  Call on 7/21/2023 at 7:22 PM from Melly Bright Aleida Hendrickson    PCP: Checo Baker M.D.  Med Onc: Dr. Newton  Rad Onc: Dr. Barrera    Patient is diagnosed with stage Ia, [id number], adenocarcinoma of right lung and is currently undergoing treatment.    - Treatment regimen consists of:  SBRT to sacrum initiated 7/19/23 (anticipate 5 fractions)    - Last treatment was:  Today, 7/21    - Next treatment is due:  Monday, 7/24      Pt / ED provider reports:  - Pt with c/o new radiculopathy that extends to toes now    Plan:  - Will start steroid x 5 days  - Consider re-imaging if sciatic type nerve issue continues  - Follow up as scheduled: pain mgmt on Mon; Dr. Newton next Fri      [facility]  Office of Mary Gray Thomas, & Dariela  Phone: [phone]  Fax: [phone]

## 2023-07-22 NOTE — DISCHARGE INSTRUCTIONS
Your tests here were reassuring.  Your blood work did not show signs of infection or other problem.  Your ultrasound did not show any signs of a clot.  Your pain is from sciatic nerve irritation, most likely related to the mass that was biopsied earlier in the week.  Use the steroids we have prescribed.  Follow-up at your pain management appointment on Monday.  The steroids will start helping with some of the pain within the next half a day.  Continue your other home medications.

## 2023-07-24 NOTE — PROCEDURES
DATE OF SERVICE: 7/24/2023    DIAGNOSIS:  Primary adenocarcinoma of right lung (HCC)  Staging form: Lung, AJCC 8th Edition  - Clinical stage from 3/30/2023: Stage IA2 (cT1b, cN0, cM0) - Signed by Hai Barrera M.D. on 3/30/2023  Histopathologic type: Adenocarcinoma, NOS  Stage prefix: Initial diagnosis       TREATMENT:  Radiation Therapy Episodes       Active Episodes       Radiation Therapy: SBRT    Radiation Therapy: SBRT (4/17/2023)                   Radiation Treatments         Plan Last Treated On Elapsed Days Fractions Treated Prescribed Fraction Dose (cGy) Prescribed Total Dose (cGy)    R Sacrum SBRT 7/24/2023 5 @ [id number] 4 of 5 700 3,500                  Reference Point Last Treated On Elapsed Days Most Recent Session Dose (cGy) Total Dose (cGy)    Sacrum 7/24/2023 5 @ [id number] 700 2,800                      Historical Treatments (Plans: 1)      Plan Last Treated On Elapsed Days Fractions Treated Prescribed Fraction Dose (cGy) Prescribed Total Dose (cGy)   RLL Lung SBRT 4/21/2023 4 @ [id number] 5 of 5 1,000 5,000                Reference Point Last Treated On Elapsed Days Most Recent Session Dose (cGy) Total Dose (cGy)   PTV 4/21/2023 4 @ [id number] -- 5,000   RLL cp 4/21/2023 4 @ [id number] -- 6,231                            STEREOTACTIC PROCEDURE NOTE:    Called by [employer] machine to verify treatment parameters including:  treatment site, treatment dose, and treatment setup prior to stereotactic treatment..    Patient was placed in the treatment position with use of immobilization device and  laser guidance. CBCT images were acquired for target localization.  Images were reviewed in the axial, coronal, and saggital views and shifts were made as necessary to ensure that patient position matched simulation position.      Treatment delivered per  prescription.  The medical physicist was present throughout the set-up, verification and treatment delivery to oversee the procedure and ensure  all parameters agreed with the computerized plan.    I have personally reviewed the relevant data, performed the target localization, and determined all relevant factors for this patient’s simulation.

## 2023-07-25 NOTE — PROCEDURES
DATE OF SERVICE: 7/25/2023    DIAGNOSIS:  Primary adenocarcinoma of right lung (HCC)  Staging form: Lung, AJCC 8th Edition  - Clinical stage from 3/30/2023: Stage IA2 (cT1b, cN0, cM0) - Signed by Hai Barrera M.D. on 3/30/2023  Histopathologic type: Adenocarcinoma, NOS  Stage prefix: Initial diagnosis       TREATMENT:  Radiation Therapy Episodes       Active Episodes       Radiation Therapy: SBRT    Radiation Therapy: SBRT (4/17/2023)                   Radiation Treatments         Plan Last Treated On Elapsed Days Fractions Treated Prescribed Fraction Dose (cGy) Prescribed Total Dose (cGy)    R Sacrum SBRT 7/25/2023 6 @ [id number] 5 of 5 700 3,500                  Reference Point Last Treated On Elapsed Days Most Recent Session Dose (cGy) Total Dose (cGy)    Sacrum 7/25/2023 6 @ [id number] 700 3,500                      Historical Treatments (Plans: 1)      Plan Last Treated On Elapsed Days Fractions Treated Prescribed Fraction Dose (cGy) Prescribed Total Dose (cGy)   RLL Lung SBRT 4/21/2023 4 @ [id number] 5 of 5 1,000 5,000                Reference Point Last Treated On Elapsed Days Most Recent Session Dose (cGy) Total Dose (cGy)   PTV 4/21/2023 4 @ [id number] -- 5,000   RLL cp 4/21/2023 4 @ [id number] -- 6,231                            STEREOTACTIC PROCEDURE NOTE:    Called by [employer] machine to verify treatment parameters including:  treatment site, treatment dose, and treatment setup prior to stereotactic treatment..    Patient was placed in the treatment position with use of immobilization device and  laser guidance. CBCT images were acquired for target localization.  Images were reviewed in the axial, coronal, and saggital views and shifts were made as necessary to ensure that patient position matched simulation position.      Treatment delivered per  prescription.  The medical physicist was present throughout the set-up, verification and treatment delivery to oversee the procedure and ensure  all parameters agreed with the computerized plan.    I have personally reviewed the relevant data, performed the target localization, and determined all relevant factors for this patient’s simulation.

## 2023-07-26 NOTE — TELEPHONE ENCOUNTER
After Hours Call    Medical Oncologist: Dr. Newton  Diagnosis: Lung cancer  Treatment: Staging and treatment discussion - under going XRT for pain    Reason for call:  Patient back in ER for continued pain. Patient was seen by pain management this week. Radiculopathy still bothersome to patient. She stopped her Gabapentin medication on her own.     Plan:  No new symptoms therefore No need for additional imaging while in ER. Patient having PET CT and follow up with Dr. Newton this coming week. Patient to keep those appts as scheduled.     Will update Dr. Newton of patient's re-admission to ER for continued pain.     FLOYD Cabrera  [facility]  Office of Kelvin Burton Menon, and [place]

## 2023-07-26 NOTE — ED NOTES
Pt ambulated on her own back to ED 7A. Pt changed into gown and placed on monitor. Pt is updated on POC. Juan Carlosrney in the lowest position, side rails are up and call light within reach. Pt educated to let RN know if condition gets worst or if the pt needs anything.

## 2023-07-26 NOTE — ED PROVIDER NOTES
ER Provider Note    Scribed for Dr. Arce by Kim Nowak. 7/25/2023  7:02 PM    Primary Care Provider: Checo Baker M.D.    CHIEF COMPLAINT  No chief complaint on file.    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  Seen here 3 days ago for the same symptoms, ruled out for DVT, and discussed with her oncology team at that time.    HPI/ROS  LIMITATION TO HISTORY   Anxiety and agitation    OUTSIDE HISTORIAN(S):      Melly Hendrickson is a 48 y.o. female with a history of metastatic breast cancer who presents to the ED complaining of increased radiculopathy pain in her lower right leg. The patient has a known lumbosacral mass which is causing her sciatic nerve pain. She is currently being followed by oncology noting that she had her last radiation appointment for her cancer today and is scheduled for a PET scan Thursday and an oncology appointment the following day. Today, she complains of numbness and tingling down her left leg, starting at her hip and radiating down to her toe. She notes decreased range of motion of her left great toe due to pain. The patient adds that she has been noticing bruising along the outside of her leg, with no noted trauma. She reports that these bruises are painful, noting that the pain is most severe at the outside of her calf. I saw her myself, on 7/21/2023, and she complained of the exact same symptoms, though at that time, had no relief from steroids, which had not been started yet.  She adds that she was prescribed Fentanyl patches. She expresses a non-compliance with taking her gabapentin, which she says she stopped because it was not working.  She was taking 300 mg 3 times daily.  She has not had any loss of bowel or bladder control, and no saddle anesthesia.  She is not having any symptoms of cauda equina.  She was seen by her pain management doctor  yesterday.    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    Chronic obstructive pulmonary disease (HCC)     COPD (chronic obstructive pulmonary disease) (HCC)     DDD (degenerative disc disease), lumbar     Depression     Emphysema of lung (HCC)     MRSA (methicillin resistant staph aureus) culture positive     Pain     back - cervical, scapula, lower    Pericarditis     Primary adenocarcinoma of lower lobe of right lung (HCC)     Psychiatric disorder     Anxiety, Depression    Sleep apnea     Snoring     Urinary incontinence        SURGICAL HISTORY  Past Surgical History:   Procedure Laterality Date    WI EXC SKIN BENIG >4CM TRUNK,ARM,LEG Left 6/7/2023    Procedure: EXCISION OF SOFT TISSUE MASS LEFT CHEST WALL;  Surgeon: Nasir Becerra M.D.;  Location: SURGERY [facility];  Service: General    WI BRONCHOSCOPY,DIAGNOSTIC N/A 02/14/2023    Procedure: FIBER OPTIC BRONCHOSCOPY WASH, BRUSH, BRONCHOALVEOLAR LAVAGE, BIOPSY AND FINE NEEDLE ASPIRATION AND NAVIGATION, ROBOTICS;  Surgeon: Guicho Ellis M.D.;  Location: SURGERY [facility];  Service: Pulmonary Robotic    ENDOBRONCHIAL US ADD-ON N/A 02/14/2023    Procedure: ENDOBRONCHIAL ULTRASOUND (EBUS);  Surgeon: Guicho Ellis M.D.;  Location: SURGERY [facility];  Service: Pulmonary Robotic    MAMMOPLASTY REDUCTION Bilateral     SEPTOPLASTY      TUBE & ECTOPIC PREG., REMOVAL      x 2       FAMILY HISTORY  Family History   Problem Relation Age of Onset    Diabetes Mother     Cancer Father         Prostate    Cancer Paternal Grandmother         Cervical    Cancer Paternal Grandfather         Unk       SOCIAL HISTORY   reports that she has been smoking cigarettes. She has a 10.00 pack-year smoking history. She has been exposed to tobacco smoke. She has never used smokeless tobacco.  She reports that she does not currently use alcohol. She reports that she does not currently use drugs after having used the following drugs: Marijuana.    CURRENT MEDICATIONS  Discharge Medication List as of 7/25/2023  8:13 PM        CONTINUE these medications which have NOT CHANGED    Details   dexamethasone (DECADRON) 4 MG Tab Take 1 Tablet by mouth 2 times a day for 10 days., Disp-20 Tablet, R-0, Normal      predniSONE (DELTASONE) 20 MG Tab Take 2 Tablets by mouth every day for 4 days., Disp-8 Tablet, R-0, Normal      diphenhydrAMINE (BENADRYL) 25 MG Tab Take 25 mg by mouth every 6 hours as needed for Sleep., Historical Med      Buprenorphine 10 MCG/HR PATCH WEEKLY Place 1 Patch on the skin every 7 days., Historical Med      Budeson-Glycopyrrol-Formoterol (BREZTRI AEROSPHERE) 160-9-4.8 MCG/ACT Aerosol Inhale 2 Puffs 2 times a day., Disp-1.7 g, R-3, Normal      HYDROcodone/acetaminophen (NORCO)  MG Tab Take 1 Tablet by mouth every 6 hours as needed., Historical Med      ipratropium-albuterol (DUONEB) 0.5-2.5 (3) MG/3ML nebulizer solution USE 1 AMPULE IN NEBULIZER 4 TIMES DAILY, Disp-180 mL, R-0, Normal      VENTOLIN  (90 Base) MCG/ACT Aero Soln inhalation aerosol INHALE 2 PUFFS BY MOUTH EVERY 4 HOURS AS NEEDED FOR SHORTNESS OF BREATH, Disp-18 g, R-2, JULIO, Normal      loratadine (CLARITIN) 10 MG Tab Take 10 mg by mouth every day., Historical Med      fluticasone (FLONASE) 50 MCG/ACT nasal spray USE 2 SPRAY(S) INTO AFFECTED NOSTRILS ONCE DAILY, Disp-16 g, R-3, Normal      hydrOXYzine HCl (ATARAX) 10 MG Tab Take 10 mg by mouth at bedtime as needed for Anxiety., Historical Med      diclofenac sodium (VOLTAREN) 1 % Gel Apply 2 g topically 4 times a day as needed (Apply's on on neck for pain)., Historical Med             ALLERGIES  Penicillins, Aspirin, and Other environmental    PHYSICAL EXAM    VITAL SIGNS: /88   Pulse 99   Temp 36.8 °C (98.2 °F) (Temporal)   Resp 18   Ht 1.829 m (6')   Wt  90.3 kg (199 lb)   LMP 2023 Comment: hx tubal ligation for ectopic PRG x2  SpO2 96%   BMI 26.99 kg/m²   Pulse ox interpretation: I interpret this pulse ox as normal.  Constitutional: Alert in no apparent distress.  HENT: No signs of trauma, Bilateral external ears normal, Nose normal.   Eyes: Pupils are equal and reactive, Conjunctiva normal, Non-icteric.   Neck: Normal range of motion, Supple, No stridor.    Cardiovascular: Normal peripheral perfusion  Thorax & Lungs: Unlabored respirations, equal chest expansion, no accessory muscle use  Abdomen: Non-distended  Skin:  No erythema, No rash.   Back: Normal alignment and ROM  Extremities: Small nonspecific bruising to a handful of spots on her outer thigh and lower leg , no medial bruising, no calf tenderness or swelling. No palpable cords, Intact distal pulses, No edema.  Musculoskeletal: Good range of motion in all major joints.   Neurologic: Walked to [facility] independently, Alert, Normal motor function, No focal deficits noted.   Psychiatric: Affect extremely anxious, frequently hyperventilating, occasionally tearful, judgment fair, mood normal.     DIAGNOSTIC STUDIES    EKG:   I have independently interpreted this EKG  Results for orders placed or performed during the hospital encounter of 23   EKG   Result Value Ref Range    Report       [facility] Emergency Dept.    Test Date:  2023  Pt Name:    JESSICA GREEN       Department: [facility]  MRN:        [id number]                      [facility]  Gender:     Female                       Technician: [id number]  :        1975                   Requested By:ER TRIAGE PROTOCOL  Order #:    [id number]                    Reading MD: SPARKLE ANDREA    Measurements  Intervals                                Axis  Rate:       133                          P:          75  AK:         143                          QRS:        51  QRSD:       71                            T:          2  QT:         303  QTc:        451    Interpretive Statements  Sinus tachycardia  Borderline T abnormalities, inferior leads  Compared to ECG 09/27/2020 06:32:30  T-wave abnormality now present  Sinus tachycardia without evidence of ischemia  Electronically Signed On 6- 23:52:46 PDT by SPARKLE ANDREA           COURSE & MEDICAL DECISION MAKING     ED Observation Status? Yes; I am placing the patient in to an observation status due to a diagnostic uncertainty as well as therapeutic intensity. Patient placed in observation status at 7:02 PM, 7/25/2023.     Observation plan is as follows: Symptomatic treatment, discussion with her oncology team, reevaluation.    Upon Reevaluation, the patient has decided to leave immediately.  I was able to place discharge instructions after discussion with her oncology team.    Patient discharged from ED Observation status at 8:12 PM (Time) 7/25/2023 (Date).     INITIAL ASSESSMENT, COURSE AND PLAN  Care Narrative:     7:02 PM Patient presents to the ED with right leg pain. Patient evaluated at bedside and discussed plan of care, including pain management.  She is actually walking more easily than she was when I saw her a few days ago.  When she was here, she was ruled out for DVT.  She has some nonspecific bruising on the outside of her leg, but no reason on this reevaluation to suspect DVT.  Her leg does not appear any different than it did when I saw her recently.  She has good multidisciplinary care, with pain management and oncology.  She was seen by her pain management doctor yesterday.  She has a PET scan scheduled for Thursday, and oncology appointment scheduled for Friday.  She is having difficulty understanding the connection between her known lumbosacral mass and her radicular symptoms.  I explained this to her several days ago, but she is in a very heightened emotional state.  I have very slowly reexplained this to the patient, repeating several times  explicitly that the known mass in her lumbosacral spine, currently undergoing radiation and other cancer treatment, is the direct cause of her radicular pain.  The patient has discontinued gabapentin on her own, and I strongly we encouraged her to start it again, with help from her pain management doctor to titrate it.  She says that it was not working, but she was only on 300 3 times daily, and obviously there is room to increase that.  We discussed multimodal pain management is often necessary.    7:15 PM Patient will be treated with Ativan 1 mg and Gabapentin 300 mg, and a Lidoderm patch.    7:42 PM Spoke with FLOYD Fox (Oncology) about optimizing medication and  she recommends that the patient is okay to continue with scheduled PET scan and oncology appointment later this week. She will consider additional palliative care.  We agreed that without symptoms of cauda equina, there is no indication for any repeat imaging at this time.    8:12 PM this patient was medicated as above, and wanted to leave immediately, not wanting to wait to talk to me or for discharge paperwork.  Fortunately, I was able to place prescriptions for Lidoderm patches, and restart her gabapentin.    ADDITIONAL PROBLEM LIST  Anxiety.  Health literacy deficit.    DISPOSITION AND DISCUSSIONS  I have discussed management of the patient with the following physicians and MONO's:  FLOYD Fox (Oncology)    Discussion of management with other QHP or appropriate source(s): None     Escalation of care considered, and ultimately not performed: Additional imaging such as MRI, but there is no clinical reason to suspect cauda equina syndrome, the patient has excellent multimodal care for her known cancer and associated issues.    Barriers to care at this time, including but not limited to:  Anxiety, and health literacy deficit are causing some difficulty helping the patient understand exactly what is causing her symptoms .     Decision tools and  prescription drugs considered including, but not limited to: Medication modification was performed, to prescribe Lidoderm patches, and to strongly encourage the patient to restart her gabapentin .    The patient is leaving against medical advice.  I discussed with the patient the risks of leaving without receiving appropriate care to include permanent disability or death.  I have discussed possible alternatives.  The patient is not intoxicated.  The patient is a capable decision maker and understands the risks and benefits of their decision.  While I certainly disagree with the patient's decision, I respect the patient's autonomy and will not keep them here against their will.  They understand that their decision to leave can be reversed at any time and they can return to us at any time for any reason at all.     FINAL DIAGNOSIS  1. Chronic right-sided low back pain with right-sided sciatica       Kim POWELL (Verna), am scribing for, and in the presence of, Gerson Arce M.D..    Electronically signed by: Kim Nowak (Verna), 7/25/2023    Gerson POWELL M.D. personally performed the services described in this documentation, as scribed by Kim Nowak in my presence, and it is both accurate and complete.

## 2023-07-26 NOTE — DISCHARGE INSTRUCTIONS
Restart your gabapentin at 300 mg 3 times per day.  You can increase this with the assistance of your pain management doctor or oncology team.  Make sure you go to your PET scan on Thursday.  Make sure you go to your oncology appointment on Friday.  We spoke again with FLOYD Houser from your oncology team, and they are aware that you will need additional assistance because of this ongoing discomfort.

## 2023-07-26 NOTE — ED NOTES
Pt did not want to wait for discharge paperwork, RN let pt know that the discharge paperwork will pop up on her mychart an her RX were sent over.

## 2023-07-26 NOTE — ED TRIAGE NOTES
Pt presents by self from home for right leg pain and bruising. Pain worse when walking. Recently evaluated for DVT (Negative). Reports bottom of feet are sensitive and tender. Numbness to right leg from foot to right lower back. Bruising to right calf and small bruise to upper right thigh. +pain.   Norco 10 taken at 1100.    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 Last treatment today.

## 2023-07-28 NOTE — PROGRESS NOTES
Consult Note: Hematology/Oncology     Primary Care:  Checo Baker M.D.    Chief Complaint   Patient presents with    Lung Cancer     Follow up after PET        Current Treatment: None    Prior Treatment:     4/17/2023-4/21/2023: RLL Lung SBRT  7/19/23-7/24/2023: R Sacrum SBRT  Subjective:   History of Presenting Illness:    Melly Hendrickson is a 48 y.o. 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    Patient history dates back to July 2023 when she had a chest x-ray performed that revealed a potential mass in her right lung.  This led to a chest CT performed on February 13, 2023.  This demonstrated 1.4 cm mass in the right lower lobe and a 1.9 groundglass opacity also in the right lower lobe.  The groundglass opacity appeared stable from previous scans but the mass was thought to be new.      A biopsy was performed and confirmed lung adenocarcinoma.    Patient saw thoracic surgery and obtained PFTs as well as a PET scan.  She was discussed at tumor board and given her significant dyspnea and her underlying COPD it was thought that she would be a better candidate for SBRT rather than surgical resection.    April 17, 2023 patient began SBRT.  Completed this treatment without many issues.    After this patient did notice a small lump in her breast reduction scar line.  She said that over the next few days it grew and she ultimately went to her PCP    PCP tried to drain the mass; however it just bled significantly during the I&D attempt.  Thus she was sent to Dr. Becerra at [facility] for excision.     Pathology from this excision demonstrated poorly differentiated malignancy favoring carcinoma without obvious involvement of underlying skin and nonspecific IHC profile.  Based on his diagnosis cancer type ID was sent off for.  Results showed 90% probability of sarcoma    Over the past several months she has  noted severe pain in her sacral region.  She does have sacral lesions that are concerning for metastatic disease.  Patient has biopsy of sacral mass on 7/18/2023.     Of note patient has 5 children and 4 grandchildren.    She follows with pain management    Interval History  Patient presents today to discuss her most recent pathology and imaging results.    She reports that she is in significant pain and has had several emergency room visits to try to control her pain.    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    Chronic obstructive pulmonary disease (HCC)     COPD (chronic obstructive pulmonary disease) (HCC)     DDD (degenerative disc disease), lumbar     Depression     Emphysema of lung (HCC)     MRSA (methicillin resistant staph aureus) culture positive     Pain     back - cervical, scapula, lower    Pericarditis     Primary adenocarcinoma of lower lobe of right lung (HCC)     Psychiatric disorder     Anxiety, Depression    Sleep apnea     Snoring     Urinary incontinence         Past Surgical History:   Procedure Laterality Date    VA EXC SKIN BENIG >4CM TRUNK,ARM,LEG Left 6/7/2023    Procedure: EXCISION OF SOFT TISSUE MASS LEFT CHEST WALL;  Surgeon: Nasir Becerra M.D.;  Location: [facility]ERY [facility];  Service: General    VA BRONCHOSCOPY,DIAGNOSTIC N/A 02/14/2023    Procedure: FIBER OPTIC BRONCHOSCOPY WASH, BRUSH, BRONCHOALVEOLAR LAVAGE, BIOPSY AND FINE NEEDLE ASPIRATION AND NAVIGATION, ROBOTICS;  Surgeon: Guicho Ellis M.D.;  Location: [facility];  Service: Pulmonary Robotic    ENDOBRONCHIAL US ADD-ON N/A 02/14/2023    Procedure: ENDOBRONCHIAL ULTRASOUND (EBUS);  Surgeon: Guicho Ellis M.D.;  Location: [facility];  Service: Pulmonary Robotic    MAMMOPLASTY REDUCTION Bilateral      SEPTOPLASTY      TUBE & ECTOPIC PREG., REMOVAL      x 2       Social History     Tobacco Use    Smoking status: Some Days     Packs/day: 0.50     Years: 20.00     Pack years: 10.00     Types: Cigarettes     Passive exposure: Past    Smokeless tobacco: Never    Tobacco comments:     on and off    Vaping Use    Vaping Use: Never used   Substance Use Topics    Alcohol use: Not Currently     Comment: occ, rare    Drug use: Not Currently     Types: Marijuana     Comment: THC gummies        Family History   Problem Relation Age of Onset    Diabetes Mother     Cancer Father         Prostate    Cancer Paternal Grandmother         Cervical    Cancer Paternal Grandfather         Unk       Allergies   Allergen Reactions    Penicillins Hives, Rash and Swelling     Pt reports that she gets swelling in throat and face, rash all over face and arms.   Tolerated cefazolin    Aspirin Rash and Hives     Pt reports that she received a rash on face, pt reports it ok if she takes NORCO    Other Environmental Shortness of Breath     Perfumes, dander, scents       Current Outpatient Medications   Medication Sig Dispense Refill    lidocaine (LIDODERM) 5 % Patch Place 1 Patch on the skin every 24 hours for 10 days. 10 Patch 0    dexamethasone (DECADRON) 4 MG Tab Take 1 Tablet by mouth 2 times a day for 10 days. 20 Tablet 0    diphenhydrAMINE (BENADRYL) 25 MG Tab Take 25 mg by mouth every 6 hours as needed for Sleep.      Buprenorphine 10 MCG/HR PATCH WEEKLY Place 1 Patch on the skin every 7 days.      Budeson-Glycopyrrol-Formoterol (BREZTRI AEROSPHERE) 160-9-4.8 MCG/ACT Aerosol Inhale 2 Puffs 2 times a day. 1.7 g 3    HYDROcodone/acetaminophen (NORCO)  MG Tab Take 1 Tablet by mouth every 6 hours as needed.      ipratropium-albuterol (DUONEB) 0.5-2.5 (3) MG/3ML nebulizer solution USE 1 AMPULE IN NEBULIZER 4 TIMES DAILY (Patient taking differently: 3 mL 2 times a day.) 180 mL 0    VENTOLIN  (90 Base) MCG/ACT Aero Soln inhalation  "aerosol INHALE 2 PUFFS BY MOUTH EVERY 4 HOURS AS NEEDED FOR SHORTNESS OF BREATH 18 g 2    loratadine (CLARITIN) 10 MG Tab Take 10 mg by mouth every day.      fluticasone (FLONASE) 50 MCG/ACT nasal spray USE 2 SPRAY(S) INTO AFFECTED NOSTRILS ONCE DAILY (Patient taking differently: Administer 2 Sprays into affected nostril(S) every day.) 16 g 3    hydrOXYzine HCl (ATARAX) 10 MG Tab Take 10 mg by mouth at bedtime as needed for Anxiety.      diclofenac sodium (VOLTAREN) 1 % Gel Apply 2 g topically 4 times a day as needed (Apply's on on neck for pain).       No current facility-administered medications for this encounter.       Review of Systems   Constitutional:  Positive for malaise/fatigue. Negative for chills, fever and weight loss.   HENT:  Negative for congestion, ear pain, nosebleeds and sore throat.    Eyes:  Negative for blurred vision.   Respiratory:  Negative for cough, sputum production, shortness of breath and wheezing.    Cardiovascular:  Negative for chest pain, palpitations, orthopnea and leg swelling.   Gastrointestinal:  Negative for abdominal pain, heartburn, nausea and vomiting.   Genitourinary:  Negative for dysuria, frequency and urgency.   Musculoskeletal:  Positive for back pain and joint pain. Negative for neck pain.   Neurological:  Negative for dizziness, tingling, tremors, sensory change, focal weakness and headaches.   Endo/Heme/Allergies:  Does not bruise/bleed easily.   Psychiatric/Behavioral:  Negative for depression, memory loss and suicidal ideas.    All other systems reviewed and are negative.      Problem list, medications, and allergies reviewed by myself today in Epic.     Objective:     Vitals:    07/28/23 0923   BP: (!) 142/94   BP Location: Left arm   Patient Position: Sitting   BP Cuff Size: Adult   Pulse: 95   Temp: 37.3 °C (99.1 °F)   TempSrc: Temporal   SpO2: 95%   Weight: 89 kg (196 lb 5.1 oz)   Height: 1.829 m (6' 0.01\")       DESC; KARNOFSKY SCALE WITH ECOG EQUIVALENT: 90, " Able to carry on normal activity; minor signs or symptoms of disease (ECOG equivalent 0)    DISTRESS LEVEL: no acute distress    Physical Exam  Constitutional:       General: She is not in acute distress.     Appearance: Normal appearance. She is not ill-appearing.   HENT:      Head: Normocephalic and atraumatic.      Nose: Nose normal.      Mouth/Throat:      Mouth: Mucous membranes are moist.      Pharynx: No oropharyngeal exudate or posterior oropharyngeal erythema.   Eyes:      General: No scleral icterus.     Conjunctiva/sclera: Conjunctivae normal.      Pupils: Pupils are equal, round, and reactive to light.   Abdominal:      General: Abdomen is flat. Bowel sounds are normal. There is distension.      Palpations: Abdomen is soft. There is no mass.      Tenderness: There is abdominal tenderness. There is no guarding.   Musculoskeletal:         General: No swelling, tenderness or deformity. Normal range of motion.      Cervical back: Normal range of motion and neck supple. No rigidity or tenderness.      Right lower leg: No edema.      Left lower leg: No edema.   Skin:     General: Skin is warm and dry.      Coloration: Skin is not jaundiced or pale.      Findings: No bruising, erythema or rash.   Neurological:      General: No focal deficit present.      Mental Status: She is alert and oriented to person, place, and time. Mental status is at baseline.      Sensory: No sensory deficit.      Motor: No weakness.      Coordination: Coordination normal.      Gait: Gait normal.   Psychiatric:         Mood and Affect: Mood normal.         Behavior: Behavior normal.         Thought Content: Thought content normal.         Judgment: Judgment normal.       Labs:   Most recent labs reviewed.  Please see the lab tab of chart review    Imaging:   Most recent images below have been independently reviewed by me.  Please see the imaging tab of chart review    7/27/23: PET   1.  Focal uptake in the medial RIGHT lower lobe of  lung abutting the thoracic spine, consistent with tumor..  2.  No PET correlate for ill-defined spiculated nodule in the RIGHT lower lobe.  3.  Large hypermetabolic mass corresponds to lytic lesion in the RIGHT sacrum consistent with malignancy.  4.  Intramuscular focal uptake at the posterior LEFT shoulder, likely metastatic.    Pathology:    7/14/2023: Pathology of Chest wall  A. Chest wall mass:          Metastatic poorly differentiated malignancy favoring carcinoma           without obvious involvement of overlying skin and a           non-specific IHC profile of some keratins positive (see           microscopic description).          See comment.   Main Cancer Type: Sarcoma   Probability: 90%     Subtype: Undifferentiated Sarcoma ([facility])   Probability: 90%     7/19/2023: Path of the Sacral Bone  A. Sacral bone biopsy:          Bone cores effaced by a high-grade malignancy histologically           identical to the previously excised chest wall mass           ([id number]).     2/14/2023: Pathology of Lung Mass  A. Lung, right lower lobe fine needle aspiration slides:          Positive for carcinoma.   B. Core, right lower lobe lung:          Moderately differentiated lung adenocarcinoma.   C. Lung, right lower lobe biopsy core and touchprep slides:          Moderately differentiated lung adenocarcinoma.   D. Bronchoalveolar lavage right lower lobe:          Rare atypical cells, malignant cells vs. reactive bronchial           cells.     Assessment/Plan:      Cancer Staging   Primary adenocarcinoma of right lung (HCC)  Staging form: Lung, AJCC 8th Edition  - Clinical stage from 3/30/2023: Stage IA2 (cT1b, cN0, cM0) - Signed by Hai Barrera M.D. on 3/30/2023       Ms. Aleida Hendrickson is a 47 yo F who presents today with a recent diagnosis of stage I A2 adenocarcinoma of the right lung status post SBRT now with a new diagnosis of metastatic undifferentiated sarcoma.    She has completed SBRT to the R sacrum.      We  discussed the results of her PET scan.  There was focal uptake in the medial right lower lobe of the lung.  There was also a large hypermetabolic mass in the right sacrum as well as intramuscular focal uptake in the posterior left shoulder.  This is in addition to her known chest wall sarcoma.    We also discussed the results of her biopsy.  Her biopsy was performed on her R sacral bone.  This result was consistent with the sarcoma found on her chest wall.    Today patient voiced her frustration with her current situation.  We had a long discussion today covering multiple topics.    The first concern the patient had was her reproductive health.  She explained to me that her partner and her want to continue to attempt to have a child.  We discussed that Yamila the  may assist her with reproductive health options.  I explained to her that chemotherapy or treatments would not be conducive to being pregnant or trying to conceive while on treatment.    We then moved on to discuss the current stage of her cancer.  We discussed that given sarcomas present in multiple areas the body and likely in her left shoulder this is consistent with stage IV sarcoma.    We then moved on to discuss overall survival and the natural history of stage IV undifferentiated sarcoma.  Patient was understandably frustrated by the news but optimistic that she will do better than the median overall survival.    We then discussed treatment options.  I informed her that I will treat her like an undifferentiated sarcoma and recommend AIM treatment.  Discussed that this medication consists of doxorubicin and ifosfamide mesna.  Would like to send her tumor off for [facility] 1 testing for the heme sarcoma panel.  This may reveal targeted mutation or revealed a drug that she can utilize.    We next discussed her referral to [facility].  The referral was placed on June 14 however patient has not heard from [facility].  We contacted [facility] and  provided the number for the patient to call on Monday.    Lastly the patient was concerned about having her daughter, to help assist her.  A note was provided to the daughter.      1) Sarcoma of Chest wall, with sacral mets  -Recommended treatment with AIM  -Patient is seeing a sarcoma specialist at [place]  -We will send off [facility] 1 heme/sarcoma panel  -To see me in 2 weeks after results have come back and hopefully she has seen the physician at [place]    2) Stage 1A2 Adenocarcinoma of the Lung  -s/p SBRT  -monitor per NCCN Guidelines  -H&P and chest CT contrast every 6 mo for 2-3 y,   -H&P and a low-dose non-contrast-enhanced chest CT annually    4) Cancer related Pain  -Per pain specialist    No follow-ups on file.     Any questions and concerns raised by the patient were addressed and answered. Patient denies any further questions.  Patient encouraged to call the office with any concerns or issues.     Alma Newton M.D.  Hematology/Oncology    60 minutes was spent on this case

## 2023-07-28 NOTE — ADDENDUM NOTE
Encounter addended by: Jeffery Salinas on: 7/28/2023 11:41 AM   Actions taken: Charge Capture section accepted

## 2023-07-31 PROBLEM — C49.9: Status: ACTIVE | Noted: 2023-01-01

## 2023-07-31 PROBLEM — R93.89 ABNORMAL CT OF THE CHEST: Status: RESOLVED | Noted: 2023-01-01 | Resolved: 2023-01-01

## 2023-07-31 PROBLEM — J32.9 CHRONIC SINUSITIS, UNSPECIFIED: Status: RESOLVED | Noted: 2020-09-21 | Resolved: 2023-01-01

## 2023-07-31 PROBLEM — R22.2 CHEST WALL MASS: Status: RESOLVED | Noted: 2023-01-01 | Resolved: 2023-01-01

## 2023-07-31 NOTE — PATIENT INSTRUCTIONS
Increase Gabapentin to 3 times a day.  Continue to use heating pads 3-5 times/day.   Follow-up in 1 week.

## 2023-07-31 NOTE — ASSESSMENT & PLAN NOTE
Secondary to stage IV sarcoma with metastatic disease to sacral region.   Follow with pain management specialist.   Pain regimen currently: Gabapentin 300 mg BID, Norco  mg every 6 hours PRN.   Increased Gabapentin to TID.   Will re-assess in 1 week.

## 2023-07-31 NOTE — PROGRESS NOTES
On July 31, 2023, Oncology Social Worker Yamila Clark contacted pt. via telephone to follow up on psychosocial distress screening and referral from pt.'s medical oncologist.  OSVIKI Clark introduced herself and reason for her call.  Pt. shared she was currently with her neighbor and asked if ESTEFANIA Clark could call back at a later time today.  ESTEFANIA Clark informed pt. she would call back later.

## 2023-07-31 NOTE — ASSESSMENT & PLAN NOTE
Right sacral mass, confirmed metastatic from Undifferentiated sarcoma of chest wall with Biopsy 07/18/23.   S/p RT. Last session 07/25/23.   Pain associated with mass only partially controlled.   Patient would likely favor from adjuvant chemotherapy but not desiring to follow this plan at this time.   PET scan 07/27/23 shows persistence of mass but d/t recent RT too soon to assess response.

## 2023-07-31 NOTE — PROGRESS NOTES
OFFICE VISIT    Melly Hendrickson is a 48 y.o. female who presents today with the following:    Reason for visit:  Re-establishment appointment.   Pain management.     HPI:  Mrs. Shin is a 48-year-old female with PMH of COPD/Asthma overlap, Adenocarcinoma of the lung stage I, Sarcoma stage IV, Anxiety/depression. Discussed and assessed the following:     Chronic pain related to neoplasm. This involves lowe back, sacrum with radiation to right lower extremity. Confirmed metastatic disease from sarcoma to sacrum, identified by pathology 07/18/23. Patient follows with Pain management specialist. Currently for pain: Voltaren, Gabapentin 300 mg BID, Norco. Patient referred wanting to avoid addition of new medications at this time. Was in agreement with increasing Gabapentin to TID and close follow-up in a week. Discussed that without resolution of source of pain: bone metastasis, eventually she might need more aggressive pain regimen if disease progresses. She understood this.   Sarcoma, stage IV, undifferentiated. First identified on left chest wall with metastasis to sacrum. Has completed RT, last 07/25. PET 07/27 show hypermetabolic lesion of right sacrum, too early to assess response to RT; nonetheless, intramuscular focal uptake at posterior left shoulder is new and likely metastatic. Had appointment with Hem/Onc on 07/28/23 where chemotherapy was discussed as well as further studies with [facility] to assess for possible alternative treatments. Patient was adamant that she does not want to do chemotherapy at this point d/t wanting to freeze her eggs to help her daughter with reproduction. She understands the risks of her decision. She will discuss her case with physician from [place] to whom she has been referred and with this input and upon follow-up with Dr. Newton, if chemotherapy continue to be the recommendation she will consider it. Of note, she also brought up how radiation could had been  a culprit as her chest wall lesion first appeared after SBRT.   Right lung, adenocarcinoma. S/p SBRT 04/2023. PET scan 07/27/23 shows less uptake in compared to 03/29/23 but now noting right medial lower lobe abutting the thoracic spine, likely pleural-based soft tissue. Will need follow-up imaging every 6 months, next by end of the year. Follows with Hem/onc.   Weight loss. Unintentional. About 30 lbs in approximately 4 months. Likely secondary to metastatic disease.     Review of Systems   Constitutional:  Positive for weight loss. Negative for chills, fever and malaise/fatigue.   HENT:  Negative for congestion, hearing loss and sore throat.    Eyes:  Negative for blurred vision, double vision and redness.   Respiratory:  Negative for cough, sputum production and shortness of breath.    Cardiovascular:  Negative for chest pain, palpitations, orthopnea and leg swelling.   Gastrointestinal:  Negative for abdominal pain, constipation, diarrhea, heartburn, nausea and vomiting.   Genitourinary:  Negative for dysuria and hematuria.   Musculoskeletal:  Positive for back pain and joint pain. Negative for myalgias.   Skin:  Negative for rash.   Neurological:  Positive for sensory change (Numbness, right foot, > 1st toe). Negative for dizziness, speech change, focal weakness, loss of consciousness, weakness and headaches.   Endo/Heme/Allergies:  Negative for environmental allergies.   Psychiatric/Behavioral:  Negative for depression and substance abuse. The patient is not nervous/anxious and does not have insomnia.        BP (!) 153/95 (BP Location: Left arm, Patient Position: Sitting, BP Cuff Size: Adult long)   Pulse (!) 107   Temp 36.9 °C (98.5 °F) (Temporal)   Ht 1.829 m (6')   Wt 90.3 kg (199 lb)   LMP 07/21/2023 Comment: hx tubal ligation for ectopic PRG x2  SpO2 94%   BMI 26.99 kg/m²     Physical Exam  Constitutional:       General: She is not in acute distress.     Appearance: She is not ill-appearing,  toxic-appearing or diaphoretic.   HENT:      Head: Normocephalic and atraumatic.      Nose: Nose normal. No congestion or rhinorrhea.      Mouth/Throat:      Mouth: Mucous membranes are moist.      Pharynx: No oropharyngeal exudate or posterior oropharyngeal erythema.   Eyes:      General: No scleral icterus.     Extraocular Movements: Extraocular movements intact.      Pupils: Pupils are equal, round, and reactive to light.   Cardiovascular:      Rate and Rhythm: Normal rate and regular rhythm.      Pulses: Normal pulses.      Heart sounds: Normal heart sounds.   Pulmonary:      Effort: No respiratory distress.      Breath sounds: Normal breath sounds.      Comments: SpO2 95% on room air.   Chest:      Chest wall: No tenderness.   Abdominal:      General: Bowel sounds are normal. There is no distension.      Palpations: Abdomen is soft.      Tenderness: There is no abdominal tenderness. There is no right CVA tenderness or left CVA tenderness.   Musculoskeletal:         General: Tenderness (Left parascapular region. Lumbo-sacral region.) present. No swelling. Normal range of motion.      Cervical back: Normal range of motion. No rigidity or tenderness.      Right lower leg: No edema.      Left lower leg: No edema.   Skin:     General: Skin is warm.      Capillary Refill: Capillary refill takes less than 2 seconds.      Coloration: Skin is not jaundiced or pale.      Findings: No lesion or rash.   Neurological:      General: No focal deficit present.      Mental Status: She is alert and oriented to person, place, and time.      Cranial Nerves: No cranial nerve deficit.      Sensory: No sensory deficit.      Motor: No weakness.   Psychiatric:      Comments: Anxious.        Assessment and Plan:     Chronic pain due to neoplasm  Secondary to stage IV sarcoma with metastatic disease to sacral region.   Follow with pain management specialist.   Pain regimen currently: Gabapentin 300 mg BID, Norco  mg every 6 hours  PRN.   Increased Gabapentin to TID.   Will re-assess in 1 week.    Primary undifferentiated sarcoma of soft tissue (HCC)  Primary: left chest wall.  Initial biopsy 06/07/23.   Metastatic disease to right sacrum, s/p radiation.   Per PET scan 07/27, possible new mets: intramuscular, left posterior shoulder.   Hem/Onc has recommended chemotherapy but patient at this point not desiring d/t reproductive concerns as explained above.   Has pending [place] referral for further evaluation and second opinion.   Will follow-up with Hem/Onc in 2 weeks for further planning.     Mass of sacrum  Right sacral mass, confirmed metastatic from Undifferentiated sarcoma of chest wall with Biopsy 07/18/23.   S/p RT. Last session 07/25/23.   Pain associated with mass only partially controlled.   Patient would likely favor from adjuvant chemotherapy but not desiring to follow this plan at this time.   PET scan 07/27/23 shows persistence of mass but d/t recent RT too soon to assess response.       Orders Placed This Encounter    hydrOXYzine HCl (ATARAX) 25 MG Tab    diclofenac sodium (VOLTAREN) 1 % Gel       Return in about 1 week (around 8/7/2023).      Patient case was seen/ assessed/ discussed with Dr. Hoskins    Signed by:    Checo Gar, PGY2, Internal Medicine  [facility]

## 2023-07-31 NOTE — ASSESSMENT & PLAN NOTE
Primary: left chest wall.  Initial biopsy 06/07/23.   Metastatic disease to right sacrum, s/p radiation.   Per PET scan 07/27, possible new mets: intramuscular, left posterior shoulder.   Hem/Onc has recommended chemotherapy but patient at this point not desiring d/t reproductive concerns as explained above.   Has pending [place] referral for further evaluation and second opinion.   Will follow-up with Hem/Onc in 2 weeks for further planning.

## 2023-08-01 NOTE — ADDENDUM NOTE
Encounter addended by: Amy Motley, Med Ass't on: 8/1/2023 10:15 AM   Actions taken: Pend clinical note

## 2023-08-01 NOTE — RADIATION COMPLETION NOTES
END OF TREATMENT SUMMARY    Patient name:  Melly Hendrickson    Primary Physician:  Checo Baker M.D. MRN: [id number]  CSN: [id number]   Referring physician:  No ref. provider found  : 1975, 48 y.o.       TREATMENT SUMMARY:        Course First Treatment Date 2023    Course Last Treatment Date 2023   Course Elapsed Days 6 @ [id number]   Course Intent Palliative     Radiation Therapy Episodes       Active Episodes       Radiation Therapy: SBRT    Radiation Therapy: SBRT (2023)                   Radiation Treatments         Plan Last Treated On Elapsed Days Fractions Treated Prescribed Fraction Dose (cGy) Prescribed Total Dose (cGy)    R Sacrum SBRT 2023 6 @ [id number] 5 of 5 700 3,500                  Reference Point Last Treated On Elapsed Days Most Recent Session Dose (cGy) Total Dose (cGy)    Sacrum 2023 6 @ [id number] -- 3,500                      Historical Treatments (Plans: 1)      Plan Last Treated On Elapsed Days Fractions Treated Prescribed Fraction Dose (cGy) Prescribed Total Dose (cGy)   RLL Lung SBRT 2023 4 @ [id number] 5 of 5 1,000 5,000                Reference Point Last Treated On Elapsed Days Most Recent Session Dose (cGy) Total Dose (cGy)   PTV 2023 4 @ [id number] -- 5,000   RLL cp 2023 4 @ [id number] -- 6,231                                     STAGE:   Primary adenocarcinoma of right lung (HCC)  Staging form: Lung, AJCC 8th Edition  - Clinical stage from 3/30/2023: Stage IA2 (cT1b, cN0, cM0) - Signed by Hai Barrera M.D. on 3/30/2023  Histopathologic type: Adenocarcinoma, NOS  Stage prefix: Initial diagnosis       TREATMENT INDICATION:   Palliative XRT     CONCURRENT SYSTEMIC TREATMENT: none     RT COURSE DISCONTINUED EARLY:   No     PATIENT EXPERIENCE:        No data to display                 FOLLOW-UP PLAN:   8 Weeks     COMMENT:          ANATOMIC TARGET SUMMARY    ANATOMIC TARGET MODALITY TECHNIQUE   R sacrum    External beam, photons SBRT            COMMENT:         DIAGRAMS:      DOSE VOLUME HISTOGRAMS:

## 2023-08-04 NOTE — TELEPHONE ENCOUNTER
"Called patient to follow up on concern for gout in her big toe.  Patient states \"several people have told her that this may be related to her sciatica, but she believes it is gout because of the swelling.\" Per Dr. Newton, advised patient to follow up with her primary care provider.  Patient verbalized understanding and agreed stating she will be calling them this morning.    "

## 2023-08-04 NOTE — TELEPHONE ENCOUNTER
Have we ever prescribed this med? .  If yes, what date? 6/19/2023    Last OV: 7/17/2023 - Dr. Gil     Next OV: none    DX: Severe persistent asthma without complication [J45.50]    Medications: ipratropium-albuterol (DUONEB) 0.5-2.5 (3) MG/3ML nebulizer solution

## 2023-08-10 PROBLEM — R73.03 PREDIABETES: Status: RESOLVED | Noted: 2021-06-15 | Resolved: 2023-01-01

## 2023-08-10 PROBLEM — C34.91 PRIMARY ADENOCARCINOMA OF RIGHT LUNG (HCC): Status: RESOLVED | Noted: 2023-01-01 | Resolved: 2023-01-01

## 2023-08-10 PROBLEM — E66.9 OBESITY (BMI 30-39.9): Status: RESOLVED | Noted: 2022-10-13 | Resolved: 2023-01-01

## 2023-08-10 PROBLEM — M79.2 NEUROPATHIC PAIN OF FOOT, RIGHT: Status: ACTIVE | Noted: 2023-01-01

## 2023-08-10 PROBLEM — M79.89 NODULE OF SOFT TISSUE: Status: RESOLVED | Noted: 2023-01-01 | Resolved: 2023-01-01

## 2023-08-10 PROBLEM — F41.1 GENERALIZED ANXIETY DISORDER: Status: RESOLVED | Noted: 2021-07-12 | Resolved: 2023-01-01

## 2023-08-10 PROBLEM — K21.00 GASTROESOPHAGEAL REFLUX DISEASE WITH ESOPHAGITIS: Status: RESOLVED | Noted: 2021-02-11 | Resolved: 2023-01-01

## 2023-08-10 PROBLEM — N92.1 MENORRHAGIA WITH IRREGULAR CYCLE: Status: RESOLVED | Noted: 2022-03-01 | Resolved: 2023-01-01

## 2023-08-10 PROBLEM — N83.292 OTHER OVARIAN CYST, LEFT SIDE: Status: RESOLVED | Noted: 2021-06-15 | Resolved: 2023-01-01

## 2023-08-10 NOTE — ASSESSMENT & PLAN NOTE
Primary: left chest wall.  Initial biopsy 06/07/23.   Completed radiation 07/2023.  Metastatic disease to right sacrum.  PET scan 07/27, possible new mets: intramuscular, left posterior shoulder.   Pending [place] appointment for further recommendations.   Not wishing to pursue chemotherapy at this point.   Follow-up with Oncology 08/11/23.

## 2023-08-10 NOTE — PATIENT INSTRUCTIONS
Contact [facility] to schedule appointment.   Follow-up with Oncology at [facility] as scheduled.   Search Doc [facility]a on YouTube; low back pain video.   Complete lab work prior to next appointment.   Follow-up in 5 weeks or sooner if needed.

## 2023-08-10 NOTE — PROGRESS NOTES
OFFICE VISIT    Melly Hendrickson is a 48 y.o. female  with PMH of Adenocarcinoma of the lung stage I, Sarcoma stage IV, COPD/Asthma overlap, Anxiety/depression who presents today with the following:    Reason for visit:  Follow-up on pain management.     HPI:  Chronic pain. Involves lower back, sacrum with radiation to right lower extremity. Confirmed metastatic disease from sarcoma to right sacrum, identified by pathology 07/18/23. Patient follows with pain management specialist. Current regimen: Gabapentin 300 mg TID, Norco  mg every 6 hours PRN, Voltaren gel as needed. No significant improvement. Offered other lines of medications but not interested to trial. Ordered B12 level. Recommended OTC B12.   Sarcoma stage IV, undifferentiated. Primary lesion: left chest wall. S/p radiation therapy. Metastatic disease to right sacrum and possibly intramuscular region of left scapula. Pending [place] appointment for further recommendations. Weight unchanged since prior appointment but known 30 lbs weight loss in past 4 months. Discussed chemotherapy option, patient still preferring to stear away from this at this time. Follow-up appointment with Oncology on 08/11/23. Pending follow-up PET scan.   Anxiety / Depression. More so now given oncologic condition. No suicidal ideations or planning; no visual or auditory hallucinations. PHQ 9: 2. Offered mental health referral for further counseling and assistance but patient not interested at this time.     Review of Systems   Constitutional:  Negative for chills, fever, malaise/fatigue and weight loss.   HENT:  Negative for congestion, hearing loss, sore throat and tinnitus.    Eyes:  Negative for blurred vision, double vision, photophobia and redness.   Respiratory:  Negative for cough, sputum production and shortness of breath.    Cardiovascular:  Negative for chest pain, palpitations and leg swelling.   Gastrointestinal:  Negative for abdominal pain,  constipation, diarrhea, heartburn, nausea and vomiting.   Genitourinary:  Negative for dysuria, frequency and hematuria.   Musculoskeletal:  Positive for back pain (Lower back, more right. Radiation downwards.) and joint pain (Right knee, improved.). Negative for falls, myalgias and neck pain.   Skin:  Negative for rash.   Neurological:  Positive for tingling (Right thigh, posterior. Toes, right foot.) and sensory change (Toes, right foot.). Negative for dizziness, tremors, loss of consciousness, weakness and headaches.        Burning type sensation, lateral right leg.    Endo/Heme/Allergies:  Negative for environmental allergies.   Psychiatric/Behavioral:  Negative for depression and substance abuse. The patient is not nervous/anxious and does not have insomnia.        BP (!) 150/103 (BP Location: Left arm, Patient Position: Sitting, BP Cuff Size: Adult)   Pulse (!) 109   Temp 36.4 °C (97.6 °F) (Temporal)   Ht 1.829 m (6')   Wt 90.3 kg (199 lb)   LMP 07/21/2023 Comment: hx tubal ligation for ectopic PRG x2  SpO2 93%   BMI 26.99 kg/m²     Physical Exam  Constitutional:       General: She is not in acute distress.     Appearance: She is not ill-appearing.   HENT:      Head: Normocephalic and atraumatic.      Nose: Nose normal. No congestion.      Mouth/Throat:      Mouth: Mucous membranes are moist.      Pharynx: No oropharyngeal exudate or posterior oropharyngeal erythema.   Eyes:      General: No scleral icterus.     Extraocular Movements: Extraocular movements intact.      Pupils: Pupils are equal, round, and reactive to light.   Cardiovascular:      Rate and Rhythm: Normal rate and regular rhythm.      Pulses: Normal pulses.      Heart sounds: Normal heart sounds.   Pulmonary:      Effort: Pulmonary effort is normal. No respiratory distress.      Breath sounds: Normal breath sounds.   Chest:      Chest wall: No tenderness.   Abdominal:      General: Bowel sounds are normal. There is no distension.       Palpations: Abdomen is soft.      Tenderness: There is no abdominal tenderness. There is no right CVA tenderness or left CVA tenderness.   Musculoskeletal:         General: Tenderness (Lower back, right.) present. No swelling. Normal range of motion.      Cervical back: Normal range of motion. No tenderness.      Right lower leg: No edema.      Left lower leg: No edema.   Lymphadenopathy:      Cervical: No cervical adenopathy.   Skin:     General: Skin is warm.      Capillary Refill: Capillary refill takes less than 2 seconds.      Coloration: Skin is not jaundiced or pale.      Findings: No lesion or rash.   Neurological:      Mental Status: She is alert.      Comments: Alert and oriented to person, time, and place  Follows commands  Speech is fluent  Pupils reactive to light and accomodation  Occular movements preserved  Facial symmetry preserved  Tongue is midline  Able to tolerate antigravity bilaterally, upper and lower extremities  No pronator drift.  Sensation decreased lateral aspect of right leg and toes of right foot.   Propioception preserved.   DTR preserved, 2+ throughout  Babinski negative         Assessment and Plan:     Chronic pain due to neoplasm  Secondary to stage IV sarcoma with metastatic disease to sacral region.   Pain regimen currently: Gabapentin 300 mg TID, Norco  mg every 6 hours PRN.   Since increase in Gabapentin last week has noted minimal improvement.   Discussed increasing dose or adding other medications but patient is not interested at this time.   Discussed postural changes and stretching exercise.   Follow-up with pain management as scheduled.     Neuropathic pain of foot, right  Secondary to nerve root compression, right lumbo-sacral region.   Tingling/numbness posterior right thigh and toes; burning type pain lateral leg.   Gabapentin 300 TID since prior appointment (07/31/23) without significant relief.   Offered to add medications or alternatives including TCA's,  SSRI's, Pregabalin but patient not interested at this time.   Follows with pain management.     Primary undifferentiated sarcoma of soft tissue (HCC)  Primary: left chest wall.  Initial biopsy 06/07/23.   Completed radiation 07/2023.  Metastatic disease to right sacrum.  PET scan 07/27, possible new mets: intramuscular, left posterior shoulder.   Pending [place] appointment for further recommendations.   Not wishing to pursue chemotherapy at this point.   Follow-up with Oncology 08/11/23.     Orders Placed This Encounter    URIC ACID    Comp Metabolic Panel    HEMOGLOBIN A1C    Lipid Profile    VIT B12,  FOLIC ACID       Return in about 5 weeks (around 9/14/2023).      Patient case was seen/ assessed/ discussed with      Signed by:    Checo Gar, PGY-2, Internal Medicine  [facility]

## 2023-08-10 NOTE — ASSESSMENT & PLAN NOTE
Secondary to stage IV sarcoma with metastatic disease to sacral region.   Pain regimen currently: Gabapentin 300 mg TID, Norco  mg every 6 hours PRN.   Since increase in Gabapentin last week has noted minimal improvement.   Discussed increasing dose or adding other medications but patient is not interested at this time.   Discussed postural changes and stretching exercise.   Follow-up with pain management as scheduled.

## 2023-08-10 NOTE — ASSESSMENT & PLAN NOTE
Secondary to nerve root compression, right lumbo-sacral region.   Tingling/numbness posterior right thigh and toes; burning type pain lateral leg.   Gabapentin 300 TID since prior appointment (07/31/23) without significant relief.   Offered to add medications or alternatives including TCA's, SSRI's, Pregabalin but patient not interested at this time.   Follows with pain management.

## 2023-08-11 NOTE — PROGRESS NOTES
Consult Note: Hematology/Oncology     Primary Care:  Checo Baker M.D.    Chief Complaint   Patient presents with    Lung Cancer     2 week follow up        Current Treatment: None    Prior Treatment:     4/17/2023-4/21/2023: RLL Lung SBRT  7/19/23-7/24/2023: R Sacrum SBRT  Subjective:   History of Presenting Illness:    Melly Hendrickson is a 48 y.o. 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    Patient history dates back to July 2023 when she had a chest x-ray performed that revealed a potential mass in her right lung.  This led to a chest CT performed on February 13, 2023.  This demonstrated 1.4 cm mass in the right lower lobe and a 1.9 groundglass opacity also in the right lower lobe.  The groundglass opacity appeared stable from previous scans but the mass was thought to be new.      A biopsy was performed and confirmed lung adenocarcinoma.    Patient saw thoracic surgery and obtained PFTs as well as a PET scan.  She was discussed at tumor board and given her significant dyspnea and her underlying COPD it was thought that she would be a better candidate for SBRT rather than surgical resection.    April 17, 2023 patient began SBRT.  Completed this treatment without many issues.    After this patient did notice a small lump in her breast reduction scar line.  She said that over the next few days it grew and she ultimately went to her PCP    PCP tried to drain the mass; however it just bled significantly during the I&D attempt.  Thus she was sent to Dr. Becerra at [facility] for excision.     Pathology from this excision demonstrated poorly differentiated malignancy favoring carcinoma without obvious involvement of underlying skin and nonspecific IHC profile.  Based on his diagnosis cancer type ID was sent off for.  Results showed 90% probability of sarcoma    Over the past several months she has noted  severe pain in her sacral region.  She does have sacral lesions that are concerning for metastatic disease.  Patient has biopsy of sacral mass on 7/18/2023.     Of note patient has 5 children and 4 grandchildren.    She follows with pain management    Interval History  Patient presents today to discuss treatment options    She reports a lot of pain from her biopsy site.  She notes numbness and tingling in her teeth.  This is just on the right side.  Pain radiates jail up her shin.      She has decided that no longer wants to harvest her eggs, unless this will not interfere with her treatment.    She has not received an appointment from [facility].    Her sarcoma panel from [place] [id number] has not returned.      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    Chronic obstructive pulmonary disease (HCC)     COPD (chronic obstructive pulmonary disease) (HCC)     DDD (degenerative disc disease), lumbar     Depression     Emphysema of lung ([facility])     MRSA (methicillin resistant staph aureus) culture positive     Pain     back - cervical, scapula, lower    Pericarditis     Primary adenocarcinoma of lower lobe of right lung (HCC)     Psychiatric disorder     Anxiety, Depression    Sleep apnea     Snoring     Urinary incontinence         Past Surgical History:   Procedure Laterality Date    SD EXC SKIN BENIG >4CM TRUNK,ARM,LEG Left 6/7/2023    Procedure: EXCISION OF SOFT TISSUE MASS LEFT CHEST WALL;  Surgeon: Nasir Becerra M.D.;  Location: SURGERY [facility];  Service: General    SD BRONCHOSCOPY,DIAGNOSTIC N/A 02/14/2023    Procedure: FIBER OPTIC BRONCHOSCOPY WASH, BRUSH, BRONCHOALVEOLAR LAVAGE, BIOPSY AND FINE NEEDLE ASPIRATION AND NAVIGATION, ROBOTICS;  Surgeon: Guicho Ellis M.D.;  Location: SURGERY [facility];  Service: Pulmonary Robotic    ENDOBRONCHIAL US  ADD-ON N/A 02/14/2023    Procedure: ENDOBRONCHIAL ULTRASOUND (EBUS);  Surgeon: Guicho Ellis M.D.;  Location: SURGERY [facility];  Service: Pulmonary Robotic    MAMMOPLASTY REDUCTION Bilateral     SEPTOPLASTY      TUBE & ECTOPIC PREG., REMOVAL      x 2       Social History     Tobacco Use    Smoking status: Some Days     Packs/day: 0.50     Years: 20.00     Pack years: 10.00     Types: Cigarettes     Passive exposure: Past    Smokeless tobacco: Never    Tobacco comments:     on and off    Vaping Use    Vaping Use: Never used   Substance Use Topics    Alcohol use: Not Currently     Comment: occ, rare    Drug use: Not Currently     Types: Marijuana     Comment: THC gummies        Family History   Problem Relation Age of Onset    Diabetes Mother     Cancer Father         Prostate    Cancer Paternal Grandmother         Cervical    Cancer Paternal Grandfather         Unk       Allergies   Allergen Reactions    Penicillins Hives, Rash and Swelling     Pt reports that she gets swelling in throat and face, rash all over face and arms.   Tolerated cefazolin    Aspirin Rash and Hives     Pt reports that she received a rash on face, pt reports it ok if she takes NORCO    Other Environmental Shortness of Breath     Perfumes, dander, scents       Current Outpatient Medications   Medication Sig Dispense Refill    fluticasone (FLONASE) 50 MCG/ACT nasal spray USE 2 SPRAY(S) INTO AFFECTED NOSTRILS ONCE DAILY Strength: 50 mcg 16 g 3    ipratropium-albuterol (DUONEB) 0.5-2.5 (3) MG/3ML nebulizer solution USE 1 AMPULE IN NEBULIZER 4 TIMES DAILY 180 mL 3    hydrOXYzine HCl (ATARAX) 25 MG Tab Take 25 mg by mouth every day.      diclofenac sodium (VOLTAREN) 1 % Gel Apply 2 g topically 4 times a day as needed (Apply's on on neck for pain). 50 g 1    diphenhydrAMINE (BENADRYL) 25 MG Tab Take 25 mg by mouth every 6 hours as needed for Sleep.      Buprenorphine 10 MCG/HR PATCH WEEKLY Place 1 Patch on the skin every 7 days.       "Budeson-Glycopyrrol-Formoterol (BREZTRI AEROSPHERE) 160-9-4.8 MCG/ACT Aerosol Inhale 2 Puffs 2 times a day. 1.7 g 3    HYDROcodone/acetaminophen (NORCO)  MG Tab Take 1 Tablet by mouth every 6 hours as needed.      VENTOLIN  (90 Base) MCG/ACT Aero Soln inhalation aerosol INHALE 2 PUFFS BY MOUTH EVERY 4 HOURS AS NEEDED FOR SHORTNESS OF BREATH 18 g 2    loratadine (CLARITIN) 10 MG Tab Take 10 mg by mouth every day.      hydrOXYzine HCl (ATARAX) 10 MG Tab Take 10 mg by mouth at bedtime as needed for Anxiety.       No current facility-administered medications for this encounter.       Review of Systems   Constitutional:  Positive for malaise/fatigue. Negative for chills, fever and weight loss.   HENT:  Negative for congestion, ear pain, nosebleeds and sore throat.    Eyes:  Negative for blurred vision.   Respiratory:  Negative for cough, sputum production, shortness of breath and wheezing.    Cardiovascular:  Negative for chest pain, palpitations, orthopnea and leg swelling.   Gastrointestinal:  Negative for abdominal pain, heartburn, nausea and vomiting.   Genitourinary:  Negative for dysuria, frequency and urgency.   Musculoskeletal:  Positive for back pain and joint pain. Negative for neck pain.   Neurological:  Negative for dizziness, tingling, tremors, sensory change, focal weakness and headaches.   Endo/Heme/Allergies:  Does not bruise/bleed easily.   Psychiatric/Behavioral:  Negative for depression, memory loss and suicidal ideas.    All other systems reviewed and are negative.      Problem list, medications, and allergies reviewed by myself today in Epic.     Objective:     Vitals:    08/11/23 0928   BP: (!) 152/86   BP Location: Left arm   Patient Position: Sitting   BP Cuff Size: Adult   Pulse: (!) 113   Temp: 36.7 °C (98.1 °F)   TempSrc: Temporal   SpO2: 97%   Weight: 89.2 kg (196 lb 10.4 oz)   Height: (P) 1.829 m (6' 0.01\")       DESC; KARNOFSKY SCALE WITH ECOG EQUIVALENT: 90, Able to carry on " normal activity; minor signs or symptoms of disease (ECOG equivalent 0)    DISTRESS LEVEL: no acute distress    Physical Exam  Constitutional:       General: She is not in acute distress.     Appearance: Normal appearance. She is not ill-appearing.   HENT:      Head: Normocephalic and atraumatic.      Nose: Nose normal.      Mouth/Throat:      Mouth: Mucous membranes are moist.      Pharynx: No oropharyngeal exudate or posterior oropharyngeal erythema.   Eyes:      General: No scleral icterus.     Conjunctiva/sclera: Conjunctivae normal.      Pupils: Pupils are equal, round, and reactive to light.   Abdominal:      General: Abdomen is flat. Bowel sounds are normal. There is distension.      Palpations: Abdomen is soft. There is no mass.      Tenderness: There is abdominal tenderness. There is no guarding.   Musculoskeletal:         General: No swelling, tenderness or deformity. Normal range of motion.      Cervical back: Normal range of motion and neck supple. No rigidity or tenderness.      Right lower leg: No edema.      Left lower leg: No edema.   Skin:     General: Skin is warm and dry.      Coloration: Skin is not jaundiced or pale.      Findings: No bruising, erythema or rash.   Neurological:      General: No focal deficit present.      Mental Status: She is alert and oriented to person, place, and time. Mental status is at baseline.      Sensory: No sensory deficit.      Motor: No weakness.      Coordination: Coordination normal.      Gait: Gait normal.   Psychiatric:         Mood and Affect: Mood normal.         Behavior: Behavior normal.         Thought Content: Thought content normal.         Judgment: Judgment normal.       Labs:   Most recent labs reviewed.  Please see the lab tab of chart review    Imaging:   Most recent images below have been independently reviewed by me.  Please see the imaging tab of chart review    7/27/23: PET   1.  Focal uptake in the medial RIGHT lower lobe of lung abutting the  thoracic spine, consistent with tumor..  2.  No PET correlate for ill-defined spiculated nodule in the RIGHT lower lobe.  3.  Large hypermetabolic mass corresponds to lytic lesion in the RIGHT sacrum consistent with malignancy.  4.  Intramuscular focal uptake at the posterior LEFT shoulder, likely metastatic.    Pathology:    7/14/2023: Pathology of Chest wall  A. Chest wall mass:          Metastatic poorly differentiated malignancy favoring carcinoma           without obvious involvement of overlying skin and a           non-specific IHC profile of some keratins positive (see           microscopic description).          See comment.   Main Cancer Type: Sarcoma   Probability: 90%     Subtype: Undifferentiated Sarcoma ([facility])   Probability: 90%     7/19/2023: Path of the Sacral Bone  A. Sacral bone biopsy:          Bone cores effaced by a high-grade malignancy histologically           identical to the previously excised chest wall mass           ([id number]).     2/14/2023: Pathology of Lung Mass  A. Lung, right lower lobe fine needle aspiration slides:          Positive for carcinoma.   B. Core, right lower lobe lung:          Moderately differentiated lung adenocarcinoma.   C. Lung, right lower lobe biopsy core and touchprep slides:          Moderately differentiated lung adenocarcinoma.   D. Bronchoalveolar lavage right lower lobe:          Rare atypical cells, malignant cells vs. reactive bronchial           cells.     Assessment/Plan:      Cancer Staging   No matching staging information was found for the patient.       Ms. Aleida Hendrickson is a 47 yo F who presents today with a recent diagnosis of stage I A2 adenocarcinoma of the right lung status post SBRT now with a new diagnosis of metastatic undifferentiated sarcoma.    She has completed SBRT to the R sacrum.      Today I had a long discussion with the patient regarding her future chemotherapy treatment.  We discussed moving forward with doxorubicin,  ifosfamide, mesna.  We discussed that this would be completed in 21-day cycles until disease progression or unacceptable toxicity including reaching a lifetime accumulation anthracycline dose.    We discussed the side effects of doxorubicin which is that this can impact the ejection fraction and needs to be monitored prior to the initiation as well as clinically indicated.  We also discussed with this medication can affect liver function.  This medication would require port.    Ifosfamide needs hydration and is required pre and post administration.  Ifosfamide can cause neurotoxicity and hemorrhagic cystitis.  It also can cause renal dysfunction.    The patient would need to be admitted for this treatment.    1) Sarcoma with metastatic disease to Chest wall,  sacral and L shoulder mets  -Recommended treatment with AIM   -treatment plan to be entered after meeting with [facility]  -Patient is seeing a sarcoma specialist at [facility]  -Awaiting [place] [id number] heme/sarcoma panel  -To see me on Wednesday after results have come back     Regimen:   Doxorubicin 25 mg per metered squared IV continuous infusion over 24 hours daily on days 1 through 3  Ifosfamide 2500 mg per metered squared IV over 3 hours on days 1 through 4  Mesna 500mg/m2 IV over 15 minutes 3x daily   Will need G-CSF      2) Stage 1A2 Adenocarcinoma of the Lung  -s/p SBRT  -monitor per NCCN Guidelines  -H&P and chest CT contrast every 6 mo for 2-3 y,   -H&P and a low-dose non-contrast-enhanced chest CT annually    4) Cancer related Pain  -Per pain specialist    No follow-ups on file.     Any questions and concerns raised by the patient were addressed and answered. Patient denies any further questions.  Patient encouraged to call the office with any concerns or issues.     Alma Newton M.D.  Hematology/Oncology    40 minutes was spent on this case

## 2023-08-11 NOTE — PROGRESS NOTES
"On August 11, 2023, Oncology Social Worker Yamila Clark contacted pt. via telephone.  Pt. shared she's \"not comfortable with putting poison in my body and not handling this diagnosis well.\"  ESTEFANIA Clark encouraged pt. to consider counseling.      Pt. asked what kind of assistance ESTEFANIA Clark was able to provide her.  Pt. asked about financial assistance.  ESTEFANIA Clark explained financial assistance is to assist with out of pocket medical expenses to which pt. has medicaid and are therefore covered and the other would be loss of income due to diagnosis.  Pt.'s income has not changed as she's been disabled due to respiratory issues since 2015.      Pt. shared she's driving herself to treatment. ESTEFANIA Clark spoke to pt. about MTM to which pt. shared \"they weren't helpful.\"  ESTEFANIA Clark informed pt. she would mail her a one time gas card assistance application to have pt. complete in order to receive assistance with gas for Oncology medical appointments.  ESTEFANIA Clark confirmed pt.'s address.      ESTEFANIA Clark spoke to pt. about questions she had regarding egg preservation.  ESTEFANIA Clark informed pt. she would need to contact Medicaid to find out if fertility preservation is covered and if she's eligible.  Pt. shared she wanted to preserve her eggs for her daughter who is not able to conceive.  ESTEFANIA Clark provided pt. [employer].  Pt. asked for ESTEFANIA Clark to include this when she sends her the gas card application.      ESTEFANIA Clark encouraged pt. to contact her next week if she decides she would like to move forward with counseling.    "

## 2023-08-11 NOTE — ADDENDUM NOTE
Encounter addended by: Jeffery Salinas on: 8/11/2023 3:10 PM   Actions taken: Charge Capture section accepted

## 2023-08-16 NOTE — PROGRESS NOTES
Consult Note: Hematology/Oncology     Primary Care:  Checo Baker M.D.    Chief Complaint   Patient presents with    Lung Cancer     Follow up        Current Treatment: None    Prior Treatment:     4/17/2023-4/21/2023: RLL Lung SBRT  7/19/23-7/24/2023: R Sacrum SBRT    This evaluation was conducted via Zoom using secure and encrypted videoconferencing technology. The patient was in their home in the [facility].    The patient's identity was confirmed and verbal consent was obtained for this virtual visit.    Subjective:   History of Presenting Illness:    Melly Hendrickson is a 48 y.o. 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    Patient history dates back to July 2023 when she had a chest x-ray performed that revealed a potential mass in her right lung.  This led to a chest CT performed on February 13, 2023.  This demonstrated 1.4 cm mass in the right lower lobe and a 1.9 groundglass opacity also in the right lower lobe.  The groundglass opacity appeared stable from previous scans but the mass was thought to be new.      A biopsy was performed and confirmed lung adenocarcinoma.    Patient saw thoracic surgery and obtained PFTs as well as a PET scan.  She was discussed at tumor board and given her significant dyspnea and her underlying COPD it was thought that she would be a better candidate for SBRT rather than surgical resection.    April 17, 2023 patient began SBRT.  Completed this treatment without many issues.    After this patient did notice a small lump in her breast reduction scar line.  She said that over the next few days it grew and she ultimately went to her PCP    PCP tried to drain the mass; however it just bled significantly during the I&D attempt.  Thus she was sent to Dr. Becerra at [facility] for excision.     Pathology from this excision demonstrated poorly differentiated  malignancy favoring carcinoma without obvious involvement of underlying skin and nonspecific IHC profile.  Based on his diagnosis cancer type ID was sent off for.  Results showed 90% probability of sarcoma    Over the past several months she has noted severe pain in her sacral region.  She does have sacral lesions that are concerning for metastatic disease.  Patient has biopsy of sacral mass on 7/18/2023.     Of note patient has 5 children and 4 grandchildren.    She follows with pain management    Interval History  Patient presents today to discuss treatment options    Patient reports that she continues to be in pain from her back.    She has not received her appointment with [facility] and has not received a call yet    She is frustrated by the process.    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    Chronic obstructive pulmonary disease (HCC)     COPD (chronic obstructive pulmonary disease) (HCC)     DDD (degenerative disc disease), lumbar     Depression     Emphysema of lung (HCC)     MRSA (methicillin resistant staph aureus) culture positive     Pain     back - cervical, scapula, lower    Pericarditis     Primary adenocarcinoma of lower lobe of right lung (HCC)     Psychiatric disorder     Anxiety, Depression    Sleep apnea     Snoring     Urinary incontinence         Past Surgical History:   Procedure Laterality Date    ND EXC SKIN BENIG >4CM TRUNK,ARM,LEG Left 6/7/2023    Procedure: EXCISION OF SOFT TISSUE MASS LEFT CHEST WALL;  Surgeon: Nasir Becerra M.D.;  Location: SURGERY [facility];  Service: General    ND BRONCHOSCOPY,DIAGNOSTIC N/A 02/14/2023    Procedure: FIBER OPTIC BRONCHOSCOPY WASH, BRUSH, BRONCHOALVEOLAR LAVAGE, BIOPSY AND FINE NEEDLE ASPIRATION AND NAVIGATION, ROBOTICS;  Surgeon: Guicho Ellis M.D.;  Location: SURGERY [facility];   Service: Pulmonary Robotic    ENDOBRONCHIAL US ADD-ON N/A 02/14/2023    Procedure: ENDOBRONCHIAL ULTRASOUND (EBUS);  Surgeon: Guicho Ellis M.D.;  Location: SURGERY [facility];  Service: Pulmonary Robotic    MAMMOPLASTY REDUCTION Bilateral     SEPTOPLASTY      TUBE & ECTOPIC PREG., REMOVAL      x 2       Social History     Tobacco Use    Smoking status: Some Days     Packs/day: 0.50     Years: 20.00     Additional pack years: 0.00     Total pack years: 10.00     Types: Cigarettes     Passive exposure: Past    Smokeless tobacco: Never    Tobacco comments:     on and off    Vaping Use    Vaping Use: Never used   Substance Use Topics    Alcohol use: Not Currently     Comment: occ, rare    Drug use: Not Currently     Types: Marijuana     Comment: THC gummies        Family History   Problem Relation Age of Onset    Diabetes Mother     Cancer Father         Prostate    Cancer Paternal Grandmother         Cervical    Cancer Paternal Grandfather         Unk       Allergies   Allergen Reactions    Penicillins Hives, Rash and Swelling     Pt reports that she gets swelling in throat and face, rash all over face and arms.   Tolerated cefazolin    Aspirin Rash and Hives     Pt reports that she received a rash on face, pt reports it ok if she takes NORCO    Other Environmental Shortness of Breath     Perfumes, dander, scents       Current Outpatient Medications   Medication Sig Dispense Refill    fluticasone (FLONASE) 50 MCG/ACT nasal spray USE 2 SPRAY(S) INTO AFFECTED NOSTRILS ONCE DAILY Strength: 50 mcg 16 g 3    ipratropium-albuterol (DUONEB) 0.5-2.5 (3) MG/3ML nebulizer solution USE 1 AMPULE IN NEBULIZER 4 TIMES DAILY 180 mL 3    hydrOXYzine HCl (ATARAX) 25 MG Tab Take 25 mg by mouth every day.      diclofenac sodium (VOLTAREN) 1 % Gel Apply 2 g topically 4 times a day as needed (Apply's on on neck for pain). 50 g 1    diphenhydrAMINE (BENADRYL) 25 MG Tab Take 25 mg by mouth every 6 hours as needed for Sleep.       Buprenorphine 10 MCG/HR PATCH WEEKLY Place 1 Patch on the skin every 7 days.      Budeson-Glycopyrrol-Formoterol (BREZTRI AEROSPHERE) 160-9-4.8 MCG/ACT Aerosol Inhale 2 Puffs 2 times a day. 1.7 g 3    HYDROcodone/acetaminophen (NORCO)  MG Tab Take 1 Tablet by mouth every 6 hours as needed.      VENTOLIN  (90 Base) MCG/ACT Aero Soln inhalation aerosol INHALE 2 PUFFS BY MOUTH EVERY 4 HOURS AS NEEDED FOR SHORTNESS OF BREATH 18 g 2    loratadine (CLARITIN) 10 MG Tab Take 10 mg by mouth every day.      hydrOXYzine HCl (ATARAX) 10 MG Tab Take 10 mg by mouth at bedtime as needed for Anxiety.       No current facility-administered medications for this encounter.       Review of Systems   Constitutional:  Positive for malaise/fatigue. Negative for chills, fever and weight loss.   HENT:  Negative for congestion, ear pain, nosebleeds and sore throat.    Eyes:  Negative for blurred vision.   Respiratory:  Negative for cough, sputum production, shortness of breath and wheezing.    Cardiovascular:  Negative for chest pain, palpitations, orthopnea and leg swelling.   Gastrointestinal:  Negative for abdominal pain, heartburn, nausea and vomiting.   Genitourinary:  Negative for dysuria, frequency and urgency.   Musculoskeletal:  Positive for back pain and joint pain. Negative for neck pain.   Neurological:  Negative for dizziness, tingling, tremors, sensory change, focal weakness and headaches.   Endo/Heme/Allergies:  Does not bruise/bleed easily.   Psychiatric/Behavioral:  Negative for depression, memory loss and suicidal ideas.    All other systems reviewed and are negative.      Problem list, medications, and allergies reviewed by myself today in Epic.     Objective:     There were no vitals filed for this visit.      DESC; KARNOFSKY SCALE WITH ECOG EQUIVALENT: 90, Able to carry on normal activity; minor signs or symptoms of disease (ECOG equivalent 0)    DISTRESS LEVEL: no acute distress  Physical exam not performed  due to virtual visit.  Previous physical exam is listed below  Physical Exam  Constitutional:       General: She is not in acute distress.     Appearance: Normal appearance. She is not ill-appearing.   HENT:      Head: Normocephalic and atraumatic.      Nose: Nose normal.      Mouth/Throat:      Mouth: Mucous membranes are moist.      Pharynx: No oropharyngeal exudate or posterior oropharyngeal erythema.   Eyes:      General: No scleral icterus.     Conjunctiva/sclera: Conjunctivae normal.      Pupils: Pupils are equal, round, and reactive to light.   Abdominal:      General: Abdomen is flat. Bowel sounds are normal. There is distension.      Palpations: Abdomen is soft. There is no mass.      Tenderness: There is abdominal tenderness. There is no guarding.   Musculoskeletal:         General: No swelling, tenderness or deformity. Normal range of motion.      Cervical back: Normal range of motion and neck supple. No rigidity or tenderness.      Right lower leg: No edema.      Left lower leg: No edema.   Skin:     General: Skin is warm and dry.      Coloration: Skin is not jaundiced or pale.      Findings: No bruising, erythema or rash.   Neurological:      General: No focal deficit present.      Mental Status: She is alert and oriented to person, place, and time. Mental status is at baseline.      Sensory: No sensory deficit.      Motor: No weakness.      Coordination: Coordination normal.      Gait: Gait normal.   Psychiatric:         Mood and Affect: Mood normal.         Behavior: Behavior normal.         Thought Content: Thought content normal.         Judgment: Judgment normal.         Labs:   Most recent labs reviewed.  Please see the lab tab of chart review    Imaging:   Most recent images below have been independently reviewed by me.  Please see the imaging tab of chart review    7/27/23: PET   1.  Focal uptake in the medial RIGHT lower lobe of lung abutting the thoracic spine, consistent with tumor..  2.  No  PET correlate for ill-defined spiculated nodule in the RIGHT lower lobe.  3.  Large hypermetabolic mass corresponds to lytic lesion in the RIGHT sacrum consistent with malignancy.  4.  Intramuscular focal uptake at the posterior LEFT shoulder, likely metastatic.    Pathology:    7/14/2023: Pathology of Chest wall  A. Chest wall mass:          Metastatic poorly differentiated malignancy favoring carcinoma           without obvious involvement of overlying skin and a           non-specific IHC profile of some keratins positive (see           microscopic description).          See comment.   Main Cancer Type: Sarcoma   Probability: 90%     Subtype: Undifferentiated Sarcoma ([facility])   Probability: 90%     7/19/2023: Path of the Sacral Bone  A. Sacral bone biopsy:          Bone cores effaced by a high-grade malignancy histologically           identical to the previously excised chest wall mass           ([id number]).     2/14/2023: Pathology of Lung Mass  A. Lung, right lower lobe fine needle aspiration slides:          Positive for carcinoma.   B. Core, right lower lobe lung:          Moderately differentiated lung adenocarcinoma.   C. Lung, right lower lobe biopsy core and touchprep slides:          Moderately differentiated lung adenocarcinoma.   D. Bronchoalveolar lavage right lower lobe:          Rare atypical cells, malignant cells vs. reactive bronchial           cells.     Assessment/Plan:      Cancer Staging   No matching staging information was found for the patient.       Ms. Aleida Hendrickson is a 49 yo F who presents today with a recent diagnosis of stage I A2 adenocarcinoma of the right lung status post SBRT now with a new diagnosis of metastatic undifferentiated sarcoma.    She has completed SBRT to the R sacrum.      Today I had a long discussion with the patient regarding her future chemotherapy treatment as well as the results from her foundation 1 sarcoma panel.      She is apprehensive to start  chemotherapy.  She has not met with [facility] would like their opinion.      Additionally patient would like to meet with radiation prior to ensure that radiation is working.    She has an appointment with radiation on September 7.    1) Sarcoma with metastatic disease to Chest wall,  sacral and L shoulder mets  -Recommended treatment with [facility]  -treatment plan on hold per patient's request.  She would like to think about this further, meet with [facility] and determine if radiation has worked.  -Patient is seeing a sarcoma specialist at [facility]  -We will see patient in [facility] on September 7 with Dr. Barrera    Recommended Regimen:   Doxorubicin 25 mg per metered squared IV continuous infusion over 24 hours daily on days 1 through 3  Ifosfamide 2500 mg per metered squared IV over 3 hours on days 1 through 4  [place] 500mg/m2 IV over 15 minutes 3x daily   Will need G-CSF      2) Stage 1A2 Adenocarcinoma of the Lung  -s/p SBRT  -monitor per NCCN Guidelines  -H&P and chest CT contrast every 6 mo for 2-3 y,   -H&P and a low-dose non-contrast-enhanced chest CT annually    4) Cancer related Pain  -Per pain specialist    No follow-ups on file.     Any questions and concerns raised by the patient were addressed and answered. Patient denies any further questions.  Patient encouraged to call the office with any concerns or issues.     Alma Newton M.D.  Hematology/Oncology    35 minutes was spent on this case

## 2023-08-21 PROBLEM — J96.01 ACUTE RESPIRATORY FAILURE WITH HYPOXIA (HCC): Status: ACTIVE | Noted: 2023-01-01

## 2023-08-21 PROBLEM — I26.99 PULMONARY EMBOLISM ON RIGHT (HCC): Status: ACTIVE | Noted: 2023-01-01

## 2023-08-21 NOTE — PROGRESS NOTES
Pt complains of right sided chest pain, initially declined oral pain medication. Followed by pulmonary MD, agreeable to increased pain management regimen and dosage (oxy 15 mg, dilaudid 1 mg). Advanced diet to cardiac diabetic. Added respiratory treatment. Pt's friend at bedside.

## 2023-08-21 NOTE — PROGRESS NOTES
Telemetry Shift Summary    Rhythm ST  HR Range 104-110  Ectopy -  Measurements 0.14/0.08/0.30        Normal Values  Rhythm SR  HR Range    Measurements 0.12-0.20 / 0.06-0.10  / 0.30-0.52

## 2023-08-21 NOTE — PROGRESS NOTES
4 Eyes Skin Assessment Completed by ANA LUISA Smiley and ANA LUISA Neri.    Head WDL  Ears WDL  Nose WDL  Mouth WDL  Neck WDL  Breast/Chest Scar  Shoulder Blades WDL  Spine WDL  (R) Arm/Elbow/Hand WDL  (L) Arm/Elbow/Hand WDL  Abdomen WDL  Groin WDL  Scrotum/Coccyx/Buttocks WDL  (R) Leg WDL  (L) Leg WDL  (R) Heel/Foot/Toe WDL  (L) Heel/Foot/Toe WDL          Devices In Places Tele Box and Nasal Cannula      Interventions In Place N/A    Possible Skin Injury No    Pictures Uploaded Into Epic N/A  Wound Consult Placed N/A  RN Wound Prevention Protocol Ordered No

## 2023-08-21 NOTE — ED NOTES
Pt Aox4, not in any form of distress. Pt lying comfortably in bed, warm blankets provided, side rails raised up, bed locked. Call bell within reach.

## 2023-08-21 NOTE — ASSESSMENT & PLAN NOTE
Patient has been treated with radiation, considering chemotherapy with this has not been initiated yet  Causing right-sided PE  Patient follows with Traci Newton at [facility], spoke with Samuel at office - will need OP f/u

## 2023-08-21 NOTE — CARE PLAN
The patient is Watcher - Medium risk of patient condition declining or worsening    Shift Goals  Clinical Goals: pain management, monitor respiratory  Patient Goals: pain management  Family Goals: MELVIN    Progress made toward(s) clinical / shift goals:    Problem: Pain - Standard  Goal: Alleviation of pain or a reduction in pain to the patient’s comfort goal  Outcome: Progressing  Flowsheets (Taken 8/21/2023 2575)  Pain Rating Scale (NPRS): 10  Note: Pt educated by MD on pain management regimen. Adjusted medication, increased dosages.      Problem: Knowledge Deficit - Standard  Goal: Patient and family/care givers will demonstrate understanding of plan of care, disease process/condition, diagnostic tests and medications  Outcome: Progressing

## 2023-08-21 NOTE — CONSULTS
Pulmonary Consultation    Date of consult: 8/21/2023    Referring Physician  Nicole Michelle M.D.    Reason for Consultation  Hemoptysis     History of Presenting Illness  48 y.o. female who presented 8/20/2023 with chest pain.   Patient has a history of stage I adenocarcinoma of the lung and completed SBRT.   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  She has had XRT to both areas, but has persistent pain in her right leg.  She was discussing with oncology regarding chemotherapy, but was reluctant and was planning for a second opinion from [facility].  She states that [facility] is still awaiting authorization from her insurance.  She also has a history of asthma for which she has been treated with Breztri.  She has extensive emphysema seen on her CT scan.  Patient reports that over the last 3 days she started to have some chest pain on the right side she had a cough and coughed up a bloody looking mucus plug.  Since that time she has had persistent blood streaked sputum, which is not elise blood, but is very persistent.  She also has pain over the right chest wall and difficulty with inhalation.  Patient had a CT angio performed in the ER which showed innumerable pulmonary nodules, a subsegmental pulmonary embolism in the right lower lobe, and right lower lobe patchy consolidation concerning for pneumonia versus pulmonary hemorrhage.  Patient is currently on 2 L nasal cannula.    Code Status  Full Code    Review of Systems   Constitutional:  Negative for chills, fever and weight loss.   Respiratory:  Positive for cough, hemoptysis, sputum production, shortness of breath and wheezing.    Cardiovascular:  Positive for chest pain and leg swelling.   Gastrointestinal:  Negative for nausea and vomiting.   Musculoskeletal:  Positive for back pain.   Neurological:  Positive for sensory change. Negative for dizziness.     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    Surgical History   has a past surgical history that includes pr bronchoscopy,diagnostic (N/A, 02/14/2023); endobronchial us add-on (N/A, 02/14/2023); septoplasty; mammoplasty reduction (Bilateral); tube & ectopic preg., removal; and pr exc skin benig >4cm trunk,arm,leg (Left, 6/7/2023).    Family History  family history includes Cancer in her father, paternal grandfather, and paternal grandmother; Diabetes in her mother.    Social History   reports that she has been smoking cigarettes. She has a 10.00 pack-year smoking history. She has been exposed to tobacco smoke. She has never used smokeless tobacco. She reports that she does not currently use alcohol. She reports that she does not currently use drugs after having used the following drugs: Marijuana.    Medications  Home Medications       Reviewed by Sherice Israel R.N. (Registered Nurse) on 08/21/23 at 0312  Med List Status: Complete     Medication Last Dose Status   Budeson-Glycopyrrol-Formoterol (BREZTRI AEROSPHERE) 160-9-4.8 MCG/ACT Aerosol 8/20/2023 Active   Buprenorphine 10 MCG/HR PATCH WEEKLY  Active   diclofenac sodium (VOLTAREN) 1 % Gel 8/20/2023 Active   diphenhydrAMINE (BENADRYL) 25 MG Tab 8/20/2023 Active   fluticasone (FLONASE) 50 MCG/ACT nasal spray 8/20/2023 Active   HYDROcodone/acetaminophen (NORCO)  MG Tab 8/20/2023 Active   hydrOXYzine HCl (ATARAX) 10 MG Tab  Active   hydrOXYzine HCl (ATARAX) 25 MG Tab 8/20/2023 Active    ipratropium-albuterol (DUONEB) 0.5-2.5 (3) MG/3ML nebulizer solution 8/20/2023 Active   loratadine (CLARITIN) 10 MG Tab 8/20/2023 Active   VENTOLIN  (90 Base) MCG/ACT Aero Soln inhalation aerosol  Active                  Current Facility-Administered Medications   Medication Dose Route Frequency Provider Last Rate Last Admin    hydrOXYzine HCl (Atarax) tablet 10 mg  10 mg Oral QHS PRN [facility] D.O.        senna-docusate (Pericolace Or Senokot S) 8.6-50 MG per tablet 2 Tablet  2 Tablet Oral BID [facility] D.O.   2 Tablet at 08/21/23 0523    And    polyethylene glycol/lytes (Miralax) PACKET 1 Packet  1 Packet Oral QDAY PRN [facility] D.O.        And    magnesium hydroxide (Milk Of Magnesia) suspension 30 mL  30 mL Oral QDAY PRN [facility] D.O.        And    bisacodyl (Dulcolax) suppository 10 mg  10 mg Rectal QDAY PRN [facility] D.O.        Respiratory Therapy Consult   Nebulization Continuous RT [facility] D.O.        acetaminophen (Tylenol) tablet 650 mg  650 mg Oral Q6HRS PRN [facility] D.O.        Pharmacy Consult Request ...Pain Management Review 1 Each  1 Each Other PHARMACY TO DOSE [facility] D.O.        oxyCODONE immediate-release (Roxicodone) tablet 5 mg  5 mg Oral Q3HRS PRN [facility] D.O.        Or    oxyCODONE immediate release (Roxicodone) tablet 10 mg  10 mg Oral Q3HRS PRN SANJU MonroeO.   10 mg at 08/21/23 0415    Or    HYDROmorphone (Dilaudid) injection 0.5 mg  0.5 mg Intravenous Q3HRS PRN [facility] D.O.   0.5 mg at 08/21/23 0523    labetalol (Normodyne/Trandate) injection 10 mg  10 mg Intravenous Q4HRS PRN [facility] D.O.        ondansetron (Zofran) syringe/vial injection 4 mg  4 mg Intravenous Q4HRS PRN [facility] D.O.        ondansetron (Zofran ODT) dispertab 4 mg  4 mg Oral Q4HRS PRN [facility] D.O.        promethazine (Phenergan) tablet 12.5-25 mg  12.5-25 mg Oral Q4HRS PRN [facility] D.O.         promethazine (Phenergan) suppository 12.5-25 mg  12.5-25 mg Rectal Q4HRS PRN [facility] D.O.        prochlorperazine (Compazine) injection 5-10 mg  5-10 mg Intravenous Q4HRS PRN [facility] D.O.        guaiFENesin dextromethorphan (Robitussin DM) 100-10 MG/5ML syrup 10 mL  10 mL Oral Q6HRS PRN [facility] [address] D.O.        ipratropium-albuterol (DUONEB) nebulizer solution  3 mL Nebulization Q4H PRN (RT) Tonny [facility] D.O.   3 mL at 08/21/23 0731       Allergies  Allergies   Allergen Reactions    Penicillins Hives, Rash and Swelling     Pt reports that she gets swelling in throat and face, rash all over face and arms.   Tolerated cefazolin    Aspirin Rash and Hives     Pt reports that she received a rash on face, pt reports it ok if she takes NORCO    Other Environmental Shortness of Breath     Perfumes, dander, scents       Vital Signs last 24 hours  Temp:  [36.9 °C (98.4 °F)-37 °C (98.6 °F)] 36.9 °C (98.5 °F)  Pulse:  [] 89  Resp:  [18-23] 18  BP: (116-154)/() 116/72  SpO2:  [94 %-100 %] 97 %    Physical Exam  Vitals reviewed.   Constitutional:       Appearance: Normal appearance.   HENT:      Head: Normocephalic.   Eyes:      Conjunctiva/sclera: Conjunctivae normal.   Cardiovascular:      Rate and Rhythm: Normal rate and regular rhythm.   Pulmonary:      Comments: Decreased breath sounds on right side, mild wheezing   Skin:     General: Skin is warm and dry.   Neurological:      General: No focal deficit present.      Mental Status: She is alert.       Fluids    Intake/Output Summary (Last 24 hours) at 8/21/2023 0816  Last data filed at 8/21/2023 0253  Gross per 24 hour   Intake --   Output 200 ml   Net -200 ml       Laboratory  Recent Results (from the past 48 hour(s))   EKG (NOW)    Collection Time: 08/20/23 10:58 PM   Result Value Ref Range    Report       [facility]    Test Date:  2023-08-20  Pt Name:    JESSICA GREEN       Department:  [facility]  MRN:        [id number]                      [facility]  Gender:     Female                       Technician: [id number]  :        1975                   Requested By:KALPESH MAE  Order #:    [id number]                    Reading MD: Kalpesh Mae    Measurements  Intervals                                Axis  Rate:       95                           P:          18  VT:         121                          QRS:        54  QRSD:       86                           T:          40  QT:         361  QTc:        454    Interpretive Statements  Sinus rhythm  Left ventricular hypertrophy  ST elev, probable normal early repol pattern  Compared to ECG 2023 18:02:50  Left ventricular hypertrophy now present  ST (T wave) deviation now present  Sinus tachycardia no longer present  T-wave abnormality no longer present  Electronically Si gned On 2023 23:04:06 PDT by Kalpesh Mae     CBC WITH DIFFERENTIAL    Collection Time: 23 11:00 PM   Result Value Ref Range    WBC 10.4 4.8 - 10.8 K/uL    RBC 4.48 4.20 - 5.40 M/uL    Hemoglobin 12.2 12.0 - 16.0 g/dL    Hematocrit 39.9 37.0 - 47.0 %    MCV 89.1 81.4 - 97.8 fL    MCH 27.2 27.0 - 33.0 pg    MCHC 30.6 (L) 32.2 - 35.5 g/dL    RDW 58.4 (H) 35.9 - 50.0 fL    Platelet Count 196 164 - 446 K/uL    MPV 10.0 9.0 - 12.9 fL    Neutrophils-Polys 75.40 (H) 44.00 - 72.00 %    Lymphocytes 12.30 (L) 22.00 - 41.00 %    Monocytes 11.10 0.00 - 13.40 %    Eosinophils 0.10 0.00 - 6.90 %    Basophils 0.20 0.00 - 1.80 %    Immature Granulocytes 0.90 0.00 - 0.90 %    Nucleated RBC 0.00 0.00 - 0.20 /100 WBC    Neutrophils (Absolute) 7.83 (H) 1.82 - 7.42 K/uL    Lymphs (Absolute) 1.28 1.00 - 4.80 K/uL    Monos (Absolute) 1.15 (H) 0.00 - 0.85 K/uL    Eos (Absolute) 0.01 0.00 - 0.51 K/uL    Baso (Absolute) 0.02 0.00 - 0.12 K/uL    Immature Granulocytes (abs) 0.09 0.00 - 0.11 K/uL    NRBC (Absolute) 0.00 K/uL   COMP METABOLIC PANEL    Collection Time: 23  11:00 PM   Result Value Ref Range    Sodium 137 135 - 145 mmol/L    Potassium 3.9 3.6 - 5.5 mmol/L    Chloride 101 96 - 112 mmol/L    Co2 26 20 - 33 mmol/L    Anion Gap 10.0 7.0 - 16.0    Glucose 79 65 - 99 mg/dL    Bun 10 8 - 22 mg/dL    Creatinine 0.71 0.50 - 1.40 mg/dL    Calcium 8.9 8.4 - 10.2 mg/dL    Correct Calcium 9.2 8.5 - 10.5 mg/dL    AST(SGOT) 8 (L) 12 - 45 U/L    ALT(SGPT) 8 2 - 50 U/L    Alkaline Phosphatase 73 30 - 99 U/L    Total Bilirubin 0.2 0.1 - 1.5 mg/dL    Albumin 3.6 3.2 - 4.9 g/dL    Total Protein 6.6 6.0 - 8.2 g/dL    Globulin 3.0 1.9 - 3.5 g/dL    A-G Ratio 1.2 g/dL   TROPONIN    Collection Time: 08/20/23 11:00 PM   Result Value Ref Range    Troponin T 9 6 - 19 ng/L   proBrain Natriuretic Peptide, NT    Collection Time: 08/20/23 11:00 PM   Result Value Ref Range    NT-proBNP 93 0 - 125 pg/mL   LACTIC ACID    Collection Time: 08/20/23 11:00 PM   Result Value Ref Range    Lactic Acid 1.1 0.5 - 2.0 mmol/L   MAGNESIUM    Collection Time: 08/20/23 11:00 PM   Result Value Ref Range    Magnesium 2.2 1.5 - 2.5 mg/dL   PHOSPHORUS    Collection Time: 08/20/23 11:00 PM   Result Value Ref Range    Phosphorus 3.7 2.5 - 4.5 mg/dL   ESTIMATED GFR    Collection Time: 08/20/23 11:00 PM   Result Value Ref Range    GFR (CKD-EPI) 103 >60 mL/min/1.73 m 2   PROCALCITONIN    Collection Time: 08/20/23 11:00 PM   Result Value Ref Range    Procalcitonin 0.04 <0.25 ng/mL   BLOOD CULTURE    Collection Time: 08/20/23 11:20 PM    Specimen: Peripheral; Blood   Result Value Ref Range    Significant Indicator NEG     Source BLD     Site PERIPHERAL     Culture Result       No Growth  Note: Blood cultures are incubated for 5 days and  are monitored continuously.Positive blood cultures  are called to the RN and reported as soon as  they are identified.  Blood culture testing and Gram stain, if indicated, are  performed at [facility], [address]  [place], [place].  Positive blood cultures  are  sent to [facility], [address] [place] [address], for organism identification and  susceptibility testing.     BLOOD CULTURE    Collection Time: 08/20/23 11:25 PM    Specimen: Peripheral; Blood   Result Value Ref Range    Significant Indicator NEG     Source BLD     Site PERIPHERAL     Culture Result       No Growth  Note: Blood cultures are incubated for 5 days and  are monitored continuously.Positive blood cultures  are called to the RN and reported as soon as  they are identified.  Blood culture testing and Gram stain, if indicated, are  performed at [facility], [address] [place] [address].  Positive blood cultures are  sent to [facility], [address] [place] [address], for organism identification and  susceptibility testing.     CoV-2, FLU A/B, and RSV by PCR (2-4 Hours [employer]) : Collect NP swab in VTM    Collection Time: 08/20/23 11:30 PM    Specimen: Respirate   Result Value Ref Range    Influenza virus A RNA Negative Negative    Influenza virus B, PCR Negative Negative    RSV, PCR Negative Negative    SARS-CoV-2 by PCR NotDetected     SARS-CoV-2 Source NP Swab    URINALYSIS CULTURE, IF INDICATED    Collection Time: 08/21/23  2:52 AM    Specimen: Urine, Clean Catch   Result Value Ref Range    Color Yellow     Character Clear     Specific Gravity 1.010 <1.035    Ph 5.5 5.0 - 8.0    Glucose Negative Negative mg/dL    Ketones Negative Negative mg/dL    Protein Negative Negative mg/dL    Bilirubin Negative Negative    Nitrite Negative Negative    Leukocyte Esterase Negative Negative    Occult Blood Negative Negative    Micro Urine Req see below        Imaging  CT-CTA CHEST PULMONARY ARTERY W/ RECONS   Final Result         1.  Right lower lobe segmental and subsegmental occlusive pulmonary emboli. No evidence of right heart strain appreciated.   2.  Right lower lobe infiltrates, given history compatible with pulmonary hemorrhage and/or  infectious infiltrate and/or component of pulmonary infarct.   3.  Numerous scattered bilateral pulmonary nodules, appearance most compatible with metastatic disease      These findings were discussed with the patient's clinician, Poncho Sanchez, on 8/21/2023 12:41 AM.      DX-CHEST-PORTABLE (1 VIEW)   Final Result         1.  No acute cardiopulmonary disease.      EC-ECHOCARDIOGRAM COMPLETE W/O CONT    (Results Pending)       Assessment/Plan  #Hemoptysis  #Right lower lobe consolidation concerning for pneumonia versus pulmonary hemorrhage  #Acute subsegmental pulmonary embolism  #Multiple pulmonary nodules  #History of stage Ib adenocarcinoma of the lung, status post XRT   #Active metastatic sarcoma status post XRT  #History of severe asthma  #Emphysema on CT  #Acute hypoxemic respiratory failure    Titrate O2 to maintain sats 88-92%  We will start inhaled TXA for management of hemoptysis  Follow-up procalcitonin, if it is elevated will initiate antibiotics and monitor for resolution of the right lower lobe consolidation  Follow-up coags  Agree with holding anticoagulation at this time as patient has a subsegmental pulmonary embolism, there is no sign of heart strain on the CT.  Follow-up echocardiogram to ensure that there is no RV dysfunction  Start patient on triple therapy and as needed nebulizers for her asthma  Pain control is paramount to patient's care as she is not able to take a deep breath and will likely develop atelectasis and worsening hypoxemia if she is not able to take deep breaths  Patient will likely need bronchoscopy     Sandra Lopez, DO   Pulmonary and Critical Care

## 2023-08-21 NOTE — H&P
Hospital Medicine History & Physical Note    Date of Service  8/21/2023    Primary Care Physician  Checo Baker M.D.    Consultants  None    Specialist Names: None    Code Status  Full Code    Chief Complaint  Chief Complaint   Patient presents with    Shoulder Pain     Cancer pt wc to triage c/o right shoulder pain and coughing up blood x 24hrs       History of Presenting Illness  Melly Hendrickson is a 48 y.o. female who presented 8/20/2023 with chest pain.  Patient does have a history of lung cancer with metastatic disease, has recently finished radiation.  She has not yet started chemotherapy because she is hesitant.  Patient states she began having chest pain yesterday, located on the right, worse with a deep breath, sharp and became severe.  Was associated with shortness of breath as well as hemoptysis.  Patient states he has been having significant hemoptysis.  Due to the worsening, she presented to the emergency department.  Patient was diagnosed with pulmonary embolism.  I discussed the case including labs and imaging with the ER physician.    I discussed the plan of care with patient.    Review of Systems  Review of Systems   Constitutional:  Negative for chills, fever and malaise/fatigue.   HENT:  Negative for congestion.    Respiratory:  Positive for cough, hemoptysis and shortness of breath. Negative for stridor.    Cardiovascular:  Positive for chest pain. Negative for palpitations and leg swelling.   Gastrointestinal:  Negative for abdominal pain, constipation, diarrhea, nausea and vomiting.   Genitourinary:  Negative for dysuria and urgency.   Musculoskeletal:  Negative for falls and myalgias.   Neurological:  Negative for dizziness, tingling, loss of consciousness, weakness and headaches.   Psychiatric/Behavioral:  Negative for depression and suicidal ideas.    All other systems reviewed and are negative.      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    Surgical History   has a past surgical history that includes pr bronchoscopy,diagnostic (N/A, 02/14/2023); endobronchial us add-on (N/A, 02/14/2023); septoplasty; mammoplasty reduction (Bilateral); tube & ectopic preg., removal; and pr exc skin benig >4cm trunk,arm,leg (Left, 6/7/2023).     Family History  family history includes Cancer in her father, paternal grandfather, and paternal grandmother; Diabetes in her mother.   Family history reviewed with patient. There is family history that is pertinent to the chief complaint.     Social History   reports that she has been smoking cigarettes. She has a 10.00 pack-year smoking history. She has been exposed to tobacco smoke. She has never used smokeless tobacco. She reports that she does not currently use alcohol. She reports that she does not currently use drugs after having used the following drugs: Marijuana.    Allergies  Allergies   Allergen Reactions    Penicillins Hives, Rash and Swelling     Pt reports that she gets swelling in throat and face, rash all over face and arms.   Tolerated cefazolin    Aspirin Rash and Hives     Pt reports that she received a rash on face, pt reports it ok if she takes NORCO    Other Environmental Shortness of Breath     Perfumes, dander, scents       Medications  Prior to Admission Medications   Prescriptions Last Dose Informant Patient Reported? Taking?   Budeson-Glycopyrrol-Formoterol (BREZTRI AEROSPHERE) 160-9-4.8 MCG/ACT Aerosol   No No    Sig: Inhale 2 Puffs 2 times a day.   Buprenorphine 10 MCG/HR PATCH WEEKLY   Yes No   Sig: Place 1 Patch on the skin every 7 days.   HYDROcodone/acetaminophen (NORCO)  MG Tab   Yes No   Sig: Take 1 Tablet by mouth every 6 hours as needed.   VENTOLIN  (90 Base) MCG/ACT Aero Soln inhalation aerosol   No No   Sig: INHALE 2 PUFFS BY MOUTH EVERY 4 HOURS AS NEEDED FOR SHORTNESS OF BREATH   diclofenac sodium (VOLTAREN) 1 % Gel   No No   Sig: Apply 2 g topically 4 times a day as needed (Apply's on on neck for pain).   diphenhydrAMINE (BENADRYL) 25 MG Tab   Yes No   Sig: Take 25 mg by mouth every 6 hours as needed for Sleep.   fluticasone (FLONASE) 50 MCG/ACT nasal spray   No No   Sig: USE 2 SPRAY(S) INTO AFFECTED NOSTRILS ONCE DAILY Strength: 50 mcg   hydrOXYzine HCl (ATARAX) 10 MG Tab  Patient Yes No   Sig: Take 10 mg by mouth at bedtime as needed for Anxiety.   hydrOXYzine HCl (ATARAX) 25 MG Tab   Yes No   Sig: Take 25 mg by mouth every day.   ipratropium-albuterol (DUONEB) 0.5-2.5 (3) MG/3ML nebulizer solution   No No   Sig: USE 1 AMPULE IN NEBULIZER 4 TIMES DAILY   loratadine (CLARITIN) 10 MG Tab  Patient Yes No   Sig: Take 10 mg by mouth every day.      Facility-Administered Medications: None       Physical Exam  Temp:  [36.9 °C (98.4 °F)] 36.9 °C (98.4 °F)  Pulse:  [] 100  Resp:  [18-19] 19  BP: (142-154)/() 148/91  SpO2:  [94 %-96 %] 95 %  Blood Pressure: (!) 148/91   Temperature: 36.9 °C (98.4 °F)   Pulse: 100   Respiration: 19   Pulse Oximetry: 95 %       Physical Exam  Vitals and nursing note reviewed.   Constitutional:       General: She is not in acute distress.     Appearance: She is well-developed. She is not diaphoretic.   HENT:      Head: Normocephalic and atraumatic.      Right Ear: External ear normal.      Left Ear: External ear normal.      Nose: Nose normal. No congestion or rhinorrhea.      Mouth/Throat:      Mouth: Mucous membranes are moist.      Pharynx: No oropharyngeal  exudate.   Eyes:      General:         Right eye: No discharge.         Left eye: No discharge.   Neck:      Trachea: No tracheal deviation.   Cardiovascular:      Rate and Rhythm: Normal rate and regular rhythm.      Heart sounds: No murmur heard.     No friction rub. No gallop.   Pulmonary:      Effort: Pulmonary effort is normal. No respiratory distress.      Breath sounds: No stridor. Wheezing and rales present.   Chest:      Chest wall: No tenderness.   Abdominal:      General: Bowel sounds are normal. There is no distension.      Palpations: Abdomen is soft.      Tenderness: There is no abdominal tenderness.   Musculoskeletal:         General: No tenderness. Normal range of motion.      Cervical back: Neck supple.      Right lower leg: No edema.      Left lower leg: No edema.   Lymphadenopathy:      Cervical: No cervical adenopathy.   Skin:     General: Skin is warm and dry.      Findings: No erythema or rash.   Neurological:      Mental Status: She is alert and oriented to person, place, and time.      Cranial Nerves: No cranial nerve deficit.   Psychiatric:         Mood and Affect: Mood normal.         Behavior: Behavior normal.         Thought Content: Thought content normal.         Judgment: Judgment normal.         Laboratory:  Recent Labs     08/20/23 2300   WBC 10.4   RBC 4.48   HEMOGLOBIN 12.2   HEMATOCRIT 39.9   MCV 89.1   MCH 27.2   MCHC 30.6*   RDW 58.4*   PLATELETCT 196   MPV 10.0     Recent Labs     08/20/23 2300   SODIUM 137   POTASSIUM 3.9   CHLORIDE 101   CO2 26   GLUCOSE 79   BUN 10   CREATININE 0.71   CALCIUM 8.9     Recent Labs     08/20/23 2300   ALTSGPT 8   ASTSGOT 8*   ALKPHOSPHAT 73   TBILIRUBIN 0.2   GLUCOSE 79         Recent Labs     08/20/23 2300   NTPROBNP 93         Recent Labs     08/20/23 2300   TROPONINT 9       Imaging:  CT-CTA CHEST PULMONARY ARTERY W/ RECONS   Final Result         1.  Right lower lobe segmental and subsegmental occlusive pulmonary emboli. No evidence of  right heart strain appreciated.   2.  Right lower lobe infiltrates, given history compatible with pulmonary hemorrhage and/or infectious infiltrate and/or component of pulmonary infarct.   3.  Numerous scattered bilateral pulmonary nodules, appearance most compatible with metastatic disease      These findings were discussed with the patient's clinician, Poncho Sanchez, on 8/21/2023 12:41 AM.      DX-CHEST-PORTABLE (1 VIEW)   Final Result         1.  No acute cardiopulmonary disease.          X-Ray:  I have personally reviewed the images and compared with prior images.    Assessment/Plan:  Justification for Admission Status  I anticipate this patient will require at least two midnights for appropriate medical management, necessitating inpatient admission because right-sided PE associated with significant hemoptysis    Patient will need a Med/Surg bed on MEDICAL service .  The need is secondary to right-sided PE with hemoptysis.    * Pulmonary embolism on right (HCC)- (present on admission)  Assessment & Plan  Associated with significant hemoptysis per patient  At this time, I do feel it safest to hold off on anticoagulation to further evaluate for how much hemoptysis she is having, if she does have significant hemoptysis, will need pulmonary consultation with possible bronchoscopy  If there is only a mild amount this is secondary to her PE with no apparent active bleeding, I do feel full dose anticoagulation would be safe  She certainly is at high risk of having a worsening PE but also of worsening bleeding  At this time, she only has mild hypoxia, will start low-flow oxygen  Monitor patient on telemetry    Acute respiratory failure with hypoxia (HCC)- (present on admission)  Assessment & Plan  Due to pulmonary embolism  While she does have a history of asthma and does have a wheeze, I do not feel she has an asthma exacerbation, I think her hypoxia is secondary to her PE  Continuous pulse ox monitoring  Start  respiratory care per protocol  Patient remains full code    Lung cancer (HCC)- (present on admission)  Assessment & Plan  Patient has been treated with radiation, considering chemotherapy with this has not been initiated yet  Causing right-sided PE    Asthma- (present on admission)  Assessment & Plan  As needed DuoNebs  Continuous pulse ox monitoring  No acute exacerbation    Cigarette nicotine dependence without complication- (present on admission)  Assessment & Plan  Tobacco cessation counseling and education provided for more than 4 minutes. Nicotine replacement options provided including patch, and further medical treatments including Wellbutrin and chantix.  As well as over the counter options of lozenges and gum.        VTE prophylaxis: SCDs/TEDs

## 2023-08-21 NOTE — ED PROVIDER NOTES
ED Provider Note    Scribed for No att. providers found by Poncho Sanchez. 8/20/2023  10:43 PM    Primary care provider: Checo Baker M.D.  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    HPI/ROS  LIMITATION TO HISTORY   Select: : None  OUTSIDE HISTORIAN(S):  Sister at bedside provides helpful collateral formation.    HPI  Melly Hendrickson is a 48 y.o. female who presents to the Emergency Department with a past medical history of adenocarcinoma of the lung with metastasis with a sarcoma of the chest wall and sacral lesions dating back to February 2023 on review of oncology's note from the 11th of this month.  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  She just finished radiation therapy recently.  Last evening she noted that she coughed up a large quarter size clot of dark blood.  She is now having pleuritic chest pain and has had several episodes of hemoptysis since.  She denies any nausea vomiting fevers chills, headache, neck stiffness or rash.  No abdominal pain diarrhea or constipation.  No  symptoms such as increased urinary frequency, hematuria or dysuria.  She is not anticoagulated.  Denies alcohol drug or tobacco abuse.  She states that she has extreme pain when coughing and mild pain when taking a deep breath.    REVIEW OF SYSTEMS  As above, all other systems reviewed and are negative.   See HPI for further details.     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  SURGICAL HISTORY   has a past surgical history that includes bronchoscopy,diagnostic (N/A, 02/14/2023); endobronchial us add-on (N/A, 02/14/2023); septoplasty; mammoplasty reduction (Bilateral); tube & ectopic preg., removal; and exc skin benig >4cm trunk,arm,leg (Left, 6/7/2023).  SOCIAL HISTORY  Social History     Tobacco Use    Smoking status: Some Days     Packs/day: 0.50     Years: 20.00     Additional pack years: 0.00     Total pack years: 10.00     Types: Cigarettes     Passive exposure: Past    Smokeless tobacco: Never    Tobacco comments:     on and off    Vaping Use    Vaping Use: Never used   Substance Use Topics    Alcohol use: Not Currently     Comment: occ, rare    Drug use: Not Currently     Types: Marijuana     Comment: THC gummies      Social History     Substance and Sexual Activity   Drug Use Not Currently    Types: Marijuana    Comment: THC gummies     FAMILY HISTORY  Family History   Problem Relation Age of Onset    Diabetes Mother     Cancer Father         Prostate    Cancer Paternal Grandmother         Cervical    Cancer Paternal Grandfather         Unk     CURRENT MEDICATIONS  Home Medications    **Home medications have not yet been reviewed for this encounter**       ALLERGIES  Allergies   Allergen Reactions    Penicillins Hives, Rash and Swelling     Pt reports that she gets swelling in throat and face, rash all over face and arms.   Tolerated cefazolin    Aspirin Rash and Hives     Pt reports that she received a rash on face, pt reports it ok if she takes NORCO     Other Environmental Shortness of Breath     Perfumes, dander, scents       PHYSICAL EXAM    VITAL SIGNS:   Vitals:    08/20/23 2159 08/20/23 2202 08/20/23 2330 08/20/23 2353   BP:  (!) 142/100 (!) 154/87 (!) 148/91   Pulse:  (!) 103 98 100   Resp:  18 19 19   Temp:  36.9 °C (98.4 °F)     TempSrc:  Temporal     SpO2:  96% 94% 95%   Weight: 88.5 kg (195 lb)      Height: 1.829 m (6')        Vitals: My interpretation: normotensive, tachycardic, afebrile, not hypoxic    Reinterpretation of vitals: improved     Cardiac Monitor Interpretation: The cardiac monitor revealed normal Sinus Rhythm with tachycardia as interpreted by me. The cardiac monitor was ordered secondary to the patient's history of tachycardia and to monitor for dysrhythmia and/or tachycardia.    PE:   Gen: Appears extremely uncomfortable, tachypneic, tachycardic  ENT: Mucous membranes moist, posterior pharynx clear, uvula midline, nares patent bilaterally   Neck: Supple, FROM  Pulmonary: Lungs are clear to auscultation bilaterally. tachypnea  CV: Tachycardic, no murmur appreciated, pulses 2+ in both upper and lower extremities  Abdomen: soft, NT/ND; no rebound/guarding  : no CVA or suprapubic tenderness   Neuro: A&Ox4 (person, place, time, situation), speech fluent, gait steady, no focal deficits appreciated  Skin: No rash or lesions.  No pallor or jaundice.  No cyanosis.  Warm and dry.     DIAGNOSTIC STUDIES / PROCEDURES    LABS  Results for orders placed or performed during the hospital encounter of 08/20/23   CBC WITH DIFFERENTIAL   Result Value Ref Range    WBC 10.4 4.8 - 10.8 K/uL    RBC 4.48 4.20 - 5.40 M/uL    Hemoglobin 12.2 12.0 - 16.0 g/dL    Hematocrit 39.9 37.0 - 47.0 %    MCV 89.1 81.4 - 97.8 fL    MCH 27.2 27.0 - 33.0 pg    MCHC 30.6 (L) 32.2 - 35.5 g/dL    RDW 58.4 (H) 35.9 - 50.0 fL    Platelet Count 196 164 - 446 K/uL    MPV 10.0 9.0 - 12.9 fL    Neutrophils-Polys 75.40 (H) 44.00 - 72.00 %    Lymphocytes 12.30 (L) 22.00 - 41.00 %     Monocytes 11.10 0.00 - 13.40 %    Eosinophils 0.10 0.00 - 6.90 %    Basophils 0.20 0.00 - 1.80 %    Immature Granulocytes 0.90 0.00 - 0.90 %    Nucleated RBC 0.00 0.00 - 0.20 /100 WBC    Neutrophils (Absolute) 7.83 (H) 1.82 - 7.42 K/uL    Lymphs (Absolute) 1.28 1.00 - 4.80 K/uL    Monos (Absolute) 1.15 (H) 0.00 - 0.85 K/uL    Eos (Absolute) 0.01 0.00 - 0.51 K/uL    Baso (Absolute) 0.02 0.00 - 0.12 K/uL    Immature Granulocytes (abs) 0.09 0.00 - 0.11 K/uL    NRBC (Absolute) 0.00 K/uL   COMP METABOLIC PANEL   Result Value Ref Range    Sodium 137 135 - 145 mmol/L    Potassium 3.9 3.6 - 5.5 mmol/L    Chloride 101 96 - 112 mmol/L    Co2 26 20 - 33 mmol/L    Anion Gap 10.0 7.0 - 16.0    Glucose 79 65 - 99 mg/dL    Bun 10 8 - 22 mg/dL    Creatinine 0.71 0.50 - 1.40 mg/dL    Calcium 8.9 8.4 - 10.2 mg/dL    Correct Calcium 9.2 8.5 - 10.5 mg/dL    AST(SGOT) 8 (L) 12 - 45 U/L    ALT(SGPT) 8 2 - 50 U/L    Alkaline Phosphatase 73 30 - 99 U/L    Total Bilirubin 0.2 0.1 - 1.5 mg/dL    Albumin 3.6 3.2 - 4.9 g/dL    Total Protein 6.6 6.0 - 8.2 g/dL    Globulin 3.0 1.9 - 3.5 g/dL    A-G Ratio 1.2 g/dL   TROPONIN   Result Value Ref Range    Troponin T 9 6 - 19 ng/L   proBrain Natriuretic Peptide, NT   Result Value Ref Range    NT-proBNP 93 0 - 125 pg/mL   LACTIC ACID   Result Value Ref Range    Lactic Acid 1.1 0.5 - 2.0 mmol/L   MAGNESIUM   Result Value Ref Range    Magnesium 2.2 1.5 - 2.5 mg/dL   CoV-2, FLU A/B, and RSV by PCR (2-4 Hours CEPHEID) : Collect NP swab in VTM    Specimen: Respirate   Result Value Ref Range    Influenza virus A RNA Negative Negative    Influenza virus B, PCR Negative Negative    RSV, PCR Negative Negative    SARS-CoV-2 by PCR NotDetected     SARS-CoV-2 Source NP Swab    PHOSPHORUS   Result Value Ref Range    Phosphorus 3.7 2.5 - 4.5 mg/dL   ESTIMATED GFR   Result Value Ref Range    GFR (CKD-EPI) 103 >60 mL/min/1.73 m 2   EKG (NOW)   Result Value Ref Range    Report       [facility]  Emergency Dept.    Test Date:  2023  Pt Name:    JESSICA GREEN       Department: EDSM  MRN:        [id number]                      Room:       -ROOM 4  Gender:     Female                       Technician: [id number]  :        [id number]                   Requested By:KALPESH MAE  Order #:    [id number]                    Reading MD: Kalpesh Mae    Measurements  Intervals                                Axis  Rate:       95                           P:          18  FL:         121                          QRS:        54  QRSD:       86                           T:          40  QT:         361  QTc:        454    Interpretive Statements  Sinus rhythm  Left ventricular hypertrophy  ST elev, probable normal early repol pattern  Compared to ECG 2023 18:02:50  Left ventricular hypertrophy now present  ST (T wave) deviation now present  Sinus tachycardia no longer present  T-wave abnormality no longer present  Electronically Si gned On 2023 23:04:06 PDT by Kalpesh Mae        All labs reviewed by me. Labs were compared to prior labs if they were available. Significant for no leukocytosis, no anemia, no thrombocytopenia, normal electrolytes, normal glucose, normal renal function, normal liver enzymes, normal bilirubin, troponin negative, BNP normal, lactic acid normal, magnesium and phosphorus normal, flu, COVID, RSV negative.    RADIOLOGY  I have independently interpreted the diagnostic imaging associated with this visit and am waiting the final reading from the radiologist.   My preliminary interpretation is a follows: No obvious cardiomegaly or focal consolidation on chest x-rayLow  Radiologist interpretation is as follows:  CT-CTA CHEST PULMONARY ARTERY W/ RECONS   Final Result         1.  Right lower lobe segmental and subsegmental occlusive pulmonary emboli. No evidence of right heart strain appreciated.   2.  Right lower lobe infiltrates, given history compatible with  pulmonary hemorrhage and/or infectious infiltrate and/or component of pulmonary infarct.   3.  Numerous scattered bilateral pulmonary nodules, appearance most compatible with metastatic disease      These findings were discussed with the patient's clinician, Poncho Sanchez, on 8/21/2023 12:41 AM.      DX-CHEST-PORTABLE (1 VIEW)   Final Result         1.  No acute cardiopulmonary disease.        COURSE & MEDICAL DECISION MAKING  Nursing notes, VS, PMSFHx, labs, imaging, EKG reviewed in chart.    Heart Score: Low     ED Observation Status? Yes; I am placing the patient in to an observation status due to a diagnostic uncertainty as well as therapeutic intensity. Patient placed in observation status at 11:04 PM, 8/20/2023.     Observation plan is as follows: There is diagnostic uncertainty and need for therapeutic intensity with IV fluids, imaging, labs, EKG and reevaluation before final disposition can be made for inpatient versus outpatient treatment pathway.    Upon Reevaluation, the patient's condition has: not improved; and will be escalated to hospitalization.    Patient discharged from ED Observation status at 1:14 AM (Time) 8/21/2023 (Date).     Ddx: PE, cancer related hemoptysis, pneumonia, COPD, asthma exacerbation    MDM: 10:43 PM Melly Hendrickson is a 48 y.o. 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  She just finished radiation therapy.  She arrives with her sister at bedside who helps provide collateral formation regarding patient's presentation.  Upon arrival here she appears extremely uncomfortable due to pain with deep breathing.  Her vital signs show hypertension and tachycardia.  Physical exam shows clear lungs but tachypneic and tachycardic, I am concerned for possible underlying pulmonary embolism in the setting of cancer and hemoptysis.   Patient started on Dilaudid, Zofran, IV fluids for cancer related pain and tachycardia.  She will require a full work-up here in the ED including CTA PE imaging, IV fluids, full cardiac work-up and electrolyte evaluation with labs.  Unfortunately patient is a very hard stick and despite nursing staff attempting IV access, I was asked to come placed an ultrasound-guided IV, see procedure note.  All labs reviewed by me. Labs were compared to prior labs if they were available. Significant for no leukocytosis, no anemia, no thrombocytopenia, normal electrolytes, normal glucose, normal renal function, normal liver enzymes, normal bilirubin, troponin negative, BNP normal, lactic acid normal, magnesium and phosphorus normal, flu, COVID, RSV negative.  Patient's chest x-ray was fairly unremarkable my independent interpretation for any cardiomegaly or significant for consolidation, radiologist agrees.  I did receive a call from the radiologist emergently stating that the patient does have a right lower lobe segmental and subsegmental occlusive pulmonary embolism without heart strain, with the distal lower lobe infiltrate on the right concerning for possible pulmonary hemorrhage or pulmonary wedge infarct.  Discussed with radiology and the hospitalist regarding anticoagulation for pulmonary embolism risk and benefits in the setting of the patient with gross hemoptysis that is active here in the ED but controlled.  After discussion, all agree that best to wait until pulmonology can weigh in in the morning as the patient is currently stable with reassuring work-up here other than her CT scan.  Discussed with the patient and her sister at bedside who verbalized understanding plan for admission, diagnosis of PE in the setting of malignancy, and gross hemoptysis.  Patient is critically ill but thankfully stable at time of admission.  Hospitalist is amenable to admission.  On reevaluation patient still remained stable with normal  vital signs, improved tachycardia, however pain is still uncontrolled after 1 mg IV Dilaudid and she was given a second dose of IV Dilaudid here in the ED.    HYDRATION: Based on the patient's presentation of Acute Vomiting, Dehydration and Inability to take oral fluids the patient was given IV fluids. IV Hydration was used because oral hydration was not adequate alone. Upon recheck following hydration, the patient was imroved.       Peripheral Venous Access with US Guidance CPT Code 07107,  Limited Diagnostic Exam: Venipuncture requiring physician skill with ultrasound guidance  Performed by Self  Patient Identity confirmed: Yes  Risks, benefits and alternatives were discussed  Consent given by: Patient   Indication for Exam: Vascular Access   Vein/Location: Left brachial vein  Normal Compressibility and Visualized Patency   Catheter Size: 20 gauge   Number of Attempts: 1   Successful Catheter Insertion: Yes   Complications: None     CRITICAL CARE TIME 42 minutes: Acute management of pulmonary embolism in the setting of malignancy and gross hemoptysis, frequent reevaluation, discussion with specialist including hospitalist and radiologist, and management of intractable pain with IV Dilaudid narcotic medications.  Multiple doses were given.  There was a very real possibility of deterioration of the patient's condition.  This patient required the highest level of care.  I provided critical care services which included: review of the medical record, treatment orders, ordering and reviewing test results, frequent reevaluation of the patient's condition and response to treatment, as well as discussing the case with appropriate personnel and various consultants. The critical care time associated with the care of this patient is exclusive of any procedures or specific interventions.    ADDITIONAL PROBLEM LIST AND DISPOSITION    I have discussed management of the patient with the following physicians and MONO's:   Hopsitalist     Discussion of management with other [facility] or appropriate source(s): Radiologist regarding diagnosis of pulmonary embolism      FINAL IMPRESSION  1. Multiple subsegmental pulmonary emboli without acute cor pulmonale (HCC) Acute   2. Hemoptysis Acute   3. Pleuritic chest pain Acute   4. Metastatic malignant neoplasm, unspecified site (HCC) Acute   5. Intractable pain Acute      The note accurately reflects work and decisions made by me.  Poncho Sanchez  8/20/2023  10:43 PM

## 2023-08-21 NOTE — PROGRESS NOTES
Med rec updated and complete, per pt   Allergies reviewed, per pt  Pt reports that she stopped using her BUPRENORPHINE 10MCG/HR patch about 3 weeks ago, because it was not working.  Pt reports that she has not taken her PREDNISONE 20MG or NORTRIPTYLINE 10MG in the last 30 days or longer.  Pt reports that she has not started chemo.

## 2023-08-21 NOTE — PROGRESS NOTES
This is a 49yo female with a history of adenocarcinoma of the lung with sarcoma of the chest wall and bony mets to the sacrum and L shoulder on recent PET who presented to hospital with hemoptysis and shoulder pain. CTA was obtained in the ER and was positive for RLL segmental and subsegmental occlusive pulmonary emboli without right heart strain.  CT also significant for pulmonary hemorrhage or infarct va PNA. Pt with mild tachycardia, on 2L O2, will recheck stat H/H.     Consult placed to Dr Lopez, pt to have bronch tomorrow morning. Pt with significant amount of pain, main meds increased, gabapentin and Ativan ordered. Palliative consult placed. Pt states she is not sure she wants to proceed with chemo for her sarcoma as she has watched several friends die on chemo without improvement in quality of life. Home buprenorphine patch ordered.

## 2023-08-21 NOTE — ASSESSMENT & PLAN NOTE
Associated with significant hemoptysis per patient  Concern of pulmonary hemorrhage  Anticoagulation currently on hold  H&H remained stable  Currently requiring 3 L  Hypercoagulable secondary to her cancerm, no further hemoptysis.

## 2023-08-21 NOTE — ED TRIAGE NOTES
Chief Complaint   Patient presents with    Shoulder Pain     Cancer pt wc to triage c/o right shoulder pain and coughing up blood x 24hrs     BP (!) 142/100   Pulse (!) 103   Temp 36.9 °C (98.4 °F) (Temporal)   Resp 18   Ht 1.829 m (6')   Wt 88.5 kg (195 lb)   LMP 07/21/2023 Comment: hx tubal ligation for ectopic PRG x2  SpO2 96%   BMI 26.45 kg/m²

## 2023-08-21 NOTE — CARE PLAN
The patient is Watcher - Medium risk of patient condition declining or worsening    Shift Goals  Clinical Goals: monitor respiratory system, manage pain  Patient Goals: control pain, breathing treatments    Progress made toward(s) clinical / shift goals:    Problem: Knowledge Deficit - Standard  Goal: Patient and family/care givers will demonstrate understanding of plan of care, disease process/condition, diagnostic tests and medications  Outcome: Progressing         Patient is not progressing towards the following goals:

## 2023-08-21 NOTE — PROGRESS NOTES
0250 patient arrived to the unit via gurney and transferred the hospital bed by scooting from one bed to the other. Patient's family at bedside. Discussed medication, UA to be collected, diet, and plan of care for the evening. All patient's needs and questions addressed at this time.

## 2023-08-21 NOTE — ASSESSMENT & PLAN NOTE
Due to pulmonary embolism  While she does have a history of asthma and does have a wheeze, I do not feel she has an asthma exacerbation, I think her hypoxia is secondary to her PE  Continuous pulse ox monitoring  Start respiratory care per protocol  Patient remains full code

## 2023-08-22 NOTE — CARE PLAN
The patient is Watcher - Medium risk of patient condition declining or worsening    Shift Goals  Clinical Goals: pain management, resp changes  Patient Goals: comfort  Family Goals: MELVIN    Progress made toward(s) clinical / shift goals:  Pt pain is managed with PRNs and heat packs, monitor for resp changes, pt reports no hemoptysis at this time, q6h hbg lab, NPO at midnight, continuous pulse ox, 2L of o2, call light within reach    Patient is not progressing towards the following goals:      Problem: Knowledge Deficit - Standard  Goal: Patient and family/care givers will demonstrate understanding of plan of care, disease process/condition, diagnostic tests and medications  Outcome: Progressing     Problem: Pain - Standard  Goal: Alleviation of pain or a reduction in pain to the patient’s comfort goal  Outcome: Progressing     Problem: Hemodynamics  Goal: Patient's hemodynamics, fluid balance and neurologic status will be stable or improve  Outcome: Progressing

## 2023-08-22 NOTE — PROGRESS NOTES
Pt and friend made RN aware of new blood in urine as of this morning, this RN visualized and notified MD on unit.

## 2023-08-22 NOTE — PROGRESS NOTES
Telemetry Shift Summary    Rhythm SR/ST  HR Range 90s-120s  Ectopy rare PVC, rare PAC/couplets/trigeminy  Measurements 0.14/0.10/0.36        Normal Values  Rhythm SR  HR Range    Measurements 0.12-0.20 / 0.06-0.10  / 0.30-0.52

## 2023-08-22 NOTE — PROCEDURES
Bronchoscopy Procedure Note      Results/Findings: Blood pooling in the right lower lobe    Specimen: RLL BAL, RLL brushing      Location: [facility] Endoscopy Suite    Date of Operation: 8/22/2023     Attending Physician Performing Procedure: Sandra Lopez D.O.     Pre-op Diagnosis: Hemoptysis, pulmonary nodules and consolidations    Post-op Diagnosis: same     Anesthesia: General, administered by Dr Enriquez    Operation:   Diagnostic flexible fiberoptic bronchoscopy, with bronchoalveolar lavage, brushings    Estimated Blood Loss: none    Complications: none    Indications and History:    The risks, benefits, complications, treatment options and expected outcomes were discussed with the patient. The possibilities of reaction to medication, pulmonary aspiration, perforation of a viscus, bleeding, failure to diagnose a condition and creating a complication requiring transfusion or operation were discussed with the patient who freely signed the consent.    Description of Procedure:    The patient was seen in the Pre-op area and interval H&P was verified. The patient was taken to the endoscopy suite, identified as Melly Hendrickson and a Time Out was held and the above information confirmed. After the induction of general anesthesia, the bronchoscope was passed through the ETT. The scope was then passed into the trachea.  Careful inspection of the tracheal lumen was accomplished. The scope was sequentially passed into the left main and then left upper and lower bronchi and segmental bronchi. The scope was then withdrawn and advanced into the right main bronchus and then into the RUL, RML, and RLL bronchi and segmental bronchi.    Findings:    Trachea: normal bronchial mucosa  Rosalind: normal bronchial mucosa  Right main bronchus: mucus with blood streaks   Right upper lobe bronchus: normal bronchial mucosa  Right middle lobe bronchus: normal bronchial mucosa  Right lower lobe bronchus: mucus  with blood streaks, no clear source of bleeding  Left main bronchus: normal bronchial mucosa  Left upper lobe bronchus: normal bronchial mucosa  Left lower lobe bronchus: normal bronchial mucosa    Bronchoalveolar lavage was subsequently performed in the RLL. Lavage specimen was bloody in gross appearance.    Endobronchial brushings were then performed in the anterior segment of the right lower lobe, prepared on slides and sent for cytology.    The Patient was subsequently taken to the PACU in satisfactory condition.    Sandra Lopez, DO   Pulmonary and Critical Care

## 2023-08-22 NOTE — PROGRESS NOTES
Hospital Medicine Daily Progress Note    Date of Service  8/22/2023    Chief Complaint  Melly Hendrickson is a 48 y.o. female admitted 8/20/2023 with hemoptysis and chest pain.    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  CTA showed a left right lower lobe segmental and subsegmental pulmonary emboli without evidence of right heart strain.  CT was also concerning for pulmonary hemorrhage versus infarct.  Patient was started on oxygen and is requiring between 2 and 3 L to maintain saturation.  Pulmonology was consulted and patient underwent bronchoscopy which showed some mucus with bloody streaks in the right main bronchus right lower lobe bronchus without evidence of obvious active bleeding.  Bronchioloalveolar lavage was performed and gross bloody specimen was sent for pathology.  Patient continues to have significant pain and I reviewed the recommendations by palliative care and made the changes now that the patient is no longer NPO.    Interval Problem Update  8/22 patient seen when n.p.o. prior to bronchoscopy this morning.  She continues to have significant pain however has not had the changes as recommended by palliative care until this afternoon.  I have started MS Contin and changed her gabapentin over to Lyrica.  I have also increased her IV Dilaudid.  I will reach out to [facility] to see if they feel that she should be transferred for possible palliative radiation for pain control.  She is currently on 3 L and not on anticoagulation given the hemoptysis and concern for pulmonary infarct/hemorrhage.  Bilateral lower extremity ultrasounds were done without evidence of DVT.  2D echocardiogram showed no evidence of right heart strain and ejection fraction of 65%.    I have discussed this patient's plan of care and discharge  plan at IDT rounds today with Case Management, Nursing, Nursing leadership, and other members of the IDT team.    Consultants/Specialty  pulmonary    Code Status  Full Code    Disposition  The patient is not medically cleared for discharge to home or a post-acute facility.  Anticipate discharge to: home with close outpatient follow-up    I have placed the appropriate orders for post-discharge needs.    Review of Systems  Review of Systems   Constitutional:  Negative for chills and fever.   HENT:  Negative for congestion.    Eyes:  Negative for blurred vision and photophobia.   Respiratory:  Negative for cough and shortness of breath.    Cardiovascular:  Positive for chest pain (Chest wall pain). Negative for claudication and leg swelling.   Gastrointestinal:  Negative for abdominal pain, constipation, diarrhea, heartburn and vomiting.   Genitourinary:  Negative for dysuria and hematuria.   Musculoskeletal:  Positive for myalgias. Negative for joint pain.   Skin:  Negative for itching and rash.   Neurological:  Negative for dizziness, sensory change, speech change, weakness and headaches.   Psychiatric/Behavioral:  Negative for depression. The patient is not nervous/anxious and does not have insomnia.         Physical Exam  Temp:  [36.3 °C (97.3 °F)-37.6 °C (99.7 °F)] 37.2 °C (99 °F)  Pulse:  [] 123  Resp:  [12-22] 18  BP: ()/(63-97) 144/83  SpO2:  [90 %-100 %] 90 %    Physical Exam  Vitals and nursing note reviewed.   Constitutional:       General: She is not in acute distress.     Appearance: Normal appearance. She is not ill-appearing.   HENT:      Head: Normocephalic and atraumatic.      Nose: Nose normal.   Cardiovascular:      Rate and Rhythm: Normal rate and regular rhythm.      Heart sounds: Normal heart sounds. No murmur heard.  Pulmonary:      Effort: Pulmonary effort is normal.      Breath sounds: Normal breath sounds.   Abdominal:      General: Bowel sounds are normal. There is no distension.       Palpations: Abdomen is soft.   Musculoskeletal:         General: No swelling or tenderness.      Cervical back: Neck supple.   Skin:     General: Skin is warm and dry.   Neurological:      General: No focal deficit present.      Mental Status: She is alert and oriented to person, place, and time.   Psychiatric:         Mood and Affect: Mood normal.         Fluids    Intake/Output Summary (Last 24 hours) at 8/22/2023 1501  Last data filed at 8/22/2023 1230  Gross per 24 hour   Intake 272.5 ml   Output 1310 ml   Net -1037.5 ml       Laboratory  Recent Labs     08/20/23  2300 08/21/23  0815 08/22/23  0229 08/22/23  0607 08/22/23  1307   WBC 10.4  --  9.4  --   --    RBC 4.48  --  4.46  --   --    HEMOGLOBIN 12.2   < > 12.2 12.4 13.4   HEMATOCRIT 39.9   < > 39.8 40.1 45.0   MCV 89.1  --  89.2  --   --    MCH 27.2  --  27.4  --   --    MCHC 30.6*  --  30.7*  --   --    RDW 58.4*  --  58.5*  --   --    PLATELETCT 196  --  189  --   --    MPV 10.0  --  9.4  --   --     < > = values in this interval not displayed.     Recent Labs     08/20/23  2300 08/22/23  0229   SODIUM 137 137   POTASSIUM 3.9 4.5   CHLORIDE 101 101   CO2 26 25   GLUCOSE 79 96   BUN 10 9   CREATININE 0.71 0.69   CALCIUM 8.9 8.9     Recent Labs     08/21/23  1415   APTT 27.4   INR 1.00               Imaging  US-EXTREMITY VENOUS LOWER BILAT         EC-ECHOCARDIOGRAM COMPLETE W/O CONT   Final Result      CT-CTA CHEST PULMONARY ARTERY W/ RECONS   Final Result         1.  Right lower lobe segmental and subsegmental occlusive pulmonary emboli. No evidence of right heart strain appreciated.   2.  Right lower lobe infiltrates, given history compatible with pulmonary hemorrhage and/or infectious infiltrate and/or component of pulmonary infarct.   3.  Numerous scattered bilateral pulmonary nodules, appearance most compatible with metastatic disease      These findings were discussed with the patient's clinician, Poncho Sanchez, on 8/21/2023 12:41 AM.       DX-CHEST-PORTABLE (1 VIEW)   Final Result         1.  No acute cardiopulmonary disease.           Assessment/Plan  * Pulmonary embolism on right (HCC)- (present on admission)  Assessment & Plan  Associated with significant hemoptysis per patient  Concern of pulmonary hemorrhage  Anticoagulation currently on hold  H&H remained stable  Currently requiring 3 L  Hypercoagulable secondary to her cancer    Acute respiratory failure with hypoxia (HCC)- (present on admission)  Assessment & Plan  Due to pulmonary embolism  While she does have a history of asthma and does have a wheeze, I do not feel she has an asthma exacerbation, I think her hypoxia is secondary to her PE  Continuous pulse ox monitoring  Start respiratory care per protocol  Patient remains full code    Lung cancer (HCC)- (present on admission)  Assessment & Plan  Patient has been treated with radiation, considering chemotherapy with this has not been initiated yet  Causing right-sided PE  Patient follows with Traci Newton at [facility] oncology, I will reach out to discuss further    Chronic pain due to neoplasm- (present on admission)  Assessment & Plan  Palliative care consultation reviewed, appreciate recommendations, gabapentin changed to Lyrica and MS Contin 30 mg p.o. twice daily ordered  10 to 15 mg oxycodone every 3 hours p.o. or 2 mg IV Dilaudid every 3 hours as needed for breakthrough pain    Asthma- (present on admission)  Assessment & Plan  As needed DuoNebs  Continuous pulse ox monitoring  No acute exacerbation    Cigarette nicotine dependence without complication- (present on admission)  Assessment & Plan  Cessation counseling         VTE prophylaxis:   SCDs/TEDs      I have performed a physical exam and reviewed and updated ROS and Plan today (8/22/2023). In review of yesterday's note (8/21/2023), there are no changes except as documented above.

## 2023-08-22 NOTE — PROGRESS NOTES
Pt remains in immense pain, pain pharmacology orders modified by MD, pt medicated with new regimen starting at 1540: 2mg dilaudid dose for 1 hour post PO dose as her pain remains 10/10 with audible grimacing and yelling out. Continuous pulse ox is on, 93% on 3L NC.    Pt reassessed 20 minutes after IV administration, resting quietly, friend at bedside. Remains 92% on 3L NC.

## 2023-08-22 NOTE — ANESTHESIA PROCEDURE NOTES
Airway    Date/Time: 8/22/2023 11:06 AM    Performed by: Cayden Enriquez M.D.  Authorized by: Cayden Enriquez M.D.    Location:  OR  Urgency:  Elective  Indications for Airway Management:  Anesthesia      Spontaneous Ventilation: absent    Sedation Level:  Deep  Preoxygenated: Yes    Patient Position:  Sniffing  Mask Difficulty Assessment:  1 - vent by mask  Final Airway Type:  Endotracheal airway  Final Endotracheal Airway:  ETT  Cuffed: Yes    Technique Used for Successful ETT Placement:  Direct laryngoscopy    Insertion Site:  Oral  Blade Type:  Mckayla  Laryngoscope Blade/Videolaryngoscope Blade Size:  3  ETT Size (mm):  7.5  Measured from:  Teeth  ETT to Teeth (cm):  22  Placement Verified by: auscultation and capnometry    Cormack-Lehane Classification:  Grade IIb - view of arytenoids or posterior of glottis only  Number of Attempts at Approach:  1

## 2023-08-22 NOTE — ANESTHESIA TIME REPORT
Anesthesia Start and Stop Event Times     Date Time Event    8/22/2023 1020 Ready for Procedure     1057 Anesthesia Start     1130 Anesthesia Stop        Responsible Staff  08/22/23    Name Role Begin End    Cayden Enriquez M.D. Anesth 1057 1130        Overtime Reason:  no overtime (within assigned shift)    Comments:

## 2023-08-22 NOTE — PROGRESS NOTES
Telemetry Shift Summary    Rhythm Sinus Tachycardia  HR Range   Ectopy rare to occasional PVC  Measurements .16/.10/.34    Sustains 120s for a few minutes after ambulation and with heightened pain levels    Normal Values  Rhythm SR  HR Range    Measurements 0.12-0.20 / 0.06-0.10  / 0.30-0.52

## 2023-08-22 NOTE — OR NURSING
"1149: Care assumed of pt moaning with pain,per pt pain level \"15-20\" expectorated blood streaked oral secretions. Plan IV analgesia. O2 decreased to pre-op level  1210: Intermittently moaning/restless with pain. Pain now rated at \"12-13-15\"    1225: Pain decreasing per pt. Sputum becoming less blood stained  1235: Calm ,resting quietly  1237: Meets criteria to transfer to floor.   1250: Transferred on O2 tank @ 578 L    "

## 2023-08-22 NOTE — HOSPITAL COURSE
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  CTA showed a left right lower lobe segmental and subsegmental pulmonary emboli without evidence of right heart strain.  CT was also concerning for pulmonary hemorrhage versus infarct.  Patient was started on oxygen and is requiring between 2 and 3 L to maintain saturation.  Pulmonology was consulted and patient underwent bronchoscopy which showed some mucus with bloody streaks in the right main bronchus right lower lobe bronchus without evidence of obvious active bleeding.  Bronchioloalveolar lavage was performed and gross bloody specimen was sent for pathology.  Patient continues to have significant pain and I reviewed the recommendations by palliative care and made the changes now that the patient is no longer NPO.

## 2023-08-22 NOTE — CONSULTS
"MRN: [id number]  Date of initial palliative care consult: 8/21  Reason for palliative medicine consultation/visit: ACP  Referring provider: Miguel Angel  Location of consult: [facility]  Additional consulting services: Pulmonology, pharmacy.    HPI:   Mlely Hendrickson is a 48 y.o. 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  She is awaiting Per Hankamer/insurance authorization.  He presented on August 20 with chest pain worsening over the last 3 days, on the right side with cough and hemoptysis and worsening with inhalation.  CT angio of chest performed in the ER reveals innumerable pulmonary nodules, subsegmental pulmonary embolism in the right lower lobe, and right lower lobe patchy consolidation concerning for pneumonia versus pulmonary hemorrhage.    Patient is scheduled for bronchoscopy this a.m.    Palliative care is consulted for advance care planning. Patient is seen in pre op holding area. Discussed prior to visit with Dr. Lopez. Best friend and \"sister\", Camille at bedside. Ok for Camille to speak for patient currently.     Pertinent PMHx: Anxiety, arrhythmia, arthritis, asthma, COPD, DDD/lumbar, depression, MRSA, pericarditis, psychiatric disorder, sleep apnea, urinary incontinence.  Patient has 10 pack/year smoking history and has been exposed to tobacco smoke.  Does not currently use alcohol nor drugs after having used marijuana.    Pain History:  Onset:   Location:   Duration:   Characteristics:   Aggravating factors:   Alleviating factors:   Radiation:   Treatments:   Severity:     Additional symptoms: TBD  (dyspnea, nausea, vomiting, constipation, fatigue, sleep, mood, appetite)    Interval History: As above    Medication Allergy/Sensitivities:  Allergies   Allergen Reactions    Penicillins Hives, Rash and Swelling     Pt " reports that she gets swelling in throat and face, rash all over face and arms.   Tolerated cefazolin    Aspirin Rash and Hives     Pt reports that she received a rash on face, pt reports it ok if she takes NORCO    Other Environmental Shortness of Breath     Perfumes, dander, scents       Pertinent Family History:  Cancer/father/paternal grandfather/paternal grandmother; diabetes/mother.    ROS:    Review of Systems   Unable to perform ROS: Acuity of condition       PE:   Recent vital signs  BMI: Body mass index is 29.36 kg/m².    Temp (24hrs), Av.2 °C (99 °F), Min:36.8 °C (98.2 °F), Max:37.6 °C (99.7 °F)  Temperature: 37.3 °C (99.1 °F)  Pulse  Av.3  Min: 79  Max: 117   Blood Pressure: 137/83       Physical Exam  Vitals and nursing note reviewed.   Constitutional:       General: She is in acute distress.      Appearance: She is ill-appearing.      Interventions: Nasal cannula in place.   HENT:      Mouth/Throat:      Mouth: Mucous membranes are moist.   Pulmonary:      Effort: Pulmonary effort is normal.   Neurological:      General: No focal deficit present.      Mental Status: She is alert and oriented to person, place, and time.   Psychiatric:         Attention and Perception: She is inattentive.         Mood and Affect: Mood is anxious.         Speech: Speech normal.         Behavior: Behavior normal.         Thought Content: Thought content normal.         Judgment: Judgment normal.     Recent Labs     23   SODIUM 137 137   POTASSIUM 3.9 4.5   CHLORIDE 101 101   CO2 26 25   GLUCOSE 79 96   BUN 10 9   CREATININE 0.71 0.69   CALCIUM 8.9 8.9     Recent Labs     23  0607   WBC 10.4  --   --  9.4  --    RBC 4.48  --   --  4.46  --    HEMOGLOBIN 12.2   < > 11.8* 12.2 12.4   HEMATOCRIT 39.9   < > 38.7 39.8 40.1   MCV 89.1  --   --  89.2  --    MCH 27.2  --   --  27.4  --    MCHC 30.6*  --   --  30.7*  --    RDW  58.4*  --   --  58.5*  --    PLATELETCT 196  --   --  189  --    MPV 10.0  --   --  9.4  --     < > = values in this interval not displayed.       ASSESSMENT/PLAN WITH SHARED DECISION MAKING:   Review  Pertinent imaging reviewed.  CT angio August 21 reveals right lower lobe segmental and subsegmental occlusive pulmonary emboli, no evidence of right heart strain, right lower lobe infiltrates with pulmonary hemorrhage and/or infectious infiltrate and/or pulmonary infarct, numerous scattered bilateral pulmonary nodules appearance most compatible with metastatic disease.    PHYSICAL ASPECTS OF CARE  Palliative Performance Scale: 50%    #Metastatic sarcoma status post XRT  #History of stage Ib adenocarcinoma of the lung status post SBRT  #Hemoptysis  #Acute on chronic pain   -Discussed pain and management with intensivist, Dr. Lopez, patient's pain more neuropathic in nature.  Gabapentin recently increased per record review.  -MEDD: 123 mg   (Oxycodone=80 mg; Hydromorphone IV=1 mg)  - Recommend the following for optimal pain management: Start MS Contin 30 mg p.o. every 12 hours; continue as needed hydromorphone 1 mg IV push every 3 hours as needed for breakthrough pain; oxycodone 10 to 15 mg p.o. every 3 hours as needed breakthrough pain.   -We will see patient on 8/23 for further investigation of pain.    Additional medications utilized:  Lorazepam 1.5 mg IV push; gabapentin 300 mg 3 times daily  -recommend changing gabapentin to Lyrica p.o. 75 mg twice daily.  -May consider long-acting antidepressant with high anxiety component.    #Multiple pulmonary nodules, new  #Acute subsegmental pulmonary embolism  #Acute hypoxemic respiratory failure  #COPD, tobacco dependence  #Right lower lobe consolidation concerning for pneumonia versus pulmonary hemorrhage    SOCIAL ASPECTS OF CARE  Moved from [facility] to [place] about 3 years ago. Has 5 adult children and 4 grandchildren. She has a spouse whom she has been  from  "but may be reconciling.     SPIRITUAL ASPECTS OF CARE   \"Believes in higher power\".     GOALS OF CARE/SERIOUS ILLNESS CONVERSATION  Introduced myself and role of palliative care. Patient not wanting to participate in discussion currently, but empowers Camille to fill in some details. Patient with ongoing diagnosis of cancer with treatment as noted. Sees Dr. ZAC Newton at [facility]. Brief discussion regarding upcoming bronch and further diagnostics.Outcome: Palliative care to follow, complete more thorough follow up when patient willing and able to discuss.  Please see recommendations above regarding pain management.    Code Status: Full    ACP Documents: None on file, will discuss    0 minutes spent discussing advance care planning, this time excludes any other billed services.    I spent a total of 48 minutes reviewing medical records, direct face-to-face time with the patient and/or family, documentation and coordination of care. This is separate from the time spent on advance care planning, which is documented above.    Kay Christensen, MSN, APRN, ACNPC-AG.  Palliative Care Nurse Practitioner  [phone]      "

## 2023-08-22 NOTE — PROGRESS NOTES
Pt returned from pacu, a/ox4, able to ambulate from gurney to bed with HH assist. No coughing or bloody sputum at this time. US at bedside now, post of vitals cycling.

## 2023-08-22 NOTE — ANESTHESIA PREPROCEDURE EVALUATION
Case: [id number] Date/Time: 08/22/23 1045    Procedure: BRONCHOSCOPY    Location:  PROCEDURE ROOM / SURGERY [facility]    Surgeons: Sandra Lopez D.O.          Relevant Problems   ANESTHESIA   (positive) Obstructive sleep apnea      PULMONARY   (positive) Asthma      CARDIAC   (positive) Pulmonary embolism on right (HCC)      GI   (positive) Gastroesophageal reflux disease       Physical Exam    Airway   Mallampati: II  TM distance: >3 FB  Neck ROM: full       Cardiovascular - normal exam  Rhythm: regular  Rate: normal  (-) murmur     Dental - normal exam           Pulmonary - normal exam  Breath sounds clear to auscultation     Abdominal    Neurological - normal exam                 Anesthesia Plan    ASA 3 (acute subsegmental pulmonary embolism, right lung consolidation concerning for pneumonia vs. pulmonary hemmorage, adenocarcinoma of the lung, metatstic sarcoma, acute hypoxic respiratory failure)       Plan - general       Airway plan will be ETT          Induction: intravenous    Postoperative Plan: Postoperative administration of opioids is intended.    Pertinent diagnostic labs and testing reviewed    Informed Consent:    Anesthetic plan and risks discussed with patient.    Use of blood products discussed with: patient whom consented to blood products.

## 2023-08-22 NOTE — PROGRESS NOTES
Monitor Summary:    Rhythm:  ST   Rate Range:  112-122  Ectopy: R/oPVC, rBIG/trig    Measurements:  0.14/0.08/0.30  (Measured by monitor tech)

## 2023-08-22 NOTE — ASSESSMENT & PLAN NOTE
Palliative care consultation reviewed, appreciate recommendations, gabapentin changed to Lyrica and MS Contin 30 mg p.o. twice daily ordered  10 to 15 mg oxycodone every 3 hours p.o. or 2 mg IV Dilaudid every 3 hours as needed for breakthrough pain  Still with significant muscle spasms due to the PE - given one dose of IV valium and has PRN flexeril available.

## 2023-08-22 NOTE — OR NURSING
1127:  To PACU from OR via gurney,  sleeping, respirations spontaneous and non-labored via OPA.      1135:  Pt rouses spontaneously.  OPA Dc'd    1149:  Report given to Sulma OROSCO

## 2023-08-22 NOTE — DIETARY
Nutrition services: Day 1 of admit.  Melly Hendrickson is a 48 y.o. female with admitting DX of Pulmonary embolism on right (HCC) [I26.99]    Consult received for MST 5: >34 lb x 3 months, poor PO intake PTA. RD visited pt at bedside; pt eating lunch, requested RD come back later, prefers 8/23 a.m., as she is currently very tired after bronchoscopy this a.m.    Assessment:  Height: 182.9 cm (6')  Weight: 98.2 kg (216 lb 7.9 oz)  Body mass index is 29.36 kg/m²., BMI classification: Overweight  Diet/Intake: Cardiac; consistent CHO: % x 1 meal per ADLs    Evaluation:   PMHx: COPD, DDD, depression, emphysema, adenocarcinoma R lung  Pt seen by this RD during admit 2 months ago; malnutrition criteria not met at that time. Wt per chart hx since then has further decreased from 205 lb to admit wt 195 lb (4.9%); wt loss notable but not significant.  Diet order placed after this a.m.'s bronchoscopy is cardiac, consistent CHO. No noted hx of DM or DM medications in H&P, and glucose labs WNL this admit; reviewed w/ braden Gómez to remove CCHO/diabetic diet modifier.  Labs reviewed.   MAR: gabapentin, dilaudid  No documented wounds or edema  Nutrition-focused physical exam not completed at this time: appeared normally nourished to temporalis, buccal, and orbital areas    Malnutrition Risk: Unable to fully assess at this time.    Recommendations/Plan:  RD to follow up w/ pt per pt's request on 8/23/23.   Remove CCHO/diabetic diet modifier; reviewed w/ BECKY Resendiz.  Encourage intake of meals >75%.   Document intake of all PO as % taken in ADL's to provide interdisciplinary communication across all shifts.   Monitor weight.  Nutrition rep will continue to see patient for ongoing meal and snack preferences.     RD following.

## 2023-08-22 NOTE — PROGRESS NOTES
Pulmonary Progress Note    Date of admission  8/20/2023    Reason for Consultation  Hemoptysis      History of Presenting Illness  48 y.o. female who presented 8/20/2023 with chest pain.   Patient has a history of stage I adenocarcinoma of the lung and completed SBRT.   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  She has had XRT to both areas, but has persistent pain in her right leg.  She was discussing with oncology regarding chemotherapy, but was reluctant and was planning for a second opinion from [facility].  She states that [facility] is still awaiting authorization from her insurance.  She also has a history of asthma for which she has been treated with Breztri.  She has extensive emphysema seen on her CT scan.  Patient reports that over the last 3 days she started to have some chest pain on the right side she had a cough and coughed up a bloody looking mucus plug.  Since that time she has had persistent blood streaked sputum, which is not elise blood, but is very persistent.  She also has pain over the right chest wall and difficulty with inhalation.  Patient had a CT angio performed in the ER which showed innumerable pulmonary nodules, a subsegmental pulmonary embolism in the right lower lobe, and right lower lobe patchy consolidation concerning for pneumonia versus pulmonary hemorrhage.  Patient is currently on 2 L nasal cannula.    Hospital Course  8/22/2023-patient continued to have hemoptysis overnight.  She continues to complain of severe back and right thorax pain.  Discussed with patient and her friend regarding bronchoscopy this morning.    Vital Signs for last 24 hours   Temp:  [36.8 °C (98.2 °F)-37.6 °C (99.7 °F)] 37.3 °C (99.1 °F)  Pulse:  [] 107  Resp:  [18-20] 18  BP: (122-144)/(69-94) 137/83  SpO2:  [90 %-98 %] 96 %    Physical Exam  Constitutional:       Appearance: Normal appearance.   HENT:      Head: Normocephalic.   Eyes:       Conjunctiva/sclera: Conjunctivae normal.   Cardiovascular:      Rate and Rhythm: Regular rhythm. Tachycardia present.   Pulmonary:      Comments: Poor air movement on right side  Skin:     General: Skin is warm and dry.   Neurological:      General: No focal deficit present.      Mental Status: She is alert and oriented to person, place, and time.       Medications  Current Facility-Administered Medications   Medication Dose Route Frequency Provider Last Rate Last Admin    lidocaine (Xylocaine) 1 % injection 0.5 mL  0.5 mL Intradermal Once PRN Sandra Lopez D.O.        lactated ringers infusion   Intravenous Continuous Sandra Lopez D.O.        [MAR Hold] senna-docusate (Pericolace Or Senokot S) 8.6-50 MG per tablet 2 Tablet  2 Tablet Oral BID Tonny St D.O.   2 Tablet at 08/21/23 1727    And    [MAR Hold] polyethylene glycol/lytes (Miralax) PACKET 1 Packet  1 Packet Oral QDAY PRN Tonny St D.O.        And    [MAR Hold] magnesium hydroxide (Milk Of Magnesia) suspension 30 mL  30 mL Oral QDAY PRN Tonny St D.O.        And    [MAR Hold] bisacodyl (Dulcolax) suppository 10 mg  10 mg Rectal QDAY PRN Tonny St D.O.        [MAR Hold] Respiratory Therapy Consult   Nebulization Continuous RT Tonny [facility] D.O.        [MAR Hold] acetaminophen (Tylenol) tablet 650 mg  650 mg Oral Q6HRS PRN Tonny St D.O.        [MAR Hold] Pharmacy Consult Request ...Pain Management Review 1 Each  1 Each Other PHARMACY TO DOSE Tonny [facility] D.O.        [MAR Hold] labetalol (Normodyne/Trandate) injection 10 mg  10 mg Intravenous Q4HRS PRN Tonny St D.O.        [MAR Hold] ondansetron (Zofran) syringe/vial injection 4 mg  4 mg Intravenous Q4HRS PRN Tonny St D.O.        [MAR Hold] ondansetron (Zofran ODT) dispertab 4 mg  4 mg Oral Q4HRS PRN Tonny St D.O.        [MAR Hold] promethazine (Phenergan) tablet 12.5-25 mg  12.5-25 mg Oral Q4HRS PRN Tonny St D.O.         [MAR Hold] promethazine (Phenergan) suppository 12.5-25 mg  12.5-25 mg Rectal Q4HRS PRN Tonny St D.O.        [MAR Hold] prochlorperazine (Compazine) injection 5-10 mg  5-10 mg Intravenous Q4HRS PRN Tonny St D.O.        [MAR Hold] guaiFENesin dextromethorphan (Robitussin DM) 100-10 MG/5ML syrup 10 mL  10 mL Oral Q6HRS PRN Tonny St D.O.        [MAR Hold] ipratropium-albuterol (DUONEB) nebulizer solution  3 mL Nebulization Q4H PRN (RT) Tonny St D.O.   3 mL at 08/21/23 2053    [MAR Hold] oxyCODONE immediate release (Roxicodone) tablet 10 mg  10 mg Oral Q3HRS PRN Nicole Michelle M.D.   10 mg at 08/21/23 1350    Or    [MAR Hold] oxyCODONE immediate-release (Roxicodone) tablet 15 mg  15 mg Oral Q3HRS PRN Nicole Michelle M.D.   15 mg at 08/21/23 2356    Or    [MAR Hold] HYDROmorphone (Dilaudid) injection 1 mg  1 mg Intravenous Q3HRS PRN Nicole Michelle M.D.   1 mg at 08/22/23 0931    [MAR Hold] tranexamic acid 100 mg/mL for inhalation 500 mg  500 mg Nebulization TID (RT) Sandra Lopez D.O.   500 mg at 08/22/23 0713    [MAR Hold] mometasone-formoterol (Dulera) 200-5 MCG/ACT inhaler 2 Puff  2 Puff Inhalation BID (RT) Sandra Lopez D.O.   2 Puff at 08/22/23 0714    [MAR Hold] tiotropium (Spiriva Respimat) 2.5 mcg/Act inhalation spray 5 mcg  5 mcg Inhalation QDAILY (RT) Sandra Lopez D.O.   5 mcg at 08/22/23 0713    [MAR Hold] gabapentin (Neurontin) capsule 300 mg  300 mg Oral TID Nicole Michelle M.D.   300 mg at 08/21/23 1727    [MAR Hold] cefTRIAXone (Rocephin) 2,000 mg in  mL IVPB  2,000 mg Intravenous Q24HRS Nicole Michelle M.D.   Stopped at 08/22/23 0506    [MAR Hold] azithromycin (Zithromax) tablet 500 mg  500 mg Oral DAILY AT NOON Nicole Michelle M.D.   500 mg at 08/21/23 1349    [MAR Hold] LORazepam (Ativan) injection 0.5 mg  0.5 mg Intravenous Q4HRS PRN Nicole Michelle M.D.   0.5 mg at 08/22/23 0643    [MAR Hold] hydrOXYzine HCl (Atarax) tablet 10 mg  10 mg  Oral QHS PRN Nicole Michelle M.D.           Fluids    Intake/Output Summary (Last 24 hours) at 8/22/2023 1030  Last data filed at 8/22/2023 1000  Gross per 24 hour   Intake 96.92 ml   Output 1300 ml   Net -1203.08 ml       Laboratory          Recent Labs     08/20/23 2300 08/22/23 0229   SODIUM 137 137   POTASSIUM 3.9 4.5   CHLORIDE 101 101   CO2 26 25   BUN 10 9   CREATININE 0.71 0.69   MAGNESIUM 2.2  --    PHOSPHORUS 3.7  --    CALCIUM 8.9 8.9     Recent Labs     08/20/23 2300 08/22/23 0229   ALTSGPT 8  --    ASTSGOT 8*  --    ALKPHOSPHAT 73  --    TBILIRUBIN 0.2  --    GLUCOSE 79 96     Recent Labs     08/20/23 2300 08/22/23 0229   WBC 10.4 9.4   NEUTSPOLYS 75.40*  --    LYMPHOCYTES 12.30*  --    MONOCYTES 11.10  --    EOSINOPHILS 0.10  --    BASOPHILS 0.20  --    ASTSGOT 8*  --    ALTSGPT 8  --    ALKPHOSPHAT 73  --    TBILIRUBIN 0.2  --      Recent Labs     08/20/23 2300 08/21/23 0815 08/21/23  1415 08/21/23 2005 08/22/23 0229 08/22/23  0607   RBC 4.48  --   --   --  4.46  --    HEMOGLOBIN 12.2   < > 12.0 11.8* 12.2 12.4   HEMATOCRIT 39.9   < > 39.3 38.7 39.8 40.1   PLATELETCT 196  --   --   --  189  --    PROTHROMBTM  --   --  13.6  --   --   --    APTT  --   --  27.4  --   --   --    INR  --   --  1.00  --   --   --     < > = values in this interval not displayed.       Imaging  CT:    Reviewed    Assessment/Plan  #Hemoptysis ongoing despite TXA  #Right lower lobe consolidation concerning for pneumonia versus pulmonary hemorrhage  #Acute subsegmental pulmonary embolism  #Multiple pulmonary nodules  #History of stage Ib adenocarcinoma of the lung, status post XRT   #Active metastatic sarcoma status post XRT  #History of severe asthma  #Emphysema on CT  #Acute hypoxemic respiratory failure     Titrate O2 to maintain sats 88-92%  Continue TXA for management of hemoptysis for 1 day total   Agree with holding anticoagulation at this time as patient has a subsegmental pulmonary embolism, there is no sign  of heart strain on the CT or echo   Continue triple therapy and as needed nebulizers for her asthma  Pain control is paramount to patient's care as she is not able to take a deep breath and will likely develop atelectasis and worsening hypoxemia if she is not able to take deep breaths  Plan for bronchoscopy today.  Discussed risks and benefits with patient and her friend at bedside.  I will not proceed with a biopsy of the lung due to ongoing bleeding.  I explained to the patient that without a biopsy I cannot be sure that I have definitive answer, however I feel that taking a biopsy of her lung when she is continuing to have active bleeding is too high risk at this time.  We will proceed with lavage, brushing, and possible endobronchial biopsy if needed     Sandra Lopez, DO   Pulmonary and Critical Care

## 2023-08-23 NOTE — CARE PLAN
The patient is Watcher - Medium risk of patient condition declining or worsening    Shift Goals  Clinical Goals: pain management, walking O2, maintain O2 sats  Patient Goals: pain management  Family Goals: comfort, get her home    Progress made toward(s) clinical / shift goals:  Pt presents with no improvement today. Pt requiring 3L NC at rest, 5L ambulating. Pt continues to have elise blood in phlegm, frequent productive cough. Tolerating RT treatments well, ambulated in hallway with HH assist. Friend at bedside during shift for support.     Patient is not progressing towards the following goals: Pt requiring constant pain and anxiety pharmacology, reports 10/10 pain with every assessment. Pt is very lethargic and drowsy, continuous pulse ox in place for respiratory monitoring, RR remains >16, new medications added to MAR for continued efforts at pain management.       Problem: Pain - Standard  Goal: Alleviation of pain or a reduction in pain to the patient’s comfort goal  8/23/2023 1117 by Kajal Braxton, R.N.  Outcome: Not Progressing  8/23/2023 1116 by Kajal Braxton, R.N.  Outcome: Not Progressing     Problem: Hemodynamics  Goal: Patient's hemodynamics, fluid balance and neurologic status will be stable or improve  8/23/2023 1117 by Kajal Braxton, R.N.  Outcome: Not Progressing  8/23/2023 1116 by Kajal Braxton, R.N.  Outcome: Progressing     Problem: Respiratory  Goal: Patient will achieve/maintain optimum respiratory ventilation and gas exchange  Outcome: Not Progressing

## 2023-08-23 NOTE — RESPIRATORY CARE
" COPD EDUCATION by COPD CLINICAL EDUCATOR  8/23/2023 at 1:53 PM by Lizzeth Parrish, RRT     Patient reviewed by COPD education team. Patient does have a history or diagnosis of Asthma/COPD and is a former smoker.  However, patient does not qualify for the COPD program.       COPD Screen  COPD Risk Screening  Do you have a history of COPD?: Yes  Do you have a Pulmonologist?: Yes    COPD Assessment  COPD Clinical Specialists ONLY  COPD Education Initiated: Yes--Short Intervention (Pt w/ severe Asthma and Emphysema on CT, Lung Cancer w/ metastatic disease, going to oncology/radation for treatment)  Is this a COPD exacerbation patient?: No  DME Equipment Type: oxygen  Physician Name: Checo Baker M.D.  Pulmonologist Name: Paulina Gil  Palliative Care: Yes  Durable Power of : No  Advance Directive: No  Discussion with others: Yes  Is POLST on file?: No  Referrals Initiated: Yes  Pulmonary Rehab: N/A  Smoking Cessation: N/A  Hospice: N/A  Home Health Care: N/A  Mobile Urgent Care Services: N/A  Geriatric Specialty Group: N/A  Private In-Home Care Agency: N/A  (OP) Pulmonary Function Testing: Yes  Interdisciplinary Rounds: Attendance at Rounds (30 Min)    PFT Results    No results found for: \"PFT\"    Meds to Beds  Would the patient like to opt in for Bedside Medication Delivery at Discharge?: No     MY COPD ACTION PLAN     It is recommended that patients and physicians /healthcare providers complete this action plan together. This plan should be discussed at each physician visit and updated as needed.    The green, yellow and red zones show groups of symptoms of COPD. This list of symptoms is not comprehensive, and you may experience other symptoms. In the \"Actions\" column, your healthcare provider has recommended actions for you to take based on your symptoms.    Patient Name: Melly Hendrickson   YOB: 1975   Last Updated on: 8/23/2023  1:53 PM   Green Zone:  I am doing well " "today Actions     Usual activitiy and exercise level   Take daily medications     Usual amounts of cough and phlegm/mucus   Use oxygen as prescribed     Sleep well at night   Continue regular exercise/diet plan     Appetite is good   At all times avoid cigarette smoke, inhaled irritants     Daily Medications (these medications are taken every day):   Budesonide/Glycopyrrolate/Formoterol Fumarate (Breztri Aerosphere)  Albuterol/Ipratropium (Combivent, Duoneb) 2 Puffs  3mL via nebulizer Twice daily  Four Times daily     Additional Information:  Rinse nebulizer after use  Patient instructed on importance of cleaning home nebulizer after every treatment to prevent infection.       Yellow Zone:  I am having a bad day or a COPD flare Actions     More breathless than usual   Continue daily medications     I have less energy for my daily activities   Use quick relief inhaler as ordered     Increased or thicker phlegm/mucus   Use oxygen as prescribed     Using quick relief inhaler/nebulizer more often   Get plenty of rest     Swelling of ankles more than usual   Use pursed lip breathing     More coughing than usual   At all times avoid cigarette smoke, inhaled irritants     I feel like I have a \"chest cold\"     Poor sleep and my symptoms woke me up     My appetite is not good     My medicine is not helping      Call provider immediately if symptoms don’t improve     Continue daily medications, add rescue medications:   Albuterol  Albuterol/Ipratropium (Combivent, Duoneb) 2 Puffs  3mL via nebulizer Every 4 hours PRN  Every 4 hours       Medications to be used during a flare up, (as Discussed with Provider):           Additional Information:  Use albuterol inhaler with spacer - Call provider immediately if symptoms do not improve    Red Zone:  I need urgent medical care Actions     Severe shortness of breath even at rest   Call 911 or seek medical care immediately     Not able to do any activity because of breathing      Fever " or shaking chills      Feeling confused or very drowsy       Chest pains      Coughing up blood

## 2023-08-23 NOTE — PROGRESS NOTES
Charge Nurse Rounding Note    Bedside rounding completed to address quality of care and overall patient experience.    Patient Satisfaction addressed including staff responsiveness. Patient/family are aware of the POC and any questions answered. Thorough safety education completed including use of call light prior to all mobility throughout the entirety of the hospital stay.     Patient/family aware of time of next Hourly Round.    No further questions/concerns currently.     Additional Notes: call light answered, pt c/o back pain/ muscle spasms. MD was at bedside Meds ordered, administered by PRN (see MAR).

## 2023-08-23 NOTE — PROGRESS NOTES
Pt up in bed eating dinner with friend at bedside, saturating 93% on 3-4L NC, no recent episodes of bloody urine or phlegm. Pt alert and oriented, tolerating new pain pharmacology without apparent complications. Stable at end of shift.

## 2023-08-23 NOTE — PROGRESS NOTES
"Pt extremely sedated after dilaudid dose (vitals WNL) and reported to this RN \"I'm taking my own stuff too\" gesturing to her purse on her lap. This RN inquired about pt taking her own pain medications but pt fell asleep and was unable to remain awake for conversation. 30 minutes later, pt was alert and stated there were no medications in her purse. Education provided to pt and her friend regarding volumes of opiates and benzos being administered and risks of respiratory depression. MD notified via voalte regarding risk of personal medications in use. Pt continues to report only 10/10-15/10 pain and inquires for next pain medication every 30 minutes to an hour. Continuous pulse ox in place, pt O2 needs increased by 2L on this shift, vitals charted in flow sheets.   "

## 2023-08-23 NOTE — PROGRESS NOTES
Monitor Summary:    Rhythm:  ST/SR   Rate Range:    Ectopy: oPVC    Measurements:  0.16/0.08/0.36  (Measured by monitor tech)

## 2023-08-23 NOTE — CARE PLAN
The patient is Stable - Low risk of patient condition declining or worsening    Shift Goals  Clinical Goals: Pain medication to control pain,  Patient Goals: safety,comfort  Family Goals: rossy    Progress made toward(s) clinical / shift goals:  Pt pain will be back to acceptable level of pain throughout the shift  .  Patient is not progressing towards the following goals:

## 2023-08-23 NOTE — CONSULTS
"MRN: [id number]  Date of initial palliative care consult: 8/21  Reason for palliative medicine consultation/visit: ACP  Referring provider: Miguel Angel  Location of consult: [facility] 3314  Additional consulting services: Pulmonology, pharmacy.    HPI:   Melly Hendrickson is a 48 y.o. 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  She is awaiting Per Register/insurance authorization.  He presented on August 20 with chest pain worsening over the last 3 days, on the right side with cough and hemoptysis and worsening with inhalation.  CT angio of chest performed in the ER reveals innumerable pulmonary nodules, subsegmental pulmonary embolism in the right lower lobe, and right lower lobe patchy consolidation concerning for pneumonia versus pulmonary hemorrhage.    Patient is scheduled for bronchoscopy this a.m.    Palliative care is consulted for advance care planning. Patient is seen in pre op holding area. Discussed prior to visit with Dr. Lopez. Best friend and \"sister\", Camille at bedside. Ok for Camille to speak for patient currently.     Pertinent PMHx: Anxiety, arrhythmia, arthritis, asthma, COPD, DDD/lumbar, depression, MRSA, pericarditis, psychiatric disorder, sleep apnea, urinary incontinence.  Patient has 10 pack/year smoking history and has been exposed to tobacco smoke.  Does not currently use alcohol nor drugs after having used marijuana.    Pain History:  Patient is not able to answer all questions relative to pain but she reports pain is in right lower back down hip/buttock area and leg. She is unable to walk upright and normally d/t same. She states the pain is shooting and constant. She has had some relief from long acting meds as well as BTP meds.   When asked the pain number she reports \"12-13/10\", \"the best pain gets honestly is a " "10/10\". She can't quantify an acceptable level of pain.   Additional symptoms: TBD  (dyspnea, nausea, vomiting, constipation, fatigue, sleep, mood, appetite)    Interval History:   : s/p bronch, no overt pathology noted, prelim cx negative. Pain management recs as noted and ordered by primary team. Patient seen today more alert and willing to visit with PC.     Medication Allergy/Sensitivities:  Allergies   Allergen Reactions    Penicillins Hives, Rash and Swelling     Pt reports that she gets swelling in throat and face, rash all over face and arms.   Tolerated cefazolin    Aspirin Rash and Hives     Pt reports that she received a rash on face, pt reports it ok if she takes NORCO    Other Environmental Shortness of Breath     Perfumes, dander, scents       Pertinent Family History:  Cancer/father/paternal grandfather/paternal grandmother; diabetes/mother.    ROS:    Review of Systems   Unable to perform ROS: Acuity of condition       PE:   Recent vital signs  BMI: Body mass index is 28.64 kg/m².    Temp (24hrs), Av.8 °C (98.2 °F), Min:36.7 °C (98 °F), Max:37 °C (98.6 °F)  Temperature: 36.7 °C (98 °F)  Pulse  Av  Min: 79  Max: 123   Blood Pressure: 132/85       Physical Exam  Vitals and nursing note reviewed.   Constitutional:       General: She is in acute distress.      Appearance: She is ill-appearing.      Interventions: Nasal cannula in place.   HENT:      Mouth/Throat:      Mouth: Mucous membranes are moist.   Pulmonary:      Effort: Pulmonary effort is normal.   Neurological:      General: No focal deficit present.      Mental Status: She is alert and oriented to person, place, and time.   Psychiatric:         Attention and Perception: She is inattentive.         Mood and Affect: Mood is anxious.         Speech: Speech normal.         Behavior: Behavior normal.         Thought Content: Thought content normal.         Judgment: Judgment normal.       Recent Labs     23  2300 23  0229 " 08/23/23  0238   SODIUM 137 137 134*   POTASSIUM 3.9 4.5 4.3   CHLORIDE 101 101 99   CO2 26 25 26   GLUCOSE 79 96 159*   BUN 10 9 11   CREATININE 0.71 0.69 0.59   CALCIUM 8.9 8.9 9.0       Recent Labs     08/20/23  2300 08/21/23  0815 08/22/23  0229 08/22/23  0607 08/22/23  1307 08/23/23  0238   WBC 10.4  --  9.4  --   --  9.1   RBC 4.48  --  4.46  --   --  4.07*   HEMOGLOBIN 12.2   < > 12.2 12.4 13.4 11.3*   HEMATOCRIT 39.9   < > 39.8 40.1 45.0 36.4*   MCV 89.1  --  89.2  --   --  89.4   MCH 27.2  --  27.4  --   --  27.8   MCHC 30.6*  --  30.7*  --   --  31.0*   RDW 58.4*  --  58.5*  --   --  57.2*   PLATELETCT 196  --  189  --   --  191   MPV 10.0  --  9.4  --   --  9.5    < > = values in this interval not displayed.         ASSESSMENT/PLAN WITH SHARED DECISION MAKING:   Review  Pertinent imaging reviewed.  CT angio August 21 reveals right lower lobe segmental and subsegmental occlusive pulmonary emboli, no evidence of right heart strain, right lower lobe infiltrates with pulmonary hemorrhage and/or infectious infiltrate and/or pulmonary infarct, numerous scattered bilateral pulmonary nodules appearance most compatible with metastatic disease.    PHYSICAL ASPECTS OF CARE  Palliative Performance Scale: 50%    #Metastatic sarcoma status post XRT  #History of stage Ib adenocarcinoma of the lung status post SBRT  #Hemoptysis  #Acute on chronic pain   -Discussed pain and management with intensivist, Dr. Lopez, patient's pain more neuropathic in nature.  Gabapentin recently increased per record review.  -MEDD: 123 mg   (Oxycodone=80 mg; Hydromorphone IV=1 mg)  - Recommend the following for optimal pain management: Start MS Contin 30 mg p.o. every 12 hours; continue as needed hydromorphone 1 mg IV push every 3 hours as needed for breakthrough pain; oxycodone 10 to 15 mg p.o. every 3 hours as needed breakthrough pain.   -We will see patient on 8/23 for further investigation of pain.    8/23 update:  -pain is  "improved with long acting medication, d/w bedside RN. She reports patient takes 2 mg IV hydromorphone and is very sedate.   - additional BTP MEDD overnight ~300mg.   -increase MS contin to every 8 hours for total of 90mg MEDD; anticipate further titration needed, plan to see daily during stay for ongoing management.   -patient has been taking Norco 10/325 at home per PCP. She reports this works well for her. She states the oxycodone makes her itch.  -add Norco 10/325 every 4 hours prn pain.   -DC oxycodone  -should DC hydromorphone tomorrow if Norco is effective.     Additional medications utilized:  Lorazepam 1.5 mg IV push; gabapentin 300 mg 3 times daily  -recommend changing gabapentin to Lyrica p.o. 75 mg twice daily.  -May consider long-acting antidepressant with high anxiety component.    #Multiple pulmonary nodules, new  #Acute subsegmental pulmonary embolism  #Acute hypoxemic respiratory failure  #COPD, tobacco dependence  #Right lower lobe consolidation concerning for pneumonia versus pulmonary hemorrhage    SOCIAL ASPECTS OF CARE  Moved from [facility] to [place] about 3 years ago. Has 5 adult children and 4 grandchildren. She has a spouse whom she has been  from but may be reconciling.     Melly has 5 adult children and 4 grandchildren. She laments that 2 of her children are not on good terms with her and have told her \"she deserves what is happening to her for being a terrible mother\". She expresses much sadness and some anger over this. She has been living alone in [place] until recently when her friend/sister Camille came here from [facility] to stay with her.     Melly is the youngest of 5 children. Her mother is still alive and is a good support to her. 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 She continues to learn and is most happy in these roles.     SPIRITUAL ASPECTS OF CARE   \"Believes in higher power\".   Patient has a strong juanis in God, she references him often.     GOALS OF " "CARE/SERIOUS ILLNESS CONVERSATION  Introduced myself and role of palliative care. Patient not wanting to participate in discussion currently, but empowers Camille to fill in some details. Patient with ongoing diagnosis of cancer with treatment as noted. Sees Dr. ZAC Newton at [facility]. Brief discussion regarding upcoming bronch and further diagnostics.Outcome: Palliative care to follow, complete more thorough follow up when patient willing and able to discuss.  Please see recommendations above regarding pain management.    8/23 updates: Melly is seen today jointly with JEAN PIERRE Pinto. She is agreeable to visit. She states her PCP, \"Dr. Khan\" has wanted her to be seen by palliative. She expresses significant frustration with her health care experiences thus far. She expressed offense in how she has felt dismissed and undervalued by certain providers. She has broad mistrust of the healthcare system locally and is confused as to what she should do next. She has further tried to educate herself with go ogle and other searches which have frightened her more.     Much of this visit was in exploration of the clear existential crisis she is currently experiencing. Expressing that she wants to live and \"I don't know what to do\". She did ask me if I thought she had another 2 months to live. I told her I think we need more information.     We discussed options of further treatment versus no treatment and a more palliative focused approach. We explored her desire to fight against her fear that she will die from this disease.     Introduced the concept of the AD and asked if she has ever expressed her wishes to her family/friends should she be incapacitated. She said she had not and was comtemplative regarding the need to do this. She is agreeable to have Millie return to discuss further.     Therapeutic support,  validation, normalization, reminiscing, active listening all employed during our encounter. Melly is clearly upset and " confused and is very adequate in her expression of this. Outcome: PC to continue to follow for symptom management, support, further discussions regarding goals of care. Will refer to OP palliative upon DC. We will address code status as well. Please notify for immediate needs.     Code Status: Full    ACP Documents: None on file, will discuss    0 minutes spent discussing advance care planning, this time excludes any other billed services.    I spent a total of 95 minutes reviewing medical records, direct face-to-face time with the patient and/or family, documentation and coordination of care. This is separate from the time spent on advance care planning, which is documented above.    Kay Christensen, MSN, APRN, ACNPC-AG.  Palliative Care Nurse Practitioner  [phone]

## 2023-08-23 NOTE — PROGRESS NOTES
[facility] Medicine Daily Progress Note    Date of Service  8/23/2023    Chief Complaint  Melly Hendrickson is a 48 y.o. female admitted 8/20/2023 with hemoptysis and chest pain.    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  CTA showed a left right lower lobe segmental and subsegmental pulmonary emboli without evidence of right heart strain.  CT was also concerning for pulmonary hemorrhage versus infarct.  Patient was started on oxygen and is requiring between 2 and 3 L to maintain saturation.  Pulmonology was consulted and patient underwent bronchoscopy which showed some mucus with bloody streaks in the right main bronchus right lower lobe bronchus without evidence of obvious active bleeding.  Bronchioloalveolar lavage was performed and gross bloody specimen was sent for pathology.  Patient continues to have significant pain and I reviewed the recommendations by palliative care and made the changes now that the patient is no longer NPO.    Interval Problem Update  8/22 patient seen when n.p.o. prior to bronchoscopy this morning.  She continues to have significant pain however has not had the changes as recommended by palliative care until this afternoon.  I have started MS Contin and changed her gabapentin over to Lyrica.  I have also increased her IV Dilaudid.  I will reach out to [facility] to see if they feel that she should be transferred for possible palliative radiation for pain control.  She is currently on 3 L and not on anticoagulation given the hemoptysis and concern for pulmonary infarct/hemorrhage.  Bilateral lower extremity ultrasounds were done without evidence of DVT.  2D echocardiogram showed no evidence of right heart strain and ejection fraction of 65%.  8/23 patient's pain seem to be well controlled after  bronchoscopy yesterday however today she is in extremis again stating that she can get comfortable but then she starts to have a spasm in her right back and it sends her to into excruciating pain again.  We will continue with the current narcotic dose however I believe she needs a muscle relaxer at this time we will start her with a one-time dose of IV Valium to get her comfortable followed by as needed Flexeril.    I have discussed this patient's plan of care and discharge plan at IDT rounds today with Case Management, Nursing, Nursing leadership, and other members of the IDT team.    Consultants/Specialty  pulmonary    Code Status  Full Code    Disposition  The patient is not medically cleared for discharge to home or a post-acute facility.  Anticipate discharge to: home with close outpatient follow-up    I have placed the appropriate orders for post-discharge needs.    Review of Systems  Review of Systems   Constitutional:  Negative for chills and fever.   HENT:  Negative for congestion and sore throat.    Eyes:  Negative for blurred vision and photophobia.   Respiratory:  Positive for shortness of breath. Negative for cough.    Cardiovascular:  Positive for chest pain. Negative for claudication and leg swelling.   Gastrointestinal:  Negative for abdominal pain, constipation, diarrhea, heartburn and vomiting.   Genitourinary:  Negative for dysuria and hematuria.   Musculoskeletal:  Negative for joint pain and myalgias.   Skin:  Negative for itching and rash.   Neurological:  Negative for dizziness, sensory change, speech change, weakness and headaches.   Psychiatric/Behavioral:  Negative for depression. The patient is not nervous/anxious and does not have insomnia.         Physical Exam  Temp:  [36.3 °C (97.3 °F)-37.2 °C (99 °F)] 36.8 °C (98.2 °F)  Pulse:  [] 109  Resp:  [16-20] 20  BP: (118-164)/(62-96) 118/62  SpO2:  [90 %-96 %] 92 %    Physical Exam  Vitals and nursing note reviewed.   Constitutional:        General: She is not in acute distress.     Appearance: Normal appearance. She is not ill-appearing.   HENT:      Head: Normocephalic and atraumatic.      Nose: Nose normal.   Cardiovascular:      Rate and Rhythm: Normal rate and regular rhythm.      Heart sounds: Normal heart sounds. No murmur heard.  Pulmonary:      Effort: Pulmonary effort is normal.      Breath sounds: Normal breath sounds.      Comments: Right posterior mid thoracic pain.  Abdominal:      General: Bowel sounds are normal. There is no distension.      Palpations: Abdomen is soft.   Musculoskeletal:         General: No swelling or tenderness.      Cervical back: Neck supple.   Skin:     General: Skin is warm and dry.   Neurological:      General: No focal deficit present.      Mental Status: She is alert and oriented to person, place, and time.   Psychiatric:         Mood and Affect: Mood normal.         Fluids    Intake/Output Summary (Last 24 hours) at 8/23/2023 1221  Last data filed at 8/23/2023 1100  Gross per 24 hour   Intake 52.5 ml   Output 650 ml   Net -597.5 ml         Laboratory  Recent Labs     08/20/23 2300 08/21/23  0815 08/22/23 0229 08/22/23  0607 08/22/23  1307 08/23/23  0238   WBC 10.4  --  9.4  --   --  9.1   RBC 4.48  --  4.46  --   --  4.07*   HEMOGLOBIN 12.2   < > 12.2 12.4 13.4 11.3*   HEMATOCRIT 39.9   < > 39.8 40.1 45.0 36.4*   MCV 89.1  --  89.2  --   --  89.4   MCH 27.2  --  27.4  --   --  27.8   MCHC 30.6*  --  30.7*  --   --  31.0*   RDW 58.4*  --  58.5*  --   --  57.2*   PLATELETCT 196  --  189  --   --  191   MPV 10.0  --  9.4  --   --  9.5    < > = values in this interval not displayed.       Recent Labs     08/20/23 2300 08/22/23 0229 08/23/23  0238   SODIUM 137 137 134*   POTASSIUM 3.9 4.5 4.3   CHLORIDE 101 101 99   CO2 26 25 26   GLUCOSE 79 96 159*   BUN 10 9 11   CREATININE 0.71 0.69 0.59   CALCIUM 8.9 8.9 9.0       Recent Labs     08/21/23  1415   APTT 27.4   INR 1.00                  Imaging  US-EXTREMITY VENOUS LOWER BILAT   Final Result      EC-ECHOCARDIOGRAM COMPLETE W/O CONT   Final Result      CT-CTA CHEST PULMONARY ARTERY W/ RECONS   Final Result         1.  Right lower lobe segmental and subsegmental occlusive pulmonary emboli. No evidence of right heart strain appreciated.   2.  Right lower lobe infiltrates, given history compatible with pulmonary hemorrhage and/or infectious infiltrate and/or component of pulmonary infarct.   3.  Numerous scattered bilateral pulmonary nodules, appearance most compatible with metastatic disease      These findings were discussed with the patient's clinician, Poncho Sanchez, on 8/21/2023 12:41 AM.      DX-CHEST-PORTABLE (1 VIEW)   Final Result         1.  No acute cardiopulmonary disease.             Assessment/Plan  * Pulmonary embolism on right (HCC)- (present on admission)  Assessment & Plan  Associated with significant hemoptysis per patient  Concern of pulmonary hemorrhage  Anticoagulation currently on hold  H&H remained stable  Currently requiring 3 L  Hypercoagulable secondary to her cancerm, no further hemoptysis.      Acute respiratory failure with hypoxia (HCC)- (present on admission)  Assessment & Plan  Due to pulmonary embolism  While she does have a history of asthma and does have a wheeze, I do not feel she has an asthma exacerbation, I think her hypoxia is secondary to her PE  Continuous pulse ox monitoring  Start respiratory care per protocol  Patient remains full code    Lung cancer (HCC)- (present on admission)  Assessment & Plan  Patient has been treated with radiation, considering chemotherapy with this has not been initiated yet  Causing right-sided PE  Patient follows with Traci Newton at [facility] oncology, spoke with APRNs at office - will need OP f/u    Chronic pain due to neoplasm- (present on admission)  Assessment & Plan  Palliative care consultation reviewed, appreciate recommendations, gabapentin changed to Lyrica  and MS Contin 30 mg p.o. twice daily ordered  10 to 15 mg oxycodone every 3 hours p.o. or 2 mg IV Dilaudid every 3 hours as needed for breakthrough pain  Still with significant muscle spasms due to the PE - given one dose of IV valium and has PRN flexeril available.    Asthma- (present on admission)  Assessment & Plan  As needed DuoNebs  Continuous pulse ox monitoring  No acute exacerbation    Cigarette nicotine dependence without complication- (present on admission)  Assessment & Plan  Cessation counseling         VTE prophylaxis:   SCDs/TEDs      I have performed a physical exam and reviewed and updated ROS and Plan today (8/23/2023). In review of yesterday's note (8/22/2023), there are no changes except as documented above.

## 2023-08-24 NOTE — DISCHARGE SUMMARY
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  CTA showed a left right lower lobe segmental and subsegmental pulmonary emboli without evidence of right heart strain.  CT was also concerning for pulmonary hemorrhage versus infarct.  Patient was started on oxygen and is requiring between 2 and 3 L to maintain saturation.  Pulmonology was consulted and patient underwent bronchoscopy which showed some mucus with bloody streaks in the right main bronchus right lower lobe bronchus without evidence of obvious active bleeding.  Bronchioloalveolar lavage was performed and gross bloody specimen was sent for pathology.  The pathology report came back without evidence of malignancy on the washings.  Palliative care was consulted to assist with patient's symptom management and recommended MS Contin with adjustments initially starting MS Contin 30 mg twice daily then adjusted to every 8 hours.  Patient had significant amount of itching and intolerance to the morphine and did not want to continue this.  She states that she was on Norco and Dilaudid alternating between the two and a pain patch prior to coming into the hospital.  She was requiring the scheduled morphine, Benadryl, muscle relaxer, intermittent Norco alternating with IV Dilaudid.  With attempts to wean from IV to oral, she was unhappy with this change and states that she would be better at home on her own regimen rather than staying here.  She had me call her pain management doctor Dr. Liang  and see if she can get an appointment as soon as possible.  The first available appointment was Monday at 11 AM.  I asked the office to call the patient to confirm this appointment for her.  Regarding her pulmonary embolus given that is segmental and she is still having intermittent hemoptysis at this time she is not appropriate for systemic anticoagulation.  Patient has decided to leave the hospital and resume her home regimen for her pain management and was discharged in stable condition.  She will follow-up with her primary care practitioner, pain management and oncology.    Therefore, she is discharged in good and stable condition to home with close outpatient follow-up.    The patient met 2-midnight criteria for an inpatient stay at the time of discharge.    Discharge Date  8/24/2023    FOLLOW UP ITEMS POST DISCHARGE  PCP, pain management, oncology    DISCHARGE DIAGNOSES  Principal Problem:    Pulmonary embolism on right (HCC) (POA: Yes)  Active Problems:    Cigarette nicotine dependence without complication (POA: Yes)    Asthma (POA: Yes)    Chronic pain due to neoplasm (POA: Yes)    Lung cancer (HCC) (POA: Yes)    Acute respiratory failure with hypoxia (HCC) (POA: Yes)  Resolved Problems:    * No resolved hospital problems. *      FOLLOW UP  Future Appointments   Date Time Provider Department Center   9/7/2023  9:00 AM Hai Barrera M.D. [facility] None   9/7/2023  9:00 AM Alma Newton M.D. [facility] None   9/14/2023  8:30 AM Checo Baker M.D. [facility] [place]     Checo Baker M.D.  [address] [facility] [id number]  [phone]    Follow up      Jacqueline Liang M.D.  [address]  [facility] [id number] [phone]    Follow up      Alma Newton M.D.  [address]  [facility] [address]  [facility] [id number] [phone]            MEDICATIONS ON DISCHARGE     Medication List        CHANGE how you take these medications        Instructions   fluticasone 50 MCG/ACT nasal spray  What changed:    how much to take  how to take this  when to take this  Commonly known as: Flonase   USE 2 SPRAY(S) INTO AFFECTED NOSTRILS ONCE DAILY Strength: 50 mcg     ipratropium-albuterol 0.5-2.5 (3) MG/3ML nebulizer solution  What changed: See the new instructions.  Commonly known as: Duoneb   USE 1 AMPULE IN NEBULIZER 4 TIMES DAILY            CONTINUE taking these medications        Instructions   Budeson-Glycopyrrol-Formoterol 160-9-4.8 MCG/ACT Aero  Commonly known as: Breztri Aerosphere   Inhale 2 Puffs 2 times a day.  Dose: 2 Puff     Buprenorphine 10 MCG/HR Ptwk   Place 1 Patch on the skin every 7 days.  Dose: 1 Patch     dexamethasone 4 MG Tabs  Commonly known as: Decadron   Take 4 mg by mouth 2 times a day. Pt started on 7/21/2023 for 10 day course  Dose: 4 mg     diclofenac sodium 1 % Gel  Commonly known as: Voltaren   Apply 2 g topically 4 times a day as needed (Apply's on on neck for pain).  Dose: 2 g     diphenhydrAMINE 25 MG Tabs  Commonly known as: Benadryl   Take 25 mg by mouth every 6 hours as needed for Sleep.  Dose: 25 mg     gabapentin 300 MG Caps  Commonly known as: Neurontin   Take 300 mg by mouth 3 times a day as needed. Indications: Neuropathic Pain  Dose: 300 mg     HYDROcodone/acetaminophen  MG Tabs  Commonly known as: Norco   Take 1 Tablet by mouth every 6 hours as needed for Severe Pain.  Dose: 1 Tablet     HYDROmorphone 2 MG Tabs  Commonly known as: Dilaudid   Take 2-4 mg by mouth every 3 hours as needed for Severe Pain.  Dose: 2-4 mg     hydrOXYzine HCl 25 MG Tabs  Commonly known as: Atarax   Take 25 mg by mouth every day.  Dose: 25 mg     loratadine 10 MG Tabs  Commonly known as: Claritin   Take 10 mg by mouth every day.  Dose: 10 mg     Ventolin  (90 Base) MCG/ACT Aers inhalation aerosol  Generic drug: albuterol   INHALE 2 PUFFS BY MOUTH EVERY 4 HOURS AS NEEDED FOR SHORTNESS OF BREATH              Allergies  Allergies   Allergen Reactions    Penicillins Hives, Rash and Swelling      Pt reports that she gets swelling in throat and face, rash all over face and arms.   Tolerated cefazolin    Aspirin Rash and Hives     Pt reports that she received a rash on face, pt reports it ok if she takes NORCO    Other Environmental Shortness of Breath     Perfumes, dander, scents       DIET  Orders Placed This Encounter   Procedures    Diet Order Diet: Cardiac     Standing Status:   Standing     Number of Occurrences:   1     Order Specific Question:   Diet:     Answer:   Cardiac [6]    Discontinue Diet Tray     Standing Status:   Standing     Number of Occurrences:   1       ACTIVITY  As tolerated.  Weight bearing as tolerated    CONSULTATIONS  Pulmonology-Dr. Lopez    PROCEDURES  8/22 -John-bronchoscopy with bronchial washings and brushings    LABORATORY  Lab Results   Component Value Date    SODIUM 134 (L) 08/23/2023    POTASSIUM 4.9 08/23/2023    CHLORIDE 99 08/23/2023    CO2 24 08/23/2023    GLUCOSE 122 (H) 08/23/2023    BUN 10 08/23/2023    CREATININE 0.55 08/23/2023        Lab Results   Component Value Date    WBC 7.8 08/23/2023    HEMOGLOBIN 10.5 (L) 08/23/2023    HEMATOCRIT 35.0 (L) 08/23/2023    PLATELETCT 176 08/23/2023        Total time of the discharge process exceeds 36 minutes.

## 2023-08-24 NOTE — PROGRESS NOTES
Discharge instructions given and discussed, signed copy in chart. Pt refused all oral meds prior to discharge. Pt verbalized understanding and all questions answered. yes prescriptions- No new. Pt discharged home in stable condition on no O2 via wheelchair escorted by transport and friend. Personal belongings sent with patient. IV removed and tolerated well. Tele box removed, monitor tech notified.

## 2023-08-24 NOTE — DIETARY
"Nutrition Update:    Day 3 of admit.  Melly Hendrickson is a 48 y.o. female with admitting DX of Pulmonary embolism on right (HCC) [I26.99].  Patient being followed to optimize nutrition.    Current Diet: Cardiac; % x 5 meals per ADLs    RD visited pt at bedside to follow up on interview. Pt stated she had no time currently, but was agreeable to a few questions. Reports normal meal pattern x >2 yrs is 1 \"normal\" meal (protein, starch, vegetable) daily with a muffin for breakfast and a half chicken salad sandwich for lunch. Pt states she has noticed early satiety, states leaving 2-3 ounces of food on her plate; unclear how this compares to normal portions or percentage of normal intake for pt; at this time, physician entered room, and pt politely asked if RD could return at later time until after she had spoken with physician.    Malnutrition risk: unable to determine; unable to complete interview d/t pt discharged before RD could return to complete interview and nutrition-focused physical exam     Problem: Nutritional:  Goal: Achieve adequate nutritional intake  Description: Patient will consume >75% of meals  Outcome: Discharged - met    RD will screen weekly per department policy; available PRN.     "

## 2023-08-24 NOTE — PROGRESS NOTES
"PALLIATIVE CARE SOCIAL WORK NOTE    Patient: Melly Barajas  Age: 48  Gender: Female  MRN: [id number]  Insurance: Medicaid FFS  Date Admitted: 8/21/23  Date of Service: 8/23/23    LMSW met with patient and palliative care provider, Kay Christensen. Patient has uncontrolled pain and verbalized she has anxiety as well. Her pain level is 12-13/10. She has not been eating much due to lack of appetite. She reports not sleeping well either. She currently lives with her friend, Camille. Her children all live in [place] and she has 4 grandchildren. She has a strained relationship with 2 of her children and reports it is affecting her. Patient verbalized frustration with medical system and fear about what is to come. She believes in God and still feels \"like there is so much still to do\". SW provided emotional support throughout visit. Patient is not sure what she wants the next steps to be. She is hesitant to start chemo. \"I don't trust none of it\". She reports her family and friends do not know her wishes and she is agreeable to discuss advance directives. She is agreeable to a follow up visit as she started to get tired. Will follow patient as needed.    Millie Gorman, [facility]  Palliative Care    "

## 2023-08-24 NOTE — CARE PLAN
Problem: Pain - Standard  Goal: Alleviation of pain or a reduction in pain to the patient’s comfort goal  Outcome: Not Progressing  Flowsheets (Taken 8/23/2023 1930)  Non Verbal Scale: Moaning  OB Pain Intervention:   Elevation   Repositioned   Education  Note: Pt  educated about medication ordered,will be able to report pain level appropriately.   The patient is Stable - Low risk of patient condition declining or worsening    Shift Goals  Clinical Goals: Pain medication,antibiotic,monitored oxygen saturation  Patient Goals: comfort  Family Goals: comfort, get her home    Progress made toward(s) clinical / shift goals:  Pt WBC within normal limits,pain level to acceptable level    Patient is not progressing towards the following goals:      Problem: Pain - Standard  Goal: Alleviation of pain or a reduction in pain to the patient’s comfort goal  Outcome: Not Progressing  Flowsheets (Taken 8/23/2023 1930)  Non Verbal Scale: Moaning  OB Pain Intervention:   Elevation   Repositioned   Education  Note: Pt  educated about medication ordered,will be able to report pain level appropriately.

## 2023-08-25 NOTE — PROGRESS NOTES
ALVIN Da Silva contacted pt post discharge to offer Community Care Management services. CHW spoke with pt and she asked  for a  call back in a couple minutes due to being busy at this time. ALVIN Da Silva attempted to contact pt an hour later. CHW was unable to reach pt. CCM contact information was provided via . Due to pt not answering and Oncology SW following pt, CHW will not continue to follow at this time.

## 2023-09-07 NOTE — PROGRESS NOTES
Consult Note: Hematology/Oncology     Primary Care:  Checo Baker M.D.    Chief Complaint   Patient presents with    Cancer     Follow up        Current Treatment: None    Prior Treatment:     4/17/2023-4/21/2023: RLL Lung SBRT  7/19/23-7/24/2023: R Sacrum SBRT      Subjective:   History of Presenting Illness:    Melly Hendrickson is a 48 y.o. 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    Patient history dates back to July 2023 when she had a chest x-ray performed that revealed a potential mass in her right lung.  This led to a chest CT performed on February 13, 2023.  This demonstrated 1.4 cm mass in the right lower lobe and a 1.9 groundglass opacity also in the right lower lobe.  The groundglass opacity appeared stable from previous scans but the mass was thought to be new.      A biopsy was performed and confirmed lung adenocarcinoma.    Patient saw thoracic surgery and obtained PFTs as well as a PET scan.  She was discussed at tumor board and given her significant dyspnea and her underlying COPD it was thought that she would be a better candidate for SBRT rather than surgical resection.    April 17, 2023 patient began SBRT.  Completed this treatment without many issues.    After this patient did notice a small lump in her breast reduction scar line.  She said that over the next few days it grew and she ultimately went to her PCP    PCP tried to drain the mass; however it just bled significantly during the I&D attempt.  Thus she was sent to Dr. Becerra at [facility] for excision.     Pathology from this excision demonstrated poorly differentiated malignancy favoring carcinoma without obvious involvement of underlying skin and nonspecific IHC profile.  Based on his diagnosis cancer type ID was sent off for.  Results showed 90% probability of sarcoma    Over the past several months she has noted severe  pain in her sacral region.  She does have sacral lesions that are concerning for metastatic disease.  Patient has biopsy of sacral mass on 7/18/2023.     Of note patient has 5 children and 4 grandchildren.    She follows with pain management    Interval History    She was recently admitted to the ER for hemoptysis on 8/20/23 and was found to have a PE.  She was still having hemoptysis and thus was not started on AC. She has discharged on 8/24/23.    Today she is having significant pain in her L shoulder and nerve pain in her R leg.  She is following up with pain management and has been approved for fentanyl patches.     She has not received her appointment with [facility] and has not received a call yet.       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    Chronic obstructive pulmonary disease (HCC)     COPD (chronic obstructive pulmonary disease) (HCC)     DDD (degenerative disc disease), lumbar     Depression     Emphysema of lung (HCC)     MRSA (methicillin resistant staph aureus) culture positive     Pain     back - cervical, scapula, lower    Pericarditis     Primary adenocarcinoma of lower lobe of right lung (HCC)     Psychiatric disorder     Anxiety, Depression    Sleep apnea     Snoring     Urinary incontinence         Past Surgical History:   Procedure Laterality Date    NC BRONCHOSCOPY,DIAGNOSTIC N/A 8/22/2023    Procedure: BRONCHOSCOPY;  Surgeon: Sandra Lopez D.O.;  Location: SURGERY [facility];  Service: Pulmonary    NC EXC SKIN BENIG >4CM TRUNK,ARM,LEG Left 6/7/2023    Procedure: EXCISION OF SOFT TISSUE MASS LEFT CHEST WALL;  Surgeon: Nasir Becerra M.D.;  Location: SURGERY [facility];  Service: General    NC BRONCHOSCOPY,DIAGNOSTIC N/A 02/14/2023    Procedure: FIBER OPTIC BRONCHOSCOPY WASH, BRUSH, BRONCHOALVEOLAR LAVAGE, BIOPSY AND FINE  NEEDLE ASPIRATION AND NAVIGATION, ROBOTICS;  Surgeon: Guicho Ellis M.D.;  Location: SURGERY [facility];  Service: Pulmonary Robotic    ENDOBRONCHIAL US ADD-ON N/A 02/14/2023    Procedure: ENDOBRONCHIAL ULTRASOUND (EBUS);  Surgeon: Guicho Ellis M.D.;  Location: SURGERY [facility];  Service: Pulmonary Robotic    MAMMOPLASTY REDUCTION Bilateral     SEPTOPLASTY      TUBE & ECTOPIC PREG., REMOVAL      x 2       Social History     Tobacco Use    Smoking status: Some Days     Current packs/day: 0.50     Average packs/day: 0.5 packs/day for 20.0 years (10.0 ttl pk-yrs)     Types: Cigarettes     Passive exposure: Past    Smokeless tobacco: Never    Tobacco comments:     on and off    Vaping Use    Vaping Use: Never used   Substance Use Topics    Alcohol use: Not Currently     Comment: occ, rare    Drug use: Not Currently     Types: Marijuana     Comment: THC gummies        Family History   Problem Relation Age of Onset    Diabetes Mother     Cancer Father         Prostate    Cancer Paternal Grandmother         Cervical    Cancer Paternal Grandfather         Unk       Allergies   Allergen Reactions    Penicillins Hives, Rash and Swelling     Pt reports that she gets swelling in throat and face, rash all over face and arms.   Tolerated cefazolin    Aspirin Rash and Hives     Pt reports that she received a rash on face, pt reports it ok if she takes NORCO    Other Environmental Shortness of Breath     Perfumes, dander, scents       Current Outpatient Medications   Medication Sig Dispense Refill    fluticasone (FLONASE) 50 MCG/ACT nasal spray USE 2 SPRAY(S) INTO AFFECTED NOSTRILS ONCE DAILY Strength: 50 mcg (Patient taking differently: Administer 2 Sprays into affected nostril(S) every evening. USE 2 SPRAY(S) INTO AFFECTED NOSTRILS ONCE DAILY Strength: 50 mcg) 16 g 3    ipratropium-albuterol (DUONEB) 0.5-2.5 (3) MG/3ML nebulizer solution USE 1 AMPULE IN NEBULIZER 4 TIMES DAILY (Patient taking differently:  Take 3 mL by nebulization 4 times a day.) 180 mL 3    diclofenac sodium (VOLTAREN) 1 % Gel Apply 2 g topically 4 times a day as needed (Apply's on on neck for pain). 50 g 1    diphenhydrAMINE (BENADRYL) 25 MG Tab Take 25 mg by mouth every 6 hours as needed for Sleep.      Budeson-Glycopyrrol-Formoterol (BREZTRI AEROSPHERE) 160-9-4.8 MCG/ACT Aerosol Inhale 2 Puffs 2 times a day. 1.7 g 3    HYDROcodone/acetaminophen (NORCO)  MG Tab Take 1 Tablet by mouth every 6 hours as needed for Severe Pain.      VENTOLIN  (90 Base) MCG/ACT Aero Soln inhalation aerosol INHALE 2 PUFFS BY MOUTH EVERY 4 HOURS AS NEEDED FOR SHORTNESS OF BREATH (Patient taking differently: Inhale 2 Puffs every four hours as needed for Shortness of Breath.) 18 g 2    loratadine (CLARITIN) 10 MG Tab Take 10 mg by mouth every day.      HYDROmorphone (DILAUDID) 2 MG Tab Take 2-4 mg by mouth every 3 hours as needed for Severe Pain. (Patient not taking: Reported on 9/7/2023)      gabapentin (NEURONTIN) 300 MG Cap Take 300 mg by mouth 3 times a day as needed. Indications: Neuropathic Pain (Patient not taking: Reported on 9/7/2023)      dexamethasone (DECADRON) 4 MG Tab Take 4 mg by mouth 2 times a day. Pt started on 7/21/2023 for 10 day course (Patient not taking: Reported on 9/7/2023)      hydrOXYzine HCl (ATARAX) 25 MG Tab Take 25 mg by mouth every day. (Patient not taking: Reported on 9/7/2023)      Buprenorphine 10 MCG/HR PATCH WEEKLY Place 1 Patch on the skin every 7 days. (Patient not taking: Reported on 9/7/2023)       No current facility-administered medications for this encounter.       Review of Systems   Constitutional:  Positive for malaise/fatigue. Negative for chills, fever and weight loss.   HENT:  Negative for congestion, ear pain, nosebleeds and sore throat.    Eyes:  Negative for blurred vision.   Respiratory:  Negative for cough, sputum production, shortness of breath and wheezing.    Cardiovascular:  Negative for chest pain,  "palpitations, orthopnea and leg swelling.   Gastrointestinal:  Negative for abdominal pain, heartburn, nausea and vomiting.   Genitourinary:  Negative for dysuria, frequency and urgency.   Musculoskeletal:  Positive for back pain and joint pain. Negative for neck pain.   Neurological:  Negative for dizziness, tingling, tremors, sensory change, focal weakness and headaches.   Endo/Heme/Allergies:  Does not bruise/bleed easily.   Psychiatric/Behavioral:  Negative for depression, memory loss and suicidal ideas.    All other systems reviewed and are negative.      Problem list, medications, and allergies reviewed by myself today in Epic.     Objective:     Vitals:    09/07/23 0846   BP: 122/78   BP Location: Right arm   Patient Position: Sitting   BP Cuff Size: Adult   Pulse: (!) 123   Temp: 36.9 °C (98.4 °F)   TempSrc: Temporal   SpO2: 93%   Weight: 87.5 kg (192 lb 14.4 oz)   Height: 1.829 m (6' 0.01\")         DESC; KARNOFSKY SCALE WITH ECOG EQUIVALENT: 90, Able to carry on normal activity; minor signs or symptoms of disease (ECOG equivalent 0)    DISTRESS LEVEL: no acute distress    Physical Exam  Constitutional:       General: She is not in acute distress.     Appearance: Normal appearance. She is not ill-appearing.   HENT:      Head: Normocephalic and atraumatic.      Nose: Nose normal.      Mouth/Throat:      Mouth: Mucous membranes are moist.      Pharynx: No oropharyngeal exudate or posterior oropharyngeal erythema.   Eyes:      General: No scleral icterus.     Conjunctiva/sclera: Conjunctivae normal.      Pupils: Pupils are equal, round, and reactive to light.   Abdominal:      General: Abdomen is flat. Bowel sounds are normal. There is distension.      Palpations: Abdomen is soft. There is no mass.      Tenderness: There is abdominal tenderness. There is no guarding.   Musculoskeletal:         General: No swelling, tenderness or deformity. Normal range of motion.      Cervical back: Normal range of motion and " neck supple. No rigidity or tenderness.      Right lower leg: No edema.      Left lower leg: No edema.   Skin:     General: Skin is warm and dry.      Coloration: Skin is not jaundiced or pale.      Findings: No bruising, erythema or rash.   Neurological:      General: No focal deficit present.      Mental Status: She is alert and oriented to person, place, and time. Mental status is at baseline.      Sensory: No sensory deficit.      Motor: No weakness.      Coordination: Coordination normal.      Gait: Gait normal.   Psychiatric:         Mood and Affect: Mood normal.         Behavior: Behavior normal.         Thought Content: Thought content normal.         Judgment: Judgment normal.         Labs:   Most recent labs reviewed.  Please see the lab tab of chart review    Imaging:   Most recent images below have been independently reviewed by me.  Please see the imaging tab of chart review    7/27/23: PET   1.  Focal uptake in the medial RIGHT lower lobe of lung abutting the thoracic spine, consistent with tumor..  2.  No PET correlate for ill-defined spiculated nodule in the RIGHT lower lobe.  3.  Large hypermetabolic mass corresponds to lytic lesion in the RIGHT sacrum consistent with malignancy.  4.  Intramuscular focal uptake at the posterior LEFT shoulder, likely metastatic.    Pathology:    7/14/2023: Pathology of Chest wall  A. Chest wall mass:          Metastatic poorly differentiated malignancy favoring carcinoma           without obvious involvement of overlying skin and a           non-specific IHC profile of some keratins positive (see           microscopic description).          See comment.   Main Cancer Type: Sarcoma   Probability: 90%     Subtype: Undifferentiated Sarcoma ([facility])   Probability: 90%     7/19/2023: Path of the Sacral Bone  A. Sacral bone biopsy:          Bone cores effaced by a high-grade malignancy histologically           identical to the previously excised chest wall mass            ([id number]).     2/14/2023: Pathology of Lung Mass  A. Lung, right lower lobe fine needle aspiration slides:          Positive for carcinoma.   B. Core, right lower lobe lung:          Moderately differentiated lung adenocarcinoma.   C. Lung, right lower lobe biopsy core and touchprep slides:          Moderately differentiated lung adenocarcinoma.   D. Bronchoalveolar lavage right lower lobe:          Rare atypical cells, malignant cells vs. reactive bronchial           cells.     Assessment/Plan:      Cancer Staging   No matching staging information was found for the patient.       Ms. Aleida Hendrickson is a 49 yo F who presents today with a recent diagnosis of stage I A2 adenocarcinoma of the right lung status post SBRT now with a new diagnosis of metastatic undifferentiated sarcoma. Foundation 1 shows no actionable mutations.    She has completed SBRT to the R sacrum.      She has not seen by [facility] yet, due to issues with insurance.     We discussed the results of her CTA, which showed  a pulmonary embolism. I also am concerned that there could be scattered bilateral pulmonary nodules.     We discussed that her cancer could be spreading.  She is still not ready to start chemo until she sees [facility].      1) Sarcoma with metastatic disease to Chest wall,  sacral and L shoulder mets  -Recommended treatment with [facility]  -treatment plan on hold per patient's request.  She would like to think about this further, meet with [facility] and determine if radiation has worked.  -Patient is seeing a sarcoma specialist at [facility]    Recommended Regimen:   Doxorubicin 25 mg per metered squared IV continuous infusion over 24 hours daily on days 1 through 3  Ifosfamide 2500 mg per metered squared IV over 3 hours on days 1 through 4  [place] 500mg/m2 IV over 15 minutes 3x daily   Will need G-CSF      2) Stage 1A2 Adenocarcinoma of the Lung  -s/p SBRT  -monitor per NCCN Guidelines  -H&P and chest CT contrast every 6 mo for 2-3 y,    -H&P and a low-dose non-contrast-enhanced chest CT annually    3) Cancer related Pain  -Per pain specialist    4) Pulmonary Embolism  -Right lower lobe segmental and subsegmental occlusive pulmonary emboli.  -she is no longer coughing blood  -Eliquis 10 mg twice daily for 7 days followed by 5 mg twice daily.     No follow-ups on file.     Any questions and concerns raised by the patient were addressed and answered. Patient denies any further questions.  Patient encouraged to call the office with any concerns or issues.     Alma Newton M.D.  Hematology/Oncology    38 minutes was spent on this case

## 2023-09-07 NOTE — ADDENDUM NOTE
Encounter addended by: Jeffery Salinas on: 9/7/2023 9:50 AM   Actions taken: Charge Capture section accepted

## 2023-09-19 PROBLEM — C49.9 SARCOMA (HCC): Status: ACTIVE | Noted: 2023-01-01

## 2023-09-19 NOTE — ADDENDUM NOTE
Encounter addended by: Fide Schwartz, Med Ass't on: 9/19/2023 3:54 PM   Actions taken: Charge Capture section accepted

## 2023-09-19 NOTE — TELEPHONE ENCOUNTER
Rec'd vm from Jeffery with Dr. Vickie Newton' office. She states they are trying to refer pt to [facility] but the referral needs to come from PCP, per Medicaid. Can you please make referral so I can inform Dr. Newton? Thank you!

## 2023-09-19 NOTE — Clinical Note
Hi Patient needs a stat PET ordered (ideally before Tuesday of next week) Will need direct admit to hospital on Tuesday Sept 26th for chemo ([facility])

## 2023-09-19 NOTE — PROGRESS NOTES
Consult Note: Hematology/Oncology     Primary Care:  Checo Baker M.D.    Chief Complaint   Patient presents with    Cancer    Follow-Up       Current Treatment: None    Prior Treatment:     4/17/2023-4/21/2023: RLL Lung SBRT  7/19/23-7/24/2023: R Sacrum SBRT    This evaluation was conducted via Zoom using secure and encrypted videoconferencing technology. The patient was in their home in the [facility].    The patient's identity was confirmed and verbal consent was obtained for this virtual visit.    Subjective:   History of Presenting Illness:    Melly Hendrickson is a 48 y.o. 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    Patient history dates back to July 2023 when she had a chest x-ray performed that revealed a potential mass in her right lung.  This led to a chest CT performed on February 13, 2023.  This demonstrated 1.4 cm mass in the right lower lobe and a 1.9 groundglass opacity also in the right lower lobe.  The groundglass opacity appeared stable from previous scans but the mass was thought to be new.      A biopsy was performed and confirmed lung adenocarcinoma.    Patient saw thoracic surgery and obtained PFTs as well as a PET scan.  She was discussed at tumor board and given her significant dyspnea and her underlying COPD it was thought that she would be a better candidate for SBRT rather than surgical resection.    April 17, 2023 patient began SBRT.  Completed this treatment without many issues.    After this patient did notice a small lump in her breast reduction scar line.  She said that over the next few days it grew and she ultimately went to her PCP    PCP tried to drain the mass; however it just bled significantly during the I&D attempt.  Thus she was sent to Dr. Becerra at [facility] for excision.     Pathology from this excision demonstrated poorly differentiated malignancy  favoring carcinoma without obvious involvement of underlying skin and nonspecific IHC profile.  Based on his diagnosis cancer type ID was sent off for.  Results showed 90% probability of sarcoma    Over the past several months she has noted severe pain in her sacral region.  She does have sacral lesions that are concerning for metastatic disease.  Patient has biopsy of sacral mass on 7/18/2023.     Of note patient has 5 children and 4 grandchildren.    She follows with pain management    Interval History      Patient is endorsing 3 nodules on the R arm.  1 on the clavicle.  Patient also has a nodule on her L arm. Patient has 1 nodule on her bikini line. She has another on the inside of her L thigh. These started 1 week ago.    She is on 2-3L of oxygen for the past several days.     She was recently admitted to the ER for hemoptysis on 8/20/23 and was found to have a PE.  She was still having hemoptysis and thus was not started on AC. She has discharged on 8/24/23.    Today she is having significant pain in her L shoulder and nerve pain in her R leg.  She is following up with pain management and has been approved for fentanyl patches.     She has not received her appointment with [facility] and has not received a call yet.       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    Chronic obstructive pulmonary disease (HCC)     COPD (chronic obstructive pulmonary disease) (HCC)     DDD (degenerative disc disease), lumbar     Depression     Emphysema of lung (HCC)     MRSA (methicillin resistant staph aureus) culture positive     Pain     back - cervical, scapula, lower    Pericarditis     Primary adenocarcinoma of lower lobe of right lung (HCC)     Psychiatric disorder     Anxiety, Depression    Sleep apnea     Snoring     Urinary incontinence         Past Surgical History:    Procedure Laterality Date    NC BRONCHOSCOPY,DIAGNOSTIC N/A 8/22/2023    Procedure: BRONCHOSCOPY;  Surgeon: Sandra Lopez D.O.;  Location: SURGERY [facility];  Service: Pulmonary    NC EXC SKIN BENIG >4CM TRUNK,ARM,LEG Left 6/7/2023    Procedure: EXCISION OF SOFT TISSUE MASS LEFT CHEST WALL;  Surgeon: Nasir Becerra M.D.;  Location: SURGERY [facility];  Service: General    NC BRONCHOSCOPY,DIAGNOSTIC N/A 02/14/2023    Procedure: FIBER OPTIC BRONCHOSCOPY WASH, BRUSH, BRONCHOALVEOLAR LAVAGE, BIOPSY AND FINE NEEDLE ASPIRATION AND NAVIGATION, ROBOTICS;  Surgeon: Guihco Ellis M.D.;  Location: SURGERY [facility];  Service: Pulmonary Robotic    ENDOBRONCHIAL US ADD-ON N/A 02/14/2023    Procedure: ENDOBRONCHIAL ULTRASOUND (EBUS);  Surgeon: Guicho Ellis M.D.;  Location: SURGERY [facility];  Service: Pulmonary Robotic    MAMMOPLASTY REDUCTION Bilateral     SEPTOPLASTY      TUBE & ECTOPIC PREG., REMOVAL      x 2       Social History     Tobacco Use    Smoking status: Some Days     Current packs/day: 0.50     Average packs/day: 0.5 packs/day for 20.0 years (10.0 ttl pk-yrs)     Types: Cigarettes     Passive exposure: Past    Smokeless tobacco: Never    Tobacco comments:     on and off    Vaping Use    Vaping Use: Never used   Substance Use Topics    Alcohol use: Not Currently     Comment: occ, rare    Drug use: Not Currently     Types: Marijuana     Comment: THC gummies        Family History   Problem Relation Age of Onset    Diabetes Mother     Cancer Father         Prostate    Cancer Paternal Grandmother         Cervical    Cancer Paternal Grandfather         Unk       Allergies   Allergen Reactions    Penicillins Hives, Rash and Swelling     Pt reports that she gets swelling in throat and face, rash all over face and arms.   Tolerated cefazolin    Aspirin Rash and Hives     Pt reports that she received a rash on face, pt reports it ok if she takes NORCO    Other Environmental Shortness of  Breath     Perfumes, dander, scents       Current Outpatient Medications   Medication Sig Dispense Refill    Apixaban Starter Pack (ELIQUIS DVT/PE STARTER PACK) 5 MG Tablet Therapy Pack Take 5 mg by mouth 2 (two) times a day for 30 doses. 70 Each 3    HYDROmorphone (DILAUDID) 2 MG Tab Take 2-4 mg by mouth every 3 hours as needed for Severe Pain.      fluticasone (FLONASE) 50 MCG/ACT nasal spray USE 2 SPRAY(S) INTO AFFECTED NOSTRILS ONCE DAILY Strength: 50 mcg (Patient taking differently: Administer 2 Sprays into affected nostril(S) every evening. USE 2 SPRAY(S) INTO AFFECTED NOSTRILS ONCE DAILY Strength: 50 mcg) 16 g 3    ipratropium-albuterol (DUONEB) 0.5-2.5 (3) MG/3ML nebulizer solution USE 1 AMPULE IN NEBULIZER 4 TIMES DAILY (Patient taking differently: Take 3 mL by nebulization 4 times a day.) 180 mL 3    hydrOXYzine HCl (ATARAX) 25 MG Tab Take 25 mg by mouth every day.      diclofenac sodium (VOLTAREN) 1 % Gel Apply 2 g topically 4 times a day as needed (Apply's on on neck for pain). 50 g 1    diphenhydrAMINE (BENADRYL) 25 MG Tab Take 25 mg by mouth every 6 hours as needed for Sleep.      Budeson-Glycopyrrol-Formoterol (BREZTRI AEROSPHERE) 160-9-4.8 MCG/ACT Aerosol Inhale 2 Puffs 2 times a day. 1.7 g 3    HYDROcodone/acetaminophen (NORCO)  MG Tab Take 1 Tablet by mouth every 6 hours as needed for Severe Pain.      VENTOLIN  (90 Base) MCG/ACT Aero Soln inhalation aerosol INHALE 2 PUFFS BY MOUTH EVERY 4 HOURS AS NEEDED FOR SHORTNESS OF BREATH (Patient taking differently: Inhale 2 Puffs every four hours as needed for Shortness of Breath.) 18 g 2    loratadine (CLARITIN) 10 MG Tab Take 10 mg by mouth every day.      gabapentin (NEURONTIN) 300 MG Cap Take 300 mg by mouth 3 times a day as needed. Indications: Neuropathic Pain      dexamethasone (DECADRON) 4 MG Tab Take 4 mg by mouth 2 times a day. Pt started on 7/21/2023 for 10 day course      Buprenorphine 10 MCG/HR PATCH WEEKLY Place 1 Patch on the  skin every 7 days.       No current facility-administered medications for this encounter.       Review of Systems   Constitutional:  Positive for malaise/fatigue. Negative for chills, fever and weight loss.   HENT:  Negative for congestion, ear pain, nosebleeds and sore throat.    Eyes:  Negative for blurred vision.   Respiratory:  Negative for cough, sputum production, shortness of breath and wheezing.    Cardiovascular:  Negative for chest pain, palpitations, orthopnea and leg swelling.   Gastrointestinal:  Negative for abdominal pain, heartburn, nausea and vomiting.   Genitourinary:  Negative for dysuria, frequency and urgency.   Musculoskeletal:  Positive for back pain and joint pain. Negative for neck pain.   Neurological:  Negative for dizziness, tingling, tremors, sensory change, focal weakness and headaches.   Endo/Heme/Allergies:  Does not bruise/bleed easily.   Psychiatric/Behavioral:  Negative for depression, memory loss and suicidal ideas.    All other systems reviewed and are negative.      Problem list, medications, and allergies reviewed by myself today in Epic.     Objective:     Vitals:    09/19/23 1513   Weight: 88 kg (194 lb)   Height: 1.829 m (6')         DESC; KARNOFSKY SCALE WITH ECOG EQUIVALENT: 90, Able to carry on normal activity; minor signs or symptoms of disease (ECOG equivalent 0)    DISTRESS LEVEL: no acute distress  Not done due to virtual visit. Previous PE is listed below  Physical Exam  Constitutional:       General: She is not in acute distress.     Appearance: Normal appearance. She is not ill-appearing.   HENT:      Head: Normocephalic and atraumatic.      Nose: Nose normal.      Mouth/Throat:      Mouth: Mucous membranes are moist.      Pharynx: No oropharyngeal exudate or posterior oropharyngeal erythema.   Eyes:      General: No scleral icterus.     Conjunctiva/sclera: Conjunctivae normal.      Pupils: Pupils are equal, round, and reactive to light.   Abdominal:      General:  Abdomen is flat. Bowel sounds are normal. There is distension.      Palpations: Abdomen is soft. There is no mass.      Tenderness: There is abdominal tenderness. There is no guarding.   Musculoskeletal:         General: No swelling, tenderness or deformity. Normal range of motion.      Cervical back: Normal range of motion and neck supple. No rigidity or tenderness.      Right lower leg: No edema.      Left lower leg: No edema.   Skin:     General: Skin is warm and dry.      Coloration: Skin is not jaundiced or pale.      Findings: No bruising, erythema or rash.   Neurological:      General: No focal deficit present.      Mental Status: She is alert and oriented to person, place, and time. Mental status is at baseline.      Sensory: No sensory deficit.      Motor: No weakness.      Coordination: Coordination normal.      Gait: Gait normal.   Psychiatric:         Mood and Affect: Mood normal.         Behavior: Behavior normal.         Thought Content: Thought content normal.         Judgment: Judgment normal.         Labs:   Most recent labs reviewed.  Please see the lab tab of chart review    Imaging:   Most recent images below have been independently reviewed by me.  Please see the imaging tab of chart review    7/27/23: PET   1.  Focal uptake in the medial RIGHT lower lobe of lung abutting the thoracic spine, consistent with tumor..  2.  No PET correlate for ill-defined spiculated nodule in the RIGHT lower lobe.  3.  Large hypermetabolic mass corresponds to lytic lesion in the RIGHT sacrum consistent with malignancy.  4.  Intramuscular focal uptake at the posterior LEFT shoulder, likely metastatic.    Pathology:    7/14/2023: Pathology of Chest wall  A. Chest wall mass:          Metastatic poorly differentiated malignancy favoring carcinoma           without obvious involvement of overlying skin and a           non-specific IHC profile of some keratins positive (see           microscopic description).           See comment.   Main Cancer Type: Sarcoma   Probability: 90%     Subtype: Undifferentiated Sarcoma (MFH)   Probability: 90%     7/19/2023: Path of the Sacral Bone  A. Sacral bone biopsy:          Bone cores effaced by a high-grade malignancy histologically           identical to the previously excised chest wall mass           ([id number]).     2/14/2023: Pathology of Lung Mass  A. Lung, right lower lobe fine needle aspiration slides:          Positive for carcinoma.   B. Core, right lower lobe lung:          Moderately differentiated lung adenocarcinoma.   C. Lung, right lower lobe biopsy core and touchprep slides:          Moderately differentiated lung adenocarcinoma.   D. Bronchoalveolar lavage right lower lobe:          Rare atypical cells, malignant cells vs. reactive bronchial           cells.     Assessment/Plan:      Cancer Staging   Primary undifferentiated sarcoma of soft tissue (HCC)  Staging form: Soft Tissue Sarcoma of the Trunk and Extremities, AJCC 8th Edition  - Clinical: Stage IV (cTX, cN0, cM1) - Signed by Alma Newton M.D. on 9/19/2023         Ms. Aleida Hendrickson is a 49 yo F who presents today with a recent diagnosis of stage I A2 adenocarcinoma of the right lung status post SBRT now with a new diagnosis of metastatic undifferentiated sarcoma. Foundation 1 shows no actionable mutations.    She has completed SBRT to the R sacrum.      She is now reporting multiple new nodules throughout her body.     She has not seen by [facility] yet, due to issues with insurance.  The referral has to come from her PCP and my MA has been working to try to get this to occur.     We spoke at length that I definitely recommend starting the treatment now, and no longer waiting.       1) Sarcoma with metastatic disease to Chest wall,  sacral and L shoulder mets  -PET scan now. Ordered STAT  -Recommended treatment with AIM  -treatment plan on hold per patient's request.  She would like to think about this further,  meet with [facility] and determine if radiation has worked.  -Patient is seeing a sarcoma specialist at [facility]    Recommended Regimen:   Doxorubicin 25 mg per metered squared IV continuous infusion over 24 hours daily on days 1 through 3  Ifosfamide 2500 mg per metered squared IV over 3 hours on days 1 through 4  [place] 500mg/m2 IV over 15 minutes 3x daily   Will need G-CSF      2) Stage 1A2 Adenocarcinoma of the Lung  -s/p SBRT  -monitor per NCCN Guidelines  -H&P and chest CT contrast every 6 mo for 2-3 y,   -H&P and a low-dose non-contrast-enhanced chest CT annually    3) Cancer related Pain  -Per pain specialist    4) Pulmonary Embolism  -Right lower lobe segmental and subsegmental occlusive pulmonary emboli.  -Continue with Eliquis 5 mg twice daily.     No follow-ups on file.     Any questions and concerns raised by the patient were addressed and answered. Patient denies any further questions.  Patient encouraged to call the office with any concerns or issues.     Alma Newton M.D.  Hematology/Oncology    38 minutes was spent on this case

## 2023-09-20 PROBLEM — J96.20 ACUTE ON CHRONIC RESPIRATORY FAILURE (HCC): Status: ACTIVE | Noted: 2023-01-01

## 2023-09-20 NOTE — ASSESSMENT & PLAN NOTE
Dr Alma Newton is her outpatient oncologist and will consult 9/21 in the hospital.  Patient has worsening on CT Chest findings

## 2023-09-20 NOTE — ASSESSMENT & PLAN NOTE
Patient has SpO2 86-88% on room air with evident respiratory distress.   HR: 140's  EKG in clinic: Sinus tachycardia. Gave to EMS to take to ED.   Recent diagnosis of PE on 08/20 at [facility]; was not discharged on anticoagulation d/t ongoing hemoptysis.   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   Apixaban was later prescribed by Oncologist on 09/07/23 but patient has not been able to start it/pick it up until yesterday (09/19/23).   Plan:  - Patient to go to ED via [facility] for further evaluation in setting of     hypoxia, tachycardia, dyspnea and recent PE without adequate     anticoagulation.   - [facility] reference center was called and notified of patient's transfer     from our clinic.

## 2023-09-20 NOTE — ED TRIAGE NOTES
Chief Complaint   Patient presents with    Shortness of Breath     PT hx lung ca that's metastasized. Was at another appt and was SOB. RA while walking in 70's per EMS. Currently on 6L NC

## 2023-09-20 NOTE — PROGRESS NOTES
OFFICE VISIT    Melly Hendrickson is a 48 y.o. female with PMH of Adenocarcinoma of the lung stage I, Undifferentiated sarcoma stage IV, COPD/Asthma overlap, Anxiety/depression.     Reason for visit:  Shortness of breath.     HPI:  Shortness of breath. 2 days of dyspnea at rest that is progressively worsening; also refers 1 week of fatigue, general malaise prior to this. On assessment she also reports chest discomfort/pleuritic chest pain, intermittent dry cough and palpitations. Recent PE diagnosis (08/20/23) at [facility]; no anticoagulation at the time d/t ongoing hemoptysis. At that time, per note, had bronchoalveolar lavage that did not report malignant cells. Apixaban indicated by Oncology on 09/07/23 but has not started/picked up until 09/19/23. On exam she is on evident respiratory distress; SpO2 86-88%; 's. EKG with sinus tachycardia without specific abnormalities. Indicated to patient need for going to the ED and is in agreement. [facility] has been called and are on site, and have been briefed on case as well as given EKG to take to ED. [facility] has been called to be notified of patient's need to transfer to ED.      Review of Systems   Constitutional:  Positive for malaise/fatigue. Negative for chills and fever.   Respiratory:  Positive for cough, sputum production and shortness of breath. Negative for hemoptysis.    Cardiovascular:  Positive for chest pain and palpitations. Negative for orthopnea and leg swelling.   Gastrointestinal:  Negative for abdominal pain and diarrhea.   Musculoskeletal:  Positive for back pain.   Neurological:  Negative for dizziness and headaches.   Psychiatric/Behavioral:  Positive for depression. Negative for suicidal ideas. The patient is nervous/anxious.        /78 (BP Location: Left arm, Patient Position: Sitting, BP Cuff Size: Adult)   Pulse (!) 132   Temp 36.1 °C (97 °F) (Temporal)   Ht 1.829 m (6')   Wt 84.7 kg (186 lb 12.8 oz)    SpO2 88%   BMI 25.33 kg/m²     Physical Exam  Constitutional:       General: She is in acute distress.      Appearance: She is ill-appearing.   Cardiovascular:      Rate and Rhythm: Regular rhythm. Tachycardia present.      Pulses: Normal pulses.      Heart sounds: Normal heart sounds.   Pulmonary:      Effort: Respiratory distress present.      Breath sounds: No stridor. No wheezing, rhonchi or rales.      Comments: SpO2 86-88% on room air.   Musculoskeletal:         General: Tenderness (Over elevated lesions as described.) present. No swelling.      Cervical back: No tenderness.      Right lower leg: No edema.      Left lower leg: No edema.      Comments: Multiple new lesions likely d/t sarcoma in lateral aspect right arm, infraclavicular area on the left, and medial aspect of left arm.   Lymphadenopathy:      Cervical: No cervical adenopathy.   Psychiatric:      Comments: Evident emotional/anxious.        Assessment and Plan:   48 y.o. female with PMH of Adenocarcinoma of the lung stage I, Undifferentiated sarcoma stage IV, COPD/Asthma overlap, Anxiety/depression.     Acute respiratory failure with hypoxia (HCC)  Patient has SpO2 86-88% on room air with evident respiratory distress.   HR: 140's  EKG in clinic: Sinus tachycardia. Gave to EMS to take to ED.   Recent diagnosis of PE on 08/20 at [place] [facility]; was not discharged on anticoagulation d/t ongoing hemoptysis.   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   Apixaban was later prescribed by Oncologist on 09/07/23 but patient has not been able to start it/pick it up until yesterday (09/19/23).   Plan:  - Patient to go to ED via [facility] for further evaluation in setting of     hypoxia, tachycardia, dyspnea and recent PE without adequate     anticoagulation.   - [facility] reference center was called and notified of patient's transfer     from our clinic.     Orders Placed This  Encounter    EKG - Clinic Performed       No follow-ups on file.      Patient case was seen/ assessed/ discussed with Dr. Casas.    Signed by:    Checo Gar, PGY-2, Internal Medicine  [facility]

## 2023-09-20 NOTE — ED NOTES
Message sent to Dr. Merino per pt request. Pt states she needs IV Benadryl not PO as pain medications make her itchy.

## 2023-09-20 NOTE — ED NOTES
Med rec completed per patient and friend at bedside.  Allergies reviewed with patient.  No outpatient antibiotics within the last 30 days.  Patient's preferred pharmacy: [facility] on [address].    Patient was prescribed an ELIQUIS STARTER PACK on 9/8/2023, however she states that she HAS NOT started this medication at home.

## 2023-09-20 NOTE — ED NOTES
Pt helped up to BS and back to [facility] without incident. Pt able to remove pants as they were very uncomfortable for her. Abx running. Covid swab collected and sent.

## 2023-09-20 NOTE — ED NOTES
Report called to Austin Gallagher. Pt resting in [facility] with stable vitals on 4L NC while eating provided meal.

## 2023-09-20 NOTE — ED NOTES
Pt states its hard to breath however saturations maintained on 4L NC. RT called and asked to come to bedside for treatment. RT at bedside now.   Pt medicated per MAR. Friend continues to be at bedside. Pt helped to and from [facility] once more. PT now resting in [facility]. Meal tray provided to pt. Pt states her friend will help set up her tray for her once resp tx complete. States no other needs at this time.

## 2023-09-20 NOTE — H&P
Hospital Medicine History & Physical Note    Date of Service  9/20/2023    Primary Care Physician  Checo Baker M.D.    Consultants  oncology    Specialist Names: Vickie Newton MD    Code Status  Full Code    Chief Complaint  Chief Complaint   Patient presents with    Shortness of Breath     PT hx lung ca that's metastasized. Was at another appt and was SOB. RA while walking in 70's per EMS. Currently on 6L NC         History of Presenting Illness  Melly Hendrickson is a 48 y.o. female with a hx of sarcoma, question of prior adenocarcinoma of lung for which she had radiation tx, pulmonary embolism dx'd 8/2023, active smoker, COPD/Asthma, bony mets to sacrum and shoulder. She was admitted 9/20/2023 with increase in work of breathing and increase in back and side pain with the inability to sleep over the last few days.  Her breathing has been worsening and she had been needing her nebulizer more often at home.    In the ER a CTA chest shows right subsegmental PE, worsening pulmonary nodules, T1 vertebral body lesion, and probably pneumonia.  Patient does state cough with thick sputum  Some occasional streaks of blood in sputum but she denies gross hemoptysis.    I discussed the plan of care with patient.    Review of Systems  Review of Systems   Constitutional:  Positive for fever and malaise/fatigue. Negative for chills.   HENT:  Negative for sore throat.    Respiratory:  Positive for cough and shortness of breath. Negative for hemoptysis.    Cardiovascular:  Negative for palpitations and leg swelling.   Gastrointestinal:  Negative for abdominal pain, diarrhea and nausea.   Genitourinary:  Negative for frequency.   Musculoskeletal:  Positive for back pain and myalgias.   Neurological:  Negative for headaches.   Psychiatric/Behavioral:  The patient is not nervous/anxious.        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    Surgical History   has a past surgical history that includes pr bronchoscopy,diagnostic (N/A, 02/14/2023); endobronchial us add-on (N/A, 02/14/2023); septoplasty; mammoplasty reduction (Bilateral); tube & ectopic preg., removal; pr exc skin benig >4cm trunk,arm,leg (Left, 6/7/2023); and pr bronchoscopy,diagnostic (N/A, 8/22/2023).     Family History  family history includes Cancer in her father, paternal grandfather, and paternal grandmother; Diabetes in her mother.   Family history reviewed with patient. There is no family history that is pertinent to the chief complaint.     Social History   reports that she has been smoking cigarettes. She has a 10.0 pack-year smoking history. She has been exposed to tobacco smoke. She has never used smokeless tobacco. She reports that she does not currently use alcohol. She reports that she does not currently use drugs after having used the following drugs: Marijuana.    Allergies  Allergies   Allergen Reactions    Penicillins Hives, Rash and Swelling     Pt reports that she gets swelling in throat and face, rash all over face and arms.   Tolerated cefazolin    Aspirin Rash and Hives     Pt reports that she received a rash on face, pt reports it ok if she takes NORCO    Other Environmental Shortness of Breath     Perfumes, dander, scents       Medications  Prior to Admission Medications   Prescriptions Last Dose Informant Patient Reported? Taking?   Apixaban Starter Pack (ELIQUIS DVT/PE STARTER PACK) 5 MG Tablet Therapy Pack New RX at  NOT STARTED Patient No No   Sig: Take 5 mg by mouth 2 (two) times a day for 30 doses.   Budeson-Glycopyrrol-Formoterol (BREZTRI AEROSPHERE) 160-9-4.8 MCG/ACT Aerosol 2~3 WEEKS AGO at [facility]. Patient No No   Sig: Inhale 2 Puffs 2 times a day.   HYDROcodone/acetaminophen (NORCO)  MG Tab 9/20/2023 at 0600 Patient Yes No   Sig: Take 1 Tablet by mouth every four hours as needed for Severe Pain.   albuterol (VENTOLIN HFA) 108 (90 Base) MCG/ACT Aero Soln inhalation aerosol PRN at PRN Patient Yes Yes   Sig: Inhale 2 Puffs every four hours as needed for Shortness of Breath.   diclofenac sodium (VOLTAREN) 1 % Gel PRN at PRN Patient No No   Sig: Apply 2 g topically 4 times a day as needed (Apply's on on neck for pain).   diphenhydrAMINE (BENADRYL) 25 MG Tab 9/20/2023 at AM Patient Yes No   Sig: Take 25 mg by mouth 2 times a day as needed for Itching or Sleep.   fluticasone (FLONASE) 50 MCG/ACT nasal spray 9/20/2023 at AM Patient Yes Yes   Sig: Administer 2 Sprays into each nostril every day.   ipratropium-albuterol (DUONEB) 0.5-2.5 (3) MG/3ML nebulizer solution 9/20/2023 at AM Patient Yes Yes   Sig: Take 3 mL by nebulization every 6 hours as needed for Shortness of Breath.   loratadine (CLARITIN) 10 MG Tab 9/20/2023 at AM Patient Yes No   Sig: Take 10 mg by mouth every day.      Facility-Administered Medications: None       Physical Exam  Temp:  [36.1 °C (97 °F)-36.8 °C (98.3 °F)] 36.6 °C (97.9 °F)  Pulse:  [102-132] 102  Resp:  [18-34] 18  BP: (112-136)/(68-86) 122/84  SpO2:  [88 %-98 %] 93 %  Blood Pressure: 130/81   Temperature: 36.6 °C (97.9 °F)   Pulse: (!) 114   Respiration: (!) 31   Pulse Oximetry: 96 %       Physical Exam  Vitals reviewed.   Constitutional:       Appearance: Normal appearance. She is not diaphoretic.   HENT:      Head: Normocephalic and atraumatic.      Nose: Nose normal.      Mouth/Throat:      Mouth: Mucous membranes are moist.      Pharynx: No oropharyngeal exudate.   Eyes:      General: No  scleral icterus.        Right eye: No discharge.         Left eye: No discharge.      Extraocular Movements: Extraocular movements intact.      Conjunctiva/sclera: Conjunctivae normal.   Cardiovascular:      Rate and Rhythm: Normal rate and regular rhythm.      Pulses:           Radial pulses are 2+ on the right side and 2+ on the left side.        Dorsalis pedis pulses are 2+ on the right side and 2+ on the left side.      Heart sounds: No murmur heard.  Pulmonary:      Effort: Pulmonary effort is normal. No respiratory distress.      Breath sounds: Decreased breath sounds and rales present. No wheezing.   Abdominal:      General: Bowel sounds are normal. There is no distension.      Palpations: Abdomen is soft.   Musculoskeletal:         General: No swelling or tenderness.      Cervical back: No muscular tenderness.      Right lower leg: No edema.      Left lower leg: No edema.   Lymphadenopathy:      Cervical: No cervical adenopathy.   Skin:     Coloration: Skin is not jaundiced or pale.      Comments: Nodule noted on left upper arm   Neurological:      General: No focal deficit present.      Mental Status: She is alert and oriented to person, place, and time. Mental status is at baseline.      Cranial Nerves: No cranial nerve deficit.   Psychiatric:         Mood and Affect: Mood normal.         Behavior: Behavior normal.         Laboratory:  Recent Labs     09/20/23  1050   WBC 11.5*   RBC 4.36   HEMOGLOBIN 11.5*   HEMATOCRIT 36.9*   MCV 84.6   MCH 26.4*   MCHC 31.2*   RDW 54.5*   PLATELETCT 135*   MPV 10.1     Recent Labs     09/20/23  1050   SODIUM 131*   POTASSIUM 4.0   CHLORIDE 94*   CO2 25   GLUCOSE 124*   BUN 6*   CREATININE 0.56   CALCIUM 8.4*     Recent Labs     09/20/23  1050   ALTSGPT 12   ASTSGOT 15   ALKPHOSPHAT 57   TBILIRUBIN 0.5   GLUCOSE 124*     Recent Labs     09/20/23  1050   APTT 31.0   INR 1.23*     Recent Labs     09/20/23  1050   NTPROBNP 1188*         Recent Labs     09/20/23  1050    TROPONINT 22*       Imaging:  CT-CTA CHEST PULMONARY ARTERY W/ RECONS   Final Result      1.  Minimal pulmonary embolism. Localized right lower lobe segmental and subsegmental branches.   2.  No right heart strain.   3.  9 mm lucent/lytic lesion T1 vertebral body. Suspect osseous metastatic deposit.   4.  Progression of extensive mediastinal adenopathy since 8/20/2023 consistent with metastatic disease.   5.  Extensive progression of diffuse bilateral innumerable pulmonary nodules consistent with metastatic disease.   6.  Interval development of extensive groundglass and airspace opacities consistent with superimposed pneumonia.   7.  Progression of right adrenal mass now measuring 43 mm x 38 mm consistent with metastasis.      Fleischner Society pulmonary nodule recommendations:      DX-CHEST-PORTABLE (1 VIEW)   Final Result      Diffuse bilateral interstitial and alveolar opacities most consistent with pneumonia or possibly noncardiogenic pulmonary edema.            Assessment/Plan:  Justification for Admission Status  I anticipate this patient will require at least two midnights for appropriate medical management, necessitating inpatient admission because acute respiratory failure with concern of worsening lung metastatic disease, pneumonia and pulmonary embolism.  She is being watch carefully     Patient will need a Med/Surg bed on MEDICAL service .  The need is secondary to .    * Acute on chronic respiratory failure (HCC)- (present on admission)  Assessment & Plan  Pulmonary embolism, concern of pneumonia, and worsening cancer  Procalcitonin  Ceftriaxone and azithromycin   Check MRSA nares  RT per protocol  Supplemental oxygen    Sarcoma (HCC)- (present on admission)  Assessment & Plan  Dr Alma Newton is her outpatient oncologist and will consult 9/21 in the hospital.  Patient has worsening on CT Chest findings    Pulmonary embolism on right (HCC)- (present on admission)  Assessment & Plan  Eliquis  initiated  Monitor vitals  Supplemental oxygen  Watch for hemoptysis  Prior U/S in August negative but she has hx of cancer and reportedly has not been on anticoagulation since last admit.    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

## 2023-09-20 NOTE — ED PROVIDER NOTES
ED Provider Note    CHIEF COMPLAINT  Chief Complaint   Patient presents with    Shortness of Breath     PT hx lung ca that's metastasized. Was at another appt and was SOB. RA while walking in 70's per EMS. Currently on 6L NC         EXTERNAL RECORDS REVIEWED  Reviewed clinic note from today for before she was sent here she had abnormal vital signs in the clinic and dyspnea.  Reviewed discharge summary from recent hospitalization.    HPI/ROS  LIMITATION TO HISTORY   None  OUTSIDE HISTORIAN(S):  None    Melly Hendrickson is a 48 y.o. female who presents to the emergency department for evaluation of shortness of breath.  The patient has a history of cancer, she also shows COPD and asthma and a pulmonary embolism.  She has been having increasing shortness of breath for some time maybe as long as a month.  Is getting to the point where she is using nebulizers 24 hours a day she states.  She states that she has history of asthma and although she was diagnosed with blood clot about a month ago she is not taking any anticoagulants until recently.  No chest pain.  Fevers at night.  Does have a cough productive of blood-tinged sputum.  Denies any other acute concerns or complaints.    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    SURGICAL HISTORY   has a past surgical history that includes bronchoscopy,diagnostic (N/A, 02/14/2023); endobronchial us add-on (N/A, 02/14/2023); septoplasty; mammoplasty  reduction (Bilateral); tube & ectopic preg., removal; exc skin benig >4cm trunk,arm,leg (Left, 6/7/2023); and bronchoscopy,diagnostic (N/A, 8/22/2023).    FAMILY HISTORY  Family History   Problem Relation Age of Onset    Diabetes Mother     Cancer Father         Prostate    Cancer Paternal Grandmother         Cervical    Cancer Paternal Grandfather         Unk       SOCIAL HISTORY  Social History     Tobacco Use    Smoking status: Some Days     Current packs/day: 0.50     Average packs/day: 0.5 packs/day for 20.0 years (10.0 ttl pk-yrs)     Types: Cigarettes     Passive exposure: Past    Smokeless tobacco: Never    Tobacco comments:     on and off    Vaping Use    Vaping Use: Never used   Substance and Sexual Activity    Alcohol use: Not Currently     Comment: occ, rare    Drug use: Not Currently     Types: Marijuana     Comment: THC gummies    Sexual activity: Not on file       CURRENT MEDICATIONS  Home Medications       Reviewed by Paulina Ambrose R.N. (Registered Nurse) on 09/20/23 at 1042  Med List Status: Not Addressed     Medication Last Dose Status   Apixaban Starter Pack (ELIQUIS DVT/PE STARTER PACK) 5 MG Tablet Therapy Pack  Active   Budeson-Glycopyrrol-Formoterol (BREZTRI AEROSPHERE) 160-9-4.8 MCG/ACT Aerosol  Active   Buprenorphine 10 MCG/HR PATCH WEEKLY  Active   dexamethasone (DECADRON) 4 MG Tab  Active   diclofenac sodium (VOLTAREN) 1 % Gel  Active   diphenhydrAMINE (BENADRYL) 25 MG Tab  Active   fluticasone (FLONASE) 50 MCG/ACT nasal spray  Active   gabapentin (NEURONTIN) 300 MG Cap  Active   HYDROcodone/acetaminophen (NORCO)  MG Tab  Active   HYDROmorphone (DILAUDID) 2 MG Tab  Active   hydrOXYzine HCl (ATARAX) 25 MG Tab  Active   ipratropium-albuterol (DUONEB) 0.5-2.5 (3) MG/3ML nebulizer solution  Active   loratadine (CLARITIN) 10 MG Tab  Active   VENTOLIN  (90 Base) MCG/ACT Aero Soln inhalation aerosol  Active                    ALLERGIES  Allergies   Allergen Reactions    Penicillins  Hives, Rash and Swelling     Pt reports that she gets swelling in throat and face, rash all over face and arms.   Tolerated cefazolin    Aspirin Rash and Hives     Pt reports that she received a rash on face, pt reports it ok if she takes NORCO    Other Environmental Shortness of Breath     Perfumes, dander, scents       PHYSICAL EXAM  VITAL SIGNS: /71   Pulse (!) 126   Temp 36.6 °C (97.9 °F) (Temporal)   Resp (!) 22   Ht 1.829 m (6')   Wt 83.9 kg (185 lb)   SpO2 98%   BMI 25.09 kg/m²    Constitutional: Awake, alert, moderately dyspneic.  In moderate distress  HENT: Normocephalic, Atraumatic, Bilateral external ears normal, Oropharynx moist,   Eyes: PERRL, EOMI, Conjunctiva normal, No discharge.   Neck: Normal range of motion, No tenderness, Supple, No stridor.   Cardiovascular: Tachycardic no murmurs rubs or gallops  Thorax & Lungs: Clear with wheezes in the bases.  Abdomen: Bowel sounds normal, Soft, No tenderness  Skin: Warm, Dry, No erythema, No rash.     Musculoskeletal: Good range of motion in all major joints.  No asymmetric edema  Neurologic: Alert, No focal deficits noted.   Psychiatric: Affect anxious      DIAGNOSTIC STUDIES / PROCEDURES    Results for orders placed or performed during the hospital encounter of 09/20/23   LACTIC ACID   Result Value Ref Range    Lactic Acid 1.8 0.5 - 2.0 mmol/L   LACTIC ACID   Result Value Ref Range    Lactic Acid 1.9 0.5 - 2.0 mmol/L   URINALYSIS    Specimen: Urine   Result Value Ref Range    Color Yellow     Character Clear     Specific Gravity 1.005 <1.035    Ph 6.0 5.0 - 8.0    Glucose Negative Negative mg/dL    Ketones Negative Negative mg/dL    Protein Negative Negative mg/dL    Bilirubin Negative Negative    Urobilinogen, Urine 0.2 Negative    Nitrite Negative Negative    Leukocyte Esterase Negative Negative    Occult Blood Negative Negative    Micro Urine Req see below    TROPONIN   Result Value Ref Range    Troponin T 22 (H) 6 - 19 ng/L   proBrain  Natriuretic Peptide, NT   Result Value Ref Range    NT-proBNP 1188 (H) 0 - 125 pg/mL   APTT   Result Value Ref Range    APTT 31.0 24.7 - 36.0 sec   PROTHROMBIN TIME (INR)   Result Value Ref Range    PT 15.7 (H) 12.0 - 14.6 sec    INR 1.23 (H) 0.87 - 1.13   D-DIMER   Result Value Ref Range    D-Dimer >20.00 (H) 0.00 - 0.50 ug/mL (FEU)   CoV-2, FLU A/B, and RSV by PCR (2-4 Hours CerebrexHEID) : Collect NP swab in VTM    Specimen: Respirate   Result Value Ref Range    Influenza virus A RNA Negative Negative    Influenza virus B, PCR Negative Negative    RSV, PCR Negative Negative    SARS-CoV-2 by PCR NotDetected     SARS-CoV-2 Source NP Swab    CBC WITH DIFFERENTIAL   Result Value Ref Range    WBC 11.5 (H) 4.8 - 10.8 K/uL    RBC 4.36 4.20 - 5.40 M/uL    Hemoglobin 11.5 (L) 12.0 - 16.0 g/dL    Hematocrit 36.9 (L) 37.0 - 47.0 %    MCV 84.6 81.4 - 97.8 fL    MCH 26.4 (L) 27.0 - 33.0 pg    MCHC 31.2 (L) 32.2 - 35.5 g/dL    RDW 54.5 (H) 35.9 - 50.0 fL    Platelet Count 135 (L) 164 - 446 K/uL    MPV 10.1 9.0 - 12.9 fL    Neutrophils-Polys 86.40 (H) 44.00 - 72.00 %    Lymphocytes 5.70 (L) 22.00 - 41.00 %    Monocytes 5.90 0.00 - 13.40 %    Eosinophils 0.30 0.00 - 6.90 %    Basophils 0.30 0.00 - 1.80 %    Immature Granulocytes 1.40 (H) 0.00 - 0.90 %    Nucleated RBC 0.20 0.00 - 0.20 /100 WBC    Neutrophils (Absolute) 9.92 (H) 1.82 - 7.42 K/uL    Lymphs (Absolute) 0.65 (L) 1.00 - 4.80 K/uL    Monos (Absolute) 0.68 0.00 - 0.85 K/uL    Eos (Absolute) 0.04 0.00 - 0.51 K/uL    Baso (Absolute) 0.03 0.00 - 0.12 K/uL    Immature Granulocytes (abs) 0.16 (H) 0.00 - 0.11 K/uL    NRBC (Absolute) 0.02 K/uL   Comp Metabolic Panel   Result Value Ref Range    Sodium 131 (L) 135 - 145 mmol/L    Potassium 4.0 3.6 - 5.5 mmol/L    Chloride 94 (L) 96 - 112 mmol/L    Co2 25 20 - 33 mmol/L    Anion Gap 12.0 7.0 - 16.0    Glucose 124 (H) 65 - 99 mg/dL    Bun 6 (L) 8 - 22 mg/dL    Creatinine 0.56 0.50 - 1.40 mg/dL    Calcium 8.4 (L) 8.5 - 10.5 mg/dL     Correct Calcium 9.2 8.5 - 10.5 mg/dL    AST(SGOT) 15 12 - 45 U/L    ALT(SGPT) 12 2 - 50 U/L    Alkaline Phosphatase 57 30 - 99 U/L    Total Bilirubin 0.5 0.1 - 1.5 mg/dL    Albumin 3.0 (L) 3.2 - 4.9 g/dL    Total Protein 6.1 6.0 - 8.2 g/dL    Globulin 3.1 1.9 - 3.5 g/dL    A-G Ratio 1.0 g/dL   ESTIMATED GFR   Result Value Ref Range    GFR (CKD-EPI) 111 >60 mL/min/1.73 m 2   Procalcitonin   Result Value Ref Range    Procalcitonin 0.11 <0.25 ng/mL   EKG   Result Value Ref Range    Report       [facility] Emergency Dept.    Test Date:  2023  Pt Name:    JESSICA GREEN       Department: ER  MRN:        [id number]                      Room:       [facility]  Gender:     Female                       Technician: [id number]  :        [id number]                   Requested By:BRIAN PAREDES  Order #:    [id number]                    Reading MD: BRIAN PAREDES. [facility]    Measurements  Intervals                                Axis  Rate:       127                          P:          70  ME:         134                          QRS:        30  QRSD:       85                           T:          53  QT:         318  QTc:        463    Interpretive Statements  Sinus tachycardia  Probable left atrial enlargement  Compared to ECG 2023 22:58:48  Sinus rhythm no longer present  Left ventricular hypertrophy no longer present  ST (T wave) deviation no longer present  Electronically Signed On 2023 10:49:51 PDT by BRIAN PAREDES. [facility]          RADIOLOGY  I have independently interpreted the diagnostic imaging associated with this visit and am waiting the final reading from the radiologist.   My preliminary interpretation is as follows: I reviewed the chest x-ray and the chest CT.  They both show extensive interstitial opacities concerning for edema or possibly pneumonia.  Radiologist interpretation:   CT-CTA CHEST PULMONARY ARTERY W/ RECONS   Final Result      1.  Minimal pulmonary embolism. Localized  right lower lobe segmental and subsegmental branches.   2.  No right heart strain.   3.  9 mm lucent/lytic lesion T1 vertebral body. Suspect osseous metastatic deposit.   4.  Progression of extensive mediastinal adenopathy since 8/20/2023 consistent with metastatic disease.   5.  Extensive progression of diffuse bilateral innumerable pulmonary nodules consistent with metastatic disease.   6.  Interval development of extensive groundglass and airspace opacities consistent with superimposed pneumonia.   7.  Progression of right adrenal mass now measuring 43 mm x 38 mm consistent with metastasis.      Fleischner Society pulmonary nodule recommendations:      DX-CHEST-PORTABLE (1 VIEW)   Final Result      Diffuse bilateral interstitial and alveolar opacities most consistent with pneumonia or possibly noncardiogenic pulmonary edema.            COURSE & MEDICAL DECISION MAKING        ED Observation Status? No; Patient does not meet criteria for ED Observation.   Patient does meet observation criteria she is hypoxic respiratory failure she will require admission.      INITIAL ASSESSMENT, COURSE AND PLAN  Care Narrative:     The patient presents to the emergency department for evaluation of shortness of breath.  Sent in from her primary care doctor's office.  Patient is markedly dyspneic.  She is history of asthma, COPD, cancer, and PE.    Her chart is reviewed for previous work-up based on labs for comparison.    The patient was seen and examined a broad differential diagnosis was considered including asthma/COPD exacerbation, pneumonia, COVID-19, pleural or pericardial effusion, ACS, and worsening PE.    The patient is worked up with labs and imaging.  She require hospitalization.    Patient is cultured and started on broad-spectrum antibiotics for pneumonia and sepsis.    Have ordered antibiotics.  She has received antibiotics and steroids.      I ordered a liter of fluid bolus.  She does not have severe sepsis or signs  of shock.  She received fluids with antibiotics as well.    The patient does not have a clinically significant PE causing symptoms.  Heart failure remains a differential diagnosis she does have a component of edema.  She will not be diuresed.  The patient requires hospitalized for pneumonia respiratory failure.  Discussed case with the hospitalist and care transfer at that time.            ADDITIONAL PROBLEM LIST  Sarcoma  Asthma    DISPOSITION AND DISCUSSIONS  I have discussed management of the patient with the following physicians and MONO's: Dr. Merino [facility] hospitalist.    Discussion of management with other [facility] or appropriate source(s): [facility] for antibiotic selection.        FINAL DIAGNOSIS  1. Other form of dyspnea    2. Acute respiratory failure with hypoxia (HCC)    3. Pneumonia of both lungs due to infectious organism, unspecified part of lung    4.  Metastatic sarcoma  5.  Pulmonary embolism       Electronically signed by: Ned Toth M.D., 9/20/2023 10:47 AM

## 2023-09-21 NOTE — DIETARY
Nutrition services: Day 1 of admit.  Melly Hendrickson is a 48 y.o. female admitted with shortness of breath  History includes sarcoma, adenocarcinoma of mauricio, pulmonary embolism, active smoker, COPD/Asthma, Depression  Patient with weight loss and decreased appetite noted on admit screen.    Patient was with another discipline at time of RD visit.  Nutrition interview to be completed in the upcoming days when patient is available.  Assessment:  Height: 182.9 cm (6')  Weight: 83.9 kg (185 lb)  Body mass index is 25.09 kg/m².  Diet/Intake: Regular    Evaluation:   Pertinent Labs today: Sodium, glucose 183 (she is on Decadron)  Per chart review, her weight on  8/23/23 was 219#; this is 34# weight loss in less than one month and severe (~15%)    Malnutrition Risk: Not yet determined    Recommendations/Interventions/Plan:    Encourage intake of meals  Document intake of all PO as % taken in ADL's to provide interdisciplinary communication across all shifts.   Monitor weight.  Nutrition rep will continue to see patient for ongoing meal and snack preferences.     RD will f/u to interview patient.

## 2023-09-21 NOTE — PROGRESS NOTES
Consult Note: Hematology/Oncology     Primary Care:  Checo Baker M.D.    Chief Complaint   Patient presents with    Shortness of Breath     PT hx lung ca that's metastasized. Was at another appt and was SOB. RA while walking in 70's per EMS. Currently on 6L NC         Current Treatment: None    Prior Treatment:     4/17/2023-4/21/2023: RLL Lung SBRT  7/19/23-7/24/2023: R Sacrum SBRT      Subjective:   History of Presenting Illness:    Melly Hendrickson is a 48 y.o. 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    Patient history dates back to July 2023 when she had a chest x-ray performed that revealed a potential mass in her right lung.  This led to a chest CT performed on February 13, 2023.  This demonstrated 1.4 cm mass in the right lower lobe and a 1.9 groundglass opacity also in the right lower lobe.  The groundglass opacity appeared stable from previous scans but the mass was thought to be new.      A biopsy was performed and confirmed lung adenocarcinoma.    Patient saw thoracic surgery and obtained PFTs as well as a PET scan.  She was discussed at tumor board and given her significant dyspnea and her underlying COPD it was thought that she would be a better candidate for SBRT rather than surgical resection.    April 17, 2023 patient began SBRT.  Completed this treatment without many issues.    After this patient did notice a small lump in her breast reduction scar line.  She said that over the next few days it grew and she ultimately went to her PCP    PCP tried to drain the mass; however it just bled significantly during the I&D attempt.  Thus she was sent to Dr. Becerra at \[facility]\"" for excision.     Pathology from this excision demonstrated poorly differentiated malignancy favoring carcinoma without obvious involvement of underlying skin and nonspecific IHC profile.  Based on his diagnosis  cancer type ID was sent off for.  Results showed 90% probability of sarcoma    Over the past several months she has noted severe pain in her sacral region.  She does have sacral lesions that are concerning for metastatic disease.  Patient has biopsy of sacral mass on 7/18/2023.     Of note patient has 5 children and 4 grandchildren.    She follows with pain management    I initially saw patient on 7/14.  At our 7/21, we had information back from biopsy and we discussed AIM.  Patient wished to hold off and think about this further.    On 7/28, we discussed initiating AIM, patient was not comfortable and wanted 2 opinion at [place].    I saw patient again on 8/11.  We discussed AIM,  patient not ready and awaiting [place] appointment.     She was recently admitted to the ER for hemoptysis on 8/20/23 and was found to have a PE.  She was still having hemoptysis and thus was not started on AC. She has discharged on 8/24/23.     I saw her subsequently on 9/7/23 and recommended AC given no hemoptysis, but active PE with cancer.  We also discussed chemo at this visit and she wished to wait.     During my visit with the patient on 9/19/23, Patient was endorsing 3 nodules on the R arm.  1 on the clavicle.  Patient also had a nodule on her L arm. Patient had 1 nodule on her bikini line. She has another on the inside of her L thigh. These started 1 week ago. She was on 2-3L of oxygen for the past several days.  We discussed intiating chemo and plan was to start on 9/26/23.  Pt requires a direct admit for AIM    Since that visit she had worseing SOB.  She was subsequently presented to the ER for SOB/cough and potential PNA. On admission, she was tachycardic, tachypneic, and afebrile.  Chest x-ray showed diffuse bilateral interstitial and alveolar opacities consistent with pneumonia. CTA chest on admission showed right subsegmental PE, worsening pulmonary nodules, T1 vertebral body region, possible pneumonia.  Procalcitonin was  0.11.  Ceftriaxone and azithromycin were empirically started.          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    Chronic obstructive pulmonary disease (HCC)     COPD (chronic obstructive pulmonary disease) (HCC)     DDD (degenerative disc disease), lumbar     Depression     Emphysema of lung (HCC)     MRSA (methicillin resistant staph aureus) culture positive     Pain     back - cervical, scapula, lower    Pericarditis     Primary adenocarcinoma of lower lobe of right lung (HCC)     Psychiatric disorder     Anxiety, Depression    Sleep apnea     Snoring     Urinary incontinence         Past Surgical History:   Procedure Laterality Date    WV BRONCHOSCOPY,DIAGNOSTIC N/A 8/22/2023    Procedure: BRONCHOSCOPY;  Surgeon: Sandra Lopez D.O.;  Location: SURGERY [facility];  Service: Pulmonary    WV EXC SKIN BENIG >4CM TRUNK,ARM,LEG Left 6/7/2023    Procedure: EXCISION OF SOFT TISSUE MASS LEFT CHEST WALL;  Surgeon: Nasir Becerra M.D.;  Location: SURGERY [facility];  Service: General    WV BRONCHOSCOPY,DIAGNOSTIC N/A 02/14/2023    Procedure: FIBER OPTIC BRONCHOSCOPY WASH, BRUSH, BRONCHOALVEOLAR LAVAGE, BIOPSY AND FINE NEEDLE ASPIRATION AND NAVIGATION, ROBOTICS;  Surgeon: Guicho Ellis M.D.;  Location: SURGERY [facility];  Service: Pulmonary Robotic    ENDOBRONCHIAL US ADD-ON N/A 02/14/2023    Procedure: ENDOBRONCHIAL ULTRASOUND (EBUS);  Surgeon: Guicho Ellis M.D.;  Location: SURGERY [facility];  Service: Pulmonary Robotic    MAMMOPLASTY REDUCTION Bilateral     SEPTOPLASTY      TUBE & ECTOPIC PREG., REMOVAL      x 2       Social History     Tobacco Use    Smoking status: Some Days     Current packs/day: 0.50     Average packs/day: 0.5 packs/day for 20.0 years (10.0 ttl pk-yrs)     Types: Cigarettes     Passive exposure: Past     Smokeless tobacco: Never    Tobacco comments:     on and off    Vaping Use    Vaping Use: Never used   Substance Use Topics    Alcohol use: Not Currently     Comment: occ, rare    Drug use: Not Currently     Types: Marijuana     Comment: THC gummies        Family History   Problem Relation Age of Onset    Diabetes Mother     Cancer Father         Prostate    Cancer Paternal Grandmother         Cervical    Cancer Paternal Grandfather         Unk       Allergies   Allergen Reactions    Penicillins Hives, Rash and Swelling     Pt reports that she gets swelling in throat and face, rash all over face and arms.   Tolerated cefazolin    Aspirin Rash and Hives     Pt reports that she received a rash on face, pt reports it ok if she takes NORCO    Other Environmental Shortness of Breath     Perfumes, dander, scents       Current Facility-Administered Medications   Medication Dose Route Frequency Provider Last Rate Last Admin    albuterol inhaler 2 Puff  2 Puff Inhalation Q4HRS PRN UMBERTO Garcia.O.   2 Puff at 09/20/23 2153    diclofenac sodium (Voltaren) 1 % gel 2 g  2 g Topical 4X/DAY PRN UMBERTO Garcia.O.        diphenhydrAMINE (Benadryl) tablet/capsule 25 mg  25 mg Oral BID PRN UMBERTO Garcia.O.   25 mg at 09/20/23 1745    HYDROcodone/acetaminophen (Norco)  MG per tablet 1 Tablet  1 Tablet Oral Q4HRS PRN UMBERTO Garcia.O.   1 Tablet at 09/20/23 1745    ipratropium-albuterol (DUONEB) nebulizer solution  3 mL Nebulization Q6HRS PRN SANJU GarciaO.   3 mL at 09/20/23 2330    loratadine (Claritin) tablet 10 mg  10 mg Oral DAILY UMBERTO Garcia.O.   10 mg at 09/21/23 0601    senna-docusate (Pericolace Or Senokot S) 8.6-50 MG per tablet 2 Tablet  2 Tablet Oral BID UMBERTO Garcia.O.   2 Tablet at 09/21/23 0601    And    polyethylene glycol/lytes (Miralax) PACKET 1 Packet  1 Packet Oral QDAY PRN SANJU GarciaO.        And    magnesium hydroxide (Milk Of Magnesia) suspension 30 mL  30 mL Oral QDAY  PRN Madhu Merino D.O.        And    bisacodyl (Dulcolax) suppository 10 mg  10 mg Rectal QDAY PRN Madhu Merino D.O.        Respiratory Therapy Consult   Nebulization Continuous RT Madhu Merino D.O.        acetaminophen (Tylenol) tablet 650 mg  650 mg Oral Q6HRS PRN Madhu Merino D.O.        cefTRIAXone (Rocephin) syringe 2,000 mg  2,000 mg Intravenous Q EVENING Madhu Merino D.O.   2,000 mg at 09/20/23 1746    azithromycin (ZITHROMAX) 500 mg in D5W 250 mL IVPB premix  500 mg Intravenous Q24HRS Madhu Merino D.O. 250 mL/hr at 09/21/23 0618 500 mg at 09/21/23 0618    hydrALAZINE (Apresoline) injection 10 mg  10 mg Intravenous Q4HRS PRN Madhu Merino D.O.        ondansetron (Zofran) syringe/vial injection 4 mg  4 mg Intravenous Q4HRS PRN Madhu Merino D.O.        ondansetron (Zofran ODT) dispertab 4 mg  4 mg Oral Q4HRS PRN Madhu Merino D.O.        promethazine (Phenergan) tablet 12.5-25 mg  12.5-25 mg Oral Q4HRS PRN Madhu Merino D.O.        promethazine (Phenergan) suppository 12.5-25 mg  12.5-25 mg Rectal Q4HRS PRN Madhu Merino D.O.        prochlorperazine (Compazine) injection 5-10 mg  5-10 mg Intravenous Q4HRS PRN Madhu Merino D.O.        guaiFENesin dextromethorphan (Robitussin DM) 100-10 MG/5ML syrup 10 mL  10 mL Oral Q6HRS PRN Madhu Merino D.O.        HYDROmorphone (Dilaudid) injection 1 mg  1 mg Intravenous Q3HRS PRN Madhu Merino D.O.   1 mg at 09/21/23 0922    apixaban (Eliquis) tablet 10 mg  10 mg Oral BID Madhu Merino D.O.   10 mg at 09/21/23 0602    Followed by    [START ON 9/27/2023] apixaban (Eliquis) tablet 5 mg  5 mg Oral BID Madhu Merino D.O.        dexamethasone (Decadron) injection 4 mg  4 mg Intravenous TID Madhu Merino D.O.   4 mg at 09/21/23 0602    fluticasone-umeclidinium-vilanterol (Trelegy Ellipta) 100-62.5-25 mcg/act inhaler 1 Puff  1 Puff Inhalation QDAILY (RT) Madhu Merino D.O.   1 Puff at 09/21/23 0838    diphenhydrAMINE (Benadryl) injection  25 mg  25 mg Intravenous Q6HRS PRN Madhu Merino D.O.   25 mg at 09/21/23 0922       Review of Systems   Constitutional:  Positive for malaise/fatigue. Negative for chills, fever and weight loss.   HENT:  Negative for congestion, ear pain, nosebleeds and sore throat.    Eyes:  Negative for blurred vision.   Respiratory:  Negative for cough, sputum production, shortness of breath and wheezing.    Cardiovascular:  Negative for chest pain, palpitations, orthopnea and leg swelling.   Gastrointestinal:  Negative for abdominal pain, heartburn, nausea and vomiting.   Genitourinary:  Negative for dysuria, frequency and urgency.   Musculoskeletal:  Positive for back pain and joint pain. Negative for neck pain.   Neurological:  Negative for dizziness, tingling, tremors, sensory change, focal weakness and headaches.   Endo/Heme/Allergies:  Does not bruise/bleed easily.   Psychiatric/Behavioral:  Negative for depression, memory loss and suicidal ideas.    All other systems reviewed and are negative.      Problem list, medications, and allergies reviewed by myself today in Epic.     Objective:     Vitals:    09/20/23 2316 09/20/23 2331 09/21/23 0444 09/21/23 0840   BP:   119/82 124/80   Pulse: 96 94 88 80   Resp: 18 19 15 16   Temp:   36.3 °C (97.3 °F) 36.3 °C (97.4 °F)   TempSrc:   Temporal Temporal   SpO2: 90% 97% 100% 90%   Weight:       Height:             DESC; KARNOFSKY SCALE WITH ECOG EQUIVALENT: 90, Able to carry on normal activity; minor signs or symptoms of disease (ECOG equivalent 0)    DISTRESS LEVEL: no acute distress  Not done due to virtual visit. Previous PE is listed below  Physical Exam  Constitutional:       General: She is not in acute distress.     Appearance: Normal appearance. She is not ill-appearing.   HENT:      Head: Normocephalic and atraumatic.      Nose: Nose normal.      Mouth/Throat:      Mouth: Mucous membranes are moist.      Pharynx: No oropharyngeal exudate or posterior oropharyngeal  erythema.   Eyes:      General: No scleral icterus.     Conjunctiva/sclera: Conjunctivae normal.      Pupils: Pupils are equal, round, and reactive to light.   Abdominal:      General: Abdomen is flat. Bowel sounds are normal. There is distension.      Palpations: Abdomen is soft. There is no mass.      Tenderness: There is abdominal tenderness. There is no guarding.   Musculoskeletal:         General: No swelling, tenderness or deformity. Normal range of motion.      Cervical back: Normal range of motion and neck supple. No rigidity or tenderness.      Right lower leg: No edema.      Left lower leg: No edema.   Skin:     General: Skin is warm and dry.      Coloration: Skin is not jaundiced or pale.      Findings: No bruising, erythema or rash.   Neurological:      General: No focal deficit present.      Mental Status: She is alert and oriented to person, place, and time. Mental status is at baseline.      Sensory: No sensory deficit.      Motor: No weakness.      Coordination: Coordination normal.      Gait: Gait normal.   Psychiatric:         Mood and Affect: Mood normal.         Behavior: Behavior normal.         Thought Content: Thought content normal.         Judgment: Judgment normal.         Labs:   Most recent labs reviewed.  Please see the lab tab of chart review    Imaging:   Most recent images below have been independently reviewed by me.  Please see the imaging tab of chart review    7/27/23: PET   1.  Focal uptake in the medial RIGHT lower lobe of lung abutting the thoracic spine, consistent with tumor..  2.  No PET correlate for ill-defined spiculated nodule in the RIGHT lower lobe.  3.  Large hypermetabolic mass corresponds to lytic lesion in the RIGHT sacrum consistent with malignancy.  4.  Intramuscular focal uptake at the posterior LEFT shoulder, likely metastatic.    Pathology:    7/14/2023: Pathology of Chest wall  A. Chest wall mass:          Metastatic poorly differentiated malignancy favoring  carcinoma           without obvious involvement of overlying skin and a           non-specific IHC profile of some keratins positive (see           microscopic description).          See comment.   Main Cancer Type: Sarcoma   Probability: 90%     Subtype: Undifferentiated Sarcoma ([facility])   Probability: 90%     7/19/2023: Path of the Sacral Bone  A. Sacral bone biopsy:          Bone cores effaced by a high-grade malignancy histologically           identical to the previously excised chest wall mass           ([id number]).     2/14/2023: Pathology of Lung Mass  A. Lung, right lower lobe fine needle aspiration slides:          Positive for carcinoma.   B. Core, right lower lobe lung:          Moderately differentiated lung adenocarcinoma.   C. Lung, right lower lobe biopsy core and touchprep slides:          Moderately differentiated lung adenocarcinoma.   D. Bronchoalveolar lavage right lower lobe:          Rare atypical cells, malignant cells vs. reactive bronchial           cells.     Assessment/Plan:      Cancer Staging   Primary undifferentiated sarcoma of soft tissue (HCC)  Staging form: Soft Tissue Sarcoma of the Trunk and Extremities, AJCC 8th Edition  - Clinical: Stage IV (cTX, cN0, cM1) - Signed by Alma Newton M.D. on 9/19/2023         Ms. Aleida Hendrickson is a 49 yo F who presents today with a recent diagnosis of stage I A2 adenocarcinoma of the right lung status post SBRT now with a new diagnosis of metastatic undifferentiated sarcoma. Foundation 1 shows no actionable mutations.    She has completed SBRT to the R sacrum.      She is now reporting multiple new nodules throughout her body.     She is admitted to the hospital for ? PNA, on azithromycin and ceftriaxone.       1) Sarcoma with metastatic disease to Chest wall,  sacral and L shoulder mets  -Recommended treatment with AIM  -will need echo now  -port or PICC placement when blood cultures confirm no sepsis 2/2 to PNA    Recommended Regimen:    Doxorubicin 25 mg per metered squared IV continuous infusion over 24 hours daily on days 1 through 3  Ifosfamide 2500 mg per metered squared IV over 3 hours on days 1 through 4  [place] 500mg/m2 IV over 15 minutes 3x daily   Will need G-CSF      2) Stage 1A2 Adenocarcinoma of the Lung  -s/p SBRT  -monitor per NCCN Guidelines  -H&P and chest CT contrast every 6 mo for 2-3 y,   -H&P and a low-dose non-contrast-enhanced chest CT annually    3) Cancer related Pain  -agree with palliative care for pain control     4) Pulmonary Embolism  -Right lower lobe segmental and subsegmental occlusive pulmonary emboli.  -Continue with Eliquis 5 mg twice daily.     5) PNA  -agree with empirical treatment  -will start AIM when PNA has cleared or been ruled out    No follow-ups on file.     Any questions and concerns raised by the patient were addressed and answered. Patient denies any further questions.  Patient encouraged to call the office with any concerns or issues.     Alma Newton M.D.  Hematology/Oncology

## 2023-09-21 NOTE — ASSESSMENT & PLAN NOTE
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   Stated Capmatinib on 9/29/2023 by oncology team  Patient is DNR/DNI.  Palliative following

## 2023-09-21 NOTE — DISCHARGE PLANNING
Care Transition Team Assessment      RNCM met with pt at bedside to complete assessment. Pt A&Ox4 and able to verify the information on the face sheet. Pt requested emergency contact information be updated which writer did. Patient reports she does not currently have advanced directive but is in contact with her  on preparing one. Patient lives alone in second [place] apartment which has approximately 17 stairs which she has difficulty ascending. Prior to hospitalization pt was independent at home with ADLs and IADLs. She reports she was previously driving herself to appointments. Patient reports that she receives monthly disability payments. Patients main support system is her daughter who is in [place]. Denied any history of substance abuse and mental health problems. Patient states she has an oxygen concentrator at home and uses 2L of oxygen at her baseline, she denies having any other DME prior to admission.     Discussed in IDT: Patient to have palliative consult due to progression of illness.       Information Source  Orientation Level: Oriented X4  Information Given By: Patient  Informant's Name: Nicki  Who is responsible for making decisions for patient? : Patient    Readmission Evaluation  Is this a readmission?: No    Elopement Risk  Legal Hold: No  Ambulatory or Self Mobile in Wheelchair: Yes  Disoriented: No  Psychiatric Symptoms: None  History of Wandering: No  Elopement this Admit: No  Vocalizing Wanting to Leave: No  Displays Behaviors, Body Language Wanting to Leave: No-Not at Risk for Elopement    Interdisciplinary Discharge Planning  Lives with - Patient's Self Care Capacity: Significant Other  Patient or legal guardian wants to designate a caregiver: No  Support Systems: [employer], Family Member(s)  Housing / Facility: [address] [place] [address]  Durable Medical Equipment: Unknown    Discharge Preparedness  What is your plan after discharge?: Home with help  What are your discharge  supports?: Child  Prior Functional Level: Ambulatory, Independent with Activities of Daily Living, Independent with Medication Management  Difficulity with ADLs: None  Difficulity with IADLs: None    Functional Assesment  Prior Functional Level: Ambulatory, Independent with Activities of Daily Living, Independent with Medication Management    Finances  Financial Barriers to Discharge: No  Prescription Coverage: Yes    Vision / Hearing Impairment  Vision Impairment : Yes  Right Eye Vision: Wears Glasses, Impaired  Left Eye Vision: Wears Glasses, Impaired  Hearing Impairment : No         Advance Directive  Advance Directive?: None  Advance Directive offered?: AD Booklet given    Domestic Abuse  Have you ever been the victim of abuse or violence?: No  Physical Abuse or Sexual Abuse: No  Verbal Abuse or Emotional Abuse: No  Possible Abuse/Neglect Reported to:: Not Applicable    Psychological Assessment  History of Substance Abuse: None  History of Psychiatric Problems: No  Non-compliant with Treatment: No  Newly Diagnosed Illness: No    Discharge Risks or Barriers  Discharge risks or barriers?: Lives alone, no community support, Complex medical needs  Patient risk factors: Complex medical needs    Anticipated Discharge Information  Discharge Disposition: Discharged to home/self care (01)

## 2023-09-21 NOTE — CARE PLAN
The patient is Watcher - Medium risk of patient condition declining or worsening    Shift Goals  Clinical Goals: moitor O2 sats, ween oxygen down  Patient Goals: pain control  Family Goals: n/a    Progress made toward(s) clinical / shift goals:    Problem: Psychosocial  Goal: Patient's level of anxiety will decrease  Outcome: Not Met  Note: Pt started the day with irritable and anxious behavior. Boundaries were placed . Pt was given Ativan. Pt is now more relaxed.        Patient is not progressing towards the following goals:      Problem: Psychosocial  Goal: Patient's level of anxiety will decrease  Outcome: Not Met  Note: Pt started the day with irritable and anxious behavior. Boundaries were placed . Pt was given Ativan. Pt is now more relaxed.

## 2023-09-21 NOTE — CARE PLAN
The patient is Watcher - Medium risk of patient condition declining or worsening    Shift Goals  Clinical Goals: pain control, monitor O2  Patient Goals: Pain control  Family Goals: n/a    Progress made toward(s) clinical / shift goals:        Problem: Pain - Standard  Goal: Alleviation of pain or a reduction in pain to the patient’s comfort goal  Outcome: Progressing     Problem: Self Care  Goal: Patient will have the ability to perform ADLs independently or with assistance (bathe, groom, dress, toilet and feed)  Outcome: Progressing     Problem: Knowledge Deficit - Standard  Goal: Patient and family/care givers will demonstrate understanding of plan of care, disease process/condition, diagnostic tests and medications  Outcome: Progressing       Patient is not progressing towards the following goals:

## 2023-09-21 NOTE — PROGRESS NOTES
Hospital Medicine Daily Progress Note    Date of Service  9/21/2023    Chief Complaint  Melly Hendrickson is a 48 y.o. female referred to the ER by her primary care physician on 9/20/2023 due to shortness of breath.    She was diagnosed with stage I NSCLC (adenocarcinoma) of the lung in 7/2023.  She underwent  SBRT as she was not a good surgical candidate due to dyspnea and underlying COPD. 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  She underwent SBRT to right sacrum.  She follows Dr. Newton (oncology), and was recommended to start chemotherapy but wished to consult with a sarcoma specialist at [place] first.     She has COPD/asthma, was diagnosed with pulmonary embolism in 8/2023, anticoagulation not started due to hemoptysis.    Hospital Course  On admission, she was tachycardic, tachypneic, and afebrile.  Chest x-ray showed diffuse bilateral interstitial and alveolar opacities consistent with pneumonia. CTA chest on admission showed right subsegmental PE, worsening pulmonary nodules, T1 vertebral body region, possible pneumonia.  Procalcitonin was 0.11.    Ceftriaxone and azithromycin were empirically started.    She was saturating about 90% on 6 L/min nasal cannula.    Interval Problem Update  Saturating 100% on 15 L/min OxyMask.  Afebrile and hemodynamically stable. Discussed with oncology. Plans are to start chemotherapy as soon as possible when patient is stabilized from suspected pneumonia.  Echocardiogram ordered.  Palliative care consulted for pain management.    I have discussed this patient's plan of care and discharge plan at IDT rounds today with Case Management, Nursing, Nursing leadership, and other members of the IDT team.    Consultants/Specialty  oncology    Code Status  Full Code    Disposition  The patient is not medically cleared for discharge to home or a post-acute  facility.  Anticipate discharge to: home with close outpatient follow-up    I have placed the appropriate orders for post-discharge needs.    Review of Systems  Review of Systems   Constitutional:  Positive for malaise/fatigue.   HENT: Negative.     Respiratory: Negative.     Cardiovascular: Negative.    Gastrointestinal: Negative.    Genitourinary: Negative.    Musculoskeletal:  Positive for back pain, joint pain and myalgias.   Skin: Negative.    Neurological: Negative.    Endo/Heme/Allergies: Negative.    Psychiatric/Behavioral: Negative.          Physical Exam  Temp:  [36.3 °C (97.3 °F)-36.8 °C (98.3 °F)] 36.3 °C (97.4 °F)  Pulse:  [] 80  Resp:  [15-33] 16  BP: (116-125)/(74-86) 124/80  SpO2:  [88 %-100 %] 90 %    Physical Exam  HENT:      Head: Normocephalic.      Nose: Nose normal.      Mouth/Throat:      Mouth: Mucous membranes are moist.   Eyes:      Pupils: Pupils are equal, round, and reactive to light.   Cardiovascular:      Rate and Rhythm: Normal rate.   Pulmonary:      Effort: Pulmonary effort is normal.   Abdominal:      Palpations: Abdomen is soft.   Musculoskeletal:      Cervical back: Normal range of motion.      Right lower leg: No edema.      Left lower leg: No edema.   Skin:     General: Skin is warm.   Neurological:      Mental Status: She is alert. Mental status is at baseline.   Psychiatric:         Mood and Affect: Mood normal.         Fluids  No intake or output data in the 24 hours ending 09/21/23 1323    Laboratory  Recent Labs     09/20/23  1050 09/21/23  0032   WBC 11.5* 8.1   RBC 4.36 3.68*   HEMOGLOBIN 11.5* 9.9*   HEMATOCRIT 36.9* 33.6*   MCV 84.6 91.3   MCH 26.4* 26.9*   MCHC 31.2* 29.5*   RDW 54.5* 58.6*   PLATELETCT 135* 151*   MPV 10.1 11.4     Recent Labs     09/20/23  1050 09/21/23  0032   SODIUM 131* 131*   POTASSIUM 4.0 4.6   CHLORIDE 94* 97   CO2 25 23   GLUCOSE 124* 183*   BUN 6* 14   CREATININE 0.56 0.48*   CALCIUM 8.4* 8.5     Recent Labs     09/20/23  1050   APTT  31.0   INR 1.23*               Imaging  CT-CTA CHEST PULMONARY ARTERY W/ RECONS   Final Result      1.  Minimal pulmonary embolism. Localized right lower lobe segmental and subsegmental branches.   2.  No right heart strain.   3.  9 mm lucent/lytic lesion T1 vertebral body. Suspect osseous metastatic deposit.   4.  Progression of extensive mediastinal adenopathy since 8/20/2023 consistent with metastatic disease.   5.  Extensive progression of diffuse bilateral innumerable pulmonary nodules consistent with metastatic disease.   6.  Interval development of extensive groundglass and airspace opacities consistent with superimposed pneumonia.   7.  Progression of right adrenal mass now measuring 43 mm x 38 mm consistent with metastasis.      Fleischner Society pulmonary nodule recommendations:      DX-CHEST-PORTABLE (1 VIEW)   Final Result      Diffuse bilateral interstitial and alveolar opacities most consistent with pneumonia or possibly noncardiogenic pulmonary edema.           Assessment/Plan  * Acute on chronic respiratory failure (HCC)- (present on admission)  Assessment & Plan  Multiple etiologies including pulmonary embolism, adenocarcinoma with underlying COPD/asthma, possible pneumonia, continues to smoke. Ceftriaxone and azithromycin were empirically started.  Eliquis was started.  Continue oxygen support.    Pulmonary embolism on right (HCC)- (present on admission)  Assessment & Plan  Diagnosed in 8/2023. She was prescribed anticoagulation by oncology but had not started it due to hemoptysis.  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  Underwent SBRT to the right sacrum.  Follows Dr. Newton oncology, and was recommended to start chemotherapy, but patient wanted to consult with sarcoma specialist at [facility].   Evaluated by oncology today, recommended to start chemotherapy as soon as pneumonia has improved.  Echocardiogram ordered.    Lung cancer (HCC)- (present on admission)  Assessment & Plan  Diagnosed with stage I adenocarcinoma of the lung and July 2023.  Underwent SBRT as she was not a good candidate for surgery due to dyspnea and underlying COPD.     Sarcoma (HCC)- (present on admission)  Assessment & Plan  Follows Dr. Jazmin Newton (oncology).  See above    Chronic pain due to neoplasm- (present on admission)  Assessment & Plan  Follows pain specialist.  Decadron was started.  On multimodal pain regimen including Voltaren, Tylenol, Dilaudid, Norco    Cigarette nicotine dependence without complication- (present on admission)  Assessment & Plan  Continues to smoke despite counseling         VTE prophylaxis:    therapeutic anticoagulation with eliquis 5 mg BID

## 2023-09-21 NOTE — PROGRESS NOTES
Palliative Care   Consult received, EMR reviewed, all completed.  Awaiting return call from [facility] regarding long-term pain management.  In the interim started scheduled Norco with continued as needed dosing of Norco and hydromorphone.  Started Benadryl 25 mg IV every 6 hours with an additional 25 mg as needed.  Full consult to follow.    Thank you for allowing Palliative Care in support of this patient and family. Please contact  with any changes in status, questions, or concerns.     Kay Christensen, MSN, APRN, ACNPC-AG.  Palliative Care Nurse Practitioner  [phone]

## 2023-09-21 NOTE — CONSULTS
"MRN: [id number]  Date of initial palliative care consult: 9/21  Reason for palliative medicine consultation/visit: Non Pain Management  Referring provider: Marcus  Location of consult: [facility]  Additional consulting services: Oncology    HPI:   Melly Hendrickson is a 48 y.o. female with a past medical history of sarcoma, lung cancer s/p XRT, pulmonary embolism in August of this year, ongoing tobacco dependence, COPD/Asthma, bony mets to sacrum and shoulder who presented to the ED from clinic appointment for low oxygen levels with increasing shortness of breath over the past several days.     In the ER, CTA reveals subsegmental  PE, worsening pulmonary nodules, T1 vertebral body lesion, and probably pneumonia.  Patient does state cough with thick sputum  Some occasional streaks of blood in sputum but she denies gross hemoptysis.    She was admitted to CNU and is seen today lying in bed in moderate emotional distress. She has some dyspnea as well and is on nasal oxygen.     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), DDD (degenerative disc disease), lumbar, Depression, Emphysema of lung (HCC), MRSA (methicillin resistant staph aureus) culture positive, Pain, Pericarditis, Primary adenocarcinoma of lower lobe of right lung (HCC), Psychiatric disorder, Sleep apnea, Snoring, and Urinary incontinence.    Pain History:  Onset: after XRT for lung ca.   Location: back/tailbone and all over  Duration: all the time  Characteristics: sharp like \"a broom or mop is sticking me\"  Aggravating factors: position  Alleviating factors: position  Radiation: down legs with tingling  Treatments: cant name any  Severity: \"15/10\"    Additional symptoms: Chronic and acute dyspnea, varied mood, fatigue, extreme itching.      Interval " History: Oncology note reviewed per Dr. Newton:  CT confirmed 1.4 cm mass in RLL and 1.9 Ground glass opacity in RLL. BX confirmed lung adenocarcinoma. She had SBRT in 2023. Subsequent to this patient noticed a small lump on her right breast reduction scar line which grew quickly. Ultimate biopsy revealed poorly differentiated malignancy favoring carcinoma  with 90% probability of sarcoma.  She has been reluctant to start chemo therapy in favor of a second opining.   While waiting for 2 opinion at [place] when she was admitted for PE.   Since then she has had worsening dyspnea and has notices more nodules similar to the one under her right breast.     Medication Allergy/Sensitivities:  Allergies   Allergen Reactions    Penicillins Hives, Rash and Swelling     Pt reports that she gets swelling in throat and face, rash all over face and arms.   Tolerated cefazolin    Aspirin Rash and Hives     Pt reports that she received a rash on face, pt reports it ok if she takes NORCO    Other Environmental Shortness of Breath     Perfumes, dander, scents       Pertinent Family History:  family history includes Cancer in her father, paternal grandfather, and paternal grandmother; Diabetes in her mother.   Family history reviewed with patient. There is no family history that is pertinent to the chief complaint.    ROS:    ROS    PE:   Recent vital signs  BMI: Body mass index is 25.09 kg/m².    Temp (24hrs), Av.5 °C (97.7 °F), Min:36.3 °C (97.3 °F), Max:36.8 °C (98.3 °F)  Temperature: 36.3 °C (97.4 °F)  Pulse  Av.4  Min: 80  Max: 132   Blood Pressure: 124/80       Physical Exam  Recent Labs     23  1050 23  0032   SODIUM 131* 131*   POTASSIUM 4.0 4.6   CHLORIDE 94* 97   CO2 25 23   GLUCOSE 124* 183*   BUN 6* 14   CREATININE 0.56 0.48*   CALCIUM 8.4* 8.5     Recent Labs     23  1050 23  0032   WBC 11.5* 8.1   RBC 4.36 3.68*   HEMOGLOBIN 11.5* 9.9*   HEMATOCRIT 36.9* 33.6*   MCV 84.6 91.3  "  [facility] 26.4* 26.9*   [facility] 31.2* 29.5*   RDW 54.5* 58.6*   PLATELETCT 135* 151*   MPV 10.1 11.4       ASSESSMENT/PLAN WITH SHARED DECISION MAKING:   Review  Pertinent imaging reviewed.    PHYSICAL ASPECTS OF CARE  Palliative Performance Scale: 50%    #With metastatic disease to chest wall, sacral and left shoulder mets  #Stage Ia 2 adenocarcinoma of lung status post SBRT  #Pulmonary embolism  #Pneumonia  #COPD/asthma  #Chronic cancer pain  -Attempted to contact Dr. Liang at [facility] to discuss goals of care regarding pain management.  Previously had placed patient on MS Contin which she tolerated well and is agreeable to taking.  Unable to contact physician.  - She is agreeable to fentanyl, fentanyl transdermal 25  micrograms ordered change every 3 days.  -Hydrocodone-acetaminophen 10/325 1 tablet every 4 hours scheduled and breakthrough pain  - Continue IV hydromorphone as previously ordered.  - Palliative care to follow-up 9/22.  #Anxiety  - Diphenhydramine 25 mg IV every 6 hours with as needed dosing as needed for increased anxiety/itching.    SOCIAL ASPECTS OF CARE  Moved from [facility] to [place] approximately 3 years ago.  Has 5 adult children and 4 grandchildren.    SPIRITUAL ASPECTS OF CARE   Believes in a higher power.    GOALS OF CARE/SERIOUS ILLNESS CONVERSATION  Met with patient at bedside and friend Nora who she refers to as her \"friend and advocate\".  Familiar with patient from previous admission.  Patient is somewhat upset and tearful.  She is most concerned about her pain reports that Dr. Liang prescribed her a fentanyl patch however she was afraid to use it with the new nodules that she discovered recently on her shoulders and chest.  In addition she reports extreme itching associated with extreme anxiety.  She has been taking her Norco on a regular basis.  She denies other symptoms aside from dyspnea and irritability emotional lability.  She has difficulty talking about goals of care and " discussing the nature of her cancer.  She admits she is distrustful of healthcare staff particularly of physician who performed her radiation.  He attributes radiation to the cause of her sarcoma.  Emotional support, active listening, normalization, all employed during visit.. Outcome: Patient remains full code currently cannot discuss goals of care.  Agreeable to start chemotherapy per Dr. Newton's note.  Please refer to symptom management changes as above.    Code Status: Full code    ACP Documents: None on file    0 minutes spent discussing advance care planning, this time excludes any other billed services.    I spent a total of 65 minutes reviewing medical records, direct face-to-face time with the patient and/or family, documentation and coordination of care. This is separate from the time spent on advance care planning, which is documented above.    Kay Christensen, MSN, APRN, ACNPC-AG.  Palliative Care Nurse Practitioner  [phone]

## 2023-09-22 NOTE — DIETARY
Nutrition Update:    Day 2 of admit.  Melly Hendrickson is a 48 y.o. female with being followed to optimize nutrition.    Met with patient at bedside.  She stated her overall weight loss since February of this year was  likely ~20# which is approximately 10% in seven months.   She stated her appetite is currently good and she requested Boost or Ensure supplements.     Current Diet: Regular, with supplement order in place.    Problem: Nutritional:  Goal: Achieve adequate nutritional intake  Description: Patient will consume 50% of meals  Outcome: Met currently.    Nutrition Representative will continue to see her for ongoing meal and snack preferences.  RD following per department policy.

## 2023-09-22 NOTE — PROGRESS NOTES
Hospital Medicine Daily Progress Note    Date of Service  9/22/2023    Chief Complaint  Melly Hendrickson is a 48 y.o. female referred to the ER by her primary care physician on 9/20/2023 due to shortness of breath.    She was diagnosed with stage I NSCLC (adenocarcinoma) of the lung in 7/2023.  She underwent  SBRT as she was not a good surgical candidate due to dyspnea and underlying COPD. 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  She underwent SBRT to right sacrum.  She follows Dr. Newton (oncology), and was recommended to start chemotherapy but wished to consult with a sarcoma specialist at [place] first.     She has COPD/asthma, was diagnosed with pulmonary embolism in 8/2023, anticoagulation not started due to hemoptysis.    Hospital Course  On admission, she was tachycardic, tachypneic, and afebrile.  Chest x-ray showed diffuse bilateral interstitial and alveolar opacities consistent with pneumonia. CTA chest on admission showed right subsegmental PE, worsening pulmonary nodules, T1 vertebral body region, possible pneumonia.  Procalcitonin was 0.11.Eliquis was started.     Ceftriaxone and azithromycin were empirically started.    She was saturating about 90% on 6 L/min nasal cannula.    Interval Problem Update  Saturating 93% on 5 L/min nasal cannula.  Afebrile, tachycardic. Echocardiogram pending. Palliative care following for pain management.  On every 4 hours prn Ativan, every 2 hours prn IV Dilaudid, Norco 10/325 every 3 hours prn. Tylenol every 6 hours prn scheduled to to every 6 hours, hold for sleeping    I have discussed this patient's plan of care and discharge plan at IDT rounds today with Case Management, Nursing, Nursing leadership, and other members of the IDT team.    Consultants/Specialty  oncology    Code Status  Full Code    Disposition  The patient is not medically cleared  for discharge to home or a post-acute facility.  Anticipate discharge to: home with close outpatient follow-up    I have placed the appropriate orders for post-discharge needs.    Review of Systems  Review of Systems   Constitutional:  Positive for malaise/fatigue.   HENT: Negative.     Respiratory: Negative.     Cardiovascular: Negative.    Gastrointestinal: Negative.    Genitourinary: Negative.    Musculoskeletal:  Positive for back pain, joint pain and myalgias.   Skin: Negative.    Neurological: Negative.    Endo/Heme/Allergies: Negative.    Psychiatric/Behavioral: Negative.          Physical Exam  Temp:  [36.3 °C (97.3 °F)-36.9 °C (98.4 °F)] 36.9 °C (98.4 °F)  Pulse:  [] 112  Resp:  [14-20] 16  BP: (114-136)/(70-85) 123/82  SpO2:  [92 %-96 %] 93 %    Physical Exam  HENT:      Head: Normocephalic.      Nose: Nose normal.      Mouth/Throat:      Mouth: Mucous membranes are moist.   Eyes:      Pupils: Pupils are equal, round, and reactive to light.   Cardiovascular:      Rate and Rhythm: Normal rate.   Pulmonary:      Effort: Pulmonary effort is normal.   Abdominal:      Palpations: Abdomen is soft.   Musculoskeletal:      Cervical back: Normal range of motion.      Right lower leg: No edema.      Left lower leg: No edema.   Skin:     General: Skin is warm.   Neurological:      Mental Status: She is alert. Mental status is at baseline.   Psychiatric:         Mood and Affect: Mood normal.         Fluids    Intake/Output Summary (Last 24 hours) at 9/22/2023 1428  Last data filed at 9/22/2023 0915  Gross per 24 hour   Intake 250 ml   Output 1 ml   Net 249 ml       Laboratory  Recent Labs     09/20/23  1050 09/21/23  0032 09/22/23  0000   WBC 11.5* 8.1 13.3*   RBC 4.36 3.68* 3.72*   HEMOGLOBIN 11.5* 9.9* 9.7*   HEMATOCRIT 36.9* 33.6* 32.7*   MCV 84.6 91.3 87.9   MCH 26.4* 26.9* 26.1*   MCHC 31.2* 29.5* 29.7*   RDW 54.5* 58.6* 56.7*   PLATELETCT 135* 151* 169   MPV 10.1 11.4 10.1     Recent Labs      09/20/23  1050 09/21/23  0032 09/22/23  0000   SODIUM 131* 131* 138   POTASSIUM 4.0 4.6 4.5   CHLORIDE 94* 97 102   CO2 25 23 26   GLUCOSE 124* 183* 166*   BUN 6* 14 16   CREATININE 0.56 0.48* 0.50   CALCIUM 8.4* 8.5 8.4*     Recent Labs     09/20/23  1050   APTT 31.0   INR 1.23*               Imaging  CT-CTA CHEST PULMONARY ARTERY W/ RECONS   Final Result      1.  Minimal pulmonary embolism. Localized right lower lobe segmental and subsegmental branches.   2.  No right heart strain.   3.  9 mm lucent/lytic lesion T1 vertebral body. Suspect osseous metastatic deposit.   4.  Progression of extensive mediastinal adenopathy since 8/20/2023 consistent with metastatic disease.   5.  Extensive progression of diffuse bilateral innumerable pulmonary nodules consistent with metastatic disease.   6.  Interval development of extensive groundglass and airspace opacities consistent with superimposed pneumonia.   7.  Progression of right adrenal mass now measuring 43 mm x 38 mm consistent with metastasis.      Fleischner Society pulmonary nodule recommendations:      DX-CHEST-PORTABLE (1 VIEW)   Final Result      Diffuse bilateral interstitial and alveolar opacities most consistent with pneumonia or possibly noncardiogenic pulmonary edema.      EC-ECHOCARDIOGRAM COMPLETE W/ CONT    (Results Pending)        Assessment/Plan  * Acute on chronic respiratory failure (HCC)- (present on admission)  Assessment & Plan  Multiple etiologies including pulmonary embolism, adenocarcinoma with underlying COPD/asthma, possible pneumonia, continues to smoke. Ceftriaxone and azithromycin were empirically started.  Eliquis was started.  Continue oxygen support.    Pulmonary embolism on right (HCC)- (present on admission)  Assessment & Plan  Diagnosed in 8/2023. She was prescribed anticoagulation by oncology but had not started it due to hemoptysis.  Eliquis was started on admission    Primary undifferentiated sarcoma of soft tissue (HCC)- (present  on admission)  Assessment & Plan  Has undifferentiated sarcoma of the chest wall with multiple nodules throughout her body including the sacrum.  Underwent SBRT to the right sacrum.  Follows Dr. Newton oncology, and was recommended to start chemotherapy, but patient wanted to consult with sarcoma specialist at [place].  Evaluated by oncology today, recommended to start chemotherapy as soon as pneumonia has improved.  Echocardiogram ordered.    Lung cancer (HCC)- (present on admission)  Assessment & Plan  Diagnosed with stage I adenocarcinoma of the lung and July 2023.  Underwent SBRT as she was not a good candidate for surgery due to dyspnea and underlying COPD.     Sarcoma (HCC)- (present on admission)  Assessment & Plan  Follows Dr. Jazmin Newton (oncology).  See above    Chronic pain due to neoplasm- (present on admission)  Assessment & Plan  Follows pain specialist.  Decadron was started.  On multimodal pain regimen including Voltaren, Tylenol, Dilaudid, Norco    Cigarette nicotine dependence without complication- (present on admission)  Assessment & Plan  Continues to smoke despite counseling         VTE prophylaxis: Eliquis

## 2023-09-22 NOTE — PROGRESS NOTES
"MRN: [id number]  Date of initial palliative care consult: 9/21  Reason for palliative medicine consultation/visit: Non-chronic Pain Management  Referring provider: Marcus  Location of consult: [facility]  Additional consulting services: Oncology    HPI:   Melly Hendrickson is a 48 y.o. female with a past medical history of sarcoma, lung cancer s/p XRT, pulmonary embolism in August of this year, ongoing tobacco dependence, COPD/Asthma, bony mets to sacrum and shoulder who presented to the ED from clinic appointment for low oxygen levels with increasing shortness of breath over the past several days.     In the ER, CTA reveals subsegmental  PE, worsening pulmonary nodules, T1 vertebral body lesion, and probably pneumonia.  Patient does state cough with thick sputum  Some occasional streaks of blood in sputum but she denies gross hemoptysis.    She was admitted to CNU and is seen today lying in bed in moderate emotional distress. She has some dyspnea as well and is on nasal oxygen.     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    Pain History:  Onset: after XRT for lung ca.   Location: back/tailbone and all over  Duration: all the time  Characteristics: sharp like \"a broom or mop is sticking me\"  Aggravating factors: position  Alleviating factors: position  Radiation: down legs with tingling  Treatments: cant name any  Severity: \"15/10\"    Additional symptoms: Chronic and acute dyspnea, varied mood, fatigue, extreme itching.  " "    Interval History: Oncology note reviewed per Dr. Newton:  CT confirmed 1.4 cm mass in RLL and 1.9 Ground glass opacity in RLL. BX confirmed lung adenocarcinoma. She had SBRT in 2023. Subsequent to this patient noticed a small lump on her right breast reduction scar line which grew quickly. Ultimate biopsy revealed poorly differentiated malignancy favoring carcinoma  with 90% probability of sarcoma.  She has been reluctant to start chemo therapy in favor of a second opining.   While waiting for 2 opinion at [facility] when she was admitted for PE.   Since then she has had worsening dyspnea and has notices more nodules similar to the one under her right breast.      patient still having significant pain.  She reports pain is 9/10 at best when she is laying on her right side and \"15\" out of 10 when she has pressure on her sacrum or ambulating.  She has significant bone pain.  She has not had a bowel movement since prior to admission.  She is anxious and has pruritus.  She reports she is tolerating fentanyl patch just fine and does not know why she was so worried/scared.  She feels worried and scared in general about her prognosis, her family, and her work.  She is also anxious about being in the hospital due to mistrust of the healthcare system and starting chemotherapy.    ROS:    Review of Systems   Respiratory:  Positive for cough, shortness of breath and wheezing.    Gastrointestinal:  Positive for constipation.   Musculoskeletal:  Positive for back pain.     PE:   Recent vital signs  BMI: Body mass index is 25.09 kg/m².    Temp (24hrs), Av.6 °C (97.9 °F), Min:36.3 °C (97.3 °F), Max:36.9 °C (98.4 °F)  Temperature: 36.9 °C (98.4 °F)  Pulse  Av.7  Min: 80  Max: 126   Blood Pressure: 123/82       Physical Exam  Pulmonary:      Breath sounds: Wheezing present.   Musculoskeletal:         General: Tenderness (sacrum/hips/spine) present. No swelling.   Neurological:      Mental Status: She is " alert.   Psychiatric:         Attention and Perception: Attention normal.         Mood and Affect: Mood is anxious.         Behavior: Behavior is cooperative.       Recent Labs     09/20/23  1050 09/21/23  0032 09/22/23  0000   SODIUM 131* 131* 138   POTASSIUM 4.0 4.6 4.5   CHLORIDE 94* 97 102   CO2 25 23 26   GLUCOSE 124* 183* 166*   BUN 6* 14 16   CREATININE 0.56 0.48* 0.50   CALCIUM 8.4* 8.5 8.4*       Recent Labs     09/20/23  1050 09/21/23  0032 09/22/23  0000   WBC 11.5* 8.1 13.3*   RBC 4.36 3.68* 3.72*   HEMOGLOBIN 11.5* 9.9* 9.7*   HEMATOCRIT 36.9* 33.6* 32.7*   MCV 84.6 91.3 87.9   MCH 26.4* 26.9* 26.1*   MCHC 31.2* 29.5* 29.7*   RDW 54.5* 58.6* 56.7*   PLATELETCT 135* 151* 169   MPV 10.1 11.4 10.1       ASSESSMENT/PLAN WITH SHARED DECISION MAKING:   Medications reviewed. Labs Reviewed.     Pertinent imaging reviewed.    PHYSICAL ASPECTS OF CARE  Palliative Performance Scale: 50%    #Metastatic disease to chest wall, sacral and left shoulder mets  #Stage Ia 2 adenocarcinoma of lung status post SBRT  #Pulmonary embolism  #Pneumonia  #COPD/asthma    #Chronic cancer pain  MEDD ~ 260 mg (25 mcg patch ~ 50 mg, hydrocodone 10 x 11 doses ~ 110 mg, hydromorphone 1 mg x 5 = 5 mg x 20 ~ 100 mg)  - Increase fentanyl patch to 50 mcg Q 72 hours  - DC scheduled Norco  - Continue Norco 10/325 1 tab every 4 hours PRN moderate pain  - Okay to give IV hydromorphone regardless of oral opioid administration while titrating correct dose of fentanyl patch  - Patient declined Cymbalta, gabapentin, Lyrica given prior side effects    She expressed significant anxiety and increase in pain while in the hospital and in anticipation of chemotherapy.  She is hopeful she will not need as much pain medication or anxiety management when she is home.  Short her we will provide aggressive symptom management while keeping her safety as a priority.  Provided extensive education in regards to opioid safety and signs/symptoms adverse side  "effects.    #Anxiety  - Continue diphenhydramine 25 mg IV every 6 hours with as needed dosing as needed for increased anxiety/itching  - Lorazepam 0.5 mg PO HS and every 4 hours as needed  - Patient declined psychotherapy today, I did place order should the patient need extra support over the weekend and discussed with Dr. Nolan    SOCIAL ASPECTS OF CARE  9/21 Moved from [facility] to [place] approximately 3 years ago.  Has 5 adult children and 4 grandchildren.     9/22 patient reports she is in the process of harvesting her eggs to help build the Legacy help her daughters due to fertility issues.  She feels frustrated, scared, angry in regards to her diagnosis and the impact her diagnosis is having on her life.  She is worried about her family and her career.  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    SPIRITUAL ASPECTS OF CARE   Believes in a higher power.    GOALS OF CARE/SERIOUS ILLNESS CONVERSATION  9/21Met with patient at bedside and friend Nora who she refers to as her \"friend and advocate\".  Familiar with patient from previous admission.  Patient is somewhat upset and tearful.  She is most concerned about her pain reports that Dr. Liang prescribed her a fentanyl patch however she was afraid to use it with the new nodules that she discovered recently on her shoulders and chest.  In addition she reports extreme itching associated with extreme anxiety.  She has been taking her Norco on a regular basis.  She denies other symptoms aside from dyspnea and irritability emotional lability.  She has difficulty talking about goals of care and discussing the nature of her cancer.  She admits she is distrustful of healthcare staff particularly of physician who performed her radiation.  He attributes radiation to the cause of her sarcoma.  Emotional support, active listening, normalization, all employed during visit.    9/22 patient having significant distress and anxiety in regards to " "her diagnosis and its effect on her life.  She is able to express emotions of fear, sadness, and anger and provided therapeutic presence and validated her emotions.  Confirmed care team is working diligently to make her as comfortable as possible and assist her in moving forward with her goals.  She is very scared about getting chemo that will \"poison\" her body however she feels it is the only thing she can do to control her cancer.  Recommend continued goals of care discussions next week, prior to discharge when symptoms are better managed.      Code Status: Full code    ACP Documents: None on file    I spent a total of  80 minutes reviewing medical records, direct face-to-face time with the patient and/or family, documentation and coordination of care. This is separate from the time spent on advance care planning, which is documented above.    Ivonne \"Mana\" FLOYD Dow, [facility]  Palliative Care Nurse Practitioner  [phone]        "

## 2023-09-22 NOTE — CARE PLAN
Problem: Bronchoconstriction  Goal: Improve in air movement and diminished wheezing  Description: Target End Date:  2 to 3 days    1.  Implement inhaled treatments  2.  Evaluate and manage medication effects  Outcome: Not Met    Respiratory Update    Treatment modality: duoneb  Frequency: Q4    Pt tolerating current treatments well with no adverse reactions.

## 2023-09-22 NOTE — PROGRESS NOTES
Bedside report received. Assumed care of patient this PM. Assessment completed      Patient is A&O x 4, pt calls for assistance appropriately  Reports 10 /10 pain, scheduled and prn medication administered.  Pt is on 4L oxygen  Mobility up self   Bed alarm in on.  Voiding +  Flatus +            Plan of care reviewed with the patient. Bed is locked and in the lowest position. Call light is within reach. Patient encouraged to voice needs and concerns, all needs met at this time. Hourly rounding in place.

## 2023-09-22 NOTE — DISCHARGE PLANNING
Case Management Discharge Planning    Admission Date: 9/20/2023  GMLOS: 3.9  ALOS: 2    6-Clicks ADL Score: 21  6-Clicks Mobility Score: 19      Anticipated Discharge Dispo: Discharge Disposition: Discharged to home/self care (01)    DME Needed: No    Action(s) Taken: Discussed in IDT rounds, pt will need inpatient chemotherapy for 5 days, per Oncology waiting medical clearance from Hospitalist. Palliative following for pain management.     Escalations Completed: None    Medically Clear: No    Next Steps: Pending chemotherapy plan.     Barriers to Discharge: Medical clearance    Is the patient up for discharge tomorrow: No

## 2023-09-22 NOTE — CARE PLAN
The patient is Stable - Low risk of patient condition declining or worsening    Shift Goals  Clinical Goals: treat and manage pain so that patient can sleep this shift, no decline in respiratory status or an increase in O2 needs  Patient Goals: comfort, rest, pain control, breathing treatments  Family Goals: rossy    Progress made toward(s) clinical / shift goals:    Pt is treated with both PRN and scheduled pain medications. While she continues to rate pain between 8-10 ,she is also sleeping during some pain reassessments. Pt has chronic pain. Pt maintains respiratory status,  reading SPO2 > 90% this shift on 4L O2. No respiratory distress noted    Patient is not progressing towards the following goals:

## 2023-09-23 NOTE — CARE PLAN
The patient is Watcher - Medium risk of patient condition declining or worsening    Shift Goals  Clinical Goals: Pain control  Patient Goals: Pain control, rest  Family Goals: Not present    Progress made toward(s) clinical / shift goals:       Problem: Knowledge Deficit - Standard  Goal: Patient and family/care givers will demonstrate understanding of plan of care, disease process/condition, diagnostic tests and medications  Description: Target End Date:  1-3 days or as soon as patient condition allows    Document in Patient Education    1.  Patient and family/caregiver oriented to unit, equipment, visitation policy and means for communicating concern  2.  Complete/review Learning Assessment  3.  Assess knowledge level of disease process/condition, treatment plan, diagnostic tests and medications  4.  Explain disease process/condition, treatment plan, diagnostic tests and medications  Outcome: Progressing  Note: Patient understands the importance of monitoring O2 sats and pain management.

## 2023-09-23 NOTE — PROGRESS NOTES
[facility] Medicine Daily Progress Note    Date of Service  9/23/2023    Chief Complaint  Melly Hendrickson is a 48 y.o. female referred to the ER by her primary care physician on 9/20/2023 due to shortness of breath.    She was diagnosed with stage I NSCLC (adenocarcinoma) of the lung in 7/2023.  She underwent  SBRT as she was not a good surgical candidate due to dyspnea and underlying COPD. 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  She underwent SBRT to right sacrum.  She follows Dr. Newton (oncology), and was recommended to start chemotherapy but wished to consult with a sarcoma specialist at [place] first.     She has COPD/asthma, was diagnosed with pulmonary embolism in 8/2023, anticoagulation not started due to hemoptysis.    Hospital Course  On admission, she was tachycardic, tachypneic, and afebrile.  Chest x-ray showed diffuse bilateral interstitial and alveolar opacities consistent with pneumonia. CTA chest on admission showed right subsegmental PE, worsening pulmonary nodules, T1 vertebral body region, possible pneumonia.  Procalcitonin was 0.11.Eliquis was started.     Ceftriaxone and azithromycin were empirically started.    She was saturating about 90% on 6 L/min nasal cannula.    Interval Problem Update  Saturating 92% on 5 L/min nasal cannula.  Afebrile, tachycardic. Echocardiogram from 9/22/2023 showed LVEF ~ 65% with no reported abnormalities. Procalcitonin is 0.08. Repeat CXR ordered.     Palliative care following for pain management.  On every 4 hours prn Ativan, every 2 hours prn IV Dilaudid, Norco 10/325 every 3 hours prn. Tylenol every 6 hours scheduled, holding when sleeping.  On dexamethasone 4 mg BID.    I have discussed this patient's plan of care and discharge plan at IDT rounds today with Case Management, Nursing, Nursing leadership, and other members of the IDT  team.    Consultants/Specialty  oncology    Code Status  Full Code    Disposition  The patient is not medically cleared for discharge to home or a post-acute facility.  Anticipate discharge to: home with close outpatient follow-up    I have placed the appropriate orders for post-discharge needs.    Review of Systems  Review of Systems   Constitutional:  Positive for malaise/fatigue.   HENT: Negative.     Respiratory: Negative.     Cardiovascular: Negative.    Gastrointestinal: Negative.    Genitourinary: Negative.    Musculoskeletal:  Positive for back pain, joint pain and myalgias.   Skin: Negative.    Neurological: Negative.    Endo/Heme/Allergies: Negative.    Psychiatric/Behavioral: Negative.          Physical Exam  Temp:  [36.8 °C (98.3 °F)-37 °C (98.6 °F)] 37 °C (98.6 °F)  Pulse:  [102-115] 110  Resp:  [14-19] 18  BP: (114-143)/(79-90) 131/85  SpO2:  [91 %-96 %] 92 %    Physical Exam  HENT:      Head: Normocephalic.      Nose: Nose normal.      Mouth/Throat:      Mouth: Mucous membranes are moist.   Eyes:      Pupils: Pupils are equal, round, and reactive to light.   Cardiovascular:      Rate and Rhythm: Normal rate.   Pulmonary:      Effort: Pulmonary effort is normal.   Abdominal:      Palpations: Abdomen is soft.   Musculoskeletal:      Cervical back: Normal range of motion.      Right lower leg: No edema.      Left lower leg: No edema.   Skin:     General: Skin is warm.   Neurological:      Mental Status: She is alert. Mental status is at baseline.   Psychiatric:         Mood and Affect: Mood normal.         Fluids    Intake/Output Summary (Last 24 hours) at 9/23/2023 1026  Last data filed at 9/23/2023 0858  Gross per 24 hour   Intake 350 ml   Output --   Net 350 ml       Laboratory  Recent Labs     09/21/23  0032 09/22/23  0000 09/23/23  0010   WBC 8.1 13.3* 12.6*   RBC 3.68* 3.72* 3.74*   HEMOGLOBIN 9.9* 9.7* 9.9*   HEMATOCRIT 33.6* 32.7* 33.8*   MCV 91.3 87.9 90.4   MCH 26.9* 26.1* 26.5*   MCHC 29.5*  29.7* 29.3*   RDW 58.6* 56.7* 58.1*   PLATELETCT 151* 169 173   MPV 11.4 10.1 9.9     Recent Labs     09/21/23  0032 09/22/23  0000 09/23/23  0010   SODIUM 131* 138 138   POTASSIUM 4.6 4.5 4.4   CHLORIDE 97 102 100   CO2 23 26 29   GLUCOSE 183* 166* 135*   BUN 14 16 12   CREATININE 0.48* 0.50 0.52   CALCIUM 8.5 8.4* 8.3*     Recent Labs     09/20/23  1050   APTT 31.0   INR 1.23*               Imaging  EC-ECHOCARDIOGRAM COMPLETE W/O CONT   Final Result      CT-CTA CHEST PULMONARY ARTERY W/ RECONS   Final Result      1.  Minimal pulmonary embolism. Localized right lower lobe segmental and subsegmental branches.   2.  No right heart strain.   3.  9 mm lucent/lytic lesion T1 vertebral body. Suspect osseous metastatic deposit.   4.  Progression of extensive mediastinal adenopathy since 8/20/2023 consistent with metastatic disease.   5.  Extensive progression of diffuse bilateral innumerable pulmonary nodules consistent with metastatic disease.   6.  Interval development of extensive groundglass and airspace opacities consistent with superimposed pneumonia.   7.  Progression of right adrenal mass now measuring 43 mm x 38 mm consistent with metastasis.      Fleischner Society pulmonary nodule recommendations:      DX-CHEST-PORTABLE (1 VIEW)   Final Result      Diffuse bilateral interstitial and alveolar opacities most consistent with pneumonia or possibly noncardiogenic pulmonary edema.           Assessment/Plan  * Acute on chronic respiratory failure (HCC)- (present on admission)  Assessment & Plan  Multiple etiologies including pulmonary embolism, adenocarcinoma with underlying COPD/asthma, possible pneumonia, tobacco smoker. Ceftriaxone and azithromycin were empirically started.  Eliquis was started.  Saturating 92% on 5 L/min nasal cannula.  Procalcitonin is 0.08.  Continue oxygen support.    Pulmonary embolism on right (HCC)- (present on admission)  Assessment & Plan  Diagnosed in 8/2023. She was prescribed  anticoagulation by oncology but had not started it due to hemoptysis.  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  Underwent SBRT to the right sacrum.  Follows Dr. Newton oncology, and was recommended to start chemotherapy, but patient wanted to consult with sarcoma specialist at [place].  Evaluated by oncology on admission, recommended to start chemotherapy as soon as pneumonia has improved. Echocardiogram from 9/22/2023 showed LVEF ~ 65% with no reported abnormalities.     Lung cancer (HCC)- (present on admission)  Assessment & Plan  Diagnosed with stage I adenocarcinoma of the lung and July 2023.  Underwent SBRT as she was not a good candidate for surgery due to dyspnea and underlying COPD.     Sarcoma (HCC)- (present on admission)  Assessment & Plan  Follows Dr. Jazmin Newton (oncology).  See above    Chronic pain due to neoplasm- (present on admission)  Assessment & Plan  Follows pain specialist.  Decadron was started.  On multimodal pain regimen including Voltaren, Tylenol, Dilaudid, Norco    Cigarette nicotine dependence without complication- (present on admission)  Assessment & Plan  Continues to smoke despite counseling         VTE prophylaxis: Eliquis

## 2023-09-23 NOTE — CARE PLAN
Problem: Bronchoconstriction  Goal: Improve in air movement and diminished wheezing  Description: Target End Date:  2 to 3 days    1.  Implement inhaled treatments  2.  Evaluate and manage medication effects  Flowsheets (Taken 9/21/2023 1555 by Hayley Marroquin RRT)  Bronchodilator Goals/Outcome: Diminished Wheezing and Volume of Air Movement Increased  Bronchodilator Indications: History / Diagnosis  Note:     Respiratory Update    Treatment modality: DUO  Frequency:Q4    Pt tolerating current treatments well with no adverse reactions.

## 2023-09-23 NOTE — CARE PLAN
The patient is Watcher - Medium risk of patient condition declining or worsening    Shift Goals  Clinical Goals: Pt will have improvement in her anxiety  Patient Goals: Pt will have improvement in her pain to a 7 or less  Family Goals: not pressent    Progress made toward(s) clinical / shift goals:  Pt free from injury. Calling appropriately.     Patient is not progressing towards the following goals:    Pt with persistent anxiety throughout the shift. Anxiety given. Box breathing implemented. Emotional support provided. Friends at the bedside. Pain continues to be rated at a 10/10 despite around the clock intervention.

## 2023-09-24 NOTE — CONSULTS
"[facility]    INPATIENT ASSESSMENT    Name: Melly Hendrickson  MRN: [id number]  : 1975  Age: 48 y.o.  Date of assessment: 2023  PCP: Checo Baker M.D.  Persons in attendance: Patient    Length of Intervention: 60 Minutes    HPI:  Per Medical Record: \"Melly Hendrickson is a 48 y.o. female referred to the ER by her primary care physician on 2023 due to shortness of breath.     She was diagnosed with stage I NSCLC (adenocarcinoma) of the lung in 2023.  She underwent  SBRT as she was not a good surgical candidate due to dyspnea and underlying COPD. 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  She underwent SBRT to right sacrum.\" Patient was [facility] for a psychotherapy session to manage emotions as a result of her current medical condition.    CHIEF COMPLAINT/PRESENTING ISSUE (as stated by Patient): Melly Hendrickson is a 48 year old female, seen sitting up on hospital bed, alert, oriented, somewhat irritable, but willing to engage in the interview process. Patient denies suicidal or homicidal ideations. There is no indication of a thought disorder. Patient's speech was clear and coherent.    Patient went into great length to present her medical journey and multiple diagnoses and treatments. Patient reports frustration with the medical system and the treatment she has had. Patient states, :and then they send you in here like I am crazy\".     Psychotherapy Session Summary  Clinician was able to affirm patients frustration and validate her feelings while guiding her towards ways of coping that would benefit her instead of remaining angry.    Patient acknowledged her juanis in God and reports reaching out to her [employer] for prayer and support. Patient also has a friend at bedside who traveled from [place] to provide " support.    Clinician encouraged patient to focus on her health and well being instead of using her energy towards holding anger towards the medical staff.  Clinician present patient with ways of managing anxiety and depression with coping skills that are familiar to her and correlate with her spiritual belief system. Patient was able to recognize the importance of focusing on healing.  By the end of the session, patient was more calm, pleasant and reports a willingness to meet again prior to her discharge.    Chief Complaint   Patient presents with    Shortness of Breath     PT hx lung ca that's metastasized. Was at another appt and was SOB. RA while walking in 70's per EMS. Currently on 6L NC          CURRENT LIVING SITUATION/SOCIAL SUPPORT: Patient reports moving to [place] from [facility] because she believed the air quality was better for her health. Patient states she has five adult children. She was residing with one of her daughters but states that did not work well. Patient has support from her Catholic and has a friend at bedside from [place].    BEHAVIORAL HEALTH TREATMENT HISTORY  Does patient/parent report a history of prior behavioral health treatment for patient?   No:    SAFETY ASSESSMENT - SELF  Does patient acknowledge current or past symptoms of dangerousness to self? no  Does parent/significant other report patient has current or past symptoms of dangerousness to self? N\A  Does presenting problem suggest symptoms of dangerousness to self? No    SAFETY ASSESSMENT - OTHERS  Does patient acknowledge current or past symptoms of aggressive behavior or risk to others? no  Does parent/significant other report patient has current or past symptoms of aggressive behavior or risk to others?  no  Does presenting problem suggest symptoms of dangerousness to others? No    Crisis Safety Plan completed and copy given to patient? N\A    ABUSE/NEGLECT SCREENING  Does patient report feeling “unsafe” in his/her home, or  "afraid of anyone?  no  Does patient report any history of physical, sexual, or emotional abuse?  no  Does parent or significant other report any of the above? no  Is there evidence of neglect by self?  no  Is there evidence of neglect by a caregiver? no  Does the patient/parent report any history of CPS/APS/police involvement related to suspected abuse/neglect or domestic violence? no  Based on the information provided during the current assessment, is a mandated report of suspected abuse/neglect being made?  No    SUBSTANCE USE SCREENING  Yes:  Apolinar all substances used in the past 30 days:      Last Use Amount   []   Alcohol     []   Marijuana     []   Heroin     []   Prescription Opioids  (used without prescription, for    recreation, or in excess of prescribed amount)     []   Other Prescription  (used without prescription, for    recreation, or in excess of prescribed amount)     []   Cocaine      []   Methamphetamine     []   \"\" drugs (ectasy, MDMA)     []   Other substances        UDS results: Not assessed during this admission  Breathalyzer results: Not assessed    What consequences does the patient associate with any of the above substance use and or addictive behaviors? None    Risk factors for detox (check all that apply):  []  Seizures   []  Diaphoretic (sweating)   []  Tremors   []  Hallucinations   []  Increased blood pressure   []  Decreased blood pressure   []  Other   []  None      [] Patient education on risk factors for detoxification and instructed to return to ER as needed.      MENTAL STATUS              Participation: Defensive  Grooming: Casual  Orientation: Alert  Behavior: Agitated  Eye contact: Good  Mood: Irritable  Affect: Congruent with content  Thought process: Circumstantial  Thought content: Within normal limits  Speech: Volume within normal limits  Perception: Within normal limits  Memory:  Recent:  Good  Insight: Adequate  Judgment:  Adequate  Other:    Collateral " information:   Source:   Significant other present in person:    Significant other by telephone   [facility] Staff   [facility] Medical Record-reviewed   Other: Physician     Unable to complete full assessment due to:   Acute intoxication   Patient declined to participate/engage   Patient verbally unresponsive   Significant cognitive deficits   Significant perceptual distortions or behavioral disorganization   Other:             CLINICAL IMPRESSIONS:  Primary:  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  Secondary:                                         IDENTIFIED NEEDS/PLAN:  [Trigger DISPOSITION list for any items marked]      Imminent safety risk - self  Imminent safety risk - others     Acute substance withdrawal   Psychosis/Impaired reality testing   x  Mood/anxiety   Substance use/Addictive behavior     Maladaptive behavior   Parent/child conflict     Family/Couples conflict   Biomedical     Housing   Financial      Legal  Occupational/Educational     Domestic violence   Other:     Recommendations and Observation Level:    Will continue to follow     Legal Hold: N/A    Thank you,      Sherry Nolan, Ph.D., [facility]  9/24/2023

## 2023-09-24 NOTE — PROGRESS NOTES
NOC APRN CROSS COVER NOTE      Responded to rapid response to shortness of breath and increased oxygen demand.     Upon arrival to bedside patient is receiving a breathing treatment. Lung sounds wheezing throughout. Patient states difficulty with pain and anxiety this evening.     Orders for one time dose of 4 mg IV decadron, increased decadron to 6 mg IV BID, 0.25 PO xanax TID PRN and 600 mg PO mucinex BID.      Naima Ambrosio ACN-AG, NOC Hospitalist APRN

## 2023-09-24 NOTE — PROGRESS NOTES
Patient continues to de-sat to low/mid 80's while sleeping on 15 LMP. This nurse notified on call APRN of this, HCP ordered bipap/cpap for pt to wear at night. RT came to bedside to set up bipap/cpap - pt refused. This nurse explained to patient that her oxygen is dropping too low while she sleeps and if pt continues to have low O2 levels while she sleeps, this nurse cannot safely administer IV dilaudid every two hours due to the respiratory depression side effects. Pt stated she understood and still refused cpap/bipap.

## 2023-09-24 NOTE — CARE PLAN
Problem: Bronchoconstriction  Goal: Improve in air movement and diminished wheezing  Description: Target End Date:  2 to 3 days    1.  Implement inhaled treatments  2.  Evaluate and manage medication effects  Outcome: Not Met

## 2023-09-24 NOTE — PROGRESS NOTES
[facility] Medicine Daily Progress Note    Date of Service  9/24/2023    Chief Complaint  Melly Hendrickson is a 48 y.o. female referred to the ER by her primary care physician on 9/20/2023 due to shortness of breath.    She was diagnosed with stage I NSCLC (adenocarcinoma) of the lung in 7/2023.  She underwent  SBRT as she was not a good surgical candidate due to dyspnea and underlying COPD. 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  She underwent SBRT to right sacrum.  She follows Dr. Newton (oncology), and was recommended to start chemotherapy but wished to consult with a sarcoma specialist at [place] [facility].     She has COPD/asthma, was diagnosed with pulmonary embolism in 8/2023, anticoagulation not started due to hemoptysis.    Hospital Course  On admission, she was tachycardic, tachypneic, and afebrile.  Chest x-ray showed diffuse bilateral interstitial and alveolar opacities consistent with pneumonia. CTA chest on admission showed right subsegmental PE, worsening pulmonary nodules, T1 vertebral body region, possible pneumonia.  Procalcitonin was 0.11.Eliquis was started.     Ceftriaxone and azithromycin were empirically started.    Echocardiogram from 9/22/2023 showed LVEF ~ 65% with no reported abnormalities. Repeat Procalcitonin on 9/23/2023 was  0.08.     Interval Problem Update  Overnight, rapid response was called due to shortness of breath and increased oxygen demand.  Patient received a dose of 4 mg IV Decadron, p.o. Xanax.  Saturating 97% on 15 L/min OxyMask.    Chest x-ray from 9/23/2023 showed innumerable bilateral pulmonary nodules which have increased since 9/20/2023, possibly superimposed pneumonia.    Palliative care following for pain management.  On every 4 hours prn Ativan, every 2 hours prn IV Dilaudid, Norco 10/325 every 3 hours prn. Tylenol every 6 hours scheduled, holding  when sleeping. Dexamethasone 4 mg BID.    I have discussed this patient's plan of care and discharge plan at IDT rounds today with Case Management, Nursing, Nursing leadership, and other members of the IDT team.    Consultants/Specialty  oncology    Code Status  Full Code    Disposition  The patient is not medically cleared for discharge to home or a post-acute facility.  Anticipate discharge to: home with close outpatient follow-up    I have placed the appropriate orders for post-discharge needs.    Review of Systems  Review of Systems   Constitutional:  Positive for malaise/fatigue.   HENT: Negative.     Respiratory: Negative.     Cardiovascular: Negative.    Gastrointestinal: Negative.    Genitourinary: Negative.    Musculoskeletal:  Positive for back pain, joint pain and myalgias.   Skin: Negative.    Neurological: Negative.    Endo/Heme/Allergies: Negative.    Psychiatric/Behavioral: Negative.          Physical Exam  Temp:  [36.4 °C (97.5 °F)-37.3 °C (99.2 °F)] 36.7 °C (98.1 °F)  Pulse:  [103-133] 103  Resp:  [15-26] 15  BP: (108-153)/() 139/85  SpO2:  [90 %-97 %] 97 %    Physical Exam  HENT:      Head: Normocephalic.      Nose: Nose normal.      Mouth/Throat:      Mouth: Mucous membranes are moist.   Eyes:      Pupils: Pupils are equal, round, and reactive to light.   Cardiovascular:      Rate and Rhythm: Normal rate.   Pulmonary:      Effort: Pulmonary effort is normal.   Abdominal:      Palpations: Abdomen is soft.   Musculoskeletal:      Cervical back: Normal range of motion.      Right lower leg: No edema.      Left lower leg: No edema.   Skin:     General: Skin is warm.   Neurological:      Mental Status: She is alert. Mental status is at baseline.   Psychiatric:         Mood and Affect: Mood normal.         Fluids    Intake/Output Summary (Last 24 hours) at 9/24/2023 1030  Last data filed at 9/24/2023 1000  Gross per 24 hour   Intake 440 ml   Output --   Net 440 ml       Laboratory  Recent Labs      09/22/23  0000 09/23/23  0010 09/24/23  0006   WBC 13.3* 12.6* 10.9*   RBC 3.72* 3.74* 4.04*   HEMOGLOBIN 9.7* 9.9* 10.3*   HEMATOCRIT 32.7* 33.8* 35.5*   MCV 87.9 90.4 87.9   MCH 26.1* 26.5* 25.5*   MCHC 29.7* 29.3* 29.0*   RDW 56.7* 58.1* 57.9*   PLATELETCT 169 173 160*   MPV 10.1 9.9 10.6     Recent Labs     09/22/23  0000 09/23/23  0010 09/24/23  0006   SODIUM 138 138 134*   POTASSIUM 4.5 4.4 5.1   CHLORIDE 102 100 98   CO2 26 29 28   GLUCOSE 166* 135* 141*   BUN 16 12 12   CREATININE 0.50 0.52 0.58   CALCIUM 8.4* 8.3* 8.5                     Imaging  DX-CHEST-PORTABLE (1 VIEW)   Final Result      1.  Innumerable bilateral pulmonary nodule, increased since 9/20/2023      2.  Differential diagnosis includes worsening metastases or superimposed pneumonia.      EC-ECHOCARDIOGRAM COMPLETE W/O CONT   Final Result      CT-CTA CHEST PULMONARY ARTERY W/ RECONS   Final Result      1.  Minimal pulmonary embolism. Localized right lower lobe segmental and subsegmental branches.   2.  No right heart strain.   3.  9 mm lucent/lytic lesion T1 vertebral body. Suspect osseous metastatic deposit.   4.  Progression of extensive mediastinal adenopathy since 8/20/2023 consistent with metastatic disease.   5.  Extensive progression of diffuse bilateral innumerable pulmonary nodules consistent with metastatic disease.   6.  Interval development of extensive groundglass and airspace opacities consistent with superimposed pneumonia.   7.  Progression of right adrenal mass now measuring 43 mm x 38 mm consistent with metastasis.      Fleischner Society pulmonary nodule recommendations:      DX-CHEST-PORTABLE (1 VIEW)   Final Result      Diffuse bilateral interstitial and alveolar opacities most consistent with pneumonia or possibly noncardiogenic pulmonary edema.           Assessment/Plan  * Acute on chronic respiratory failure (HCC)- (present on admission)  Assessment & Plan  Multiple etiologies including pulmonary embolism,  adenocarcinoma with underlying COPD/asthma, possible pneumonia, tobacco smoker. Ceftriaxone and azithromycin were empirically started.  Eliquis was started.  Saturating 92% on 5 L/min nasal cannula.  Procalcitonin was 0.11 on admission, repeat was 0.08 on 9/23/2023.  Continue oxygen support.    Pulmonary embolism on right (HCC)- (present on admission)  Assessment & Plan  Diagnosed in 8/2023. She was prescribed anticoagulation by oncology but had not started it due to hemoptysis.  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  Underwent SBRT to the right sacrum.  Follows Dr. Newton oncology, and was recommended to start chemotherapy, but patient wanted to consult with sarcoma specialist at [place].  Evaluated by oncology on admission, recommended to start chemotherapy as soon as pneumonia has improved. Echocardiogram from 9/22/2023 showed LVEF ~ 65% with no reported abnormalities.     Lung cancer (HCC)- (present on admission)  Assessment & Plan  Diagnosed with stage I adenocarcinoma of the lung and July 2023.  Underwent SBRT as she was not a good candidate for surgery due to dyspnea and underlying COPD.     Sarcoma (HCC)- (present on admission)  Assessment & Plan  Follows Dr. Jazmin Newton (oncology).  See above    Chronic pain due to neoplasm- (present on admission)  Assessment & Plan  Follows pain specialist.  Decadron was started.  On multimodal pain regimen including Voltaren, Tylenol, Dilaudid, Norco    Cigarette nicotine dependence without complication- (present on admission)  Assessment & Plan  Continues to smoke despite counseling         VTE prophylaxis: Eliquis

## 2023-09-24 NOTE — PROGRESS NOTES
Rapid Response Summary     Rapid response called at 2229 for: Shortness of breath  and Increased oxygen demand     VS: Tachycardia , Tachypneic , and Hypoxic  (See Vitals Flowsheet)  Additional info: Mets to lungs, increase WOB  MD Paged: Hebert BARRIENTOS at bedside  Interventions:    Imaging/Tests: N/A   Labs: N/A   Medications: N/A   Other: N/A  Disposition: Improved with rapid response team interventions. Primary RN updated on plan of care. Rapid team will continue to follow the patient.     Patient WOB  with nebulizer treatment.

## 2023-09-25 NOTE — CARE PLAN
The patient is Watcher - Medium risk of patient condition declining or worsening    Shift Goals  Clinical Goals: monitor o2, pain  Patient Goals: pain managment  Family Goals: n/a    Progress made toward(s) clinical / shift goals:  Pt requiring less pain medication today. Given IV dilaudid x 1. Norco in use. Pt more fatigue but able to sleep comfortably through the shift.     Patient is not progressing towards the following goals:      Problem: Respiratory  Goal: Patient will achieve/maintain optimum respiratory ventilation and gas exchange  Outcome: Not Progressing     Problem: Psychosocial  Goal: Patient's level of anxiety will decrease  Outcome: Not Progressing     Pt continues with severe anxiety. Emotional support provided and anxiety measures including breathing techniques and pharmacologic interventions in place. O2 saturations continue to drop in to the mid to low 80's. Pt requiring between 6 and 15L to maintain saturations. Pt states history of obstructive sleep apnea but refuses CPAP. Educated and continues to decline. RT following patient.  in use.

## 2023-09-25 NOTE — PROGRESS NOTES
[facility] Medicine Daily Progress Note    Date of Service  9/25/2023    Chief Complaint  Melly Hendrickson is a 48 y.o. female referred to the ER by her primary care physician on 9/20/2023 due to shortness of breath.    She was diagnosed with stage I NSCLC (adenocarcinoma) of the lung in 7/2023.  She underwent  SBRT as she was not a good surgical candidate due to dyspnea and underlying COPD. 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  She underwent SBRT to right sacrum.  She follows Dr. Newton (oncology), and was recommended to start chemotherapy but wished to consult with a sarcoma specialist at [facility] first.     She has COPD/asthma, was diagnosed with pulmonary embolism in 8/2023, anticoagulation not started due to hemoptysis.    Hospital Course  On admission, she was tachycardic, tachypneic, and afebrile.  Chest x-ray showed diffuse bilateral interstitial and alveolar opacities consistent with pneumonia. CTA chest on admission showed right subsegmental PE, worsening pulmonary nodules, T1 vertebral body region, possible pneumonia.  Procalcitonin was 0.11.Eliquis was started.     Ceftriaxone and azithromycin were empirically started.    Echocardiogram from 9/22/2023 showed LVEF ~ 65% with no reported abnormalities. Repeat Procalcitonin on 9/23/2023 was  0.08.     Chest x-ray from 9/23/2023 showed innumerable bilateral pulmonary nodules which have increased since 9/20/2023, possibly superimposed pneumonia.    Palliative care following for pain management.  On every 4 hours prn Ativan, every 2 hours prn IV Dilaudid, Norco 10/325 every 3 hours prn. Tylenol every 6 hours scheduled, holding when sleeping. Dexamethasone 4 mg BID.    Interval Problem Update  Continues to complain of significant pain.  PICC line was unable to be placed today.  Discussed port placement with IR. Port placement by IR is not an  option, as patient is unable to lie flat, and is requiring 15 L/min oxygen via OxyMask.  Central venous catheter placement was recommended, patient wishes to discuss this with Dr. Newton before making any decisions.    I have discussed this patient's plan of care and discharge plan at IDT rounds today with Case Management, Nursing, Nursing leadership, and other members of the IDT team.    Consultants/Specialty  oncology    Code Status  Full Code    Disposition  The patient is not medically cleared for discharge to home or a post-acute facility.  Anticipate discharge to: home with close outpatient follow-up    I have placed the appropriate orders for post-discharge needs.    Review of Systems  Review of Systems   Constitutional:  Positive for malaise/fatigue.   HENT: Negative.     Respiratory: Negative.     Cardiovascular: Negative.    Gastrointestinal: Negative.    Genitourinary: Negative.    Musculoskeletal:  Positive for back pain, joint pain and myalgias.   Skin: Negative.    Neurological: Negative.    Endo/Heme/Allergies: Negative.    Psychiatric/Behavioral: Negative.          Physical Exam  Temp:  [36.6 °C (97.9 °F)-37.6 °C (99.6 °F)] 37.6 °C (99.6 °F)  Pulse:  [100-131] 116  Resp:  [18-20] 20  BP: (115-145)/(71-97) 119/71  SpO2:  [88 %-96 %] 93 %    Physical Exam  HENT:      Head: Normocephalic.      Nose: Nose normal.      Mouth/Throat:      Mouth: Mucous membranes are moist.   Eyes:      Pupils: Pupils are equal, round, and reactive to light.   Cardiovascular:      Rate and Rhythm: Normal rate.   Pulmonary:      Effort: Pulmonary effort is normal.   Abdominal:      Palpations: Abdomen is soft.   Musculoskeletal:      Cervical back: Normal range of motion.      Right lower leg: No edema.      Left lower leg: No edema.   Skin:     General: Skin is warm.   Neurological:      Mental Status: She is alert. Mental status is at baseline.   Psychiatric:         Mood and Affect: Mood normal.         Fluids  No intake  or output data in the 24 hours ending 09/25/23 1427      Laboratory  Recent Labs     09/23/23  0010 09/24/23  0006 09/25/23  0006   WBC 12.6* 10.9* 13.2*   RBC 3.74* 4.04* 3.75*   HEMOGLOBIN 9.9* 10.3* 9.8*   HEMATOCRIT 33.8* 35.5* 33.3*   MCV 90.4 87.9 88.8   MCH 26.5* 25.5* 26.1*   MCHC 29.3* 29.0* 29.4*   RDW 58.1* 57.9* 57.3*   PLATELETCT 173 160* 164   MPV 9.9 10.6 9.8     Recent Labs     09/23/23  0010 09/24/23  0006 09/25/23  0006   SODIUM 138 134* 137   POTASSIUM 4.4 5.1 4.6   CHLORIDE 100 98 97   CO2 29 28 33   GLUCOSE 135* 141* 178*   BUN 12 12 16   CREATININE 0.52 0.58 0.52   CALCIUM 8.3* 8.5 8.4*                     Imaging  IR-PICC LINE PLACEMENT W/ GUIDANCE > AGE 5   Final Result                  Ultrasound-guided PICC placement performed by qualified nursing staff as    above.          DX-CHEST-PORTABLE (1 VIEW)   Final Result      1.  Innumerable bilateral pulmonary nodule, increased since 9/20/2023      2.  Differential diagnosis includes worsening metastases or superimposed pneumonia.      EC-ECHOCARDIOGRAM COMPLETE W/O CONT   Final Result      CT-CTA CHEST PULMONARY ARTERY W/ RECONS   Final Result      1.  Minimal pulmonary embolism. Localized right lower lobe segmental and subsegmental branches.   2.  No right heart strain.   3.  9 mm lucent/lytic lesion T1 vertebral body. Suspect osseous metastatic deposit.   4.  Progression of extensive mediastinal adenopathy since 8/20/2023 consistent with metastatic disease.   5.  Extensive progression of diffuse bilateral innumerable pulmonary nodules consistent with metastatic disease.   6.  Interval development of extensive groundglass and airspace opacities consistent with superimposed pneumonia.   7.  Progression of right adrenal mass now measuring 43 mm x 38 mm consistent with metastasis.      Fleischner Society pulmonary nodule recommendations:      DX-CHEST-PORTABLE (1 VIEW)   Final Result      Diffuse bilateral interstitial and alveolar opacities  most consistent with pneumonia or possibly noncardiogenic pulmonary edema.           Assessment/Plan  * Acute on chronic respiratory failure (HCC)- (present on admission)  Assessment & Plan  Multiple etiologies including pulmonary embolism, adenocarcinoma with underlying COPD/asthma, possible pneumonia, tobacco smoker. Ceftriaxone and azithromycin were empirically started.  Eliquis was started.  Saturating 92% on 5 L/min nasal cannula.  Procalcitonin was 0.11 on admission, repeat was 0.08 on 9/23/2023.  Continue oxygen support.    Pulmonary embolism on right (HCC)- (present on admission)  Assessment & Plan  Diagnosed in 8/2023. She was prescribed anticoagulation by oncology but had not started it due to hemoptysis.  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  Underwent SBRT to the right sacrum.  Follows Dr. Newton oncology. Echocardiogram from 9/22/2023 showed LVEF ~ 65% with no reported abnormalities.  PICC line was unable to be placed today.  Port placement by IR is not an option as patient is unable to lie flat requiring 15 L/min oxygen via oxy mask.  Central venous catheter placement was recommended, patient wishes to discuss this with Dr. Newton before making decision.     Lung cancer (HCC)- (present on admission)  Assessment & Plan  Diagnosed with stage I adenocarcinoma of the lung and July 2023.  Underwent SBRT as she was not a good candidate for surgery due to dyspnea and underlying COPD.     Sarcoma (HCC)- (present on admission)  Assessment & Plan  Follows Dr. Jazmin Newton (oncology).  See above    Chronic pain due to neoplasm- (present on admission)  Assessment & Plan  Follows pain specialist.  Decadron was started.  On multimodal pain regimen including Voltaren, Tylenol, Dilaudid, Norco    Cigarette  nicotine dependence without complication- (present on admission)  Assessment & Plan  Continues to smoke despite counseling         VTE prophylaxis: Eliquis

## 2023-09-25 NOTE — PROGRESS NOTES
Consult Note: Hematology/Oncology     Primary Care:  Checo Baker M.D.    Chief Complaint   Patient presents with    Shortness of Breath     PT hx lung ca that's metastasized. Was at another appt and was SOB. RA while walking in 70's per EMS. Currently on 6L NC         Current Treatment:     None    Prior Treatment:     4/17/2023-4/21/2023: RLL Lung SBRT  7/19/23-7/24/2023: R Sacrum SBRT      Subjective:   History of Presenting Illness:    Melly Hendrickson is a 48 y.o. 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    Patient history dates back to July 2022 when she had a chest x-ray performed that revealed a potential mass in her right lung.  This led to a chest CT performed on February 13, 2023.  This demonstrated 1.4 cm mass in the right lower lobe and a 1.9 groundglass opacity also in the right lower lobe.  The groundglass opacity appeared stable from previous scans but the mass was thought to be new.      A biopsy was performed and confirmed lung adenocarcinoma.    Patient saw thoracic surgery and obtained PFTs as well as a PET scan.  She was discussed at tumor board and given her significant dyspnea and her underlying COPD it was thought that she would be a better candidate for SBRT rather than surgical resection.    April 17, 2023 patient began SBRT.  Completed this treatment without many issues.    After this patient did notice a small lump in her breast reduction scar line.  She said that over the next few days it grew and she ultimately went to her PCP    PCP tried to drain the mass; however it just bled significantly during the I&D attempt.  Thus she was sent to Dr. Becerra at [facility] for excision.     Pathology from this excision demonstrated poorly differentiated malignancy favoring carcinoma without obvious involvement of underlying skin and nonspecific IHC profile.  Based on his diagnosis  cancer type ID was sent off for.  Results showed 90% probability of sarcoma    Over the past several months she has noted severe pain in her sacral region.  She does have sacral lesions that are concerning for metastatic disease.  Patient has biopsy of sacral mass on 7/18/2023.     Of note patient has 5 children and 4 grandchildren.    She follows with pain management    I initially saw patient on 7/14.  At our 7/21, we had information back from biopsy and we discussed AIM.  Patient wished to hold off and think about this further.    On 7/28, we discussed initiating AIM, patient was not comfortable and wanted 2 opinion at [place].    I saw patient again on 8/11.  We discussed AIM,  patient not ready and awaiting [place] appointment.     She was recently admitted to the ER for hemoptysis on 8/20/23 and was found to have a PE.  She was still having hemoptysis and thus was not started on AC. She has discharged on 8/24/23.     I saw her subsequently on 9/7/23 and recommended AC given no hemoptysis, but active PE with cancer.  We also discussed chemo at this visit and she wished to wait.     During my visit with the patient on 9/19/23, Patient was endorsing 3 nodules on the R arm.  1 on the clavicle.  Patient also had a nodule on her L arm. Patient had 1 nodule on her bikini line. She has another on the inside of her L thigh. These started 1 week ago. She was on 2-3L of oxygen for the past several days.  We discussed intiating chemo and plan was to start on 9/26/23.  Pt requires a direct admit for AIM    Since that visit she had worseing SOB.  She was subsequently presented to the ER for SOB/cough and potential PNA. On admission, she was tachycardic, tachypneic, and afebrile.  Chest x-ray showed diffuse bilateral interstitial and alveolar opacities consistent with pneumonia. CTA chest on admission showed right subsegmental PE, worsening pulmonary nodules, T1 vertebral body region, possible pneumonia.  Procalcitonin was  0.11.  Ceftriaxone and azithromycin were empirically started.    Interval history    PNA appears to be less likely.  SOB is thought to be 2/2 to disease as well as underlying COPD.   Patient unable to get PICC today, due to eating and not being NPO.          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    Chronic obstructive pulmonary disease (HCC)     COPD (chronic obstructive pulmonary disease) (HCC)     DDD (degenerative disc disease), lumbar     Depression     Emphysema of lung (HCC)     MRSA (methicillin resistant staph aureus) culture positive     Pain     back - cervical, scapula, lower    Pericarditis     Primary adenocarcinoma of lower lobe of right lung (HCC)     Psychiatric disorder     Anxiety, Depression    Sleep apnea     Snoring     Urinary incontinence         Past Surgical History:   Procedure Laterality Date    NJ BRONCHOSCOPY,DIAGNOSTIC N/A 8/22/2023    Procedure: BRONCHOSCOPY;  Surgeon: Sandra Lopez D.O.;  Location: [facility];  Service: Pulmonary    NJ EXC SKIN BENIG >4CM TRUNK,ARM,LEG Left 6/7/2023    Procedure: EXCISION OF SOFT TISSUE MASS LEFT CHEST WALL;  Surgeon: Nasir Becerra M.D.;  Location: [facility];  Service: General    NJ BRONCHOSCOPY,DIAGNOSTIC N/A 02/14/2023    Procedure: FIBER OPTIC BRONCHOSCOPY WASH, BRUSH, BRONCHOALVEOLAR LAVAGE, BIOPSY AND FINE NEEDLE ASPIRATION AND NAVIGATION, ROBOTICS;  Surgeon: Guicho Ellis M.D.;  Location: [facility];  Service: Pulmonary Robotic    ENDOBRONCHIAL US ADD-ON N/A 02/14/2023    Procedure: ENDOBRONCHIAL ULTRASOUND (EBUS);  Surgeon: Guicho Ellis M.D.;  Location: [facility];  Service: Pulmonary Robotic    MAMMOPLASTY REDUCTION Bilateral     SEPTOPLASTY      TUBE & ECTOPIC PREG., REMOVAL      x 2       Social History     Tobacco  Use    Smoking status: Some Days     Current packs/day: 0.50     Average packs/day: 0.5 packs/day for 20.0 years (10.0 ttl pk-yrs)     Types: Cigarettes     Passive exposure: Past    Smokeless tobacco: Never    Tobacco comments:     on and off    Vaping Use    Vaping Use: Never used   Substance Use Topics    Alcohol use: Not Currently     Comment: occ, rare    Drug use: Not Currently     Types: Marijuana     Comment: THC gummies        Family History   Problem Relation Age of Onset    Diabetes Mother     Cancer Father         Prostate    Cancer Paternal Grandmother         Cervical    Cancer Paternal Grandfather         Unk       Allergies   Allergen Reactions    Penicillins Hives, Rash and Swelling     Pt reports that she gets swelling in throat and face, rash all over face and arms.   Tolerated cefazolin    Aspirin Rash and Hives     Pt reports that she received a rash on face, pt reports it ok if she takes NORCO    Other Environmental Shortness of Breath     Perfumes, dander, scents       Current Facility-Administered Medications   Medication Dose Route Frequency Provider Last Rate Last Admin    NS infusion   Intravenous Continuous Yenifer Velazquez M.D.        dexamethasone (Decadron) injection 4 mg  4 mg Intravenous BID Yenifer Velazquez M.D.   4 mg at 09/25/23 0528    ALPRAZolam (Xanax) tablet 0.25 mg  0.25 mg Oral TID PRN Naima Ambrosio, A.P.R.N.   0.25 mg at 09/25/23 0046    guaiFENesin ER (Mucinex) tablet 600 mg  600 mg Oral Q12HRS Naima Ambrosio A.P.R.N.   600 mg at 09/25/23 0528    LORazepam (Ativan) tablet 0.5 mg  0.5 mg Oral Nightly Ivonne Mohan A.P.R.N.   0.5 mg at 09/24/23 2034    polyethylene glycol/lytes (Miralax) PACKET 1 Packet  1 Packet Oral Q48HRS Ivonne Mohan A.P.R.N.   1 Packet at 09/24/23 1207    fentaNYL (Duragesic) 50 MCG/HR 1 Patch  1 Patch Transdermal Q72HRS ARTHUR Zapata.P.R.N.   1 Patch at 09/25/23 1105    HYDROmorphone (Dilaudid) injection 1 mg  1 mg Intravenous  Q2HRS PRN Ivonne Mohan, A.P.R.N.   1 mg at 09/25/23 0951    HYDROcodone/acetaminophen (Norco)  MG per tablet 1 Tablet  1 Tablet Oral Q3HRS PRN Yenifer Velazquez M.D.   1 Tablet at 09/25/23 1103    diphenhydrAMINE (Benadryl) injection 25 mg  25 mg Intravenous Q6HRS Kay Christensen A.P.R.N.   25 mg at 09/25/23 1102    diphenhydrAMINE (Benadryl) injection 25 mg  25 mg Intravenous Q6HRS PRN Kay Christensen A.P.R.N.        fluticasone-umeclidinium-vilanterol (Trelegy Ellipta) 100-62.5-25 mcg/act inhaler 1 Puff  1 Puff Inhalation DAILY Yenifer Velazquez M.D.   1 Puff at 09/25/23 0528    ipratropium-albuterol (DUONEB) nebulizer solution  3 mL Nebulization Q4HRS (RT) Yenifer Velazquez M.D.   3 mL at 09/25/23 1147    ipratropium-albuterol (DUONEB) nebulizer solution  3 mL Nebulization Q2HRS PRN (RT) Yenifer Velazquez M.D.   3 mL at 09/25/23 0451    albuterol inhaler 2 Puff  2 Puff Inhalation Q4HRS PRN SANJU GarciaOEstrellita   2 Puff at 09/23/23 0501    diclofenac sodium (Voltaren) 1 % gel 2 g  2 g Topical 4X/DAY PRN SANJU GarciaOEstrellita        diphenhydrAMINE (Benadryl) tablet/capsule 25 mg  25 mg Oral BID PRN SANJU GarciaOEstrellita   25 mg at 09/20/23 1745    loratadine (Claritin) tablet 10 mg  10 mg Oral DAILY SANJU GarciaO.   10 mg at 09/25/23 0528    senna-docusate (Pericolace Or Senokot S) 8.6-50 MG per tablet 2 Tablet  2 Tablet Oral BID SANJU GarciaO.   2 Tablet at 09/25/23 0528    And    polyethylene glycol/lytes (Miralax) PACKET 1 Packet  1 Packet Oral QDAY PRN Madhu Merino D.O.   1 Packet at 09/23/23 1141    And    magnesium hydroxide (Milk Of Magnesia) suspension 30 mL  30 mL Oral QDAY PRN Madhu Merino D.O.   30 mL at 09/24/23 0939    And    bisacodyl (Dulcolax) suppository 10 mg  10 mg Rectal QDAY PRN Madhu Merino D.O.        Respiratory Therapy Consult   Nebulization Continuous RT Madhu Merino D.O.        acetaminophen (Tylenol) tablet 650 mg  650 mg Oral Q6HRS PRN Madhu Merino D.O.         cefTRIAXone (Rocephin) syringe 2,000 mg  2,000 mg Intravenous Q EVENING Yenifer Velazquez M.D.   2,000 mg at 09/24/23 1730    hydrALAZINE (Apresoline) injection 10 mg  10 mg Intravenous Q4HRS PRN Madhu Merino D.O.        ondansetron (Zofran) syringe/vial injection 4 mg  4 mg Intravenous Q4HRS PRN Madhu Merino D.O.        ondansetron (Zofran ODT) dispertab 4 mg  4 mg Oral Q4HRS PRN Madhu Merino D.O.        promethazine (Phenergan) tablet 12.5-25 mg  12.5-25 mg Oral Q4HRS PRN Madhu Merino D.O.        promethazine (Phenergan) suppository 12.5-25 mg  12.5-25 mg Rectal Q4HRS PRN Madhu Merino D.O.        prochlorperazine (Compazine) injection 5-10 mg  5-10 mg Intravenous Q4HRS PRN Madhu Merino D.O.        guaiFENesin dextromethorphan (Robitussin DM) 100-10 MG/5ML syrup 10 mL  10 mL Oral Q6HRS PRN Madhu Merino D.O.   10 mL at 09/23/23 2319    apixaban (Eliquis) tablet 10 mg  10 mg Oral BID SANJU GarciaOEstrellita   10 mg at 09/25/23 0528    Followed by    [START ON 9/27/2023] apixaban (Eliquis) tablet 5 mg  5 mg Oral BID Madhu Merino D.O.           Review of Systems   Constitutional:  Positive for malaise/fatigue. Negative for chills, fever and weight loss.   HENT:  Negative for congestion, ear pain, nosebleeds and sore throat.    Eyes:  Negative for blurred vision.   Respiratory:  Negative for cough, sputum production, shortness of breath and wheezing.    Cardiovascular:  Negative for chest pain, palpitations, orthopnea and leg swelling.   Gastrointestinal:  Negative for abdominal pain, heartburn, nausea and vomiting.   Genitourinary:  Negative for dysuria, frequency and urgency.   Musculoskeletal:  Positive for back pain and joint pain. Negative for neck pain.   Neurological:  Negative for dizziness, tingling, tremors, sensory change, focal weakness and headaches.   Endo/Heme/Allergies:  Does not bruise/bleed easily.   Psychiatric/Behavioral:  Negative for depression, memory loss and suicidal ideas.     All other systems reviewed and are negative.      Problem list, medications, and allergies reviewed by myself today in Epic.     Objective:     Vitals:    09/25/23 0523 09/25/23 0702 09/25/23 0835 09/25/23 1153   BP: (!) 140/85  (!) 145/85    Pulse: (!) 131  (!) 121 (!) 125   Resp: 20  18 18   Temp: 37 °C (98.6 °F)  37 °C (98.6 °F)    TempSrc: Temporal  Temporal    SpO2: 90% 88% 91% 91%   Weight:       Height:             DESC; KARNOFSKY SCALE WITH ECOG EQUIVALENT: 90, Able to carry on normal activity; minor signs or symptoms of disease (ECOG equivalent 0)    DISTRESS LEVEL: no acute distress    Physical Exam  Constitutional:       General: She is not in acute distress.     Appearance: Normal appearance. She is not ill-appearing.   HENT:      Head: Normocephalic and atraumatic.      Nose: Nose normal.      Mouth/Throat:      Mouth: Mucous membranes are moist.      Pharynx: No oropharyngeal exudate or posterior oropharyngeal erythema.   Eyes:      General: No scleral icterus.     Conjunctiva/sclera: Conjunctivae normal.      Pupils: Pupils are equal, round, and reactive to light.   Abdominal:      General: Abdomen is flat. Bowel sounds are normal. There is distension.      Palpations: Abdomen is soft. There is no mass.      Tenderness: There is abdominal tenderness. There is no guarding.   Musculoskeletal:         General: No swelling, tenderness or deformity. Normal range of motion.      Cervical back: Normal range of motion and neck supple. No rigidity or tenderness.      Right lower leg: No edema.      Left lower leg: No edema.   Skin:     General: Skin is warm and dry.      Coloration: Skin is not jaundiced or pale.      Findings: No bruising, erythema or rash.   Neurological:      General: No focal deficit present.      Mental Status: She is alert and oriented to person, place, and time. Mental status is at baseline.      Sensory: No sensory deficit.      Motor: No weakness.      Coordination: Coordination  normal.      Gait: Gait normal.   Psychiatric:         Mood and Affect: Mood normal.         Behavior: Behavior normal.         Thought Content: Thought content normal.         Judgment: Judgment normal.         Labs:   Most recent labs reviewed.  Please see the lab tab of chart review    Imaging:   Most recent images below have been independently reviewed by me.  Please see the imaging tab of chart review    9/22/2023 : Echocardiogram showed LVEF ~ 65% with no reported abnormalities    7/27/23: PET   1.  Focal uptake in the medial RIGHT lower lobe of lung abutting the thoracic spine, consistent with tumor..  2.  No PET correlate for ill-defined spiculated nodule in the RIGHT lower lobe.  3.  Large hypermetabolic mass corresponds to lytic lesion in the RIGHT sacrum consistent with malignancy.  4.  Intramuscular focal uptake at the posterior LEFT shoulder, likely metastatic.    Pathology:    7/14/2023: Pathology of Chest wall  A. Chest wall mass:          Metastatic poorly differentiated malignancy favoring carcinoma           without obvious involvement of overlying skin and a           non-specific IHC profile of some keratins positive (see           microscopic description).          See comment.   Main Cancer Type: Sarcoma   Probability: 90%     Subtype: Undifferentiated Sarcoma ([facility])   Probability: 90%     7/19/2023: Path of the Sacral Bone  A. Sacral bone biopsy:          Bone cores effaced by a high-grade malignancy histologically           identical to the previously excised chest wall mass           ([id number]).     2/14/2023: Pathology of Lung Mass  A. Lung, right lower lobe fine needle aspiration slides:          Positive for carcinoma.   B. Core, right lower lobe lung:          Moderately differentiated lung adenocarcinoma.   C. Lung, right lower lobe biopsy core and touchprep slides:          Moderately differentiated lung adenocarcinoma.   D. Bronchoalveolar lavage right lower lobe:          Rare  atypical cells, malignant cells vs. reactive bronchial           cells.     Assessment/Plan:      Cancer Staging   Primary undifferentiated sarcoma of soft tissue (HCC)  Staging form: Soft Tissue Sarcoma of the Trunk and Extremities, AJCC 8th Edition  - Clinical: Stage IV (cTX, cN0, cM1) - Signed by Alma Newton M.D. on 9/19/2023         Ms. Aleida Hendrickson is a 49 yo F who presents today with a recent diagnosis of stage I A2 adenocarcinoma of the right lung status post SBRT now with a new diagnosis of metastatic undifferentiated sarcoma.     She has completed SBRT to the R sacrum.      She is now reporting multiple new nodules throughout her body.     She is admitted to the hospital for SOB and will be starting AIM now.    Echocardiogram from 9/22/2023 showed LVEF ~ 65% with no reported abnormalities      1) Sarcoma with metastatic disease to Chest wall,  sacral and L shoulder mets  -Recommended treatment with AIM  -ok to proceed with central line  -start AIM as soon as PICC placed    Recommended Regimen:   Doxorubicin 25 mg per metered squared IV continuous infusion over 24 hours daily on days 1 through 3  Ifosfamide 2500 mg per metered squared IV over 3 hours on days 1 through 4  [place] 500mg/m2 IV over 15 minutes 3x daily   Will need G-CSF      2) Stage 1A2 Adenocarcinoma of the Lung  -s/p SBRT  -monitor per NCCN Guidelines  -H&P and chest CT contrast every 6 mo for 2-3 y,   -H&P and a low-dose non-contrast-enhanced chest CT annually    3) Cancer related Pain  -agree with palliative care for pain control     4) Pulmonary Embolism  -Right lower lobe segmental and subsegmental occlusive pulmonary emboli.  -Continue with Eliquis 5 mg twice daily.     No follow-ups on file.     Any questions and concerns raised by the patient were addressed and answered. Patient denies any further questions.  Patient encouraged to call the office with any concerns or issues.     Alam Newton,  M.D.  Hematology/Oncology

## 2023-09-25 NOTE — CARE PLAN
The patient is Watcher - Medium risk of patient condition declining or worsening    Shift Goals  Clinical Goals: Monitor O2, pain management  Patient Goals: Pain management  Family Goals: n/a    Progress made toward(s) clinical / shift goals:    Problem: Pain - Standard  Goal: Alleviation of pain or a reduction in pain to the patient’s comfort goal  Outcome: Progressing  Note: Pain assessed using 0-10 verbal pain scale, PRN and breakthrough pain medications administered per MAR. Fentanyl patch in use.      Problem: Knowledge Deficit - Standard  Goal: Patient and family/care givers will demonstrate understanding of plan of care, disease process/condition, diagnostic tests and medications  Outcome: Progressing  Note: Pt updated on plan of care, pt states understanding for pain management, IV abx, pending chemotherapy plan, and monitoring O2. All questions answered.         Patient is not progressing towards the following goals:      Problem: Respiratory  Goal: Patient will achieve/maintain optimum respiratory ventilation and gas exchange  Outcome: Not Progressing  Note: Pt desats on 15L oxymask while sleeping, pt refuses CPAP.

## 2023-09-25 NOTE — PROGRESS NOTES
Inpatient Palliative Care     Location: [facility]     HPI:   Updates per primary team, appreciated.  Reviewed weekend.  Unable to place PICC line per note review, unable to go IR port secondary to oxygen.  Currently on 15 L facemask.  Continues to require doses of breakthrough medication for pain control.  Reports pain 20/10 on visit.  She is seen lying in bed arousable but sleepy at this time.  Wearing 15 L oxy mask.     Summary:  Follow-up pain management:  - ADD for as needed breakthrough medication =105  - Total MEDD (with fentanyl 50 mcg)= 205  -Increase fentanyl to 100 mcg, new 50 mcg patch placed today order additional 50 mcg patch placement today.  Discussed with primary RN.    Patient continues to have reservations regarding chemotherapy and now replacement.  Dr. Newton entering room on this provider's exit to discuss.     Active listening, reflection, reminiscing, validation & normalization, and empathic support utilized throughout this encounter.  All questions answered and contact information provided, encouraged to call with any questions or concerns.      Plan:     1) palliative care to continue to follow for pain and other symptom management.  2) may reach side effects celing given prn dosages-plan to discuss methadone         Thank you for allowing me the opportunity to participate in the care of Melly.     I spent a total of 40 minutes reviewing medical records, direct face-to-face time with the patient and/or family, coordination of care, and documentation. This is separate from the time spent on advance care planning, which is documented above.     Kay Christensen, MSN, APRN, ACNPC-AG.  Palliative Care Nurse Practitioner  [phone]

## 2023-09-25 NOTE — CARE PLAN
Problem: Pain - Standard  Goal: Alleviation of pain or a reduction in pain to the patient’s comfort goal  Outcome: Progressing     Problem: Care Map:  Optimal Outcome for the Pneumonia Patient  Goal: Collection and monitoring of appropriate tests and labs  Outcome: Progressing     Problem: Respiratory  Goal: Patient will achieve/maintain optimum respiratory ventilation and gas exchange  Outcome: Not Progressing     Problem: Risk for Aspiration  Goal: Patient's risk for aspiration will be absent or decrease  Outcome: Progressing     Problem: Hemodynamics - Pneumonia  Goal: Patient's hemodynamics, fluid balance and neurologic status will be stable or improve  Outcome: Progressing     Problem: Self Care  Goal: Patient will have the ability to perform ADLs independently or with assistance (bathe, groom, dress, toilet and feed)  Outcome: Progressing     Problem: Knowledge Deficit - Standard  Goal: Patient and family/care givers will demonstrate understanding of plan of care, disease process/condition, diagnostic tests and medications  Outcome: Progressing     Problem: Skin Integrity  Goal: Skin integrity is maintained or improved  Outcome: Progressing   The patient is Watcher - Medium risk of patient condition declining or worsening    Shift Goals  Clinical Goals: monitor 02, pain control, port placement  Patient Goals: Pain management  Family Goals: n/a    Progress made toward(s) clinical / shift goals:      Patient is not progressing towards the following goals:      Problem: Respiratory  Goal: Patient will achieve/maintain optimum respiratory ventilation and gas exchange  Outcome: Not Progressing   Pt on 15 L 02 saturating above 90 % when awake when sleeping pt 02 goes down to 75%-80%. Doctor and RT notified. RT recommends high flow, pt refuses during day, aggress during the night.       Problem: Psychosocial  Goal: Patient's level of anxiety will decrease  Outcome: Not Met   Pt anxious., crying. Emotional support  given.

## 2023-09-25 NOTE — PROCEDURES
Date of Insertion: 9/25/2023  Attempted PICC placement per MD order unable to pass PICC line past 30cm. Procedure stopped, pressure held until hemostasis obtained. MD notified.   Vascular Access Team     Date of Insertion: 9/25/2023  Arm Circumference: 30  Vein Occupancy %: 33   Reason for PICC: chemo  Labs: WBC 13.2, , BUN 16, Cr 0.52, , INR 1.23     Consents confirmed, vessel patency confirmed with ultrasound. Risks and benefits of procedure explained to patient and family member and education regarding central line associated bloodstream infections provided. Questions answered.      PICC placed in LUE per licensed provider order with ultrasound guidance.  5Fr, 2 lumen PICC unable to place in Brachial vein after 1 attempt(s) Unable to pass PICC line past 30cm. Procedure stopped, pressure held until hemostasis obtained. MD notified. . 2 mL of 1% lidocaine injected intradermally at the insertion site. A 21 gauge microintroducer needle was visualized entering the vein and modified Seldinger technique was used to obtain access to the vein.         Ultrasound images uploaded to PACS and viewable in the EMR - yes  Ultrasound imaged printed and placed in paper chart - no     [employer] PICC ref # [id number], Lot # [id number], Expiration Date 07/31/2024

## 2023-09-26 NOTE — PROGRESS NOTES
Patient reports ripping off fentanyl pain patches today during the rapid due to fears of it compromising her breathing status.    Patient currently does not have any fentanyl patches on her body at the moment.

## 2023-09-26 NOTE — CONSULTS
Date of Service  September 26, 2023     Reason for Consult: Mediport insert    Requested by: Madhu Barajas D.O.    Location: R315    Chief Complaint  Shortness of Breath (PT hx lung ca that's metastasized. Was at another appt and was SOB. RA while walking in 70's per EMS. Currently on 6L NC/)        HPI: Patient is a 48 year old with COPD, asthma and pulmonary embolism with to presented to the ED for acute respiratory failure with hypoxia, pneumonia and metastatic sarcoma. She was admitted to the hospitalist and noted to have an oxygen sat of 70s and PEs.      She has history of stage I NSCLC (adenocarcinoma in 7/2023). She underwent SBRT as she was not a good surgical candidate due to dyspnea and underlying COPD. 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 She was going to start chemotherapy, but needed to see the sarcoma specialist prior to initiating chemotherapy. CTA shows minimal pulmonary embolism. Progression of extensive mediastinal adenopathy since 8/20/23.  She has been seen by her oncologist in the hospital. She would like to proceed with chemotherapy. The plan is to place a port or PICC after her PNA has resolved. PICC unable to be placed in brachial vein. Port placement by IR is not an option as patient is unable to lie flat requiring 15 L/min oxygen via oxy mask.  Patient was in the middle of a rapid response and being transferred to ICU for possible intubation.    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    Chronic obstructive pulmonary disease (HCC)     COPD (chronic obstructive pulmonary disease) (HCC)     DDD (degenerative disc disease), lumbar     Depression     Emphysema of lung (HCC)     MRSA (methicillin resistant staph aureus) culture positive     Pain      back - cervical, scapula, lower    Pericarditis     Primary adenocarcinoma of lower lobe of right lung (HCC)     Psychiatric disorder     Anxiety, Depression    Sleep apnea     Snoring     Urinary incontinence        Past Surgical History  Past Surgical History:   Procedure Laterality Date    KY BRONCHOSCOPY,DIAGNOSTIC N/A 8/22/2023    Procedure: BRONCHOSCOPY;  Surgeon: Sandra Lopez D.O.;  Location: [facility];  Service: Pulmonary    KY EXC SKIN BENIG >4CM TRUNK,ARM,LEG Left 6/7/2023    Procedure: EXCISION OF SOFT TISSUE MASS LEFT CHEST WALL;  Surgeon: Nasir Becerra M.D.;  Location: [facility];  Service: General    KY BRONCHOSCOPY,DIAGNOSTIC N/A 02/14/2023    Procedure: FIBER OPTIC BRONCHOSCOPY WASH, BRUSH, BRONCHOALVEOLAR LAVAGE, BIOPSY AND FINE NEEDLE ASPIRATION AND NAVIGATION, ROBOTICS;  Surgeon: Guicho Ellis M.D.;  Location: [facility];  Service: Pulmonary Robotic    ENDOBRONCHIAL US ADD-ON N/A 02/14/2023    Procedure: ENDOBRONCHIAL ULTRASOUND (EBUS);  Surgeon: Guicho Ellis M.D.;  Location: [facility];  Service: Pulmonary Robotic    MAMMOPLASTY REDUCTION Bilateral     SEPTOPLASTY      TUBE & ECTOPIC PREG., REMOVAL      x 2       Current Medications:   Scheduled Medications   Medication Dose Frequency    fentaNYL  1 Patch Q72HRS    dexamethasone  4 mg BID    guaiFENesin ER  600 mg Q12HRS    LORazepam  0.5 mg Nightly    polyethylene glycol/lytes  1 Packet Q48HRS    fentaNYL  1 Patch Q72HRS    diphenhydrAMINE  25 mg Q6HRS    fluticasone-umeclidinium-vilanterol  1 Puff DAILY    ipratropium-albuterol  3 mL Q4HRS (RT)    loratadine  10 mg DAILY    senna-docusate  2 Tablet BID    cefTRIAXone (ROCEPHIN) IV  2,000 mg Q EVENING    apixaban  10 mg BID    Followed by    [START ON 9/27/2023] apixaban  5 mg BID     Allergies:   Allergies   Allergen Reactions    Penicillins Hives, Rash and Swelling     Pt reports that she gets swelling in throat and face,  rash all over face and arms.   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 *       Subjective  Review of Systems   Unable to perform ROS: Acuity of condition         Objective  Temp:  [36.7 °C (98 °F)-37.6 °C (99.6 °F)] 36.7 °C (98.1 °F)  Pulse:  [] 150  Resp:  [17-30] 30  BP: (107-205)/() 205/116  SpO2:  [88 %-98 %] 92 %      Physical Exam  Constitutional:       General: She is in acute distress.      Appearance: Normal appearance.   HENT:      Head: Normocephalic.      Right Ear: External ear normal.      Left Ear: External ear normal.      Nose: Nose normal.      Mouth/Throat:      Mouth: Mucous membranes are moist.   Eyes:      General: No scleral icterus.  Cardiovascular:      Rate and Rhythm: Tachycardia present.   Pulmonary:      Effort: Respiratory distress present.      Comments: Non rebreather  Musculoskeletal:         General: No swelling or tenderness.   Skin:     Coloration: Skin is not jaundiced.   Neurological:      Mental Status: She is alert and oriented to person, place, and time.   Psychiatric:         Behavior: Behavior normal.          Fluids  No intake or output data in the 24 hours ending 09/26/23 1015      Labs  Lab Results   Component Value Date/Time    WBC 13.8 (H) 09/26/2023 12:22 AM    RBC 4.13 (L) 09/26/2023 12:22 AM     HEMOGLOBIN 10.6 (L) 09/26/2023 12:22 AM    HEMATOCRIT 37.2 09/26/2023 12:22 AM    MCV 90.1 09/26/2023 12:22 AM    MCH 25.7 (L) 09/26/2023 12:22 AM    MCHC 28.5 (L) 09/26/2023 12:22 AM    MPV 10.0 09/26/2023 12:22 AM    NEUTSPOLYS 85.00 (H) 09/26/2023 12:22 AM    LYMPHOCYTES 7.70 (L) 09/26/2023 12:22 AM    MONOCYTES 4.30 09/26/2023 12:22 AM    EOSINOPHILS 0.20 09/26/2023 12:22 AM    BASOPHILS 0.30 09/26/2023 12:22 AM    ANISOCYTOSIS 1+ 09/23/2023 12:10 AM        Lab Results   Component Value Date/Time    SODIUM 138 09/26/2023 12:22 AM    POTASSIUM 5.0 09/26/2023 12:22 AM    CHLORIDE 98 09/26/2023 12:22 AM    CO2 30 09/26/2023 12:22 AM    GLUCOSE 106 (H) 09/26/2023 12:22 AM    BUN 14 09/26/2023 12:22 AM    CREATININE 0.60 09/26/2023 12:22 AM        Lab Results   Component Value Date/Time    PROTHROMBTM 15.7 (H) 09/20/2023 10:50 AM    INR 1.23 (H) 09/20/2023 10:50 AM        Recent Labs     09/20/23  1050   ASTSGOT 15   ALTSGPT 12   TBILIRUBIN 0.5   GLOBULIN 3.1   INR 1.23*       Imaging  CTA Lung 9/20/23  IMPRESSION:  1.  Minimal pulmonary embolism. Localized right lower lobe segmental and subsegmental branches.  2.  No right heart strain.  3.  9 mm lucent/lytic lesion T1 vertebral body. Suspect osseous metastatic deposit.  4.  Progression of extensive mediastinal adenopathy since 8/20/2023 consistent with metastatic disease.  5.  Extensive progression of diffuse bilateral innumerable pulmonary nodules consistent with metastatic disease.  6.  Interval development of extensive groundglass and airspace opacities consistent with superimposed pneumonia.  7.  Progression of right adrenal mass now measuring 43 mm x 38 mm consistent with metastasis.     Pathology:     7/14/2023: Pathology of Chest wall  A. Chest wall mass:          Metastatic poorly differentiated malignancy favoring carcinoma           without obvious involvement of overlying skin and a           non-specific IHC profile of some keratins positive (see            microscopic description).          See comment.   Main Cancer Type: Sarcoma   Probability: 90%     Subtype: Undifferentiated Sarcoma ([facility])   Probability: 90%      7/19/2023: Path of the Sacral Bone  A. Sacral bone biopsy:          Bone cores effaced by a high-grade malignancy histologically           identical to the previously excised chest wall mass           ([id number]).      2/14/2023: Pathology of Lung Mass  A. Lung, right lower lobe fine needle aspiration slides:          Positive for carcinoma.   B. Core, right lower lobe lung:          Moderately differentiated lung adenocarcinoma.   C. Lung, right lower lobe biopsy core and touchprep slides:          Moderately differentiated lung adenocarcinoma.   D. Bronchoalveolar lavage right lower lobe:          Rare atypical cells, malignant cells vs. reactive bronchial           cells.     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 *       Assessment and Plan  Patient is an acutely ill 48 year old female with acute respiratory failure. Case discussed with critical care team and Dr. Newton, and will hold off on port placement given the acuity of the pulmonary function and if patient is a chemotherapy candidate. If patient stabilizes possibility for port placement. We will plan to sign off. Thank you for the opportunity to be a part of the care, and please do not hesitate to notify our office if services are needed.    I, Saúl Fernandez MD have entered, reviewed and confirmed the above diagnosis related to this patient  on this date of service, September 26, 2023 11:42 AM     Greater than 45 minutes were spent with the patient.  This included review of outside records and imaging, counseling of the patient and coordination of care.

## 2023-09-26 NOTE — CODE DOCUMENTATION
Intensivist at bedside to discuss goals of care at length with patient and friend; daughters on speaker phone.  Patient to transfer to ICU; orders placed.

## 2023-09-26 NOTE — PROGRESS NOTES
[facility] Medicine Daily Progress Note    Date of Service  9/26/2023    Chief Complaint  Melly Hendrickson is a 48 y.o. female referred to the ER by her primary care physician on 9/20/2023 due to shortness of breath.    She was diagnosed with stage I NSCLC (adenocarcinoma) of the lung in 7/2023.  She underwent  SBRT as she was not a good surgical candidate due to dyspnea and underlying COPD. 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  She underwent SBRT to right sacrum.  She follows Dr. Newton (oncology), and was recommended to start chemotherapy but wished to consult with a sarcoma specialist at [facility].     She has COPD/asthma, was diagnosed with pulmonary embolism in 8/2023, anticoagulation not started due to hemoptysis.    Hospital Course  On admission, she was tachycardic, tachypneic, and afebrile.  Chest x-ray showed diffuse bilateral interstitial and alveolar opacities consistent with pneumonia. CTA chest on admission showed right lower lobe subsegmental PE, worsening pulmonary nodules, T1 vertebral body lesion, possible pneumonia.  Procalcitonin was 0.11.Eliquis was started.     Ceftriaxone and azithromycin were empirically started.    Echocardiogram from 9/22/2023 showed LVEF ~ 65% with no reported abnormalities. Repeat Procalcitonin on 9/23/2023 was  0.08.     Chest x-ray from 9/23/2023 showed innumerable bilateral pulmonary nodules which have increased since 9/20/2023, possibly superimposed pneumonia.    Palliative care following for pain management.  On every 4 hours prn Ativan, every 2 hours prn IV Dilaudid, Norco 10/325 every 3 hours prn. Tylenol every 6 hours scheduled, holding when sleeping. Dexamethasone 4 mg BID.    Continues to complain of significant pain.  PICC line was unable to be placed today.  Discussed port placement with IR. Port placement by IR is not an option, as  patient is unable to lie flat, and is requiring 15 L/min oxygen via OxyMask.  Central venous catheter placement was recommended, patient wishes to discuss this with Dr. Newton before making any decisions.    Interval Problem Update  Patient was seen and examined at bedside.  I have personally reviewed and interpreted vitals, labs, and imaging.    9/26.  Afebrile.  Has been tachycardic, tachypnea.  On 50L HHF.  Anemia 10.6 thrombocytopenia 154.  Wbc 13.8.  Patient is short of breath, lethargic.  Denies chest pain, palpitations.  Patient was agreeable to have CVC placement however this also requires her to be able to lie flat.  This was discussed with intensivist, surgical oncology, IR about best way to gain access.  Patient continued to deteriorate and was still hypoxic maxed out on heated high flow nasal cannula and tachycardic sustaining in the 160s.  I did confirm with patient that she wishes to be full code.  Discussed with the ICU and patient will be transferred to the ICU.    I have discussed this patient's plan of care and discharge plan at IDT rounds today with Case Management, Nursing, Nursing leadership, and other members of the IDT team.    Consultants/Specialty  oncology    Code Status  Full Code    Disposition  The patient is not medically cleared for discharge to home or a post-acute facility.  Anticipate discharge to: skilled nursing facility    I have placed the appropriate orders for post-discharge needs.    Review of Systems  Review of Systems   Constitutional:  Positive for malaise/fatigue.   HENT: Negative.     Respiratory: Negative.     Cardiovascular: Negative.    Gastrointestinal: Negative.    Genitourinary: Negative.    Musculoskeletal:  Positive for back pain, joint pain and myalgias.   Skin: Negative.    Neurological: Negative.    Endo/Heme/Allergies: Negative.    Psychiatric/Behavioral: Negative.          Physical Exam  Temp:  [36.7 °C (98 °F)-37.6 °C (99.6 °F)] 36.7 °C (98.1 °F)  Pulse:   [] 112  Resp:  [17-22] 18  BP: (107-145)/(71-85) 107/71  SpO2:  [88 %-98 %] 93 %    Physical Exam  HENT:      Head: Normocephalic.      Nose: Nose normal.      Mouth/Throat:      Mouth: Mucous membranes are moist.   Eyes:      Pupils: Pupils are equal, round, and reactive to light.   Cardiovascular:      Rate and Rhythm: Normal rate.   Pulmonary:      Effort: Pulmonary effort is normal.   Abdominal:      Palpations: Abdomen is soft.   Musculoskeletal:      Cervical back: Normal range of motion.      Right lower leg: No edema.      Left lower leg: No edema.   Skin:     General: Skin is warm.   Neurological:      Mental Status: She is alert. Mental status is at baseline.   Psychiatric:         Mood and Affect: Mood normal.         Fluids  No intake or output data in the 24 hours ending 09/26/23 0527      Laboratory  Recent Labs     09/24/23  0006 09/25/23  0006 09/26/23  0022   WBC 10.9* 13.2* 13.8*   RBC 4.04* 3.75* 4.13*   HEMOGLOBIN 10.3* 9.8* 10.6*   HEMATOCRIT 35.5* 33.3* 37.2   MCV 87.9 88.8 90.1   MCH 25.5* 26.1* 25.7*   MCHC 29.0* 29.4* 28.5*   RDW 57.9* 57.3* 58.1*   PLATELETCT 160* 164 154*   MPV 10.6 9.8 10.0       Recent Labs     09/24/23  0006 09/25/23  0006 09/26/23  0022   SODIUM 134* 137 138   POTASSIUM 5.1 4.6 5.0   CHLORIDE 98 97 98   CO2 28 33 30   GLUCOSE 141* 178* 106*   BUN 12 16 14   CREATININE 0.58 0.52 0.60   CALCIUM 8.5 8.4* 8.9                       Imaging  IR-PICC LINE PLACEMENT W/ GUIDANCE > AGE 5   Final Result                  Ultrasound-guided PICC placement performed by qualified nursing staff as    above.          DX-CHEST-PORTABLE (1 VIEW)   Final Result      1.  Innumerable bilateral pulmonary nodule, increased since 9/20/2023      2.  Differential diagnosis includes worsening metastases or superimposed pneumonia.      EC-ECHOCARDIOGRAM COMPLETE W/O CONT   Final Result      CT-CTA CHEST PULMONARY ARTERY W/ RECONS   Final Result      1.  Minimal pulmonary embolism. Localized  right lower lobe segmental and subsegmental branches.   2.  No right heart strain.   3.  9 mm lucent/lytic lesion T1 vertebral body. Suspect osseous metastatic deposit.   4.  Progression of extensive mediastinal adenopathy since 8/20/2023 consistent with metastatic disease.   5.  Extensive progression of diffuse bilateral innumerable pulmonary nodules consistent with metastatic disease.   6.  Interval development of extensive groundglass and airspace opacities consistent with superimposed pneumonia.   7.  Progression of right adrenal mass now measuring 43 mm x 38 mm consistent with metastasis.      Fleischner Society pulmonary nodule recommendations:      DX-CHEST-PORTABLE (1 VIEW)   Final Result      Diffuse bilateral interstitial and alveolar opacities most consistent with pneumonia or possibly noncardiogenic pulmonary edema.             Assessment/Plan  * Acute on chronic respiratory failure (HCC)- (present on admission)  Assessment & Plan  9/26/2023  Multiple etiologies including pulmonary embolism, adenocarcinoma with underlying COPD/asthma, possible pneumonia, tobacco smoker. Ceftriaxone and azithromycin were empirically started.  Eliquis was started.  Saturating 92% on 5 L/min nasal cannula.  Procalcitonin was 0.11 on admission, repeat was 0.08 on 9/23/2023.  Continue oxygen support.    Patient began requiring heated high flow nasal cannula.  More likely secondary to COPD, worsening non-small cell lung cancer, metastatic sarcoma  Transferred to the ICU for increased oxygen requirement    Sarcoma (HCC)- (present on admission)  Assessment & Plan  9/26/2023  Follows Dr. Jazmin Newton (oncology).  See above    Pulmonary embolism on right (HCC)- (present on admission)  Assessment & Plan  9/26/2023  Diagnosed in 8/2023. She was prescribed anticoagulation by oncology but had not started it due to hemoptysis.  Eliquis was started on admission    Primary undifferentiated sarcoma of soft tissue (HCC)- (present on  admission)  Assessment & Plan  9/26/2023  Has undifferentiated sarcoma of the chest wall with multiple nodules throughout her body including the sacrum.  Underwent SBRT to the right sacrum.  Follows Dr. Newton oncology. Echocardiogram from 9/22/2023 showed LVEF ~ 65% with no reported abnormalities.  PICC line was unable to be placed today.  Port placement by IR is not an option as patient is unable to lie flat requiring 15 L/min oxygen via oxy mask.  Central venous catheter placement was recommended, patient wishes to discuss this with Dr. Newton before making decision.     Lung cancer (HCC)- (present on admission)  Assessment & Plan  9/26/2023  Diagnosed with stage I adenocarcinoma of the lung and July 2023.  Underwent SBRT as she was not a good candidate for surgery due to dyspnea and underlying COPD.     Chronic pain due to neoplasm- (present on admission)  Assessment & Plan  9/26/2023  Follows pain specialist.  Decadron was started.  On multimodal pain regimen including Voltaren, Tylenol, Dilaudid, Norco    Cigarette nicotine dependence without complication- (present on admission)  Assessment & Plan  9/26/2023  Continues to smoke despite counseling         VTE prophylaxis: Eliquis    Patient is critically ill.   The patient continues to have: Acute hypoxic respiratory failure requiring non invasive positive pressure ventilation secondary to COPD, non-small cell lung cancer, metastatic sarcoma with pulmonary nodules currently on 50L heated high flow and 100% FiO2    The vital organ system that is affected is the: Respiratory  If untreated there is a high chance of deterioration into: Respiratory failure and eventually death.   The critical care that I am providing today is: heated high flow  The critical that has been undertaken is medically complex.   There has been no overlap in critical care time.   Critical Care Time not including procedures: 35 minutes

## 2023-09-26 NOTE — CODE DOCUMENTATION
Rapid response called for hypertension, increasing oxygen demands and hypoxia/tachypnea, and heart rate in the 150s. On rapid team arrival, patient maxed on high flow with non-rebreather on top, increased agitation, and hypertensive over 200 SBP.

## 2023-09-26 NOTE — CARE PLAN
Problem: Humidified High Flow Nasal Cannula  Goal: Maintain adequate oxygenation dependent on patient condition  Description: Target End Date:  resolve prior to discharge or when underlying condition is resolved/stabilized    1.  Implement humidified high flow oxygen therapy  2.  Titrate high flow oxygen to maintain appropriate SpO2  Outcome: Progressing       Respiratory Update    HFNC 60L 100% + NRB  Q4 Duoneb    Pt tolerating current treatments well with no adverse reactions.

## 2023-09-26 NOTE — PROGRESS NOTES
Patient arrived to T930 from R315 with rapid response RN.     4 Eyes Skin Assessment Completed by ANA LUISA Green and ANA LUISA Isbell.    Head WDL  Ears WDL  Nose WDL  Mouth WDL  Neck WDL  Breast/Chest Abrasion and Bruising  Shoulder Blades WDL  Spine WDL  (R) Arm/Elbow/Hand Bruising and Abrasion  (L) Arm/Elbow/Hand Bruising and Abrasion  Abdomen WDL  Groin WDL  Scrotum/Coccyx/Buttocks WDL  (R) Leg Bruising and Abrasion  (L) Leg Bruising and Abrasion  (R) Heel/Foot/Toe WDL  (L) Heel/Foot/Toe WDL          Devices In Places ECG, Blood Pressure Cuff, Pulse Ox, SCD's, HFNC, and Oxy Mask      Interventions In Place Sacral Mepilex, TAP System, Pillows, and Low Air Loss Mattress    Possible Skin Injury No    Pictures Uploaded Into Epic N/A  Wound Consult Placed N/A  RN Wound Prevention Protocol Ordered No

## 2023-09-26 NOTE — PROGRESS NOTES
Inpatient Palliative Care     Location: [id number] & [id number]     HPI:   Patient initially seen on [facility].  Events from overnight reviewed including patient switched to high flow nasal cannula with continuing desaturation and tachycardia up into the 150s.  Patient is anxious and reports that she is hot.    Patient seen again this afternoon at 3:30 PM she is now trauma ICU after rapid response was called secondary to impending respiratory failure.  Chest x-ray completed with innumerable pulmonary nodules noted.  Patient seen lying in bed on high flow nasal cannula max support as well as 15 L oxy mask.  He is now on Precedex drip.  She is calm and able to speak in complete sentences.  Friends at bedside family in route.     Summary:  Reintroduced necessity for spokesperson should she be intubated today.  She is unable to verbalize whom she would like to be her spokesperson.  She is quite anxious upon first visit.    Reviewed notes from rapid response as well as ICU internal medicine resident notes that patient has appointed her daughter Ely as surrogate decision maker.  Patient is still , it is unclear if spouse is willing to be involved.  Review of Dr. Newton's notes patient will not be eligible for chemotherapy until respiratory status improves and/or stabilizes.  Updated her friends at bedside.  Friends reports she removed her fentanyl patches.  She is not currently complaining of pain.     Active listening, reflection, reminiscing, validation & normalization, and empathic support utilized throughout this encounter.  All questions answered and contact information provided, encouraged to call with any questions or concerns.      Plan:     1) pain and symptom management per ICU team  2) palliative care to continue to assist with further goals of care discussions as needed and provide support to patient and family.       Thank you for allowing me the opportunity to participate in the care of  Melly.     I  spent a total of 70 minutes reviewing medical records, direct face-to-face time with the patient and/or family, coordination of care, and documentation. This is separate from the time spent on advance care planning, which is documented above.     Kay Christensen, MSN, APRN, ACNPC-AG.  Palliative Care Nurse Practitioner  [phone]

## 2023-09-26 NOTE — CONSULTS
R ICU Consult Note      Admit Date: 9/20/2023    Resident(s): Marilee Clemons M.D.   Attending:  Dr. Remberto Mcdowell    Patient ID:    Name:  Melly Barajas   YOB: 1975  Age:  48 y.o.  female   MRN:  [id number]    Hospital Course (carried forward and updated):  Melly Hendrickson is a 48 y.o. female with pertinent PMHx significant for undifferentiated stage IV sarcoma, questionable prior history of adenocarcinoma of the lung, pulmonary embolism, COPD/asthma overlap, anxiety/depression, who presented on 9/20/2023 with chief concerns of shortness of breath after she was found to be hypoxic to 70% per EMS at another appointment.  Patient was admitted to the hospitalist service.    This morning, 9/26/2023, rapid response team was activated given acutely worsening shortness of breath, requiring patient being maxed out on high flow nasal cannula, with 15 L of oxygen mask, saturating in the range of 88-92%.  ICU team saw the patient at bedside.  Review of imaging and notes showed innumerable metastatic lesions to the bilateral lung fields, consistent with an extremely guarded prognosis.  ICU team had lengthy discussion with the patient at bedside.  Goals of care were discussed.  Patient made the decision to be full code, be transferred to the ICU for further monitoring, with the expected for intubation if patient condition worsens.  Patient was informed that given her guarded prognosis, she may never come off the ventilator, patient understands the consequences, would like her daughter, Ely Shin, to make the decisions for her if she were to be intubated.  Jonas is on her way from [place] to visit her mother in [place]. Patient's friend, Camille, was also at bedside during this discussion.    Consultants:  Critical Care    Vitals Range last 24h:  Temp:  [36.7 °C (98 °F)-37.1 °C (98.8 °F)] 37.1 °C (98.8 °F)  Pulse:  [] 131  Resp:  [17-30] 25  BP: (107-205)/()  157/78  SpO2:  [88 %-98 %] 96 %    No intake or output data in the 24 hours ending 09/26/23 1503     Review of Systems   Constitutional:  Positive for malaise/fatigue. Negative for chills and fever.   HENT:  Negative for congestion, hearing loss and sore throat.    Eyes:  Negative for blurred vision, double vision and discharge.   Respiratory:  Positive for shortness of breath and wheezing. Negative for cough and sputum production.    Cardiovascular:  Positive for palpitations. Negative for chest pain, orthopnea, claudication and leg swelling.   Gastrointestinal:  Negative for abdominal pain, blood in stool, constipation, diarrhea, heartburn, melena, nausea and vomiting.   Genitourinary:  Negative for dysuria, frequency, hematuria and urgency.   Musculoskeletal:  Negative for back pain, joint pain and neck pain.   Skin:  Negative for rash.   Neurological:  Negative for dizziness, sensory change, weakness and headaches.   Endo/Heme/Allergies:  Negative for environmental allergies. Does not bruise/bleed easily.   Psychiatric/Behavioral:  Negative for depression. The patient is not nervous/anxious and does not have insomnia.         PHYSICAL EXAM:  Vitals:    09/26/23 1010 09/26/23 1200 09/26/23 1201 09/26/23 1205   BP: (!) 205/116  (!) 163/94 (!) 157/78   Pulse: (!) 150  (!) 138 (!) 131   Resp: (!) 30 (!) 30 (!) 27 (!) 25   Temp:   37.1 °C (98.8 °F)    TempSrc:   Temporal    SpO2: 92% 93% 93% 96%   Weight:   86.4 kg (190 lb 7.6 oz)    Height:        Body mass index is 25.83 kg/m².    O2 therapy: Pulse Oximetry: 96 %, O2 (LPM): 60, O2 Delivery Device: Heated High Flow Nasal Cannula       Physical Exam  Constitutional:       General: She is in acute distress.      Appearance: Normal appearance. She is normal weight. She is ill-appearing.   HENT:      Head: Normocephalic and atraumatic.      Nose: Nose normal. No congestion.      Mouth/Throat:      Mouth: Mucous membranes are moist.      Pharynx: Oropharynx is clear.    Eyes:      General:         Right eye: No discharge.         Left eye: No discharge.      Extraocular Movements: Extraocular movements intact.      Pupils: Pupils are equal, round, and reactive to light.   Neck:      Vascular: No carotid bruit.   Cardiovascular:      Rate and Rhythm: Regular rhythm. Tachycardia present.      Pulses: Normal pulses.      Heart sounds: Normal heart sounds. No murmur heard.     No friction rub.   Pulmonary:      Effort: Respiratory distress present.      Breath sounds: Wheezing present. No rhonchi.   Abdominal:      General: Abdomen is flat. Bowel sounds are normal.      Palpations: Abdomen is soft. There is no mass.      Tenderness: There is no abdominal tenderness.   Musculoskeletal:         General: No tenderness. Normal range of motion.      Cervical back: Normal range of motion and neck supple. No rigidity.      Right lower leg: No edema.      Left lower leg: No edema.   Skin:     General: Skin is warm.      Capillary Refill: Capillary refill takes less than 2 seconds.   Neurological:      General: No focal deficit present.      Mental Status: She is alert and oriented to person, place, and time. Mental status is at baseline.   Psychiatric:         Mood and Affect: Mood normal.         Behavior: Behavior normal.         Thought Content: Thought content normal.         Judgment: Judgment normal.         Recent Labs     09/26/23 0942   [id number] 7.38*   [id number] 54.4*   [id number] 53.4*   [id number] 83.7*   ARTHCO3 31*   F5MBOMYMC 60L  60.0*   ARTBE 5*     Recent Labs     09/24/23 0006 09/25/23 0006 09/26/23  0022   SODIUM 134* 137 138   POTASSIUM 5.1 4.6 5.0   CHLORIDE 98 97 98   CO2 28 33 30   BUN 12 16 14   CREATININE 0.58 0.52 0.60   CALCIUM 8.5 8.4* 8.9     Recent Labs     09/24/23 0006 09/25/23 0006 09/26/23  0022   GLUCOSE 141* 178* 106*     Recent Labs     09/24/23 0006 09/25/23 0006 09/26/23  0022   RBC 4.04* 3.75* 4.13*   HEMOGLOBIN 10.3* 9.8* 10.6*   HEMATOCRIT  35.5* 33.3* 37.2   PLATELETCT 160* 164 154*     Recent Labs     09/24/23  0006 09/25/23  0006 09/26/23  0022   WBC 10.9* 13.2* 13.8*   NEUTSPOLYS 84.40* 86.60* 85.00*   LYMPHOCYTES 6.90* 6.20* 7.70*   MONOCYTES 6.50 5.10 4.30   EOSINOPHILS 0.10 0.10 0.20   BASOPHILS 0.10 0.20 0.30       Meds:   dexmedetomidine (Precedex) infusion  0.1-1.5 mcg/kg/hr (Ideal) 0.2 mcg/kg/hr (09/26/23 1223)    NS   83 mL/hr at 09/26/23 1200    HYDROmorphone  1 mg      dexamethasone  4 mg      ALPRAZolam  0.25 mg      guaiFENesin ER  600 mg      LORazepam  0.5 mg      polyethylene glycol/lytes  1 Packet      HYDROcodone/acetaminophen  1 Tablet      diphenhydrAMINE  25 mg      diphenhydrAMINE  25 mg      fluticasone-umeclidinium-vilanterol  1 Puff      ipratropium-albuterol  3 mL      ipratropium-albuterol  3 mL      albuterol  2 Puff      diclofenac sodium  2 g      diphenhydrAMINE  25 mg      loratadine  10 mg      senna-docusate  2 Tablet      And    polyethylene glycol/lytes  1 Packet      And    magnesium hydroxide  30 mL      And    bisacodyl  10 mg      Respiratory Therapy Consult        acetaminophen  650 mg      cefTRIAXone (ROCEPHIN) IV  2,000 mg      hydrALAZINE  10 mg      ondansetron  4 mg      ondansetron  4 mg      promethazine  12.5-25 mg      promethazine  12.5-25 mg      prochlorperazine  5-10 mg      guaiFENesin dextromethorphan  10 mL      apixaban  10 mg      Followed by    [START ON 9/27/2023] apixaban  5 mg          Procedures:  None    Imaging:  DX-CHEST-PORTABLE (1 VIEW)   Final Result      1.  Innumerable pulmonary nodules bilaterally.      2.  Increasing confluence and airspace opacities in the perihilar regions suspicious for superimposed pneumonitis on diffuse metastatic disease      IR-PICC LINE PLACEMENT W/ GUIDANCE > AGE 5   Final Result                  Ultrasound-guided PICC placement performed by qualified nursing staff as    above.          DX-CHEST-PORTABLE (1 VIEW)   Final Result      1.  Innumerable  bilateral pulmonary nodule, increased since 9/20/2023      2.  Differential diagnosis includes worsening metastases or superimposed pneumonia.      EC-ECHOCARDIOGRAM COMPLETE W/O CONT   Final Result      CT-CTA CHEST PULMONARY ARTERY W/ RECONS   Final Result      1.  Minimal pulmonary embolism. Localized right lower lobe segmental and subsegmental branches.   2.  No right heart strain.   3.  9 mm lucent/lytic lesion T1 vertebral body. Suspect osseous metastatic deposit.   4.  Progression of extensive mediastinal adenopathy since 8/20/2023 consistent with metastatic disease.   5.  Extensive progression of diffuse bilateral innumerable pulmonary nodules consistent with metastatic disease.   6.  Interval development of extensive groundglass and airspace opacities consistent with superimposed pneumonia.   7.  Progression of right adrenal mass now measuring 43 mm x 38 mm consistent with metastasis.      Fleischner Society pulmonary nodule recommendations:      DX-CHEST-PORTABLE (1 VIEW)   Final Result      Diffuse bilateral interstitial and alveolar opacities most consistent with pneumonia or possibly noncardiogenic pulmonary edema.          ASSESSEMENT and PLAN:    * Acute on chronic respiratory failure (HCC)- (present on admission)  Assessment & Plan  Pulmonary embolism, concern of pneumonia, and worsening cancer  Procalcitonin  Ceftriaxone and azithromycin   RT per protocol  Supplemental oxygen (currently maxed out on HFNC, 15 L on Oximask)  Transfer to the ICU for increased level of care and monitoring. Patient wishes to be full code, with expectation of escalation of treatment up to invasive ventilation if indicated.  Dexamathasone  Duonebs Q4H for wheezing  Precedex drip for anxiety  Continue to have GOC discussion with patient and family.  Patient will benefit from palliative consultation     Sinus Tachycardia  Assessment & Plan  Most likely secondary to hypoxia and confounded by anxiety.  Start patient on  precedex drip    Sarcoma (HCC)- (present on admission)  Assessment & Plan  Dr Alma Newton is her outpatient oncologist, following in house.  Patient has worsening on CT Chest findings, innumerable metastatic lesions.     Pulmonary embolism on right (HCC)- (present on admission)  Assessment & Plan  Eliquis  Monitor vitals  Supplemental oxygen  Watch for hemoptysis  Prior U/S in August negative but she has hx of cancer and reportedly has not been on anticoagulation since last admit.     Primary undifferentiated sarcoma of soft tissue (HCC)- (present on admission)  Assessment & Plan  States follow up with [place] and locally with Dr Newton     Lung cancer (HCC)- (present on admission)  Assessment & Plan  Prior hx of radiation treatment  F/u with Dr Newton  RT per protocol  Supplemental oxygen     Chronic pain due to neoplasm- (present on admission)  Assessment & Plan  inititated decadron to see if relieves any bony pain  Voltaren, Tylenol, Dilaudid, norco prn     Cigarette nicotine dependence without complication- (present on admission)  Assessment & Plan  Cessation encouraged      DISPO: Continued ICU care for severe respiratory distress    CODE STATUS: FULL CODE    Quality Measures:  Feeding: Regular  Analgesia: As needed  Sedation: None  Thromboprophylaxis: Apixaban  Head of bed: >30 degrees  Ulcer prophylaxis: None  Glycemic control: None  Bowel care: bowel regimen PRN  Indwelling lines: PIV  Deescalation of antibiotics: Currently on Ceftriaxone

## 2023-09-26 NOTE — PROGRESS NOTES
Consult Note: Hematology/Oncology     Primary Care:  Checo Baker M.D.    Chief Complaint   Patient presents with    Shortness of Breath     PT hx lung ca that's metastasized. Was at another appt and was SOB. RA while walking in 70's per EMS. Currently on 6L NC         Current Treatment:     None    Prior Treatment:     4/17/2023-4/21/2023: RLL Lung SBRT  7/19/23-7/24/2023: R Sacrum SBRT      Subjective:   History of Presenting Illness:    Melly Hendrickson is a 48 y.o. 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    Patient history dates back to July 2022 when she had a chest x-ray performed that revealed a potential mass in her right lung.  This led to a chest CT performed on February 13, 2023.  This demonstrated 1.4 cm mass in the right lower lobe and a 1.9 groundglass opacity also in the right lower lobe.  The groundglass opacity appeared stable from previous scans but the mass was thought to be new.      A biopsy was performed and confirmed lung adenocarcinoma.    Patient saw thoracic surgery and obtained PFTs as well as a PET scan.  She was discussed at tumor board and given her significant dyspnea and her underlying COPD it was thought that she would be a better candidate for SBRT rather than surgical resection.    April 17, 2023 patient began SBRT.  Completed this treatment without many issues.    After this patient did notice a small lump in her breast reduction scar line.  She said that over the next few days it grew and she ultimately went to her PCP    PCP tried to drain the mass; however it just bled significantly during the I&D attempt.  Thus she was sent to Dr. Becerra at [facility] for excision.     Pathology from this excision demonstrated poorly differentiated malignancy favoring carcinoma without obvious involvement of underlying skin and nonspecific IHC profile.  Based on his diagnosis  cancer type ID was sent off for.  Results showed 90% probability of sarcoma    Over the past several months she has noted severe pain in her sacral region.  She does have sacral lesions that are concerning for metastatic disease.  Patient has biopsy of sacral mass on 7/18/2023.     Of note patient has 5 children and 4 grandchildren.    She follows with pain management    I initially saw patient on 7/14.  At our 7/21, we had information back from biopsy and we discussed AIM.  Patient wished to hold off and think about this further.    On 7/28, we discussed initiating AIM, patient was not comfortable and wanted 2 opinion at [place].    I saw patient again on 8/11.  We discussed AIM,  patient not ready and awaiting [place] appointment.     She was recently admitted to the ER for hemoptysis on 8/20/23 and was found to have a PE.  She was still having hemoptysis and thus was not started on AC. She has discharged on 8/24/23.     I saw her subsequently on 9/7/23 and recommended AC given no hemoptysis, but active PE with cancer.  We also discussed chemo at this visit and she wished to wait.     During my visit with the patient on 9/19/23, Patient was endorsing 3 nodules on the R arm.  1 on the clavicle.  Patient also had a nodule on her L arm. Patient had 1 nodule on her bikini line. She has another on the inside of her L thigh. These started 1 week ago. She was on 2-3L of oxygen for the past several days.  We discussed intiating chemo and plan was to start on 9/26/23.  Pt requires a direct admit for AIM    Since that visit she had worseing SOB.  She was subsequently presented to the ER for SOB/cough and potential PNA. On admission, she was tachycardic, tachypneic, and afebrile.  Chest x-ray showed diffuse bilateral interstitial and alveolar opacities consistent with pneumonia. CTA chest on admission showed right subsegmental PE, worsening pulmonary nodules, T1 vertebral body region, possible pneumonia.  Procalcitonin was  0.11.  Ceftriaxone and azithromycin were empirically started.    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    Chronic obstructive pulmonary disease (HCC)     COPD (chronic obstructive pulmonary disease) (HCC)     DDD (degenerative disc disease), lumbar     Depression     Emphysema of lung (HCC)     MRSA (methicillin resistant staph aureus) culture positive     Pain     back - cervical, scapula, lower    Pericarditis     Primary adenocarcinoma of lower lobe of right lung (HCC)     Psychiatric disorder     Anxiety, Depression    Sleep apnea     Snoring     Urinary incontinence         Past Surgical History:   Procedure Laterality Date    RI BRONCHOSCOPY,DIAGNOSTIC N/A 8/22/2023    Procedure: BRONCHOSCOPY;  Surgeon: Sandra Lopez D.O.;  Location: [facility] [place];  Service: Pulmonary    RI EXC SKIN BENIG >4CM TRUNK,ARM,LEG Left 6/7/2023    Procedure: EXCISION OF SOFT TISSUE MASS LEFT CHEST WALL;  Surgeon: Nasir Becerra M.D.;  Location: [facility];  Service: General    RI BRONCHOSCOPY,DIAGNOSTIC N/A 02/14/2023    Procedure: FIBER OPTIC BRONCHOSCOPY WASH, BRUSH, BRONCHOALVEOLAR LAVAGE, BIOPSY AND FINE NEEDLE ASPIRATION AND NAVIGATION, ROBOTICS;  Surgeon: Guicho Ellis M.D.;  Location: [facility] [place];  Service: Pulmonary Robotic    ENDOBRONCHIAL US ADD-ON N/A 02/14/2023    Procedure: ENDOBRONCHIAL ULTRASOUND (EBUS);  Surgeon: Guicho Ellis M.D.;  Location: [facility] [place];  Service: Pulmonary Robotic    MAMMOPLASTY REDUCTION Bilateral     SEPTOPLASTY      TUBE & ECTOPIC PREG., REMOVAL      x 2       Social History     Tobacco Use    Smoking  status: Some Days     Current packs/day: 0.50     Average packs/day: 0.5 packs/day for 20.0 years (10.0 ttl pk-yrs)     Types: Cigarettes     Passive exposure: Past    Smokeless tobacco: Never    Tobacco comments:     on and off    Vaping Use    Vaping Use: Never used   Substance Use Topics    Alcohol use: Not Currently     Comment: occ, rare    Drug use: Not Currently     Types: Marijuana     Comment: THC gummies        Family History   Problem Relation Age of Onset    Diabetes Mother     Cancer Father         Prostate    Cancer Paternal Grandmother         Cervical    Cancer Paternal Grandfather         Unk       Allergies   Allergen Reactions    Penicillins Hives, Rash and Swelling     Pt reports that she gets swelling in throat and face, rash all over face and arms.   Tolerated cefazolin    Aspirin Rash and Hives     Pt reports that she received a rash on face, pt reports it ok if she takes NORCO    Other Environmental Shortness of Breath     Perfumes, dander, scents       Current Facility-Administered Medications   Medication Dose Route Frequency Provider Last Rate Last Admin    dexmedetomidine (PRECEDEX) 400 mcg/100mL NS premix infusion  0.1-1.5 mcg/kg/hr (Ideal) Intravenous Continuous Remberto Mcdowell Jr., D.OEstrellita 3.7 mL/hr at 09/26/23 1223 0.2 mcg/kg/hr at 09/26/23 1223    NS infusion   Intravenous Continuous Yenifer Velazquez M.D. 83 mL/hr at 09/26/23 1200 New Bag at 09/26/23 1200    HYDROmorphone (Dilaudid) injection 1 mg  1 mg Intravenous Q3HRS PRN Kay Chrisetnsen, A.P.R.N.   1 mg at 09/25/23 1648    dexamethasone (Decadron) injection 4 mg  4 mg Intravenous BID Yenifer Velazquez M.D.   4 mg at 09/26/23 1424    ALPRAZolam (Xanax) tablet 0.25 mg  0.25 mg Oral TID PRN Naima Ambrosio, A.P.R.N.   0.25 mg at 09/26/23 1012    guaiFENesin ER (Mucinex) tablet 600 mg  600 mg Oral Q12HRS Naima Ambrosio, A.P.R.N.   600 mg at 09/26/23 0641    LORazepam (Ativan) tablet 0.5 mg  0.5 mg Oral Nightly Ivonne Mohan,  A.P.R.N.   0.5 mg at 09/25/23 2054    polyethylene glycol/lytes (Miralax) PACKET 1 Packet  1 Packet Oral Q48HRS Ivonne Mohan A.P.R.N.   1 Packet at 09/24/23 1207    HYDROcodone/acetaminophen (Norco)  MG per tablet 1 Tablet  1 Tablet Oral Q3HRS PRN Yenifer Velazquez M.D.   1 Tablet at 09/26/23 0429    diphenhydrAMINE (Benadryl) injection 25 mg  25 mg Intravenous Q6HRS Kay Christensen A.P.R.N.   25 mg at 09/26/23 1202    diphenhydrAMINE (Benadryl) injection 25 mg  25 mg Intravenous Q6HRS PRN Kay Christensen A.P.R.N.   25 mg at 09/26/23 1205    fluticasone-umeclidinium-vilanterol (Trelegy Ellipta) 100-62.5-25 mcg/act inhaler 1 Puff  1 Puff Inhalation DAILY Yenifer Velazquez M.D.   1 Puff at 09/25/23 0528    ipratropium-albuterol (DUONEB) nebulizer solution  3 mL Nebulization Q4HRS (RT) Yenifer Velazquez M.D.   3 mL at 09/26/23 1502    ipratropium-albuterol (DUONEB) nebulizer solution  3 mL Nebulization Q2HRS PRN (RT) Yenifer Velazquez M.D.   3 mL at 09/26/23 1005    albuterol inhaler 2 Puff  2 Puff Inhalation Q4HRS PRN SANJU GarciaOEstrellita   2 Puff at 09/23/23 0501    diclofenac sodium (Voltaren) 1 % gel 2 g  2 g Topical 4X/DAY PRN SANJU GarciaO.        diphenhydrAMINE (Benadryl) tablet/capsule 25 mg  25 mg Oral BID PRN SANJU GarciaOEstrellita   25 mg at 09/20/23 1745    loratadine (Claritin) tablet 10 mg  10 mg Oral DAILY SANJU GarciaO.   10 mg at 09/26/23 0642    senna-docusate (Pericolace Or Senokot S) 8.6-50 MG per tablet 2 Tablet  2 Tablet Oral BID Madhu Merino D.O.   2 Tablet at 09/25/23 1741    And    polyethylene glycol/lytes (Miralax) PACKET 1 Packet  1 Packet Oral QDAY PRN Madhu Merino D.O.   1 Packet at 09/23/23 1141    And    magnesium hydroxide (Milk Of Magnesia) suspension 30 mL  30 mL Oral QDAY PRN Madhu Merino D.O.   30 mL at 09/24/23 0939    And    bisacodyl (Dulcolax) suppository 10 mg  10 mg Rectal QDAY PRN Madhu Merino D.O.        Respiratory Therapy Consult   Nebulization  Continuous RT Madhu Merino D.O.        acetaminophen (Tylenol) tablet 650 mg  650 mg Oral Q6HRS PRN Madhu Merino D.O.        cefTRIAXone (Rocephin) syringe 2,000 mg  2,000 mg Intravenous Q EVENING Yenifer Velazquez M.D.   2,000 mg at 09/25/23 1739    hydrALAZINE (Apresoline) injection 10 mg  10 mg Intravenous Q4HRS PRN Madhu Merino D.O.   10 mg at 09/26/23 1010    ondansetron (Zofran) syringe/vial injection 4 mg  4 mg Intravenous Q4HRS PRN Madhu Merino D.O.        ondansetron (Zofran ODT) dispertab 4 mg  4 mg Oral Q4HRS PRN Madhu Merino D.O.        promethazine (Phenergan) tablet 12.5-25 mg  12.5-25 mg Oral Q4HRS PRN Madhu Merino D.O.        promethazine (Phenergan) suppository 12.5-25 mg  12.5-25 mg Rectal Q4HRS PRN Madhu Merino D.O.        prochlorperazine (Compazine) injection 5-10 mg  5-10 mg Intravenous Q4HRS PRN Madhu Merino D.O.        guaiFENesin dextromethorphan (Robitussin DM) 100-10 MG/5ML syrup 10 mL  10 mL Oral Q6HRS PRN SANJU GarciaOEstrellita   10 mL at 09/23/23 2319    apixaban (Eliquis) tablet 10 mg  10 mg Oral BID SANJU GarciaO.   10 mg at 09/26/23 0642    Followed by    [START ON 9/27/2023] apixaban (Eliquis) tablet 5 mg  5 mg Oral BID Madhu Merino D.O.           Review of Systems   Constitutional:  Positive for malaise/fatigue. Negative for chills, fever and weight loss.   HENT:  Negative for congestion, ear pain, nosebleeds and sore throat.    Eyes:  Negative for blurred vision.   Respiratory:  Negative for cough, sputum production, shortness of breath and wheezing.    Cardiovascular:  Negative for chest pain, palpitations, orthopnea and leg swelling.   Gastrointestinal:  Negative for abdominal pain, heartburn, nausea and vomiting.   Genitourinary:  Negative for dysuria, frequency and urgency.   Musculoskeletal:  Positive for back pain and joint pain. Negative for neck pain.   Neurological:  Negative for dizziness, tingling, tremors, sensory change, focal weakness and  headaches.   Endo/Heme/Allergies:  Does not bruise/bleed easily.   Psychiatric/Behavioral:  Negative for depression, memory loss and suicidal ideas.    All other systems reviewed and are negative.      Problem list, medications, and allergies reviewed by myself today in Epic.     Objective:     Vitals:    09/26/23 1010 09/26/23 1200 09/26/23 1201 09/26/23 1205   BP: (!) 205/116  (!) 163/94 (!) 157/78   Pulse: (!) 150  (!) 138 (!) 131   Resp: (!) 30 (!) 30 (!) 27 (!) 25   Temp:   37.1 °C (98.8 °F)    TempSrc:   Temporal    SpO2: 92% 93% 93% 96%   Weight:   86.4 kg (190 lb 7.6 oz)    Height:             DESC; KARNOFSKY SCALE WITH ECOG EQUIVALENT: 90, Able to carry on normal activity; minor signs or symptoms of disease (ECOG equivalent 0)    DISTRESS LEVEL: no acute distress    Physical Exam  Deferred    Labs:   Most recent labs reviewed.  Please see the lab tab of chart review    Imaging:   Most recent images below have been independently reviewed by me.  Please see the imaging tab of chart review    9/22/2023 : Echocardiogram showed LVEF ~ 65% with no reported abnormalities    7/27/23: PET   1.  Focal uptake in the medial RIGHT lower lobe of lung abutting the thoracic spine, consistent with tumor..  2.  No PET correlate for ill-defined spiculated nodule in the RIGHT lower lobe.  3.  Large hypermetabolic mass corresponds to lytic lesion in the RIGHT sacrum consistent with malignancy.  4.  Intramuscular focal uptake at the posterior LEFT shoulder, likely metastatic.    Pathology:    7/14/2023: Pathology of Chest wall  A. Chest wall mass:          Metastatic poorly differentiated malignancy favoring carcinoma           without obvious involvement of overlying skin and a           non-specific IHC profile of some keratins positive (see           microscopic description).          See comment.   Main Cancer Type: Sarcoma   Probability: 90%     Subtype: Undifferentiated Sarcoma (MFH)   Probability: 90%     7/19/2023: Path  of the Sacral Bone  A. Sacral bone biopsy:          Bone cores effaced by a high-grade malignancy histologically           identical to the previously excised chest wall mass           ([id number]).     2/14/2023: Pathology of Lung Mass  A. Lung, right lower lobe fine needle aspiration slides:          Positive for carcinoma.   B. Core, right lower lobe lung:          Moderately differentiated lung adenocarcinoma.   C. Lung, right lower lobe biopsy core and touchprep slides:          Moderately differentiated lung adenocarcinoma.   D. Bronchoalveolar lavage right lower lobe:          Rare atypical cells, malignant cells vs. reactive bronchial           cells.     Assessment/Plan:      Cancer Staging   Primary undifferentiated sarcoma of soft tissue (HCC)  Staging form: Soft Tissue Sarcoma of the Trunk and Extremities, AJCC 8th Edition  - Clinical: Stage IV (cTX, cN0, cM1) - Signed by Alma Newton M.D. on 9/19/2023         Ms. Aleida Hendrickson is a 49 yo F who presents today with a recent diagnosis of stage I A2 adenocarcinoma of the right lung status post SBRT now with a new diagnosis of metastatic undifferentiated sarcoma.     She has completed SBRT to the R sacrum.      She is now reporting multiple new nodules throughout her body.     She is admitted to the hospital for SOB.      Today patient had a decline in her respiratory status and was transferred to the ICU.    Prior to her decline, we discussed her pathology results.      We reviewed that her first path result (obtained in 2/2023) was from an EBUS awhich showed full glands, TTF+ and Napsin A +. It was thought from that small sample, that the patients path was consistent with a carcinoma (moderately differentiated adenocarcinoma).    Her second path result (obtained from the chest wall on 6/2023) was consistent with metastatic poorly differentiated malignancy favoring carcinoma.   It was thought that this sample had different histological features from  her adenocarcinoma.  This sample showed more of a spingle PD almost sarcamatoid histology. TTF1 and Napsin was negative.  PD malignancy can lose their specific markers for site of origin however.  This sample was sent off for cancer type ID and was found to be consistent with undifferentiated sarcoma MFH.  I also sent this tumor off for NGS.  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     Her third sample (obtained on sacral bone on 8/2023) was consistent with the path from the second biopsy of her chest wall mass; thus no further testing was sent out.     Given the cancer type ID, the skin nodules were thought to be a second primary (sarcoma), different from her lung primary (adeno); however, the NGS testing of the chest wall shows met exon 14 skipping mutation.  This is typically found in lung.  This is concerning that she does not Have 2 primaries but rather her initial biopsy was in fact more of a sarcamatoid picture and her second and third biopsies were of metastatic lesions from her primary.      The way to determine this, is to send her original tumor off for NGS and see if it has an MET exon14 mutation.  We wont have that back by the time she needs to start chemo.    Given that we cannot get access, AIM (doxo, Ifo, Mesna) is off the table. This would be best for the former theory (that these lesions are of a secondary primary, a sarcoma).  We can proceed with a more lung focused regimen (given the later theory, that this is all metastatic disease from her lung primary).      Nevertheless, her respiratory status needs to improve prior to starting chemo.    Will attempt to get capmatinib outpatient, to address her [id number] mutation.     1) Sarcoma vs NSCLC (Sarcamatoid) with metastatic disease to Chest wall,  sacral and L shoulder mets  -will need to improve respiratory status prior to initiating chemo  -will work on sending original biopsy off for NGS testing to see if it too harbors a [id number]  mutation  -will work on getting capmatinib approved outpatient    2) Stage 1A2 Adenocarcinoma of the Lung  -s/p SBRT  -monitor per NCCN Guidelines  -H&P and chest CT contrast every 6 mo for 2-3 y,   -H&P and a low-dose non-contrast-enhanced chest CT annually    3) Cancer related Pain  -agree with palliative care for pain control     4) Pulmonary Embolism  -Right lower lobe segmental and subsegmental occlusive pulmonary emboli.  Hold eliquis given her hemoptysis     No follow-ups on file.     Any questions and concerns raised by the patient were addressed and answered. Patient denies any further questions.  Patient encouraged to call the office with any concerns or issues.     Alma Newton M.D.  Hematology/Oncology

## 2023-09-27 NOTE — PROGRESS NOTES
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  Work-up confirmed PE and worsening metastatic lung nodules, T1 vertebral body lesion, and probable pneumonia.  Palliative care consulted for symptom management as well as advance care planning.    Patient transferred to ICU yesterday due to acute respiratory failure.  Patient worried that respiratory failure could be due to respiratory suppression due to opioid therapy and has ceased all opioids greater than 24 hours.    Appreciate update from Dr. Edmond in regards to his and Dr. Gandara conversation with patient and recommendation for hospice care.    Summary:     Met with patient and patient's friend at bedside.  Patient very sleepy.  She is on HFNC as well as nonrebreather.  She remembers meeting with me last week.  She confirmed her daughter will be visiting [facility].  She does not wish to discuss goals of care until her daughter is present.  A hospice referral has been placed.  Encouraged her to call with any questions or needs and confirmed that I will check on her tomorrow pending clinical picture and plan of care.  She denied having questions or needs at this time.    I spent a total of 25 minutes reviewing medical records, direct face-to-face time with the patient and/or family, coordination of care, and documentation.    Ivonne \"Mana\" FLOYD Dow, [facility], [place]  Palliative Care Nurse Practitioner  [phone]               "

## 2023-09-27 NOTE — DISCHARGE PLANNING
Received Choice form at [id number]  Agency/Facility Name: [facility] ([facility])  Referral sent per Choice form @ [phone]

## 2023-09-27 NOTE — CARE PLAN
The patient is Stable - Low risk of patient condition declining or worsening    Shift Goals  Clinical Goals: Hemodynamic stability, Wean o2, pain control  Patient Goals: pain, rest, reassurance  Family Goals: updates    Progress made toward(s) clinical / shift goals:  PT remains hemodynamically stable, O2 requirement remains unchanged.  PT remains safe and free of injury.        Problem: Pain - Standard  Goal: Alleviation of pain or a reduction in pain to the patient’s comfort goal  Outcome: Progressing     Problem: Care Map:  Optimal Outcome for the Pneumonia Patient  Goal: Collection and monitoring of appropriate tests and labs  Outcome: Progressing     Problem: Respiratory  Goal: Patient will achieve/maintain optimum respiratory ventilation and gas exchange  Outcome: Progressing     Problem: Risk for Aspiration  Goal: Patient's risk for aspiration will be absent or decrease  Outcome: Progressing     Problem: Hemodynamics - Pneumonia  Goal: Patient's hemodynamics, fluid balance and neurologic status will be stable or improve  Outcome: Progressing     Problem: Self Care  Goal: Patient will have the ability to perform ADLs independently or with assistance (bathe, groom, dress, toilet and feed)  Outcome: Progressing     Problem: Discharge Planning - Pneumonia  Goal: Patient will verbalize understanding of lifestyle changes and therapeutic needs post discharge  Outcome: Progressing     Problem: Knowledge Deficit - Standard  Goal: Patient and family/care givers will demonstrate understanding of plan of care, disease process/condition, diagnostic tests and medications  Outcome: Progressing     Problem: Skin Integrity  Goal: Skin integrity is maintained or improved  Outcome: Progressing     Problem: Psychosocial  Goal: Patient's level of anxiety will decrease  Outcome: Progressing  Goal: Patient's ability to verbalize feelings about condition will improve  Outcome: Progressing  Goal: Patient's ability to re-evaluate and  adapt role responsibilities will improve  Outcome: Progressing  Goal: Patient and family will demonstrate ability to cope with life altering diagnosis and/or procedure  Outcome: Progressing  Goal: Spiritual and cultural needs incorporated into hospitalization  Outcome: Progressing     Problem: Fall Risk  Goal: Patient will remain free from falls  Outcome: Progressing

## 2023-09-27 NOTE — DISCHARGE PLANNING
Case Management Discharge Planning    Admission Date: 9/20/2023  GMLOS: 3.9  ALOS: 7    6-Clicks ADL Score: 21  6-Clicks Mobility Score: 19      Anticipated Discharge Dispo: Discharge Disposition: D/T to hospice home (50)    DME Needed: Yes    DME Ordered: No    Action(s) Taken: Choice obtained and Referral(s) sent    LMSW met w/ pt at bedside and discussed pt's understanding of her situation and pt's wishes moving forward. Pt verbalizes her desire to go home with hospice and signed choice form. Pt does ask that her family be present for the conversation with hospice and states a daughter will be flying into [place] later tonight. Pt is on a high flow of O2 which may be a barrier to going home with hospice; however, hospice will review referral and speak with pt and family tomorrow (9/28). LMSW will continue to follow.     Escalations Completed: None    Medically Clear: No    Next Steps: LMSW faxed completed Choice form to [facility] [id number].

## 2023-09-27 NOTE — TELEPHONE ENCOUNTER
Received request for [employer] ran a test claim and a PA is required.    Submitted PA via CoverMyMeds key is [id number]

## 2023-09-27 NOTE — ASSESSMENT & PLAN NOTE
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

## 2023-09-27 NOTE — ASSESSMENT & PLAN NOTE
Stage I adenocarcinoma initially treated with chemoradiation  Follows with Dr Newton (Onc)  Patient concerned prior radiation therapy contributed to current problem

## 2023-09-27 NOTE — PROGRESS NOTES
Written orders received from Dr. Alma Newton for Capmatinib (Trabecta) 400mg PO twice daily received.  Orders entered and pharmacy liaison Kvng SALAZAR, notified.  Pt to follow up with Dr. Newton upon discharge.

## 2023-09-27 NOTE — CARE PLAN
The patient is Watcher - Medium risk of patient condition declining or worsening    Shift Goals  Clinical Goals: maintain sats >92%; mobility  Patient Goals: pain control, rest  Family Goals: updates    Progress made toward(s) clinical / shift goals:  Pt on 50/100% high flow maintaining sats >92%. Pt requested prn neb-- RT administered. Pt agreeable to mobility but declines at this time. Pt resting in a position of comfort. Scheduled eliquis and rocephin administered.     Problem: Pain - Standard  Goal: Alleviation of pain or a reduction in pain to the patient’s comfort goal  Outcome: Progressing     Problem: Care Map:  Optimal Outcome for the Pneumonia Patient  Goal: Collection and monitoring of appropriate tests and labs  Outcome: Progressing     Problem: Respiratory  Goal: Patient will achieve/maintain optimum respiratory ventilation and gas exchange  Outcome: Progressing     Problem: Risk for Aspiration  Goal: Patient's risk for aspiration will be absent or decrease  Outcome: Progressing     Problem: Hemodynamics - Pneumonia  Goal: Patient's hemodynamics, fluid balance and neurologic status will be stable or improve  Outcome: Progressing     Problem: Self Care  Goal: Patient will have the ability to perform ADLs independently or with assistance (bathe, groom, dress, toilet and feed)  Outcome: Progressing     Problem: Discharge Planning - Pneumonia  Goal: Patient will verbalize understanding of lifestyle changes and therapeutic needs post discharge  Outcome: Progressing     Problem: Knowledge Deficit - Standard  Goal: Patient and family/care givers will demonstrate understanding of plan of care, disease process/condition, diagnostic tests and medications  Outcome: Progressing     Problem: Skin Integrity  Goal: Skin integrity is maintained or improved  Outcome: Progressing     Problem: Psychosocial  Goal: Patient's level of anxiety will decrease  Outcome: Progressing  Goal: Patient's ability to verbalize feelings  about condition will improve  Outcome: Progressing  Goal: Patient's ability to re-evaluate and adapt role responsibilities will improve  Outcome: Progressing  Goal: Patient and family will demonstrate ability to cope with life altering diagnosis and/or procedure  Outcome: Progressing  Goal: Spiritual and cultural needs incorporated into hospitalization  Outcome: Progressing     Problem: Fall Risk  Goal: Patient will remain free from falls  Outcome: Progressing

## 2023-09-27 NOTE — CARE PLAN
The patient is Watcher - Medium risk of patient condition declining or worsening    Shift Goals  Clinical Goals: monitor/maintain O2 saturations, monitor heart rate,  Patient Goals: pain control, rest  Family Goals: n/a    Progress made toward(s) clinical / shift goals:  Patient has reported her anxiety and shortness of breath has improved at the end of shift. Patient has had minimal appetite and has been educated on the importance         Problem: Pain - Standard  Goal: Alleviation of pain or a reduction in pain to the patient’s comfort goal  Description: Target End Date:  Prior to discharge or change in level of care    Document on Vitals flowsheet    1.  Document pain using the appropriate pain scale per order or unit policy  2.  Educate and implement non-pharmacologic comfort measures (i.e. relaxation, distraction, massage, cold/heat therapy, etc.)  3.  Pain management medications as ordered  4.  Reassess pain after pain med administration per policy  5.  If opiods administered assess patient's response to pain medication is appropriate per POSS sedation scale  6.  Follow pain management plan developed in collaboration with patient and interdisciplinary team (including palliative care or pain specialists if applicable)  Outcome: Progressing     Problem: Care Map:  Optimal Outcome for the Pneumonia Patient  Goal: Collection and monitoring of appropriate tests and labs  Description: Target End Date:  end of day 1  Outcome: Progressing     Problem: Respiratory  Goal: Patient will achieve/maintain optimum respiratory ventilation and gas exchange  Description: Target End Date:  Prior to discharge or change in level of care    Document on Assessment flowsheet    1.  Assess and monitor rate, rhythm, depth and effort of respiration  2.  Breath sounds assessed qshift and/or as needed  3.  Assess O2 saturation, administer/titrate oxygen as ordered  4.  Position patient for maximum ventilatory efficiency  5.  Turn, cough,  and deep breath with splinting to improve effectiveness  6.  Collaborate with RT to administer medication/treatments per order  7.  Encourage use of incentive spirometer and encourage patient to cough after use and utilize splinting techniques if applicable  8.  Airway suctioning  9.  Monitor sputum production for changes in color, consistency and frequency  10. Perform frequent oral hygiene  11. Alternate physical activity with rest periods  Outcome: Progressing     Problem: Risk for Aspiration  Goal: Patient's risk for aspiration will be absent or decrease  Description: Target End Date:  Prior to discharge or change in level of care    1.   Complete dysphagia screening on admission  2.   NPO until dysphagia screening complete or medically cleared  3.   Collaborate with Speech Therapy, Clinical Dietitian and interdisciplinary team  4.   Implement aspiration precautions  5.   Assist patient up to chair for meals  6.   Elevate head of bed 90 degrees if patient is unable to get out of bed  7.   Encourage small bites  8.   Ensure foods/liquids are of appropriate consistency  9.   Assess for any signs/symptoms of aspiration  10. Assess breath sounds and vital signs after oral intake  Outcome: Progressing     Problem: Hemodynamics - Pneumonia  Goal: Patient's hemodynamics, fluid balance and neurologic status will be stable or improve  Description: Target End Date:  Prior to discharge or change in level of care    Document on Assessment and I/O flowsheet templates    1.  Monitor vital signs, pulse oximetry and cardiac monitor per provider order and/or policy  2.  Manage IV fluids and IV infusions  3.  Monitor intake and output  4.  Daily weights per unit policy or provider order  5.  Assess peripheral pulses and capillary refill  6.  Monitor body temperature and assist with comfort measures to reduce fever and chills  7.  Position patient for maximum circulation/cardiac output  8.  Assess for peripheral, sacral,  periorbital and abdominal edema  9. Assess for signs of deterioration - tachycardia, tachypnea, dyspnea, hypertension, hypoxemia, pallor, changes in consciousness or restlessness  Outcome: Progressing     Problem: Self Care  Goal: Patient will have the ability to perform ADLs independently or with assistance (bathe, groom, dress, toilet and feed)  Description: Target End Date:  Prior to discharge or change in level of care    Document on ADL flowsheet    1.  Assess the capability and level of deficiency to perform ADLs  2.  Encourage family/care giver involvement  3.  Provide assistive devices  4.  Consider PT/OT evaluations  5.  Maintain support, give positive feedback, encourage self-care allowing extra time and verbal cuing as needed  6.  Avoid doing something for patients they can do themselves, but provide assistance as needed  7.  Assist in anticipating/planning individual needs  8.  Collaborate with Case Management and  to meet discharge needs  Outcome: Progressing     Problem: Discharge Planning - Pneumonia  Goal: Patient will verbalize understanding of lifestyle changes and therapeutic needs post discharge  Description: Target End Date:  Prior to discharge or change in level of care    1.  Assess need for home oxygen throughout admission.  2.  Discuss debilitating aspects of disease, length of convalescence, and recovery expectations. Identify self-care and homemaker needs.  3.  Reinforce importance of continuing effective coughing and deep-breathing exercises.  4.  Emphasize necessity for continuing antibiotic therapy for prescribed period.  5.  Review the importance of cessation of smoking.  6.  Outline steps to enhance general health and well-being: balanced rest and activity, well-rounded diet, avoidance of crowds during cold/flu season and persons with URIs.  7.  Stress importance of continuing medical follow-up and obtaining vaccinations as appropriate.  8.  Identify signs and symptoms  requiring notification of health care provider: increasing dyspnea, chest pain, prolonged fatigue, weight loss, fever, chills, persistence of productive cough, changes in mentation.  9.  Instruct patient to avoid using antibiotics indiscriminately during minor viral infections.  10. Encourage Pneumovax and annual flu shots for high-risk patients.  11. Educate on importance of oral care in prevention of pneumonia  Outcome: Progressing     Problem: Knowledge Deficit - Standard  Goal: Patient and family/care givers will demonstrate understanding of plan of care, disease process/condition, diagnostic tests and medications  Description: Target End Date:  1-3 days or as soon as patient condition allows    Document in Patient Education    1.  Patient and family/caregiver oriented to unit, equipment, visitation policy and means for communicating concern  2.  Complete/review Learning Assessment  3.  Assess knowledge level of disease process/condition, treatment plan, diagnostic tests and medications  4.  Explain disease process/condition, treatment plan, diagnostic tests and medications  Outcome: Progressing     Problem: Skin Integrity  Goal: Skin integrity is maintained or improved  Description: Target End Date:  Prior to discharge or change in level of care    Document interventions on Skin Risk/Patel flowsheet groups and corresponding LDA    1.  Assess and monitor skin integrity, appearance and/or temperature  2.  Assess risk factors for impaired skin integrity and/or pressures ulcers  3.  Implement precautions to protect skin integrity in collaboration with interdisciplinary team  4.  Implement pressure ulcer prevention protocol if at risk for skin breakdown  5.  Confirm wound care consult if at risk for skin breakdown  6.  Ensure patient use of pressure relieving devices  (Low air loss bed, waffle overlay, heel protectors, ROHO cushion, etc)  Outcome: Progressing     Problem: Psychosocial  Goal: Patient's level of  anxiety will decrease  Description: Target End Date:  1-3 days or as soon as patient condition allows    1.  Collaborate with patient and family/caregiver to identify triggers and develop strategies to cope with anxiety  2.  Implement stimuli reduction, calming techniques  3.  Pharmacologic management per provider order  4.  Encourage patient/family/care giver participation  5.  Collaborate with interdisciplinary team including Psychologist or Behavioral Health Team as needed  Outcome: Progressing  Goal: Patient's ability to verbalize feelings about condition will improve  Description: Target End Date:  Prior to discharge or change in level of care    1.  Discuss coping with medical condition and its effects  2.  Encourage patient participation in care  3.  Encourage acknowledgement of body changes and accompanying emotions  4.  Perform depression screening  Outcome: Progressing  Goal: Patient's ability to re-evaluate and adapt role responsibilities will improve  Description: Target End Date:  Prior to discharge or change in level of care    1.  Assess family support  2.  Encourage support system participation in care  3.  Encouraged verbalization of feelings regarding caregiver responsibilities  4.  Discuss changes in role and responsibilities caused by patient's condition  Outcome: Progressing  Goal: Patient and family will demonstrate ability to cope with life altering diagnosis and/or procedure  Description: Target End Date:  1-3 days or as soon as patient condition allows    1. Expect initial shock and disbelief following diagnosis of cancer and traumatizing procedures (disfiguring surgery, colostomy, amputation).  2.  Assess patient and family/caregiver for stage of grief currently being experienced. Explain process as appropriate.  3.  Provide open, nonjudgmental environment. Use therapeutic communication skills of Active-Listening, acknowledgment, and so on.  4.  Encourage verbalization of thoughts or  concerns and accept expressions of sadness, anger, rejection. Acknowledge normality of these feelings  5.  Be aware of mood swings, hostility, and other acting-out behavior. Set limits on inappropriate behavior, redirect negative thinking  6.  Be aware of debilitating depression. Ask patient direct questions about state of mind.  7.  Visit frequently and provide physical contact as appropriate, or provide frequent phone support as appropriate for setting. Arrange for care provider and support person to stay with patient as needed  8.  Reinforce teaching regarding disease process and treatments and provide information as appropriate about dying. Be honest; do not give false hope while providing emotional support  9.  Review past life experiences, role changes, and coping skills. Talk about things that interest the patient  Outcome: Progressing  Goal: Spiritual and cultural needs incorporated into hospitalization  Description: Target End Date:  End of day 1    1.  Encourage verbalization of feelings, concerns, expectations and needs  2.  Collaborate with Case Management/  3.  Collaborate with Pastoral Care to meet spiritual needs  Outcome: Progressing     Problem: Fall Risk  Goal: Patient will remain free from falls  Description: Target End Date:  Prior to discharge or change in level of care    Document interventions on the Nell Haider Fall Risk Assessment    1.  Assess for fall risk factors  2.  Implement fall precautions  Outcome: Progressing

## 2023-09-27 NOTE — ASSESSMENT & PLAN NOTE
Continue multimodal pain management, oxy, tylenol, lidocaine patches, gabapentin and IV pain medication for breakthrough  When ready for hospice and end-of-life IV morphine infusion okay  Dex gtt for anxiety and pain

## 2023-09-27 NOTE — PROGRESS NOTES
Critical Care Progress Note    Date of admission  9/20/2023    Chief Complaint  48-year-old female with past medical history of COPD, asthma, anxiety and depression as well as a stage I adenocarcinoma of the lung was discovered in July of last year for which she received chemoradiation for.  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  She presented to the hospital on 9/20/2023 for dyspnea and pain.  A CT scan done on admission showed a small right subsegmental PE with diffusely worsening pulmonary nodules with possible overlying inflammatory or infectious changes.  She does state that she has been coughing up significant sputum which just prior to admission had begun having small streaks of blood.  The patient was admitted to the oncology floor where she was seen by medical oncology and planned for initiation of chemotherapy.  Her case was discussed with her by her oncology team and patient requested second opinion from [facility].  Last month she was admitted for hemoptysis and found to have a PE however was not started on anticoagulation due to hemoptysis.  On admission to the hospital during this stay the patient was started on ceftriaxone and azithromycin for pneumonia coverage.  Over the last 3 days her supplemental oxygen requirements have increased and today she has been significantly tachycardic and anxious due to her hypoxia.  EKG showed sinus tachycardia, she is currently on high flow nasal cannula and nonrebreather saturating between 88 and 92%.     During my talk with her we discussed her ongoing overall poor prognosis with stage IV sarcoma with diffuse metastatic burden to the lungs and progressive hypoxia.  We discussed that if she were to fail high flow nasal cannula mechanical intubation would be the next step however if she were to be placed on mechanical ventilation she  "may never be able to be weaned from support.  The patient tearfully replied that she \"wants more time\" and she is \"not ready to die\".  (Dr Mcdowell HPI)    Hospital Course  No notes on file    Interval Problem Update  Reviewed last 24 hour events:    A&O x4  DEX 0.9  SR/ST   SBp 90-120s  UO good  HFNC 50/100 + 100% NRBM  DuoNebs Q4H  WOB ok  CXR and CT chest reviewed at length  Tm 99.0  WBC 12.7, trending down  Ceftriaxone day#8  Boost/reg diet  Apixiban  Decadron  Plt 133, trending down  Hemoglobin 10.6 unchanged times several days    Call Heme/Onc - prognosis/code status/port?  Hold anticoag?  Hospice?    Multiple discussions with patient and her best friend at the bedside.  Outlined diagnosis, treatment and prognosis.  In particular focused on 2 part of her chest disease 1 potential reversible and 1 not.  Treating PE, pneumonia and bronchospasm in usual fashion although not clinically improving in terms oxygenation in fact just the opposite and this was discussed at length.  Also reviewed sarcoma course and management.  Patient requested I send her home and I outlined how that would only be possible if we were sending her home with hospice due to her high oxygen requirements and even then it may be difficult because of them.    Oncologist kindly came by and participated in another even longer conversation about diagnosis, prognosis plan.  I reviewed actual CT images from August and September with patient, her best friend and oncology.  Clearly primarily reveals extremely rapid progression of diffuse metastatic nodules throughout both lungs bilaterally.  Patient has unfortunately some subcutaneous tissue nodules throughout her body as well.  We outlined with her the difficulties of chemotherapy in a patient on maximum oxygen therapy immobile in bed in the ICU.  Oncology reminded the patient and informed me she has had multiple conversations and regarding therapy and prognosis.  Patient was quite upset about " prior radiation therapy having a causative role in this condition.  Her oncologist outlined that this was more likely just a extremely aggressive tumor.  She also outlined how she had shown this tissue to multiple pathologist and oncologist.  We both recommended hospice and she would prefer to go home.  I have requested home hospice evaluate the patient and as a backup we will consider [facility] hospice as needed.  The patient's daughter was partially involved in this process on her cell phone listening from [place].  She updated us that she moved her airline flight from tomorrow to tonight to be here sooner.        Review of Systems  Review of Systems   Constitutional:  Positive for malaise/fatigue. Negative for fever.   HENT:  Negative for congestion and sore throat.    Respiratory:  Positive for cough and shortness of breath. Negative for hemoptysis and sputum production.    Cardiovascular:  Positive for chest pain (Remittent chest tightness).   Gastrointestinal:  Positive for nausea. Negative for abdominal pain and vomiting.        Belching   Genitourinary:  Negative for dysuria.   Musculoskeletal:  Positive for back pain.   Neurological:  Positive for headaches. Negative for focal weakness.   Psychiatric/Behavioral:  Positive for depression. The patient is nervous/anxious.         Vital Signs for last 24 hours   Temp:  [36.4 °C (97.6 °F)-37.2 °C (99 °F)] 37.2 °C (99 °F)  Pulse:  [] 106  Resp:  [19-34] 20  BP: ()/(55-89) 103/65  SpO2:  [90 %-98 %] 96 %    Hemodynamic parameters for last 24 hours       Respiratory Information for the last 24 hours       Physical Exam   Physical Exam  Vitals reviewed.   Constitutional:       Appearance: She is normal weight. She is ill-appearing. She is not toxic-appearing.      Comments: HFNC plus NRBM, speaking in full sentences   HENT:      Head: Atraumatic.      Mouth/Throat:      Mouth: Mucous membranes are moist.   Eyes:      General: No scleral icterus.      Extraocular Movements: Extraocular movements intact.      Pupils: Pupils are equal, round, and reactive to light.   Neck:      Vascular: No JVD.   Cardiovascular:      Rate and Rhythm: Regular rhythm. No extrasystoles are present.     Heart sounds: No murmur heard.     Comments: SR/ST  Pulmonary:      Effort: Pulmonary effort is normal. No respiratory distress.      Breath sounds: Rales present. No wheezing or rhonchi.   Skin:     General: Skin is dry.      Capillary Refill: Capillary refill takes less than 2 seconds.      Coloration: Skin is not cyanotic.      Nails: There is no clubbing.   Neurological:      General: No focal deficit present.      Mental Status: She is oriented to person, place, and time. Mental status is at baseline.   Psychiatric:         Attention and Perception: Attention normal.         Mood and Affect: Mood is anxious and depressed. Affect is labile and angry.         Speech: Speech normal.         Behavior: Behavior is cooperative.         Thought Content: Thought content normal.         Cognition and Memory: Cognition normal.         Medications  Current Facility-Administered Medications   Medication Dose Route Frequency Provider Last Rate Last Admin    dexmedetomidine (PRECEDEX) 400 mcg/100mL NS premix infusion  0.1-1.5 mcg/kg/hr (Ideal) Intravenous Continuous Remberto Mcdowell Jr., D.O. 18.3 mL/hr at 09/27/23 1618 1 mcg/kg/hr at 09/27/23 1618    LORazepam (Ativan) tablet 0.5 mg  0.5 mg Oral HS PRN Remberto Mcdowell Jr., D.O.   0.5 mg at 09/26/23 2108    NS infusion   Intravenous Continuous Yenifer Velazquez M.D. 83 mL/hr at 09/27/23 1618 Rate Verify at 09/27/23 1618    HYDROmorphone (Dilaudid) injection 1 mg  1 mg Intravenous Q3HRS PRN Kay Christensen, A.P.R.N.   1 mg at 09/25/23 1648    dexamethasone (Decadron) injection 4 mg  4 mg Intravenous BID Yenifer Velazquez M.D.   4 mg at 09/27/23 1547    ALPRAZolam (Xanax) tablet 0.25 mg  0.25 mg Oral TID PRN Naima Ambrosio, ARTHUR.P.R.NEstrellita   0.25 mg at  09/26/23 1012    guaiFENesin ER (Mucinex) tablet 600 mg  600 mg Oral Q12HRS Naima Ambrosio, A.P.R.N.   600 mg at 09/27/23 0633    polyethylene glycol/lytes (Miralax) PACKET 1 Packet  1 Packet Oral Q48HRS Ivonne Mohan, A.P.R.N.   1 Packet at 09/24/23 1207    HYDROcodone/acetaminophen (Norco)  MG per tablet 1 Tablet  1 Tablet Oral Q3HRS PRN Yenifer Velazquez M.D.   1 Tablet at 09/26/23 0429    diphenhydrAMINE (Benadryl) injection 25 mg  25 mg Intravenous Q6HRS Kay Christensen A.P.R.N.   25 mg at 09/27/23 1547    diphenhydrAMINE (Benadryl) injection 25 mg  25 mg Intravenous Q6HRS PRN Kay Christensen A.P.R.N.   25 mg at 09/26/23 1205    fluticasone-umeclidinium-vilanterol (Trelegy Ellipta) 100-62.5-25 mcg/act inhaler 1 Puff  1 Puff Inhalation DAILY Yenifer Velazquez M.D.   1 Puff at 09/25/23 0528    ipratropium-albuterol (DUONEB) nebulizer solution  3 mL Nebulization Q4HRS (RT) Yenifer Velazquez M.D.   3 mL at 09/27/23 1439    ipratropium-albuterol (DUONEB) nebulizer solution  3 mL Nebulization Q2HRS PRN (RT) Yenifer Velazquez M.D.   3 mL at 09/26/23 2050    albuterol inhaler 2 Puff  2 Puff Inhalation Q4HRS PRN SANJU GarciaOEstrellita   2 Puff at 09/23/23 0501    diclofenac sodium (Voltaren) 1 % gel 2 g  2 g Topical 4X/DAY PRN SANJU GarciaO.        diphenhydrAMINE (Benadryl) tablet/capsule 25 mg  25 mg Oral BID PRN SANJU GarciaO.   25 mg at 09/20/23 1745    loratadine (Claritin) tablet 10 mg  10 mg Oral DAILY SANJU GarciaOEstrellita   10 mg at 09/27/23 0639    senna-docusate (Pericolace Or Senokot S) 8.6-50 MG per tablet 2 Tablet  2 Tablet Oral BID Madhu Merino D.O.   2 Tablet at 09/25/23 1741    And    polyethylene glycol/lytes (Miralax) PACKET 1 Packet  1 Packet Oral QDAY PRN Madhu Merino D.O.   1 Packet at 09/23/23 1141    And    magnesium hydroxide (Milk Of Magnesia) suspension 30 mL  30 mL Oral QDAY PRN Madhu Merino D.O.   30 mL at 09/24/23 0939    And    bisacodyl (Dulcolax) suppository 10  mg  10 mg Rectal QDAY PRN Madhu Merino D.O.        Respiratory Therapy Consult   Nebulization Continuous RT Madhu Merino D.O.        acetaminophen (Tylenol) tablet 650 mg  650 mg Oral Q6HRS PRN Madhu Merino D.O.        cefTRIAXone (Rocephin) syringe 2,000 mg  2,000 mg Intravenous Q EVENING Yenifer Velazquez M.D.   2,000 mg at 09/26/23 2109    hydrALAZINE (Apresoline) injection 10 mg  10 mg Intravenous Q4HRS PRN SANJU GarciaOEstrellita   10 mg at 09/26/23 1010    ondansetron (Zofran) syringe/vial injection 4 mg  4 mg Intravenous Q4HRS PRN Madhu Merino D.O.        ondansetron (Zofran ODT) dispertab 4 mg  4 mg Oral Q4HRS PRN Madhu Merino D.O.        promethazine (Phenergan) tablet 12.5-25 mg  12.5-25 mg Oral Q4HRS PRN Madhu Merino D.O.        promethazine (Phenergan) suppository 12.5-25 mg  12.5-25 mg Rectal Q4HRS PRN Madhu Merino D.O.        prochlorperazine (Compazine) injection 5-10 mg  5-10 mg Intravenous Q4HRS PRN Madhu Merino D.O.        guaiFENesin dextromethorphan (Robitussin DM) 100-10 MG/5ML syrup 10 mL  10 mL Oral Q6HRS PRN Madhu Merino D.O.   10 mL at 09/23/23 2319    apixaban (Eliquis) tablet 5 mg  5 mg Oral BID Madhu Merino D.O.           Fluids    Intake/Output Summary (Last 24 hours) at 9/27/2023 1808  Last data filed at 9/27/2023 1618  Gross per 24 hour   Intake 3082.42 ml   Output 4150 ml   Net -1067.58 ml       Laboratory  Recent Labs     09/26/23  0942   VAYRD47C 7.38*   [id number] 54.4*   [id number] 53.4*   XBCG6XBU 83.7*   ARTHCO3 31*   U7BOTOMMI 60L  60.0*   ARTBE 5*         Recent Labs     09/25/23  0006 09/26/23  0022 09/27/23  0345   SODIUM 137 138 133*   POTASSIUM 4.6 5.0 4.3   CHLORIDE 97 98 94*   CO2 33 30 29   BUN 16 14 14   CREATININE 0.52 0.60 0.46*   CALCIUM 8.4* 8.9 8.9     Recent Labs     09/25/23  0006 09/26/23  0022 09/27/23  0345   GLUCOSE 178* 106* 125*     Recent Labs     09/25/23  0006 09/26/23  0022 09/27/23  0345   WBC 13.2* 13.8* 12.7*   NEUTSPOLYS  86.60* 85.00* 84.20*   LYMPHOCYTES 6.20* 7.70* 7.80*   MONOCYTES 5.10 4.30 4.70   EOSINOPHILS 0.10 0.20 0.50   BASOPHILS 0.20 0.30 0.10     Recent Labs     09/25/23  0006 09/26/23  0022 09/27/23  0345   RBC 3.75* 4.13* 3.88*   HEMOGLOBIN 9.8* 10.6* 10.0*   HEMATOCRIT 33.3* 37.2 33.6*   PLATELETCT 164 154* 133*       Imaging  X-Ray:  I have personally reviewed the images and compared with prior images.  CT:    Reviewed    Assessment/Plan  * Acute on chronic respiratory failure (HCC)- (present on admission)  Assessment & Plan  Due to sarcoma metastasis to the lungs with possibly overlying edema versus infection  Continue antibiotics not dramatically improving with prolonged course  Forced diuresis as clinically appropriate  Currently on HFNC/nonrebreather mask  RT/O2 Protocols  Titrate supplemental FiO2 to maintain SpO2 >92%  Continue scheduled nebs, steroids and give a dose of magnesium for wheezing/bronchospasm component    Unfortunately the majority of her other possibilities for hypoxemia including COPD/bronchospasm/pneumonia/PE/volume overload all are on appropriate therapy and all do not appear to be a significant portion of her problem.  CT chest change between 8/20 and 9/20 is very dramatic.  I believe the vast majority of her hypoxemia is irreversible tumor progression in her lungs.  Unfortunately this poorly differentiated sarcoma is unlikely to respond to any therapy at this time per oncology.  Additionally she is on max oxygen therapy at this time with 50 L / 100% HFNC and 15 L nonrebreather mask.  Fortunately her work of breathing is okay and her sats are in the low to mid 90s and holding. Extremely high risk of never coming off of ventilator if she was to require/allow intubation.  Hospice evaluation is recommended at this time.    Pulmonary embolism on right (HCC)- (present on admission)  Assessment & Plan  Subsegmental, no RV strain  Continue DOAC  No significant bleeding at this time,  "monitoring    Primary undifferentiated sarcoma of soft tissue (HCC)- (present on admission)  Assessment & Plan  Completed radiation in July 2023, patient concern radiation therapy was causative for her current diffuse metastatic condition  Diffuse metastatic disease to the shoulder, lungs  AIM vs Capmatinib  Onc following  Extremely rapid growing tumor with rather dramatic increase in disease by chest CT 1 month apart 8/20 versus 9/20  Hospice recommended by oncology and myself    Mass of sacrum- (present on admission)  Assessment & Plan  Sarcoma  Analgesia as needed    Lung cancer (HCC)- (present on admission)  Assessment & Plan  Stage I adenocarcinoma initially treated with chemoradiation  Follows with Dr Newton (Onc)  Patient concerned prior radiation therapy contributed to current problem    Chronic pain due to neoplasm- (present on admission)  Assessment & Plan  Continue pain management  Patient requests no additional fentanyl patches \"people are dying from fentanyl\"  Patient stated her pain was controlled with current regimen    Cigarette nicotine dependence without complication- (present on admission)  Assessment & Plan  Has not required nicotine replacement protocol during this hospitalization  Significant prior smoking history may be contributing in part to her hypoxemia         VTE:  NOAC  Ulcer:  Patient had some side effects to PPI  Lines: None    I have performed a physical exam and reviewed and updated ROS and Plan today (9/27/2023). In review of yesterday's note (9/26/2023), there are no changes except as documented above.     Discussed patient condition and risk of morbidity and/or mortality with Family, RN, RT, Pharmacy, , Code status disscussed, Charge nurse / hot rounds, Patient, and oncology  The patient remains critically ill.  Critical care time = 105 minutes in directly providing and coordinating critical care and extensive data review.  No time overlap and excludes procedures.  "

## 2023-09-27 NOTE — CARE PLAN
Problem: Humidified High Flow Nasal Cannula  Goal: Maintain adequate oxygenation dependent on patient condition  Description: Target End Date:  resolve prior to discharge or when underlying condition is resolved/stabilized    1.  Implement humidified high flow oxygen therapy  2.  Titrate high flow oxygen to maintain appropriate SpO2  Outcome: Progressing       Respiratory Update    HFNC 40 100% + nrb  Q4 duo    Pt tolerating current treatments well with no adverse reactions.

## 2023-09-27 NOTE — ASSESSMENT & PLAN NOTE
Due to sarcoma metastasis to the lungs with possibly overlying edema versus infection  S/p finished course of antibiotics   Forced diuresis as clinically appropriate  Currently on HFNC/nonrebreather mask  RT/O2 Protocols  Titrate supplemental FiO2 to maintain SpO2 >88%  Continue scheduled nebs, steroids   Unfortunately the majority of her other possibilities for hypoxemia including COPD/bronchospasm/pneumonia/PE/volume overload all are on appropriate therapy and all do not appear to be a significant portion of her problem.  CT chest change between 8/20 and 9/20 is very dramatic.  I believe the vast majority of her hypoxemia is tumor progression in her lungs.  Unfortunately this poorly differentiated sarcoma is unlikely to respond to any therapy at this time per oncology.  Additionally she is on max oxygen therapy at this time with 50 L / 100% HFNC and 15 L nonrebreather mask.  Fortunately her work of breathing is okay and her sats are in the low to mid 90s and holding.  Hospice evaluation is recommended at this time.  Patient has elected DNR/DNI   Likely a more palliative approach is recommended

## 2023-09-27 NOTE — PROGRESS NOTES
Pt states 8-10/10 pain mainly in back but declines prn analgesics. Pt states she feels that the multiple use of analgesics is related to her respiratory distress. Pt educated on side effects and balance of pain control with respiratory status.     Pt declines a second PIV. Pt educated on possible incompatibility of multiple medications. Pt educated on need to stick for morning labs and the ability put in an IV for labs-- pt again declined and states wishes for in and out stick.

## 2023-09-28 PROBLEM — Z71.89 GOALS OF CARE, COUNSELING/DISCUSSION: Status: ACTIVE | Noted: 2023-01-01

## 2023-09-28 NOTE — CARE PLAN
The patient is Watcher - Medium risk of patient condition declining or worsening    Shift Goals  Clinical Goals: comfort, o2 sats >92%  Patient Goals: rest, see family  Family Goals: update on goals of care    Progress made toward(s) clinical / shift goals:  Pt O2 sats maintaining >92% on high flow NC and non-rebreather. Family bedside and updated on goals of care. Pt declines analgesics and educated on availability should pain increase.     Problem: Pain - Standard  Goal: Alleviation of pain or a reduction in pain to the patient’s comfort goal  Outcome: Progressing     Problem: Care Map:  Optimal Outcome for the Pneumonia Patient  Goal: Collection and monitoring of appropriate tests and labs  Outcome: Progressing     Problem: Respiratory  Goal: Patient will achieve/maintain optimum respiratory ventilation and gas exchange  Outcome: Not Progressing     Problem: Risk for Aspiration  Goal: Patient's risk for aspiration will be absent or decrease  Outcome: Progressing     Problem: Hemodynamics - Pneumonia  Goal: Patient's hemodynamics, fluid balance and neurologic status will be stable or improve  Outcome: Progressing     Problem: Self Care  Goal: Patient will have the ability to perform ADLs independently or with assistance (bathe, groom, dress, toilet and feed)  Outcome: Progressing     Problem: Discharge Planning - Pneumonia  Goal: Patient will verbalize understanding of lifestyle changes and therapeutic needs post discharge  Outcome: Not Progressing     Problem: Knowledge Deficit - Standard  Goal: Patient and family/care givers will demonstrate understanding of plan of care, disease process/condition, diagnostic tests and medications  Outcome: Progressing     Problem: Skin Integrity  Goal: Skin integrity is maintained or improved  Outcome: Progressing     Problem: Psychosocial  Goal: Patient's level of anxiety will decrease  Outcome: Progressing  Goal: Patient's ability to verbalize feelings about condition will  improve  Outcome: Progressing  Goal: Patient's ability to re-evaluate and adapt role responsibilities will improve  Outcome: Progressing  Goal: Patient and family will demonstrate ability to cope with life altering diagnosis and/or procedure  Outcome: Progressing  Goal: Spiritual and cultural needs incorporated into hospitalization  Outcome: Progressing     Problem: Fall Risk  Goal: Patient will remain free from falls  Outcome: Progressing       Patient is not progressing towards the following goals:  Pt verbalized understanding of poor prognosis and tenuous goals of care related to respiratory status    Problem: Respiratory  Goal: Patient will achieve/maintain optimum respiratory ventilation and gas exchange  Outcome: Not Progressing     Problem: Discharge Planning - Pneumonia  Goal: Patient will verbalize understanding of lifestyle changes and therapeutic needs post discharge  Outcome: Not Progressing

## 2023-09-28 NOTE — ASSESSMENT & PLAN NOTE
Goals of care done last week extensively. Hospice recommended by oncology and ICU team.  Patient would like to try some form of therapy if possible and oncology offered an oral chemotherapeutic regime. She elected to change to DNR/DNI.   Continue with palliative care consultation.

## 2023-09-28 NOTE — PROGRESS NOTES
"Inpatient Palliative Care     Location: [place] [address]     HPI:     Melly Shin is a 48-year-old female with PMH significant for NSCLC, underlying COPD and life long asthma, former smoker, and recent diagnosis of additional primary cancer metastatic undifferentiated sarcoma. Hospital course complicated by acute hypoxic respiratory failure. Patient originally seen by palliative care for symptom management 9/21/2023 and we continue to follow for goals of care/support.     Summary:     Discussed hospice discussion with [facility] liaison Kika Chavira; patient's oxygen levels unable to be supported at home even on AIRVO ventilator (50 L/100% FiO2 with NRB at 15L too high); [facility] available to continue hospice discussions as clinical picture unfolds. Current plan is to move forward with Capmatinib given patients wishes and high tumor burden. Confirmed with Dr. Newton who has continued discussing goals of care/options with patient/family in collaboration with ICU team.     Met wit patient at bedside. Patient's daughter sleeping at bedside. She is more awake this morning. Confirmed plan of care. Patient denies needs from palliative team at this time and stated she wants \"more time\" and stated \"I'm just not ready yet. She confirmed she understands recommendations regarding DNR/hospice but remains mistrusting of \"people's motives.\" She stated she doesn't \"want to be a strain on the taxpayers dollars or nothing like that.\" Patient works in political advocacy.\" Reiterated recommendations are to ensure she does not suffered when there would likely be no benefit/addition of quality time in regards to resuscitation efforts/life support given her widely metastatic cancer. Confirmed that the care team is here to support her and her family and ensure she/they are able to make informed decisions.     She asked for my card; provided and included work number.     Patient's daughter Abdirahman outside patient room while staff providing care. " "Introduced myself and discussed roll on care team. Encouraged her to call for questions/support. She denied having questions/needs at this time.     Discussed with Dr. Edmond.      Plan:     1) DNAR/DNI recommended as well as hospice care; she is considering  2) Patient opting for continued treatment including Capmatinib   3) Critical care/oncologist managing care  4) Palliative care and [facility] liaison available for continued support    I spent a total of 25 minutes reviewing medical records, direct face-to-face time with the patient and/or family, coordination of care, and documentation.     Please reach out to our team with any questions needs; we are available 8AM - 5PM SAGAR Coronado \"Mana\" FLOYD Dow, [facility]  Palliative Care Nurse Practitioner  [phone]               "

## 2023-09-28 NOTE — PROGRESS NOTES
Consult Note: Hematology/Oncology     Primary Care:  Checo Baker M.D.    Chief Complaint   Patient presents with    Shortness of Breath     PT hx lung ca that's metastasized. Was at another appt and was SOB. RA while walking in 70's per EMS. Currently on 6L NC         Current Treatment:     None    Prior Treatment:     4/17/2023-4/21/2023: RLL Lung SBRT  7/19/23-7/24/2023: R Sacrum SBRT      Subjective:   History of Presenting Illness:    Melly Hendrickson is a 48 y.o. 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    Patient history dates back to July 2022 when she had a chest x-ray performed that revealed a potential mass in her right lung.  This led to a chest CT performed on February 13, 2023.  This demonstrated 1.4 cm mass in the right lower lobe and a 1.9 groundglass opacity also in the right lower lobe.  The groundglass opacity appeared stable from previous scans but the mass was thought to be new.      A biopsy was performed and confirmed lung adenocarcinoma.    Patient saw thoracic surgery and obtained PFTs as well as a PET scan.  She was discussed at tumor board and given her significant dyspnea and her underlying COPD it was thought that she would be a better candidate for SBRT rather than surgical resection.    April 17, 2023 patient began SBRT.  Completed this treatment without many issues.    After this patient did notice a small lump in her breast reduction scar line.  She said that over the next few days it grew and she ultimately went to her PCP    PCP tried to drain the mass; however it just bled significantly during the I&D attempt.  Thus she was sent to Dr. Becerra at [facility] for excision.     Pathology from this excision demonstrated poorly differentiated malignancy favoring carcinoma without obvious involvement of underlying skin and nonspecific IHC profile.  Based on his diagnosis  cancer type ID was sent off for.  Results showed 90% probability of sarcoma    Over the past several months she has noted severe pain in her sacral region.  She does have sacral lesions that are concerning for metastatic disease.  Patient has biopsy of sacral mass on 7/18/2023.     Of note patient has 5 children and 4 grandchildren.    She follows with pain management    I initially saw patient on 7/14.  At our 7/21, we had information back from biopsy and we discussed AIM.  Patient wished to hold off and think about this further.    On 7/28, we discussed initiating AIM, patient was not comfortable and wanted 2 opinion at [facility].    I saw patient again on 8/11.  We discussed AIM,  patient not ready and awaiting [facility] appointment.     She was recently admitted to the ER for hemoptysis on 8/20/23 and was found to have a PE.  She was still having hemoptysis and thus was not started on AC. She has discharged on 8/24/23.     I saw her subsequently on 9/7/23 and recommended AC given no hemoptysis, but active PE with cancer.  We also discussed chemo at this visit and she wished to wait.     During my visit with the patient on 9/19/23, Patient was endorsing 3 nodules on the R arm.  1 on the clavicle.  Patient also had a nodule on her L arm. Patient had 1 nodule on her bikini line. She has another on the inside of her L thigh. These started 1 week ago. She was on 2-3L of oxygen for the past several days.  We discussed intiating chemo and plan was to start on 9/26/23.  Pt requires a direct admit for AIM    Since that visit she had worseing SOB.  She was subsequently presented to the ER for SOB/cough and potential PNA. On admission, she was tachycardic, tachypneic, and afebrile.  Chest x-ray showed diffuse bilateral interstitial and alveolar opacities consistent with pneumonia. CTA chest on admission showed right subsegmental PE, worsening pulmonary nodules, T1 vertebral body region, possible pneumonia.  Procalcitonin was  0.11.  Ceftriaxone and azithromycin were empirically started.    Line was not able to be placed unable to start chemo.  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    Chronic obstructive pulmonary disease (HCC)     COPD (chronic obstructive pulmonary disease) (HCC)     DDD (degenerative disc disease), lumbar     Depression     Emphysema of lung (HCC)     MRSA (methicillin resistant staph aureus) culture positive     Pain     back - cervical, scapula, lower    Pericarditis     Primary adenocarcinoma of lower lobe of right lung (HCC)     Psychiatric disorder     Anxiety, Depression    Sleep apnea     Snoring     Urinary incontinence         Past Surgical History:   Procedure Laterality Date    NE BRONCHOSCOPY,DIAGNOSTIC N/A 8/22/2023    Procedure: BRONCHOSCOPY;  Surgeon: Sandra Lopez D.O.;  Location: [facility];  Service: Pulmonary    NE EXC SKIN BENIG >4CM TRUNK,ARM,LEG Left 6/7/2023    Procedure: EXCISION OF SOFT TISSUE MASS LEFT CHEST WALL;  Surgeon: Nasir Becerra M.D.;  Location: [facility];  Service: General    NE BRONCHOSCOPY,DIAGNOSTIC N/A 02/14/2023    Procedure: FIBER OPTIC BRONCHOSCOPY WASH, BRUSH, BRONCHOALVEOLAR LAVAGE, BIOPSY AND FINE NEEDLE ASPIRATION AND NAVIGATION, ROBOTICS;  Surgeon: Guicho lElis M.D.;  Location: [facility];  Service: Pulmonary Robotic    ENDOBRONCHIAL US ADD-ON N/A 02/14/2023    Procedure: ENDOBRONCHIAL ULTRASOUND (EBUS);  Surgeon: Guicho Ellis M.D.;  Location: [facility];  Service: Pulmonary Robotic    MAMMOPLASTY REDUCTION Bilateral     SEPTOPLASTY       TUBE & ECTOPIC PREG., REMOVAL      x 2       Social History     Tobacco Use    Smoking status: Some Days     Current packs/day: 0.50     Average packs/day: 0.5 packs/day for 20.0 years (10.0 ttl pk-yrs)     Types: Cigarettes     Passive exposure: Past    Smokeless tobacco: Never    Tobacco comments:     on and off    Vaping Use    Vaping Use: Never used   Substance Use Topics    Alcohol use: Not Currently     Comment: occ, rare    Drug use: Not Currently     Types: Marijuana     Comment: THC gummies        Family History   Problem Relation Age of Onset    Diabetes Mother     Cancer Father         Prostate    Cancer Paternal Grandmother         Cervical    Cancer Paternal Grandfather         Unk       Allergies   Allergen Reactions    Penicillins Hives, Rash and Swelling     Pt reports that she gets swelling in throat and face, rash all over face and arms.   Tolerated cefazolin    Aspirin Rash and Hives     Pt reports that she received a rash on face, pt reports it ok if she takes NORCO    Other Environmental Shortness of Breath     Perfumes, dander, scents       Current Facility-Administered Medications   Medication Dose Route Frequency Provider Last Rate Last Admin    dexmedetomidine (PRECEDEX) 400 mcg/100mL NS premix infusion  0.1-1.5 mcg/kg/hr (Ideal) Intravenous Continuous Remberto Mcdowell Jr., D.O. 23.8 mL/hr at 09/28/23 1400 1.3 mcg/kg/hr at 09/28/23 1400    LORazepam (Ativan) tablet 0.5 mg  0.5 mg Oral HS PRN Rebmerto Mcdowell Jr., D.O.   0.5 mg at 09/27/23 2218    HYDROmorphone (Dilaudid) injection 1 mg  1 mg Intravenous Q3HRS PRN Kay Christensen, A.P.R.N.   1 mg at 09/25/23 1648    dexamethasone (Decadron) injection 4 mg  4 mg Intravenous BID Yenifer Velazquez M.D.   4 mg at 09/28/23 1542    ALPRAZolam (Xanax) tablet 0.25 mg  0.25 mg Oral TID PRN Naima Ambrosio, A.P.R.N.   0.25 mg at 09/26/23 1012    guaiFENesin ER (Mucinex) tablet 600 mg  600 mg Oral Q12HRS Naima Ambrosio A.P.R.N.   600 mg at  09/28/23 0543    polyethylene glycol/lytes (Miralax) PACKET 1 Packet  1 Packet Oral Q48HRS Ivonne Mohan, A.P.R.N.   1 Packet at 09/24/23 1207    HYDROcodone/acetaminophen (Norco)  MG per tablet 1 Tablet  1 Tablet Oral Q3HRS PRN Yenifer Velazquez M.D.   1 Tablet at 09/26/23 0429    diphenhydrAMINE (Benadryl) injection 25 mg  25 mg Intravenous Q6HRS Kay Christensen A.P.R.N.   25 mg at 09/28/23 1253    diphenhydrAMINE (Benadryl) injection 25 mg  25 mg Intravenous Q6HRS PRN Kay Christensen A.P.R.N.   25 mg at 09/26/23 1205    fluticasone-umeclidinium-vilanterol (Trelegy Ellipta) 100-62.5-25 mcg/act inhaler 1 Puff  1 Puff Inhalation DAILY Yenifer Velazquez M.D.   1 Puff at 09/25/23 0528    ipratropium-albuterol (DUONEB) nebulizer solution  3 mL Nebulization Q4HRS (RT) Yenifer Velazquez M.D.   3 mL at 09/28/23 1456    ipratropium-albuterol (DUONEB) nebulizer solution  3 mL Nebulization Q2HRS PRN (RT) Yenifer Velazquez M.D.   3 mL at 09/26/23 2050    albuterol inhaler 2 Puff  2 Puff Inhalation Q4HRS PRN SANJU GarciaO.   2 Puff at 09/23/23 0501    diclofenac sodium (Voltaren) 1 % gel 2 g  2 g Topical 4X/DAY PRN SANJU GarciaO.        diphenhydrAMINE (Benadryl) tablet/capsule 25 mg  25 mg Oral BID PRN SANJU GarciaO.   25 mg at 09/20/23 1745    loratadine (Claritin) tablet 10 mg  10 mg Oral DAILY SANJU GarciaO.   10 mg at 09/28/23 0543    senna-docusate (Pericolace Or Senokot S) 8.6-50 MG per tablet 2 Tablet  2 Tablet Oral BID Madhu Merino D.O.   2 Tablet at 09/27/23 1826    And    polyethylene glycol/lytes (Miralax) PACKET 1 Packet  1 Packet Oral QDAY PRN SANJU GarciaO.   1 Packet at 09/23/23 1141    And    magnesium hydroxide (Milk Of Magnesia) suspension 30 mL  30 mL Oral QDAY PRN SANJU GarciaO.   30 mL at 09/24/23 0939    And    bisacodyl (Dulcolax) suppository 10 mg  10 mg Rectal QDAY PRN SANJU GarciaOEstrellita   10 mg at 09/28/23 1300    Respiratory Therapy Consult   Nebulization  Continuous RT Madhu Merino D.O.        acetaminophen (Tylenol) tablet 650 mg  650 mg Oral Q6HRS PRN Madhu Merino D.O.        cefTRIAXone (Rocephin) syringe 2,000 mg  2,000 mg Intravenous Q EVENING Yenifer Velazquez M.D.   2,000 mg at 09/27/23 1829    hydrALAZINE (Apresoline) injection 10 mg  10 mg Intravenous Q4HRS PRN SANJU GarciaOEstrellita   10 mg at 09/26/23 1010    ondansetron (Zofran) syringe/vial injection 4 mg  4 mg Intravenous Q4HRS PRN Madhu Merino D.O.        ondansetron (Zofran ODT) dispertab 4 mg  4 mg Oral Q4HRS PRN Madhu Merino D.O.        promethazine (Phenergan) tablet 12.5-25 mg  12.5-25 mg Oral Q4HRS PRN Madhu Merino D.O.        promethazine (Phenergan) suppository 12.5-25 mg  12.5-25 mg Rectal Q4HRS PRN Madhu Merino D.O.        prochlorperazine (Compazine) injection 5-10 mg  5-10 mg Intravenous Q4HRS PRN Madhu Merino D.O.        guaiFENesin dextromethorphan (Robitussin DM) 100-10 MG/5ML syrup 10 mL  10 mL Oral Q6HRS PRN SANJU GarciaOEstrellita   10 mL at 09/23/23 2319    apixaban (Eliquis) tablet 5 mg  5 mg Oral BID SANJU GarciaO.   5 mg at 09/28/23 0543       Review of Systems   Constitutional:  Positive for malaise/fatigue. Negative for chills, fever and weight loss.   HENT:  Negative for congestion, ear pain, nosebleeds and sore throat.    Eyes:  Negative for blurred vision.   Respiratory:  Negative for cough, sputum production, shortness of breath and wheezing.    Cardiovascular:  Negative for chest pain, palpitations, orthopnea and leg swelling.   Gastrointestinal:  Negative for abdominal pain, heartburn, nausea and vomiting.   Genitourinary:  Negative for dysuria, frequency and urgency.   Musculoskeletal:  Positive for back pain and joint pain. Negative for neck pain.   Neurological:  Negative for dizziness, tingling, tremors, sensory change, focal weakness and headaches.   Endo/Heme/Allergies:  Does not bruise/bleed easily.   Psychiatric/Behavioral:  Negative for  depression, memory loss and suicidal ideas.    All other systems reviewed and are negative.      Problem list, medications, and allergies reviewed by myself today in Epic.     Objective:     Vitals:    09/28/23 1200 09/28/23 1300 09/28/23 1400 09/28/23 1501   BP: 111/72 98/82 104/66    Pulse: (!) 101 99 100 94   Resp: (!) 35 (!) 28 (!) 34 (!) 31   Temp:   36.4 °C (97.6 °F)    TempSrc:   Temporal    SpO2: 94% 91% 97% 95%   Weight:       Height:             DESC; KARNOFSKY SCALE WITH ECOG EQUIVALENT: 90, Able to carry on normal activity; minor signs or symptoms of disease (ECOG equivalent 0)    DISTRESS LEVEL: no acute distress    Physical Exam  Deferred    Labs:   Most recent labs reviewed.  Please see the lab tab of chart review    Imaging:   Most recent images below have been independently reviewed by me.  Please see the imaging tab of chart review    9/22/2023 : Echocardiogram showed LVEF ~ 65% with no reported abnormalities    7/27/23: PET   1.  Focal uptake in the medial RIGHT lower lobe of lung abutting the thoracic spine, consistent with tumor..  2.  No PET correlate for ill-defined spiculated nodule in the RIGHT lower lobe.  3.  Large hypermetabolic mass corresponds to lytic lesion in the RIGHT sacrum consistent with malignancy.  4.  Intramuscular focal uptake at the posterior LEFT shoulder, likely metastatic.    Pathology:    7/14/2023: Pathology of Chest wall  A. Chest wall mass:          Metastatic poorly differentiated malignancy favoring carcinoma           without obvious involvement of overlying skin and a           non-specific IHC profile of some keratins positive (see           microscopic description).          See comment.   Main Cancer Type: Sarcoma   Probability: 90%     Subtype: Undifferentiated Sarcoma ([facility])   Probability: 90%     7/19/2023: Path of the Sacral Bone  A. Sacral bone biopsy:          Bone cores effaced by a high-grade malignancy histologically           identical to the  previously excised chest wall mass           ([id number]).     2/14/2023: Pathology of Lung Mass  A. Lung, right lower lobe fine needle aspiration slides:          Positive for carcinoma.   B. Core, right lower lobe lung:          Moderately differentiated lung adenocarcinoma.   C. Lung, right lower lobe biopsy core and touchprep slides:          Moderately differentiated lung adenocarcinoma.   D. Bronchoalveolar lavage right lower lobe:          Rare atypical cells, malignant cells vs. reactive bronchial           cells.     Assessment/Plan:      Cancer Staging   Primary undifferentiated sarcoma of soft tissue (HCC)  Staging form: Soft Tissue Sarcoma of the Trunk and Extremities, AJCC 8th Edition  - Clinical: Stage IV (cTX, cN0, cM1) - Signed by Alma Newton M.D. on 9/19/2023         Ms. 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  We discussed that she is not ready for hospice.  She continues to state that she wants to live.  We discussed that again her cancer is incurable and is stage IV.  She also has extremely poor respiratory function at this point.  We discussed that she is too frail and weak to start chemotherapy and she likely will tolerate it.  If we did she would likely be intubated and not come off the ventilator.  I also discussed that given her met exon 14 mutation we may try capmatinib.  I informed her again that her cancer is extremely aggressive and may not respond in time.  We also discussed that it is unclear if this metastatic burden is a sarcomatoid lung cancer versus a undifferentiated sarcoma.  Patient wants to try capmatinib.    We discussed also CODE STATUS.  I was very blatant and honest with the patient what it would look like to be  coded and intubated.  Additionally her sister is a pharmacist and her daughter works in pharmacy.  They reiterated that they do not want her to be intubated and coded.  Through shared decision making it was decided to make her DNR/DNI.  I think this is extremely reasonable given her terrible respiratory function.    Plan  Change CODE STATUS to DNR/DNI  We will attempt to get capmatinib, knowing that there is a small chance her cancer may respond. Even if it does work it will not cure her disease but may buy her a small amount of time      Alma Newton M.D.  Hematology/Oncology

## 2023-09-28 NOTE — CARE PLAN
Problem: Humidified High Flow Nasal Cannula  Goal: Maintain adequate oxygenation dependent on patient condition  Description: Target End Date:  resolve prior to discharge or when underlying condition is resolved/stabilized    1.  Implement humidified high flow oxygen therapy  2.  Titrate high flow oxygen to maintain appropriate SpO2  Outcome: Not Met. Unable to titrate HHF today, remains on 50L and 100% with NRB

## 2023-09-28 NOTE — CARE PLAN
The patient is Watcher - Medium risk of patient condition declining or worsening    Shift Goals  Clinical Goals: establish plan of care, comfort, wean oxygen as tolerated  Patient Goals: speak with hospice  Family Goals: be involved in plan of care/discussions    Progress made toward(s) clinical / shift goals:  Collaborating with MD and hospice consultation in place for dc planning and establishing goals of care.     Patient is not progressing towards the following goals:      Problem: Respiratory  Goal: Patient will achieve/maintain optimum respiratory ventilation and gas exchange  Outcome: Not Progressing   Pt unable to wean oxygen - still requiring HRNC and NRB.

## 2023-09-28 NOTE — PROGRESS NOTES
Consult Note: Hematology/Oncology     Primary Care:  Checo Baker M.D.    Chief Complaint   Patient presents with    Shortness of Breath     PT hx lung ca that's metastasized. Was at another appt and was SOB. RA while walking in 70's per EMS. Currently on 6L NC         Current Treatment:     None    Prior Treatment:     4/17/2023-4/21/2023: RLL Lung SBRT  7/19/23-7/24/2023: R Sacrum SBRT      Subjective:   History of Presenting Illness:    Melly Hendrickson is a 48 y.o. 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    Patient history dates back to July 2022 when she had a chest x-ray performed that revealed a potential mass in her right lung.  This led to a chest CT performed on February 13, 2023.  This demonstrated 1.4 cm mass in the right lower lobe and a 1.9 groundglass opacity also in the right lower lobe.  The groundglass opacity appeared stable from previous scans but the mass was thought to be new.      A biopsy was performed and confirmed lung adenocarcinoma.    Patient saw thoracic surgery and obtained PFTs as well as a PET scan.  She was discussed at tumor board and given her significant dyspnea and her underlying COPD it was thought that she would be a better candidate for SBRT rather than surgical resection.    April 17, 2023 patient began SBRT.  Completed this treatment without many issues.    After this patient did notice a small lump in her breast reduction scar line.  She said that over the next few days it grew and she ultimately went to her PCP    PCP tried to drain the mass; however it just bled significantly during the I&D attempt.  Thus she was sent to Dr. Becerra at [facility] for excision.     Pathology from this excision demonstrated poorly differentiated malignancy favoring carcinoma without obvious involvement of underlying skin and nonspecific IHC profile.  Based on his diagnosis  cancer type ID was sent off for.  Results showed 90% probability of sarcoma    Over the past several months she has noted severe pain in her sacral region.  She does have sacral lesions that are concerning for metastatic disease.  Patient has biopsy of sacral mass on 7/18/2023.     Of note patient has 5 children and 4 grandchildren.    She follows with pain management    I initially saw patient on 7/14.  At our 7/21, we had information back from biopsy and we discussed AIM.  Patient wished to hold off and think about this further.    On 7/28, we discussed initiating AIM, patient was not comfortable and wanted 2 opinion at [facility].    I saw patient again on 8/11.  We discussed AIM,  patient not ready and awaiting [facility] appointment.     She was recently admitted to the ER for hemoptysis on 8/20/23 and was found to have a PE.  She was still having hemoptysis and thus was not started on AC. She has discharged on 8/24/23.     I saw her subsequently on 9/7/23 and recommended AC given no hemoptysis, but active PE with cancer.  We also discussed chemo at this visit and she wished to wait.     During my visit with the patient on 9/19/23, Patient was endorsing 3 nodules on the R arm.  1 on the clavicle.  Patient also had a nodule on her L arm. Patient had 1 nodule on her bikini line. She has another on the inside of her L thigh. These started 1 week ago. She was on 2-3L of oxygen for the past several days.  We discussed intiating chemo and plan was to start on 9/26/23.  Pt requires a direct admit for AIM    Since that visit she had worseing SOB.  She was subsequently presented to the ER for SOB/cough and potential PNA. On admission, she was tachycardic, tachypneic, and afebrile.  Chest x-ray showed diffuse bilateral interstitial and alveolar opacities consistent with pneumonia. CTA chest on admission showed right subsegmental PE, worsening pulmonary nodules, T1 vertebral body region, possible pneumonia.  Procalcitonin was  0.11.  Ceftriaxone and azithromycin were empirically started.    Line was not able to be placed unable to start chemo.  Patients respiratory status declined and she was transferred to the ICU    Unfortunately patients condition has not improved and she continues to need HF NC oxygen support with frequent breathing treatments.       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    Chronic obstructive pulmonary disease (HCC)     COPD (chronic obstructive pulmonary disease) (HCC)     DDD (degenerative disc disease), lumbar     Depression     Emphysema of lung (HCC)     MRSA (methicillin resistant staph aureus) culture positive     Pain     back - cervical, scapula, lower    Pericarditis     Primary adenocarcinoma of lower lobe of right lung (HCC)     Psychiatric disorder     Anxiety, Depression    Sleep apnea     Snoring     Urinary incontinence         Past Surgical History:   Procedure Laterality Date    NM BRONCHOSCOPY,DIAGNOSTIC N/A 8/22/2023    Procedure: BRONCHOSCOPY;  Surgeon: Sandra Lopez D.O.;  Location: [facility];  Service: Pulmonary    NM EXC SKIN BENIG >4CM TRUNK,ARM,LEG Left 6/7/2023    Procedure: EXCISION OF SOFT TISSUE MASS LEFT CHEST WALL;  Surgeon: Nasir Becerra M.D.;  Location: [facility];  Service: General    NM BRONCHOSCOPY,DIAGNOSTIC N/A 02/14/2023    Procedure: FIBER OPTIC BRONCHOSCOPY WASH, BRUSH, BRONCHOALVEOLAR LAVAGE, BIOPSY AND FINE NEEDLE ASPIRATION AND NAVIGATION, ROBOTICS;  Surgeon: Guicho Ellis M.D.;  Location: [facility];  Service: Pulmonary Robotic    ENDOBRONCHIAL US ADD-ON N/A 02/14/2023    Procedure: ENDOBRONCHIAL ULTRASOUND (EBUS);  Surgeon: Guicho Ellis M.D.;  Location: [facility];  Service: Pulmonary Robotic    MAMMOPLASTY REDUCTION Bilateral     SEPTOPLASTY       TUBE & ECTOPIC PREG., REMOVAL      x 2       Social History     Tobacco Use    Smoking status: Some Days     Current packs/day: 0.50     Average packs/day: 0.5 packs/day for 20.0 years (10.0 ttl pk-yrs)     Types: Cigarettes     Passive exposure: Past    Smokeless tobacco: Never    Tobacco comments:     on and off    Vaping Use    Vaping Use: Never used   Substance Use Topics    Alcohol use: Not Currently     Comment: occ, rare    Drug use: Not Currently     Types: Marijuana     Comment: THC gummies        Family History   Problem Relation Age of Onset    Diabetes Mother     Cancer Father         Prostate    Cancer Paternal Grandmother         Cervical    Cancer Paternal Grandfather         Unk       Allergies   Allergen Reactions    Penicillins Hives, Rash and Swelling     Pt reports that she gets swelling in throat and face, rash all over face and arms.   Tolerated cefazolin    Aspirin Rash and Hives     Pt reports that she received a rash on face, pt reports it ok if she takes NORCO    Other Environmental Shortness of Breath     Perfumes, dander, scents       Current Facility-Administered Medications   Medication Dose Route Frequency Provider Last Rate Last Admin    dexmedetomidine (PRECEDEX) 400 mcg/100mL NS premix infusion  0.1-1.5 mcg/kg/hr (Ideal) Intravenous Continuous Remberto Mcdowell Jr., D.O. 18.3 mL/hr at 09/27/23 1618 1 mcg/kg/hr at 09/27/23 1618    LORazepam (Ativan) tablet 0.5 mg  0.5 mg Oral HS PRN Remberto Mcdowell Jr., D.O.   0.5 mg at 09/26/23 2108    NS infusion   Intravenous Continuous Yenifer Velazquez M.D. 83 mL/hr at 09/27/23 1618 Rate Verify at 09/27/23 1618    HYDROmorphone (Dilaudid) injection 1 mg  1 mg Intravenous Q3HRS PRN Kay Christensen, A.P.R.N.   1 mg at 09/25/23 1648    dexamethasone (Decadron) injection 4 mg  4 mg Intravenous BID Yenifer Velazquez M.D.   4 mg at 09/27/23 1547    ALPRAZolam (Xanax) tablet 0.25 mg  0.25 mg Oral TID PRN Naima Ambrosio, A.P.R.N.   0.25 mg at 09/26/23  1012    guaiFENesin ER (Mucinex) tablet 600 mg  600 mg Oral Q12HRS Naima Ambrosio, A.P.R.N.   600 mg at 09/27/23 1826    polyethylene glycol/lytes (Miralax) PACKET 1 Packet  1 Packet Oral Q48HRS Ivonne Mohan A.P.R.N.   1 Packet at 09/24/23 1207    HYDROcodone/acetaminophen (Norco)  MG per tablet 1 Tablet  1 Tablet Oral Q3HRS PRN Yenifer Velazquez M.D.   1 Tablet at 09/26/23 0429    diphenhydrAMINE (Benadryl) injection 25 mg  25 mg Intravenous Q6HRS Kay Christensen A.P.R.N.   25 mg at 09/27/23 1826    diphenhydrAMINE (Benadryl) injection 25 mg  25 mg Intravenous Q6HRS PRN Kay Christensen A.P.R.N.   25 mg at 09/26/23 1205    fluticasone-umeclidinium-vilanterol (Trelegy Ellipta) 100-62.5-25 mcg/act inhaler 1 Puff  1 Puff Inhalation DAILY Yenifer Velazquez M.D.   1 Puff at 09/25/23 0528    ipratropium-albuterol (DUONEB) nebulizer solution  3 mL Nebulization Q4HRS (RT) Yenifer Velazquez M.D.   3 mL at 09/27/23 1912    ipratropium-albuterol (DUONEB) nebulizer solution  3 mL Nebulization Q2HRS PRN (RT) Yenifer Velazquez M.D.   3 mL at 09/26/23 2050    albuterol inhaler 2 Puff  2 Puff Inhalation Q4HRS PRN SANJU GarciaOEstrellita   2 Puff at 09/23/23 0501    diclofenac sodium (Voltaren) 1 % gel 2 g  2 g Topical 4X/DAY PRN SANJU GarciaO.        diphenhydrAMINE (Benadryl) tablet/capsule 25 mg  25 mg Oral BID PRN SANJU GarciaO.   25 mg at 09/20/23 1745    loratadine (Claritin) tablet 10 mg  10 mg Oral DAILY Madhu Merino D.O.   10 mg at 09/27/23 0639    senna-docusate (Pericolace Or Senokot S) 8.6-50 MG per tablet 2 Tablet  2 Tablet Oral BID Madhu Merino D.O.   2 Tablet at 09/27/23 1826    And    polyethylene glycol/lytes (Miralax) PACKET 1 Packet  1 Packet Oral QDAY PRN Madhu Merino D.O.   1 Packet at 09/23/23 1141    And    magnesium hydroxide (Milk Of Magnesia) suspension 30 mL  30 mL Oral QDAY PRN Madhu Merino D.O.   30 mL at 09/24/23 0939    And    bisacodyl (Dulcolax) suppository 10 mg  10 mg  Rectal QDAY PRN Madhu Merino D.O.        Respiratory Therapy Consult   Nebulization Continuous RT Madhu Merino D.O.        acetaminophen (Tylenol) tablet 650 mg  650 mg Oral Q6HRS PRN Madhu Merino D.O.        cefTRIAXone (Rocephin) syringe 2,000 mg  2,000 mg Intravenous Q EVENING Yenifer Velazquez M.D.   2,000 mg at 09/27/23 1829    hydrALAZINE (Apresoline) injection 10 mg  10 mg Intravenous Q4HRS PRN SANJU GarciaO.   10 mg at 09/26/23 1010    ondansetron (Zofran) syringe/vial injection 4 mg  4 mg Intravenous Q4HRS PRN Madhu Merino D.O.        ondansetron (Zofran ODT) dispertab 4 mg  4 mg Oral Q4HRS PRN Madhu Merino D.O.        promethazine (Phenergan) tablet 12.5-25 mg  12.5-25 mg Oral Q4HRS PRN Madhu Merino D.O.        promethazine (Phenergan) suppository 12.5-25 mg  12.5-25 mg Rectal Q4HRS PRN Madhu Merino D.O.        prochlorperazine (Compazine) injection 5-10 mg  5-10 mg Intravenous Q4HRS PRN Madhu Merino D.O.        guaiFENesin dextromethorphan (Robitussin DM) 100-10 MG/5ML syrup 10 mL  10 mL Oral Q6HRS PRN Madhu Merino D.O.   10 mL at 09/23/23 2319    apixaban (Eliquis) tablet 5 mg  5 mg Oral BID Madhu Merino D.O.   5 mg at 09/27/23 1829       Review of Systems   Constitutional:  Positive for malaise/fatigue. Negative for chills, fever and weight loss.   HENT:  Negative for congestion, ear pain, nosebleeds and sore throat.    Eyes:  Negative for blurred vision.   Respiratory:  Negative for cough, sputum production, shortness of breath and wheezing.    Cardiovascular:  Negative for chest pain, palpitations, orthopnea and leg swelling.   Gastrointestinal:  Negative for abdominal pain, heartburn, nausea and vomiting.   Genitourinary:  Negative for dysuria, frequency and urgency.   Musculoskeletal:  Positive for back pain and joint pain. Negative for neck pain.   Neurological:  Negative for dizziness, tingling, tremors, sensory change, focal weakness and headaches.    Endo/Heme/Allergies:  Does not bruise/bleed easily.   Psychiatric/Behavioral:  Negative for depression, memory loss and suicidal ideas.    All other systems reviewed and are negative.      Problem list, medications, and allergies reviewed by myself today in Epic.     Objective:     Vitals:    09/27/23 1400 09/27/23 1440 09/27/23 1500 09/27/23 1600   BP: 107/65 96/57 96/57 103/65   Pulse: (!) 109 91 (!) 108 (!) 106   Resp: (!) 27 (!) 27 (!) 34 20   Temp:    37.2 °C (99 °F)   TempSrc:    Temporal   SpO2: 95% 93% 94% 96%   Weight:       Height:             DESC; KARNOFSKY SCALE WITH ECOG EQUIVALENT: 90, Able to carry on normal activity; minor signs or symptoms of disease (ECOG equivalent 0)    DISTRESS LEVEL: no acute distress    Physical Exam  Deferred    Labs:   Most recent labs reviewed.  Please see the lab tab of chart review    Imaging:   Most recent images below have been independently reviewed by me.  Please see the imaging tab of chart review    9/22/2023 : Echocardiogram showed LVEF ~ 65% with no reported abnormalities    7/27/23: PET   1.  Focal uptake in the medial RIGHT lower lobe of lung abutting the thoracic spine, consistent with tumor..  2.  No PET correlate for ill-defined spiculated nodule in the RIGHT lower lobe.  3.  Large hypermetabolic mass corresponds to lytic lesion in the RIGHT sacrum consistent with malignancy.  4.  Intramuscular focal uptake at the posterior LEFT shoulder, likely metastatic.    Pathology:    7/14/2023: Pathology of Chest wall  A. Chest wall mass:          Metastatic poorly differentiated malignancy favoring carcinoma           without obvious involvement of overlying skin and a           non-specific IHC profile of some keratins positive (see           microscopic description).          See comment.   Main Cancer Type: Sarcoma   Probability: 90%     Subtype: Undifferentiated Sarcoma (MFH)   Probability: 90%     7/19/2023: Path of the Sacral Bone  A. Sacral bone biopsy:           Bone cores effaced by a high-grade malignancy histologically           identical to the previously excised chest wall mass           ([id number]).     2/14/2023: Pathology of Lung Mass  A. Lung, right lower lobe fine needle aspiration slides:          Positive for carcinoma.   B. Core, right lower lobe lung:          Moderately differentiated lung adenocarcinoma.   C. Lung, right lower lobe biopsy core and touchprep slides:          Moderately differentiated lung adenocarcinoma.   D. Bronchoalveolar lavage right lower lobe:          Rare atypical cells, malignant cells vs. reactive bronchial           cells.     Assessment/Plan:      Cancer Staging   Primary undifferentiated sarcoma of soft tissue (HCC)  Staging form: Soft Tissue Sarcoma of the Trunk and Extremities, AJCC 8th Edition  - Clinical: Stage IV (cTX, cN0, cM1) - Signed by Alma Newton M.D. on 9/19/2023         Ms. Aleida Hendrickson is a 47 yo F who presents today with a recent diagnosis of stage I A2 adenocarcinoma of the right lung status post SBRT now with a new diagnosis of metastatic undifferentiated sarcoma.     She has completed SBRT to the R sacrum.      She is now reporting multiple new nodules throughout her body.     She is admitted to the hospital for SOB.      Today patient had a decline in her respiratory status and was transferred to the ICU.    Prior to her decline, we discussed her pathology results.      We reviewed that her first path result (obtained in 2/2023) was from an EBUS awhich showed full glands, TTF+ and Napsin A +. It was thought from that small sample, that the patients path was consistent with a carcinoma (moderately differentiated adenocarcinoma).  Her second path result (obtained from the chest wall on 6/2023) was consistent with metastatic poorly differentiated malignancy favoring carcinoma.   It was thought that this sample had different histological features from her adenocarcinoma.  This sample showed more of a  spingle PD almost sarcamatoid histology. TTF1 and Napsin was negative.  PD malignancy can lose their specific markers for site of origin however.  This sample was sent off for cancer type ID and was found to be consistent with undifferentiated sarcoma MFH.  I also sent this tumor off for NGS.  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 Her third sample (obtained on sacral bone on 8/2023) was consistent with the path from the second biopsy of her chest wall mass; thus no further testing was sent out.  Given the cancer type ID, the skin nodules were thought to be a second primary (sarcoma), different from her lung primary (adeno); however, the NGS testing of the chest wall shows met exon 14 skipping mutation.  This is typically found in lung.  This is concerning that she does not Have 2 primaries but rather her initial biopsy was in fact more of a sarcamatoid picture and her second and third biopsies were of metastatic lesions from her primary.  The way to determine this, is to send her original tumor off for NGS and see if it has an MET exon14 mutation.  We wont have that back by the time she needs to start chemo.     Never the less, access remains an issue.  We need access to give AIM (doxo, Ifo, [place]) is off the table. This would be best for the former theory (that these lesions are of a secondary primary, a sarcoma).  We can proceed with a more lung focused regimen (given the later theory, that this is all metastatic disease from her lung primary).  However, I had a long, extremely elise conversation with the patient her friend and daughter David via phone. I was joined by Dr. Edmond.     I outlined that the cancer is growing extremely quickly.  Her respiratory distress is likely not from a reversible cause but rather from her tumor burden.  I outlined 3 options going forward:    1) she be discharged home with hospice.  We discussed the pros of this (QOL focus, allow her to be home with her family,  allow death to occur on her terms with the ability to talk to family prior to dying).  She is not ready to die and struggling with this choice.  We discussed that hospice may come to talk about options.     2) we attempt chemotherapy, knowing that her condition may worsen and likely need intubation. IF she requires intubation, the pulmonary attending physician weighted in on the chances of her coming off the ventilator.  It was thought based on the extreme tumor burden, lung disease (concomitant with her PE, asthma COPD) she is likely to not come off.  We discussed the very real chance that she would die on the ventilator.  We discussed the pros of this (being that she could give the chemo a try) but the very real and devestating picture of her dying on a ventilator in the hospital.    3) lastly we talked about the unlikely event that the chemo may work; however, we discussed again that her cancer is stage IV which is incurable.  Her cancer has progressed at a rapid speed and is extremely aggressive. We discussed that even if she can get home to get the treatment outpatient, she will not be cured.     Understandably, she is very upset.     I recommended that hospice come by today to discuss various options and what they can offer.  I recommend that she go home with hospice given how aggressive this cancer is and likely her limited time left.     I will see her tomorrow to discuss further.       Alam Newton M.D.  Hematology/Oncology

## 2023-09-28 NOTE — PROGRESS NOTES
Critical Care Progress Note    Date of admission  9/20/2023    Chief Complaint  48-year-old female with past medical history of COPD, asthma, anxiety and depression as well as a stage I adenocarcinoma of the lung was discovered in July of last year for which she received chemoradiation for.  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  She presented to the hospital on 9/20/2023 for dyspnea and pain.  A CT scan done on admission showed a small right subsegmental PE with diffusely worsening pulmonary nodules with possible overlying inflammatory or infectious changes.  She does state that she has been coughing up significant sputum which just prior to admission had begun having small streaks of blood.  The patient was admitted to the oncology floor where she was seen by medical oncology and planned for initiation of chemotherapy.  Her case was discussed with her by her oncology team and patient requested second opinion from [place].  Last month she was admitted for hemoptysis and found to have a PE however was not started on anticoagulation due to hemoptysis.  On admission to the hospital during this stay the patient was started on ceftriaxone and azithromycin for pneumonia coverage.  Over the last 3 days her supplemental oxygen requirements have increased and today she has been significantly tachycardic and anxious due to her hypoxia.  EKG showed sinus tachycardia, she is currently on high flow nasal cannula and nonrebreather saturating between 88 and 92%.     During my talk with her we discussed her ongoing overall poor prognosis with stage IV sarcoma with diffuse metastatic burden to the lungs and progressive hypoxia.  We discussed that if she were to fail high flow nasal cannula mechanical intubation would be the next step however if she were to be placed on mechanical ventilation she  "may never be able to be weaned from support.  The patient tearfully replied that she \"wants more time\" and she is \"not ready to die\".  (Dr Mcdowell [facility])    Hospital Course  No notes on file    Interval Problem Update  Reviewed last 24 hour events:    A&O x4  DEX 1.5  No pain meds  SR/St  SBp 100-140s  UO ok  HFNC 100/50/NRBM 15   O2 saturations 92-95%  Full sentences, tachypneic at times  Nonproductive cough with minimal heme  Mobility I  CXR Slt worse?  C3 & decadron  Apixiban  Lytes ok    Case reviewed with Dr. Newton at length.  She and the patient agreed to try an antibiotic treatment with the 1 caveat that she change her CODE STATUS to DNR/DNI and she agreed.  We will change the CODE STATUS at the request of Dr. Newton, I did speak with the family and friends and they all were in agreement and supportive of Ms. Hendrickson.       YESTERDAY  A&O x4  DEX 0.9  SR/ST   SBp 90-120s  UO good  HFNC 50/100 + 100% NRBM  DuoNebs Q4H  WOB ok  CXR and CT chest reviewed at length  Tm 99.0  WBC 12.7, trending down  Ceftriaxone day#8  Boost/reg diet  Apixiban  Decadron  Plt 133, trending down  Hemoglobin 10.6 unchanged times several days    Call Heme/Onc - prognosis/code status/port?  Hold anticoag?  Hospice?    Multiple discussions with patient and her best friend at the bedside.  Outlined diagnosis, treatment and prognosis.  In particular focused on 2 part of her chest disease 1 potential reversible and 1 not.  Treating PE, pneumonia and bronchospasm in usual fashion although not clinically improving in terms oxygenation in fact just the opposite and this was discussed at length.  Also reviewed sarcoma course and management.  Patient requested I send her home and I outlined how that would only be possible if we were sending her home with hospice due to her high oxygen requirements and even then it may be difficult because of them.    Oncologist kindly came by and participated in another even longer conversation about " diagnosis, prognosis plan.  I reviewed actual CT images from August and September with patient, her best friend and oncology.  Clearly primarily reveals extremely rapid progression of diffuse metastatic nodules throughout both lungs bilaterally.  Patient has unfortunately some subcutaneous tissue nodules throughout her body as well.  We outlined with her the difficulties of chemotherapy in a patient on maximum oxygen therapy immobile in bed in the ICU.  Oncology reminded the patient and informed me she has had multiple conversations and regarding therapy and prognosis.  Patient was quite upset about prior radiation therapy having a causative role in this condition.  Her oncologist outlined that this was more likely just a extremely aggressive tumor.  She also outlined how she had shown this tissue to multiple pathologist and oncologist.  We both recommended hospice and she would prefer to go home.  I have requested home hospice evaluate the patient and as a backup we will consider [facility] hospice as needed.  The patient's daughter was partially involved in this process on her cell phone listening from [employer].  She updated us that she moved her airline flight from tomorrow to tonight to be here sooner.    Review of Systems  Review of Systems   Constitutional:  Positive for malaise/fatigue (No change). Negative for fever.   HENT:  Negative for congestion and sore throat.    Respiratory:  Positive for cough and shortness of breath. Negative for hemoptysis and sputum production.    Cardiovascular:  Positive for chest pain (Intermittent chest tightness, no change).   Gastrointestinal:  Negative for abdominal pain, nausea and vomiting.        Belching   Genitourinary:  Negative for dysuria.   Musculoskeletal:  Positive for back pain.   Neurological:  Positive for headaches (Tolerated, not taking narcotics). Negative for focal weakness.   Psychiatric/Behavioral:  Positive for depression. The patient is nervous/anxious.          Vital Signs for last 24 hours   Temp:  [36.4 °C (97.5 °F)-37.2 °C (99 °F)] 36.6 °C (97.8 °F)  Pulse:  [] 101  Resp:  [20-55] 35  BP: ()/(57-89) 111/72  SpO2:  [87 %-97 %] 94 %    Hemodynamic parameters for last 24 hours       Respiratory Information for the last 24 hours       Physical Exam   Physical Exam  Vitals reviewed.   Constitutional:       Appearance: She is normal weight. She is ill-appearing. She is not toxic-appearing.      Comments: HFNC plus NRBM, speaking in full sentences   HENT:      Head: Atraumatic.      Mouth/Throat:      Mouth: Mucous membranes are moist.   Eyes:      General: No scleral icterus.     Extraocular Movements: Extraocular movements intact.      Pupils: Pupils are equal, round, and reactive to light.   Neck:      Vascular: No JVD.   Cardiovascular:      Rate and Rhythm: Regular rhythm. No extrasystoles are present.     Heart sounds: No murmur heard.     Comments: SR/ST  Pulmonary:      Effort: Pulmonary effort is normal. No respiratory distress.      Breath sounds: Rales present. No wheezing or rhonchi.   Skin:     General: Skin is dry.      Capillary Refill: Capillary refill takes less than 2 seconds.      Coloration: Skin is not cyanotic.      Nails: There is no clubbing.   Neurological:      General: No focal deficit present.      Mental Status: She is oriented to person, place, and time. Mental status is at baseline.   Psychiatric:         Attention and Perception: Attention normal.         Mood and Affect: Mood is anxious and depressed. Affect is labile and angry.         Speech: Speech normal.         Behavior: Behavior is cooperative.         Thought Content: Thought content normal.         Cognition and Memory: Cognition normal.         Medications  Current Facility-Administered Medications   Medication Dose Route Frequency Provider Last Rate Last Admin    dexmedetomidine (PRECEDEX) 400 mcg/100mL NS premix infusion  0.1-1.5 mcg/kg/hr (Ideal) Intravenous  Continuous Remberto Mcdowell Jr., D.O. 25.6 mL/hr at 09/28/23 1002 1.4 mcg/kg/hr at 09/28/23 1002    LORazepam (Ativan) tablet 0.5 mg  0.5 mg Oral HS PRN Remberto Mcdowell Jr., D.O.   0.5 mg at 09/27/23 2218    HYDROmorphone (Dilaudid) injection 1 mg  1 mg Intravenous Q3HRS PRN Kay Christensen A.P.R.N.   1 mg at 09/25/23 1648    dexamethasone (Decadron) injection 4 mg  4 mg Intravenous BID Yenifer Velazquez M.D.   4 mg at 09/28/23 0543    ALPRAZolam (Xanax) tablet 0.25 mg  0.25 mg Oral TID PRN Naima Ambrosio A.P.R.N.   0.25 mg at 09/26/23 1012    guaiFENesin ER (Mucinex) tablet 600 mg  600 mg Oral Q12HRS Naima Ambrosio A.P.R.N.   600 mg at 09/28/23 0543    polyethylene glycol/lytes (Miralax) PACKET 1 Packet  1 Packet Oral Q48HRS Ivonne Mohan A.P.R.N.   1 Packet at 09/24/23 1207    HYDROcodone/acetaminophen (Norco)  MG per tablet 1 Tablet  1 Tablet Oral Q3HRS PRN Yenifer Velazquez M.D.   1 Tablet at 09/26/23 0429    diphenhydrAMINE (Benadryl) injection 25 mg  25 mg Intravenous Q6HRS Kay Christensen A.P.R.N.   25 mg at 09/28/23 0543    diphenhydrAMINE (Benadryl) injection 25 mg  25 mg Intravenous Q6HRS PRN Kay Christensen A.P.R.N.   25 mg at 09/26/23 1205    fluticasone-umeclidinium-vilanterol (Trelegy Ellipta) 100-62.5-25 mcg/act inhaler 1 Puff  1 Puff Inhalation DAILY Yenifer Velazquez M.D.   1 Puff at 09/25/23 0528    ipratropium-albuterol (DUONEB) nebulizer solution  3 mL Nebulization Q4HRS (RT) Yenifer Velazquez M.D.   3 mL at 09/28/23 1117    ipratropium-albuterol (DUONEB) nebulizer solution  3 mL Nebulization Q2HRS PRN (RT) Yenifer Velazquez M.D.   3 mL at 09/26/23 2050    albuterol inhaler 2 Puff  2 Puff Inhalation Q4HRS PRN SANJU GarciaOEstrellita   2 Puff at 09/23/23 0501    diclofenac sodium (Voltaren) 1 % gel 2 g  2 g Topical 4X/DAY PRN SANJU GarciaO.        diphenhydrAMINE (Benadryl) tablet/capsule 25 mg  25 mg Oral BID PRN SANJU GarciaO.   25 mg at 09/20/23 1745    loratadine  (Claritin) tablet 10 mg  10 mg Oral DAILY SANJU GarciaOEstrellita   10 mg at 09/28/23 0543    senna-docusate (Pericolace Or Senokot S) 8.6-50 MG per tablet 2 Tablet  2 Tablet Oral BID SANJU GarciaOEstrellita   2 Tablet at 09/27/23 1826    And    polyethylene glycol/lytes (Miralax) PACKET 1 Packet  1 Packet Oral QDAY PRN SANJU GarciaOEstrellita   1 Packet at 09/23/23 1141    And    magnesium hydroxide (Milk Of Magnesia) suspension 30 mL  30 mL Oral QDAY PRN Madhu Merino D.O.   30 mL at 09/24/23 0939    And    bisacodyl (Dulcolax) suppository 10 mg  10 mg Rectal QDAY PRN Madhu Merino D.O.        Respiratory Therapy Consult   Nebulization Continuous RT Madhu Merino D.O.        acetaminophen (Tylenol) tablet 650 mg  650 mg Oral Q6HRS PRN Madhu Merino D.O.        cefTRIAXone (Rocephin) syringe 2,000 mg  2,000 mg Intravenous Q EVENING Yenifer Velazquez M.D.   2,000 mg at 09/27/23 1829    hydrALAZINE (Apresoline) injection 10 mg  10 mg Intravenous Q4HRS PRN SANJU GarciaOEstrellita   10 mg at 09/26/23 1010    ondansetron (Zofran) syringe/vial injection 4 mg  4 mg Intravenous Q4HRS PRN Madhu Merino D.O.        ondansetron (Zofran ODT) dispertab 4 mg  4 mg Oral Q4HRS PRN Madhu Merino D.O.        promethazine (Phenergan) tablet 12.5-25 mg  12.5-25 mg Oral Q4HRS PRN Madhu Merino D.O.        promethazine (Phenergan) suppository 12.5-25 mg  12.5-25 mg Rectal Q4HRS PRN Madhu Merino D.O.        prochlorperazine (Compazine) injection 5-10 mg  5-10 mg Intravenous Q4HRS PRN Madhu W. Wilber, D.O.        guaiFENesin dextromethorphan (Robitussin DM) 100-10 MG/5ML syrup 10 mL  10 mL Oral Q6HRS PRN SANJU GarciaOEstrellita   10 mL at 09/23/23 2319    apixaban (Eliquis) tablet 5 mg  5 mg Oral BID SANJU GarciaOEstrellita   5 mg at 09/28/23 0543       Fluids    Intake/Output Summary (Last 24 hours) at 9/28/2023 1243  Last data filed at 9/28/2023 1200  Gross per 24 hour   Intake 3119.39 ml   Output 4850 ml   Net -1730.61 ml        Laboratory  Recent Labs     09/26/23 0942   NALMY35H 7.38*   PNNVVA080A 54.4*   SAPTV326W 53.4*   IKHK8TAE 83.7*   ARTHCO3 31*   E3XVQRZTB 60L  60.0*   ARTBE 5*         Recent Labs     09/26/23 0022 09/27/23 0345 09/28/23  0443   SODIUM 138 133* 136   POTASSIUM 5.0 4.3 4.4   CHLORIDE 98 94* 98   CO2 30 29 26   BUN 14 14 14   CREATININE 0.60 0.46* 0.44*   MAGNESIUM  --   --  2.1   PHOSPHORUS  --   --  4.1   CALCIUM 8.9 8.9 9.1     Recent Labs     09/26/23 0022 09/27/23 0345 09/28/23  0443   GLUCOSE 106* 125* 122*     Recent Labs     09/26/23 0022 09/27/23 0345 09/28/23  0443   WBC 13.8* 12.7* 13.2*   NEUTSPOLYS 85.00* 84.20*  --    LYMPHOCYTES 7.70* 7.80*  --    MONOCYTES 4.30 4.70  --    EOSINOPHILS 0.20 0.50  --    BASOPHILS 0.30 0.10  --      Recent Labs     09/26/23 0022 09/27/23 0345 09/28/23  0443   RBC 4.13* 3.88* 3.87*   HEMOGLOBIN 10.6* 10.0* 10.2*   HEMATOCRIT 37.2 33.6* 33.7*   PLATELETCT 154* 133* 119*       Imaging  X-Ray:  I have personally reviewed the images and compared with prior images.  CT:    Reviewed    Assessment/Plan  * Acute on chronic respiratory failure (HCC)- (present on admission)  Assessment & Plan  Due to sarcoma metastasis to the lungs with possibly overlying edema versus infection  Continue antibiotics not dramatically improving with prolonged course  Forced diuresis as clinically appropriate  Currently on HFNC/nonrebreather mask  RT/O2 Protocols  Titrate supplemental FiO2 to maintain SpO2 >92%  Continue scheduled nebs, steroids and give a dose of magnesium for wheezing/bronchospasm component    Unfortunately the majority of her other possibilities for hypoxemia including COPD/bronchospasm/pneumonia/PE/volume overload all are on appropriate therapy and all do not appear to be a significant portion of her problem.  CT chest change between 8/20 and 9/20 is very dramatic.  I believe the vast majority of her hypoxemia is tumor progression in her lungs.  Unfortunately this  poorly differentiated sarcoma is unlikely to respond to any therapy at this time per oncology.  Additionally she is on max oxygen therapy at this time with 50 L / 100% HFNC and 15 L nonrebreather mask.  Fortunately her work of breathing is okay and her sats are in the low to mid 90s and holding. Extremely high risk of never coming off of ventilator if she was to require/allow intubation.  Hospice evaluation is recommended at this time.    CXR and oxygenation unchanged    Pulmonary embolism on right (HCC)- (present on admission)  Assessment & Plan  Subsegmental, no RV strain  Continue DOAC  No significant bleeding at this time, monitoring-trace hemoptysis occasionally only    Primary undifferentiated sarcoma of soft tissue (HCC)- (present on admission)  Assessment & Plan  Completed radiation in July 2023, patient concern radiation therapy was causative for her current diffuse metastatic condition  Diffuse metastatic disease to the shoulder, lungs  AIM vs Capmatinib  Onc following  Extremely rapid growing tumor with rather dramatic increase in disease by chest CT 1 month apart 8/20 versus 9/20  Hospice recommended by oncology and myself    Patient deferring hospice for now  Antibody therapy offered by oncology, hopefully will receive it tomorrow with compassionate rush order versus early next week?  CODE STATUS reviewed by oncology and she offered this compassionate chemotherapy with the caveat DNR/DNI and the patient agreed and family and dear friends are supportive, CODE STATUS changed to DNR/DNI    Mass of sacrum- (present on admission)  Assessment & Plan  Sarcoma  Analgesia as needed    Lung cancer (HCC)- (present on admission)  Assessment & Plan  Stage I adenocarcinoma initially treated with chemoradiation  Follows with Dr Newton (Onc)  Patient concerned prior radiation therapy contributed to current problem    Chronic pain due to neoplasm- (present on admission)  Assessment & Plan  Continue pain  "management  Patient requests no additional fentanyl patches \"people are dying from fentanyl\"  Patient stated her pain was controlled with current regimen  Not asking for narcotics    Cigarette nicotine dependence without complication- (present on admission)  Assessment & Plan  Has not required nicotine replacement protocol during this hospitalization  Significant prior smoking history may be contributing in part to her hypoxemia    Goals of care, counseling/discussion  Assessment & Plan  Ongoing discussions for several days with palliative care, oncology and ICU team.  Patient with progressive tumor radiographically and unsuccessful attempts at mitigating worsening oxygenation for alternative problems that seem less likely to be the primary issue.  Hospice recommended by oncology and ICU team.  Patient would like to try some form of therapy if possible and oncology offered an oral chemotherapeutic regimen today which may take 1 to several days to obtain.  She offered it with the caveat that we change her CODE STATUS to DNR/DNI and the patient agreed with oncology.  Multiple family members and dear friends at the bedside all participated in discussion and all are in agreement.  We will continue aggressive supportive care and current treatment for alternative problems while we wait for chemotherapeutic regimen.  CODE STATUS will be changed to DNR/DNI.         VTE:  NOAC  Ulcer:  Patient had some side effects to PPI  Lines: None    I have performed a physical exam and reviewed and updated ROS and Plan today (9/28/2023). In review of yesterday's note (9/27/2023), there are no changes except as documented above.     Discussed patient condition and risk of morbidity and/or mortality with Family, RN, RT, Pharmacy, , Code status disscussed, Charge nurse / hot rounds, Patient, and oncology    The patient remains critically ill.  Critical care time = 45   minutes in directly providing and coordinating critical " care and extensive data review.  No time overlap and excludes procedures.

## 2023-09-29 NOTE — CARE PLAN
Shift Goals  Clinical Goals: organize POC with family  Patient Goals: POC  Family Goals: Start chemo    Medicating per MAR, collaborating with IDT, normalizing sleep/wake cycles, monitoring nutrition intake and ins/outs, facilitating ADLs as thea. Fall, skin, delirium risk interventions in place. Education offered with each interaction. Please refer to flow sheets for additional interventions.      Problem: Pain - Standard  Goal: Alleviation of pain or a reduction in pain to the patient’s comfort goal  Outcome: Progressing     Problem: Care Map:  Optimal Outcome for the Pneumonia Patient  Goal: Collection and monitoring of appropriate tests and labs  Outcome: Progressing     Problem: Respiratory  Goal: Patient will achieve/maintain optimum respiratory ventilation and gas exchange  Outcome: Progressing     Problem: Risk for Aspiration  Goal: Patient's risk for aspiration will be absent or decrease  Outcome: Progressing     Problem: Hemodynamics - Pneumonia  Goal: Patient's hemodynamics, fluid balance and neurologic status will be stable or improve  Outcome: Progressing     Problem: Self Care  Goal: Patient will have the ability to perform ADLs independently or with assistance (bathe, groom, dress, toilet and feed)  Outcome: Progressing     Problem: Discharge Planning - Pneumonia  Goal: Patient will verbalize understanding of lifestyle changes and therapeutic needs post discharge  Outcome: Progressing     Problem: Knowledge Deficit - Standard  Goal: Patient and family/care givers will demonstrate understanding of plan of care, disease process/condition, diagnostic tests and medications  Outcome: Progressing     Problem: Skin Integrity  Goal: Skin integrity is maintained or improved  Outcome: Progressing     Problem: Psychosocial  Goal: Patient's level of anxiety will decrease  Outcome: Progressing  Goal: Patient's ability to verbalize feelings about condition will improve  Outcome: Progressing  Goal: Patient's ability  to re-evaluate and adapt role responsibilities will improve  Outcome: Progressing  Goal: Patient and family will demonstrate ability to cope with life altering diagnosis and/or procedure  Outcome: Progressing  Goal: Spiritual and cultural needs incorporated into hospitalization  Outcome: Progressing     Problem: Fall Risk  Goal: Patient will remain free from falls  Outcome: Progressing

## 2023-09-29 NOTE — CARE PLAN
The patient is Watcher - Medium risk of patient condition declining or worsening    Shift Goals  Clinical Goals: comfort, maintain o2 sats  Patient Goals: comfort, rest  Family Goals: updates    Progress made toward(s) clinical / shift goals:  Pt has elected to attempt po chemo per oncology. Family bedside and updated on plan of care and goals of care. Pt voices desire to rest and be comfortable. Sats maintained on high flow and non-rebreather.     Problem: Pain - Standard  Goal: Alleviation of pain or a reduction in pain to the patient’s comfort goal  Outcome: Progressing     Problem: Care Map:  Optimal Outcome for the Pneumonia Patient  Goal: Collection and monitoring of appropriate tests and labs  Outcome: Progressing     Problem: Respiratory  Goal: Patient will achieve/maintain optimum respiratory ventilation and gas exchange  Outcome: Progressing     Problem: Risk for Aspiration  Goal: Patient's risk for aspiration will be absent or decrease  Outcome: Progressing     Problem: Hemodynamics - Pneumonia  Goal: Patient's hemodynamics, fluid balance and neurologic status will be stable or improve  Outcome: Progressing     Problem: Self Care  Goal: Patient will have the ability to perform ADLs independently or with assistance (bathe, groom, dress, toilet and feed)  Outcome: Progressing     Problem: Discharge Planning - Pneumonia  Goal: Patient will verbalize understanding of lifestyle changes and therapeutic needs post discharge  Outcome: Not Progressing     Problem: Knowledge Deficit - Standard  Goal: Patient and family/care givers will demonstrate understanding of plan of care, disease process/condition, diagnostic tests and medications  Outcome: Progressing     Problem: Skin Integrity  Goal: Skin integrity is maintained or improved  Outcome: Progressing     Problem: Psychosocial  Goal: Patient's level of anxiety will decrease  Outcome: Progressing  Goal: Patient's ability to verbalize feelings about condition will  improve  Outcome: Progressing  Goal: Patient's ability to re-evaluate and adapt role responsibilities will improve  Outcome: Progressing  Goal: Patient and family will demonstrate ability to cope with life altering diagnosis and/or procedure  Outcome: Progressing  Goal: Spiritual and cultural needs incorporated into hospitalization  Outcome: Progressing     Problem: Fall Risk  Goal: Patient will remain free from falls  Outcome: Progressing       Patient is not progressing towards the following goals:  Lifestyle changes not currently applicable to pt current prognosis.    Problem: Discharge Planning - Pneumonia  Goal: Patient will verbalize understanding of lifestyle changes and therapeutic needs post discharge  Outcome: Not Progressing

## 2023-09-29 NOTE — DIETARY
Nutrition Update:    Day 9 of admit.  Melly Hendrickson is a 48 y.o. female with admitting DX of Acute on chronic respiratory failure.    Poor PO intake noted per chart review. Pt is receiving a Level 7 - easy to chew, thin liquid diet with Boost Plus supplements. Pt previously with good PO intake >50% of meals, however, this has worsened. Pt was transferred to ICU on 9/26 due to worsening shortness of breath. Recorded PO intake now <25%. Pt visited today by Nutrition Rep. Family assisted with choosing pt's menu orders and snacks adjusted. Continue to offer Boost Plus TID.    Problem: Nutritional:  Goal: Achieve adequate nutritional intake  Description: Patient will consume >50% of meals  Outcome: Not met.    Recommendations/Plan:  Boost Plus TID and snacks per pt preference.   Encourage intake of meals, supplements >50%.  Document intake of all meals and supplements as % taken in ADL's to provide interdisciplinary communication across all shifts.   Monitor weight.  Nutrition rep will continue to see patient for ongoing meal and snack preferences.    RD following

## 2023-09-29 NOTE — PROGRESS NOTES
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 No concerning drug or non-drug allergies.                                                                                          Drug Therapy (name/formulation/dose/route of admin/frequency): Tabrecta 200mg tab, 2 tabs (400mg) po BID       EMR Reviewed   - Dose Appropriateness (Y/N): Y,  MET exon 14 skipping mutation    - Renal or Hepatic Adjustments (Y/N): N    - Any comorbidities, PMH, precautions, or contraindications that pose a medication safety concern? (Y/N):  N    - Any relevant lab work needed that affects the initial start date? (Y/N): N    - List Past Treatments: SBRT    - List DDI’s: no clinically significant DDIs    - Patient’s ability to self-administer medication:     Issues identified per EMR  - patient admitted to ICU and will be admin by care team   List Goals of Therapy:       - Reduce progression of disease (POD) and/or improve quality of life     - Mitigation of side effects    - Promote optimal medication administration and adherence     - Improve overall survival (OS) and progression-free survival (PFS)       Patient pre-emptively opt out of clinical services for Tabrecta as of 9/29/2023

## 2023-09-29 NOTE — PROGRESS NOTES
Consult Note: Hematology/Oncology     Primary Care:  Checo Baker M.D.    Chief Complaint   Patient presents with    Shortness of Breath     PT hx lung ca that's metastasized. Was at another appt and was SOB. RA while walking in 70's per EMS. Currently on 6L NC         Current Treatment:     None    Prior Treatment:     4/17/2023-4/21/2023: RLL Lung SBRT  7/19/23-7/24/2023: R Sacrum SBRT      Subjective:   History of Presenting Illness:    Melly Hendrickson is a 48 y.o. 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    Patient history dates back to July 2022 when she had a chest x-ray performed that revealed a potential mass in her right lung.  This led to a chest CT performed on February 13, 2023.  This demonstrated 1.4 cm mass in the right lower lobe and a 1.9 groundglass opacity also in the right lower lobe.  The groundglass opacity appeared stable from previous scans but the mass was thought to be new.      A biopsy was performed and confirmed lung adenocarcinoma.    Patient saw thoracic surgery and obtained PFTs as well as a PET scan.  She was discussed at tumor board and given her significant dyspnea and her underlying COPD it was thought that she would be a better candidate for SBRT rather than surgical resection.    April 17, 2023 patient began SBRT.  Completed this treatment without many issues.    After this patient did notice a small lump in her breast reduction scar line.  She said that over the next few days it grew and she ultimately went to her PCP    PCP tried to drain the mass; however it just bled significantly during the I&D attempt.  Thus she was sent to Dr. Becerra at [facility] for excision.     Pathology from this excision demonstrated poorly differentiated malignancy favoring carcinoma without obvious involvement of underlying skin and nonspecific IHC profile.  Based on his diagnosis  cancer type ID was sent off for.  Results showed 90% probability of sarcoma    Over the past several months she has noted severe pain in her sacral region.  She does have sacral lesions that are concerning for metastatic disease.  Patient has biopsy of sacral mass on 7/18/2023.     Of note patient has 5 children and 4 grandchildren.    She follows with pain management    I initially saw patient on 7/14.  At our 7/21, we had information back from biopsy and we discussed AIM.  Patient wished to hold off and think about this further.    On 7/28, we discussed initiating AIM, patient was not comfortable and wanted 2 opinion at [facility].    I saw patient again on 8/11.  We discussed AIM,  patient not ready and awaiting [facility] appointment.     She was recently admitted to the ER for hemoptysis on 8/20/23 and was found to have a PE.  She was still having hemoptysis and thus was not started on AC. She has discharged on 8/24/23.     I saw her subsequently on 9/7/23 and recommended AC given no hemoptysis, but active PE with cancer.  We also discussed chemo at this visit and she wished to wait.     During my visit with the patient on 9/19/23, Patient was endorsing 3 nodules on the R arm.  1 on the clavicle.  Patient also had a nodule on her L arm. Patient had 1 nodule on her bikini line. She has another on the inside of her L thigh. These started 1 week ago. She was on 2-3L of oxygen for the past several days.  We discussed intiating chemo and plan was to start on 9/26/23.  Pt requires a direct admit for AIM    Since that visit she had worseing SOB.  She was subsequently presented to the ER for SOB/cough and potential PNA. On admission, she was tachycardic, tachypneic, and afebrile.  Chest x-ray showed diffuse bilateral interstitial and alveolar opacities consistent with pneumonia. CTA chest on admission showed right subsegmental PE, worsening pulmonary nodules, T1 vertebral body region, possible pneumonia.  Procalcitonin was  0.11.  Ceftriaxone and azithromycin were empirically started.    Line was not able to be placed unable to start chemo.  Patients respiratory status declined and she was transferred to the ICU    Yesterday I discussed with the patient starting capmatinib (please see note from yesterday)    Today capmatinib is scheduled to arrive in the pharmacy.  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    Chronic obstructive pulmonary disease (HCC)     COPD (chronic obstructive pulmonary disease) (HCC)     DDD (degenerative disc disease), lumbar     Depression     Emphysema of lung (HCC)     MRSA (methicillin resistant staph aureus) culture positive     Pain     back - cervical, scapula, lower    Pericarditis     Primary adenocarcinoma of lower lobe of right lung (HCC)     Psychiatric disorder     Anxiety, Depression    Sleep apnea     Snoring     Urinary incontinence         Past Surgical History:   Procedure Laterality Date    LA BRONCHOSCOPY,DIAGNOSTIC N/A 8/22/2023    Procedure: BRONCHOSCOPY;  Surgeon: Sandra Lopez D.O.;  Location: [facility];  Service: Pulmonary    LA EXC SKIN BENIG >4CM TRUNK,ARM,LEG Left 6/7/2023    Procedure: EXCISION OF SOFT TISSUE MASS LEFT CHEST WALL;  Surgeon: Nasir Becerra M.D.;  Location: [facility];  Service: General    LA BRONCHOSCOPY,DIAGNOSTIC N/A 02/14/2023    Procedure: FIBER OPTIC BRONCHOSCOPY WASH, BRUSH, BRONCHOALVEOLAR LAVAGE, BIOPSY AND FINE NEEDLE ASPIRATION AND NAVIGATION, ROBOTICS;  Surgeon: Guicho Ellis M.D.;  Location: [facility];  Service: Pulmonary Robotic    ENDOBRONCHIAL US ADD-ON N/A 02/14/2023    Procedure: ENDOBRONCHIAL ULTRASOUND (EBUS);  Surgeon: Guicho Ellis M.D.;  Location: [facility];  Service:  Pulmonary Robotic    MAMMOPLASTY REDUCTION Bilateral     SEPTOPLASTY      TUBE & ECTOPIC PREG., REMOVAL      x 2       Social History     Tobacco Use    Smoking status: Some Days     Current packs/day: 0.50     Average packs/day: 0.5 packs/day for 20.0 years (10.0 ttl pk-yrs)     Types: Cigarettes     Passive exposure: Past    Smokeless tobacco: Never    Tobacco comments:     on and off    Vaping Use    Vaping Use: Never used   Substance Use Topics    Alcohol use: Not Currently     Comment: occ, rare    Drug use: Not Currently     Types: Marijuana     Comment: THC gummies        Family History   Problem Relation Age of Onset    Diabetes Mother     Cancer Father         Prostate    Cancer Paternal Grandmother         Cervical    Cancer Paternal Grandfather         Unk       Allergies   Allergen Reactions    Penicillins Hives, Rash and Swelling     Pt reports that she gets swelling in throat and face, rash all over face and arms.   Tolerated cefazolin    Aspirin Rash and Hives     Pt reports that she received a rash on face, pt reports it ok if she takes NORCO    Other Environmental Shortness of Breath     Perfumes, dander, scents       Current Facility-Administered Medications   Medication Dose Route Frequency Provider Last Rate Last Admin    dexmedetomidine (PRECEDEX) 400 mcg/100mL NS premix infusion  0.1-1.5 mcg/kg/hr (Ideal) Intravenous Continuous Remberto Mcdowell Jr., D.O. 20.1 mL/hr at 09/29/23 0858 1.1 mcg/kg/hr at 09/29/23 0858    LORazepam (Ativan) tablet 0.5 mg  0.5 mg Oral HS PRN Remberto Mcdowell Jr., D.O.   0.5 mg at 09/28/23 1933    HYDROmorphone (Dilaudid) injection 1 mg  1 mg Intravenous Q3HRS PRN Kay Christensen, A.P.R.N.   1 mg at 09/25/23 1648    dexamethasone (Decadron) injection 4 mg  4 mg Intravenous BID Yenifer Velazquez M.D.   4 mg at 09/29/23 0511    ALPRAZolam (Xanax) tablet 0.25 mg  0.25 mg Oral TID PRN Naima Ambrosio A.P.R.N.   0.25 mg at 09/26/23 1012    guaiFENesin ER (Mucinex) tablet  600 mg  600 mg Oral Q12HRS Naima Ambrosio, A.P.R.N.   600 mg at 09/29/23 0511    polyethylene glycol/lytes (Miralax) PACKET 1 Packet  1 Packet Oral Q48HRS Ivonne Mohan, A.P.R.N.   1 Packet at 09/24/23 1207    HYDROcodone/acetaminophen (Norco)  MG per tablet 1 Tablet  1 Tablet Oral Q3HRS PRN Yenifer Velazquez M.D.   1 Tablet at 09/26/23 0429    diphenhydrAMINE (Benadryl) injection 25 mg  25 mg Intravenous Q6HRS Kay Christensen A.P.R.N.   25 mg at 09/29/23 0511    diphenhydrAMINE (Benadryl) injection 25 mg  25 mg Intravenous Q6HRS PRN Kay Christensen A.P.R.N.   25 mg at 09/29/23 0908    fluticasone-umeclidinium-vilanterol (Trelegy Ellipta) 100-62.5-25 mcg/act inhaler 1 Puff  1 Puff Inhalation DAILY Yenifer Velazquez M.D.   1 Puff at 09/25/23 0528    ipratropium-albuterol (DUONEB) nebulizer solution  3 mL Nebulization Q4HRS (RT) Yenifer Velazquez M.D.   3 mL at 09/29/23 0737    ipratropium-albuterol (DUONEB) nebulizer solution  3 mL Nebulization Q2HRS PRN (RT) Yenifer Velazquez M.D.   3 mL at 09/29/23 0416    albuterol inhaler 2 Puff  2 Puff Inhalation Q4HRS PRN SANJU GarciaOEstrellita   2 Puff at 09/23/23 0501    diclofenac sodium (Voltaren) 1 % gel 2 g  2 g Topical 4X/DAY PRN SANJU GarciaO.        diphenhydrAMINE (Benadryl) tablet/capsule 25 mg  25 mg Oral BID PRN SANJU GarciaOEstrellita   25 mg at 09/20/23 1745    loratadine (Claritin) tablet 10 mg  10 mg Oral DAILY Madhu Merino D.O.   10 mg at 09/29/23 0511    senna-docusate (Pericolace Or Senokot S) 8.6-50 MG per tablet 2 Tablet  2 Tablet Oral BID Madhu Merino D.O.   2 Tablet at 09/29/23 0511    And    polyethylene glycol/lytes (Miralax) PACKET 1 Packet  1 Packet Oral QDAY PRN Madhu Merino D.O.   1 Packet at 09/23/23 1141    And    magnesium hydroxide (Milk Of Magnesia) suspension 30 mL  30 mL Oral QDAY PRN Madhu Merino D.O.   30 mL at 09/24/23 0939    And    bisacodyl (Dulcolax) suppository 10 mg  10 mg Rectal QDAY PRN Madhu Merino D.O.    10 mg at 09/28/23 1300    Respiratory Therapy Consult   Nebulization Continuous RT Madhu Merino D.O.        acetaminophen (Tylenol) tablet 650 mg  650 mg Oral Q6HRS PRN Madhu Merino D.O.        hydrALAZINE (Apresoline) injection 10 mg  10 mg Intravenous Q4HRS PRN SANJU GarciaO.   10 mg at 09/26/23 1010    ondansetron (Zofran) syringe/vial injection 4 mg  4 mg Intravenous Q4HRS PRN Madhu Merino D.O.        ondansetron (Zofran ODT) dispertab 4 mg  4 mg Oral Q4HRS PRN Madhu Merino D.O.        promethazine (Phenergan) tablet 12.5-25 mg  12.5-25 mg Oral Q4HRS PRN Madhu Merino D.O.        promethazine (Phenergan) suppository 12.5-25 mg  12.5-25 mg Rectal Q4HRS PRN Madhu Merino D.O.        prochlorperazine (Compazine) injection 5-10 mg  5-10 mg Intravenous Q4HRS PRN Madhu Merino D.O.        guaiFENesin dextromethorphan (Robitussin DM) 100-10 MG/5ML syrup 10 mL  10 mL Oral Q6HRS PRN Madhu Merino D.O.   10 mL at 09/23/23 2319    apixaban (Eliquis) tablet 5 mg  5 mg Oral BID SANJU GarciaO.   5 mg at 09/29/23 0511       Review of Systems   Constitutional:  Positive for malaise/fatigue. Negative for chills, fever and weight loss.   HENT:  Negative for congestion, ear pain, nosebleeds and sore throat.    Eyes:  Negative for blurred vision.   Respiratory:  Negative for cough, sputum production, shortness of breath and wheezing.    Cardiovascular:  Negative for chest pain, palpitations, orthopnea and leg swelling.   Gastrointestinal:  Negative for abdominal pain, heartburn, nausea and vomiting.   Genitourinary:  Negative for dysuria, frequency and urgency.   Musculoskeletal:  Positive for back pain and joint pain. Negative for neck pain.   Neurological:  Negative for dizziness, tingling, tremors, sensory change, focal weakness and headaches.   Endo/Heme/Allergies:  Does not bruise/bleed easily.   Psychiatric/Behavioral:  Negative for depression, memory loss and suicidal ideas.    All other  systems reviewed and are negative.      Problem list, medications, and allergies reviewed by myself today in Epic.     Objective:     Vitals:    09/29/23 0400 09/29/23 0500 09/29/23 0600 09/29/23 0739   BP: 126/60 111/56 105/57    Pulse: 97 (!) 102 (!) 102 (!) 104   Resp: (!) 27 (!) 39 (!) 28 (!) 28   Temp: 36.7 °C (98 °F)  36.9 °C (98.5 °F)    TempSrc: Temporal  Temporal    SpO2: 94% 95% 91% 90%   Weight: 84.5 kg (186 lb 4.6 oz)      Height:             DESC; KARNOFSKY SCALE WITH ECOG EQUIVALENT: 90, Able to carry on normal activity; minor signs or symptoms of disease (ECOG equivalent 0)    DISTRESS LEVEL: no acute distress    Physical Exam  Deferred    Labs:   Most recent labs reviewed.  Please see the lab tab of chart review    Imaging:   Most recent images below have been independently reviewed by me.  Please see the imaging tab of chart review    9/22/2023 : Echocardiogram showed LVEF ~ 65% with no reported abnormalities    7/27/23: PET   1.  Focal uptake in the medial RIGHT lower lobe of lung abutting the thoracic spine, consistent with tumor..  2.  No PET correlate for ill-defined spiculated nodule in the RIGHT lower lobe.  3.  Large hypermetabolic mass corresponds to lytic lesion in the RIGHT sacrum consistent with malignancy.  4.  Intramuscular focal uptake at the posterior LEFT shoulder, likely metastatic.    Pathology:    7/14/2023: Pathology of Chest wall  A. Chest wall mass:          Metastatic poorly differentiated malignancy favoring carcinoma           without obvious involvement of overlying skin and a           non-specific IHC profile of some keratins positive (see           microscopic description).          See comment.   Main Cancer Type: Sarcoma   Probability: 90%     Subtype: Undifferentiated Sarcoma ([facility])   Probability: 90%     7/19/2023: Path of the Sacral Bone  A. Sacral bone biopsy:          Bone cores effaced by a high-grade malignancy histologically           identical to the previously  excised chest wall mass           ([id number]).     2/14/2023: Pathology of Lung Mass  A. Lung, right lower lobe fine needle aspiration slides:          Positive for carcinoma.   B. Core, right lower lobe lung:          Moderately differentiated lung adenocarcinoma.   C. Lung, right lower lobe biopsy core and touchprep slides:          Moderately differentiated lung adenocarcinoma.   D. Bronchoalveolar lavage right lower lobe:          Rare atypical cells, malignant cells vs. reactive bronchial           cells.     Assessment/Plan:      Cancer Staging   Primary undifferentiated sarcoma of soft tissue (HCC)  Staging form: Soft Tissue Sarcoma of the Trunk and Extremities, AJCC 8th Edition  - Clinical: Stage IV (cTX, cN0, cM1) - Signed by Alma Newton M.D. on 9/19/2023         Ms. 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  Again she is not ready for hospice and we are awaiting the pill to arrive at the pharmacy.    I discussed with the patient's daughter that she is to  capmatinib from [facility] pharmacy    We had a long discussion about CODE STATUS.  The patient is concerned that she is being pushed into CODE STATUS.  I was very clear and told her that this was not a bargaining tool.  She is not being forced to sign the DNR DNI I outlined the realities of intubation and resuscitation.  I also outlined that her daughter would have to be the one to make the medical decision to take her off the tube since she is her power of .  I was very elise with her and told her that if she is intubated there is an extremely low chance that she will be able to be extubated given th status of her respiratory function And tumor burden.     Plan  Clarify CODE  STATUS after discussions with family  Initiate capmatinib once pill arrives  Work on next steps whether she is stepdown to the CNU or remains in ICU (pending CODE STATUS)    Alma Newton M.D.  Hematology/Oncology

## 2023-09-29 NOTE — CARE PLAN
Problem: Bronchoconstriction  Goal: Improve in air movement and diminished wheezing  Description: Target End Date:  2 to 3 days    1.  Implement inhaled treatments  2.  Evaluate and manage medication effects  Outcome: Not Met     Problem: Humidified High Flow Nasal Cannula  Goal: Maintain adequate oxygenation dependent on patient condition  Description: Target End Date:  resolve prior to discharge or when underlying condition is resolved/stabilized    1.  Implement humidified high flow oxygen therapy  2.  Titrate high flow oxygen to maintain appropriate SpO2  Outcome: Not Met       Respiratory Update    Treatment modality: Duoneb  Frequency: Q4    Pt on HFNC 50L, 100% with 15L NRB.    Pt tolerating current treatments well with no adverse reactions.

## 2023-09-29 NOTE — PROGRESS NOTES
Critical Care Progress Note    Date of admission  9/20/2023    Chief Complaint  48-year-old female with past medical history of COPD, asthma, anxiety and depression as well as a stage I adenocarcinoma of the lung was discovered in July of last year for which she received chemoradiation for.  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  She presented to the hospital on 9/20/2023 for dyspnea and pain.  A CT scan done on admission showed a small right subsegmental PE with diffusely worsening pulmonary nodules with possible overlying inflammatory or infectious changes.  She does state that she has been coughing up significant sputum which just prior to admission had begun having small streaks of blood.  The patient was admitted to the oncology floor where she was seen by medical oncology and planned for initiation of chemotherapy.  Her case was discussed with her by her oncology team and patient requested second opinion from [place].  Last month she was admitted for hemoptysis and found to have a PE however was not started on anticoagulation due to hemoptysis.  On admission to the hospital during this stay the patient was started on ceftriaxone and azithromycin for pneumonia coverage.  Over the last 3 days her supplemental oxygen requirements have increased and today she has been significantly tachycardic and anxious due to her hypoxia.  EKG showed sinus tachycardia, she is currently on high flow nasal cannula and nonrebreather saturating between 88 and 92%.     During my talk with her we discussed her ongoing overall poor prognosis with stage IV sarcoma with diffuse metastatic burden to the lungs and progressive hypoxia.  We discussed that if she were to fail high flow nasal cannula mechanical intubation would be the next step however if she were to be placed on mechanical ventilation she  "may never be able to be weaned from support.  The patient tearfully replied that she \"wants more time\" and she is \"not ready to die\".  (Dr Mcdowell HPI 9/26)    Hospital Course    9/27 - HFNC 100/50 + MRBM, WOB ok, lasix, decadron, C3  9/28 - HFNC 100/50 + NRBM, lasix, Decadron, Ceftriaxone, code status changed to DNR/DNI      Interval Problem Update  Reviewed last 24 hour events:    A&O x3-4  Still very upset stated  Declining Narcs  DEX 1.5  SR/ST 90-100s  SBp 90-120s  UO excellent  Fluid balance - neg 1.89 L  HFNC 100/50  + NRBM 15l  DuoNeb Q4H  CXR no change  Tmax-98.5  WBC 14.6  ABX complete-10-day course of ceftriaxone complete  No positive cultures  Apixiban, no bleeding clinically  Hemoglobin unchanged at 10.5  Platelet count 104, slowly trending down  Decadron twice a day, consider increasing with bronchospasm      I reviewed anxiety/pain management at length with Ms. Epstein and 2 of her daughters.  Outlined the difference between IV versus oral and continuous infusion versus bolus at length as well.    Patient still reluctant to take narcotics so we will hold on scheduled agents and continue oral as needed when she is willing.    She has lots of itching and definitely wants to take the Benadryl.    Dexmedetomidine is helping some and we will continue to titrate that.    Ativan at night seems to help so we will schedule Ativan 0.5 mg every 8 hours and additionally give 0.5 mg IV as needed for acute episodes.  I told her we had alternatives such as haloperidol which we get also give IV as needed but will start with just 1 agent at first.    Finally we discussed when she gets really dyspneic and is ready for hospice continuous infusion of morphine.    Reviewed plan at length with nursing staff.       YESTERDAY  A&O x4  DEX 1.5  No pain meds  SR/St  SBp 100-140s  UO ok  HFNC 100/50/NRBM 15   O2 saturations 92-95%  Full sentences, tachypneic at times  Nonproductive cough with minimal heme  Mobility I  CXR Slt " worse?  C3 & decadron  Apixiban  Lytes ok    Case reviewed with Dr. Newton at length.  She and the patient agreed to try an antibiotic treatment with the 1 caveat that she change her CODE STATUS to DNR/DNI and she agreed.  We will change the CODE STATUS at the request of Dr. Newton, I did speak with the family and friends and they all were in agreement and supportive of Ms. Hendrickson.    Review of Systems  Review of Systems   Constitutional:  Positive for malaise/fatigue (Unchanged). Negative for fever.   HENT:  Negative for congestion and sore throat.    Respiratory:  Positive for cough and shortness of breath (Worse with panic attacks and she is trying to help her self). Negative for hemoptysis and sputum production.    Cardiovascular:  Positive for chest pain (Intermittent chest tightness, no delta).   Gastrointestinal:  Negative for abdominal pain, nausea and vomiting.        Belching   Genitourinary:  Negative for dysuria.   Musculoskeletal:  Positive for back pain (Controlled, not taking narcotics).   Neurological:  Positive for headaches (Tolerated, still not taking narcotics). Negative for focal weakness.   Psychiatric/Behavioral:  Positive for depression. The patient is nervous/anxious (Still having bouts of uncontrolled anxiety).         Vital Signs for last 24 hours   Temp:  [36.7 °C (98 °F)-37.1 °C (98.8 °F)] 37.1 °C (98.8 °F)  Pulse:  [] 104  Resp:  [24-39] 29  BP: (100-138)/(55-80) 121/62  SpO2:  [88 %-96 %] 89 %    Hemodynamic parameters for last 24 hours       Respiratory Information for the last 24 hours       Physical Exam   Physical Exam  Vitals reviewed.   Constitutional:       Appearance: She is normal weight. She is ill-appearing (Unchanged). She is not toxic-appearing or diaphoretic.      Comments: HFNC plus NRBM, to speak in full sentences   HENT:      Head: Atraumatic.      Mouth/Throat:      Mouth: Mucous membranes are moist.   Eyes:      General: No scleral icterus.     Extraocular  Movements: Extraocular movements intact.      Pupils: Pupils are equal, round, and reactive to light.   Neck:      Vascular: No JVD.   Cardiovascular:      Rate and Rhythm: Regular rhythm. No extrasystoles are present.     Heart sounds: No murmur heard.     Comments: SR/ST  Pulmonary:      Effort: Pulmonary effort is normal. No respiratory distress.      Breath sounds: Wheezing and rales present. No rhonchi.   Skin:     General: Skin is dry.      Capillary Refill: Capillary refill takes less than 2 seconds.      Coloration: Skin is not cyanotic.      Nails: There is no clubbing.   Neurological:      General: No focal deficit present.      Mental Status: She is oriented to person, place, and time. Mental status is at baseline.   Psychiatric:         Attention and Perception: Attention normal.         Mood and Affect: Mood is anxious and depressed. Affect is labile. Affect is not angry.         Speech: Speech normal.         Behavior: Behavior is cooperative.         Thought Content: Thought content normal.         Cognition and Memory: Cognition normal.      Comments: Improved         Medications  Current Facility-Administered Medications   Medication Dose Route Frequency Provider Last Rate Last Admin    LORazepam (Ativan) injection 0.5 mg  0.5 mg Intravenous Q6HRS PRN Tonny Edmond M.D.        LORazepam (Ativan) tablet 0.5 mg  0.5 mg Oral Q8HRS Tonny Edmond M.D.   0.5 mg at 09/29/23 1538    capmatinib (Tabrecta) tablet 400 mg  400 mg Oral BID Alma Newton M.D.   400 mg at 09/29/23 1515    dexmedetomidine (PRECEDEX) 400 mcg/100mL NS premix infusion  0.1-1.5 mcg/kg/hr (Ideal) Intravenous Continuous Remberto Mcdowell Jr., D.O. 16.4 mL/hr at 09/29/23 1151 0.9 mcg/kg/hr at 09/29/23 1151    HYDROmorphone (Dilaudid) injection 1 mg  1 mg Intravenous Q3HRS PRN NICOLASA BoldenPSTEVENN.   1 mg at 09/29/23 1443    dexamethasone (Decadron) injection 4 mg  4 mg Intravenous BID Yenifer Velazquez M.D.   4 mg at  09/29/23 1538    guaiFENesin ER (Mucinex) tablet 600 mg  600 mg Oral Q12HRS Niama Ambrosio, A.P.R.N.   600 mg at 09/29/23 0511    polyethylene glycol/lytes (Miralax) PACKET 1 Packet  1 Packet Oral Q48HRS Ivonne Mohan, A.P.R.N.   1 Packet at 09/24/23 1207    HYDROcodone/acetaminophen (Norco)  MG per tablet 1 Tablet  1 Tablet Oral Q3HRS PRN Yenifer Velazquez M.D.   1 Tablet at 09/26/23 0429    diphenhydrAMINE (Benadryl) injection 25 mg  25 mg Intravenous Q6HRS Kay Christensen A.P.R.N.   25 mg at 09/29/23 1246    diphenhydrAMINE (Benadryl) injection 25 mg  25 mg Intravenous Q6HRS PRN Kay Christensen A.P.R.N.   25 mg at 09/29/23 0908    fluticasone-umeclidinium-vilanterol (Trelegy Ellipta) 100-62.5-25 mcg/act inhaler 1 Puff  1 Puff Inhalation DAILY Yenifer Velazquez M.D.   1 Puff at 09/25/23 0528    ipratropium-albuterol (DUONEB) nebulizer solution  3 mL Nebulization Q4HRS (RT) Yenifer Velazquez M.D.   3 mL at 09/29/23 1503    ipratropium-albuterol (DUONEB) nebulizer solution  3 mL Nebulization Q2HRS PRN (RT) Yenifer Velazquez M.D.   3 mL at 09/29/23 1157    albuterol inhaler 2 Puff  2 Puff Inhalation Q4HRS PRN SANJU GarciaOEstrellita   2 Puff at 09/23/23 0501    diclofenac sodium (Voltaren) 1 % gel 2 g  2 g Topical 4X/DAY PRN SANJU GarciaO.        diphenhydrAMINE (Benadryl) tablet/capsule 25 mg  25 mg Oral BID PRN SANJU GarciaO.   25 mg at 09/20/23 1745    loratadine (Claritin) tablet 10 mg  10 mg Oral DAILY SANJU GarciaOEstrellita   10 mg at 09/29/23 0511    senna-docusate (Pericolace Or Senokot S) 8.6-50 MG per tablet 2 Tablet  2 Tablet Oral BID Madhu Merino D.O.   2 Tablet at 09/29/23 0511    And    polyethylene glycol/lytes (Miralax) PACKET 1 Packet  1 Packet Oral QDAY PRN Madhu Merino D.O.   1 Packet at 09/23/23 1141    And    magnesium hydroxide (Milk Of Magnesia) suspension 30 mL  30 mL Oral QDAY PRN Madhu Merino D.O.   30 mL at 09/24/23 0939    And    bisacodyl (Dulcolax) suppository 10  mg  10 mg Rectal QDAY PRN SANJU GarciaOEstrellita   10 mg at 09/28/23 1300    Respiratory Therapy Consult   Nebulization Continuous RT Madhu Merino D.O.        acetaminophen (Tylenol) tablet 650 mg  650 mg Oral Q6HRS PRN Madhu Merino D.O.        hydrALAZINE (Apresoline) injection 10 mg  10 mg Intravenous Q4HRS PRN Madhu Merino D.O.   10 mg at 09/26/23 1010    ondansetron (Zofran) syringe/vial injection 4 mg  4 mg Intravenous Q4HRS PRN Madhu Merino D.O.        ondansetron (Zofran ODT) dispertab 4 mg  4 mg Oral Q4HRS PRN Madhu Merino D.O.        promethazine (Phenergan) tablet 12.5-25 mg  12.5-25 mg Oral Q4HRS PRN Madhu Merino D.O.        promethazine (Phenergan) suppository 12.5-25 mg  12.5-25 mg Rectal Q4HRS PRN Madhu Merino D.O.        prochlorperazine (Compazine) injection 5-10 mg  5-10 mg Intravenous Q4HRS PRN Madhu Merino D.O.        guaiFENesin dextromethorphan (Robitussin DM) 100-10 MG/5ML syrup 10 mL  10 mL Oral Q6HRS PRN SANJU GarciaOEstrellita   10 mL at 09/23/23 2319    apixaban (Eliquis) tablet 5 mg  5 mg Oral BID SANJU GarciaOEstrellita   5 mg at 09/29/23 0511       Fluids    Intake/Output Summary (Last 24 hours) at 9/29/2023 1636  Last data filed at 9/29/2023 1600  Gross per 24 hour   Intake 2145.64 ml   Output 3450 ml   Net -1304.36 ml       Laboratory          Recent Labs     09/27/23  0345 09/28/23  0443 09/29/23  0535   SODIUM 133* 136 138   POTASSIUM 4.3 4.4 4.0   CHLORIDE 94* 98 99   CO2 29 26 24   BUN 14 14 18   CREATININE 0.46* 0.44* 0.51   MAGNESIUM  --  2.1  --    PHOSPHORUS  --  4.1  --    CALCIUM 8.9 9.1 8.7     Recent Labs     09/27/23 0345 09/28/23 0443 09/29/23 0535   GLUCOSE 125* 122* 106*     Recent Labs     09/27/23 0345 09/28/23 0443 09/29/23 0535   WBC 12.7* 13.2* 14.6*   NEUTSPOLYS 84.20*  --   --    LYMPHOCYTES 7.80*  --   --    MONOCYTES 4.70  --   --    EOSINOPHILS 0.50  --   --    BASOPHILS 0.10  --   --      Recent Labs     09/27/23 0345 09/28/23 0443  09/29/23  0535   RBC 3.88* 3.87* 4.02*   HEMOGLOBIN 10.0* 10.2* 10.5*   HEMATOCRIT 33.6* 33.7* 34.3*   PLATELETCT 133* 119* 104*       Imaging  X-Ray:  I have personally reviewed the images and compared with prior images.  CT:    Reviewed    Assessment/Plan  * Acute on chronic respiratory failure (HCC)- (present on admission)  Assessment & Plan  Due to sarcoma metastasis to the lungs with possibly overlying edema versus infection  Continue antibiotics not dramatically improving with prolonged course  Forced diuresis as clinically appropriate  Currently on HFNC/nonrebreather mask  RT/O2 Protocols  Titrate supplemental FiO2 to maintain SpO2 >92%  Continue scheduled nebs, steroids and give a dose of magnesium for wheezing/bronchospasm component    Unfortunately the majority of her other possibilities for hypoxemia including COPD/bronchospasm/pneumonia/PE/volume overload all are on appropriate therapy and all do not appear to be a significant portion of her problem.  CT chest change between 8/20 and 9/20 is very dramatic.  I believe the vast majority of her hypoxemia is tumor progression in her lungs.  Unfortunately this poorly differentiated sarcoma is unlikely to respond to any therapy at this time per oncology.  Additionally she is on max oxygen therapy at this time with 50 L / 100% HFNC and 15 L nonrebreather mask.  Fortunately her work of breathing is okay and her sats are in the low to mid 90s and holding. Extremely high risk of never coming off of ventilator if she was to require/allow intubation.  Hospice evaluation is recommended at this time.    CXR and oxygenation unchanged again  Likely not a BiPAP candidate given her anxiety    Pulmonary embolism on right (HCC)- (present on admission)  Assessment & Plan  Subsegmental, no RV strain  Clinically not in right heart failure  Continue DOAC  No significant bleeding at this time, monitoring-trace hemoptysis occasionally only    Primary undifferentiated sarcoma of  "soft tissue (HCC)- (present on admission)  Assessment & Plan  Completed radiation in July 2023, patient concern radiation therapy was causative for her current diffuse metastatic condition  Diffuse metastatic disease to the shoulder, lungs  AIM vs Capmatinib  Onc following  Extremely rapid growing tumor with rather dramatic increase in disease by chest CT 1 month apart 8/20 versus 9/20  Hospice recommended by oncology and myself    Patient deferring hospice for now  Antibody therapy offered by oncology, hopefully will receive it tomorrow with compassionate rush order versus early next week?  CODE STATUS reviewed by oncology and she offered this compassionate chemotherapy with the caveat DNR/DNI and the patient agreed and family and dear friends are supportive, CODE STATUS changed to DNR/DNI    Capmatinib per oncology for her sarcoma    Mass of sacrum- (present on admission)  Assessment & Plan  Sarcoma  Analgesia as needed    Lung cancer (HCC)- (present on admission)  Assessment & Plan  Stage I adenocarcinoma initially treated with chemoradiation  Follows with Dr Newton (Onc)  Patient concerned prior radiation therapy contributed to current problem    Chronic pain due to neoplasm- (present on admission)  Assessment & Plan  Continue pain management  Patient requests no additional fentanyl patches \"people are dying from fentanyl\"  Patient stated her pain was controlled with current regimen  Not asking for narcotics-patient, reviewed benefits and risk of narcotics and how we could make it safe for her reluctant  Patient does not want fentanyl or methadone  When ready for hospice and end-of-life IV morphine infusion okay  Oral hydrocodone as needed for now with IV Dilaudid for rescue    Cigarette nicotine dependence without complication- (present on admission)  Assessment & Plan  Has not required nicotine replacement protocol during this hospitalization  Significant prior smoking history may be contributing in part to " her hypoxemia    Goals of care, counseling/discussion  Assessment & Plan  Ongoing discussions for several days with palliative care, oncology and ICU team.  Patient with progressive tumor radiographically and unsuccessful attempts at mitigating worsening oxygenation for alternative problems that seem less likely to be the primary issue.  Hospice recommended by oncology and ICU team.  Patient would like to try some form of therapy if possible and oncology offered an oral chemotherapeutic regimen today which may take 1 to several days to obtain.  She offered it with the caveat that we change her CODE STATUS to DNR/DNI and the patient agreed with oncology.  Multiple family members and dear friends at the bedside all participated in discussion and all are in agreement.  We will continue aggressive supportive care and current treatment for alternative problems while we wait for chemotherapeutic regimen.  CODE STATUS will be changed to DNR/DNI.          VTE:  NOAC  Ulcer:  Patient had some side effects to PPI  Lines: None    I have performed a physical exam and reviewed and updated ROS and Plan today (9/29/2023). In review of yesterday's note (9/28/2023), there are no changes except as documented above.     Discussed patient condition and risk of morbidity and/or mortality with Family, RN, RT, Pharmacy, , Code status disscussed, Charge nurse / hot rounds, Patient, and oncology    The patient remains critically ill.  Critical care time = 50  minutes in directly providing and coordinating critical care and extensive data review.  No time overlap and excludes procedures.

## 2023-09-30 NOTE — CARE PLAN
Problem: Aerosol Therapy  Goal: Improved hydration/ability to mobilize secretions and/or decreased airway edema  Description: Target End Date:  resolve prior to discharge or when underlying condition is resolved/stabilized    1.  Implement heated or cool aerosol therapy  2.  Assessed for optimal hydration, decreased edema and/or improved ability to mobilize secretions  Outcome: Not Met     At this time pt is on 50L 90%  Duo Q4

## 2023-09-30 NOTE — CARE PLAN
Problem: Bronchoconstriction  Goal: Improve in air movement and diminished wheezing  Description: Target End Date:  2 to 3 days    1.  Implement inhaled treatments  2.  Evaluate and manage medication effects  Outcome: Met     Problem: Humidified High Flow Nasal Cannula  Goal: Maintain adequate oxygenation dependent on patient condition  Description: Target End Date:  resolve prior to discharge or when underlying condition is resolved/stabilized    1.  Implement humidified high flow oxygen therapy  2.  Titrate high flow oxygen to maintain appropriate SpO2  Outcome:not met                                              Respiratory Update     Treatment modality: Duoneb  Frequency: Q4     Pt on HFNC 45L, 100% with 15L NRB.     Pt tolerating current treatments well with no adverse reactions.

## 2023-09-30 NOTE — PROGRESS NOTES
Critical Care Progress Note    Date of admission  9/20/2023    Chief Complaint  48-year-old female with past medical history of COPD, asthma, anxiety and depression as well as a stage I adenocarcinoma of the lung was discovered in July of last year for which she received chemoradiation for.  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  She presented to the hospital on 9/20/2023 for dyspnea and pain.  A CT scan done on admission showed a small right subsegmental PE with diffusely worsening pulmonary nodules with possible overlying inflammatory or infectious changes.  She does state that she has been coughing up significant sputum which just prior to admission had begun having small streaks of blood.  The patient was admitted to the oncology floor where she was seen by medical oncology and planned for initiation of chemotherapy.  Her case was discussed with her by her oncology team and patient requested second opinion from [place].  Last month she was admitted for hemoptysis and found to have a PE however was not started on anticoagulation due to hemoptysis.  On admission to the hospital during this stay the patient was started on ceftriaxone and azithromycin for pneumonia coverage.  Over the last 3 days her supplemental oxygen requirements have increased and today she has been significantly tachycardic and anxious due to her hypoxia.  EKG showed sinus tachycardia, she is currently on high flow nasal cannula and nonrebreather saturating between 88 and 92%.     During my talk with her we discussed her ongoing overall poor prognosis with stage IV sarcoma with diffuse metastatic burden to the lungs and progressive hypoxia.  We discussed that if she were to fail high flow nasal cannula mechanical intubation would be the next step however if she were to be placed on mechanical ventilation she  "may never be able to be weaned from support.  The patient tearfully replied that she \"wants more time\" and she is \"not ready to die\".  (Dr Mcdowell HPI 9/26)    Hospital Course    9/27 - HFNC 100/50 + MRBM, WOB ok, lasix, decadron, C3  9/28 - HFNC 100/50 + NRBM, lasix, Decadron, Ceftriaxone, code status changed to DNR/DNI  9/29 -  HFNC % + NRBM 15 L, decadron, lasix, last day ceftriaxone, Capmatinib initiated, DEX drip      Interval Problem Update  Reviewed last 24 hour events:    A&O x4  Up 20 commode  Dialudid x3 last 24 for Back pain  DEX 0.6-weaned off completely over the course of the day  Scheduled ativan PO  No PRN ativan IV  ST 100s  SBp 120s  UO adequate  I/Os -340 cc  HFNC 45->60/100 + NRBM  Afebrile  Last WBC 14.6  Apixiban, no signs of bleeding  Capmatinib bid      Continue nebulizers  Up decadron Q6  Continue Lasix  Mobilize  Change Benadryl to as needed    Long discussion with patient and multiple sisters and 1 daughter in the afternoon.  Reviewed respiratory status with the new arrivals including most of her sisters.  Oxygenation better today down to 100% to 90% and tolerating being off the nonrebreather mask at times.  Patient feels less tight in the chest and bronchospasm was less by exam.  No new complaints or symptoms and she is not taking a small amount of the narcotics and clinically doing well with them.       YESTERDAY  A&O x3-4  Still very upset stated  Declining Narcs  DEX 1.5  SR/ST 90-100s  SBp 90-120s  UO excellent  Fluid balance - neg 1.89 L  HFNC 100/50  + NRBM 15l  DuoNeb Q4H  CXR no change  Tmax-98.5  WBC 14.6  ABX complete-10-day course of ceftriaxone complete  No positive cultures  Apixiban, no bleeding clinically  Hemoglobin unchanged at 10.5  Platelet count 104, slowly trending down  Decadron twice a day, consider increasing with bronchospasm      I reviewed anxiety/pain management at length with Ms. Epstein and 2 of her daughters.  Outlined the difference between IV versus " oral and continuous infusion versus bolus at length as well.    Patient still reluctant to take narcotics so we will hold on scheduled agents and continue oral as needed when she is willing.    She has lots of itching and definitely wants to take the Benadryl.    Dexmedetomidine is helping some and we will continue to titrate that.    Ativan at night seems to help so we will schedule Ativan 0.5 mg every 8 hours and additionally give 0.5 mg IV as needed for acute episodes.  I told her we had alternatives such as haloperidol which we get also give IV as needed but will start with just 1 agent at first.    Finally we discussed when she gets really dyspneic and is ready for hospice continuous infusion of morphine.    Reviewed plan at length with nursing staff.    Review of Systems  Review of Systems   Constitutional:  Positive for malaise/fatigue (Improved). Negative for fever.   HENT:  Negative for congestion and sore throat.    Respiratory:  Positive for cough (Improved) and shortness of breath (Better today, primarily when anxious or exerting herself). Negative for hemoptysis and sputum production.    Cardiovascular:  Negative for chest pain (denies today).   Gastrointestinal:  Negative for abdominal pain, nausea and vomiting.        Belching   Genitourinary:  Negative for dysuria.   Musculoskeletal:  Positive for back pain (Much better with pain medication).   Neurological:  Positive for headaches (Much better with pain medication). Negative for focal weakness.   Psychiatric/Behavioral:  Positive for depression (Better today). The patient is not nervous/anxious (much better).         Vital Signs for last 24 hours   Temp:  [36.1 °C (97 °F)-37.1 °C (98.7 °F)] 36.7 °C (98 °F)  Pulse:  [] 104  Resp:  [22-33] 28  BP: ()/(57-73) 126/59  SpO2:  [88 %-100 %] 95 %    Hemodynamic parameters for last 24 hours       Respiratory Information for the last 24 hours       Physical Exam   Physical Exam  Vitals reviewed.    Constitutional:       Appearance: She is normal weight. She is ill-appearing (Looks better). She is not toxic-appearing or diaphoretic.      Comments: HFNC plus NRBM, to speak in full sentences   HENT:      Head: Atraumatic.      Mouth/Throat:      Mouth: Mucous membranes are moist.   Eyes:      General: No scleral icterus.     Extraocular Movements: Extraocular movements intact.      Pupils: Pupils are equal, round, and reactive to light.   Neck:      Vascular: No JVD.   Cardiovascular:      Rate and Rhythm: Regular rhythm. No extrasystoles are present.     Heart sounds: No murmur heard.     Comments: SR/ST  Pulmonary:      Effort: Pulmonary effort is normal. No respiratory distress.      Breath sounds: Rales (Improved) present. No wheezing (Nearly resolved, slight prolonged expiratory phase) or rhonchi.      Comments: Speaking in full sentences, less dyspneic  Skin:     General: Skin is dry.      Capillary Refill: Capillary refill takes less than 2 seconds.      Coloration: Skin is not cyanotic.      Nails: There is no clubbing.   Neurological:      General: No focal deficit present.      Mental Status: She is oriented to person, place, and time. Mental status is at baseline.   Psychiatric:         Attention and Perception: Attention normal.         Mood and Affect: Mood is anxious and depressed. Affect is labile. Affect is not angry.         Speech: Speech normal.         Behavior: Behavior is cooperative.         Thought Content: Thought content normal.         Cognition and Memory: Cognition normal.      Comments: Improved         Medications  Current Facility-Administered Medications   Medication Dose Route Frequency Provider Last Rate Last Admin    Nozin nasal  swab  1 Applicator Each Nostril BID Tonny Edmond M.D.   1 Applicator at 09/30/23 1800    dexamethasone (Decadron) injection 4 mg  4 mg Intravenous Q6HRS Tonny Edmond M.D.   4 mg at 09/30/23 1801    Nozin nasal  swab  1  Applicator Each Nostril BID Tonny Edmond M.D.        sodium chloride (Ocean) 0.65 % nasal spray 2 Spray  2 Spray Nasal Q2HRS PRN Tonny Edmond M.D.        LORazepam (Ativan) injection 0.5 mg  0.5 mg Intravenous Q6HRS PRN Tonny Edmond M.D.   0.5 mg at 09/30/23 1801    LORazepam (Ativan) tablet 0.5 mg  0.5 mg Oral Q8HRS Tonny Edmond M.D.   0.5 mg at 09/30/23 1342    capmatinib (Tabrecta) tablet 400 mg  400 mg Oral BID Alma Newton M.D.   400 mg at 09/30/23 1503    dexmedetomidine (PRECEDEX) 400 mcg/100mL NS premix infusion  0.1-1.5 mcg/kg/hr (Ideal) Intravenous Continuous Remberto Mcdowell Jr., D.O. 12.8 mL/hr at 09/30/23 0741 0.7 mcg/kg/hr at 09/30/23 0741    HYDROmorphone (Dilaudid) injection 1 mg  1 mg Intravenous Q3HRS PRN Kay Christensen, A.P.R.N.   1 mg at 09/30/23 1500    guaiFENesin ER (Mucinex) tablet 600 mg  600 mg Oral Q12HRS Naima Ambrosio, A.P.R.N.   600 mg at 09/30/23 1801    polyethylene glycol/lytes (Miralax) PACKET 1 Packet  1 Packet Oral Q48HRS Ivonne Mohan, A.P.R.N.   1 Packet at 09/24/23 1207    HYDROcodone/acetaminophen (Norco)  MG per tablet 1 Tablet  1 Tablet Oral Q3HRS PRN Yenifer Velazquez M.D.   1 Tablet at 09/30/23 1639    diphenhydrAMINE (Benadryl) injection 25 mg  25 mg Intravenous Q6HRS Kay Christensen A.P.R.N.   25 mg at 09/30/23 1342    diphenhydrAMINE (Benadryl) injection 25 mg  25 mg Intravenous Q6HRS PRN Kay Christensen A.P.R.N.   25 mg at 09/30/23 1639    fluticasone-umeclidinium-vilanterol (Trelegy Ellipta) 100-62.5-25 mcg/act inhaler 1 Puff  1 Puff Inhalation DAILY Yenifer Velazquez M.D.   1 Puff at 09/25/23 0528    ipratropium-albuterol (DUONEB) nebulizer solution  3 mL Nebulization Q4HRS (RT) Yenifer Velazquez M.D.   3 mL at 09/30/23 1340    ipratropium-albuterol (DUONEB) nebulizer solution  3 mL Nebulization Q2HRS PRN (RT) Yenifer Velazquez M.D.   3 mL at 09/29/23 1157    albuterol inhaler 2 Puff  2 Puff Inhalation Q4HRS PRN Madhu SWIFT  BECKY Merino   2 Puff at 09/23/23 0501    diclofenac sodium (Voltaren) 1 % gel 2 g  2 g Topical 4X/DAY PRN Madhu Merino D.O.        loratadine (Claritin) tablet 10 mg  10 mg Oral DAILY Madhu Merino D.O.   10 mg at 09/30/23 0556    senna-docusate (Pericolace Or Senokot S) 8.6-50 MG per tablet 2 Tablet  2 Tablet Oral BID Madhu Merino D.O.   2 Tablet at 09/30/23 1801    And    polyethylene glycol/lytes (Miralax) PACKET 1 Packet  1 Packet Oral QDAY PRN Madhu Merino D.O.   1 Packet at 09/23/23 1141    And    magnesium hydroxide (Milk Of Magnesia) suspension 30 mL  30 mL Oral QDAY PRN Madhu Merino D.O.   30 mL at 09/24/23 0939    And    bisacodyl (Dulcolax) suppository 10 mg  10 mg Rectal QDAY PRN Madhu Merino D.O.   10 mg at 09/28/23 1300    Respiratory Therapy Consult   Nebulization Continuous RT Madhu Merino D.O.        acetaminophen (Tylenol) tablet 650 mg  650 mg Oral Q6HRS PRN Madhu Merino D.O.        hydrALAZINE (Apresoline) injection 10 mg  10 mg Intravenous Q4HRS PRN Madhu Merino D.O.   10 mg at 09/26/23 1010    ondansetron (Zofran) syringe/vial injection 4 mg  4 mg Intravenous Q4HRS PRN Madhu Merino D.O.        ondansetron (Zofran ODT) dispertab 4 mg  4 mg Oral Q4HRS PRN Madhu Merino D.O.        promethazine (Phenergan) tablet 12.5-25 mg  12.5-25 mg Oral Q4HRS PRN Madhu Merino D.O.        promethazine (Phenergan) suppository 12.5-25 mg  12.5-25 mg Rectal Q4HRS PRN Madhu Merino D.O.        prochlorperazine (Compazine) injection 5-10 mg  5-10 mg Intravenous Q4HRS PRN Madhu Merino D.O.        guaiFENesin dextromethorphan (Robitussin DM) 100-10 MG/5ML syrup 10 mL  10 mL Oral Q6HRS PRN Madhu Merino D.O.   10 mL at 09/30/23 1802    apixaban (Eliquis) tablet 5 mg  5 mg Oral BID Madhu Merino D.O.   5 mg at 09/30/23 1802       Fluids    Intake/Output Summary (Last 24 hours) at 9/30/2023 1841  Last data filed at 9/30/2023 1400  Gross per 24 hour   Intake 1229.25 ml   Output  700 ml   Net 529.25 ml       Laboratory          Recent Labs     09/28/23 0443 09/29/23  0535   SODIUM 136 138   POTASSIUM 4.4 4.0   CHLORIDE 98 99   CO2 26 24   BUN 14 18   CREATININE 0.44* 0.51   MAGNESIUM 2.1  --    PHOSPHORUS 4.1  --    CALCIUM 9.1 8.7     Recent Labs     09/28/23  0443 09/29/23  0535   GLUCOSE 122* 106*     Recent Labs     09/28/23  0443 09/29/23  0535   WBC 13.2* 14.6*     Recent Labs     09/28/23  0443 09/29/23  0535   RBC 3.87* 4.02*   HEMOGLOBIN 10.2* 10.5*   HEMATOCRIT 33.7* 34.3*   PLATELETCT 119* 104*       Imaging  X-Ray:  I have personally reviewed the images and compared with prior images.  CT:    Reviewed    Assessment/Plan  * Acute on chronic respiratory failure (HCC)- (present on admission)  Assessment & Plan  Due to sarcoma metastasis to the lungs with possibly overlying edema versus infection  Continue antibiotics not dramatically improving with prolonged course  Forced diuresis as clinically appropriate  Currently on HFNC/nonrebreather mask  RT/O2 Protocols  Titrate supplemental FiO2 to maintain SpO2 >92%  Continue scheduled nebs, steroids and give a dose of magnesium for wheezing/bronchospasm component    Unfortunately the majority of her other possibilities for hypoxemia including COPD/bronchospasm/pneumonia/PE/volume overload all are on appropriate therapy and all do not appear to be a significant portion of her problem.  CT chest change between 8/20 and 9/20 is very dramatic.  I believe the vast majority of her hypoxemia is tumor progression in her lungs.  Unfortunately this poorly differentiated sarcoma is unlikely to respond to any therapy at this time per oncology.  Additionally she is on max oxygen therapy at this time with 50 L / 100% HFNC and 15 L nonrebreather mask.  Fortunately her work of breathing is okay and her sats are in the low to mid 90s and holding. Extremely high risk of never coming off of ventilator if she was to require/allow intubation.  Hospice  "evaluation is recommended at this time.    First day we have seen some improved oxygenation  Likely not a BiPAP candidate given her anxiety    Pulmonary embolism on right (HCC)- (present on admission)  Assessment & Plan  Subsegmental, no RV strain  Clinically not in right heart failure  Continue DOAC  No significant bleeding at this time, monitoring-trace hemoptysis occasionally only  Ongoing gentle diuresis    Primary undifferentiated sarcoma of soft tissue (HCC)- (present on admission)  Assessment & Plan  Completed radiation in July 2023, patient concern radiation therapy was causative for her current diffuse metastatic condition  Diffuse metastatic disease to the shoulder, lungs  AIM vs Capmatinib  Onc following  Extremely rapid growing tumor with rather dramatic increase in disease by chest CT 1 month apart 8/20 versus 9/20  Hospice recommended by oncology and myself    Patient deferring hospice for now  Antibody therapy offered by oncology, hopefully will receive it tomorrow with compassionate rush order versus early next week?  CODE STATUS reviewed by oncology and she offered this compassionate chemotherapy with the caveat DNR/DNI and the patient agreed and family and dear friends are supportive, CODE STATUS changed to DNR/DNI    Capmatinib per oncology for her sarcoma started 9/29, daughter picked up at the pharmacy  Monitor for clinical response and side effects    Mass of sacrum- (present on admission)  Assessment & Plan  Sarcoma  Analgesia as needed    Lung cancer (HCC)- (present on admission)  Assessment & Plan  Stage I adenocarcinoma initially treated with chemoradiation  Follows with Dr Newton (Onc)  Patient concerned prior radiation therapy contributed to current problem    Chronic pain due to neoplasm- (present on admission)  Assessment & Plan  Continue pain management  Patient requests no additional fentanyl patches \"people are dying from fentanyl\"  Patient stated her pain was controlled with current " regimen  Not asking for narcotics-patient, reviewed benefits and risk of narcotics and how we could make it safe for her reluctant  Patient does not want fentanyl or methadone  When ready for hospice and end-of-life IV morphine infusion okay  Oral hydrocodone as needed for now with IV Dilaudid for rescue    Improved today now that she is taking occasional IV Dilaudid for severe pain and oral hydrocodone for moderate pain with longer relief and she is not overly sedated    Cigarette nicotine dependence without complication- (present on admission)  Assessment & Plan  Has not required nicotine replacement protocol during this hospitalization  Significant prior smoking history may be contributing in part to her hypoxemia    Goals of care, counseling/discussion  Assessment & Plan  Ongoing discussions for several days with palliative care, oncology and ICU team.  Patient with progressive tumor radiographically and unsuccessful attempts at mitigating worsening oxygenation for alternative problems that seem less likely to be the primary issue.  Hospice recommended by oncology and ICU team.  Patient would like to try some form of therapy if possible and oncology offered an oral chemotherapeutic regimen today which may take 1 to several days to obtain.  She offered it with the caveat that we change her CODE STATUS to DNR/DNI and the patient agreed with oncology.  Multiple family members and dear friends at the bedside all participated in discussion and all are in agreement.  We will continue aggressive supportive care and current treatment for alternative problems while we wait for chemotherapeutic regimen.  CODE STATUS will be changed to DNR/DNI.          VTE:  NOAC  Ulcer:  Patient had some side effects to PPI  Lines: None    I have performed a physical exam and reviewed and updated ROS and Plan today (9/30/2023). In review of yesterday's note (9/29/2023), there are no changes except as documented above.     Discussed  patient condition and risk of morbidity and/or mortality with Family, RN, RT, Pharmacy, , Code status disscussed, Charge nurse / hot rounds, Patient, and oncology    The patient remains critically ill.  Critical care time = 50   minutes in directly providing and coordinating critical care and extensive data review.  No time overlap and excludes procedures.

## 2023-09-30 NOTE — CARE PLAN
The patient is Watcher - Medium risk of patient condition declining or worsening    Shift Goals  Clinical Goals: Pulmonary hygiene, pain control  Patient Goals: Pain control and comfort  Family Goals: Updates    Progress made toward(s) clinical / shift goals:      Problem: Pain - Standard  Goal: Alleviation of pain or a reduction in pain to the patient’s comfort goal  Outcome: Progressing     Problem: Respiratory  Goal: Patient will achieve/maintain optimum respiratory ventilation and gas exchange  Outcome: Progressing     Problem: Knowledge Deficit - Standard  Goal: Patient and family/care givers will demonstrate understanding of plan of care, disease process/condition, diagnostic tests and medications  Outcome: Progressing       Patient is not progressing towards the following goals:

## 2023-09-30 NOTE — CARE PLAN
Shift Goals  Clinical Goals: Pulmonary hygiene, pain control  Patient Goals: Pain control and comfort  Family Goals: Updates    Progress made toward(s) clinical / shift goals:    Medicating per MAR, collaborating with IDT, normalizing sleep/wake cycles, monitoring nutrition intake and ins/outs, facilitating ADLs as thea. Fall, skin, delirium risk interventions in place. Education offered with each interaction. Please refer to flow sheets for additional interventions.        Problem: Pain - Standard  Goal: Alleviation of pain or a reduction in pain to the patient’s comfort goal  Outcome: Progressing     Problem: Care Map:  Optimal Outcome for the Pneumonia Patient  Goal: Collection and monitoring of appropriate tests and labs  Outcome: Progressing     Problem: Respiratory  Goal: Patient will achieve/maintain optimum respiratory ventilation and gas exchange  Outcome: Progressing     Problem: Risk for Aspiration  Goal: Patient's risk for aspiration will be absent or decrease  Outcome: Progressing     Problem: Hemodynamics - Pneumonia  Goal: Patient's hemodynamics, fluid balance and neurologic status will be stable or improve  Outcome: Progressing     Problem: Self Care  Goal: Patient will have the ability to perform ADLs independently or with assistance (bathe, groom, dress, toilet and feed)  Outcome: Progressing     Problem: Discharge Planning - Pneumonia  Goal: Patient will verbalize understanding of lifestyle changes and therapeutic needs post discharge  Outcome: Progressing     Problem: Knowledge Deficit - Standard  Goal: Patient and family/care givers will demonstrate understanding of plan of care, disease process/condition, diagnostic tests and medications  Outcome: Progressing     Problem: Skin Integrity  Goal: Skin integrity is maintained or improved  Outcome: Progressing     Problem: Psychosocial  Goal: Patient's level of anxiety will decrease  Outcome: Progressing  Goal: Patient's ability to verbalize feelings  about condition will improve  Outcome: Progressing  Goal: Patient's ability to re-evaluate and adapt role responsibilities will improve  Outcome: Progressing  Goal: Patient and family will demonstrate ability to cope with life altering diagnosis and/or procedure  Outcome: Progressing  Goal: Spiritual and cultural needs incorporated into hospitalization  Outcome: Progressing     Problem: Fall Risk  Goal: Patient will remain free from falls  Outcome: Progressing

## 2023-10-01 NOTE — CARE PLAN
Problem: Humidified High Flow Nasal Cannula  Goal: Maintain adequate oxygenation dependent on patient condition  Description: Target End Date:  resolve prior to discharge or when underlying condition is resolved/stabilized    1.  Implement humidified high flow oxygen therapy  2.  Titrate high flow oxygen to maintain appropriate SpO2  Outcome: Not Met   50/100 high flow w & w/o nrb on top throughout night

## 2023-10-01 NOTE — CARE PLAN
Problem: Aerosol Therapy  Goal: Improved hydration/ability to mobilize secretions and/or decreased airway edema  Description: Target End Date:  resolve prior to discharge or when underlying condition is resolved/stabilized    1.  Implement heated or cool aerosol therapy  2.  Assessed for optimal hydration, decreased edema and/or improved ability to mobilize secretions  9/30/2023 1725 by Marguerite Mohan, RRT  Outcome: Not Met  9/30/2023 1351 by Marguerite Mohan, LUCERO  Outcome: Not Met     Titrated to 45L 85% HHFNC

## 2023-10-01 NOTE — PROGRESS NOTES
Critical Care Progress Note    Date of admission  9/20/2023    Chief Complaint  48-year-old female with past medical history of COPD, asthma, anxiety and depression as well as a stage I adenocarcinoma of the lung was discovered in July of last year for which she received chemoradiation for.  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  She presented to the hospital on 9/20/2023 for dyspnea and pain.  A CT scan done on admission showed a small right subsegmental PE with diffusely worsening pulmonary nodules with possible overlying inflammatory or infectious changes.  She does state that she has been coughing up significant sputum which just prior to admission had begun having small streaks of blood.  The patient was admitted to the oncology floor where she was seen by medical oncology and planned for initiation of chemotherapy.  Her case was discussed with her by her oncology team and patient requested second opinion from [place].  Last month she was admitted for hemoptysis and found to have a PE however was not started on anticoagulation due to hemoptysis.  On admission to the hospital during this stay the patient was started on ceftriaxone and azithromycin for pneumonia coverage.  Over the last 3 days her supplemental oxygen requirements have increased and today she has been significantly tachycardic and anxious due to her hypoxia.  EKG showed sinus tachycardia, she is currently on high flow nasal cannula and nonrebreather saturating between 88 and 92%.     During my talk with her we discussed her ongoing overall poor prognosis with stage IV sarcoma with diffuse metastatic burden to the lungs and progressive hypoxia.  We discussed that if she were to fail high flow nasal cannula mechanical intubation would be the next step however if she were to be placed on mechanical ventilation she  "may never be able to be weaned from support.  The patient tearfully replied that she \"wants more time\" and she is \"not ready to die\".  (Dr Mcdowell HPI 9/26)    Hospital Course    9/27 - HFNC 100/50 + MRBM, WOB ok, lasix, decadron, C3  9/28 - HFNC 100/50 + NRBM, lasix, Decadron, Ceftriaxone, code status changed to DNR/DNI  9/29 -  HFNC % + NRBM 15 L, decadron, lasix, last day ceftriaxone, Capmatinib initiated, DEX drip  9/30 -   HFNC 100/50 + 15L NRBM, Decadron, Lasix, Capmatinib, Off DEX infusion      Interval Problem Update  Reviewed last 24 hour events:    A&O x4, some delirium?  Anxiety flares  Dexmedetomidine resume this AM 0.2  Scheduled Ativan 0.5 Q8  PRN Ativan x3/narcotics/diphenhydramine scheduled and PRN  ST 110s  SBP 140s  UO adequate  I/O incomplete documentation  WOB  HFNC 50 L/100% +15 L NRBM  O2 sats 90s  CXR reviewed films  DuoNeb every 4 hours and PRN  Tmax 98.9  Capmatinib day 3  Apixaban  Bowel care protocols    Taper Decadron to every 8 hours  Continue nebs/furosemide  Follow-up labs and CXR in the a.m.  Pulmonary toilet  Analgesia/anxiolysis  Try reduce CNS meds except DEX with delirium    Case reviewed with many new family members including mom and brothers with a few of her kids.  When I saw patient she was off NRBM on HFNC only.    Better LOC midday after starting DEX again.  Adjusted analgesia and anxiety meds.     YESTERDAY  A&O x4  Up 20 commode  Dialudid x3 last 24 for Back pain  DEX 0.6-weaned off completely over the course of the day  Scheduled ativan PO  No PRN ativan IV  ST 100s  SBp 120s  UO adequate  I/Os -340 cc  HFNC 45->60/100 + NRBM  Afebrile  Last WBC 14.6  Apixiban, no signs of bleeding  Capmatinib bid    Continue nebulizers  Up decadron Q6  Continue Lasix  Mobilize  Change Benadryl to as needed    Long discussion with patient and multiple sisters and 1 daughter in the afternoon.  Reviewed respiratory status with the new arrivals including most of her sisters.  Oxygenation " better today down to 100% to 90% and tolerating being off the nonrebreather mask at times.  Patient feels less tight in the chest and bronchospasm was less by exam.  No new complaints or symptoms and she is not taking a small amount of the narcotics and clinically doing well with them.    Review of Systems  Review of Systems   Constitutional:  Positive for malaise/fatigue (Improved). Negative for fever.   HENT:  Negative for congestion and sore throat.    Respiratory:  Positive for cough (Improved) and shortness of breath (Better today, primarily when anxious or exerting herself). Negative for hemoptysis and sputum production.    Cardiovascular:  Negative for chest pain (denies today).   Gastrointestinal:  Negative for abdominal pain, nausea and vomiting.        Belching   Genitourinary:  Negative for dysuria.   Musculoskeletal:  Positive for back pain (Much better with pain medication).   Neurological:  Positive for headaches (Much better with pain medication). Negative for focal weakness.   Psychiatric/Behavioral:  Positive for depression (Better today). The patient is not nervous/anxious (better).         Vital Signs for last 24 hours   Temp:  [36.7 °C (98 °F)-37.7 °C (99.9 °F)] 36.7 °C (98 °F)  Pulse:  [] 98  Resp:  [17-37] 21  BP: (111-165)/(58-96) 136/74  SpO2:  [84 %-99 %] 93 %    Hemodynamic parameters for last 24 hours       Respiratory Information for the last 24 hours       Physical Exam   Physical Exam  Vitals reviewed.   Constitutional:       Appearance: She is normal weight. She is ill-appearing (Looks better). She is not toxic-appearing or diaphoretic.      Comments: HFNC w/o NRBM, speaking in full sentences   HENT:      Head: Atraumatic.      Mouth/Throat:      Mouth: Mucous membranes are moist.   Eyes:      General: No scleral icterus.     Extraocular Movements: Extraocular movements intact.      Pupils: Pupils are equal, round, and reactive to light.   Neck:      Vascular: No JVD.    Cardiovascular:      Rate and Rhythm: Regular rhythm. No extrasystoles are present.     Heart sounds: No murmur heard.     Comments: SR/ST  Pulmonary:      Effort: Pulmonary effort is normal. No respiratory distress.      Breath sounds: Rales (Improved) present. No wheezing (Nearly resolved, slight prolonged expiratory phase) or rhonchi.      Comments: Speaking in full sentences, less dyspneic  Skin:     General: Skin is dry.      Capillary Refill: Capillary refill takes less than 2 seconds.      Coloration: Skin is not cyanotic.      Nails: There is no clubbing.   Neurological:      General: No focal deficit present.      Mental Status: She is oriented to person, place, and time. Mental status is at baseline.   Psychiatric:         Attention and Perception: Attention normal.         Mood and Affect: Mood is anxious and depressed. Affect is labile. Affect is not angry.         Speech: Speech normal.         Behavior: Behavior is cooperative.         Thought Content: Thought content normal.         Cognition and Memory: Cognition normal.      Comments: Delirium at times per RN, ok with my evals         Medications  Current Facility-Administered Medications   Medication Dose Route Frequency Provider Last Rate Last Admin    LORazepam (Ativan) injection 0.5 mg  0.5 mg Intravenous Q4HRS PRN Tonny Edmond M.D.        lidocaine (Lidoderm) 5 % 3 Patch  3 Patch Transdermal Q24HR Tonny Edmond M.D.   3 Patch at 10/01/23 1244    Nozin nasal  swab  1 Applicator Each Nostril BID Tonny Edmond M.D.   1 Applicator at 09/30/23 1800    dexamethasone (Decadron) injection 4 mg  4 mg Intravenous Q6HRS Tonny Edmond M.D.   4 mg at 10/01/23 1248    sodium chloride (Ocean) 0.65 % nasal spray 2 Spray  2 Spray Nasal Q2HRS PRN Tonny Edmond M.D.        HYDROmorphone (Dilaudid) injection 1 mg  1 mg Intravenous Q HOUR PRN NICOLASA BaigP.REstrellitaN.   1 mg at 10/01/23 1532    LORazepam (Ativan) tablet 0.5 mg   0.5 mg Oral Q8HRS Tonny Edmond M.D.   0.5 mg at 10/01/23 1351    capmatinib (Tabrecta) tablet 400 mg  400 mg Oral BID Alma Newton M.D.   400 mg at 10/01/23 1533    dexmedetomidine (PRECEDEX) 400 mcg/100mL NS premix infusion  0.1-1.5 mcg/kg/hr (Ideal) Intravenous Continuous Remberto Mcdowell Jr., D.OEstrellita 3.7 mL/hr at 10/01/23 0803 0.2 mcg/kg/hr at 10/01/23 0803    guaiFENesin ER (Mucinex) tablet 600 mg  600 mg Oral Q12HRS Naima Ambrosio, A.P.R.N.   600 mg at 10/01/23 0522    polyethylene glycol/lytes (Miralax) PACKET 1 Packet  1 Packet Oral Q48HRS Ivonne Mohan, A.P.R.N.   1 Packet at 09/24/23 1207    HYDROcodone/acetaminophen (Norco)  MG per tablet 1 Tablet  1 Tablet Oral Q3HRS PRN Yenifer Velazquez M.D.   1 Tablet at 10/01/23 1247    diphenhydrAMINE (Benadryl) injection 25 mg  25 mg Intravenous Q6HRS PRN Kay Christensen, A.P.R.N.   25 mg at 10/01/23 1605    fluticasone-umeclidinium-vilanterol (Trelegy Ellipta) 100-62.5-25 mcg/act inhaler 1 Puff  1 Puff Inhalation DAILY Yenifer Velazquez M.D.   1 Puff at 09/25/23 0528    ipratropium-albuterol (DUONEB) nebulizer solution  3 mL Nebulization Q4HRS (RT) Yenifer Velazquez M.D.   3 mL at 10/01/23 1442    ipratropium-albuterol (DUONEB) nebulizer solution  3 mL Nebulization Q2HRS PRN (RT) Yenifer Velazquez M.D.   3 mL at 09/29/23 1157    albuterol inhaler 2 Puff  2 Puff Inhalation Q4HRS PRN Madhu Merino D.O.   2 Puff at 10/01/23 0516    diclofenac sodium (Voltaren) 1 % gel 2 g  2 g Topical 4X/DAY PRN Madhu Merino D.O.        loratadine (Claritin) tablet 10 mg  10 mg Oral DAILY Madhu Merino D.O.   10 mg at 09/30/23 0556    senna-docusate (Pericolace Or Senokot S) 8.6-50 MG per tablet 2 Tablet  2 Tablet Oral BID Madhu Merino D.O.   2 Tablet at 10/01/23 0522    And    polyethylene glycol/lytes (Miralax) PACKET 1 Packet  1 Packet Oral QDAY PRN Madhu Merino D.O.   1 Packet at 09/23/23 1141    And    magnesium hydroxide (Milk Of Magnesia) suspension 30  mL  30 mL Oral QDAY PRN SANJU GarciaOEstrellita   30 mL at 09/24/23 0939    And    bisacodyl (Dulcolax) suppository 10 mg  10 mg Rectal QDAY PRN SANJU GarciaOEstrellita   10 mg at 09/28/23 1300    Respiratory Therapy Consult   Nebulization Continuous RT Madhu Merino D.O.        acetaminophen (Tylenol) tablet 650 mg  650 mg Oral Q6HRS PRN Madhu Merino D.O.        hydrALAZINE (Apresoline) injection 10 mg  10 mg Intravenous Q4HRS PRN SANJU GarciaOEstrellita   10 mg at 09/26/23 1010    ondansetron (Zofran) syringe/vial injection 4 mg  4 mg Intravenous Q4HRS PRN SANJU GarciaOEstrellita   4 mg at 10/01/23 0655    ondansetron (Zofran ODT) dispertab 4 mg  4 mg Oral Q4HRS PRN Madhu Merino D.O.        promethazine (Phenergan) tablet 12.5-25 mg  12.5-25 mg Oral Q4HRS PRN Madhu Merino D.O.        promethazine (Phenergan) suppository 12.5-25 mg  12.5-25 mg Rectal Q4HRS PRN Madhu Merino D.O.        prochlorperazine (Compazine) injection 5-10 mg  5-10 mg Intravenous Q4HRS PRN Madhu Merino D.O.        guaiFENesin dextromethorphan (Robitussin DM) 100-10 MG/5ML syrup 10 mL  10 mL Oral Q6HRS PRN SANJU GarciaOEstrellita   10 mL at 09/30/23 1802    apixaban (Eliquis) tablet 5 mg  5 mg Oral BID SANJU GarciaOEstrellita   5 mg at 10/01/23 0858       Fluids    Intake/Output Summary (Last 24 hours) at 10/1/2023 1722  Last data filed at 10/1/2023 1600  Gross per 24 hour   Intake 1609.87 ml   Output 800 ml   Net 809.87 ml       Laboratory          Recent Labs     09/29/23  0535   SODIUM 138   POTASSIUM 4.0   CHLORIDE 99   CO2 24   BUN 18   CREATININE 0.51   CALCIUM 8.7     Recent Labs     09/29/23  0535   GLUCOSE 106*     Recent Labs     09/29/23  0535   WBC 14.6*     Recent Labs     09/29/23  0535   RBC 4.02*   HEMOGLOBIN 10.5*   HEMATOCRIT 34.3*   PLATELETCT 104*       Imaging  X-Ray:  I have personally reviewed the images and compared with prior images.  CT:    Reviewed    Assessment/Plan  * Acute on chronic respiratory failure (HCC)-  (present on admission)  Assessment & Plan  Due to sarcoma metastasis to the lungs with possibly overlying edema versus infection  Continue antibiotics not dramatically improving with prolonged course  Forced diuresis as clinically appropriate  Currently on HFNC/nonrebreather mask  RT/O2 Protocols  Titrate supplemental FiO2 to maintain SpO2 >92%  Continue scheduled nebs, steroids and give a dose of magnesium for wheezing/bronchospasm component    Unfortunately the majority of her other possibilities for hypoxemia including COPD/bronchospasm/pneumonia/PE/volume overload all are on appropriate therapy and all do not appear to be a significant portion of her problem.  CT chest change between 8/20 and 9/20 is very dramatic.  I believe the vast majority of her hypoxemia is tumor progression in her lungs.  Unfortunately this poorly differentiated sarcoma is unlikely to respond to any therapy at this time per oncology.  Additionally she is on max oxygen therapy at this time with 50 L / 100% HFNC and 15 L nonrebreather mask.  Fortunately her work of breathing is okay and her sats are in the low to mid 90s and holding. Extremely high risk of never coming off of ventilator if she was to require/allow intubation.  Hospice evaluation is recommended at this time.    On/off NRBM, WOB ok, anxiety/delirium generates issues with O2 sats    Pulmonary embolism on right (HCC)- (present on admission)  Assessment & Plan  Subsegmental, no RV strain  Clinically not in right heart failure  Continue DOAC  No significant bleeding at this time, monitoring-trace hemoptysis occasionally only  Ongoing gentle diuresis  Plan life long DOAC    Primary undifferentiated sarcoma of soft tissue (HCC)- (present on admission)  Assessment & Plan  Completed radiation in July 2023, patient concern radiation therapy was causative for her current diffuse metastatic condition  Diffuse metastatic disease to the shoulder, lungs  AIM vs Capmatinib  Onc  "following  Extremely rapid growing tumor with rather dramatic increase in disease by chest CT 1 month apart 8/20 versus 9/20  Hospice recommended by oncology and myself    Patient deferring hospice for now  Antibody therapy offered by oncology, hopefully will receive it tomorrow with compassionate rush order versus early next week?  CODE STATUS reviewed by oncology and she offered this compassionate chemotherapy with the caveat DNR/DNI and the patient agreed and family and dear friends are supportive, CODE STATUS changed to DNR/DNI    Capmatinib per oncology for her sarcoma started 9/29, daughter picked up at the pharmacy  Monitor for clinical response and side effects    Pain more of an issue  Lidocaine patch for R shoulder issue  Norco for back/sacrum, dilaudid for breakthrough    Mass of sacrum- (present on admission)  Assessment & Plan  Sarcoma  Analgesia as needed    Lung cancer (HCC)- (present on admission)  Assessment & Plan  Stage I adenocarcinoma initially treated with chemoradiation  Follows with Dr Newton (Onc)  Patient concerned prior radiation therapy contributed to current problem    Chronic pain due to neoplasm- (present on admission)  Assessment & Plan  Continue pain management  Patient requests no additional fentanyl patches \"people are dying from fentanyl\"  Patient stated her pain was controlled with current regimen  Not asking for narcotics-patient, reviewed benefits and risk of narcotics and how we could make it safe for her reluctant  Patient does not want fentanyl or methadone  When ready for hospice and end-of-life IV morphine infusion okay  Oral hydrocodone as needed for now with IV Dilaudid for rescue    Some delirium like behavior overnight - better today  Encouraged oral meds over IV    Cigarette nicotine dependence without complication- (present on admission)  Assessment & Plan  Has not required nicotine replacement protocol during this hospitalization  Significant prior smoking history " may be contributing in part to her hypoxemia  No nicotine craving  Query COPD - some Bspasm - better today  Continue decadron and DuoNeb    Goals of care, counseling/discussion  Assessment & Plan  Ongoing discussions for several days with palliative care, oncology and ICU team.  Patient with progressive tumor radiographically and unsuccessful attempts at mitigating worsening oxygenation for alternative problems that seem less likely to be the primary issue.  Hospice recommended by oncology and ICU team.  Patient would like to try some form of therapy if possible and oncology offered an oral chemotherapeutic regimen today which may take 1 to several days to obtain.  She offered it with the caveat that we change her CODE STATUS to DNR/DNI and the patient agreed with oncology.  Multiple family members and dear friends at the bedside all participated in discussion and all are in agreement.  We will continue aggressive supportive care and current treatment for alternative problems while we wait for chemotherapeutic regimen.  CODE STATUS will be changed to DNR/DNI.  No change in plan         VTE:  NOAC  Ulcer:  Patient had some side effects to PPI  Lines: None    I have performed a physical exam and reviewed and updated ROS and Plan today (10/1/2023). In review of yesterday's note (9/30/2023), there are no changes except as documented above.     Discussed patient condition and risk of morbidity and/or mortality with Family, RN, RT, Pharmacy, Code status disscussed, Charge nurse / hot rounds, Patient, and oncology    The patient remains critically ill.  Critical care time = 50   minutes in directly providing and coordinating critical care and extensive data review.  No time overlap and excludes procedures.

## 2023-10-01 NOTE — PROGRESS NOTES
FLOYD Everett notified that pt is becoming increasingly agitated and inconsolable, even after PRNs administered. Per Marguerite, okay for RN to turn Precedex gtt back on.     Sister notified of conversation with FLOYD. Sister would like to wait to turn on the Precedex gtt to see if they can calm her down themselves first.

## 2023-10-01 NOTE — CARE PLAN
Shift Goals  Clinical Goals: Pulm hygiene, visit with family  Patient Goals: visit with family  Family Goals: visit with pt    Progress made toward(s) clinical / shift goals:   Medicating per MAR, collaborating with IDT, normalizing sleep/wake cycles, monitoring nutrition intake and ins/outs, facilitating ADLs as thea. Fall, skin, delirium risk interventions in place. Education offered with each interaction. Please refer to flow sheets for additional interventions.      Problem: Pain - Standard  Goal: Alleviation of pain or a reduction in pain to the patient’s comfort goal  Outcome: Progressing     Problem: Care Map:  Optimal Outcome for the Pneumonia Patient  Goal: Collection and monitoring of appropriate tests and labs  Outcome: Progressing     Problem: Respiratory  Goal: Patient will achieve/maintain optimum respiratory ventilation and gas exchange  Outcome: Progressing     Problem: Risk for Aspiration  Goal: Patient's risk for aspiration will be absent or decrease  Outcome: Progressing     Problem: Hemodynamics - Pneumonia  Goal: Patient's hemodynamics, fluid balance and neurologic status will be stable or improve  Outcome: Progressing     Problem: Self Care  Goal: Patient will have the ability to perform ADLs independently or with assistance (bathe, groom, dress, toilet and feed)  Outcome: Progressing     Problem: Discharge Planning - Pneumonia  Goal: Patient will verbalize understanding of lifestyle changes and therapeutic needs post discharge  Outcome: Progressing     Problem: Knowledge Deficit - Standard  Goal: Patient and family/care givers will demonstrate understanding of plan of care, disease process/condition, diagnostic tests and medications  Outcome: Progressing     Problem: Skin Integrity  Goal: Skin integrity is maintained or improved  Outcome: Progressing     Problem: Psychosocial  Goal: Patient's level of anxiety will decrease  Outcome: Progressing  Goal: Patient's ability to verbalize feelings  about condition will improve  Outcome: Progressing  Goal: Patient's ability to re-evaluate and adapt role responsibilities will improve  Outcome: Progressing  Goal: Patient and family will demonstrate ability to cope with life altering diagnosis and/or procedure  Outcome: Progressing  Goal: Spiritual and cultural needs incorporated into hospitalization  Outcome: Progressing     Problem: Fall Risk  Goal: Patient will remain free from falls  Outcome: Progressing

## 2023-10-02 PROBLEM — J96.91 RESPIRATORY FAILURE WITH HYPOXIA (HCC): Status: ACTIVE | Noted: 2023-01-01

## 2023-10-02 NOTE — PROGRESS NOTES
Pt arrived to unit via hospital bed at 1610. Pt is on HHFNC 100% at 60 L. Pt is A&Ox4 and is on a dex drip running at 0.6 mcg/kg/hr. Chemo medications were placed in medication drawer with chemo cart outside of door. Pt oriented to room, unit, and plan of care. Tele-monitor placed and monitor room notified. All questions answered at this time. Call light within reach; fall precautions in place.

## 2023-10-02 NOTE — CARE PLAN
Problem: Humidified High Flow Nasal Cannula  Goal: Maintain adequate oxygenation dependent on patient condition  Description: Target End Date:  resolve prior to discharge or when underlying condition is resolved/stabilized    1.  Implement humidified high flow oxygen therapy  2.  Titrate high flow oxygen to maintain appropriate SpO2  Outcome: Not Met

## 2023-10-02 NOTE — PROCEDURES
Vascular Access Team    Date of Insertion: 10/2/2023  Arm Circumference: n/a  Line Length: 10  Line Size: 20  Vein Occupancy %: 25  Reason for Midline: access  Labs: WBC 15, PLT 93, BUN 18, Cr 0.51, , INR n/a    Orders confirmed, vessel patency confirmed with ultrasound. Risks and benefits of procedure explained to patient and education regarding line associated bloodstream infections provided. Questions answered.     PowerGlide Midline placed in RUE per licensed provider order with ultrasound guidance. 20g, 10 cm line placed in brachial vein after 1 attempt(s).  Catheter inserted with brisk blood return. Secured with 0cm external from insertion site.  Line flushed without resistance with 10 mL 0.9% normal saline.  Midline secured with Biopatch and Tegaderm.     Midline placement is confirmed by nurse using ultrasound and ability to flush and draw blood. Midline is appropriate for use at this time.  No X-ray is needed for placement confirmation. Pt tolerated procedure well.  Patient condition relayed to unit RN or ordering physician via this post procedure note in the EMR.    Ultrasound images uploaded to PACS and viewable in the EMR - yes  Ultrasound imaged printed and placed in paper chart - no      BARD PowerGlide Midline ref # [id number], Lot # [id number], Expiration Date 09/30/2024

## 2023-10-02 NOTE — DIETARY
Nutrition Update:    Day 12 of admit.  Melly Hendrickson is a 48 y.o. female with admitting DX of Acute on chronic respiratory failure.  Patient being followed to optimize nutrition.    Current Diet: Level 7 - easy to chew, thin liquid with boost plus supplements    Recorded PO intake is improving with pt eating 50-75% and % for the past 4 meals. Continue to encourage PO intake.    Problem: Nutritional:  Goal: Achieve adequate nutritional intake  Description: Patient will consume >50% of meals  Outcome: progressing    RD following

## 2023-10-02 NOTE — PROGRESS NOTES
Critical Care Progress Note    Date of admission  9/20/2023    Chief Complaint  48-year-old female with past medical history of COPD, asthma, anxiety and depression as well as a stage I adenocarcinoma of the lung was discovered in July of last year for which she received chemoradiation for.  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  She presented to the hospital on 9/20/2023 for dyspnea and pain.  A CT scan done on admission showed a small right subsegmental PE with diffusely worsening pulmonary nodules with possible overlying inflammatory or infectious changes.  She does state that she has been coughing up significant sputum which just prior to admission had begun having small streaks of blood.  The patient was admitted to the oncology floor where she was seen by medical oncology and planned for initiation of chemotherapy.  Her case was discussed with her by her oncology team and patient requested second opinion from [facility].  Last month she was admitted for hemoptysis and found to have a PE however was not started on anticoagulation due to hemoptysis.  On admission to the hospital during this stay the patient was started on ceftriaxone and azithromycin for pneumonia coverage.  Over the last 3 days her supplemental oxygen requirements have increased and today she has been significantly tachycardic and anxious due to her hypoxia.  EKG showed sinus tachycardia, she is currently on high flow nasal cannula and nonrebreather saturating between 88 and 92%.     During my talk with her we discussed her ongoing overall poor prognosis with stage IV sarcoma with diffuse metastatic burden to the lungs and progressive hypoxia.  We discussed that if she were to fail high flow nasal cannula mechanical intubation would be the next step however if she were to be placed on mechanical ventilation she  "may never be able to be weaned from support.  The patient tearfully replied that she \"wants more time\" and she is \"not ready to die\".  (Dr Mcdowell HPI 9/26)    Hospital Course    9/27 - HFNC 100/50 + MRBM, WOB ok, lasix, decadron, C3  9/28 - HFNC 100/50 + NRBM, lasix, Decadron, Ceftriaxone, code status changed to DNR/DNI  9/29 -  HFNC % + NRBM 15 L, decadron, lasix, last day ceftriaxone, Capmatinib initiated, DEX drip  9/30 -   HFNC 100/50 + 15L NRBM, Decadron, Lasix, Capmatinib, Off DEX infusion  10/1 HFNC + NRBM, dex gtt    Interval Problem Update  Reviewed last 24 hour events:  Neuro: awake and alert some confusion at time, dex gtt, dilaudid x4 overnight  HR: 100's  SBP: 120-140's  Tmax: afebrile  GI: BM this am 10/2 regular diet  UOP: good  Lines: peripheral IV, US midline today  Resp: HFNC + NRB max desats    Vte: noac  PPI/H2:n/a  Antibx: none    +1.1L last 24hrs net negative 1L for hospital stay  Consider transfer to [facility]?  Check CMP, mag, phos, Ical    Review of Systems  Review of Systems   Constitutional:  Negative for fever.   HENT:  Negative for congestion and sore throat.    Respiratory:  Positive for shortness of breath. Negative for hemoptysis and sputum production.    Cardiovascular:  Negative for chest pain (denies today).   Gastrointestinal:  Negative for abdominal pain, nausea and vomiting.        Belching   Genitourinary:  Negative for dysuria.   Musculoskeletal:  Positive for back pain.   Neurological:  Negative for focal weakness.        Vital Signs for last 24 hours   Temp:  [36.7 °C (98 °F)-37.7 °C (99.9 °F)] 37.3 °C (99.1 °F)  Pulse:  [] 91  Resp:  [17-35] 20  BP: (108-158)/(56-96) 138/69  SpO2:  [81 %-100 %] 97 %    Hemodynamic parameters for last 24 hours       Respiratory Information for the last 24 hours       Physical Exam   Physical Exam  Vitals reviewed.   Constitutional:       Appearance: Normal appearance. She is normal weight. She is not ill-appearing, toxic-appearing or " diaphoretic.      Comments: HFNC w/o NRBM, speaking in full sentences eating apple sauce   HENT:      Head: Atraumatic.      Mouth/Throat:      Mouth: Mucous membranes are moist.   Eyes:      General: No scleral icterus.     Extraocular Movements: Extraocular movements intact.      Pupils: Pupils are equal, round, and reactive to light.   Neck:      Vascular: No JVD.   Cardiovascular:      Rate and Rhythm: Regular rhythm. No extrasystoles are present.     Heart sounds: No murmur heard.  Pulmonary:      Effort: Pulmonary effort is normal. No respiratory distress.      Breath sounds: Wheezing present. No rhonchi or rales.      Comments: Speaking in full sentences no respiratory distress, occasional wheezing  Skin:     General: Skin is dry.      Capillary Refill: Capillary refill takes less than 2 seconds.      Coloration: Skin is not cyanotic.      Nails: There is no clubbing.   Neurological:      General: No focal deficit present.      Mental Status: She is oriented to person, place, and time. Mental status is at baseline.   Psychiatric:         Attention and Perception: Attention normal.         Mood and Affect: Mood is anxious and depressed. Affect is labile. Affect is not angry.         Speech: Speech normal.         Behavior: Behavior is cooperative.         Thought Content: Thought content normal.         Cognition and Memory: Cognition normal.         Medications  Current Facility-Administered Medications   Medication Dose Route Frequency Provider Last Rate Last Admin    [START ON 10/3/2023] melatonin tablet 10 mg  10 mg Oral Nightly Aline Reveles        oxyCODONE immediate-release (Roxicodone) tablet 5 mg  5 mg Oral Q4HRS PRN Gerson Adam M.D.        Or    oxyCODONE immediate release (Roxicodone) tablet 10 mg  10 mg Oral Q4HRS PRN Gerson Adam M.D.        gabapentin (Neurontin) capsule 100 mg  100 mg Oral Q8HRS Gerson Adam M.D.   100 mg at 10/02/23 0911    acetaminophen (Tylenol) tablet 650  mg  650 mg Oral Q6HRS Gerson Adam M.D.        LORazepam (Ativan) injection 0.5 mg  0.5 mg Intravenous Q4HRS PRN Tonny Edmond M.D.   0.5 mg at 10/02/23 0734    lidocaine (Lidoderm) 5 % 3 Patch  3 Patch Transdermal Q24HR Tonny Edmond M.D.   3 Patch at 10/01/23 1244    Nozin nasal  swab  1 Applicator Each Nostril BID Tonny Edmond M.D.   1 Applicator at 10/02/23 0558    dexamethasone (Decadron) injection 4 mg  4 mg Intravenous Q6HRS Tonny Edmond M.D.   4 mg at 10/02/23 0559    sodium chloride (Ocean) 0.65 % nasal spray 2 Spray  2 Spray Nasal Q2HRS PRN Tonny Edmond M.D.        HYDROmorphone (Dilaudid) injection 1 mg  1 mg Intravenous Q HOUR PRN Gerson Adam M.D.   1 mg at 10/02/23 0632    LORazepam (Ativan) tablet 0.5 mg  0.5 mg Oral Q8HRS Tonny Edmond M.D.   0.5 mg at 10/02/23 0600    capmatinib (Tabrecta) tablet 400 mg  400 mg Oral BID Alma Newton M.D.   400 mg at 10/02/23 0426    dexmedetomidine (PRECEDEX) 400 mcg/100mL NS premix infusion  0.1-1.5 mcg/kg/hr (Ideal) Intravenous Continuous Remberto Mcdowell Jr., D.O. 11 mL/hr at 10/02/23 0425 0.6 mcg/kg/hr at 10/02/23 0425    guaiFENesin ER (Mucinex) tablet 600 mg  600 mg Oral Q12HRS Naima Ambrosio, A.P.R.N.   600 mg at 10/02/23 0559    polyethylene glycol/lytes (Miralax) PACKET 1 Packet  1 Packet Oral Q48HRS Ivonne Mohan, A.P.R.N.   1 Packet at 09/24/23 1207    diphenhydrAMINE (Benadryl) injection 25 mg  25 mg Intravenous Q6HRS PRN Kay Christensen A.P.R.NEstrellita   25 mg at 10/01/23 1605    fluticasone-umeclidinium-vilanterol (Trelegy Ellipta) 100-62.5-25 mcg/act inhaler 1 Puff  1 Puff Inhalation DAILY Yenifer Velazquez M.D.   1 Puff at 09/25/23 0528    ipratropium-albuterol (DUONEB) nebulizer solution  3 mL Nebulization Q4HRS (RT) Yenifer Velazquez M.D.   3 mL at 10/02/23 0621    ipratropium-albuterol (DUONEB) nebulizer solution  3 mL Nebulization Q2HRS PRN (RT) Yenifer Velazquez M.D.   3 mL at 09/29/23 1157     albuterol inhaler 2 Puff  2 Puff Inhalation Q4HRS PRN SANJU GarciaOEstrellita   2 Puff at 10/01/23 0516    diclofenac sodium (Voltaren) 1 % gel 2 g  2 g Topical 4X/DAY PRN Madhu Merino D.O.        loratadine (Claritin) tablet 10 mg  10 mg Oral DAILY SANJU GarciaOEstrellita   10 mg at 10/02/23 0559    senna-docusate (Pericolace Or Senokot S) 8.6-50 MG per tablet 2 Tablet  2 Tablet Oral BID SANJU GarciaOEstrellita   2 Tablet at 10/02/23 0558    And    polyethylene glycol/lytes (Miralax) PACKET 1 Packet  1 Packet Oral QDAY PRN SANJU GarciaOEstrellita   1 Packet at 09/23/23 1141    And    magnesium hydroxide (Milk Of Magnesia) suspension 30 mL  30 mL Oral QDAY PRN SANJU GarciaOEstrellita   30 mL at 09/24/23 0939    And    bisacodyl (Dulcolax) suppository 10 mg  10 mg Rectal QDAY PRN SANJU GarciaOEstrellita   10 mg at 09/28/23 1300    Respiratory Therapy Consult   Nebulization Continuous RT Madhu Merino D.O.        acetaminophen (Tylenol) tablet 650 mg  650 mg Oral Q6HRS PRN Madhu Merino D.O.        hydrALAZINE (Apresoline) injection 10 mg  10 mg Intravenous Q4HRS PRN SANJU GarciaOEstrellita   10 mg at 09/26/23 1010    ondansetron (Zofran) syringe/vial injection 4 mg  4 mg Intravenous Q4HRS PRN SANJU GarciaOEstrellita   4 mg at 10/01/23 2235    ondansetron (Zofran ODT) dispertab 4 mg  4 mg Oral Q4HRS PRN Madhu Merino D.O.        promethazine (Phenergan) tablet 12.5-25 mg  12.5-25 mg Oral Q4HRS PRN Madhu Merino D.O.        promethazine (Phenergan) suppository 12.5-25 mg  12.5-25 mg Rectal Q4HRS PRN Madhu Merino D.O.        prochlorperazine (Compazine) injection 5-10 mg  5-10 mg Intravenous Q4HRS PRN Madhu Merino D.O.        guaiFENesin dextromethorphan (Robitussin DM) 100-10 MG/5ML syrup 10 mL  10 mL Oral Q6HRS PRN Madhu Merino D.O.   10 mL at 09/30/23 1802    apixaban (Eliquis) tablet 5 mg  5 mg Oral BID Madhu Merino D.O.   5 mg at 10/02/23 0714       Fluids    Intake/Output Summary (Last 24 hours) at 10/2/2023  0935  Last data filed at 10/2/2023 0800  Gross per 24 hour   Intake 1598.46 ml   Output 1150 ml   Net 448.46 ml       Laboratory          Recent Labs     10/02/23  0555   SODIUM 135   POTASSIUM 4.7   CHLORIDE 100   CO2 27   BUN 17   CREATININE 0.53   MAGNESIUM 2.1   CALCIUM 8.0*     Recent Labs     10/02/23  0555   ALTSGPT 206*   ASTSGOT 101*   ALKPHOSPHAT 214*   TBILIRUBIN 0.4   GLUCOSE 145*     Recent Labs     10/02/23  0440 10/02/23  0555   WBC 15.0*  --    NEUTSPOLYS 88.20*  --    LYMPHOCYTES 5.30*  --    MONOCYTES 3.70  --    EOSINOPHILS 0.10  --    BASOPHILS 0.20  --    ASTSGOT  --  101*   ALTSGPT  --  206*   ALKPHOSPHAT  --  214*   TBILIRUBIN  --  0.4     Recent Labs     10/02/23  0440   RBC 3.76*   HEMOGLOBIN 9.7*   HEMATOCRIT 33.2*   PLATELETCT 93*       Imaging  X-Ray:  I have personally reviewed the images and compared with prior images.  CT:    Reviewed    Assessment/Plan  * Acute on chronic respiratory failure (HCC)- (present on admission)  Assessment & Plan  Due to sarcoma metastasis to the lungs with possibly overlying edema versus infection  S/p finished course of antibiotics   Forced diuresis as clinically appropriate  Currently on HFNC/nonrebreather mask  RT/O2 Protocols  Titrate supplemental FiO2 to maintain SpO2 >88%  Continue scheduled nebs, steroids   Unfortunately the majority of her other possibilities for hypoxemia including COPD/bronchospasm/pneumonia/PE/volume overload all are on appropriate therapy and all do not appear to be a significant portion of her problem.  CT chest change between 8/20 and 9/20 is very dramatic.  I believe the vast majority of her hypoxemia is tumor progression in her lungs.  Unfortunately this poorly differentiated sarcoma is unlikely to respond to any therapy at this time per oncology.  Additionally she is on max oxygen therapy at this time with 50 L / 100% HFNC and 15 L nonrebreather mask.  Fortunately her work of breathing is okay and her sats are in the low to  mid 90s and holding.  Hospice evaluation is recommended at this time.  Patient has elected DNR/DNI   Likely a more palliative approach is recommended    Goals of care, counseling/discussion  Assessment & Plan  Goals of care done last week extensively. Hospice recommended by oncology and ICU team.  Patient would like to try some form of therapy if possible and oncology offered an oral chemotherapeutic regime. She elected to change to DNR/DNI.   Continue with palliative care consultation.     Pulmonary embolism on right (HCC)- (present on admission)  Assessment & Plan  Subsegmental, no RV strain  Clinically not in right heart failure  Continue DOAC      Primary undifferentiated sarcoma of soft tissue (HCC)- (present on admission)  Assessment & Plan  Completed radiation in July 2023, patient concern radiation therapy was causative for her current diffuse metastatic condition  Diffuse metastatic disease to the shoulder, lungs  AIM vs Capmatinib  Onc following  Extremely rapid growing tumor with rather dramatic increase in disease by chest CT 1 month apart 8/20 versus 9/20  Hospice recommended  Capmatinib per oncology for her sarcoma started 9/29  DNR/DNI  Hospice following    Mass of sacrum- (present on admission)  Assessment & Plan  Sarcoma  Analgesia as needed    Lung cancer (HCC)- (present on admission)  Assessment & Plan  Stage I adenocarcinoma initially treated with chemoradiation  Follows with Dr Newton (Onc)  Patient concerned prior radiation therapy contributed to current problem    Chronic pain due to neoplasm- (present on admission)  Assessment & Plan  Continue multimodal pain management, oxy, tylenol, lidocaine patches, gabapentin and IV pain medication for breakthrough  When ready for hospice and end-of-life IV morphine infusion okay  Dex gtt for anxiety and pain    Cigarette nicotine dependence without complication- (present on admission)  Assessment & Plan  Has not required nicotine replacement protocol  during this hospitalization         VTE:  NOAC  Ulcer: Not Indicated  Lines: None    I have performed a physical exam and reviewed and updated ROS and Plan today (10/2/2023). In review of yesterday's note (10/1/2023), there are no changes except as documented above.     Discussed patient condition and risk of morbidity and/or mortality with RN, RT, Pharmacy, Charge nurse / hot rounds, and Patient    The patient remains critically ill from very high amount of oxygen with active titration of HFNC.  Critical care time = 55   minutes in directly providing and coordinating critical care and extensive data review.  No time overlap and excludes procedures.

## 2023-10-03 PROBLEM — E78.5 HYPERLIPIDEMIA: Status: RESOLVED | Noted: 2021-07-12 | Resolved: 2023-01-01

## 2023-10-03 NOTE — CARE PLAN
Problem: Pain - Standard  Goal: Alleviation of pain or a reduction in pain to the patient’s comfort goal  Outcome: Progressing     Problem: Pain - Standard  Goal: Alleviation of pain or a reduction in pain to the patient’s comfort goal  Outcome: Progressing     Problem: Respiratory  Goal: Patient will achieve/maintain optimum respiratory ventilation and gas exchange  Outcome: Not Progressing   The patient is Watcher - Medium risk of patient condition declining or worsening    Shift Goals  Clinical Goals: Comfort  Patient Goals: pain control and rest  Family Goals: Comfort    Progress made toward(s) clinical / shift goals:  Pt given pain medication per scale. Pt resting comfortably after pain medication. Pt on high flow nasal cannula and desaturates with mobility and at rest.     Patient is not progressing towards the following goals:      Problem: Respiratory  Goal: Patient will achieve/maintain optimum respiratory ventilation and gas exchange  Outcome: Not Progressing

## 2023-10-03 NOTE — PROGRESS NOTES
1525-Report called to Otis BATES RN.  All questions answered.   1605-Pt transferred to [facility] 607 with ACLS RN on monitor with all Pt belongings.

## 2023-10-03 NOTE — CARE PLAN
The patient is Watcher - Medium risk of patient condition declining or worsening    Shift Goals  Clinical Goals: Monitor O2, wean O2, pain control  Patient Goals: pain control and rest  Family Goals: comfort    Progress made toward(s) clinical / shift goals:    Problem: Pain - Standard  Goal: Alleviation of pain or a reduction in pain to the patient’s comfort goal  Outcome: Progressing     Problem: Self Care  Goal: Patient will have the ability to perform ADLs independently or with assistance (bathe, groom, dress, toilet and feed)  Outcome: Progressing     Problem: Knowledge Deficit - Standard  Goal: Patient and family/care givers will demonstrate understanding of plan of care, disease process/condition, diagnostic tests and medications  Outcome: Progressing     Problem: Psychosocial  Goal: Patient and family will demonstrate ability to cope with life altering diagnosis and/or procedure  Outcome: Progressing     Problem: Fall Risk  Goal: Patient will remain free from falls  Outcome: Progressing     Patient is not progressing towards the following goals:      Problem: Psychosocial  Goal: Patient's level of anxiety will decrease  Outcome: Not Progressing  Goal: Patient's ability to verbalize feelings about condition will improve  Outcome: Not Progressing  Goal: Patient's ability to re-evaluate and adapt role responsibilities will improve  Outcome: Not Progressing

## 2023-10-03 NOTE — PROGRESS NOTES
[facility] Medicine Daily Progress Note    Date of Service  10/3/2023    Chief Complaint  Melly Hendrickson is a 48 y.o. female referred to the ER by her primary care physician on 9/20/2023 due to shortness of breath.  She was diagnosed with stage I NSCLC (adenocarcinoma) of the lung in 7/2023.  She underwent  SBRT as she was not a good surgical candidate due to dyspnea and underlying COPD. 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  She underwent SBRT to right sacrum.  She follows Dr. Newton (oncology), and was recommended to start chemotherapy   She has COPD/asthma, was diagnosed with pulmonary embolism in 8/2023, anticoagulation not started due to hemoptysis.    Hospital Course  On admission, she was tachycardic, tachypneic, and afebrile.  Chest x-ray showed diffuse bilateral interstitial and alveolar opacities consistent with pneumonia. CTA chest on admission showed right lower lobe subsegmental PE, worsening pulmonary nodules, T1 vertebral body lesion, possible pneumonia.  Procalcitonin was 0.11.Eliquis was started.   Ceftriaxone and azithromycin were empirically started.  Echocardiogram from 9/22/2023 showed LVEF ~ 65% with no reported abnormalities. Repeat Procalcitonin on 9/23/2023 was  0.08.   Chest x-ray from 9/23/2023 showed innumerable bilateral pulmonary nodules which have increased since 9/20/2023, possibly superimposed pneumonia.  Palliative care following for pain management.  On every 4 hours prn Ativan, every 2 hours prn IV Dilaudid, Norco 10/325 every 3 hours prn. Tylenol every 6 hours scheduled, holding when sleeping. Dexamethasone 4 mg BID.  Reportedly the patient was diagnosed with a pulmonary embolism last month but did not pursue anticoagulation secondary to hemoptysis  The patient was escalated to ICU level of care 9/26, secondary to increasing oxygen requirement, and multiple  medication regimen to accommodate her extensive medical needs.  The patient was on dexamethasone, DuoNebs, started on a Precedex drip, for anxiety in conjunction with respiratory failure   The patient was initially placed on high flow nasal cannula, and 100%, 50 L plus nonrebreather mask, was diuresed, kept on antibiotic therapy,  Palliative care consulted, the patient is CODE STATUS was changed on 9/28 to DNR/DNI  The patient was recommended to start oral chemotherapy in form of capmatinib  This was brought from an outside pharmacy and administered here at 4 mg p.o. twice daily  On 10/2 the patient found appropriate for downgrade to the [facility] level of care with overall improved anxiety, respiratory status, for ongoing aggressive medical care    Interval Problem Update  Patient seen and examined today.  Data, Medication data reviewed.  Case discussed with nursing as available.  Plan of Care reviewed with patient and notified of changes.   10/2 the patient is seen in the ICU level of care, she is awake and alert, she sits at the bedside, conversing with her daughter, currently no significant anxiety, no significant pain verbalized,  Patient is currently afebrile, heart rate in the 90s, respiration in the mid 20s, the patient is on 60 L, 100% FiO2, blood pressure in the 1 teens over 60s,  Currently on Precedex drip at 0.6  10/3 patient complains of pain in the deadline line area, the midline is not drawing, asked the vascular access team to evaluate, later that the PICC line is removed,, currently was kinked, there is no other suitable access for a longer catheter apparently as per recent trials,  The patient continues to complain of mild area pain, fever of chemotherapy not working effectively, the patient at this time is still on a Precedex drip for anxiety as well as high flow nasal cannula currently still at 60 L, 100%  Adjusting medication for anxiety, ongoing pain control  Actively titrating the patient's oxygen  delivery  I have discussed this patient's plan of care and discharge plan at IDT rounds today with Case Management, Nursing, Nursing leadership, and other members of the IDT team.    Consultants/Specialty  oncology critical care, palliative care    Code Status  DNAR/DNI    Disposition  The patient is not medically cleared for discharge to home or a post-acute facility.  Anticipate discharge to: home with close outpatient follow-up    I have placed the appropriate orders for post-discharge needs.    Review of Systems  Review of Systems   Constitutional:  Positive for malaise/fatigue. Negative for chills and fever.   HENT: Negative.     Eyes: Negative.    Respiratory:  Positive for cough and shortness of breath.    Cardiovascular: Negative.  Negative for chest pain, palpitations and leg swelling.   Gastrointestinal: Negative.  Negative for heartburn, nausea and vomiting.   Genitourinary: Negative.  Negative for dysuria and frequency.   Musculoskeletal:  Positive for myalgias. Negative for neck pain.   Skin: Negative.  Negative for itching and rash.   Neurological: Negative.  Negative for dizziness, focal weakness, weakness and headaches.   Endo/Heme/Allergies: Negative.  Negative for polydipsia. Does not bruise/bleed easily.   Psychiatric/Behavioral:  Negative for depression. The patient is nervous/anxious.         Physical Exam  Temp:  [36.3 °C (97.4 °F)-37.1 °C (98.7 °F)] 36.4 °C (97.5 °F)  Pulse:  [] 86  Resp:  [16-38] 24  BP: (108-158)/(58-87) 126/77  SpO2:  [81 %-100 %] 97 %    Physical Exam  Vitals and nursing note reviewed.   Constitutional:       Appearance: She is well-developed. She is ill-appearing. She is not diaphoretic.   HENT:      Head: Normocephalic and atraumatic.      Nose: Nose normal.      Comments: High flow nasal cannula     Mouth/Throat:      Mouth: Mucous membranes are moist.   Eyes:      Conjunctiva/sclera: Conjunctivae normal.      Pupils: Pupils are equal, round, and reactive to  light.   Neck:      Thyroid: No thyromegaly.      Vascular: No JVD.   Cardiovascular:      Rate and Rhythm: Normal rate and regular rhythm.      Heart sounds: Normal heart sounds.      No friction rub. No gallop.   Pulmonary:      Effort: Pulmonary effort is normal.      Breath sounds: Rhonchi and rales present. No wheezing.   Abdominal:      General: Bowel sounds are normal. There is no distension.      Palpations: Abdomen is soft. There is no mass.      Tenderness: There is no abdominal tenderness. There is no guarding or rebound.   Musculoskeletal:         General: No tenderness. Normal range of motion.      Cervical back: Normal range of motion and neck supple.      Right lower leg: No edema.      Left lower leg: No edema.   Lymphadenopathy:      Cervical: No cervical adenopathy.   Skin:     General: Skin is warm and dry.   Neurological:      Mental Status: She is alert and oriented to person, place, and time. Mental status is at baseline.      Cranial Nerves: No cranial nerve deficit.   Psychiatric:         Behavior: Behavior normal.      Comments: Anxious         Fluids    Intake/Output Summary (Last 24 hours) at 10/3/2023 0747  Last data filed at 10/3/2023 0600  Gross per 24 hour   Intake 1343.27 ml   Output 2050 ml   Net -706.73 ml           Laboratory  Recent Labs     10/02/23  0440 10/03/23  0310   WBC 15.0* 19.1*   RBC 3.76* 4.05*   HEMOGLOBIN 9.7* 10.8*   HEMATOCRIT 33.2* 35.4*   MCV 88.3 87.4   MCH 25.8* 26.7*   MCHC 29.2* 30.5*   RDW 56.8* 55.2*   PLATELETCT 93* 105*   MPV 11.4 11.3       Recent Labs     10/02/23  0555 10/03/23  0310   SODIUM 135 136   POTASSIUM 4.7 4.4   CHLORIDE 100 99   CO2 27 27   GLUCOSE 145* 184*   BUN 17 14   CREATININE 0.53 0.63   CALCIUM 8.0* 8.4*                       Imaging  DX-CHEST-PORTABLE (1 VIEW)   Final Result         1.  Extensive bilateral pulmonary parenchymal opacities compatible with ARDS, severe pulmonary edema, and/or superimposed pulmonary infiltrates, stable  since prior study.      IR-MIDLINE CATHETER INSERTION WO GUIDANCE > AGE 3   Final Result                  Ultrasound-guided midline placement performed by qualified nursing staff    as above.          DX-CHEST-PORTABLE (1 VIEW)   Final Result         1.  Extensive bilateral pulmonary parenchymal opacities compatible with ARDS, severe pulmonary edema, and/or superimposed pulmonary infiltrates, stable since prior study.      DX-CHEST-PORTABLE (1 VIEW)   Final Result         1.  Pulmonary edema and/or infiltrates are identified, which are stable since the prior exam.      DX-CHEST-PORTABLE (1 VIEW)   Final Result         1.  Bilateral pulmonary infiltrates, stable since prior study   2.  Multiple bilateral pulmonary nodules.      DX-CHEST-PORTABLE (1 VIEW)   Final Result      1.  Innumerable pulmonary nodules bilaterally.      2.  Increasing confluence and airspace opacities in the perihilar regions suspicious for superimposed pneumonitis on diffuse metastatic disease      IR-PICC LINE PLACEMENT W/ GUIDANCE > AGE 5   Final Result                  Ultrasound-guided PICC placement performed by qualified nursing staff as    above.          DX-CHEST-PORTABLE (1 VIEW)   Final Result      1.  Innumerable bilateral pulmonary nodule, increased since 9/20/2023      2.  Differential diagnosis includes worsening metastases or superimposed pneumonia.      EC-ECHOCARDIOGRAM COMPLETE W/O CONT   Final Result      CT-CTA CHEST PULMONARY ARTERY W/ RECONS   Final Result      1.  Minimal pulmonary embolism. Localized right lower lobe segmental and subsegmental branches.   2.  No right heart strain.   3.  9 mm lucent/lytic lesion T1 vertebral body. Suspect osseous metastatic deposit.   4.  Progression of extensive mediastinal adenopathy since 8/20/2023 consistent with metastatic disease.   5.  Extensive progression of diffuse bilateral innumerable pulmonary nodules consistent with metastatic disease.   6.  Interval development of  extensive groundglass and airspace opacities consistent with superimposed pneumonia.   7.  Progression of right adrenal mass now measuring 43 mm x 38 mm consistent with metastasis.      Fleischner Society pulmonary nodule recommendations:      DX-CHEST-PORTABLE (1 VIEW)   Final Result      Diffuse bilateral interstitial and alveolar opacities most consistent with pneumonia or possibly noncardiogenic pulmonary edema.             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 history of stage 1 lung adenocarcinoma s/p SBRT, pulmonary embolism, COPD/asthma, recent pneumonia.  Transferred to the ICU on 9/26/2023 due to increasing oxygen requirements, downgraded to IMCU on 10/2/2023.  Currently saturating 97% on 60 L/min heated high flow oxygen.  Completed course of antibiotics. Continue steroids and nebulizers.    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 Stated Capmatinib on 9/29/2023. Oncology following. DNR/DNI. Hospice following.     Goals of care, counseling/discussion  Assessment & Plan  Goals of care done last week extensively. Hospice recommended by oncology and ICU team.  Patient would like to try some form of therapy if possible and oncology offered an oral chemotherapeutic regime. She elected to change to DNR/DNI.   Continue with palliative care consultation.     Sarcoma (HCC)- (present on admission)  Assessment & Plan  Advanced, aggressive, on oral chemotherapy currently    Pulmonary embolism on right (HCC)- (present on admission)  Assessment & Plan  Recently diagnosed. On Apixaban.     Mass of sacrum- (present on admission)  Assessment & Plan  Aggressive metastatic undifferentiated sarcoma. On Capmatinib    Lung cancer (HCC)- (present on  admission)  Assessment & Plan  Stage I adenocarcinoma initially treated with chemoradiation. Follows with Dr Newton (Onc)    Chronic pain due to neoplasm- (present on admission)  Assessment & Plan  Palliative care have been following for pain management.  Continue multimodal pain management, oxy, tylenol, lidocaine patches, gabapentin and IV pain medication for breakthrough.  Hospice following.    Cigarette nicotine dependence without complication- (present on admission)  Assessment & Plan  History of ongoing tobacco abuse     Plan  Adjust anxiolytics, adjusting BuSpar  Attempt to get off Precedex as possible  Ongoing multimodality pain regimen including dexamethasone  Ongoing anticoagulation with apixaban with caution  Oxygen titration is much as possible the patient in ongoing acute severe respiratory failure requiring maximum oxygen delivery on high flow nasal cannula regimen  See orders    Patient is critically ill.   The patient continues to have: Acute hypoxic respiratory failure requiring non invasive positive pressure ventilation secondary to COPD, non-small cell lung cancer, metastatic sarcoma with pulmonary nodules currently on 60L heated high flow and 100% FiO2    The vital organ system that is affected is the: Respiratory  If untreated there is a high chance of deterioration into: Respiratory failure and eventually death.   The critical care that I am providing today is: heated high flow  The critical that has been undertaken is medically complex.   There has been no overlap in critical care time.   Critical Care Time not including procedures: 34 minutes  Patient is has a high medical complexity, complex decision making and is at high risk for complication, morbidity, and mortality.    VTE prophylaxis: Eliquis      Please note that this dictation was created using voice recognition software. I have made every reasonable attempt to correct obvious errors, but I expect that there are errors of grammar and  possibly context that I did not discover before finalizing the note.

## 2023-10-03 NOTE — DISCHARGE PLANNING
RNCM received VM from Kika with [facility]: [facility] stated pt is on HFNC that they cannot accommodate at this time/pt not ready for hospice per [facility] conversation with pt last week.

## 2023-10-03 NOTE — PROGRESS NOTES
[facility] Medicine Daily Progress Note    Date of Service  10/3/2023    Chief Complaint  Melly Hendrickson is a 48 y.o. female referred to the ER by her primary care physician on 9/20/2023 due to shortness of breath.     She was diagnosed with stage I NSCLC (adenocarcinoma) of the lung in 7/2023.  She underwent  SBRT as she was not a good surgical candidate due to dyspnea and underlying COPD. 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  She underwent SBRT to right sacrum.  She follows Dr. Newton (oncology), and was recommended to start chemotherapy but wished to consult with a sarcoma specialist at [facility] first.      She has COPD/asthma, was diagnosed with pulmonary embolism in 8/2023, anticoagulation not started due to hemoptysis.    Hospital Course  On admission, she was tachycardic, tachypneic, and afebrile. Chest x-ray showed diffuse bilateral interstitial and alveolar opacities consistent with pneumonia. CTA chest on admission showed right subsegmental PE, worsening pulmonary nodules, T1 vertebral body region, possible pneumonia.  Procalcitonin was 0.11.Eliquis was started.      Ceftriaxone and azithromycin were empirically started.     Echocardiogram from 9/22/2023 showed LVEF ~ 65% with no reported abnormalities. Repeat Procalcitonin on 9/23/2023 was  0.08.     Palliative care have been following for pain management.    Patient was transferred to the ICU on 9/26/2023 due to increasing oxygen requirement.  She was on dexamethasone, DuoNebs, Precedex drip, for anxiety in conjunction with respiratory failure.     Palliative care consulted, the patient is CODE STATUS was changed on 9/28 to DNR/DNI    The patient was recommended to start oral chemotherapy (capmatinib) which was brought from an outside pharmacy and administered here at 4 mg p.o. twice daily.  She was downgraded to [facility] level of care  on 10/2/2023    Interval Problem Update  Afebrile.  Respiration rate is 24.  Blood pressure stable.  Saturating 97% on 60 L/min heated high flow oxygen    I have discussed this patient's plan of care and discharge plan at IDT rounds today with Case Management, Nursing, Nursing leadership, and other members of the IDT team.    Consultants/Specialty  critical care, oncology, and palliative care    Code Status  DNAR/DNI    Disposition  The patient is not medically cleared for discharge to home or a post-acute facility.      I have placed the appropriate orders for post-discharge needs.    Review of Systems  Review of Systems   Constitutional:  Positive for malaise/fatigue.   HENT: Negative.     Respiratory: Negative.     Cardiovascular: Negative.    Gastrointestinal: Negative.    Genitourinary: Negative.    Musculoskeletal:  Positive for back pain, joint pain and myalgias.   Skin: Negative.    Neurological: Negative.    Endo/Heme/Allergies: Negative.    Psychiatric/Behavioral: Negative.       Physical Exam  Temp:  [36.3 °C (97.4 °F)-37.1 °C (98.7 °F)] 36.4 °C (97.5 °F)  Pulse:  [] 96  Resp:  [16-38] 24  BP: (110-146)/(58-87) 118/78  SpO2:  [81 %-100 %] 100 %      Physical Exam  HENT:      Head: Normocephalic.      Nose: Nose normal.      Mouth/Throat:      Mouth: Mucous membranes are moist.   Eyes:      Pupils: Pupils are equal, round, and reactive to light.   Cardiovascular:      Rate and Rhythm: Normal rate.   Pulmonary:      Effort: Pulmonary effort is normal.   Abdominal:      Palpations: Abdomen is soft.   Musculoskeletal:      Cervical back: Normal range of motion.      Right lower leg: No edema.      Left lower leg: No edema.   Skin:     General: Skin is warm.   Neurological:      Mental Status: She is alert. Mental status is at baseline.   Psychiatric:         Mood and Affect: Mood normal.       Fluids    Intake/Output Summary (Last 24 hours) at 10/3/2023 1027  Last data filed at 10/3/2023 0600  Gross per  24 hour   Intake 918.24 ml   Output 1650 ml   Net -731.76 ml       Laboratory  Recent Labs     10/02/23  0440 10/03/23  0310   WBC 15.0* 19.1*   RBC 3.76* 4.05*   HEMOGLOBIN 9.7* 10.8*   HEMATOCRIT 33.2* 35.4*   MCV 88.3 87.4   MCH 25.8* 26.7*   MCHC 29.2* 30.5*   RDW 56.8* 55.2*   PLATELETCT 93* 105*   MPV 11.4 11.3     Recent Labs     10/02/23  0555 10/03/23  0310   SODIUM 135 136   POTASSIUM 4.7 4.4   CHLORIDE 100 99   CO2 27 27   GLUCOSE 145* 184*   BUN 17 14   CREATININE 0.53 0.63   CALCIUM 8.0* 8.4*                   Imaging  DX-CHEST-PORTABLE (1 VIEW)   Final Result         1.  Extensive bilateral pulmonary parenchymal opacities compatible with ARDS, severe pulmonary edema, and/or superimposed pulmonary infiltrates, stable since prior study.      IR-MIDLINE CATHETER INSERTION WO GUIDANCE > AGE 3   Final Result                  Ultrasound-guided midline placement performed by qualified nursing staff    as above.          DX-CHEST-PORTABLE (1 VIEW)   Final Result         1.  Extensive bilateral pulmonary parenchymal opacities compatible with ARDS, severe pulmonary edema, and/or superimposed pulmonary infiltrates, stable since prior study.      DX-CHEST-PORTABLE (1 VIEW)   Final Result         1.  Pulmonary edema and/or infiltrates are identified, which are stable since the prior exam.      DX-CHEST-PORTABLE (1 VIEW)   Final Result         1.  Bilateral pulmonary infiltrates, stable since prior study   2.  Multiple bilateral pulmonary nodules.      DX-CHEST-PORTABLE (1 VIEW)   Final Result      1.  Innumerable pulmonary nodules bilaterally.      2.  Increasing confluence and airspace opacities in the perihilar regions suspicious for superimposed pneumonitis on diffuse metastatic disease      IR-PICC LINE PLACEMENT W/ GUIDANCE > AGE 5   Final Result                  Ultrasound-guided PICC placement performed by qualified nursing staff as    above.          DX-CHEST-PORTABLE (1 VIEW)   Final Result      1.   Innumerable bilateral pulmonary nodule, increased since 9/20/2023      2.  Differential diagnosis includes worsening metastases or superimposed pneumonia.      EC-ECHOCARDIOGRAM COMPLETE W/O CONT   Final Result      CT-CTA CHEST PULMONARY ARTERY W/ RECONS   Final Result      1.  Minimal pulmonary embolism. Localized right lower lobe segmental and subsegmental branches.   2.  No right heart strain.   3.  9 mm lucent/lytic lesion T1 vertebral body. Suspect osseous metastatic deposit.   4.  Progression of extensive mediastinal adenopathy since 8/20/2023 consistent with metastatic disease.   5.  Extensive progression of diffuse bilateral innumerable pulmonary nodules consistent with metastatic disease.   6.  Interval development of extensive groundglass and airspace opacities consistent with superimposed pneumonia.   7.  Progression of right adrenal mass now measuring 43 mm x 38 mm consistent with metastasis.      Fleischner Society pulmonary nodule recommendations:      DX-CHEST-PORTABLE (1 VIEW)   Final Result      Diffuse bilateral interstitial and alveolar opacities most consistent with pneumonia or possibly noncardiogenic pulmonary edema.           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 history of stage 1 lung adenocarcinoma s/p SBRT, pulmonary embolism, COPD/asthma, recent pneumonia.  Transferred to the ICU on 9/26/2023 due to increasing oxygen requirements, downgraded to IMCU on 10/2/2023.  Currently saturating 97% on 60 L/min heated high flow oxygen.  Completed course of antibiotics. Continue steroids and nebulizers.    Pulmonary embolism on right (HCC)- (present on admission)  Assessment & Plan  Recently diagnosed. On Apixaban.     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 Stated Capmatinib on 9/29/2023. Oncology following. DNR/DNI. Hospice following.     Goals of care, counseling/discussion  Assessment & Plan  Goals of care done last week extensively. Hospice recommended by oncology and ICU team.  Patient would like to try some form of therapy if possible and oncology offered an oral chemotherapeutic regime. She elected to change to DNR/DNI.   Continue with palliative care consultation.     Lung cancer (HCC)- (present on admission)  Assessment & Plan  Stage I adenocarcinoma initially treated with chemoradiation. Follows with Dr Newton (Onc)    Chronic pain due to neoplasm- (present on admission)  Assessment & Plan  Palliative care have been following for pain management.  Continue multimodal pain management, oxy, tylenol, lidocaine patches, gabapentin and IV pain medication for breakthrough.  Hospice following.    Mass of sacrum- (present on admission)  Assessment & Plan  Aggressive metastatic undifferentiated sarcoma. On Capmatinib    Cigarette nicotine dependence without complication- (present on admission)  Assessment & Plan  History of ongoing tobacco abuse         VTE prophylaxis: On apixaban

## 2023-10-03 NOTE — PROGRESS NOTES
0040- Assumed patient care from RN. Patient laying in bed, with call light in hand, bed in lowest position.

## 2023-10-03 NOTE — PROGRESS NOTES
[facility] Medicine Daily Progress Note    Date of Service  10/2/2023    Chief Complaint  Melly Hendrickson is a 48 y.o. female referred to the ER by her primary care physician on 9/20/2023 due to shortness of breath.  She was diagnosed with stage I NSCLC (adenocarcinoma) of the lung in 7/2023.  She underwent  SBRT as she was not a good surgical candidate due to dyspnea and underlying COPD. 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  She underwent SBRT to right sacrum.  She follows Dr. Newton (oncology), and was recommended to start chemotherapy   She has COPD/asthma, was diagnosed with pulmonary embolism in 8/2023, anticoagulation not started due to hemoptysis.    Hospital Course  On admission, she was tachycardic, tachypneic, and afebrile.  Chest x-ray showed diffuse bilateral interstitial and alveolar opacities consistent with pneumonia. CTA chest on admission showed right lower lobe subsegmental PE, worsening pulmonary nodules, T1 vertebral body lesion, possible pneumonia.  Procalcitonin was 0.11.Eliquis was started.   Ceftriaxone and azithromycin were empirically started.  Echocardiogram from 9/22/2023 showed LVEF ~ 65% with no reported abnormalities. Repeat Procalcitonin on 9/23/2023 was  0.08.   Chest x-ray from 9/23/2023 showed innumerable bilateral pulmonary nodules which have increased since 9/20/2023, possibly superimposed pneumonia.  Palliative care following for pain management.  On every 4 hours prn Ativan, every 2 hours prn IV Dilaudid, Norco 10/325 every 3 hours prn. Tylenol every 6 hours scheduled, holding when sleeping. Dexamethasone 4 mg BID.  Reportedly the patient was diagnosed with a pulmonary embolism last month but did not pursue anticoagulation secondary to hemoptysis  The patient was escalated to ICU level of care 9/26, secondary to increasing oxygen requirement, and multiple  medication regimen to accommodate her extensive medical needs.  The patient was on dexamethasone, DuoNebs, started on a Precedex drip, for anxiety in conjunction with respiratory failure   The patient was initially placed on high flow nasal cannula, and 100%, 50 L plus nonrebreather mask, was diuresed, kept on antibiotic therapy,  Palliative care consulted, the patient is CODE STATUS was changed on 9/28 to DNR/DNI  The patient was recommended to start oral chemotherapy in form of capmatinib  This was brought from an outside pharmacy and administered here at 4 mg p.o. twice daily  On 10/2 the patient found appropriate for downgrade to the [facility] level of care with overall improved anxiety, respiratory status, for ongoing aggressive medical care    Interval Problem Update  Patient seen and examined today.  Data, Medication data reviewed.  Case discussed with nursing as available.  Plan of Care reviewed with patient and notified of changes.   10/2 the patient is seen in the ICU level of care, she is awake and alert, she sits at the bedside, conversing with her daughter, currently no significant anxiety, no significant pain verbalized,  Patient is currently afebrile, heart rate in the 90s, respiration in the mid 20s, the patient is on 60 L, 100% FiO2, blood pressure in the 1 teens over 60s,  Currently on Precedex drip at 0.6    I have discussed this patient's plan of care and discharge plan at IDT rounds today with Case Management, Nursing, Nursing leadership, and other members of the IDT team.    Consultants/Specialty  oncology critical care, palliative care    Code Status  DNAR/DNI    Disposition  The patient is not medically cleared for discharge to home or a post-acute facility.  Anticipate discharge to: skilled nursing facility    I have placed the appropriate orders for post-discharge needs.    Review of Systems  Review of Systems   Constitutional:  Positive for malaise/fatigue. Negative for chills and fever.    HENT: Negative.     Eyes: Negative.    Respiratory:  Positive for cough and shortness of breath.    Cardiovascular: Negative.  Negative for chest pain, palpitations and leg swelling.   Gastrointestinal: Negative.  Negative for heartburn, nausea and vomiting.   Genitourinary: Negative.  Negative for dysuria and frequency.   Musculoskeletal:  Positive for myalgias. Negative for neck pain.   Skin: Negative.  Negative for itching and rash.   Neurological: Negative.  Negative for dizziness, focal weakness, weakness and headaches.   Endo/Heme/Allergies: Negative.  Negative for polydipsia. Does not bruise/bleed easily.   Psychiatric/Behavioral:  Negative for depression. The patient is nervous/anxious.         Physical Exam  Temp:  [36.4 °C (97.6 °F)-37.3 °C (99.2 °F)] 36.4 °C (97.6 °F)  Pulse:  [] 93  Resp:  [16-38] 30  BP: (108-158)/(56-78) 110/61  SpO2:  [81 %-100 %] 94 %    Physical Exam  Vitals and nursing note reviewed.   Constitutional:       Appearance: She is well-developed. She is ill-appearing. She is not diaphoretic.   HENT:      Head: Normocephalic and atraumatic.      Nose: Nose normal.      Comments: High flow nasal cannula     Mouth/Throat:      Mouth: Mucous membranes are moist.   Eyes:      Conjunctiva/sclera: Conjunctivae normal.      Pupils: Pupils are equal, round, and reactive to light.   Neck:      Thyroid: No thyromegaly.      Vascular: No JVD.   Cardiovascular:      Rate and Rhythm: Normal rate and regular rhythm.      Heart sounds: Normal heart sounds.      No friction rub. No gallop.   Pulmonary:      Effort: Pulmonary effort is normal.      Breath sounds: Rhonchi and rales present. No wheezing.   Abdominal:      General: Bowel sounds are normal. There is no distension.      Palpations: Abdomen is soft. There is no mass.      Tenderness: There is no abdominal tenderness. There is no guarding or rebound.   Musculoskeletal:         General: No tenderness. Normal range of motion.       Cervical back: Normal range of motion and neck supple.      Right lower leg: No edema.      Left lower leg: No edema.   Lymphadenopathy:      Cervical: No cervical adenopathy.   Skin:     General: Skin is warm and dry.   Neurological:      Mental Status: She is alert and oriented to person, place, and time. Mental status is at baseline.      Cranial Nerves: No cranial nerve deficit.   Psychiatric:         Behavior: Behavior normal.      Comments: Anxious         Fluids    Intake/Output Summary (Last 24 hours) at 10/2/2023 1708  Last data filed at 10/2/2023 1400  Gross per 24 hour   Intake 1867.43 ml   Output 1550 ml   Net 317.43 ml         Laboratory  Recent Labs     10/02/23  0440   WBC 15.0*   RBC 3.76*   HEMOGLOBIN 9.7*   HEMATOCRIT 33.2*   MCV 88.3   MCH 25.8*   MCHC 29.2*   RDW 56.8*   PLATELETCT 93*   MPV 11.4       Recent Labs     10/02/23  0555   SODIUM 135   POTASSIUM 4.7   CHLORIDE 100   CO2 27   GLUCOSE 145*   BUN 17   CREATININE 0.53   CALCIUM 8.0*                       Imaging  IR-MIDLINE CATHETER INSERTION WO GUIDANCE > AGE 3   Final Result                  Ultrasound-guided midline placement performed by qualified nursing staff    as above.          DX-CHEST-PORTABLE (1 VIEW)   Final Result         1.  Extensive bilateral pulmonary parenchymal opacities compatible with ARDS, severe pulmonary edema, and/or superimposed pulmonary infiltrates, stable since prior study.      DX-CHEST-PORTABLE (1 VIEW)   Final Result         1.  Pulmonary edema and/or infiltrates are identified, which are stable since the prior exam.      DX-CHEST-PORTABLE (1 VIEW)   Final Result         1.  Bilateral pulmonary infiltrates, stable since prior study   2.  Multiple bilateral pulmonary nodules.      DX-CHEST-PORTABLE (1 VIEW)   Final Result      1.  Innumerable pulmonary nodules bilaterally.      2.  Increasing confluence and airspace opacities in the perihilar regions suspicious for superimposed pneumonitis on diffuse  metastatic disease      IR-PICC LINE PLACEMENT W/ GUIDANCE > AGE 5   Final Result                  Ultrasound-guided PICC placement performed by qualified nursing staff as    above.          DX-CHEST-PORTABLE (1 VIEW)   Final Result      1.  Innumerable bilateral pulmonary nodule, increased since 9/20/2023      2.  Differential diagnosis includes worsening metastases or superimposed pneumonia.      EC-ECHOCARDIOGRAM COMPLETE W/O CONT   Final Result      CT-CTA CHEST PULMONARY ARTERY W/ RECONS   Final Result      1.  Minimal pulmonary embolism. Localized right lower lobe segmental and subsegmental branches.   2.  No right heart strain.   3.  9 mm lucent/lytic lesion T1 vertebral body. Suspect osseous metastatic deposit.   4.  Progression of extensive mediastinal adenopathy since 8/20/2023 consistent with metastatic disease.   5.  Extensive progression of diffuse bilateral innumerable pulmonary nodules consistent with metastatic disease.   6.  Interval development of extensive groundglass and airspace opacities consistent with superimposed pneumonia.   7.  Progression of right adrenal mass now measuring 43 mm x 38 mm consistent with metastasis.      Fleischner Society pulmonary nodule recommendations:      DX-CHEST-PORTABLE (1 VIEW)   Final Result      Diffuse bilateral interstitial and alveolar opacities most consistent with pneumonia or possibly noncardiogenic pulmonary edema.             Assessment/Plan  * Acute on chronic respiratory failure (HCC)- (present on admission)  Assessment & Plan  Due to sarcoma metastasis to the lungs with possibly overlying edema versus infection  S/p finished course of antibiotics   Forced diuresis as clinically appropriate  Currently on HFNC/nonrebreather mask  RT/O2 Protocols  Titrate supplemental FiO2 to maintain SpO2 >88%  Continue scheduled nebs, steroids   Unfortunately the majority of her other possibilities for hypoxemia including COPD/bronchospasm/pneumonia/PE/volume overload  all are on appropriate therapy and all do not appear to be a significant portion of her problem.  CT chest change between 8/20 and 9/20 is very dramatic,  the vast majority of her hypoxemia is tumor progression in her lungs.  Unfortunately this poorly differentiated sarcoma is unlikely to respond to any therapy at this time per oncology.  Additionally she is on max oxygen therapy at this time with 60 L / 100% HFNC and 15 L nonrebreather mask.  Fortunately her work of breathing is okay and her sats are in the low to mid 90s and holding.  Hospice evaluation is recommended at this time.  Patient has elected DNR/DNI   Likely a more palliative approach is recommended    Primary undifferentiated sarcoma of soft tissue (HCC)- (present on admission)  Assessment & Plan  Completed radiation in July 2023, patient concern radiation therapy was causative for her current diffuse metastatic condition  Diffuse metastatic disease to the shoulder, lungs  Started on capmatinib per oncology for her sarcoma started 9/29  Onc following  Extremely rapid growing tumor with rather dramatic increase in disease by chest CT 1 month apart 8/20 versus 9/20  Hospice recommended  DNR/DNI  Hospice following    Goals of care, counseling/discussion  Assessment & Plan  Goals of care done last week extensively. Hospice recommended by oncology and ICU team.  Patient would like to try some form of therapy if possible and oncology offered an oral chemotherapeutic regime. She elected to change to DNR/DNI.   Continue with palliative care consultation.     Pulmonary embolism on right (HCC)- (present on admission)  Assessment & Plan  Subsegmental, no RV strain  Clinically not in right heart failure  Continue DOAC      Mass of sacrum- (present on admission)  Assessment & Plan  Sarcoma  Analgesia as needed    Lung cancer (HCC)- (present on admission)  Assessment & Plan  Stage I adenocarcinoma initially treated with chemoradiation  Follows with Dr Newton  (Onc)  Patient concerned prior radiation therapy contributed to current problem    Chronic pain due to neoplasm- (present on admission)  Assessment & Plan  Continue multimodal pain management, oxy, tylenol, lidocaine patches, gabapentin and IV pain medication for breakthrough  When ready for hospice and end-of-life IV morphine infusion okay  Dex gtt for anxiety and pain    Cigarette nicotine dependence without complication- (present on admission)  Assessment & Plan  History of ongoing tobacco abuse    Hyperlipidemia- (present on admission)  Assessment & Plan  History of, currently not a clinical relevant Diagnosis      Plan  Adjust anxiolytics, add BuSpar  Attempt to get off Precedex as possible  Ongoing multimodality pain regimen including dexamethasone  Ongoing anticoagulation with apixaban with caution  Oxygen titration is much as possible  See orders    Patient is critically ill.   The patient continues to have: Acute hypoxic respiratory failure requiring non invasive positive pressure ventilation secondary to COPD, non-small cell lung cancer, metastatic sarcoma with pulmonary nodules currently on 50L heated high flow and 100% FiO2    The vital organ system that is affected is the: Respiratory  If untreated there is a high chance of deterioration into: Respiratory failure and eventually death.   The critical care that I am providing today is: heated high flow  The critical that has been undertaken is medically complex.   There has been no overlap in critical care time.   Critical Care Time not including procedures: 35 minutes    VTE prophylaxis: Eliquis      Please note that this dictation was created using voice recognition software. I have made every reasonable attempt to correct obvious errors, but I expect that there are errors of grammar and possibly context that I did not discover before finalizing the note.

## 2023-10-03 NOTE — PROGRESS NOTES
Consult Note: Hematology/Oncology     Primary Care:  Checo Baker M.D.    Chief Complaint   Patient presents with    Shortness of Breath     PT hx lung ca that's metastasized. Was at another appt and was SOB. RA while walking in 70's per EMS. Currently on 6L NC         Current Treatment:     9/29/23: Capmatinib    Prior Treatment:     4/17/2023-4/21/2023: RLL Lung SBRT  7/19/23-7/24/2023: R Sacrum SBRT      Subjective:   History of Presenting Illness:    Melly Hendrickson is a 48 y.o. 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    Patient history dates back to July 2022 when she had a chest x-ray performed that revealed a potential mass in her right lung.  This led to a chest CT performed on February 13, 2023.  This demonstrated 1.4 cm mass in the right lower lobe and a 1.9 groundglass opacity also in the right lower lobe.  The groundglass opacity appeared stable from previous scans but the mass was thought to be new.      A biopsy was performed and confirmed lung adenocarcinoma.    Patient saw thoracic surgery and obtained PFTs as well as a PET scan.  She was discussed at tumor board and given her significant dyspnea and her underlying COPD it was thought that she would be a better candidate for SBRT rather than surgical resection.    April 17, 2023 patient began SBRT.  Completed this treatment without many issues.    After this patient did notice a small lump in her breast reduction scar line.  She said that over the next few days it grew and she ultimately went to her PCP    PCP tried to drain the mass; however it just bled significantly during the I&D attempt.  Thus she was sent to Dr. Becerra at [facility] for excision.     Pathology from this excision demonstrated poorly differentiated malignancy favoring carcinoma without obvious involvement of underlying skin and nonspecific IHC profile.  Based on  his diagnosis cancer type ID was sent off for.  Results showed 90% probability of sarcoma    Over the past several months she has noted severe pain in her sacral region.  She does have sacral lesions that are concerning for metastatic disease.  Patient has biopsy of sacral mass on 7/18/2023.     Of note patient has 5 children and 4 grandchildren.    She follows with pain management    I initially saw patient on 7/14.  At our 7/21, we had information back from biopsy and we discussed AIM.  Patient wished to hold off and think about this further.    On 7/28, we discussed initiating AIM, patient was not comfortable and wanted 2 opinion at [place].    I saw patient again on 8/11.  We discussed AIM,  patient not ready and awaiting [place] appointment.     She was recently admitted to the ER for hemoptysis on 8/20/23 and was found to have a PE.  She was still having hemoptysis and thus was not started on AC. She has discharged on 8/24/23.     I saw her subsequently on 9/7/23 and recommended AC given no hemoptysis, but active PE with cancer.  We also discussed chemo at this visit and she wished to wait.     During my visit with the patient on 9/19/23, Patient was endorsing 3 nodules on the R arm.  1 on the clavicle.  Patient also had a nodule on her L arm. Patient had 1 nodule on her bikini line. She has another on the inside of her L thigh. These started 1 week ago. She was on 2-3L of oxygen for the past several days.  We discussed intiating chemo and plan was to start on 9/26/23.  Pt requires a direct admit for AIM    Since that visit she had worseing SOB.  She was subsequently presented to the ER for SOB/cough and potential PNA. On admission, she was tachycardic, tachypneic, and afebrile.  Chest x-ray showed diffuse bilateral interstitial and alveolar opacities consistent with pneumonia. CTA chest on admission showed right subsegmental PE, worsening pulmonary nodules, T1 vertebral body region, possible pneumonia.   Procalcitonin was 0.11.  Ceftriaxone and azithromycin were empirically started.    Line was not able to be placed unable to start chemo.  Patients respiratory status declined and she was transferred to the ICU    Yesterday I discussed with the patient starting capmatinib (please see note from 9/29/23)    Today patient reports that she is doing well.  Her oxygen requirements remain relatively stable and she was able to stepdown from the ICU to the ICU stepdown.    Remains hopeful that she can get home while taking capmatinib.    She is complaining of her PICC line and pain associated with this line.        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    Chronic obstructive pulmonary disease (HCC)     COPD (chronic obstructive pulmonary disease) (HCC)     DDD (degenerative disc disease), lumbar     Depression     Emphysema of lung ([facility])     MRSA (methicillin resistant staph aureus) culture positive     Pain     back - cervical, scapula, lower    Pericarditis     Primary adenocarcinoma of lower lobe of right lung ([facility])     Psychiatric disorder     Anxiety, Depression    Sleep apnea     Snoring     Urinary incontinence         Past Surgical History:   Procedure Laterality Date    WV BRONCHOSCOPY,DIAGNOSTIC N/A 8/22/2023    Procedure: BRONCHOSCOPY;  Surgeon: Sandra Lopez D.O.;  Location: SURGERY [facility];  Service: Pulmonary    WV EXC SKIN BENIG >4CM TRUNK,ARM,LEG Left 6/7/2023    Procedure: EXCISION OF SOFT TISSUE MASS LEFT CHEST WALL;  Surgeon: Nasir Becerra M.D.;  Location: SURGERY [facility];  Service: General    WV BRONCHOSCOPY,DIAGNOSTIC N/A 02/14/2023    Procedure: FIBER OPTIC BRONCHOSCOPY WASH, BRUSH, BRONCHOALVEOLAR LAVAGE, BIOPSY AND FINE NEEDLE ASPIRATION AND NAVIGATION, ROBOTICS;  Surgeon: Guicho Ellis M.D.;  Location: SURGERY [facility];  Service: Pulmonary Robotic    ENDOBRONCHIAL US ADD-ON N/A 02/14/2023    Procedure: ENDOBRONCHIAL ULTRASOUND (EBUS);  Surgeon: Guicho Ellis M.D.;  Location: SURGERY [facility];  Service: Pulmonary Robotic    MAMMOPLASTY REDUCTION Bilateral     SEPTOPLASTY      TUBE & ECTOPIC PREG., REMOVAL      x 2       Social History     Tobacco Use    Smoking status: Some Days     Current packs/day: 0.50     Average packs/day: 0.5 packs/day for 20.0 years (10.0 ttl pk-yrs)     Types: Cigarettes     Passive exposure: Past    Smokeless tobacco: Never    Tobacco comments:     on and off    Vaping Use    Vaping Use: Never used   Substance Use Topics    Alcohol use: Not Currently     Comment: occ, rare    Drug use: Not Currently     Types: Marijuana     Comment: THC gummies        Family History   Problem Relation Age of Onset    Diabetes Mother     Cancer Father         Prostate    Cancer Paternal Grandmother         Cervical    Cancer Paternal Grandfather         Unk       Allergies   Allergen Reactions    Penicillins Hives, Rash and Swelling     Pt reports that she gets swelling in throat and face, rash all over face and arms.   Tolerated cefazolin    Aspirin Rash and Hives     Pt reports that she received a rash on face, pt reports it ok if she takes NORCO    Other Environmental Shortness of Breath     Perfumes, dander, scents       Current Facility-Administered Medications   Medication Dose Route Frequency Provider Last Rate Last Admin    melatonin tablet 10 mg  10 mg Oral Nightly Aline Reveles        oxyCODONE immediate-release (Roxicodone) tablet 5 mg  5 mg Oral Q4HRS PRN Gerson Adam M.D.        Or    oxyCODONE immediate release (Roxicodone) tablet 10 mg  10 mg Oral Q4HRS PRMELLISA Adam M.D.   10 mg at 10/02/23 1423    acetaminophen (Tylenol) tablet 650 mg  650 mg Oral Q6HRS Gerson Adam M.D.   650 mg at 10/03/23 0538    gabapentin (Neurontin) capsule 300 mg  300 mg Oral Q8HRS Dex  JAZMINE Madrid        busPIRone (Buspar) tablet 10 mg  10 mg Oral TID Dex Madrid M.D.   10 mg at 10/03/23 0537    LORazepam (Ativan) injection 0.5 mg  0.5 mg Intravenous Q4HRS PRN Tonny Edmond M.D.   0.5 mg at 10/02/23 0734    lidocaine (Lidoderm) 5 % 3 Patch  3 Patch Transdermal Q24HR Tonny Edmond M.D.   3 Patch at 10/01/23 1244    Nozin nasal  swab  1 Applicator Each Nostril BID Tonny Edmond M.D.   1 Applicator at 10/03/23 0538    dexamethasone (Decadron) injection 4 mg  4 mg Intravenous Q6HRS Tonny Edmond M.D.   4 mg at 10/03/23 0538    sodium chloride (Ocean) 0.65 % nasal spray 2 Spray  2 Spray Nasal Q2HRS PRN Tonny Edmond M.D.        HYDROmorphone (Dilaudid) injection 1 mg  1 mg Intravenous Q HOUR PRN Gerson Adam M.D.   1 mg at 10/03/23 0545    LORazepam (Ativan) tablet 0.5 mg  0.5 mg Oral Q8HRS Tonny Edmond M.D.   0.5 mg at 10/03/23 0537    capmatinib (Tabrecta) tablet 400 mg  400 mg Oral BID Alma Newton M.D.   400 mg at 10/03/23 0310    dexmedetomidine (PRECEDEX) 400 mcg/100mL NS premix infusion  0.1-1.5 mcg/kg/hr (Ideal) Intravenous Continuous Remberto Mcdowell Jr. D.OEstrellita 11 mL/hr at 10/03/23 0040 0.6 mcg/kg/hr at 10/03/23 0040    guaiFENesin ER (Mucinex) tablet 600 mg  600 mg Oral Q12HRS Naima Ambrosio, A.P.R.N.   600 mg at 10/03/23 0537    polyethylene glycol/lytes (Miralax) PACKET 1 Packet  1 Packet Oral Q48HRS Ivonne Mohan, A.P.R.N.   1 Packet at 09/24/23 1207    diphenhydrAMINE (Benadryl) injection 25 mg  25 mg Intravenous Q6HRS PRN ARTHUR Bolden.P.REstrellitaN.   25 mg at 10/01/23 1605    fluticasone-umeclidinium-vilanterol (Trelegy Ellipta) 100-62.5-25 mcg/act inhaler 1 Puff  1 Puff Inhalation DAILY Yenifer Velazquez M.D.   1 Puff at 09/25/23 0528    ipratropium-albuterol (DUONEB) nebulizer solution  3 mL Nebulization Q4HRS (RT) Yenifer Velazquez M.D.   3 mL at 10/03/23 0652    ipratropium-albuterol (DUONEB) nebulizer solution  3 mL  Nebulization Q2HRS PRN (RT) Yenifer Velazquez M.D.   3 mL at 09/29/23 1157    albuterol inhaler 2 Puff  2 Puff Inhalation Q4HRS PRN Madhu Merino D.O.   2 Puff at 10/01/23 0516    diclofenac sodium (Voltaren) 1 % gel 2 g  2 g Topical 4X/DAY PRN Madhu Merino D.O.        loratadine (Claritin) tablet 10 mg  10 mg Oral DAILY SANJU GarciaOEstrellita   10 mg at 10/03/23 0538    senna-docusate (Pericolace Or Senokot S) 8.6-50 MG per tablet 2 Tablet  2 Tablet Oral BID Madhu Merino D.O.   2 Tablet at 10/02/23 1733    And    polyethylene glycol/lytes (Miralax) PACKET 1 Packet  1 Packet Oral QDAY PRN SANJU GarciaOEstrellita   1 Packet at 09/23/23 1141    And    magnesium hydroxide (Milk Of Magnesia) suspension 30 mL  30 mL Oral QDAY PRN SANJU GarciaOEstrellita   30 mL at 09/24/23 0939    And    bisacodyl (Dulcolax) suppository 10 mg  10 mg Rectal QDAY PRN SANJU GarciaOEstrellita   10 mg at 09/28/23 1300    Respiratory Therapy Consult   Nebulization Continuous RT Madhu Merino D.O.        acetaminophen (Tylenol) tablet 650 mg  650 mg Oral Q6HRS PRN Madhu Merino D.O.        hydrALAZINE (Apresoline) injection 10 mg  10 mg Intravenous Q4HRS PRN SANJU GarciaOEstrellita   10 mg at 09/26/23 1010    ondansetron (Zofran) syringe/vial injection 4 mg  4 mg Intravenous Q4HRS PRN SANJU GarciaOEstrellita   4 mg at 10/01/23 2235    ondansetron (Zofran ODT) dispertab 4 mg  4 mg Oral Q4HRS PRN Madhu Merino D.O.        promethazine (Phenergan) tablet 12.5-25 mg  12.5-25 mg Oral Q4HRS PRN Madhu Merino D.O.        promethazine (Phenergan) suppository 12.5-25 mg  12.5-25 mg Rectal Q4HRS PRN Madhu Merino D.O.        prochlorperazine (Compazine) injection 5-10 mg  5-10 mg Intravenous Q4HRS PRN Madhu Merino D.O.        guaiFENesin dextromethorphan (Robitussin DM) 100-10 MG/5ML syrup 10 mL  10 mL Oral Q6HRS PRN Madhu Merino D.O.   10 mL at 09/30/23 1802    apixaban (Eliquis) tablet 5 mg  5 mg Oral BID Madhu Merino D.O.   5 mg at 10/03/23 0537        Review of Systems   Constitutional:  Positive for malaise/fatigue. Negative for chills, fever and weight loss.   HENT:  Negative for congestion, ear pain, nosebleeds and sore throat.    Eyes:  Negative for blurred vision.   Respiratory:  Negative for cough, sputum production, shortness of breath and wheezing.    Cardiovascular:  Negative for chest pain, palpitations, orthopnea and leg swelling.   Gastrointestinal:  Negative for abdominal pain, heartburn, nausea and vomiting.   Genitourinary:  Negative for dysuria, frequency and urgency.   Musculoskeletal:  Positive for back pain and joint pain. Negative for neck pain.   Neurological:  Negative for dizziness, tingling, tremors, sensory change, focal weakness and headaches.   Endo/Heme/Allergies:  Does not bruise/bleed easily.   Psychiatric/Behavioral:  Negative for depression, memory loss and suicidal ideas.    All other systems reviewed and are negative.      Problem list, medications, and allergies reviewed by myself today in Epic.     Objective:     Vitals:    10/03/23 0252 10/03/23 0300 10/03/23 0400 10/03/23 0654   BP:  123/66 126/77    Pulse: 96 96 88 86   Resp: 18   (!) 24   Temp:       TempSrc:       SpO2: 95% 95% 99% 97%   Weight:       Height:             DESC; KARNOFSKY SCALE WITH ECOG EQUIVALENT: 90, Able to carry on normal activity; minor signs or symptoms of disease (ECOG equivalent 0)    DISTRESS LEVEL: no acute distress    Physical Exam  Deferred    Labs:   Most recent labs reviewed.  Please see the lab tab of chart review    Imaging:   Most recent images below have been independently reviewed by me.  Please see the imaging tab of chart review    9/22/2023 : Echocardiogram showed LVEF ~ 65% with no reported abnormalities    7/27/23: PET   1.  Focal uptake in the medial RIGHT lower lobe of lung abutting the thoracic spine, consistent with tumor..  2.  No PET correlate for ill-defined spiculated nodule in the RIGHT lower lobe.  3.  Large  hypermetabolic mass corresponds to lytic lesion in the RIGHT sacrum consistent with malignancy.  4.  Intramuscular focal uptake at the posterior LEFT shoulder, likely metastatic.    Pathology:    7/14/2023: Pathology of Chest wall  A. Chest wall mass:          Metastatic poorly differentiated malignancy favoring carcinoma           without obvious involvement of overlying skin and a           non-specific IHC profile of some keratins positive (see           microscopic description).          See comment.   Main Cancer Type: Sarcoma   Probability: 90%     Subtype: Undifferentiated Sarcoma ([facility])   Probability: 90%     7/19/2023: Path of the Sacral Bone  A. Sacral bone biopsy:          Bone cores effaced by a high-grade malignancy histologically           identical to the previously excised chest wall mass           ([id number]).     2/14/2023: Pathology of Lung Mass  A. Lung, right lower lobe fine needle aspiration slides:          Positive for carcinoma.   B. Core, right lower lobe lung:          Moderately differentiated lung adenocarcinoma.   C. Lung, right lower lobe biopsy core and touchprep slides:          Moderately differentiated lung adenocarcinoma.   D. Bronchoalveolar lavage right lower lobe:          Rare atypical cells, malignant cells vs. reactive bronchial           cells.     Assessment/Plan:      Cancer Staging   Primary undifferentiated sarcoma of soft tissue (HCC)  Staging form: Soft Tissue Sarcoma of the Trunk and Extremities, AJCC 8th Edition  - Clinical: Stage IV (cTX, cN0, cM1) - Signed by Alma Newton M.D. on 9/19/2023         Ms. 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    She reports that she feels that she is doing slightly better.     She is hopeful that  she will be able to go home while taking the oral chemo.    All questions and concerns addressed today.  I will continue to follow    Alma Newton M.D.  Hematology/Oncology

## 2023-10-03 NOTE — PROGRESS NOTES
Called Vascular access team about patient Midline that was inserted on 10/02/2023 but does not flush and or draw back. Response will come and see her as soon as we can. Patient updated.

## 2023-10-03 NOTE — PROGRESS NOTES
4 Eyes Skin Assessment Completed by ANA LUISA Berg and ANA LUISA Dupont.    Head WDL  Ears WDL  Nose WDL  Mouth WDL  Neck WDL  Breast/Chest WDL  Shoulder Blades WDL  Spine WDL  (R) Arm/Elbow/Hand WDL  (L) Arm/Elbow/Hand WDL  Abdomen WDL  Groin WDL  Scrotum/Coccyx/Buttocks WDL  (R) Leg WDL  (L) Leg WDL  (R) Heel/Foot/Toe WDL  (L) Heel/Foot/Toe WDL          Devices In Places ECG, Tele Box, Blood Pressure Cuff, Pulse Ox, and HFNC      Interventions In Place Pillows, Q2 Turns, and Barrier Cream    Possible Skin Injury No    Pictures Uploaded Into Epic N/A  Wound Consult Placed N/A  RN Wound Prevention Protocol Ordered No

## 2023-10-03 NOTE — CARE PLAN
Problem: Bronchoconstriction  Goal: Improve in air movement and diminished wheezing  Description: Target End Date:  2 to 3 days    1.  Implement inhaled treatments  2.  Evaluate and manage medication effects  Outcome: Progressing  Flowsheets (Taken 9/21/2023 1555 by Hayley Marroquin RRT)  Bronchodilator Goals/Outcome: Diminished Wheezing and Volume of Air Movement Increased  Bronchodilator Indications: History / Diagnosis  Note:     Respiratory Update    Treatment modality: DuoNeb  Frequency: Q4    Pt tolerating current treatments well with no adverse reactions.       Problem: Humidified High Flow Nasal Cannula  Goal: Maintain adequate oxygenation dependent on patient condition  Description: Target End Date:  resolve prior to discharge or when underlying condition is resolved/stabilized    1.  Implement humidified high flow oxygen therapy  2.  Titrate high flow oxygen to maintain appropriate SpO2  Outcome: Progressing  Flowsheets  Taken 10/3/2023 1404 by Jeb Jones RRT  O2 (LPM): 60  FiO2%: 90 %  Taken 9/25/2023 1956 by Guzman Toro RRT  Indication: COPD diagnosis: SpO2 less than 89% despite conventional supplemental oxygen devices

## 2023-10-03 NOTE — CARE PLAN
Problem: Bronchoconstriction  Goal: Improve in air movement and diminished wheezing  Description: Target End Date:  2 to 3 days    1.  Implement inhaled treatments  2.  Evaluate and manage medication effects  Outcome: Not Met     Problem: Humidified High Flow Nasal Cannula  Goal: Maintain adequate oxygenation dependent on patient condition  Description: Target End Date:  resolve prior to discharge or when underlying condition is resolved/stabilized    1.  Implement humidified high flow oxygen therapy  2.  Titrate high flow oxygen to maintain appropriate SpO2  Outcome: Not Met   HHFNC [id number]%  DuoNeb q4

## 2023-10-04 NOTE — PROGRESS NOTES
Monitor Summary:    Sinus Rhythm/ Sinus Tach: 80's-110's. Rare PVC's.    Pt becomes more tachycardic with activity/ stimulation.    0.108/0.071/0.334

## 2023-10-04 NOTE — CARE PLAN
The patient is Watcher - Medium risk of patient condition declining or worsening    Shift Goals  Clinical Goals: Optimize O2 saturations, wean off dexmedetomidine, decrease O2 requirements.  Patient Goals: Pain control, rest/ sleep.  Family Goals: Stay the night.    Progress made toward(s) clinical / shift goals:  Pt was able to rest/ sleep throughout the night. Pt's family was able to spend the night.     Problem: Pain - Standard  Goal: Alleviation of pain or a reduction in pain to the patient’s comfort goal  Outcome: Progressing  Note: PRN pain medication administered when pt reports pain from systemic, metastatic cancer.      Problem: Respiratory  Goal: Patient will achieve/maintain optimum respiratory ventilation and gas exchange  Outcome: Progressing  Note: HFNC weaned down this shift from 100%/60L to 80%/50L. Pt maintaining saturations in the mid 90%'s, tolerating well.      Problem: Psychosocial  Goal: Patient's level of anxiety will decrease  Outcome: Progressing  Note: Dexmedetomidine wean down and titrated off. Pt receiving Q8 hour PO Ativan. RASS score of 0.

## 2023-10-04 NOTE — CARE PLAN
The patient is Stable - Low risk of patient condition declining or worsening    Shift Goals  Clinical Goals: optimize O2 sat  Patient Goals: pain control  Family Goals: Stay the night.    Progress made toward(s) clinical / shift goals:    Problem: Pain - Standard  Goal: Alleviation of pain or a reduction in pain to the patient’s comfort goal  Outcome: Progressing     Problem: Care Map:  Optimal Outcome for the Pneumonia Patient  Goal: Collection and monitoring of appropriate tests and labs  Outcome: Progressing     Problem: Risk for Aspiration  Goal: Patient's risk for aspiration will be absent or decrease  Outcome: Progressing     Problem: Knowledge Deficit - Standard  Goal: Patient and family/care givers will demonstrate understanding of plan of care, disease process/condition, diagnostic tests and medications  Outcome: Progressing     Problem: Skin Integrity  Goal: Skin integrity is maintained or improved  Outcome: Progressing     Problem: Psychosocial  Goal: Patient's level of anxiety will decrease  Outcome: Progressing  Goal: Patient's ability to verbalize feelings about condition will improve  Outcome: Progressing

## 2023-10-04 NOTE — PROGRESS NOTES
[facility] Daily Progress Note    Date of Service  10/4/2023    Chief Complaint  Melly Hendrickson is a 48 y.o. female referred to the ER by her primary care physician on 9/20/2023 due to shortness of breath.  She was diagnosed with stage I NSCLC (adenocarcinoma) of the lung in 7/2023.  She underwent  SBRT as she was not a good surgical candidate due to dyspnea and underlying COPD. 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  She underwent SBRT to right sacrum.  She follows Dr. Newton (oncology), and was recommended to start chemotherapy   She has COPD/asthma, was diagnosed with pulmonary embolism in 8/2023, anticoagulation not started due to hemoptysis.    Hospital Course  On admission, she was tachycardic, tachypneic, and afebrile.  Chest x-ray showed diffuse bilateral interstitial and alveolar opacities consistent with pneumonia. CTA chest on admission showed right lower lobe subsegmental PE, worsening pulmonary nodules, T1 vertebral body lesion, possible pneumonia.  Procalcitonin was 0.11.Eliquis was started.   Ceftriaxone and azithromycin were empirically started.  Echocardiogram from 9/22/2023 showed LVEF ~ 65% with no reported abnormalities. Repeat Procalcitonin on 9/23/2023 was  0.08.   Chest x-ray from 9/23/2023 showed innumerable bilateral pulmonary nodules which have increased since 9/20/2023, possibly superimposed pneumonia.  Palliative care following for pain management.  On every 4 hours prn Ativan, every 2 hours prn IV Dilaudid, Norco 10/325 every 3 hours prn. Tylenol every 6 hours scheduled, holding when sleeping. Dexamethasone 4 mg BID.  Reportedly the patient was diagnosed with a pulmonary embolism last month but did not pursue anticoagulation secondary to hemoptysis  The patient was escalated to ICU level of care 9/26, secondary to increasing oxygen requirement, and multiple  medication regimen to accommodate her extensive medical needs.  The patient was on dexamethasone, DuoNebs, started on a Precedex drip, for anxiety in conjunction with respiratory failure   The patient was initially placed on high flow nasal cannula, and 100%, 50 L plus nonrebreather mask, was diuresed, kept on antibiotic therapy,  Palliative care consulted, the patient is CODE STATUS was changed on 9/28 to DNR/DNI  The patient was recommended to start oral chemotherapy in form of capmatinib  This was brought from an outside pharmacy and administered here at 4 mg p.o. twice daily  On 10/2 the patient found appropriate for downgrade to the [facility] level of care with overall improved anxiety, respiratory status, for ongoing aggressive medical care    Interval Problem Update  Patient seen and examined today.  Data, Medication data reviewed.  Case discussed with nursing as available.  Plan of Care reviewed with patient and notified of changes.   10/2 the patient is seen in the ICU level of care, she is awake and alert, she sits at the bedside, conversing with her daughter, currently no significant anxiety, no significant pain verbalized,  Patient is currently afebrile, heart rate in the 90s, respiration in the mid 20s, the patient is on 60 L, 100% FiO2, blood pressure in the 1 teens over 60s,  Currently on Precedex drip at 0.6  10/3 patient complains of pain in the deadline line area, the midline is not drawing, asked the vascular access team to evaluate, later that the PICC line is removed,, currently was kinked, there is no other suitable access for a longer catheter apparently as per recent trials,  The patient continues to complain of mild area pain, fever of chemotherapy not working effectively, the patient at this time is still on a Precedex drip for anxiety as well as high flow nasal cannula currently still at 60 L, 100%  Adjusting medication for anxiety, ongoing pain control  Actively titrating the patient's oxygen  delivery  10/4 the patient was able to titrate off the Precedex drip, her anxiety is currently fairly well controlled, she states that she still has 10 out of 10 pain in the right shoulder, remains at 60 L, 100% FiO2 on high flow nasal cannula, currently denies dyspnea, reportedly is having adequate p.o. intake and bowel movements,  Laboratory data reviewed, glucose 134, white cell count 18.4, hemoglobin 10.5, platelet count 93,  I have discussed this patient's plan of care and discharge plan at IDT rounds today with Case Management, Nursing, Nursing leadership, and other members of the IDT team.    Consultants/Specialty  oncology critical care, palliative care    Code Status  DNAR/DNI    Disposition  Medically Cleared  I have placed the appropriate orders for post-discharge needs.    Review of Systems  Review of Systems   Constitutional:  Positive for malaise/fatigue. Negative for chills and fever.   HENT: Negative.     Eyes: Negative.    Respiratory:  Positive for cough and shortness of breath.    Cardiovascular: Negative.  Negative for chest pain, palpitations and leg swelling.   Gastrointestinal: Negative.  Negative for heartburn, nausea and vomiting.   Genitourinary: Negative.  Negative for dysuria and frequency.   Musculoskeletal:  Positive for myalgias. Negative for neck pain.   Skin: Negative.  Negative for itching and rash.   Neurological: Negative.  Negative for dizziness, focal weakness, weakness and headaches.   Endo/Heme/Allergies: Negative.  Negative for polydipsia. Does not bruise/bleed easily.   Psychiatric/Behavioral:  Negative for depression. The patient is nervous/anxious.         Physical Exam  Temp:  [36.3 °C (97.4 °F)-36.8 °C (98.3 °F)] 36.3 °C (97.4 °F)  Pulse:  [] 107  Resp:  [17-28] 20  BP: (113-162)/(53-91) 162/91  SpO2:  [88 %-100 %] 100 %    Physical Exam  Vitals and nursing note reviewed.   Constitutional:       Appearance: She is well-developed. She is ill-appearing. She is not  diaphoretic.   HENT:      Head: Normocephalic and atraumatic.      Nose: Nose normal.      Comments: High flow nasal cannula     Mouth/Throat:      Mouth: Mucous membranes are moist.   Eyes:      Conjunctiva/sclera: Conjunctivae normal.      Pupils: Pupils are equal, round, and reactive to light.   Neck:      Thyroid: No thyromegaly.      Vascular: No JVD.   Cardiovascular:      Rate and Rhythm: Normal rate and regular rhythm.      Heart sounds: Normal heart sounds.      No friction rub. No gallop.   Pulmonary:      Effort: Pulmonary effort is normal.      Breath sounds: Rhonchi and rales present. No wheezing.   Abdominal:      General: Bowel sounds are normal. There is no distension.      Palpations: Abdomen is soft. There is no mass.      Tenderness: There is no abdominal tenderness. There is no guarding or rebound.   Musculoskeletal:         General: No tenderness. Normal range of motion.      Cervical back: Normal range of motion and neck supple.      Right lower leg: No edema.      Left lower leg: No edema.   Lymphadenopathy:      Cervical: No cervical adenopathy.   Skin:     General: Skin is warm and dry.   Neurological:      Mental Status: She is alert and oriented to person, place, and time. Mental status is at baseline.      Cranial Nerves: No cranial nerve deficit.   Psychiatric:         Behavior: Behavior normal.      Comments: Anxious         Fluids    Intake/Output Summary (Last 24 hours) at 10/4/2023 1414  Last data filed at 10/4/2023 0600  Gross per 24 hour   Intake 275.4 ml   Output --   Net 275.4 ml           Laboratory  Recent Labs     10/02/23  0440 10/03/23  0310 10/04/23  0500   WBC 15.0* 19.1* 18.4*   RBC 3.76* 4.05* 4.00*   HEMOGLOBIN 9.7* 10.8* 10.5*   HEMATOCRIT 33.2* 35.4* 35.3*   MCV 88.3 87.4 88.3   MCH 25.8* 26.7* 26.3*   MCHC 29.2* 30.5* 29.7*   RDW 56.8* 55.2* 56.7*   PLATELETCT 93* 105* 93*   MPV 11.4 11.3 11.6       Recent Labs     10/02/23  0555 10/03/23  0310 10/04/23  0500    SODIUM 135 136 136   POTASSIUM 4.7 4.4 4.6   CHLORIDE 100 99 99   CO2 27 27 30   GLUCOSE 145* 184* 134*   BUN 17 14 14   CREATININE 0.53 0.63 0.58   CALCIUM 8.0* 8.4* 8.3*                       Imaging  DX-CHEST-PORTABLE (1 VIEW)   Final Result         1.  Extensive bilateral pulmonary parenchymal opacities compatible with ARDS, severe pulmonary edema, and/or superimposed pulmonary infiltrates, stable since prior study.      IR-MIDLINE CATHETER INSERTION WO GUIDANCE > AGE 3   Final Result                  Ultrasound-guided midline placement performed by qualified nursing staff    as above.          DX-CHEST-PORTABLE (1 VIEW)   Final Result         1.  Extensive bilateral pulmonary parenchymal opacities compatible with ARDS, severe pulmonary edema, and/or superimposed pulmonary infiltrates, stable since prior study.      DX-CHEST-PORTABLE (1 VIEW)   Final Result         1.  Pulmonary edema and/or infiltrates are identified, which are stable since the prior exam.      DX-CHEST-PORTABLE (1 VIEW)   Final Result         1.  Bilateral pulmonary infiltrates, stable since prior study   2.  Multiple bilateral pulmonary nodules.      DX-CHEST-PORTABLE (1 VIEW)   Final Result      1.  Innumerable pulmonary nodules bilaterally.      2.  Increasing confluence and airspace opacities in the perihilar regions suspicious for superimposed pneumonitis on diffuse metastatic disease      IR-PICC LINE PLACEMENT W/ GUIDANCE > AGE 5   Final Result                  Ultrasound-guided PICC placement performed by qualified nursing staff as    above.          DX-CHEST-PORTABLE (1 VIEW)   Final Result      1.  Innumerable bilateral pulmonary nodule, increased since 9/20/2023      2.  Differential diagnosis includes worsening metastases or superimposed pneumonia.      EC-ECHOCARDIOGRAM COMPLETE W/O CONT   Final Result      CT-CTA CHEST PULMONARY ARTERY W/ RECONS   Final Result      1.  Minimal pulmonary embolism. Localized right lower lobe  segmental and subsegmental branches.   2.  No right heart strain.   3.  9 mm lucent/lytic lesion T1 vertebral body. Suspect osseous metastatic deposit.   4.  Progression of extensive mediastinal adenopathy since 8/20/2023 consistent with metastatic disease.   5.  Extensive progression of diffuse bilateral innumerable pulmonary nodules consistent with metastatic disease.   6.  Interval development of extensive groundglass and airspace opacities consistent with superimposed pneumonia.   7.  Progression of right adrenal mass now measuring 43 mm x 38 mm consistent with metastasis.      Fleischner Society pulmonary nodule recommendations:      DX-CHEST-PORTABLE (1 VIEW)   Final Result      Diffuse bilateral interstitial and alveolar opacities most consistent with pneumonia or possibly noncardiogenic pulmonary edema.             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 history of stage 1 lung adenocarcinoma s/p SBRT, pulmonary embolism, COPD/asthma, recent pneumonia.  Transferred to the ICU on 9/26/2023 due to increasing oxygen requirements, downgraded to IMCU on 10/2/2023.  Currently saturating 97% on 60 L/min heated high flow oxygen.  Completed course of antibiotics. Continue steroids and nebulizers.    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 Stated Capmatinib on 9/29/2023. Oncology following. DNR/DNI. Hospice following.     Goals of care, counseling/discussion  Assessment & Plan  Goals of care done last week extensively. Hospice recommended by oncology and ICU team.  Patient would like to try some form of therapy if possible and oncology offered an oral chemotherapeutic regime. She elected to change to DNR/DNI.   Continue with palliative care  consultation.     Sarcoma (HCC)- (present on admission)  Assessment & Plan  Advanced, aggressive, on oral chemotherapy currently    Pulmonary embolism on right (HCC)- (present on admission)  Assessment & Plan  Recently diagnosed. On Apixaban.     Mass of sacrum- (present on admission)  Assessment & Plan  Aggressive metastatic undifferentiated sarcoma. On Capmatinib    Lung cancer (HCC)- (present on admission)  Assessment & Plan  Stage I adenocarcinoma initially treated with chemoradiation. Follows with Dr Newton (Onc)    Chronic pain due to neoplasm- (present on admission)  Assessment & Plan  Palliative care have been following for pain management.  Continue multimodal pain management, oxy, tylenol, lidocaine patches, gabapentin and IV pain medication for breakthrough.  Hospice following.    Cigarette nicotine dependence without complication- (present on admission)  Assessment & Plan  History of ongoing tobacco abuse     Plan  The patient off Precedex currently, adjusted BuSpar  Further attempts at more adequate pain control  Ongoing multimodality pain regimen including dexamethasone  Ongoing anticoagulation with apixaban with caution  Oxygen titration is much as possible the patient in ongoing acute severe respiratory failure requiring maximum oxygen delivery on high flow nasal cannula regimen  Additional dose of forced diuresis  See orders    Patient is critically ill.   The patient continues to have: Acute hypoxic respiratory failure requiring non invasive positive pressure ventilation secondary to COPD, non-small cell lung cancer, metastatic sarcoma with pulmonary nodules currently on 60L heated high flow and 100% FiO2    The vital organ system that is affected is the: Respiratory  If untreated there is a high chance of deterioration into: Respiratory failure and eventually death.   The critical care that I am providing today is: heated high flow  The critical that has been undertaken is medically complex.    There has been no overlap in critical care time.   Critical Care Time not including procedures: 32 minutes  Patient is has a high medical complexity, complex decision making and is at high risk for complication, morbidity, and mortality.    VTE prophylaxis: Eliquis      Please note that this dictation was created using voice recognition software. I have made every reasonable attempt to correct obvious errors, but I expect that there are errors of grammar and possibly context that I did not discover before finalizing the note.

## 2023-10-04 NOTE — CARE PLAN
Problem: Bronchoconstriction  Goal: Improve in air movement and diminished wheezing  Description: Target End Date:  2 to 3 days    1.  Implement inhaled treatments  2.  Evaluate and manage medication effects  Outcome: Progressing     Problem: Humidified High Flow Nasal Cannula  Goal: Maintain adequate oxygenation dependent on patient condition  Description: Target End Date:  resolve prior to discharge or when underlying condition is resolved/stabilized    1.  Implement humidified high flow oxygen therapy  2.  Titrate high flow oxygen to maintain appropriate SpO2  Outcome: Progressing   HHFNC 60/100%  DUO q4

## 2023-10-04 NOTE — CARE PLAN
The patient is Stable - Low risk of patient condition declining or worsening    Shift Goals  Clinical Goals: Wean off  O2  Patient Goals: pain control  Family Goals: comfort    Progress made toward(s) clinical / shift goals:    Problem: Pain - Standard  Goal: Alleviation of pain or a reduction in pain to the patient’s comfort goal  Outcome: Progressing     Problem: Knowledge Deficit - Standard  Goal: Patient and family/care givers will demonstrate understanding of plan of care, disease process/condition, diagnostic tests and medications  Outcome: Progressing     Problem: Skin Integrity  Goal: Skin integrity is maintained or improved  Outcome: Progressing     Problem: Skin Integrity  Goal: Skin integrity is maintained or improved  Outcome: Progressing     Problem: Psychosocial  Goal: Patient's level of anxiety will decrease  Outcome: Progressing     Problem: Fall Risk  Goal: Patient will remain free from falls  Outcome: Progressing

## 2023-10-05 NOTE — CARE PLAN
The patient is Watcher - Medium risk of patient condition declining or worsening    Shift Goals  Clinical Goals: decrease FiO2  Patient Goals: transfer to CNU, pain control  Family Goals: Stay the night.    Progress made toward(s) clinical / shift goals:    Problem: Pain - Standard  Goal: Alleviation of pain or a reduction in pain to the patient’s comfort goal  Outcome: Progressing  Note: Pain assessed using 0-10 verbal pain scale, PRN pain medications administered per MAR.      Problem: Respiratory  Goal: Patient will achieve/maintain optimum respiratory ventilation and gas exchange  Outcome: Progressing  Note: Pt maintaining adequate O2 on 70% at 50L high flow nasal cannula.      Problem: Knowledge Deficit - Standard  Goal: Patient and family/care givers will demonstrate understanding of plan of care, disease process/condition, diagnostic tests and medications  Outcome: Progressing  Note: Pt updated on plan of care and oriented to unit. Pt states understanding for PO chemo and pain management. All questions answered at this time.      Problem: Fall Risk  Goal: Patient will remain free from falls  Outcome: Progressing  Note: Bed alarm on, bed locked and in lowest position, personal belongings within reach, call light within reach, nonslip socks on, pt room close to nursing station, pt uses call light appropriately.        Patient is not progressing towards the following goals: N/A

## 2023-10-05 NOTE — CARE PLAN
"The patient is Watcher - Medium risk of patient condition declining or worsening    Shift Goals  Clinical Goals: decrease FiO2  Patient Goals: transfer to CNU, pain control  Family Goals: Stay the night.    Progress made toward(s) clinical / shift goals:  Tolerated multiple pain medications however still complaining of severe pain. Able to lower FiO2 LPM and % to 30L 65% while sleeping. Mobilizes well, very pleasant, labs stable, confirmed CNU is able to take patients on HFNC-unsure of barriers to transfer.   Problem: Pain - Standard  Goal: Alleviation of pain or a reduction in pain to the patient’s comfort goal  Outcome: Progressing     Problem: Care Map:  Optimal Outcome for the Pneumonia Patient  Goal: Collection and monitoring of appropriate tests and labs  Outcome: Progressing     Problem: Respiratory  Goal: Patient will achieve/maintain optimum respiratory ventilation and gas exchange  Outcome: Progressing     Problem: Self Care  Goal: Patient will have the ability to perform ADLs independently or with assistance (bathe, groom, dress, toilet and feed)  Outcome: Progressing       Patient is not progressing towards the following goals: Issues with friend causing anxiety, possible unrealistic expectations regarding prognosis stating would like \"10-15 more years with family\"      "

## 2023-10-05 NOTE — PROGRESS NOTES
[facility] Medicine Daily Progress Note    Date of Service  10/5/2023    Chief Complaint  Melly Hendrickson is a 48 y.o. female referred to the ER by her primary care physician on 9/20/2023 due to shortness of breath.  She was diagnosed with stage I NSCLC (adenocarcinoma) of the lung in 7/2023.  She underwent  SBRT as she was not a good surgical candidate due to dyspnea and underlying COPD. 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  She underwent SBRT to right sacrum.  She follows Dr. Newton (oncology), and was recommended to start chemotherapy   She has COPD/asthma, was diagnosed with pulmonary embolism in 8/2023, anticoagulation not started due to hemoptysis.    Hospital Course  On admission, she was tachycardic, tachypneic, and afebrile.  Chest x-ray showed diffuse bilateral interstitial and alveolar opacities consistent with pneumonia. CTA chest on admission showed right lower lobe subsegmental PE, worsening pulmonary nodules, T1 vertebral body lesion, possible pneumonia.  Procalcitonin was 0.11.Eliquis was started.   Ceftriaxone and azithromycin were empirically started.  Echocardiogram from 9/22/2023 showed LVEF ~ 65% with no reported abnormalities. Repeat Procalcitonin on 9/23/2023 was  0.08.   Chest x-ray from 9/23/2023 showed innumerable bilateral pulmonary nodules which have increased since 9/20/2023, possibly superimposed pneumonia.  Palliative care following for pain management.  On every 4 hours prn Ativan, every 2 hours prn IV Dilaudid, Norco 10/325 every 3 hours prn. Tylenol every 6 hours scheduled, holding when sleeping. Dexamethasone 4 mg BID.  Reportedly the patient was diagnosed with a pulmonary embolism last month but did not pursue anticoagulation secondary to hemoptysis  The patient was escalated to ICU level of care 9/26, secondary to increasing oxygen requirement, and multiple  medication regimen to accommodate her extensive medical needs.  The patient was on dexamethasone, DuoNebs, started on a Precedex drip, for anxiety in conjunction with respiratory failure   The patient was initially placed on high flow nasal cannula, and 100%, 50 L plus nonrebreather mask, was diuresed, kept on antibiotic therapy,  Palliative care consulted, the patient is CODE STATUS was changed on 9/28 to DNR/DNI  The patient was recommended to start oral chemotherapy in form of capmatinib  This was brought from an outside pharmacy and administered here at 4 mg p.o. twice daily  On 10/2 the patient found appropriate for downgrade to the [facility] level of care with overall improved anxiety, respiratory status, for ongoing aggressive medical care    Interval Problem Update  Patient seen and examined today.  Data, Medication data reviewed.  Case discussed with nursing as available.  Plan of Care reviewed with patient and notified of changes.   10/2 the patient is seen in the ICU level of care, she is awake and alert, she sits at the bedside, conversing with her daughter, currently no significant anxiety, no significant pain verbalized,  Patient is currently afebrile, heart rate in the 90s, respiration in the mid 20s, the patient is on 60 L, 100% FiO2, blood pressure in the 1 teens over 60s,  Currently on Precedex drip at 0.6  10/3 patient complains of pain in the deadline line area, the midline is not drawing, asked the vascular access team to evaluate, later that the PICC line is removed,, currently was kinked, there is no other suitable access for a longer catheter apparently as per recent trials,  The patient continues to complain of mild area pain, fever of chemotherapy not working effectively, the patient at this time is still on a Precedex drip for anxiety as well as high flow nasal cannula currently still at 60 L, 100%  Adjusting medication for anxiety, ongoing pain control  Actively titrating the patient's oxygen  delivery  10/4 the patient was able to titrate off the Precedex drip, her anxiety is currently fairly well controlled, she states that she still has 10 out of 10 pain in the right shoulder, remains at 60 L, 100% FiO2 on high flow nasal cannula, currently denies dyspnea, reportedly is having adequate p.o. intake and bowel movements,  Laboratory data reviewed, glucose 134, white cell count 18.4, hemoglobin 10.5, platelet count 93,  10/5 the patient is improved, she is off Precedex, anxiety is improved, the patient currently decreasing on her oxygen requirement down to 50 L, 70% FiO2, she is requesting to transfer to the oncology unit, she the discussed her current care with the oncologist,  The patient with overall improved pain control  Laboratory data fairly unchanged, still with a white second of 18, hemoglobin 10.8, platelet count 92, chemistry with a sodium of 134, glucose 173, ALT improved to 86, alk phos fairly stable at 269 unchanged, BNP is 393, tolerated Lasix yesterday,      I have discussed this patient's plan of care and discharge plan at IDT rounds today with Case Management, Nursing, Nursing leadership, and other members of the IDT team.    Consultants/Specialty  oncology critical care, palliative care    Code Status  DNAR/DNI    Disposition  The patient is not medically cleared for discharge to home or a post-acute facility.  Anticipate discharge to: home with close outpatient follow-up    I have placed the appropriate orders for post-discharge needs.    Review of Systems  Review of Systems   Constitutional:  Positive for malaise/fatigue. Negative for chills and fever.   HENT: Negative.     Eyes: Negative.    Respiratory:  Positive for cough and shortness of breath.    Cardiovascular: Negative.  Negative for chest pain, palpitations and leg swelling.   Gastrointestinal: Negative.  Negative for heartburn, nausea and vomiting.   Genitourinary: Negative.  Negative for dysuria and frequency.    Musculoskeletal:  Positive for myalgias. Negative for neck pain.   Skin: Negative.  Negative for itching and rash.   Neurological: Negative.  Negative for dizziness, focal weakness, weakness and headaches.   Endo/Heme/Allergies: Negative.  Negative for polydipsia. Does not bruise/bleed easily.   Psychiatric/Behavioral:  Negative for depression. The patient is nervous/anxious.         Physical Exam  Temp:  [36.6 °C (97.8 °F)] 36.6 °C (97.8 °F)  Pulse:  [] 114  Resp:  [15-22] 20  BP: (118-162)/(64-95) 126/75  SpO2:  [91 %-100 %] 92 %    Physical Exam  Vitals and nursing note reviewed.   Constitutional:       Appearance: She is well-developed. She is ill-appearing. She is not diaphoretic.   HENT:      Head: Normocephalic and atraumatic.      Nose: Nose normal.      Comments: High flow nasal cannula     Mouth/Throat:      Mouth: Mucous membranes are moist.   Eyes:      Conjunctiva/sclera: Conjunctivae normal.      Pupils: Pupils are equal, round, and reactive to light.   Neck:      Thyroid: No thyromegaly.      Vascular: No JVD.   Cardiovascular:      Rate and Rhythm: Normal rate and regular rhythm.      Heart sounds: Normal heart sounds.      No friction rub. No gallop.   Pulmonary:      Effort: Pulmonary effort is normal.      Breath sounds: Rhonchi and rales present. No wheezing.   Abdominal:      General: Bowel sounds are normal. There is no distension.      Palpations: Abdomen is soft. There is no mass.      Tenderness: There is no abdominal tenderness. There is no guarding or rebound.   Musculoskeletal:         General: No tenderness. Normal range of motion.      Cervical back: Normal range of motion and neck supple.      Right lower leg: No edema.      Left lower leg: No edema.   Lymphadenopathy:      Cervical: No cervical adenopathy.   Skin:     General: Skin is warm and dry.   Neurological:      Mental Status: She is alert and oriented to person, place, and time. Mental status is at baseline.       Cranial Nerves: No cranial nerve deficit.   Psychiatric:         Behavior: Behavior normal.      Comments: Anxious         Fluids    Intake/Output Summary (Last 24 hours) at 10/5/2023 0743  Last data filed at 10/4/2023 2200  Gross per 24 hour   Intake --   Output 600 ml   Net -600 ml           Laboratory  Recent Labs     10/03/23  0310 10/04/23  0500 10/05/23  0350   WBC 19.1* 18.4* 18.0*   RBC 4.05* 4.00* 4.16*   HEMOGLOBIN 10.8* 10.5* 10.8*   HEMATOCRIT 35.4* 35.3* 37.4   MCV 87.4 88.3 89.9   MCH 26.7* 26.3* 26.0*   MCHC 30.5* 29.7* 28.9*   RDW 55.2* 56.7* 60.0*   PLATELETCT 105* 93* 92*   MPV 11.3 11.6 10.8       Recent Labs     10/03/23  0310 10/04/23  0500 10/05/23  0350   SODIUM 136 136 134*   POTASSIUM 4.4 4.6 5.0   CHLORIDE 99 99 94*   CO2 27 30 32   GLUCOSE 184* 134* 173*   BUN 14 14 18   CREATININE 0.63 0.58 0.65   CALCIUM 8.4* 8.3* 8.9                       Imaging  DX-CHEST-PORTABLE (1 VIEW)   Final Result         1.  Extensive bilateral pulmonary parenchymal opacities compatible with ARDS, severe pulmonary edema, and/or superimposed pulmonary infiltrates, stable since prior study.      IR-MIDLINE CATHETER INSERTION WO GUIDANCE > AGE 3   Final Result                  Ultrasound-guided midline placement performed by qualified nursing staff    as above.          DX-CHEST-PORTABLE (1 VIEW)   Final Result         1.  Extensive bilateral pulmonary parenchymal opacities compatible with ARDS, severe pulmonary edema, and/or superimposed pulmonary infiltrates, stable since prior study.      DX-CHEST-PORTABLE (1 VIEW)   Final Result         1.  Pulmonary edema and/or infiltrates are identified, which are stable since the prior exam.      DX-CHEST-PORTABLE (1 VIEW)   Final Result         1.  Bilateral pulmonary infiltrates, stable since prior study   2.  Multiple bilateral pulmonary nodules.      DX-CHEST-PORTABLE (1 VIEW)   Final Result      1.  Innumerable pulmonary nodules bilaterally.      2.  Increasing  confluence and airspace opacities in the perihilar regions suspicious for superimposed pneumonitis on diffuse metastatic disease      IR-PICC LINE PLACEMENT W/ GUIDANCE > AGE 5   Final Result                  Ultrasound-guided PICC placement performed by qualified nursing staff as    above.          DX-CHEST-PORTABLE (1 VIEW)   Final Result      1.  Innumerable bilateral pulmonary nodule, increased since 9/20/2023      2.  Differential diagnosis includes worsening metastases or superimposed pneumonia.      EC-ECHOCARDIOGRAM COMPLETE W/O CONT   Final Result      CT-CTA CHEST PULMONARY ARTERY W/ RECONS   Final Result      1.  Minimal pulmonary embolism. Localized right lower lobe segmental and subsegmental branches.   2.  No right heart strain.   3.  9 mm lucent/lytic lesion T1 vertebral body. Suspect osseous metastatic deposit.   4.  Progression of extensive mediastinal adenopathy since 8/20/2023 consistent with metastatic disease.   5.  Extensive progression of diffuse bilateral innumerable pulmonary nodules consistent with metastatic disease.   6.  Interval development of extensive groundglass and airspace opacities consistent with superimposed pneumonia.   7.  Progression of right adrenal mass now measuring 43 mm x 38 mm consistent with metastasis.      Fleischner Society pulmonary nodule recommendations:      DX-CHEST-PORTABLE (1 VIEW)   Final Result      Diffuse bilateral interstitial and alveolar opacities most consistent with pneumonia or possibly noncardiogenic pulmonary edema.             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 history of stage 1 lung adenocarcinoma s/p SBRT, pulmonary embolism, COPD/asthma, recent pneumonia.  Transferred to the ICU on 9/26/2023 due to increasing oxygen requirements, downgraded to IMCU on 10/2/2023.    Completed course of  antibiotics. Continue steroids and nebulizers.    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 Stated Capmatinib on 9/29/2023. Oncology following. DNR/DNI. Hospice following.     Goals of care, counseling/discussion- (present on admission)  Assessment & Plan  Goals of care done last week extensively. Hospice recommended by oncology and ICU team.  Patient would like to try some form of therapy if possible and oncology offered an oral chemotherapeutic regime. She elected to change to DNR/DNI.   Continue with palliative care consultation.     Sarcoma (HCC)- (present on admission)  Assessment & Plan  Advanced, aggressive, on oral chemotherapy currently    Pulmonary embolism on right (HCC)- (present on admission)  Assessment & Plan  Recently diagnosed. On Apixaban.     Mass of sacrum- (present on admission)  Assessment & Plan  Aggressive metastatic undifferentiated sarcoma. On Capmatinib    Lung cancer (HCC)- (present on admission)  Assessment & Plan  Initially diagnosed stage I adenocarcinoma initially treated with chemoradiation. Follows with Dr Newton (Onc)  Now with widespread metastatic sarcoma    Chronic pain due to neoplasm- (present on admission)  Assessment & Plan  Palliative care have been following for pain management.  Continue multimodal pain management, oxy, tylenol, lidocaine patches, gabapentin and IV pain medication for breakthrough.  Hospice following.    Cigarette nicotine dependence without complication- (present on admission)  Assessment & Plan  History of ongoing tobacco abuse     Plan  Anxiolytics currently effective  Improved pain control on modified regimen  Ongoing multimodality pain regimen including dexamethasone  Ongoing anticoagulation with apixaban with caution  Oxygen  titration is much as possible the patient in ongoing acute severe respiratory failure requiring maximum oxygen delivery on high flow nasal cannula regimen  See orders  Okay to transfer the patient to the oncology unit at this time  Yet, the patient is still critically ill.   The patient continues to have: Acute hypoxic respiratory failure requiring non invasive positive pressure ventilation secondary to COPD, non-small cell lung cancer, metastatic sarcoma with pulmonary nodules currently on 50L heated high flow and 70% FiO2    The vital organ system that is affected is the: Respiratory  If untreated there is a high chance of deterioration into: Respiratory failure and eventually death.   The critical care that I am providing today is: heated high flow  The critical that has been undertaken is medically complex.   There has been no overlap in critical care time.   Critical Care Time not including procedures: 34 minutes  Patient is has a high medical complexity, complex decision making and is at high risk for complication, morbidity, and mortality.    VTE prophylaxis: Eliquis      Please note that this dictation was created using voice recognition software. I have made every reasonable attempt to correct obvious errors, but I expect that there are errors of grammar and possibly context that I did not discover before finalizing the note.

## 2023-10-05 NOTE — PROGRESS NOTES
4 Eyes Skin Assessment Completed by Ariella BYRD RN and Sanjana FRANK RN.    Head WDL  Ears WDL  Nose WDL  Mouth WDL  Neck WDL  Breast/Chest WDL  Shoulder Blades WDL  Spine WDL  (R) Arm/Elbow/Hand Dryness  (L) Arm/Elbow/Hand Dryness  Abdomen WDL  Groin WDL  Scrotum/Coccyx/Buttocks WDL  (R) Leg WDL  (L) Leg WDL  (R) Heel/Foot/Toe WDL  (L) Heel/Foot/Toe WDL          Devices In Places HFNC      Interventions In Place Waffle Overlay and Pressure Redistribution Mattress    Possible Skin Injury No    Pictures Uploaded Into Epic N/A  Wound Consult Placed N/A  RN Wound Prevention Protocol Ordered No

## 2023-10-05 NOTE — CARE PLAN
Problem: Bronchoconstriction  Goal: Improve in air movement and diminished wheezing  Description: Target End Date:  2 to 3 days    1.  Implement inhaled treatments  2.  Evaluate and manage medication effects  Outcome: Progressing     Problem: Humidified High Flow Nasal Cannula  Goal: Maintain adequate oxygenation dependent on patient condition  Description: Target End Date:  resolve prior to discharge or when underlying condition is resolved/stabilized    1.  Implement humidified high flow oxygen therapy  2.  Titrate high flow oxygen to maintain appropriate SpO2  Outcome: Progressing

## 2023-10-05 NOTE — DIETARY
Nutrition Services: Update   Day 15 of admit.  Melly Hendrickson is a 48 y.o. female with admitting DX of Acute on chronic respiratory failure (HCC) [J96.20]; Respiratory failure with hypoxia (HCC) [J96.91].    Per ADLs, PO intake <25% x1, 25-50% x1, and 50-75% x1 since last RD update on 10/2.  Meals/nourishments, including snacks, supplements, and preferred items on trays = 3180 kcal/day; without Boost Plus TID = 2100 kcal/day.  HFNC, anxiety, and shoulder pain noted.  Attempted to see late this morning on T6, but pt had just transferred.  Later visited CNU: RD explained purpose for visit, but pt said she just wanted to sleep and to leave everything the same for now (as related to food-service).   Pt's weight is trending down since admission. -2.2 L fluid per I/O.  REE per MSJ x1.2 = 1849 kcal/day; 25 kcal/kg = 2018 kcal/day.  Pt needs to consistently consume % of meals/snacks/supplements to meet estimated nutritional needs-- Not being met @ this time.    RD will continue to monitor.

## 2023-10-06 NOTE — PROGRESS NOTES
[facility] Medicine Daily Progress Note    Date of Service  10/6/2023    Chief Complaint  48-year-old female with history of sarcoma and lung cancer presented 9/20 with dyspnea.      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  On admission patient had tachycardia tachypnea, chest x-ray showed bilateral infiltration, CTA for chest showed right lower lobe subsegmental PE, worsening pulmonary nodules with T1 vertebral body lesion, possible pneumonia.  Procalcitonin was 0.11.Eliquis was started.  Also antibiotics for pneumonia Ceftriaxone and azithromycin were empirically started.  Echo was done and showed normal ejection fraction 65%, initially patient was treated ICU secondary to increasing oxygen requirement, patient was on high flow oxygen, treated with a steroid and inhalers.  With some improvement and patient was transferred out of the ICU.    Patient was evaluated by Dr. Newton oncologist who recommended to start chemotherapy in form of capmatinib on 9/29/2023.    Patient has metastatic sarcoma with lung cancer and significant anxiety, palliative care following for pain management.  Patient needed Precedex drip for anxiety.  Patient is on every 4 hours prn Ativan, every 2 hours prn IV Dilaudid, Norco 10/325 every 3 hours prn. Tylenol every 6 hours scheduled, holding when sleeping. Dexamethasone 4 mg BID.  CODE STATUS was discussed with the patient and agreed for DNR/DNI.        Interval Problem Update  -Evaluated examined the patient at bedside, still having some anxiety with shortness of breath, on exam patient has significant wheezing and patient is on still high flow oxygen.  -Start with inhalers, continue dexamethasone, continue Ativan.  -Labs reviewed still having  leukocytosis likely related to dexamethasone, sodium 133.      I have discussed this patient's plan of care and discharge plan at IDT rounds today with Case Management, Nursing, Nursing leadership, and other members of the IDT team.    Consultants/Specialty  oncology critical care, palliative care    Code Status  DNAR/DNI    Disposition  The patient is not medically cleared for discharge to home or a post-acute facility.  Anticipate discharge to: home with organized home healthcare and close outpatient follow-up    I have placed the appropriate orders for post-discharge needs.    Review of Systems  Review of Systems   Constitutional:  Positive for malaise/fatigue. Negative for chills and fever.   HENT: Negative.     Eyes: Negative.    Respiratory:  Positive for cough and shortness of breath.    Cardiovascular: Negative.  Negative for chest pain, palpitations and leg swelling.   Gastrointestinal: Negative.  Negative for heartburn, nausea and vomiting.   Genitourinary: Negative.  Negative for dysuria and frequency.   Musculoskeletal:  Positive for myalgias. Negative for neck pain.   Skin: Negative.  Negative for itching and rash.   Neurological: Negative.  Negative for dizziness, focal weakness, weakness and headaches.   Endo/Heme/Allergies: Negative.  Negative for polydipsia. Does not bruise/bleed easily.   Psychiatric/Behavioral:  Negative for depression. The patient is nervous/anxious.         Physical Exam  Temp:  [36.3 °C (97.3 °F)-37.1 °C (98.7 °F)] 36.3 °C (97.3 °F)  Pulse:  [] 99  Resp:  [16-20] 18  BP: (126-144)/() 126/92  SpO2:  [94 %-99 %] 98 %    Physical Exam  Vitals and nursing note reviewed.   Constitutional:       Appearance: She is well-developed. She is ill-appearing. She is not diaphoretic.   HENT:      Head: Normocephalic and atraumatic.      Nose: Nose normal.      Comments: High flow nasal cannula     Mouth/Throat:      Mouth: Mucous membranes are moist.   Eyes:       Conjunctiva/sclera: Conjunctivae normal.      Pupils: Pupils are equal, round, and reactive to light.   Neck:      Thyroid: No thyromegaly.      Vascular: No JVD.   Cardiovascular:      Rate and Rhythm: Normal rate and regular rhythm.      Heart sounds: Normal heart sounds.      No friction rub. No gallop.   Pulmonary:      Effort: Respiratory distress present.      Breath sounds: Rhonchi and rales present. No wheezing.   Abdominal:      General: Bowel sounds are normal. There is no distension.      Palpations: Abdomen is soft. There is no mass.      Tenderness: There is no abdominal tenderness. There is no guarding or rebound.   Musculoskeletal:         General: No tenderness. Normal range of motion.      Cervical back: Normal range of motion and neck supple.      Right lower leg: No edema.      Left lower leg: No edema.   Lymphadenopathy:      Cervical: No cervical adenopathy.   Skin:     General: Skin is warm and dry.   Neurological:      Mental Status: She is alert and oriented to person, place, and time. Mental status is at baseline.      Cranial Nerves: No cranial nerve deficit.      Motor: No weakness.   Psychiatric:         Behavior: Behavior normal.      Comments: Anxious         Fluids  No intake or output data in the 24 hours ending 10/06/23 1023        Laboratory  Recent Labs     10/04/23  0500 10/05/23  0350 10/06/23  0011   WBC 18.4* 18.0* 16.9*   RBC 4.00* 4.16* 4.38   HEMOGLOBIN 10.5* 10.8* 11.3*   HEMATOCRIT 35.3* 37.4 38.2   MCV 88.3 89.9 87.2   MCH 26.3* 26.0* 25.8*   MCHC 29.7* 28.9* 29.6*   RDW 56.7* 60.0* 57.2*   PLATELETCT 93* 92* 120*   MPV 11.6 10.8 10.9     Recent Labs     10/04/23  0500 10/05/23  0350 10/06/23  0011   SODIUM 136 134* 133*   POTASSIUM 4.6 5.0 4.8   CHLORIDE 99 94* 95*   CO2 30 32 30   GLUCOSE 134* 173* 138*   BUN 14 18 19   CREATININE 0.58 0.65 0.68   CALCIUM 8.3* 8.9 8.8                     Imaging  DX-CHEST-PORTABLE (1 VIEW)   Final Result         1.  Extensive bilateral  pulmonary parenchymal opacities compatible with ARDS, severe pulmonary edema, and/or superimposed pulmonary infiltrates, stable since prior study.      IR-MIDLINE CATHETER INSERTION WO GUIDANCE > AGE 3   Final Result                  Ultrasound-guided midline placement performed by qualified nursing staff    as above.          DX-CHEST-PORTABLE (1 VIEW)   Final Result         1.  Extensive bilateral pulmonary parenchymal opacities compatible with ARDS, severe pulmonary edema, and/or superimposed pulmonary infiltrates, stable since prior study.      DX-CHEST-PORTABLE (1 VIEW)   Final Result         1.  Pulmonary edema and/or infiltrates are identified, which are stable since the prior exam.      DX-CHEST-PORTABLE (1 VIEW)   Final Result         1.  Bilateral pulmonary infiltrates, stable since prior study   2.  Multiple bilateral pulmonary nodules.      DX-CHEST-PORTABLE (1 VIEW)   Final Result      1.  Innumerable pulmonary nodules bilaterally.      2.  Increasing confluence and airspace opacities in the perihilar regions suspicious for superimposed pneumonitis on diffuse metastatic disease      IR-PICC LINE PLACEMENT W/ GUIDANCE > AGE 5   Final Result                  Ultrasound-guided PICC placement performed by qualified nursing staff as    above.          DX-CHEST-PORTABLE (1 VIEW)   Final Result      1.  Innumerable bilateral pulmonary nodule, increased since 9/20/2023      2.  Differential diagnosis includes worsening metastases or superimposed pneumonia.      EC-ECHOCARDIOGRAM COMPLETE W/O CONT   Final Result      CT-CTA CHEST PULMONARY ARTERY W/ RECONS   Final Result      1.  Minimal pulmonary embolism. Localized right lower lobe segmental and subsegmental branches.   2.  No right heart strain.   3.  9 mm lucent/lytic lesion T1 vertebral body. Suspect osseous metastatic deposit.   4.  Progression of extensive mediastinal adenopathy since 8/20/2023 consistent with metastatic disease.   5.  Extensive  progression of diffuse bilateral innumerable pulmonary nodules consistent with metastatic disease.   6.  Interval development of extensive groundglass and airspace opacities consistent with superimposed pneumonia.   7.  Progression of right adrenal mass now measuring 43 mm x 38 mm consistent with metastasis.      Fleischner Society pulmonary nodule recommendations:      DX-CHEST-PORTABLE (1 VIEW)   Final Result      Diffuse bilateral interstitial and alveolar opacities most consistent with pneumonia or possibly noncardiogenic pulmonary edema.             Assessment/Plan  * Acute on chronic respiratory failure (HCC)- (present on admission)  Assessment & Plan  Multiple factors including aggressive metastatic undifferentiated sarcoma versus sarcomatoid lung cancer.   history of stage 1 lung adenocarcinoma s/p SBRT, pulmonary embolism, COPD/asthma, recent pneumonia.      Initially treated at the ICU, patient still on high flow oxygen  Finish course of antibiotics  downgraded to IMCU on 10/2/2023.      Completed course of antibiotics.   Continue steroids, inhaler and nebulizers.  RT, continue weaning her from oxygen    Goals of care, counseling/discussion- (present on admission)  Assessment & Plan  Goals of care done last week extensively. Hospice recommended by oncology and ICU team.  Patient would like to try some form of therapy if possible and oncology offered an oral chemotherapeutic regime. She elected to change to DNR/DNI.   Continue with palliative care consultation.     Sarcoma (HCC)- (present on admission)  Assessment & Plan  Advanced, aggressive, on oral chemotherapy currently    Pulmonary embolism on right (HCC)- (present on admission)  Assessment & Plan  Recently diagnosed.  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   Stated Capmatinib  on 9/29/2023 by oncology team  Patient is DNR/DNI.  Palliative team on board, appreciate their help    Mass of sacrum- (present on admission)  Assessment & Plan  Aggressive metastatic undifferentiated sarcoma. On Capmatinib    Lung cancer (HCC)- (present on admission)  Assessment & Plan  Initially diagnosed stage I adenocarcinoma initially treated with chemoradiation.   Follows with Dr Newton (Onc)  Now with widespread metastatic sarcoma    Chronic pain due to neoplasm- (present on admission)  Assessment & Plan  Palliative care have been following for pain management.  Continue multimodal pain management, oxy, tylenol, lidocaine patches, gabapentin and IV pain medication for breakthrough.  Hospice following.    Cigarette nicotine dependence without complication- (present on admission)  Assessment & Plan  History of ongoing tobacco abuse  Nicotine patch if needed          VTE prophylaxis: Eliquis      Patient is critically ill.   The patient continues to have: Acute respiratory failure  The vital organ system that is affected is the: Lung  If untreated there is a high chance of deterioration into: Hypoxia  And eventually death.   The critical care that I am providing today is: IV pain medication, high flow oxygen, inhalers, steroid  The critical that has been undertaken is medically complex.   There has been no overlap in critical care time.   Critical Care Time not including procedures: 40 minutes

## 2023-10-06 NOTE — CONSULTS
"[facility]    INPATIENT ASSESSMENT    Name: Melly Hendrickson  MRN: [id number]  : 1975  Age: 48 y.o.  Date of assessment: 10/6/2023  PCP: Checo Baker M.D.  Persons in attendance: Patient, Children, and friend    Length of Intervention: 45 Minutes     HPI:  Per Medical Record: \"Melly Hendrickson is a 48 y.o. female referred to the ER by her primary care physician on 2023 due to shortness of breath.     She was diagnosed with stage I NSCLC (adenocarcinoma) of the lung in 2023.  She underwent  SBRT as she was not a good surgical candidate due to dyspnea and underlying COPD. 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  She underwent SBRT to right sacrum.\" Patient was referred to [facility] for a psychotherapy session to manage emotions as a result of her current medical condition.       CHIEF COMPLAINT/PRESENTING ISSUE (as stated by Patient): Melly Hendrickson is a 48 year old female, seen sitting up on hospital bed, alert, oriented, more pleasant than previous session and willing to engage in the interview process. Patient's daughter and friend were at bedside. Patient did not remember me from the previous meeting, so when I mentioned psychiatry patient said, \"Oh I don't want to talk to you!\" Clinician responded, \"You said that the last time and we talked for a entire hour!\" Patient started laughing and agreed to meet. Patient denies suicidal or homicidal ideations. There is no indication of a thought disorder. Patient's speech was clear and coherent.     Patient reported her needed for comfort. Patient expressed fear regarding her current medical condition. Patient expressed fear related to the potential negative outcome. Patient reports waking up and feeling disoriented, taking a few minutes to remember where she is at the time. This is a " disconcerting experience for patient.      Psychotherapy Session Summary  Clinician provided encouragement and support related to patient's fears. Clinician affirmed patients spiritual belief system and suggested she use these beliefs as a source of comfort.       Clinician encouraged patient to embrace the support of family and friends that visit. Patient was able to acknowledge the effort of others to be available for her during this difficult time.     Will continue to follow.    Chief Complaint   Patient presents with    Shortness of Breath     PT hx lung ca that's metastasized. Was at another appt and was SOB. RA while walking in 70's per EMS. Currently on 6L NC            MENTAL STATUS              Participation: Active verbal participation  Grooming: Casual  Orientation: Alert  Behavior: Calm  Eye contact: Limited  Mood: Depressed and Anxious  Affect: Full range  Thought process: Circumstantial  Thought content: Within normal limits  Speech: Rate within normal limits  Perception:  patient reports waking up disoriented.  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  Secondary:                                         IDENTIFIED NEEDS/PLAN:  [Trigger DISPOSITION list for any items marked]      Imminent safety risk - self  Imminent safety risk - others     Acute substance withdrawal   Psychosis/Impaired reality testing   x  Mood/anxiety    Substance use/Addictive behavior     Maladaptive behavior   Parent/child conflict     Family/Couples conflict   Biomedical     Housing   Financial      Legal  Occupational/Educational     Domestic violence   Other:           Legal Hold: N/A    Thank you,      Sherry Nolan, Ph.D., [facility]  10/6/2023

## 2023-10-06 NOTE — DISCHARGE PLANNING
Case Management Discharge Planning    Admission Date: 9/20/2023  GMLOS: 3.5  ALOS: 16    6-Clicks ADL Score: 20  6-Clicks Mobility Score: 19      Anticipated Discharge Dispo: Discharge Disposition: Discharged to home/self care (01)      Action(s) Taken: Patient discussed in rounds this AM. Patient continues on high flow O2. Patient is not cleared medically for discharge.     Escalations Completed: None    Medically Clear: No    Next Steps: CM will continue to follow patient and assist with any DCP needs.     Barriers to Discharge: Medical clearance

## 2023-10-06 NOTE — PROGRESS NOTES
Consult Note: Hematology/Oncology     Primary Care:  Checo Baker M.D.    Chief Complaint   Patient presents with    Shortness of Breath     PT hx lung ca that's metastasized. Was at another appt and was SOB. RA while walking in 70's per EMS. Currently on 6L NC         Current Treatment:     9/29/23: Capmatinib    Prior Treatment:     4/17/2023-4/21/2023: RLL Lung SBRT  7/19/23-7/24/2023: R Sacrum SBRT      Subjective:   History of Presenting Illness:    Melly Hendrickson is a 48 y.o. 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    Patient history dates back to July 2022 when she had a chest x-ray performed that revealed a potential mass in her right lung.  This led to a chest CT performed on February 13, 2023.  This demonstrated 1.4 cm mass in the right lower lobe and a 1.9 groundglass opacity also in the right lower lobe.  The groundglass opacity appeared stable from previous scans but the mass was thought to be new.      A biopsy was performed and confirmed lung adenocarcinoma.    Patient saw thoracic surgery and obtained PFTs as well as a PET scan.  She was discussed at tumor board and given her significant dyspnea and her underlying COPD it was thought that she would be a better candidate for SBRT rather than surgical resection.    April 17, 2023 patient began SBRT.  Completed this treatment without many issues.    After this patient did notice a small lump in her breast reduction scar line.  She said that over the next few days it grew and she ultimately went to her PCP    PCP tried to drain the mass; however it just bled significantly during the I&D attempt.  Thus she was sent to Dr. Becerra at [facility] for excision.     Pathology from this excision demonstrated poorly differentiated malignancy favoring carcinoma without obvious involvement of underlying skin and nonspecific IHC profile.  Based on  his diagnosis cancer type ID was sent off for.  Results showed 90% probability of sarcoma    Over the past several months she has noted severe pain in her sacral region.  She does have sacral lesions that are concerning for metastatic disease.  Patient has biopsy of sacral mass on 7/18/2023.     Of note patient has 5 children and 4 grandchildren.    She follows with pain management    I initially saw patient on 7/14.  At our 7/21, we had information back from biopsy and we discussed AIM.  Patient wished to hold off and think about this further.    On 7/28, we discussed initiating AIM, patient was not comfortable and wanted 2 opinion at [place].    I saw patient again on 8/11.  We discussed AIM,  patient not ready and awaiting [place] appointment.     She was recently admitted to the ER for hemoptysis on 8/20/23 and was found to have a PE.  She was still having hemoptysis and thus was not started on AC. She has discharged on 8/24/23.     I saw her subsequently on 9/7/23 and recommended AC given no hemoptysis, but active PE with cancer.  We also discussed chemo at this visit and she wished to wait.     During my visit with the patient on 9/19/23, Patient was endorsing 3 nodules on the R arm.  1 on the clavicle.  Patient also had a nodule on her L arm. Patient had 1 nodule on her bikini line. She has another on the inside of her L thigh. These started 1 week ago. She was on 2-3L of oxygen for the past several days.  We discussed intiating chemo and plan was to start on 9/26/23.  Pt requires a direct admit for AIM    Since that visit she had worseing SOB.  She was subsequently presented to the ER for SOB/cough and potential PNA. On admission, she was tachycardic, tachypneic, and afebrile.  Chest x-ray showed diffuse bilateral interstitial and alveolar opacities consistent with pneumonia. CTA chest on admission showed right subsegmental PE, worsening pulmonary nodules, T1 vertebral body region, possible pneumonia.   Procalcitonin was 0.11.  Ceftriaxone and azithromycin were empirically started.    Line was not able to be placed unable to start chemo.  Patients respiratory status declined and she was transferred to the ICU    Patient started on Capmatinib 9/29/23    She is stepdown to the cancer floor    Today patient reports that she is doing well.  She is struggling a little bit with concerns about the side effects of the medication.  We discussed the alternative which would be to stop the medication and she does not want to do this.  We discussed some of the common side effects with capmatinib and that typically they are well managed.    Anxiety and pain are well controlled.          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    Chronic obstructive pulmonary disease (HCC)     COPD (chronic obstructive pulmonary disease) ([facility])     DDD (degenerative disc disease), lumbar     Depression     Emphysema of lung ([facility])     MRSA (methicillin resistant staph aureus) culture positive     Pain     back - cervical, scapula, lower    Pericarditis     Primary adenocarcinoma of lower lobe of right lung ([facility])     Psychiatric disorder     Anxiety, Depression    Sleep apnea     Snoring     Urinary incontinence         Past Surgical History:   Procedure Laterality Date    GA BRONCHOSCOPY,DIAGNOSTIC N/A 8/22/2023    Procedure: BRONCHOSCOPY;  Surgeon: Sandra Lopez D.O.;  Location: SURGERY [facility];  Service: Pulmonary    GA EXC SKIN BENIG >4CM TRUNK,ARM,LEG Left 6/7/2023    Procedure: EXCISION OF SOFT TISSUE MASS LEFT CHEST WALL;  Surgeon: Nasir Becerra M.D.;  Location: SURGERY [facility];  Service: General    GA BRONCHOSCOPY,DIAGNOSTIC N/A 02/14/2023    Procedure: FIBER OPTIC BRONCHOSCOPY WASH, BRUSH, BRONCHOALVEOLAR LAVAGE, BIOPSY AND FINE NEEDLE ASPIRATION AND NAVIGATION,  ROBOTICS;  Surgeon: Guicho Ellis M.D.;  Location: SURGERY [facility];  Service: Pulmonary Robotic    ENDOBRONCHIAL US ADD-ON N/A 02/14/2023    Procedure: ENDOBRONCHIAL ULTRASOUND (EBUS);  Surgeon: Guicho Ellis M.D.;  Location: SURGERY [facility];  Service: Pulmonary Robotic    MAMMOPLASTY REDUCTION Bilateral     SEPTOPLASTY      TUBE & ECTOPIC PREG., REMOVAL      x 2       Social History     Tobacco Use    Smoking status: Some Days     Current packs/day: 0.50     Average packs/day: 0.5 packs/day for 20.0 years (10.0 ttl pk-yrs)     Types: Cigarettes     Passive exposure: Past    Smokeless tobacco: Never    Tobacco comments:     on and off    Vaping Use    Vaping Use: Never used   Substance Use Topics    Alcohol use: Not Currently     Comment: occ, rare    Drug use: Not Currently     Types: Marijuana     Comment: THC gummies        Family History   Problem Relation Age of Onset    Diabetes Mother     Cancer Father         Prostate    Cancer Paternal Grandmother         Cervical    Cancer Paternal Grandfather         Unk       Allergies   Allergen Reactions    Penicillins Hives, Rash and Swelling     Pt reports that she gets swelling in throat and face, rash all over face and arms.   Tolerated cefazolin    Aspirin Rash and Hives     Pt reports that she received a rash on face, pt reports it ok if she takes NORCO    Other Environmental Shortness of Breath     Perfumes, dander, scents       Current Facility-Administered Medications   Medication Dose Route Frequency Provider Last Rate Last Admin    tiotropium (Spiriva Respimat) 2.5 mcg/Act inhalation spray 5 mcg  5 mcg Inhalation QDAILY (RT) Oksana Mas M.D.        mometasone-formoterol (Dulera) 100-5 MCG/ACT inhaler 2 Puff  2 Puff Inhalation BID (RT) Oksana aMs M.D.        albuterol inhaler 2 Puff  2 Puff Inhalation Q2HRS PRN (RT) Oksana Mas M.D.        melatonin tablet 10 mg  10 mg Oral HS PRN Dex Madrid M.D.         morphine ER (Ms Contin) tablet 15 mg  15 mg Oral Q12HRS Dex Madrid M.D.   15 mg at 10/06/23 0945    clotrimazole (Mycelex) suzette 10 mg  10 mg Oral 5X/DAY Dex Madrid M.D.   10 mg at 10/06/23 0611    busPIRone (Buspar) tablet 15 mg  15 mg Oral TID Dex Madrid M.D.   15 mg at 10/06/23 0612    oxyCODONE immediate-release (Roxicodone) tablet 5 mg  5 mg Oral Q4HRS PRMELLISA Adam M.D.        Or    oxyCODONE immediate release (Roxicodone) tablet 10 mg  10 mg Oral Q4HRS PRMELLISA Adam M.D.   10 mg at 10/04/23 2125    acetaminophen (Tylenol) tablet 650 mg  650 mg Oral Q6HRS Gerson Adam M.D.   650 mg at 10/06/23 0944    LORazepam (Ativan) injection 0.5 mg  0.5 mg Intravenous Q4HRS PRN Tonny Edmond M.D.   0.5 mg at 10/06/23 0723    lidocaine (Lidoderm) 5 % 3 Patch  3 Patch Transdermal Q24HR Tonny Edmond M.D.   2 Patch at 10/04/23 0532    dexamethasone (Decadron) injection 4 mg  4 mg Intravenous Q6HRS Tonny Edmond M.D.   4 mg at 10/06/23 0609    sodium chloride (Ocean) 0.65 % nasal spray 2 Spray  2 Spray Nasal Q2HRS PRN Tonny Edmond M.D.        HYDROmorphone (Dilaudid) injection 1 mg  1 mg Intravenous Q HOUR PRMELLISA Adam M.D.   1 mg at 10/06/23 0955    capmatinib (Tabrecta) tablet 400 mg  400 mg Oral BID Alma Newton M.D.   400 mg at 10/06/23 0637    guaiFENesin ER (Mucinex) tablet 600 mg  600 mg Oral Q12HRS Naima Ambrosio, A.P.REstrellitaN.   600 mg at 10/06/23 0612    diphenhydrAMINE (Benadryl) injection 25 mg  25 mg Intravenous Q6HRS PRN Kay Christensen A.P.R.NEstrellita   25 mg at 10/04/23 1725    diclofenac sodium (Voltaren) 1 % gel 2 g  2 g Topical 4X/DAY PRN Madhu Merino D.O.        loratadine (Claritin) tablet 10 mg  10 mg Oral DAILY SANJU GarciaOEstrellita   10 mg at 10/06/23 0612    senna-docusate (Pericolace Or Senokot S) 8.6-50 MG per tablet 2 Tablet  2 Tablet Oral BID SANJU GarciaOEstrellita   2 Tablet at 10/05/23 1750    And     polyethylene glycol/lytes (Miralax) PACKET 1 Packet  1 Packet Oral QDAY PRN SANJU GarciaO.   1 Packet at 09/23/23 1141    And    magnesium hydroxide (Milk Of Magnesia) suspension 30 mL  30 mL Oral QDAY PRN SANJU GarciaOEstrellita   30 mL at 09/24/23 0939    And    bisacodyl (Dulcolax) suppository 10 mg  10 mg Rectal QDAY PRN SANJU GarciaOEstrellita   10 mg at 09/28/23 1300    Respiratory Therapy Consult   Nebulization Continuous RT Madhu Merino D.O.        acetaminophen (Tylenol) tablet 650 mg  650 mg Oral Q6HRS PRN Madhu Merino D.O.        hydrALAZINE (Apresoline) injection 10 mg  10 mg Intravenous Q4HRS PRN SANJU GarciaOEstrellita   10 mg at 09/26/23 1010    ondansetron (Zofran) syringe/vial injection 4 mg  4 mg Intravenous Q4HRS PRN SANJU GarciaOEstrellita   4 mg at 10/04/23 2150    ondansetron (Zofran ODT) dispertab 4 mg  4 mg Oral Q4HRS PRN Madhu Merino D.O.        promethazine (Phenergan) tablet 12.5-25 mg  12.5-25 mg Oral Q4HRS PRN Madhu Merino D.O.        promethazine (Phenergan) suppository 12.5-25 mg  12.5-25 mg Rectal Q4HRS PRN Madhu Merino D.O.        prochlorperazine (Compazine) injection 5-10 mg  5-10 mg Intravenous Q4HRS PRN Madhu Merino D.O.        guaiFENesin dextromethorphan (Robitussin DM) 100-10 MG/5ML syrup 10 mL  10 mL Oral Q6HRS PRN SANJU GarciaOEstrellita   10 mL at 09/30/23 1802    apixaban (Eliquis) tablet 5 mg  5 mg Oral BID SANJU GarciaOEstrellita   5 mg at 10/06/23 0612       Review of Systems   Constitutional:  Positive for malaise/fatigue. Negative for chills, fever and weight loss.   HENT:  Negative for congestion, ear pain, nosebleeds and sore throat.    Eyes:  Negative for blurred vision.   Respiratory:  Negative for cough, sputum production, shortness of breath and wheezing.    Cardiovascular:  Negative for chest pain, palpitations, orthopnea and leg swelling.   Gastrointestinal:  Negative for abdominal pain, heartburn, nausea and vomiting.   Genitourinary:  Negative for  dysuria, frequency and urgency.   Musculoskeletal:  Positive for back pain and joint pain. Negative for neck pain.   Neurological:  Negative for dizziness, tingling, tremors, sensory change, focal weakness and headaches.   Endo/Heme/Allergies:  Does not bruise/bleed easily.   Psychiatric/Behavioral:  Negative for depression, memory loss and suicidal ideas.    All other systems reviewed and are negative.      Problem list, medications, and allergies reviewed by myself today in Epic.     Objective:     Vitals:    10/06/23 0723 10/06/23 0839 10/06/23 1012 10/06/23 1140   BP:  (!) 126/92  121/81   Pulse: (!) 107 (!) 106 99 94   Resp:  18 18 19   Temp:  36.3 °C (97.3 °F)  36.3 °C (97.3 °F)   TempSrc:  Temporal  Temporal   SpO2:  95% 98% 100%   Weight:       Height:             DESC; KARNOFSKY SCALE WITH ECOG EQUIVALENT: 90, Able to carry on normal activity; minor signs or symptoms of disease (ECOG equivalent 0)    DISTRESS LEVEL: no acute distress    Physical Exam  Deferred    Labs:   Most recent labs reviewed.  Please see the lab tab of chart review    Imaging:   Most recent images below have been independently reviewed by me.  Please see the imaging tab of chart review    9/22/2023 : Echocardiogram showed LVEF ~ 65% with no reported abnormalities    7/27/23: PET   1.  Focal uptake in the medial RIGHT lower lobe of lung abutting the thoracic spine, consistent with tumor..  2.  No PET correlate for ill-defined spiculated nodule in the RIGHT lower lobe.  3.  Large hypermetabolic mass corresponds to lytic lesion in the RIGHT sacrum consistent with malignancy.  4.  Intramuscular focal uptake at the posterior LEFT shoulder, likely metastatic.    Pathology:    7/14/2023: Pathology of Chest wall  A. Chest wall mass:          Metastatic poorly differentiated malignancy favoring carcinoma           without obvious involvement of overlying skin and a           non-specific IHC profile of some keratins positive (see            microscopic description).          See comment.   Main Cancer Type: Sarcoma   Probability: 90%     Subtype: Undifferentiated Sarcoma ([facility])   Probability: 90%     7/19/2023: Path of the Sacral Bone  A. Sacral bone biopsy:          Bone cores effaced by a high-grade malignancy histologically           identical to the previously excised chest wall mass           ([id number]).     2/14/2023: Pathology of Lung Mass  A. Lung, right lower lobe fine needle aspiration slides:          Positive for carcinoma.   B. Core, right lower lobe lung:          Moderately differentiated lung adenocarcinoma.   C. Lung, right lower lobe biopsy core and touchprep slides:          Moderately differentiated lung adenocarcinoma.   D. Bronchoalveolar lavage right lower lobe:          Rare atypical cells, malignant cells vs. reactive bronchial           cells.     Assessment/Plan:      Cancer Staging   Primary undifferentiated sarcoma of soft tissue (HCC)  Staging form: Soft Tissue Sarcoma of the Trunk and Extremities, AJCC 8th Edition  - Clinical: Stage IV (cTX, cN0, cM1) - Signed by Alma Newton M.D. on 9/19/2023         Ms. 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    Clinically, she is slightly improved but still acutely sick and in critical condition.  She is now on the CNU    Patient to continue capmatinib    All questions and concerns addressed today.  I will continue to follow    Amla Newton M.D.  Hematology/Oncology

## 2023-10-06 NOTE — PROGRESS NOTES
Consult Note: Hematology/Oncology     Primary Care:  Checo Baker M.D.    Chief Complaint   Patient presents with    Shortness of Breath     PT hx lung ca that's metastasized. Was at another appt and was SOB. RA while walking in 70's per EMS. Currently on 6L NC         Current Treatment:     9/29/23: Capmatinib    Prior Treatment:     4/17/2023-4/21/2023: RLL Lung SBRT  7/19/23-7/24/2023: R Sacrum SBRT      Subjective:   History of Presenting Illness:    Melly Hendrickson is a 48 y.o. 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    Patient history dates back to July 2022 when she had a chest x-ray performed that revealed a potential mass in her right lung.  This led to a chest CT performed on February 13, 2023.  This demonstrated 1.4 cm mass in the right lower lobe and a 1.9 groundglass opacity also in the right lower lobe.  The groundglass opacity appeared stable from previous scans but the mass was thought to be new.      A biopsy was performed and confirmed lung adenocarcinoma.    Patient saw thoracic surgery and obtained PFTs as well as a PET scan.  She was discussed at tumor board and given her significant dyspnea and her underlying COPD it was thought that she would be a better candidate for SBRT rather than surgical resection.    April 17, 2023 patient began SBRT.  Completed this treatment without many issues.    After this patient did notice a small lump in her breast reduction scar line.  She said that over the next few days it grew and she ultimately went to her PCP    PCP tried to drain the mass; however it just bled significantly during the I&D attempt.  Thus she was sent to Dr. Becerra at [facility] for excision.     Pathology from this excision demonstrated poorly differentiated malignancy favoring carcinoma without obvious involvement of underlying skin and nonspecific IHC profile.  Based on  his diagnosis cancer type ID was sent off for.  Results showed 90% probability of sarcoma    Over the past several months she has noted severe pain in her sacral region.  She does have sacral lesions that are concerning for metastatic disease.  Patient has biopsy of sacral mass on 7/18/2023.     Of note patient has 5 children and 4 grandchildren.    She follows with pain management    I initially saw patient on 7/14.  At our 7/21, we had information back from biopsy and we discussed AIM.  Patient wished to hold off and think about this further.    On 7/28, we discussed initiating AIM, patient was not comfortable and wanted 2 opinion at [place].    I saw patient again on 8/11.  We discussed AIM,  patient not ready and awaiting [place] appointment.     She was recently admitted to the ER for hemoptysis on 8/20/23 and was found to have a PE.  She was still having hemoptysis and thus was not started on AC. She has discharged on 8/24/23.     I saw her subsequently on 9/7/23 and recommended AC given no hemoptysis, but active PE with cancer.  We also discussed chemo at this visit and she wished to wait.     During my visit with the patient on 9/19/23, Patient was endorsing 3 nodules on the R arm.  1 on the clavicle.  Patient also had a nodule on her L arm. Patient had 1 nodule on her bikini line. She has another on the inside of her L thigh. These started 1 week ago. She was on 2-3L of oxygen for the past several days.  We discussed intiating chemo and plan was to start on 9/26/23.  Pt requires a direct admit for AIM    Since that visit she had worseing SOB.  She was subsequently presented to the ER for SOB/cough and potential PNA. On admission, she was tachycardic, tachypneic, and afebrile.  Chest x-ray showed diffuse bilateral interstitial and alveolar opacities consistent with pneumonia. CTA chest on admission showed right subsegmental PE, worsening pulmonary nodules, T1 vertebral body region, possible pneumonia.   Procalcitonin was 0.11.  Ceftriaxone and azithromycin were empirically started.    Line was not able to be placed unable to start chemo.  Patients respiratory status declined and she was transferred to the ICU    Patient started on Capmatinib 9/29/23    Today patient reports that she is doing well. She continues to be in good spirits with her ox  Her oxygen requirements coming down.      Anxiety and pain are well controlled.          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    Chronic obstructive pulmonary disease (HCC)     COPD (chronic obstructive pulmonary disease) (HCC)     DDD (degenerative disc disease), lumbar     Depression     Emphysema of lung (HCC)     MRSA (methicillin resistant staph aureus) culture positive     Pain     back - cervical, scapula, lower    Pericarditis     Primary adenocarcinoma of lower lobe of right lung ([facility])     Psychiatric disorder     Anxiety, Depression    Sleep apnea     Snoring     Urinary incontinence         Past Surgical History:   Procedure Laterality Date    PA BRONCHOSCOPY,DIAGNOSTIC N/A 8/22/2023    Procedure: BRONCHOSCOPY;  Surgeon: Sandra Lopez D.O.;  Location: [facility];  Service: Pulmonary    PA EXC SKIN BENIG >4CM TRUNK,ARM,LEG Left 6/7/2023    Procedure: EXCISION OF SOFT TISSUE MASS LEFT CHEST WALL;  Surgeon: Nasir Becerra M.D.;  Location: [facility];  Service: General    PA BRONCHOSCOPY,DIAGNOSTIC N/A 02/14/2023    Procedure: FIBER OPTIC BRONCHOSCOPY WASH, BRUSH, BRONCHOALVEOLAR LAVAGE, BIOPSY AND FINE NEEDLE ASPIRATION AND NAVIGATION, ROBOTICS;  Surgeon: Guicho Ellis M.D.;  Location: [facility];  Service: Pulmonary Robotic    ENDOBRONCHIAL US ADD-ON N/A 02/14/2023    Procedure: ENDOBRONCHIAL ULTRASOUND (EBUS);  Surgeon: Guicho Ellis M.D.;  Location:  SUR[facility];  Service: Pulmonary Robotic    MAMMOPLASTY REDUCTION Bilateral     SEPTOPLASTY      TUBE & ECTOPIC PREG., REMOVAL      x 2       Social History     Tobacco Use    Smoking status: Some Days     Current packs/day: 0.50     Average packs/day: 0.5 packs/day for 20.0 years (10.0 ttl pk-yrs)     Types: Cigarettes     Passive exposure: Past    Smokeless tobacco: Never    Tobacco comments:     on and off    Vaping Use    Vaping Use: Never used   Substance Use Topics    Alcohol use: Not Currently     Comment: occ, rare    Drug use: Not Currently     Types: Marijuana     Comment: THC gummies        Family History   Problem Relation Age of Onset    Diabetes Mother     Cancer Father         Prostate    Cancer Paternal Grandmother         Cervical    Cancer Paternal Grandfather         Unk       Allergies   Allergen Reactions    Penicillins Hives, Rash and Swelling     Pt reports that she gets swelling in throat and face, rash all over face and arms.   Tolerated cefazolin    Aspirin Rash and Hives     Pt reports that she received a rash on face, pt reports it ok if she takes NORCO    Other Environmental Shortness of Breath     Perfumes, dander, scents       Current Facility-Administered Medications   Medication Dose Route Frequency Provider Last Rate Last Admin    melatonin tablet 10 mg  10 mg Oral HS PRN Dex Madrid M.D.        morphine ER (Ms Contin) tablet 15 mg  15 mg Oral Q12HRS Dex Madrid M.D.   15 mg at 10/05/23 1751    clotrimazole (Mycelex) suzette 10 mg  10 mg Oral 5X/DAY Dex Madrid M.D.   10 mg at 10/05/23 1825    busPIRone (Buspar) tablet 15 mg  15 mg Oral TID Dex Madrid M.D.   15 mg at 10/05/23 1751    oxyCODONE immediate-release (Roxicodone) tablet 5 mg  5 mg Oral Q4HRS PRN Gerson Adam M.D.        Or    oxyCODONE immediate release (Roxicodone) tablet 10 mg  10 mg Oral Q4HRS PRN Gerson Adam M.D.   10 mg at 10/04/23 2125    acetaminophen (Tylenol)  tablet 650 mg  650 mg Oral Q6HRS Gerson Adam M.D.   650 mg at 10/05/23 1750    LORazepam (Ativan) injection 0.5 mg  0.5 mg Intravenous Q4HRS PRN Tonny Edmond M.D.   0.5 mg at 10/05/23 0933    lidocaine (Lidoderm) 5 % 3 Patch  3 Patch Transdermal Q24HR Tonny Edmond M.D.   2 Patch at 10/04/23 0532    dexamethasone (Decadron) injection 4 mg  4 mg Intravenous Q6HRS Tonny Edmond M.D.   4 mg at 10/05/23 1752    sodium chloride (Ocean) 0.65 % nasal spray 2 Spray  2 Spray Nasal Q2HRS PRN Tonny Edmond M.D.        HYDROmorphone (Dilaudid) injection 1 mg  1 mg Intravenous Q HOUR PRN Gerson Adam M.D.   1 mg at 10/05/23 1752    capmatinib (Tabrecta) tablet 400 mg  400 mg Oral BID Alma Newton M.D.   400 mg at 10/05/23 1752    guaiFENesin ER (Mucinex) tablet 600 mg  600 mg Oral Q12HRS Naima Ambrosio, A.P.R.N.   600 mg at 10/05/23 1750    diphenhydrAMINE (Benadryl) injection 25 mg  25 mg Intravenous Q6HRS PRN Kay Christensen, A.P.R.N.   25 mg at 10/04/23 1725    ipratropium-albuterol (DUONEB) nebulizer solution  3 mL Nebulization Q4HRS (RT) Yenifer Velazquez M.D.   3 mL at 10/05/23 1535    ipratropium-albuterol (DUONEB) nebulizer solution  3 mL Nebulization Q2HRS PRN (RT) Yenifer Velazquez M.D.   3 mL at 09/29/23 1157    diclofenac sodium (Voltaren) 1 % gel 2 g  2 g Topical 4X/DAY PRN Madhu Merino D.O.        loratadine (Claritin) tablet 10 mg  10 mg Oral DAILY Madhu Merino D.O.   10 mg at 10/05/23 1202    senna-docusate (Pericolace Or Senokot S) 8.6-50 MG per tablet 2 Tablet  2 Tablet Oral BID Madhu Merino D.O.   2 Tablet at 10/05/23 1750    And    polyethylene glycol/lytes (Miralax) PACKET 1 Packet  1 Packet Oral QDAY PRN Madhu Merino D.O.   1 Packet at 09/23/23 1141    And    magnesium hydroxide (Milk Of Magnesia) suspension 30 mL  30 mL Oral QDAY PRN Madhu Merino D.O.   30 mL at 09/24/23 0939    And    bisacodyl (Dulcolax) suppository 10 mg  10 mg Rectal QDAY PRN Madhu  SANJU RiosO.   10 mg at 09/28/23 1300    Respiratory Therapy Consult   Nebulization Continuous RT Madhu Merino D.O.        acetaminophen (Tylenol) tablet 650 mg  650 mg Oral Q6HRS PRN Madhu Merino D.O.        hydrALAZINE (Apresoline) injection 10 mg  10 mg Intravenous Q4HRS PRN SANJU GarciaOEstrellita   10 mg at 09/26/23 1010    ondansetron (Zofran) syringe/vial injection 4 mg  4 mg Intravenous Q4HRS PRN SANJU GarciaOEstrellita   4 mg at 10/04/23 2150    ondansetron (Zofran ODT) dispertab 4 mg  4 mg Oral Q4HRS PRN Madhu Merino D.O.        promethazine (Phenergan) tablet 12.5-25 mg  12.5-25 mg Oral Q4HRS PRN Madhu Merino D.O.        promethazine (Phenergan) suppository 12.5-25 mg  12.5-25 mg Rectal Q4HRS PRN Madhu Merino D.O.        prochlorperazine (Compazine) injection 5-10 mg  5-10 mg Intravenous Q4HRS PRN Madhu Merino D.O.        guaiFENesin dextromethorphan (Robitussin DM) 100-10 MG/5ML syrup 10 mL  10 mL Oral Q6HRS PRN Madhu Merino D.O.   10 mL at 09/30/23 1802    apixaban (Eliquis) tablet 5 mg  5 mg Oral BID SANJU GarciaO.   5 mg at 10/05/23 1750       Review of Systems   Constitutional:  Positive for malaise/fatigue. Negative for chills, fever and weight loss.   HENT:  Negative for congestion, ear pain, nosebleeds and sore throat.    Eyes:  Negative for blurred vision.   Respiratory:  Negative for cough, sputum production, shortness of breath and wheezing.    Cardiovascular:  Negative for chest pain, palpitations, orthopnea and leg swelling.   Gastrointestinal:  Negative for abdominal pain, heartburn, nausea and vomiting.   Genitourinary:  Negative for dysuria, frequency and urgency.   Musculoskeletal:  Positive for back pain and joint pain. Negative for neck pain.   Neurological:  Negative for dizziness, tingling, tremors, sensory change, focal weakness and headaches.   Endo/Heme/Allergies:  Does not bruise/bleed easily.   Psychiatric/Behavioral:  Negative for depression, memory loss  and suicidal ideas.    All other systems reviewed and are negative.      Problem list, medications, and allergies reviewed by myself today in Epic.     Objective:     Vitals:    10/05/23 1211 10/05/23 1311 10/05/23 1531 10/05/23 1533   BP:    138/86   Pulse:   90 87   Resp: 19 20 16 18   Temp:    36.7 °C (98 °F)   TempSrc:    Temporal   SpO2:   99% 98%   Weight:       Height:             DESC; KARNOFSKY SCALE WITH ECOG EQUIVALENT: 90, Able to carry on normal activity; minor signs or symptoms of disease (ECOG equivalent 0)    DISTRESS LEVEL: no acute distress    Physical Exam  Deferred    Labs:   Most recent labs reviewed.  Please see the lab tab of chart review    Imaging:   Most recent images below have been independently reviewed by me.  Please see the imaging tab of chart review    9/22/2023 : Echocardiogram showed LVEF ~ 65% with no reported abnormalities    7/27/23: PET   1.  Focal uptake in the medial RIGHT lower lobe of lung abutting the thoracic spine, consistent with tumor..  2.  No PET correlate for ill-defined spiculated nodule in the RIGHT lower lobe.  3.  Large hypermetabolic mass corresponds to lytic lesion in the RIGHT sacrum consistent with malignancy.  4.  Intramuscular focal uptake at the posterior LEFT shoulder, likely metastatic.    Pathology:    7/14/2023: Pathology of Chest wall  A. Chest wall mass:          Metastatic poorly differentiated malignancy favoring carcinoma           without obvious involvement of overlying skin and a           non-specific IHC profile of some keratins positive (see           microscopic description).          See comment.   Main Cancer Type: Sarcoma   Probability: 90%     Subtype: Undifferentiated Sarcoma ([facility])   Probability: 90%     7/19/2023: Path of the Sacral Bone  A. Sacral bone biopsy:          Bone cores effaced by a high-grade malignancy histologically           identical to the previously excised chest wall mass           ([id number]).     2/14/2023:  Pathology of Lung Mass  A. Lung, right lower lobe fine needle aspiration slides:          Positive for carcinoma.   B. Core, right lower lobe lung:          Moderately differentiated lung adenocarcinoma.   C. Lung, right lower lobe biopsy core and touchprep slides:          Moderately differentiated lung adenocarcinoma.   D. Bronchoalveolar lavage right lower lobe:          Rare atypical cells, malignant cells vs. reactive bronchial           cells.     Assessment/Plan:      Cancer Staging   Primary undifferentiated sarcoma of soft tissue (HCC)  Staging form: Soft Tissue Sarcoma of the Trunk and Extremities, AJCC 8th Edition  - Clinical: Stage IV (cTX, cN0, cM1) - Signed by Alma Newton M.D. on 9/19/2023         Ms. 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    Clinically, she is slightly improved but still acutely sick and in critical condition.      This AM, she is hopeful to be stepped down to the [facility].     All questions and concerns addressed today.  I will continue to follow    Alma Newton M.D.  Hematology/Oncology

## 2023-10-07 NOTE — CONSULTS
Pulmonary Consultation    Date of consult: 10/7/2023    Referring Physician  Oksana Mas M.D.    Reason for Consultation  Acute hypoxemic respiratory failure    History of Presenting Illness  48 y.o. female who presented 9/20/2023 with shortness of breath.  She has a history of stage I adenocarcinoma of the lung diagnosed July 2023 which was treated with SBRT.  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  Patient was uncomfortable starting chemotherapy at this time and was hoping to have a second opinion from [place], however, she never made it to [place].  She was admitted to the ER on 8/20/2023 with hemoptysis and found to have a pulmonary embolism.  The PE was subsegmental and due to ongoing hemoptysis, anticoagulation was withheld.  She was then seen in oncology office in early September and was experiencing no hemoptysis so anticoagulation was recommended at that point.  Patient has had ongoing worsening shortness of breath.  On this admission, patient was admitted with hypoxemia and shortness of breath and was titrated up to high flow nasal cannula.  She has been treated with ceftriaxone and azithromycin for pneumonia.  However, CT shows diffuse worsening of bilateral pulmonary nodules, over taking a significant amount of her parenchyma.  She was started on capmatinib on 9/29/2023.  Patient has remained on high flow since that time, now with increasing wheezing.    Code Status  DNAR/DNI    Review of Systems   Constitutional:  Positive for malaise/fatigue. Negative for chills and fever.   Respiratory:  Positive for cough, shortness of breath and wheezing.    Cardiovascular:  Negative for chest pain and leg swelling.   Gastrointestinal:  Negative for nausea and vomiting.   Psychiatric/Behavioral:  The patient is nervous/anxious.        Past Medical History   has a past medical history of  Anxiety, Arrhythmia, Arthritis, Asthma, Breath shortness, Cancer (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), DDD (degenerative disc disease), lumbar, Depression, Emphysema of lung ([facility]), MRSA (methicillin resistant staph aureus) culture positive, Pain, Pericarditis, Primary adenocarcinoma of lower lobe of right lung ([facility]), Psychiatric disorder, Sleep apnea, Snoring, and Urinary incontinence.    Surgical History   has a past surgical history that includes pr bronchoscopy,diagnostic (N/A, 02/14/2023); endobronchial us add-on (N/A, 02/14/2023); septoplasty; mammoplasty reduction (Bilateral); tube & ectopic preg., removal; pr exc skin benig >4cm trunk,arm,leg (Left, 6/7/2023); and pr bronchoscopy,diagnostic (N/A, 8/22/2023).    Family History  family history includes Cancer in her father, paternal grandfather, and paternal grandmother; Diabetes in her mother.    Social History   reports that she has been smoking cigarettes. She has a 10.0 pack-year smoking history. She has been exposed to tobacco smoke. She has never used smokeless tobacco. She reports that she does not currently use alcohol. She reports that she does not currently use drugs after having used the following drugs: Marijuana.    Medications  Home Medications       Reviewed by Austin Yu R.N. (Registered Nurse) on 09/20/23 at 1942  Med List Status: Complete     Medication Last Dose Status   albuterol (VENTOLIN HFA) 108 (90 Base) MCG/ACT Aero Soln inhalation aerosol PRN Active   Apixaban Starter Pack (ELIQUIS DVT/PE STARTER PACK) 5 MG Tablet Therapy Pack New RX Active   Budeson-Glycopyrrol-Formoterol (BREZTRI AEROSPHERE) 160-9-4.8 MCG/ACT Aerosol 2~3 WEEKS AGO Active   diclofenac sodium (VOLTAREN) 1 % Gel PRN Active   diphenhydrAMINE (BENADRYL) 25 MG Tab 9/20/2023 Active   fluticasone (FLONASE) 50 MCG/ACT nasal spray 9/20/2023 Active    HYDROcodone/acetaminophen (NORCO)  MG Tab 9/20/2023 Active   ipratropium-albuterol (DUONEB) 0.5-2.5 (3) MG/3ML nebulizer solution 9/20/2023 Active   loratadine (CLARITIN) 10 MG Tab 9/20/2023 Active                  Current Facility-Administered Medications   Medication Dose Route Frequency Provider Last Rate Last Admin    Respiratory Therapy Consult   Nebulization Continuous RT Oksana Mas M.D.        furosemide (Lasix) injection 40 mg  40 mg Intravenous BID DIURETIC Oksana Mas M.D.   40 mg at 10/07/23 1035    tiotropium (Spiriva Respimat) 2.5 mcg/Act inhalation spray 5 mcg  5 mcg Inhalation QDAILY (RT) Oksana Mas M.D.        mometasone-formoterol (Dulera) 100-5 MCG/ACT inhaler 2 Puff  2 Puff Inhalation BID (RT) Oksana Mas M.D.   2 Puff at 10/06/23 2052    albuterol inhaler 2 Puff  2 Puff Inhalation Q2HRS PRN (RT) Oksana Mas M.D.   2 Puff at 10/07/23 0624    ipratropium-albuterol (DUONEB) nebulizer solution  3 mL Nebulization Q4HRS (RT) Oksana Mas M.D.   3 mL at 10/07/23 0726    ipratropium-albuterol (DUONEB) nebulizer solution  3 mL Nebulization Q2HRS PRN (RT) Oksana Mas M.D.        melatonin tablet 10 mg  10 mg Oral HS PRN Dex Madrid M.D.        morphine ER (Ms Contin) tablet 15 mg  15 mg Oral Q12HRS Dex Madrid M.D.   15 mg at 10/06/23 0945    clotrimazole (Mycelex) suzette 10 mg  10 mg Oral 5X/DAY Dex Madrid M.D.   10 mg at 10/07/23 1035    busPIRone (Buspar) tablet 15 mg  15 mg Oral TID Dex Madrid M.D.   15 mg at 10/07/23 1239    oxyCODONE immediate-release (Roxicodone) tablet 5 mg  5 mg Oral Q4HRS PRMELLISA Adam M.D.        Or    oxyCODONE immediate release (Roxicodone) tablet 10 mg  10 mg Oral Q4HRS PRMELLISA Adam M.D.   10 mg at 10/04/23 2125    acetaminophen (Tylenol) tablet 650 mg  650 mg Oral Q6HRS Gerson Adam M.D.   650 mg at 10/07/23 1239    LORazepam (Ativan) injection 0.5 mg  0.5 mg  Intravenous Q4HRS PRN Tonny Edmond M.D.   0.5 mg at 10/07/23 0624    lidocaine (Lidoderm) 5 % 3 Patch  3 Patch Transdermal Q24HR Tonny Edmond M.D.   2 Patch at 10/04/23 0532    dexamethasone (Decadron) injection 4 mg  4 mg Intravenous Q6HRS Tonny Edmond M.D.   4 mg at 10/07/23 1240    sodium chloride (Ocean) 0.65 % nasal spray 2 Spray  2 Spray Nasal Q2HRS PRN Tonny Edmond M.D.        HYDROmorphone (Dilaudid) injection 1 mg  1 mg Intravenous Q HOUR PRN Gerson Adam M.D.   1 mg at 10/07/23 1240    capmatinib (Tabrecta) tablet 400 mg  400 mg Oral BID Alma Newton M.D.   400 mg at 10/07/23 0600    guaiFENesin ER (Mucinex) tablet 600 mg  600 mg Oral Q12HRS Naima Ambrosio, A.P.R.N.   600 mg at 10/07/23 0558    diphenhydrAMINE (Benadryl) injection 25 mg  25 mg Intravenous Q6HRS PRN Kay Christensen, A.P.R.N.   25 mg at 10/06/23 2052    diclofenac sodium (Voltaren) 1 % gel 2 g  2 g Topical 4X/DAY PRN Madhu Merino D.O.        loratadine (Claritin) tablet 10 mg  10 mg Oral DAILY SANJU GarciaOEstrellita   10 mg at 10/07/23 0559    senna-docusate (Pericolace Or Senokot S) 8.6-50 MG per tablet 2 Tablet  2 Tablet Oral BID SANJU GarciaO.   2 Tablet at 10/06/23 1823    And    polyethylene glycol/lytes (Miralax) PACKET 1 Packet  1 Packet Oral QDAY PRN SANJU GarciaO.   1 Packet at 09/23/23 1141    And    magnesium hydroxide (Milk Of Magnesia) suspension 30 mL  30 mL Oral QDAY PRN Madhu Merino D.O.   30 mL at 09/24/23 0939    And    bisacodyl (Dulcolax) suppository 10 mg  10 mg Rectal QDAY PRN SANJU GarciaOEstrellita   10 mg at 09/28/23 1300    acetaminophen (Tylenol) tablet 650 mg  650 mg Oral Q6HRS PRN SANJU GarciaO.        hydrALAZINE (Apresoline) injection 10 mg  10 mg Intravenous Q4HRS PRN SANJU GarciaOEstrellita   10 mg at 09/26/23 1010    ondansetron (Zofran) syringe/vial injection 4 mg  4 mg Intravenous Q4HRS PRN SANJU GarciaOEstrellita   4 mg at 10/04/23 7553     ondansetron (Zofran ODT) dispertab 4 mg  4 mg Oral Q4HRS PRN Madhu Merino D.O.        promethazine (Phenergan) tablet 12.5-25 mg  12.5-25 mg Oral Q4HRS PRN Madhu Merino D.O.        promethazine (Phenergan) suppository 12.5-25 mg  12.5-25 mg Rectal Q4HRS PRN Madhu Merino D.O.        prochlorperazine (Compazine) injection 5-10 mg  5-10 mg Intravenous Q4HRS PRN Madhu Merino D.O.        guaiFENesin dextromethorphan (Robitussin DM) 100-10 MG/5ML syrup 10 mL  10 mL Oral Q6HRS PRN Madhu Merino D.O.   10 mL at 09/30/23 1802    apixaban (Eliquis) tablet 5 mg  5 mg Oral BID Madhu Merino D.O.   5 mg at 10/07/23 0558       Allergies  Allergies   Allergen Reactions    Penicillins Hives, Rash and Swelling     Pt reports that she gets swelling in throat and face, rash all over face and arms.   Tolerated cefazolin    Aspirin Rash and Hives     Pt reports that she received a rash on face, pt reports it ok if she takes NORCO    Other Environmental Shortness of Breath     Perfumes, dander, scents       Vital Signs last 24 hours  Temp:  [36.2 °C (97.2 °F)-37.1 °C (98.8 °F)] 36.9 °C (98.4 °F)  Pulse:  [] 112  Resp:  [18-23] 23  BP: (112-155)/(69-98) 150/98  SpO2:  [86 %-99 %] 95 %    Physical Exam  Vitals reviewed.   Constitutional:       Appearance: Normal appearance.   HENT:      Head: Normocephalic.      Mouth/Throat:      Mouth: Mucous membranes are moist.   Eyes:      Conjunctiva/sclera: Conjunctivae normal.   Cardiovascular:      Rate and Rhythm: Normal rate and regular rhythm.   Pulmonary:      Effort: No respiratory distress.      Breath sounds: Wheezing and rales present.   Skin:     General: Skin is warm and dry.   Neurological:      General: No focal deficit present.      Mental Status: She is alert. Mental status is at baseline.       Fluids    Intake/Output Summary (Last 24 hours) at 10/7/2023 1325  Last data filed at 10/7/2023 1149  Gross per 24 hour   Intake 1440 ml   Output 1400 ml   Net 40 ml        Laboratory  Recent Results (from the past 48 hour(s))   CBC WITHOUT DIFFERENTIAL    Collection Time: 10/06/23 12:11 AM   Result Value Ref Range    WBC 16.9 (H) 4.8 - 10.8 K/uL    RBC 4.38 4.20 - 5.40 M/uL    Hemoglobin 11.3 (L) 12.0 - 16.0 g/dL    Hematocrit 38.2 37.0 - 47.0 %    MCV 87.2 81.4 - 97.8 fL    MCH 25.8 (L) 27.0 - 33.0 pg    MCHC 29.6 (L) 32.2 - 35.5 g/dL    RDW 57.2 (H) 35.9 - 50.0 fL    Platelet Count 120 (L) 164 - 446 K/uL    MPV 10.9 9.0 - 12.9 fL   Basic Metabolic Panel    Collection Time: 10/06/23 12:11 AM   Result Value Ref Range    Sodium 133 (L) 135 - 145 mmol/L    Potassium 4.8 3.6 - 5.5 mmol/L    Chloride 95 (L) 96 - 112 mmol/L    Co2 30 20 - 33 mmol/L    Glucose 138 (H) 65 - 99 mg/dL    Bun 19 8 - 22 mg/dL    Creatinine 0.68 0.50 - 1.40 mg/dL    Calcium 8.8 8.5 - 10.5 mg/dL    Anion Gap 8.0 7.0 - 16.0   MAGNESIUM    Collection Time: 10/06/23 12:11 AM   Result Value Ref Range    Magnesium 2.1 1.5 - 2.5 mg/dL   PHOSPHORUS    Collection Time: 10/06/23 12:11 AM   Result Value Ref Range    Phosphorus 4.3 2.5 - 4.5 mg/dL   proBrain Natriuretic Peptide, NT    Collection Time: 10/06/23 12:11 AM   Result Value Ref Range    NT-proBNP 254 (H) 0 - 125 pg/mL   ESTIMATED GFR    Collection Time: 10/06/23 12:11 AM   Result Value Ref Range    GFR (CKD-EPI) 106 >60 mL/min/1.73 m 2   CBC WITH DIFFERENTIAL    Collection Time: 10/07/23 12:38 AM   Result Value Ref Range    WBC 14.8 (H) 4.8 - 10.8 K/uL    RBC 4.06 (L) 4.20 - 5.40 M/uL    Hemoglobin 10.8 (L) 12.0 - 16.0 g/dL    Hematocrit 35.7 (L) 37.0 - 47.0 %    MCV 87.9 81.4 - 97.8 fL    MCH 26.6 (L) 27.0 - 33.0 pg    MCHC 30.3 (L) 32.2 - 35.5 g/dL    RDW 57.8 (H) 35.9 - 50.0 fL    Platelet Count 104 (L) 164 - 446 K/uL    MPV 11.6 9.0 - 12.9 fL    Neutrophils-Polys 81.30 (H) 44.00 - 72.00 %    Lymphocytes 8.50 (L) 22.00 - 41.00 %    Monocytes 5.40 0.00 - 13.40 %    Eosinophils 0.10 0.00 - 6.90 %    Basophils 0.20 0.00 - 1.80 %    Immature Granulocytes 4.50  (H) 0.00 - 0.90 %    Nucleated RBC 0.20 0.00 - 0.20 /100 WBC    Neutrophils (Absolute) 11.99 (H) 1.82 - 7.42 K/uL    Lymphs (Absolute) 1.25 1.00 - 4.80 K/uL    Monos (Absolute) 0.79 0.00 - 0.85 K/uL    Eos (Absolute) 0.02 0.00 - 0.51 K/uL    Baso (Absolute) 0.03 0.00 - 0.12 K/uL    Immature Granulocytes (abs) 0.67 (H) 0.00 - 0.11 K/uL    NRBC (Absolute) 0.03 K/uL   Comp Metabolic Panel    Collection Time: 10/07/23 12:38 AM   Result Value Ref Range    Sodium 132 (L) 135 - 145 mmol/L    Potassium 4.7 3.6 - 5.5 mmol/L    Chloride 95 (L) 96 - 112 mmol/L    Co2 30 20 - 33 mmol/L    Anion Gap 7.0 7.0 - 16.0    Glucose 151 (H) 65 - 99 mg/dL    Bun 22 8 - 22 mg/dL    Creatinine 0.65 0.50 - 1.40 mg/dL    Calcium 8.6 8.5 - 10.5 mg/dL    Correct Calcium 9.4 8.5 - 10.5 mg/dL    AST(SGOT) 13 12 - 45 U/L    ALT(SGPT) 47 2 - 50 U/L    Alkaline Phosphatase 267 (H) 30 - 99 U/L    Total Bilirubin 0.5 0.1 - 1.5 mg/dL    Albumin 3.0 (L) 3.2 - 4.9 g/dL    Total Protein 5.4 (L) 6.0 - 8.2 g/dL    Globulin 2.4 1.9 - 3.5 g/dL    A-G Ratio 1.3 g/dL   PROCALCITONIN    Collection Time: 10/07/23 12:38 AM   Result Value Ref Range    Procalcitonin 0.08 <0.25 ng/mL   CRP QUANTITIVE (NON-CARDIAC)    Collection Time: 10/07/23 12:38 AM   Result Value Ref Range    Stat C-Reactive Protein 0.54 0.00 - 0.75 mg/dL   MAGNESIUM    Collection Time: 10/07/23 12:38 AM   Result Value Ref Range    Magnesium 2.3 1.5 - 2.5 mg/dL   ESTIMATED GFR    Collection Time: 10/07/23 12:38 AM   Result Value Ref Range    GFR (CKD-EPI) 107 >60 mL/min/1.73 m 2       Imaging  DX-CHEST-LIMITED (1 VIEW)   Final Result         1.  Extensive bilateral pulmonary parenchymal opacities compatible with ARDS, severe pulmonary edema, and/or superimposed pulmonary infiltrates, slightly decreased since prior study.      DX-CHEST-PORTABLE (1 VIEW)   Final Result         1.  Extensive bilateral pulmonary parenchymal opacities compatible with ARDS, severe pulmonary edema, and/or superimposed  pulmonary infiltrates, stable since prior study.      IR-MIDLINE CATHETER INSERTION WO GUIDANCE > AGE 3   Final Result                  Ultrasound-guided midline placement performed by qualified nursing staff    as above.          DX-CHEST-PORTABLE (1 VIEW)   Final Result         1.  Extensive bilateral pulmonary parenchymal opacities compatible with ARDS, severe pulmonary edema, and/or superimposed pulmonary infiltrates, stable since prior study.      DX-CHEST-PORTABLE (1 VIEW)   Final Result         1.  Pulmonary edema and/or infiltrates are identified, which are stable since the prior exam.      DX-CHEST-PORTABLE (1 VIEW)   Final Result         1.  Bilateral pulmonary infiltrates, stable since prior study   2.  Multiple bilateral pulmonary nodules.      DX-CHEST-PORTABLE (1 VIEW)   Final Result      1.  Innumerable pulmonary nodules bilaterally.      2.  Increasing confluence and airspace opacities in the perihilar regions suspicious for superimposed pneumonitis on diffuse metastatic disease      IR-PICC LINE PLACEMENT W/ GUIDANCE > AGE 5   Final Result                  Ultrasound-guided PICC placement performed by qualified nursing staff as    above.          DX-CHEST-PORTABLE (1 VIEW)   Final Result      1.  Innumerable bilateral pulmonary nodule, increased since 9/20/2023      2.  Differential diagnosis includes worsening metastases or superimposed pneumonia.      EC-ECHOCARDIOGRAM COMPLETE W/O CONT   Final Result      CT-CTA CHEST PULMONARY ARTERY W/ RECONS   Final Result      1.  Minimal pulmonary embolism. Localized right lower lobe segmental and subsegmental branches.   2.  No right heart strain.   3.  9 mm lucent/lytic lesion T1 vertebral body. Suspect osseous metastatic deposit.   4.  Progression of extensive mediastinal adenopathy since 8/20/2023 consistent with metastatic disease.   5.  Extensive progression of diffuse bilateral innumerable pulmonary nodules consistent with metastatic disease.   6.   Interval development of extensive groundglass and airspace opacities consistent with superimposed pneumonia.   7.  Progression of right adrenal mass now measuring 43 mm x 38 mm consistent with metastasis.      Fleischner Society pulmonary nodule recommendations:      DX-CHEST-PORTABLE (1 VIEW)   Final Result      Diffuse bilateral interstitial and alveolar opacities most consistent with pneumonia or possibly noncardiogenic pulmonary edema.          Assessment/Plan  #Acute hypoxemic respiratory failure, currently on high flow  #Diffuse, dense bilateral pulmonary nodules, due to metastatic sarcoma  #COPD/Asthma, by history   #Wheezing, possibly due to COPD or asthma exacerbation, more concerned that it is due to endobronchial involvement with tumor  #Pulmonary embolism, right lower lobe subsegmental  #Stage I adenocarcinoma of lung status post XRT    Titrate O2 to maintain sats 88-92%  Patient is currently on dexamethasone 4 mg every 6 hours  Continue with around-the-clock nebulizers  Continue with Dulera and Spiriva  Continue with capmatinib for sarcoma  Continue with full dose anticoagulation for pulmonary embolism in the setting of malignancy  Aggressive pain control  Ongoing palliative care would be beneficial for this patient    Sandra Lopez, DO   Pulmonary and Critical Care

## 2023-10-07 NOTE — CARE PLAN
The patient is Unstable - High likelihood or risk of patient condition declining or worsening    Shift Goals  Clinical Goals: oxygen/lasix/, pain control, fall precautions, telemonitoring  Patient Goals: pain control/see her mom  Family Goals: loss of phone , block phone calls, give pain meds.    Progress made toward(s) clinical / shift goals:  yes    Patient is not progressing towards the following goals:pain      Pt is aaox4-anxious and tearful today.  States mom is coming today and that's all she needs.  Reports pain at times 10/10 - refusing long acting morphine since last night.  Says she wants to be awake for mom.  She did take it for me yesterday with a dose of ativan and pain was much better controlled and pt was not as emotional liable as today - emotional and comfort measures provided.  Pt in more pain today.  Lasix started-I/o's.  Good po intake - no bm today - denies prn.  Piv patent/flushed.  Pt desaturation in mid 80's and heart rate up to 130 when up to commode at 40L 60% high flow.  Once relaxed in bed goes up to 96% saturation and h/r low 100's.  Sores in mouth-pt received order ok for pt to swoosh/spit peroxide.  Family at bedside.  Fall precautions in place.  Pt makes needs known - will continue to round.        Problem: Pain - Standard  Goal: Alleviation of pain or a reduction in pain to the patient’s comfort goal  Outcome: Not Progressing     Problem: Respiratory  Goal: Patient will achieve/maintain optimum respiratory ventilation and gas exchange  Outcome: Progressing     Problem: Self Care  Goal: Patient will have the ability to perform ADLs independently or with assistance (bathe, groom, dress, toilet and feed)  Outcome: Progressing     Problem: Knowledge Deficit - Standard  Goal: Patient and family/care givers will demonstrate understanding of plan of care, disease process/condition, diagnostic tests and medications  Outcome: Progressing     Problem: Skin Integrity  Goal: Skin  integrity is maintained or improved  Outcome: Progressing

## 2023-10-07 NOTE — CARE PLAN
The patient is Watcher - Medium risk of patient condition declining or worsening    Shift Goals  Clinical Goals: high flow oxygen/desaturation, pain control, emotional support,  Patient Goals: pain control  Family Goals: pain control, medicaitons    Progress made toward(s) clinical / shift goals:    Problem: Pain - Standard  Goal: Alleviation of pain or a reduction in pain to the patient’s comfort goal  Outcome: Progressing     Problem: Care Map:  Optimal Outcome for the Pneumonia Patient  Goal: Collection and monitoring of appropriate tests and labs  Outcome: Progressing     Problem: Risk for Aspiration  Goal: Patient's risk for aspiration will be absent or decrease  Outcome: Progressing     Problem: Knowledge Deficit - Standard  Goal: Patient and family/care givers will demonstrate understanding of plan of care, disease process/condition, diagnostic tests and medications  Outcome: Progressing     Problem: Skin Integrity  Goal: Skin integrity is maintained or improved  Outcome: Progressing       Patient is not progressing towards the following goals:

## 2023-10-07 NOTE — PROGRESS NOTES
Hospital Medicine Daily Progress Note    Date of Service  10/7/2023    Chief Complaint  48-year-old female with history of sarcoma and lung cancer presented 9/20 with dyspnea.      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  On admission patient had tachycardia tachypnea, chest x-ray showed bilateral infiltration, CTA for chest showed right lower lobe subsegmental PE, worsening pulmonary nodules with T1 vertebral body lesion, possible pneumonia.  Procalcitonin was 0.11.Eliquis was started.  Also antibiotics for pneumonia Ceftriaxone and azithromycin were empirically started.  Echo was done and showed normal ejection fraction 65%, initially patient was treated ICU secondary to increasing oxygen requirement, patient was on high flow oxygen, treated with a steroid and inhalers.  With some improvement and patient was transferred out of the ICU.    Patient was evaluated by Dr. Newton oncologist who recommended to start chemotherapy in form of capmatinib on 9/29/2023.    Patient has metastatic sarcoma with lung cancer and significant anxiety, palliative care following for pain management.  Patient needed Precedex drip for anxiety.  Patient is on every 4 hours prn Ativan, every 2 hours prn IV Dilaudid, Norco 10/325 every 3 hours prn. Tylenol every 6 hours scheduled, holding when sleeping. Dexamethasone 4 mg BID.  CODE STATUS was discussed with the patient and agreed for DNR/DNI.        Interval Problem Update  -Evaluated examined the patient at bedside, improving on her wheezing, continue inhalers, patient still on high flow oxygen.  -Oncology on board for chemo  -Labs showed leukocytosis, improving, hyponatremia 132  -Chest x-ray reviewed personally, no significant  infiltration bilaterally, received 1 dose of IV Lasix.      I have discussed this patient's plan of care and discharge plan at IDT rounds today with Case Management, Nursing, Nursing leadership, and other members of the IDT team.    Consultants/Specialty  oncology critical care, palliative care    Code Status  DNAR/DNI    Disposition  The patient is not medically cleared for discharge to home or a post-acute facility.  Anticipate discharge to: skilled nursing facility    I have placed the appropriate orders for post-discharge needs.    Review of Systems  Review of Systems   Constitutional:  Positive for malaise/fatigue. Negative for chills and fever.   HENT: Negative.     Eyes: Negative.    Respiratory:  Positive for cough and shortness of breath.    Cardiovascular: Negative.  Negative for chest pain, palpitations and leg swelling.   Gastrointestinal: Negative.  Negative for heartburn, nausea and vomiting.   Genitourinary: Negative.  Negative for dysuria and frequency.   Musculoskeletal:  Positive for myalgias. Negative for neck pain.   Skin: Negative.  Negative for itching and rash.   Neurological: Negative.  Negative for dizziness, focal weakness, weakness and headaches.   Endo/Heme/Allergies: Negative.  Negative for polydipsia. Does not bruise/bleed easily.   Psychiatric/Behavioral:  Negative for depression. The patient is nervous/anxious.         Physical Exam  Temp:  [36.2 °C (97.2 °F)-37.1 °C (98.8 °F)] 36.7 °C (98.1 °F)  Pulse:  [] 98  Resp:  [18-22] 20  BP: (112-155)/(69-95) 112/69  SpO2:  [91 %-100 %] 94 %    Physical Exam  Vitals and nursing note reviewed.   Constitutional:       Appearance: She is well-developed. She is ill-appearing. She is not diaphoretic.   HENT:      Head: Normocephalic and atraumatic.      Nose: Nose normal.      Comments: High flow nasal cannula     Mouth/Throat:      Mouth: Mucous membranes are moist.   Eyes:      Conjunctiva/sclera: Conjunctivae normal.      Pupils: Pupils  are equal, round, and reactive to light.   Neck:      Thyroid: No thyromegaly.      Vascular: No JVD.   Cardiovascular:      Rate and Rhythm: Normal rate and regular rhythm.      Heart sounds: Normal heart sounds.      No friction rub. No gallop.   Pulmonary:      Effort: Respiratory distress present.      Breath sounds: Rhonchi and rales present. No wheezing.   Abdominal:      General: Bowel sounds are normal. There is no distension.      Palpations: Abdomen is soft. There is no mass.      Tenderness: There is no abdominal tenderness. There is no guarding or rebound.   Musculoskeletal:         General: No tenderness. Normal range of motion.      Cervical back: Normal range of motion and neck supple.      Right lower leg: No edema.      Left lower leg: No edema.   Lymphadenopathy:      Cervical: No cervical adenopathy.   Skin:     General: Skin is warm and dry.   Neurological:      Mental Status: She is alert and oriented to person, place, and time. Mental status is at baseline.      Cranial Nerves: No cranial nerve deficit.      Motor: No weakness.   Psychiatric:         Behavior: Behavior normal.      Comments: Anxious         Fluids    Intake/Output Summary (Last 24 hours) at 10/7/2023 0939  Last data filed at 10/6/2023 1800  Gross per 24 hour   Intake 700 ml   Output --   Net 700 ml           Laboratory  Recent Labs     10/05/23  0350 10/06/23  0011 10/07/23  0038   WBC 18.0* 16.9* 14.8*   RBC 4.16* 4.38 4.06*   HEMOGLOBIN 10.8* 11.3* 10.8*   HEMATOCRIT 37.4 38.2 35.7*   MCV 89.9 87.2 87.9   MCH 26.0* 25.8* 26.6*   MCHC 28.9* 29.6* 30.3*   RDW 60.0* 57.2* 57.8*   PLATELETCT 92* 120* 104*   MPV 10.8 10.9 11.6     Recent Labs     10/05/23  0350 10/06/23  0011 10/07/23  0038   SODIUM 134* 133* 132*   POTASSIUM 5.0 4.8 4.7   CHLORIDE 94* 95* 95*   CO2 32 30 30   GLUCOSE 173* 138* 151*   BUN 18 19 22   CREATININE 0.65 0.68 0.65   CALCIUM 8.9 8.8 8.6                     Imaging  DX-CHEST-LIMITED (1 VIEW)   Final  Result         1.  Extensive bilateral pulmonary parenchymal opacities compatible with ARDS, severe pulmonary edema, and/or superimposed pulmonary infiltrates, slightly decreased since prior study.      DX-CHEST-PORTABLE (1 VIEW)   Final Result         1.  Extensive bilateral pulmonary parenchymal opacities compatible with ARDS, severe pulmonary edema, and/or superimposed pulmonary infiltrates, stable since prior study.      IR-MIDLINE CATHETER INSERTION WO GUIDANCE > AGE 3   Final Result                  Ultrasound-guided midline placement performed by qualified nursing staff    as above.          DX-CHEST-PORTABLE (1 VIEW)   Final Result         1.  Extensive bilateral pulmonary parenchymal opacities compatible with ARDS, severe pulmonary edema, and/or superimposed pulmonary infiltrates, stable since prior study.      DX-CHEST-PORTABLE (1 VIEW)   Final Result         1.  Pulmonary edema and/or infiltrates are identified, which are stable since the prior exam.      DX-CHEST-PORTABLE (1 VIEW)   Final Result         1.  Bilateral pulmonary infiltrates, stable since prior study   2.  Multiple bilateral pulmonary nodules.      DX-CHEST-PORTABLE (1 VIEW)   Final Result      1.  Innumerable pulmonary nodules bilaterally.      2.  Increasing confluence and airspace opacities in the perihilar regions suspicious for superimposed pneumonitis on diffuse metastatic disease      IR-PICC LINE PLACEMENT W/ GUIDANCE > AGE 5   Final Result                  Ultrasound-guided PICC placement performed by qualified nursing staff as    above.          DX-CHEST-PORTABLE (1 VIEW)   Final Result      1.  Innumerable bilateral pulmonary nodule, increased since 9/20/2023      2.  Differential diagnosis includes worsening metastases or superimposed pneumonia.      EC-ECHOCARDIOGRAM COMPLETE W/O CONT   Final Result      CT-CTA CHEST PULMONARY ARTERY W/ RECONS   Final Result      1.  Minimal pulmonary embolism. Localized right lower lobe  segmental and subsegmental branches.   2.  No right heart strain.   3.  9 mm lucent/lytic lesion T1 vertebral body. Suspect osseous metastatic deposit.   4.  Progression of extensive mediastinal adenopathy since 8/20/2023 consistent with metastatic disease.   5.  Extensive progression of diffuse bilateral innumerable pulmonary nodules consistent with metastatic disease.   6.  Interval development of extensive groundglass and airspace opacities consistent with superimposed pneumonia.   7.  Progression of right adrenal mass now measuring 43 mm x 38 mm consistent with metastasis.      Fleischner Society pulmonary nodule recommendations:      DX-CHEST-PORTABLE (1 VIEW)   Final Result      Diffuse bilateral interstitial and alveolar opacities most consistent with pneumonia or possibly noncardiogenic pulmonary edema.             Assessment/Plan  * Acute on chronic respiratory failure (HCC)- (present on admission)  Assessment & Plan  Multiple factors including aggressive metastatic undifferentiated sarcoma versus sarcomatoid lung cancer.   history of stage 1 lung adenocarcinoma s/p SBRT, pulmonary embolism, COPD/asthma, recent pneumonia.      Initially treated at the ICU, patient still on high flow oxygen  Finish course of antibiotics  downgraded to IMCU on 10/2/2023.      Completed course of antibiotics.   Patient still on high flow oxygen  Continue steroids, inhaler and nebulizers.  RT, continue weaning her from oxygen    Goals of care, counseling/discussion- (present on admission)  Assessment & Plan  Goals of care done last week extensively. Hospice recommended by oncology and ICU team.  Patient would like to try some form of therapy if possible and oncology offered an oral chemotherapeutic regime. She elected to change to DNR/DNI.   Continue with palliative care consultation.     Sarcoma (HCC)- (present on admission)  Assessment & Plan  Advanced, aggressive, on oral chemotherapy currently    Pulmonary embolism on right  (HCC)- (present on admission)  Assessment & Plan  Recently diagnosed.  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   Stated Capmatinib on 9/29/2023 by oncology team  Patient is DNR/DNI.  Palliative team on board, appreciate their help    Mass of sacrum- (present on admission)  Assessment & Plan  Aggressive metastatic undifferentiated sarcoma. On Capmatinib    Lung cancer (HCC)- (present on admission)  Assessment & Plan  Initially diagnosed stage I adenocarcinoma initially treated with chemoradiation.   Follows with Dr Newton (Onc)  Now with widespread metastatic sarcoma    Chronic pain due to neoplasm- (present on admission)  Assessment & Plan  Palliative care have been following for pain management.  Continue multimodal pain management, oxy, tylenol, lidocaine patches, gabapentin and IV pain medication for breakthrough.  Hospice following.    Cigarette nicotine dependence without complication- (present on admission)  Assessment & Plan  History of ongoing tobacco abuse  Nicotine patch if needed          VTE prophylaxis: Eliquis      Greater than 51 minutes spent prepping to see patient (e.g. review of tests) obtaining and/or reviewing separately obtained history. Performing a medically appropriate examination and/ evaluation.  Counseling and educating the patient/family/caregiver.  Ordering medications, tests, or procedures.  Referring and communicating with other health care professionals.  Documenting clinical information in EPIC.  Independently interpreting results and communicating results to patient/family/caregiver.  Care coordination

## 2023-10-07 NOTE — RESPIRATORY CARE
COPD EDUCATION by COPD CLINICAL EDUCATOR  10/7/2023  at  12:46 PM by Greyson Thakur, RRT     Reviewed RT medications and updated action plan. Materials provided and emphasized stress reduction.  Patient follows with [facility].    COPD Screen  COPD Risk Screening  Do you have a history of COPD?: No  COPD Population Screener  During the past 4 weeks, how much did you feel short of breath?: Most  or all of the time  Do you ever cough up any mucus or phlegm?: No/only with occasional colds or infections  In the past 12 months, you do less than you used to because of your breathing problems: Agree  Have you smoked at least 100 cigarettes in your entire life?: No/don't know  How [age] 50-59  COPD Screening Score: 4  COPD Coordinator Not Recommended: Yes    COPD Assessment  COPD Clinical Specialists ONLY  COPD Education Initiated: Yes--Short Intervention  Is this a COPD exacerbation patient?: Yes (order set used)  DME Company: [employer]  DME Equipment Type: nebulizer  Physician Name: Checo Baker M.D.  Pulmonologist Name: [facility] last seen 7/2023  Palliative Care: Yes  Referrals Initiated: Yes  Pulmonary Rehab: Yes (per admit team)  Smoking Cessation: No (Reports she does not smoke)  Hospice:  (Appears to have been discharged on hospice last month)  Home Health Care: N/A  Mobile Urgent Care Services: N/A  Geriatric Specialty Group: N/A  Private In-Home Care Agency: N/A  $ Demo/Eval of SVN's, MDI's and Aerosols: Yes (Reviewed RT medications)  (OP) Pulmonary Function Testing: Yes ()  Interdisciplinary Rounds: Attendance at Rounds (30 Min)    PFT Results    1/2023 PFT FEV1 59%; ratio 59.    Meds to Beds  Would the patient like to opt in for Bedside Medication Delivery at Discharge?: Yes, interested     MY COPD ACTION PLAN     It is recommended that patients and physicians /healthcare providers complete this action plan together. This plan should be discussed at each physician visit and  "updated as needed.    The green, yellow and red zones show groups of symptoms of COPD. This list of symptoms is not comprehensive, and you may experience other symptoms. In the \"Actions\" column, your healthcare provider has recommended actions for you to take based on your symptoms.    Patient Name: Melly Hendrickson   YOB: 1975   Last Updated on: 10/7/2023 12:46 PM   Green Zone:  I am doing well today Actions     Usual activitiy and exercise level   Take daily medications     Usual amounts of cough and phlegm/mucus   Use oxygen as prescribed     Sleep well at night   Continue regular exercise/diet plan     Appetite is good   At all times avoid cigarette smoke, inhaled irritants     Daily Medications (these medications are taken every day):   Budesonide/Glycopyrrolate/Formoterol Fumarate (Breztri Aerosphere)  Albuterol/Ipratropium (Combivent, Duoneb) 2 Puffs  3mL via nebulizer Twice daily  Four Times daily     Additional Information:  Rinse nebulizer after use  Patient instructed on importance of cleaning home nebulizer after every treatment to prevent infection.       Yellow Zone:  I am having a bad day or a COPD flare Actions     More breathless than usual   Continue daily medications     I have less energy for my daily activities   Use quick relief inhaler as ordered     Increased or thicker phlegm/mucus   Use oxygen as prescribed     Using quick relief inhaler/nebulizer more often   Get plenty of rest     Swelling of ankles more than usual   Use pursed lip breathing     More coughing than usual   At all times avoid cigarette smoke, inhaled irritants     I feel like I have a \"chest cold\"     Poor sleep and my symptoms woke me up     My appetite is not good     My medicine is not helping      Call provider immediately if symptoms don’t improve     Continue daily medications, add rescue medications:   Albuterol  Albuterol/Ipratropium (Combivent, Duoneb) 2 Puffs  3mL via nebulizer Every 4 " hours PRN         Medications to be used during a flare up, (as Discussed with Provider):           Additional Information:  Use albuterol inhaler with spacer - Call provider immediately if symptoms do not improve    Red Zone:  I need urgent medical care Actions     Severe shortness of breath even at rest   Call 911 or seek medical care immediately     Not able to do any activity because of breathing      Fever or shaking chills      Feeling confused or very drowsy       Chest pains      Coughing up blood

## 2023-10-08 NOTE — CARE PLAN
The patient is Watcher - Medium risk of patient condition declining or worsening    Shift Goals  Clinical Goals: Monitor O2 level, vss, tele  Patient Goals: pain control and rest  Family Goals: pain control and rest    Progress made toward(s) clinical / shift goals:    Problem: Pain - Standard  Goal: Alleviation of pain or a reduction in pain to the patient’s comfort goal  Outcome: Progressing     Problem: Respiratory  Goal: Patient will achieve/maintain optimum respiratory ventilation and gas exchange  Outcome: Progressing     Problem: Knowledge Deficit - Standard  Goal: Patient and family/care givers will demonstrate understanding of plan of care, disease process/condition, diagnostic tests and medications  Outcome: Progressing       Patient is not progressing towards the following goals:

## 2023-10-08 NOTE — PROGRESS NOTES
Hospital Medicine Daily Progress Note    Date of Service  10/8/2023    Chief Complaint  48-year-old female with history of sarcoma and lung cancer presented 9/20 with dyspnea.      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  On admission patient had tachycardia tachypnea, chest x-ray showed bilateral infiltration, CTA for chest showed right lower lobe subsegmental PE, worsening pulmonary nodules with T1 vertebral body lesion, possible pneumonia.  Procalcitonin was 0.11.Eliquis was started.  Also antibiotics for pneumonia Ceftriaxone and azithromycin were empirically started.  Echo was done and showed normal ejection fraction 65%, initially patient was treated ICU secondary to increasing oxygen requirement, patient was on high flow oxygen, treated with a steroid and inhalers.  With some improvement and patient was transferred out of the ICU.    Patient was evaluated by Dr. Newton oncologist who recommended to start chemotherapy in form of capmatinib on 9/29/2023.    Patient has metastatic sarcoma with lung cancer and significant anxiety, palliative care following for pain management.  Patient needed Precedex drip for anxiety.  Patient is on every 4 hours prn Ativan, every 2 hours prn IV Dilaudid, Norco 10/325 every 3 hours prn. Tylenol every 6 hours scheduled, holding when sleeping. Dexamethasone 4 mg BID.  CODE STATUS was discussed with the patient and agreed for DNR/DNI.        Interval Problem Update  -Evaluated examined the patient at bedside, patient has had episodes of panic attack with sinus tachycardia, patient still on high flow.  Some improving on her wheezing  -Patient is on morphine long-acting twice daily, discussed increasing the dose however her pain is  controlled.  Patient does not want to go up with morphine.  -Case was discussed with pulmonologist, appreciate recommendation  -Labs improving on leukocytosis and hyponatremia  -Chest x-ray reviewed personally, no significant infiltration bilaterally,   -Today patient has some social issues with her daughter and she was crying.      I have discussed this patient's plan of care and discharge plan at IDT rounds today with Case Management, Nursing, Nursing leadership, and other members of the IDT team.    Consultants/Specialty  oncology critical care, palliative care    Code Status  DNAR/DNI    Disposition    Anticipate discharge to: skilled nursing facility    I have placed the appropriate orders for post-discharge needs.    Review of Systems  Review of Systems   Constitutional:  Positive for malaise/fatigue. Negative for chills and fever.   HENT: Negative.     Eyes: Negative.    Respiratory:  Positive for cough and shortness of breath.    Cardiovascular: Negative.  Negative for chest pain, palpitations and leg swelling.   Gastrointestinal: Negative.  Negative for heartburn, nausea and vomiting.   Genitourinary: Negative.  Negative for dysuria and frequency.   Musculoskeletal:  Positive for myalgias. Negative for neck pain.   Skin: Negative.  Negative for itching and rash.   Neurological: Negative.  Negative for dizziness, focal weakness, weakness and headaches.   Endo/Heme/Allergies: Negative.  Negative for polydipsia. Does not bruise/bleed easily.   Psychiatric/Behavioral:  Negative for depression. The patient is nervous/anxious.         Physical Exam  Temp:  [36.2 °C (97.1 °F)-36.9 °C (98.5 °F)] 36.3 °C (97.4 °F)  Pulse:  [] 91  Resp:  [16-24] 16  BP: (134-155)/(73-98) 155/92  SpO2:  [86 %-99 %] 99 %    Physical Exam  Vitals and nursing note reviewed.   Constitutional:       Appearance: She is well-developed. She is ill-appearing. She is not diaphoretic.   HENT:      Head: Normocephalic and atraumatic.       Nose: Nose normal.      Comments: High flow nasal cannula     Mouth/Throat:      Mouth: Mucous membranes are moist.   Eyes:      Conjunctiva/sclera: Conjunctivae normal.      Pupils: Pupils are equal, round, and reactive to light.   Neck:      Thyroid: No thyromegaly.      Vascular: No JVD.   Cardiovascular:      Rate and Rhythm: Normal rate and regular rhythm.      Heart sounds: Normal heart sounds.      No friction rub. No gallop.   Pulmonary:      Effort: Respiratory distress present.      Breath sounds: Rhonchi and rales present. No wheezing.   Abdominal:      General: Bowel sounds are normal. There is no distension.      Palpations: Abdomen is soft. There is no mass.      Tenderness: There is no abdominal tenderness. There is no guarding or rebound.   Musculoskeletal:         General: No tenderness. Normal range of motion.      Cervical back: Normal range of motion and neck supple.      Right lower leg: No edema.      Left lower leg: No edema.   Lymphadenopathy:      Cervical: No cervical adenopathy.   Skin:     General: Skin is warm and dry.   Neurological:      Mental Status: She is alert and oriented to person, place, and time. Mental status is at baseline.      Cranial Nerves: No cranial nerve deficit.      Motor: No weakness.   Psychiatric:         Behavior: Behavior normal.      Comments: Anxious         Fluids    Intake/Output Summary (Last 24 hours) at 10/8/2023 0834  Last data filed at 10/8/2023 0510  Gross per 24 hour   Intake 1440 ml   Output 2450 ml   Net -1010 ml           Laboratory  Recent Labs     10/06/23  0011 10/07/23  0038 10/08/23  0017   WBC 16.9* 14.8* 12.3*   RBC 4.38 4.06* 4.11*   HEMOGLOBIN 11.3* 10.8* 11.0*   HEMATOCRIT 38.2 35.7* 36.1*   MCV 87.2 87.9 87.8   MCH 25.8* 26.6* 26.8*   MCHC 29.6* 30.3* 30.5*   RDW 57.2* 57.8* 58.5*   PLATELETCT 120* 104* 106*   MPV 10.9 11.6 10.9     Recent Labs     10/06/23  0011 10/07/23  0038 10/08/23  0017   SODIUM 133* 132* 135   POTASSIUM 4.8 4.7  4.5   CHLORIDE 95* 95* 97   CO2 30 30 30   GLUCOSE 138* 151* 214*   BUN 19 22 23*   CREATININE 0.68 0.65 0.64   CALCIUM 8.8 8.6 8.5                     Imaging  DX-CHEST-LIMITED (1 VIEW)   Final Result         1.  Extensive bilateral pulmonary parenchymal opacities compatible with ARDS, severe pulmonary edema, and/or superimposed pulmonary infiltrates, slightly decreased since prior study.      DX-CHEST-PORTABLE (1 VIEW)   Final Result         1.  Extensive bilateral pulmonary parenchymal opacities compatible with ARDS, severe pulmonary edema, and/or superimposed pulmonary infiltrates, stable since prior study.      IR-MIDLINE CATHETER INSERTION WO GUIDANCE > AGE 3   Final Result                  Ultrasound-guided midline placement performed by qualified nursing staff    as above.          DX-CHEST-PORTABLE (1 VIEW)   Final Result         1.  Extensive bilateral pulmonary parenchymal opacities compatible with ARDS, severe pulmonary edema, and/or superimposed pulmonary infiltrates, stable since prior study.      DX-CHEST-PORTABLE (1 VIEW)   Final Result         1.  Pulmonary edema and/or infiltrates are identified, which are stable since the prior exam.      DX-CHEST-PORTABLE (1 VIEW)   Final Result         1.  Bilateral pulmonary infiltrates, stable since prior study   2.  Multiple bilateral pulmonary nodules.      DX-CHEST-PORTABLE (1 VIEW)   Final Result      1.  Innumerable pulmonary nodules bilaterally.      2.  Increasing confluence and airspace opacities in the perihilar regions suspicious for superimposed pneumonitis on diffuse metastatic disease      IR-PICC LINE PLACEMENT W/ GUIDANCE > AGE 5   Final Result                  Ultrasound-guided PICC placement performed by qualified nursing staff as    above.          DX-CHEST-PORTABLE (1 VIEW)   Final Result      1.  Innumerable bilateral pulmonary nodule, increased since 9/20/2023      2.  Differential diagnosis includes worsening metastases or superimposed  pneumonia.      EC-ECHOCARDIOGRAM COMPLETE W/O CONT   Final Result      CT-CTA CHEST PULMONARY ARTERY W/ RECONS   Final Result      1.  Minimal pulmonary embolism. Localized right lower lobe segmental and subsegmental branches.   2.  No right heart strain.   3.  9 mm lucent/lytic lesion T1 vertebral body. Suspect osseous metastatic deposit.   4.  Progression of extensive mediastinal adenopathy since 8/20/2023 consistent with metastatic disease.   5.  Extensive progression of diffuse bilateral innumerable pulmonary nodules consistent with metastatic disease.   6.  Interval development of extensive groundglass and airspace opacities consistent with superimposed pneumonia.   7.  Progression of right adrenal mass now measuring 43 mm x 38 mm consistent with metastasis.      Fleischner Society pulmonary nodule recommendations:      DX-CHEST-PORTABLE (1 VIEW)   Final Result      Diffuse bilateral interstitial and alveolar opacities most consistent with pneumonia or possibly noncardiogenic pulmonary edema.      DX-CHEST-LIMITED (1 VIEW)    (Results Pending)          Assessment/Plan  * Acute on chronic respiratory failure (HCC)- (present on admission)  Assessment & Plan  Multiple factors including aggressive metastatic undifferentiated sarcoma versus sarcomatoid lung cancer.   history of stage 1 lung adenocarcinoma s/p SBRT, pulmonary embolism, COPD/asthma, recent pneumonia.      Initially treated at the ICU, patient still on high flow oxygen  Finish course of antibiotics  downgraded to IMCU on 10/2/2023.      Completed course of antibiotics.   Patient still on high flow oxygen  Continue steroids, inhaler and nebulizers.  RT, continue weaning her from oxygen    Goals of care, counseling/discussion- (present on admission)  Assessment & Plan  Goals of care done last week extensively. Hospice recommended by oncology and ICU team.  Patient would like to try some form of therapy if possible and oncology offered an oral  chemotherapeutic regime. She elected to change to DNR/DNI.   Continue with palliative care consultation.     Sarcoma (HCC)- (present on admission)  Assessment & Plan  Advanced, aggressive, on oral chemotherapy currently    Pulmonary embolism on right (HCC)- (present on admission)  Assessment & Plan  Recently diagnosed.  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   Stated Capmatinib on 9/29/2023 by oncology team  Patient is DNR/DNI.  Palliative team on board, appreciate their help    Mass of sacrum- (present on admission)  Assessment & Plan  Aggressive metastatic undifferentiated sarcoma. On Capmatinib    Lung cancer (HCC)- (present on admission)  Assessment & Plan  Initially diagnosed stage I adenocarcinoma initially treated with chemoradiation.   Follows with Dr Newton (Onc)  Now with widespread metastatic sarcoma    Chronic pain due to neoplasm- (present on admission)  Assessment & Plan  Palliative care have been following for pain management.  Continue multimodal pain management, oxy, tylenol, lidocaine patches, gabapentin and IV pain medication for breakthrough.  Hospice following.    Cigarette nicotine dependence without complication- (present on admission)  Assessment & Plan  History of ongoing tobacco abuse  Nicotine patch if needed          VTE prophylaxis: Eliquis      Greater than 51 minutes spent prepping to see patient (e.g. review of tests) obtaining and/or reviewing separately obtained history. Performing a medically appropriate examination and/ evaluation.  Counseling and educating the patient/family/caregiver.  Ordering medications, tests, or procedures.  Referring and communicating with other health care professionals.  Documenting clinical information in EPIC.  Independently interpreting results and communicating results to  patient/family/caregiver.  Care coordination

## 2023-10-09 NOTE — CARE PLAN
Problem: Pain - Standard  Goal: Alleviation of pain or a reduction in pain to the patient’s comfort goal  Outcome: Progressing     Problem: Care Map:  Optimal Outcome for the Pneumonia Patient  Goal: Collection and monitoring of appropriate tests and labs  Outcome: Progressing     Problem: Respiratory  Goal: Patient will achieve/maintain optimum respiratory ventilation and gas exchange  Outcome: Progressing     Problem: Risk for Aspiration  Goal: Patient's risk for aspiration will be absent or decrease  Outcome: Progressing     Problem: Hemodynamics - Pneumonia  Goal: Patient's hemodynamics, fluid balance and neurologic status will be stable or improve  Outcome: Progressing     Problem: Self Care  Goal: Patient will have the ability to perform ADLs independently or with assistance (bathe, groom, dress, toilet and feed)  Outcome: Progressing     Problem: Discharge Planning - Pneumonia  Goal: Patient will verbalize understanding of lifestyle changes and therapeutic needs post discharge  Outcome: Progressing     Problem: Knowledge Deficit - Standard  Goal: Patient and family/care givers will demonstrate understanding of plan of care, disease process/condition, diagnostic tests and medications  Outcome: Progressing     Problem: Skin Integrity  Goal: Skin integrity is maintained or improved  Outcome: Progressing     Problem: Psychosocial  Goal: Patient's level of anxiety will decrease  Outcome: Progressing  Goal: Patient's ability to verbalize feelings about condition will improve  Outcome: Progressing  Goal: Patient's ability to re-evaluate and adapt role responsibilities will improve  Outcome: Progressing  Goal: Patient and family will demonstrate ability to cope with life altering diagnosis and/or procedure  Outcome: Progressing  Goal: Spiritual and cultural needs incorporated into hospitalization  Outcome: Progressing     Problem: Fall Risk  Goal: Patient will remain free from falls  Outcome: Progressing      Problem: Hemodynamics  Goal: Patient's hemodynamics, fluid balance and neurologic status will be stable or improve  Outcome: Progressing     Problem: Fluid Volume  Goal: Fluid volume balance will be maintained  Outcome: Progressing     Problem: Urinary - Renal Perfusion  Goal: Ability to achieve and maintain adequate renal perfusion and functioning will improve  Outcome: Progressing     Problem: Mechanical Ventilation  Goal: Safe management of artificial airway and ventilation  Outcome: Progressing  Goal: Successful weaning off mechanical ventilator, spontaneously maintains adequate gas exchange  Outcome: Progressing  Goal: Patient will be able to express needs and understand communication  Outcome: Progressing     Problem: Physical Regulation  Goal: Diagnostic test results will improve  Outcome: Progressing  Goal: Signs and symptoms of infection will decrease  Outcome: Progressing     Problem: Knowledge Deficit - COPD  Goal: Patient/significant other demonstrates understanding of disease process, utilization of the Action Plan, medications and discharge instruction  Outcome: Progressing     Problem: Risk for Infection - COPD  Goal: Patient will remain free from signs and symptoms of infection  Outcome: Progressing     Problem: Nutrition - Advanced  Goal: Patient will display progressive weight gain toward goal have adequate food and fluid intake  Outcome: Progressing     Problem: Ineffective Airway Clearance  Goal: Patient will maintain patent airway with clear/clearing breath sounds  Outcome: Progressing     Problem: Impaired Gas Exchange  Goal: Patient will demonstrate improved ventilation and adequate oxygenation and participate in treatment regimen within the level of ability/situation.  Outcome: Progressing   The patient is Unstable - High likelihood or risk of patient condition declining or worsening    Shift Goals  Clinical Goals: Monitor O2, VS monitored, pt. to remain calm, pain management  Patient  Goals: pain control, rest, visit with family  Family Goals: pain control    Progress made toward(s) clinical / shift goals:  Pt. Has high anxiety r/t her family, especially her daughter this am.  Pt.'s daughter was arguing with her, raised voices, upsetting pt., CNA in room to help, called security who escorted pt.'s daughter outside d/t conflict around 10am.  Pt. Has requested pain medication, refuses Oxy PO, will only accept IV Dilaudid d/t 8-10/10 pain.      Patient is not progressing towards the following goals:

## 2023-10-09 NOTE — PROGRESS NOTES
Hospital Medicine Daily Progress Note    Date of Service  10/9/2023    Chief Complaint  48-year-old female with history of sarcoma and lung cancer presented 9/20 with dyspnea.      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  On admission patient had tachycardia tachypnea, chest x-ray showed bilateral infiltration, CTA for chest showed right lower lobe subsegmental PE, worsening pulmonary nodules with T1 vertebral body lesion, possible pneumonia.  Procalcitonin was 0.11.Eliquis was started.  Also antibiotics for pneumonia Ceftriaxone and azithromycin were empirically started.  Echo was done and showed normal ejection fraction 65%, initially patient was treated ICU secondary to increasing oxygen requirement, patient was on high flow oxygen, treated with a steroid and inhalers.  With some improvement and patient was transferred out of the ICU.    Patient was evaluated by Dr. Newton oncologist who recommended to start chemotherapy in form of capmatinib on 9/29/2023.    Patient has metastatic sarcoma with lung cancer and significant anxiety, palliative care following for pain management.  Patient needed Precedex drip for anxiety.  Patient is on every 4 hours prn Ativan, every 2 hours prn IV Dilaudid, Norco 10/325 every 3 hours prn. Tylenol every 6 hours scheduled, holding when sleeping. Dexamethasone 4 mg BID.  CODE STATUS was discussed with the patient and agreed for DNR/DNI.        Interval Problem Update  -Evaluated examined the patient at bedside, patient still on high flow oxygen, not able to weaning her from oxygen, reviewed the CT scan with the patient and the metastasis to her lungs.    - Case was discussed with pulmonary team Dr. Jin, poor prognosis, continue  high flow and continue inhalers.  -Waiting for further recommendation from oncology team  -Palliative on board, appreciate their help.  -Patient has poor prognosis,  patient wants to keep fighting and she has refused hospice many times, patient might get benefit with conference with all doctors and family.      I have discussed this patient's plan of care and discharge plan at IDT rounds today with Case Management, Nursing, Nursing leadership, and other members of the IDT team.    Consultants/Specialty  oncology critical care, palliative care    Code Status  DNAR/DNI    Disposition  The patient is not medically cleared for discharge to home or a post-acute facility.  Anticipate discharge to: skilled nursing facility    I have placed the appropriate orders for post-discharge needs.    Review of Systems  Review of Systems   Constitutional:  Positive for malaise/fatigue. Negative for chills and fever.   HENT: Negative.     Eyes: Negative.    Respiratory:  Positive for cough and shortness of breath.    Cardiovascular: Negative.  Negative for chest pain, palpitations and leg swelling.   Gastrointestinal: Negative.  Negative for heartburn, nausea and vomiting.   Genitourinary: Negative.  Negative for dysuria and frequency.   Musculoskeletal:  Positive for myalgias. Negative for neck pain.   Skin: Negative.  Negative for itching and rash.   Neurological: Negative.  Negative for dizziness, focal weakness, weakness and headaches.   Endo/Heme/Allergies: Negative.  Negative for polydipsia. Does not bruise/bleed easily.   Psychiatric/Behavioral:  Negative for depression. The patient is nervous/anxious.         Physical Exam  Temp:  [36.5 °C (97.7 °F)-37.2 °C (99 °F)] 37.2 °C (99 °F)  Pulse:  [] 96  Resp:  [16-24] 21  BP: (114-147)/(72-93) 132/78  SpO2:  [94 %-98 %] 97 %    Physical Exam  Vitals and nursing note reviewed.   Constitutional:       Appearance: She is well-developed. She is ill-appearing. She is not  diaphoretic.   HENT:      Head: Normocephalic and atraumatic.      Nose: Nose normal.      Comments: High flow nasal cannula     Mouth/Throat:      Mouth: Mucous membranes are moist.   Eyes:      Conjunctiva/sclera: Conjunctivae normal.      Pupils: Pupils are equal, round, and reactive to light.   Neck:      Thyroid: No thyromegaly.      Vascular: No JVD.   Cardiovascular:      Rate and Rhythm: Normal rate and regular rhythm.      Heart sounds: Normal heart sounds.      No friction rub. No gallop.   Pulmonary:      Effort: Respiratory distress present.      Breath sounds: Rhonchi and rales present. No wheezing.   Abdominal:      General: Bowel sounds are normal. There is no distension.      Palpations: Abdomen is soft. There is no mass.      Tenderness: There is no abdominal tenderness. There is no guarding or rebound.   Musculoskeletal:         General: No tenderness. Normal range of motion.      Cervical back: Normal range of motion and neck supple.      Right lower leg: No edema.      Left lower leg: No edema.   Lymphadenopathy:      Cervical: No cervical adenopathy.   Skin:     General: Skin is warm and dry.   Neurological:      Mental Status: She is alert and oriented to person, place, and time. Mental status is at baseline.      Cranial Nerves: No cranial nerve deficit.      Motor: No weakness.   Psychiatric:         Behavior: Behavior normal.      Comments: Anxious         Fluids    Intake/Output Summary (Last 24 hours) at 10/9/2023 1355  Last data filed at 10/9/2023 0050  Gross per 24 hour   Intake --   Output 650 ml   Net -650 ml           Laboratory  Recent Labs     10/07/23  0038 10/08/23  0017 10/09/23  0003   WBC 14.8* 12.3* 12.8*   RBC 4.06* 4.11* 4.23   HEMOGLOBIN 10.8* 11.0* 10.9*   HEMATOCRIT 35.7* 36.1* 36.5*   MCV 87.9 87.8 86.3   MCH 26.6* 26.8* 25.8*   MCHC 30.3* 30.5* 29.9*   RDW 57.8* 58.5* 59.1*   PLATELETCT 104* 106* 103*   MPV 11.6 10.9 9.6     Recent Labs     10/07/23  0038 10/08/23  0017  10/09/23  0003   SODIUM 132* 135 132*   POTASSIUM 4.7 4.5 4.4   CHLORIDE 95* 97 97   CO2 30 30 27   GLUCOSE 151* 214* 205*   BUN 22 23* 21   CREATININE 0.65 0.64 0.59   CALCIUM 8.6 8.5 8.1*                     Imaging  DX-CHEST-LIMITED (1 VIEW)   Final Result      Stable bilateral pulmonary infiltrates.      DX-CHEST-LIMITED (1 VIEW)   Final Result         1.  Extensive bilateral pulmonary parenchymal opacities compatible with ARDS, severe pulmonary edema, and/or superimposed pulmonary infiltrates, slightly decreased since prior study.      DX-CHEST-PORTABLE (1 VIEW)   Final Result         1.  Extensive bilateral pulmonary parenchymal opacities compatible with ARDS, severe pulmonary edema, and/or superimposed pulmonary infiltrates, stable since prior study.      IR-MIDLINE CATHETER INSERTION WO GUIDANCE > AGE 3   Final Result                  Ultrasound-guided midline placement performed by qualified nursing staff    as above.          DX-CHEST-PORTABLE (1 VIEW)   Final Result         1.  Extensive bilateral pulmonary parenchymal opacities compatible with ARDS, severe pulmonary edema, and/or superimposed pulmonary infiltrates, stable since prior study.      DX-CHEST-PORTABLE (1 VIEW)   Final Result         1.  Pulmonary edema and/or infiltrates are identified, which are stable since the prior exam.      DX-CHEST-PORTABLE (1 VIEW)   Final Result         1.  Bilateral pulmonary infiltrates, stable since prior study   2.  Multiple bilateral pulmonary nodules.      DX-CHEST-PORTABLE (1 VIEW)   Final Result      1.  Innumerable pulmonary nodules bilaterally.      2.  Increasing confluence and airspace opacities in the perihilar regions suspicious for superimposed pneumonitis on diffuse metastatic disease      IR-PICC LINE PLACEMENT W/ GUIDANCE > AGE 5   Final Result                  Ultrasound-guided PICC placement performed by qualified nursing staff as    above.          DX-CHEST-PORTABLE (1 VIEW)   Final Result       1.  Innumerable bilateral pulmonary nodule, increased since 9/20/2023      2.  Differential diagnosis includes worsening metastases or superimposed pneumonia.      EC-ECHOCARDIOGRAM COMPLETE W/O CONT   Final Result      CT-CTA CHEST PULMONARY ARTERY W/ RECONS   Final Result      1.  Minimal pulmonary embolism. Localized right lower lobe segmental and subsegmental branches.   2.  No right heart strain.   3.  9 mm lucent/lytic lesion T1 vertebral body. Suspect osseous metastatic deposit.   4.  Progression of extensive mediastinal adenopathy since 8/20/2023 consistent with metastatic disease.   5.  Extensive progression of diffuse bilateral innumerable pulmonary nodules consistent with metastatic disease.   6.  Interval development of extensive groundglass and airspace opacities consistent with superimposed pneumonia.   7.  Progression of right adrenal mass now measuring 43 mm x 38 mm consistent with metastasis.      Fleischner Society pulmonary nodule recommendations:      DX-CHEST-PORTABLE (1 VIEW)   Final Result      Diffuse bilateral interstitial and alveolar opacities most consistent with pneumonia or possibly noncardiogenic pulmonary edema.      IR-US GUIDED PIV    (Results Pending)          Assessment/Plan  * Acute on chronic respiratory failure (HCC)- (present on admission)  Assessment & Plan  Multiple factors including aggressive metastatic undifferentiated sarcoma versus sarcomatoid lung cancer.   history of stage 1 lung adenocarcinoma s/p SBRT, pulmonary embolism, COPD/asthma, recent pneumonia.      Initially treated at the ICU, patient still on high flow oxygen  Finish course of antibiotics  downgraded to IMCU on 10/2/2023.      Completed course of antibiotics.   Patient still on high flow oxygen  Continue steroids, inhaler and nebulizers.  RT, continue weaning her from oxygen    Goals of care, counseling/discussion- (present on admission)  Assessment & Plan  Goals of care done last week extensively.  Hospice recommended by oncology and ICU team.  Patient would like to try some form of therapy if possible and oncology offered an oral chemotherapeutic regime. She elected to change to DNR/DNI.   Continue with palliative care consultation.     Sarcoma (HCC)- (present on admission)  Assessment & Plan  Advanced, aggressive, on oral chemotherapy currently    Pulmonary embolism on right (HCC)- (present on admission)  Assessment & Plan  Recently diagnosed.  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   Stated Capmatinib on 9/29/2023 by oncology team  Patient is DNR/DNI.  Palliative team on board, appreciate their help    Mass of sacrum- (present on admission)  Assessment & Plan  Aggressive metastatic undifferentiated sarcoma. On Capmatinib    Lung cancer (HCC)- (present on admission)  Assessment & Plan  Initially diagnosed stage I adenocarcinoma initially treated with chemoradiation.   Follows with Dr Newton (Onc)  Now with widespread metastatic sarcoma    Chronic pain due to neoplasm- (present on admission)  Assessment & Plan  Palliative care have been following for pain management.  Continue multimodal pain management, oxy, tylenol, lidocaine patches, gabapentin and IV pain medication for breakthrough.  Hospice following.    Cigarette nicotine dependence without complication- (present on admission)  Assessment & Plan  History of ongoing tobacco abuse  Nicotine patch if needed          VTE prophylaxis: Eliquis      Greater than 51 minutes spent prepping to see patient (e.g. review of tests) obtaining and/or reviewing separately obtained history. Performing a medically appropriate examination and/ evaluation.  Counseling and educating the patient/family/caregiver.  Ordering medications, tests, or procedures.  Referring and communicating with other  health care professionals.  Documenting clinical information in EPIC.  Independently interpreting results and communicating results to patient/family/caregiver.  Care coordination

## 2023-10-09 NOTE — PROGRESS NOTES
"Palliative Care   Attempted to see patient she is yelling across the room that she does not want to see me although she likes me I am in \"vampire… And tell her that she is going to die\".  Hospital team informed.    Thank you for allowing Palliative Care in support of this patient and family. Please contact  with any changes in status, questions, or concerns.     Kay Christensen, MSN, APRN, ACNPC-AG.  Palliative Care Nurse Practitioner  [phone]             "

## 2023-10-09 NOTE — CONSULTS
Palliative Care   Consult received, patient currently being followed by palliative care, will prioritize patient to be seen today.     Thank you for allowing Palliative Care in support of this patient and family. Please contact  with any changes in status, questions, or concerns.     Kay Christensen, MSN, APRN, ACN-AG.  Palliative Care Nurse Practitioner  [phone]

## 2023-10-09 NOTE — PROGRESS NOTES
Consult Note: Hematology/Oncology     Primary Care:  Checo Baker M.D.    Chief Complaint   Patient presents with    Shortness of Breath     PT hx lung ca that's metastasized. Was at another appt and was SOB. RA while walking in 70's per EMS. Currently on 6L NC         Current Treatment:     9/29/23: Capmatinib    Prior Treatment:     4/17/2023-4/21/2023: RLL Lung SBRT  7/19/23-7/24/2023: R Sacrum SBRT      Subjective:   History of Presenting Illness:    Melly Hendrickson is a 48 y.o. 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    Patient history dates back to July 2022 when she had a chest x-ray performed that revealed a potential mass in her right lung.  This led to a chest CT performed on February 13, 2023.  This demonstrated 1.4 cm mass in the right lower lobe and a 1.9 groundglass opacity also in the right lower lobe.  The groundglass opacity appeared stable from previous scans but the mass was thought to be new.      A biopsy was performed and confirmed lung adenocarcinoma.    Patient saw thoracic surgery and obtained PFTs as well as a PET scan.  She was discussed at tumor board and given her significant dyspnea and her underlying COPD it was thought that she would be a better candidate for SBRT rather than surgical resection.    April 17, 2023 patient began SBRT.  Completed this treatment without many issues.    After this patient did notice a small lump in her breast reduction scar line.  She said that over the next few days it grew and she ultimately went to her PCP    PCP tried to drain the mass; however it just bled significantly during the I&D attempt.  Thus she was sent to Dr. Becerra at [facility] for excision.     Pathology from this excision demonstrated poorly differentiated malignancy favoring carcinoma without obvious involvement of underlying skin and nonspecific IHC profile.  Based on  his diagnosis cancer type ID was sent off for.  Results showed 90% probability of sarcoma    Over the past several months she has noted severe pain in her sacral region.  She does have sacral lesions that are concerning for metastatic disease.  Patient has biopsy of sacral mass on 7/18/2023.     Of note patient has 5 children and 4 grandchildren.    She follows with pain management    I initially saw patient on 7/14.  At our 7/21, we had information back from biopsy and we discussed AIM.  Patient wished to hold off and think about this further.    On 7/28, we discussed initiating AIM, patient was not comfortable and wanted 2 opinion at [place].    I saw patient again on 8/11.  We discussed AIM,  patient not ready and awaiting [place] appointment.     She was recently admitted to the ER for hemoptysis on 8/20/23 and was found to have a PE.  She was still having hemoptysis and thus was not started on AC. She has discharged on 8/24/23.     I saw her subsequently on 9/7/23 and recommended AC given no hemoptysis, but active PE with cancer.  We also discussed chemo at this visit and she wished to wait.     During my visit with the patient on 9/19/23, Patient was endorsing 3 nodules on the R arm.  1 on the clavicle.  Patient also had a nodule on her L arm. Patient had 1 nodule on her bikini line. She has another on the inside of her L thigh. These started 1 week ago. She was on 2-3L of oxygen for the past several days.  We discussed intiating chemo and plan was to start on 9/26/23.  Pt requires a direct admit for AIM    Since that visit she had worseing SOB.  She was subsequently presented to the ER for SOB/cough and potential PNA. On admission, she was tachycardic, tachypneic, and afebrile.  Chest x-ray showed diffuse bilateral interstitial and alveolar opacities consistent with pneumonia. CTA chest on admission showed right subsegmental PE, worsening pulmonary nodules, T1 vertebral body region, possible pneumonia.   Procalcitonin was 0.11.  Ceftriaxone and azithromycin were empirically started.    Line was not able to be placed unable to start chemo.  Patients respiratory status declined and she was transferred to the ICU    Patient started on Capmatinib 9/29/23    Patient reports that she slowly is improving symptomatically.  She is still short of breath when worked up and has been dealing unfortunately with some social situations and family issues.        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    Chronic obstructive pulmonary disease (HCC)     COPD (chronic obstructive pulmonary disease) (HCC)     DDD (degenerative disc disease), lumbar     Depression     Emphysema of lung (HCC)     MRSA (methicillin resistant staph aureus) culture positive     Pain     back - cervical, scapula, lower    Pericarditis     Primary adenocarcinoma of lower lobe of right lung ([facility])     Psychiatric disorder     Anxiety, Depression    Sleep apnea     Snoring     Urinary incontinence         Past Surgical History:   Procedure Laterality Date    CA BRONCHOSCOPY,DIAGNOSTIC N/A 8/22/2023    Procedure: BRONCHOSCOPY;  Surgeon: Sandra Lopez D.O.;  Location: [facility];  Service: Pulmonary    CA EXC SKIN BENIG >4CM TRUNK,ARM,LEG Left 6/7/2023    Procedure: EXCISION OF SOFT TISSUE MASS LEFT CHEST WALL;  Surgeon: Nasir Becerra M.D.;  Location: [facility];  Service: General    CA BRONCHOSCOPY,DIAGNOSTIC N/A 02/14/2023    Procedure: FIBER OPTIC BRONCHOSCOPY WASH, BRUSH, BRONCHOALVEOLAR LAVAGE, BIOPSY AND FINE NEEDLE ASPIRATION AND NAVIGATION, ROBOTICS;  Surgeon: Guicho Ellis M.D.;  Location: [facility];  Service: Pulmonary Robotic    ENDOBRONCHIAL US ADD-ON N/A 02/14/2023    Procedure: ENDOBRONCHIAL ULTRASOUND (EBUS);  Surgeon: Guicho Ellis M.D.;   Location: SURGERY [facility];  Service: Pulmonary Robotic    MAMMOPLASTY REDUCTION Bilateral     SEPTOPLASTY      TUBE & ECTOPIC PREG., REMOVAL      x 2       Social History     Tobacco Use    Smoking status: Some Days     Current packs/day: 0.50     Average packs/day: 0.5 packs/day for 20.0 years (10.0 ttl pk-yrs)     Types: Cigarettes     Passive exposure: Past    Smokeless tobacco: Never    Tobacco comments:     on and off    Vaping Use    Vaping Use: Never used   Substance Use Topics    Alcohol use: Not Currently     Comment: occ, rare    Drug use: Not Currently     Types: Marijuana     Comment: THC gummies        Family History   Problem Relation Age of Onset    Diabetes Mother     Cancer Father         Prostate    Cancer Paternal Grandmother         Cervical    Cancer Paternal Grandfather         Unk       Allergies   Allergen Reactions    Penicillins Hives, Rash and Swelling     Pt reports that she gets swelling in throat and face, rash all over face and arms.   Tolerated cefazolin    Aspirin Rash and Hives     Pt reports that she received a rash on face, pt reports it ok if she takes NORCO    Other Environmental Shortness of Breath     Perfumes, dander, scents       Current Facility-Administered Medications   Medication Dose Route Frequency Provider Last Rate Last Admin    levalbuterol (Xopenex) 1.25 MG/3ML nebulizer solution 1.25 mg  1.25 mg Nebulization Q2HRS PRN (RT) Oksana Mas M.D.        albuterol inhaler 2 Puff  2 Puff Inhalation Q4HRS PRN Oksana Mas M.D.   2 Puff at 10/09/23 1039    Respiratory Therapy Consult   Nebulization Continuous RT Oksana Mas M.D.        tiotropium (Spiriva Respimat) 2.5 mcg/Act inhalation spray 5 mcg  5 mcg Inhalation QDAILY (RT) Oksana Mas M.D.   5 mcg at 10/08/23 1127    mometasone-formoterol (Dulera) 100-5 MCG/ACT inhaler 2 Puff  2 Puff Inhalation BID (RT) Oksana Mas M.D.   2 Puff at 10/09/23 0641    ipratropium-albuterol (DUONEB)  nebulizer solution  3 mL Nebulization Q4HRS (RT) Oksana Mas M.D.   3 mL at 10/09/23 1355    ipratropium-albuterol (DUONEB) nebulizer solution  3 mL Nebulization Q2HRS PRN (RT) Oksana Mas M.D.        melatonin tablet 10 mg  10 mg Oral HS PRN Dex Madrid M.D.        morphine ER (Ms Contin) tablet 15 mg  15 mg Oral Q12HRS Dex Madrid M.D.   15 mg at 10/06/23 0945    clotrimazole (Mycelex) suzette 10 mg  10 mg Oral 5X/DAY Dex Madrid M.D.   10 mg at 10/09/23 1500    busPIRone (Buspar) tablet 15 mg  15 mg Oral TID Dex Madrid M.D.   15 mg at 10/09/23 1332    oxyCODONE immediate-release (Roxicodone) tablet 5 mg  5 mg Oral Q4HRS PRN Gerson Adam M.D.        Or    oxyCODONE immediate release (Roxicodone) tablet 10 mg  10 mg Oral Q4HRS PRN Gerson Adam M.D.   10 mg at 10/09/23 1125    acetaminophen (Tylenol) tablet 650 mg  650 mg Oral Q6HRS Gerson Adam M.D.   650 mg at 10/09/23 1331    LORazepam (Ativan) injection 0.5 mg  0.5 mg Intravenous Q4HRS PRN Tonny Edmond M.D.   0.5 mg at 10/09/23 1025    lidocaine (Lidoderm) 5 % 3 Patch  3 Patch Transdermal Q24HR Tonny Edmond M.D.   2 Patch at 10/04/23 0532    dexamethasone (Decadron) injection 4 mg  4 mg Intravenous Q6HRS Tonny Edmond M.D.   4 mg at 10/09/23 1534    sodium chloride (Ocean) 0.65 % nasal spray 2 Spray  2 Spray Nasal Q2HRS PRN Tonny Edmond M.D.        HYDROmorphone (Dilaudid) injection 1 mg  1 mg Intravenous Q HOUR PRN Madhu Simpson P.A.-C.   1 mg at 10/09/23 1529    capmatinib (Tabrecta) tablet 400 mg  400 mg Oral BID Alma Newton M.D.   400 mg at 10/09/23 0525    guaiFENesin ER (Mucinex) tablet 600 mg  600 mg Oral Q12HRS Naima Ambrosio, A.P.R.N.   600 mg at 10/09/23 0522    diphenhydrAMINE (Benadryl) injection 25 mg  25 mg Intravenous Q6HRS PRN Kay Christensen A.P.R.N.   25 mg at 10/08/23 0526    diclofenac sodium (Voltaren) 1 % gel 2 g  2 g Topical 4X/DAY PRN  Madhu Merino D.O.        loratadine (Claritin) tablet 10 mg  10 mg Oral DAILY SANJU GarciaOEstrellita   10 mg at 10/09/23 0522    senna-docusate (Pericolace Or Senokot S) 8.6-50 MG per tablet 2 Tablet  2 Tablet Oral BID SANJU GarciaOEstrellita   2 Tablet at 10/08/23 0442    And    polyethylene glycol/lytes (Miralax) PACKET 1 Packet  1 Packet Oral QDAY PRN SANJU GarciaOEstrellita   1 Packet at 09/23/23 1141    And    magnesium hydroxide (Milk Of Magnesia) suspension 30 mL  30 mL Oral QDAY PRN Madhu Merino D.O.   30 mL at 09/24/23 0939    And    bisacodyl (Dulcolax) suppository 10 mg  10 mg Rectal QDAY PRN SANJU GarciaOEstrellita   10 mg at 09/28/23 1300    acetaminophen (Tylenol) tablet 650 mg  650 mg Oral Q6HRS PRN Madhu Merino D.O.        hydrALAZINE (Apresoline) injection 10 mg  10 mg Intravenous Q4HRS PRN SANJU GarciaOEstrellita   10 mg at 09/26/23 1010    ondansetron (Zofran) syringe/vial injection 4 mg  4 mg Intravenous Q4HRS PRN SANJU GarciaOEstrellita   4 mg at 10/04/23 2150    ondansetron (Zofran ODT) dispertab 4 mg  4 mg Oral Q4HRS PRN Madhu Merino D.O.        promethazine (Phenergan) tablet 12.5-25 mg  12.5-25 mg Oral Q4HRS PRN Madhu Merino D.O.        promethazine (Phenergan) suppository 12.5-25 mg  12.5-25 mg Rectal Q4HRS PRN Madhu Merino D.O.        prochlorperazine (Compazine) injection 5-10 mg  5-10 mg Intravenous Q4HRS PRN Madhu Merino D.O.        guaiFENesin dextromethorphan (Robitussin DM) 100-10 MG/5ML syrup 10 mL  10 mL Oral Q6HRS PRN SANJU GarciaOEstrellita   10 mL at 09/30/23 1802    apixaban (Eliquis) tablet 5 mg  5 mg Oral BID Madhu Merino D.O.   5 mg at 10/09/23 0523       Review of Systems   Constitutional:  Positive for malaise/fatigue. Negative for chills, fever and weight loss.   HENT:  Negative for congestion, ear pain, nosebleeds and sore throat.    Eyes:  Negative for blurred vision.   Respiratory:  Negative for cough, sputum production, shortness of breath and wheezing.     Cardiovascular:  Negative for chest pain, palpitations, orthopnea and leg swelling.   Gastrointestinal:  Negative for abdominal pain, heartburn, nausea and vomiting.   Genitourinary:  Negative for dysuria, frequency and urgency.   Musculoskeletal:  Positive for back pain and joint pain. Negative for neck pain.   Neurological:  Negative for dizziness, tingling, tremors, sensory change, focal weakness and headaches.   Endo/Heme/Allergies:  Does not bruise/bleed easily.   Psychiatric/Behavioral:  Negative for depression, memory loss and suicidal ideas.    All other systems reviewed and are negative.      Problem list, medications, and allergies reviewed by myself today in Epic.     Objective:     Vitals:    10/09/23 0737 10/09/23 1006 10/09/23 1147 10/09/23 1355   BP: 131/78  132/78    Pulse: (!) 101 99 96 94   Resp: 19 20 (!) 21 20   Temp: 36.6 °C (97.9 °F)  37.2 °C (99 °F)    TempSrc: Temporal  Temporal    SpO2: 98% 97% 97% 94%   Weight:       Height:             DESC; KARNOFSKY SCALE WITH ECOG EQUIVALENT: 90, Able to carry on normal activity; minor signs or symptoms of disease (ECOG equivalent 0)    DISTRESS LEVEL: no acute distress    Physical Exam  Deferred    Labs:   Most recent labs reviewed.  Please see the lab tab of chart review    Imaging:   Most recent images below have been independently reviewed by me.  Please see the imaging tab of chart review    9/22/2023 : Echocardiogram showed LVEF ~ 65% with no reported abnormalities    7/27/23: PET   1.  Focal uptake in the medial RIGHT lower lobe of lung abutting the thoracic spine, consistent with tumor..  2.  No PET correlate for ill-defined spiculated nodule in the RIGHT lower lobe.  3.  Large hypermetabolic mass corresponds to lytic lesion in the RIGHT sacrum consistent with malignancy.  4.  Intramuscular focal uptake at the posterior LEFT shoulder, likely metastatic.    Pathology:    7/14/2023: Pathology of Chest wall  A. Chest wall mass:           Metastatic poorly differentiated malignancy favoring carcinoma           without obvious involvement of overlying skin and a           non-specific IHC profile of some keratins positive (see           microscopic description).          See comment.   Main Cancer Type: Sarcoma   Probability: 90%     Subtype: Undifferentiated Sarcoma ([facility])   Probability: 90%     7/19/2023: Path of the Sacral Bone  A. Sacral bone biopsy:          Bone cores effaced by a high-grade malignancy histologically           identical to the previously excised chest wall mass           ([id number]).     2/14/2023: Pathology of Lung Mass  A. Lung, right lower lobe fine needle aspiration slides:          Positive for carcinoma.   B. Core, right lower lobe lung:          Moderately differentiated lung adenocarcinoma.   C. Lung, right lower lobe biopsy core and touchprep slides:          Moderately differentiated lung adenocarcinoma.   D. Bronchoalveolar lavage right lower lobe:          Rare atypical cells, malignant cells vs. reactive bronchial           cells.     Assessment/Plan:      Cancer Staging   Primary undifferentiated sarcoma of soft tissue (HCC)  Staging form: Soft Tissue Sarcoma of the Trunk and Extremities, AJCC 8th Edition  - Clinical: Stage IV (cTX, cN0, cM1) - Signed by Alma Newton M.D. on 9/19/2023         Ms. 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    Clinically, she is slightly improved but still acutely sick and in critical condition.  She is now on the CNU.    Patient to continue capmatinib    Wean oxygen as tolerated    All questions and concerns addressed today.  I will continue to follow    Alma Newton M.D.  Hematology/Oncology
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
## 2023-10-10 NOTE — PROGRESS NOTES
"Pt. Had a new US guided IV placed this afternoon, PIV site at left FA infiltrated.  Pt. Had a lot of family and  in today, pt. Voiced that she believes her \"god is bigger than this\" r/t her status, after Palliative RN Kay arrived to talk to her, pt. Stopped her at the doorway and told her that she was not going to talk to her.  Pt. Remained more calm today, only desatted to mid 80's when upto BSC, it was comforting to her RN at her side.    "

## 2023-10-10 NOTE — CARE PLAN
The patient is Watcher - Medium risk of patient condition declining or worsening    Shift Goals  Clinical Goals: Safety, pain managment, anxiety control, O2 titration  Patient Goals: Pain management, rest  Family Goals: pain control    Progress made toward(s) clinical / shift goals:    Problem: Pain - Standard  Goal: Alleviation of pain or a reduction in pain to the patient’s comfort goal  Outcome: Progressing  Note: Pain assessed using 0-10 verbal pain scale, PRN pain medications administered per MAR.      Problem: Respiratory  Goal: Patient will achieve/maintain optimum respiratory ventilation and gas exchange  Outcome: Progressing  Note: Pt remains on high flow nasal cannula, pt request PRN inhaler appropriately.      Problem: Knowledge Deficit - Standard  Goal: Patient and family/care givers will demonstrate understanding of plan of care, disease process/condition, diagnostic tests and medications  Outcome: Progressing  Note: Pt updated on plan of care, pt states understanding for plan of titration of O2.      Problem: Fall Risk  Goal: Patient will remain free from falls  Outcome: Progressing  Note: Bed alarm on, bed locked and in lowest position, personal belongings within reach, call light within reach, nonslip socks on, pt room close to nursing station.        Patient is not progressing towards the following goals: N/A

## 2023-10-10 NOTE — CARE PLAN
Problem: Pain - Standard  Goal: Alleviation of pain or a reduction in pain to the patient’s comfort goal  Outcome: Progressing     Problem: Care Map:  Optimal Outcome for the Pneumonia Patient  Goal: Collection and monitoring of appropriate tests and labs  Outcome: Progressing     Problem: Respiratory  Goal: Patient will achieve/maintain optimum respiratory ventilation and gas exchange  Outcome: Progressing     Problem: Risk for Aspiration  Goal: Patient's risk for aspiration will be absent or decrease  Outcome: Progressing     Problem: Hemodynamics - Pneumonia  Goal: Patient's hemodynamics, fluid balance and neurologic status will be stable or improve  Outcome: Progressing     Problem: Self Care  Goal: Patient will have the ability to perform ADLs independently or with assistance (bathe, groom, dress, toilet and feed)  Outcome: Progressing     Problem: Discharge Planning - Pneumonia  Goal: Patient will verbalize understanding of lifestyle changes and therapeutic needs post discharge  Outcome: Progressing     Problem: Knowledge Deficit - Standard  Goal: Patient and family/care givers will demonstrate understanding of plan of care, disease process/condition, diagnostic tests and medications  Outcome: Progressing     Problem: Skin Integrity  Goal: Skin integrity is maintained or improved  Outcome: Progressing     Problem: Psychosocial  Goal: Patient's level of anxiety will decrease  Outcome: Progressing  Goal: Patient's ability to verbalize feelings about condition will improve  Outcome: Progressing  Goal: Patient's ability to re-evaluate and adapt role responsibilities will improve  Outcome: Progressing  Goal: Patient and family will demonstrate ability to cope with life altering diagnosis and/or procedure  Outcome: Progressing  Goal: Spiritual and cultural needs incorporated into hospitalization  Outcome: Progressing     Problem: Fall Risk  Goal: Patient will remain free from falls  Outcome: Progressing      Problem: Hemodynamics  Goal: Patient's hemodynamics, fluid balance and neurologic status will be stable or improve  Outcome: Progressing     Problem: Fluid Volume  Goal: Fluid volume balance will be maintained  Outcome: Progressing     Problem: Urinary - Renal Perfusion  Goal: Ability to achieve and maintain adequate renal perfusion and functioning will improve  Outcome: Progressing     Problem: Mechanical Ventilation  Goal: Safe management of artificial airway and ventilation  Outcome: Progressing  Goal: Successful weaning off mechanical ventilator, spontaneously maintains adequate gas exchange  Outcome: Progressing  Goal: Patient will be able to express needs and understand communication  Outcome: Progressing     Problem: Physical Regulation  Goal: Diagnostic test results will improve  Outcome: Progressing  Goal: Signs and symptoms of infection will decrease  Outcome: Progressing     Problem: Knowledge Deficit - COPD  Goal: Patient/significant other demonstrates understanding of disease process, utilization of the Action Plan, medications and discharge instruction  Outcome: Progressing     Problem: Risk for Infection - COPD  Goal: Patient will remain free from signs and symptoms of infection  Outcome: Progressing     Problem: Nutrition - Advanced  Goal: Patient will display progressive weight gain toward goal have adequate food and fluid intake  Outcome: Progressing     Problem: Ineffective Airway Clearance  Goal: Patient will maintain patent airway with clear/clearing breath sounds  Outcome: Progressing     Problem: Impaired Gas Exchange  Goal: Patient will demonstrate improved ventilation and adequate oxygenation and participate in treatment regimen within the level of ability/situation.  Outcome: Progressing   The patient is Unstable - High likelihood or risk of patient condition declining or worsening    Shift Goals  Clinical Goals: Monitor Sats/VS, pain management, pt. to remain calm  Patient Goals: pain  "control, remain calm, reduce SOB  Family Goals: pain control    Progress made toward(s) clinical / shift goals:  Pt. Had a new US guided IV placed this afternoon, PIV site at left FA infiltrated.  Pt. Had a lot of family and  in today, pt. Voiced that she believes her \"god is bigger than this\" r/t her status, after Palliative RN Kay arrived to talk to her, pt. Stopped her at the doorway and told her that she was not going to talk to her.  Pt. Remained more calm today, only desatted to mid 80's when upto BSC, it was comforting to her RN at her side.      Patient is not progressing towards the following goals:      "

## 2023-10-10 NOTE — DIETARY
"Nutrition Update:  Day 20 of admit.  Melly Hendrickson is a 48 y.o. female with being followed to optimize nutrition.    Met with patient at bedside.  She stated \"my appetite is fine!\"  She stated she has not started drinking the Ensure supplements because she hadn't wrapped her brain around needing those yet.  She added, \"I'll get there.\"  She did request 7 puddings and ice cream with meals.  She was eating pudding during interview and requested another.  Encouraged her to drink at least one supplement per day for protein and vitamins.  Decadron could be increasing appetite.    Current Diet: Regular, with supplement order in place.  Eating % of recent meals per ADL flowsheet.    Weight down from admit by approximately 6 kg, but she had been receiving Lasix per MAR.    Nutrition Representative will continue to see her for ongoing meal and snack preferences.  RD following per department policy.      "

## 2023-10-10 NOTE — PROGRESS NOTES
Consult Note: Hematology/Oncology     Primary Care:  Checo Baker M.D.    Chief Complaint   Patient presents with    Shortness of Breath     PT hx lung ca that's metastasized. Was at another appt and was SOB. RA while walking in 70's per EMS. Currently on 6L NC         Current Treatment:     9/29/23: Capmatinib    Prior Treatment:     4/17/2023-4/21/2023: RLL Lung SBRT  7/19/23-7/24/2023: R Sacrum SBRT      Subjective:   History of Presenting Illness:    Melly Hendrickson is a 48 y.o. 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    Patient history dates back to July 2022 when she had a chest x-ray performed that revealed a potential mass in her right lung.  This led to a chest CT performed on February 13, 2023.  This demonstrated 1.4 cm mass in the right lower lobe and a 1.9 groundglass opacity also in the right lower lobe.  The groundglass opacity appeared stable from previous scans but the mass was thought to be new.      A biopsy was performed and confirmed lung adenocarcinoma.    Patient saw thoracic surgery and obtained PFTs as well as a PET scan.  She was discussed at tumor board and given her significant dyspnea and her underlying COPD it was thought that she would be a better candidate for SBRT rather than surgical resection.    April 17, 2023 patient began SBRT.  Completed this treatment without many issues.    After this patient did notice a small lump in her breast reduction scar line.  She said that over the next few days it grew and she ultimately went to her PCP    PCP tried to drain the mass; however it just bled significantly during the I&D attempt.  Thus she was sent to Dr. Becerra at [facility] for excision.     Pathology from this excision demonstrated poorly differentiated malignancy favoring carcinoma without obvious involvement of underlying skin and nonspecific IHC profile.  Based on  his diagnosis cancer type ID was sent off for.  Results showed 90% probability of sarcoma    Over the past several months she has noted severe pain in her sacral region.  She does have sacral lesions that are concerning for metastatic disease.  Patient has biopsy of sacral mass on 7/18/2023.     Of note patient has 5 children and 4 grandchildren.    She follows with pain management    I initially saw patient on 7/14.  At our 7/21, we had information back from biopsy and we discussed AIM.  Patient wished to hold off and think about this further.    On 7/28, we discussed initiating AIM, patient was not comfortable and wanted 2 opinion at [place].    I saw patient again on 8/11.  We discussed AIM,  patient not ready and awaiting [place] appointment.     She was recently admitted to the ER for hemoptysis on 8/20/23 and was found to have a PE.  She was still having hemoptysis and thus was not started on AC. She has discharged on 8/24/23.     I saw her subsequently on 9/7/23 and recommended AC given no hemoptysis, but active PE with cancer.  We also discussed chemo at this visit and she wished to wait.     During my visit with the patient on 9/19/23, Patient was endorsing 3 nodules on the R arm.  1 on the clavicle.  Patient also had a nodule on her L arm. Patient had 1 nodule on her bikini line. She has another on the inside of her L thigh. These started 1 week ago. She was on 2-3L of oxygen for the past several days.  We discussed intiating chemo and plan was to start on 9/26/23.  Pt requires a direct admit for AIM    Since that visit she had worseing SOB.  She was subsequently presented to the ER for SOB/cough and potential PNA. On admission, she was tachycardic, tachypneic, and afebrile.  Chest x-ray showed diffuse bilateral interstitial and alveolar opacities consistent with pneumonia. CTA chest on admission showed right subsegmental PE, worsening pulmonary nodules, T1 vertebral body region, possible pneumonia.   Procalcitonin was 0.11.  Ceftriaxone and azithromycin were empirically started.    Line was not able to be placed unable to start chemo.  Patients respiratory status declined and she was transferred to the ICU    Patient started on Capmatinib 9/29/23    Patient reports that she is doing well and relatively stable from when I saw her yesterday.  Her only been complaining of some new mouth sores.      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    Chronic obstructive pulmonary disease (HCC)     COPD (chronic obstructive pulmonary disease) (HCC)     DDD (degenerative disc disease), lumbar     Depression     Emphysema of lung (HCC)     MRSA (methicillin resistant staph aureus) culture positive     Pain     back - cervical, scapula, lower    Pericarditis     Primary adenocarcinoma of lower lobe of right lung (HCC)     Psychiatric disorder     Anxiety, Depression    Sleep apnea     Snoring     Urinary incontinence         Past Surgical History:   Procedure Laterality Date    AK BRONCHOSCOPY,DIAGNOSTIC N/A 8/22/2023    Procedure: BRONCHOSCOPY;  Surgeon: Sandra Lopez D.O.;  Location: [facility];  Service: Pulmonary    AK EXC SKIN BENIG >4CM TRUNK,ARM,LEG Left 6/7/2023    Procedure: EXCISION OF SOFT TISSUE MASS LEFT CHEST WALL;  Surgeon: Nasir Becerra M.D.;  Location: [facility];  Service: General    AK BRONCHOSCOPY,DIAGNOSTIC N/A 02/14/2023    Procedure: FIBER OPTIC BRONCHOSCOPY WASH, BRUSH, BRONCHOALVEOLAR LAVAGE, BIOPSY AND FINE NEEDLE ASPIRATION AND NAVIGATION, ROBOTICS;  Surgeon: Guicho Ellis M.D.;  Location: [facility];  Service: Pulmonary Robotic    ENDOBRONCHIAL US ADD-ON N/A 02/14/2023    Procedure: ENDOBRONCHIAL ULTRASOUND (EBUS);  Surgeon: Guicho Ellis M.D.;  Location: [facility];  Service:  Pulmonary Robotic    MAMMOPLASTY REDUCTION Bilateral     SEPTOPLASTY      TUBE & ECTOPIC PREG., REMOVAL      x 2       Social History     Tobacco Use    Smoking status: Some Days     Current packs/day: 0.50     Average packs/day: 0.5 packs/day for 20.0 years (10.0 ttl pk-yrs)     Types: Cigarettes     Passive exposure: Past    Smokeless tobacco: Never    Tobacco comments:     on and off    Vaping Use    Vaping Use: Never used   Substance Use Topics    Alcohol use: Not Currently     Comment: occ, rare    Drug use: Not Currently     Types: Marijuana     Comment: THC gummies        Family History   Problem Relation Age of Onset    Diabetes Mother     Cancer Father         Prostate    Cancer Paternal Grandmother         Cervical    Cancer Paternal Grandfather         Unk       Allergies   Allergen Reactions    Penicillins Hives, Rash and Swelling     Pt reports that she gets swelling in throat and face, rash all over face and arms.   Tolerated cefazolin    Aspirin Rash and Hives     Pt reports that she received a rash on face, pt reports it ok if she takes NORCO    Other Environmental Shortness of Breath     Perfumes, dander, scents       Current Facility-Administered Medications   Medication Dose Route Frequency Provider Last Rate Last Admin    nystatin-tetracycline-prednisone-diphenhydramine (Miracle Mouth Wash) oral susp 5 mL  5 mL Oral Q6HRS PRN Naima Ambrosio, ARTHUR.P.R.N.   5 mL at 10/10/23 0514    ipratropium-albuterol (DUONEB) nebulizer solution  3 mL Nebulization 4X/DAY kOsana Mas M.D.   3 mL at 10/10/23 1031    levalbuterol (Xopenex) 1.25 MG/3ML nebulizer solution 1.25 mg  1.25 mg Nebulization Q2HRS PRN (RT) Oksana Mas M.D.        albuterol inhaler 2 Puff  2 Puff Inhalation Q4HRS PRN Oksana Mas M.D.   2 Puff at 10/10/23 1123    mometasone-formoterol (Dulera) 100-5 MCG/ACT inhaler 2 Puff  2 Puff Inhalation BID Oksana Mas M.D.   2 Puff at 10/10/23 0510    Respiratory Therapy  Consult   Nebulization Continuous RT Oksana Mas M.D.        tiotropium (Spiriva Respimat) 2.5 mcg/Act inhalation spray 5 mcg  5 mcg Inhalation QDAILY (RT) Oksana Mas M.D.   5 mcg at 10/08/23 1127    ipratropium-albuterol (DUONEB) nebulizer solution  3 mL Nebulization Q2HRS PRN (RT) Oksana Mas M.D.        melatonin tablet 10 mg  10 mg Oral HS PRN Dex Madrid M.D.        morphine ER (Ms Contin) tablet 15 mg  15 mg Oral Q12HRS Dex Madrid M.D.   15 mg at 10/06/23 0945    busPIRone (Buspar) tablet 15 mg  15 mg Oral TID Dex Madrid M.D.   15 mg at 10/10/23 1113    oxyCODONE immediate-release (Roxicodone) tablet 5 mg  5 mg Oral Q4HRS PRN Gerson Adam M.D.        Or    oxyCODONE immediate release (Roxicodone) tablet 10 mg  10 mg Oral Q4HRS PRN Gerson Adam M.D.   10 mg at 10/09/23 1125    acetaminophen (Tylenol) tablet 650 mg  650 mg Oral Q6HRS Gerson Adam M.D.   650 mg at 10/10/23 1113    LORazepam (Ativan) injection 0.5 mg  0.5 mg Intravenous Q4HRS PRN Tonny Edmond M.D.   0.5 mg at 10/10/23 0831    lidocaine (Lidoderm) 5 % 3 Patch  3 Patch Transdermal Q24HR Tonny Edmond M.D.   2 Patch at 10/04/23 0532    dexamethasone (Decadron) injection 4 mg  4 mg Intravenous Q6HRS Tonny Edmond M.D.   4 mg at 10/10/23 1113    sodium chloride (Ocean) 0.65 % nasal spray 2 Spray  2 Spray Nasal Q2HRS PRN Tonny Edmond M.D.        HYDROmorphone (Dilaudid) injection 1 mg  1 mg Intravenous Q HOUR PRN Madhu Simpson P.A.-C.   1 mg at 10/10/23 1113    capmatinib (Tabrecta) tablet 400 mg  400 mg Oral BID Alma Newton M.D.   400 mg at 10/10/23 0509    guaiFENesin ER (Mucinex) tablet 600 mg  600 mg Oral Q12HRS Naima Ambrosio, A.P.R.N.   600 mg at 10/10/23 0508    diphenhydrAMINE (Benadryl) injection 25 mg  25 mg Intravenous Q6HRS PRN Kay Christensen A.P.R.N.   25 mg at 10/10/23 0929    diclofenac sodium (Voltaren) 1 % gel 2 g  2 g Topical  4X/DAY PRN Madhu Merino D.O.        loratadine (Claritin) tablet 10 mg  10 mg Oral DAILY Madhu Merino D.O.   10 mg at 10/10/23 0508    senna-docusate (Pericolace Or Senokot S) 8.6-50 MG per tablet 2 Tablet  2 Tablet Oral BID Madhu Merino D.O.   2 Tablet at 10/08/23 0442    And    polyethylene glycol/lytes (Miralax) PACKET 1 Packet  1 Packet Oral QDAY PRN SANJU GarciaO.   1 Packet at 09/23/23 1141    And    magnesium hydroxide (Milk Of Magnesia) suspension 30 mL  30 mL Oral QDAY PRN Madhu Merino D.O.   30 mL at 09/24/23 0939    And    bisacodyl (Dulcolax) suppository 10 mg  10 mg Rectal QDAY PRN Madhu Merino D.O.   10 mg at 09/28/23 1300    acetaminophen (Tylenol) tablet 650 mg  650 mg Oral Q6HRS PRN Madhu Merino D.O.        hydrALAZINE (Apresoline) injection 10 mg  10 mg Intravenous Q4HRS PRN Madhu Merino D.O.   10 mg at 09/26/23 1010    ondansetron (Zofran) syringe/vial injection 4 mg  4 mg Intravenous Q4HRS PRN SANJU GarciaOEstrellita   4 mg at 10/04/23 2150    ondansetron (Zofran ODT) dispertab 4 mg  4 mg Oral Q4HRS PRN Madhu Merino D.O.        promethazine (Phenergan) tablet 12.5-25 mg  12.5-25 mg Oral Q4HRS PRN Madhu Merino D.O.        promethazine (Phenergan) suppository 12.5-25 mg  12.5-25 mg Rectal Q4HRS PRN Madhu Merino D.O.        prochlorperazine (Compazine) injection 5-10 mg  5-10 mg Intravenous Q4HRS PRN Madhu Merino D.O.        guaiFENesin dextromethorphan (Robitussin DM) 100-10 MG/5ML syrup 10 mL  10 mL Oral Q6HRS PRN SANJU GarciaOEstrellita   10 mL at 09/30/23 1802    apixaban (Eliquis) tablet 5 mg  5 mg Oral BID SANJU GarciaOEstrellita   5 mg at 10/10/23 0508       Review of Systems   Constitutional:  Positive for malaise/fatigue. Negative for chills, fever and weight loss.   HENT:  Negative for congestion, ear pain, nosebleeds and sore throat.         Mouth sores   Eyes:  Negative for blurred vision.   Respiratory:  Negative for cough, sputum production, shortness of  breath and wheezing.    Cardiovascular:  Negative for chest pain, palpitations, orthopnea and leg swelling.   Gastrointestinal:  Negative for abdominal pain, heartburn, nausea and vomiting.   Genitourinary:  Negative for dysuria, frequency and urgency.   Musculoskeletal:  Positive for back pain and joint pain. Negative for neck pain.   Neurological:  Negative for dizziness, tingling, tremors, sensory change, focal weakness and headaches.   Endo/Heme/Allergies:  Does not bruise/bleed easily.   Psychiatric/Behavioral:  Negative for depression, memory loss and suicidal ideas.    All other systems reviewed and are negative.      Problem list, medications, and allergies reviewed by myself today in Epic.     Objective:     Vitals:    10/10/23 1031 10/10/23 1113 10/10/23 1119 10/10/23 1220   BP:   135/87    Pulse: (!) 115  (!) 131    Resp: (!) 22 (!) 22 (!) 22 20   Temp:   37.3 °C (99.2 °F)    TempSrc:   Temporal    SpO2: 92%  90%    Weight:       Height:             DESC; KARNOFSKY SCALE WITH ECOG EQUIVALENT: 90, Able to carry on normal activity; minor signs or symptoms of disease (ECOG equivalent 0)    DISTRESS LEVEL: no acute distress    Physical Exam  Constitutional:       General: She is not in acute distress.     Appearance: She is not ill-appearing or toxic-appearing.   HENT:      Mouth/Throat:      Pharynx: Oropharyngeal exudate and posterior oropharyngeal erythema present.   Cardiovascular:      Rate and Rhythm: Tachycardia present.   Pulmonary:      Effort: No respiratory distress.      Breath sounds: No stridor. Rhonchi present. No wheezing.   Musculoskeletal:      Cervical back: Normal range of motion and neck supple.       Deferred    Labs:   Most recent labs reviewed.  Please see the lab tab of chart review    Imaging:   Most recent images below have been independently reviewed by me.  Please see the imaging tab of chart review    9/22/2023 : Echocardiogram showed LVEF ~ 65% with no reported  abnormalities    7/27/23: PET   1.  Focal uptake in the medial RIGHT lower lobe of lung abutting the thoracic spine, consistent with tumor..  2.  No PET correlate for ill-defined spiculated nodule in the RIGHT lower lobe.  3.  Large hypermetabolic mass corresponds to lytic lesion in the RIGHT sacrum consistent with malignancy.  4.  Intramuscular focal uptake at the posterior LEFT shoulder, likely metastatic.    Pathology:    7/14/2023: Pathology of Chest wall  A. Chest wall mass:          Metastatic poorly differentiated malignancy favoring carcinoma           without obvious involvement of overlying skin and a           non-specific IHC profile of some keratins positive (see           microscopic description).          See comment.   Main Cancer Type: Sarcoma   Probability: 90%     Subtype: Undifferentiated Sarcoma ([facility])   Probability: 90%     7/19/2023: Path of the Sacral Bone  A. Sacral bone biopsy:          Bone cores effaced by a high-grade malignancy histologically           identical to the previously excised chest wall mass           ([id number]).     2/14/2023: Pathology of Lung Mass  A. Lung, right lower lobe fine needle aspiration slides:          Positive for carcinoma.   B. Core, right lower lobe lung:          Moderately differentiated lung adenocarcinoma.   C. Lung, right lower lobe biopsy core and touchprep slides:          Moderately differentiated lung adenocarcinoma.   D. Bronchoalveolar lavage right lower lobe:          Rare atypical cells, malignant cells vs. reactive bronchial           cells.     Assessment/Plan:      Cancer Staging   Primary undifferentiated sarcoma of soft tissue (HCC)  Staging form: Soft Tissue Sarcoma of the Trunk and Extremities, AJCC 8th Edition  - Clinical: Stage IV (cTX, cN0, cM1) - Signed by Alma Newton M.D. on 9/19/2023         Ms. 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    Patient remains stable    Patient to continue capmatinib  Recommend continuing magic mouth wash  Continue to wean oxygen as tolerated    All questions and concerns addressed today.  I will continue to follow    Alma Newton M.D.  Hematology/Oncology

## 2023-10-10 NOTE — PROGRESS NOTES
Hospital Medicine Daily Progress Note    Date of Service  10/10/2023    Chief Complaint  48-year-old female with history of sarcoma and lung cancer presented 9/20 with dyspnea.      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  On admission patient had tachycardia tachypnea, chest x-ray showed bilateral infiltration, CTA for chest showed right lower lobe subsegmental PE, worsening pulmonary nodules with T1 vertebral body lesion, possible pneumonia.  Procalcitonin was 0.11.Eliquis was started.  Also antibiotics for pneumonia Ceftriaxone and azithromycin were empirically started.  Echo was done and showed normal ejection fraction 65%, initially patient was treated ICU secondary to increasing oxygen requirement, patient was on high flow oxygen, treated with a steroid and inhalers.  With some improvement and patient was transferred out of the ICU.    Patient was evaluated by Dr. Newton oncologist who recommended to start  capmatinib on 9/29/2023.    Patient has metastatic sarcoma with lung cancer and significant anxiety, palliative care following for pain management.  Patient needed Precedex drip for anxiety.  Patient is on every 4 hours prn Ativan, every 2 hours prn IV Dilaudid, Norco 10/325 every 3 hours prn. Tylenol every 6 hours scheduled, holding when sleeping. Dexamethasone 4 mg BID.  CODE STATUS was discussed with the patient and agreed for DNR/DNI.    Interval Problem Update  Axox3, she remains on high flow oxygen, currently at 40%, she reports sob is stable, no cough, denies pain. She states her goal is to go home and she wants to continue treatment, she is also willing to go to an ltach - I discussed this with Dr. Newton who is in agreement as long as  they can continue the capmatinib.       I have discussed this patient's plan of care and discharge plan at IDT rounds today with Case Management, Nursing, Nursing leadership, and other members of the IDT team.    Consultants/Specialty  oncology critical care, palliative care    Code Status  DNAR/DNI    Disposition  The patient is not medically cleared for discharge to home or a post-acute facility.      I have placed the appropriate orders for post-discharge needs.    Review of Systems  Review of Systems   Constitutional:  Positive for malaise/fatigue. Negative for chills, diaphoresis, fever and weight loss.   HENT: Negative.  Negative for sore throat.    Eyes: Negative.  Negative for blurred vision.   Respiratory:  Positive for shortness of breath. Negative for cough.    Cardiovascular: Negative.  Negative for chest pain, palpitations and leg swelling.   Gastrointestinal: Negative.  Negative for abdominal pain, heartburn, nausea and vomiting.   Genitourinary: Negative.  Negative for dysuria and frequency.   Musculoskeletal:  Positive for myalgias. Negative for neck pain.   Skin: Negative.  Negative for itching and rash.   Neurological: Negative.  Negative for dizziness, focal weakness, weakness and headaches.   Endo/Heme/Allergies: Negative.  Negative for polydipsia. Does not bruise/bleed easily.   Psychiatric/Behavioral: Negative.  Negative for depression, substance abuse and suicidal ideas. The patient is not nervous/anxious.    All other systems reviewed and are negative.       Physical Exam  Temp:  [35.9 °C (96.7 °F)-37.3 °C (99.2 °F)] 36.9 °C (98.5 °F)  Pulse:  [] 121  Resp:  [18-28] 20  BP: (115-152)/(72-91) 152/91  SpO2:  [90 %-95 %] 93 %    Physical Exam  Vitals and nursing note reviewed. Exam conducted with a chaperone present.   Constitutional:       General: She is not in acute distress.     Appearance: Normal appearance. She is well-developed. She is ill-appearing. She is not diaphoretic.   HENT:       Head: Normocephalic and atraumatic.      Nose: Nose normal.      Comments: High flow nasal cannula     Mouth/Throat:      Mouth: Mucous membranes are moist.   Eyes:      Conjunctiva/sclera: Conjunctivae normal.      Pupils: Pupils are equal, round, and reactive to light.   Neck:      Thyroid: No thyromegaly.      Vascular: No JVD.   Cardiovascular:      Rate and Rhythm: Normal rate and regular rhythm.      Pulses: Normal pulses.      Heart sounds: Normal heart sounds.      No friction rub. No gallop.   Pulmonary:      Effort: Pulmonary effort is normal. No respiratory distress.      Breath sounds: Rhonchi present. No wheezing or rales.   Abdominal:      General: Abdomen is flat. Bowel sounds are normal. There is no distension.      Palpations: Abdomen is soft. There is no mass.      Tenderness: There is no abdominal tenderness. There is no guarding or rebound.   Musculoskeletal:         General: No swelling, tenderness or deformity. Normal range of motion.      Cervical back: Normal range of motion and neck supple.      Right lower leg: No edema.      Left lower leg: No edema.   Lymphadenopathy:      Cervical: No cervical adenopathy.   Skin:     General: Skin is warm and dry.      Capillary Refill: Capillary refill takes less than 2 seconds.   Neurological:      General: No focal deficit present.      Mental Status: She is alert and oriented to person, place, and time. Mental status is at baseline.      Cranial Nerves: No cranial nerve deficit.      Motor: No weakness.   Psychiatric:         Mood and Affect: Mood normal.         Behavior: Behavior normal.         Fluids  No intake or output data in the 24 hours ending 10/10/23 1659          Laboratory  Recent Labs     10/08/23  0017 10/09/23  0003 10/10/23  0018   WBC 12.3* 12.8* 13.7*   RBC 4.11* 4.23 4.11*   HEMOGLOBIN 11.0* 10.9* 10.9*   HEMATOCRIT 36.1* 36.5* 36.0*   MCV 87.8 86.3 87.6   MCH 26.8* 25.8* 26.5*   MCHC 30.5* 29.9* 30.3*   RDW 58.5* 59.1* 59.8*    PLATELETCT 106* 103* 98*   MPV 10.9 9.6 9.7     Recent Labs     10/08/23  0017 10/09/23  0003 10/10/23  0018   SODIUM 135 132* 133*   POTASSIUM 4.5 4.4 5.2   CHLORIDE 97 97 99   CO2 30 27 23   GLUCOSE 214* 205* 125*   BUN 23* 21 16   CREATININE 0.64 0.59 0.53   CALCIUM 8.5 8.1* 8.0*                     Imaging  IR-US GUIDED PIV   Final Result    Ultrasound-guided PERIPHERAL IV INSERTION performed by    qualified nursing staff as above.      DX-CHEST-LIMITED (1 VIEW)   Final Result      Stable bilateral pulmonary infiltrates.      DX-CHEST-LIMITED (1 VIEW)   Final Result         1.  Extensive bilateral pulmonary parenchymal opacities compatible with ARDS, severe pulmonary edema, and/or superimposed pulmonary infiltrates, slightly decreased since prior study.      DX-CHEST-PORTABLE (1 VIEW)   Final Result         1.  Extensive bilateral pulmonary parenchymal opacities compatible with ARDS, severe pulmonary edema, and/or superimposed pulmonary infiltrates, stable since prior study.      IR-MIDLINE CATHETER INSERTION WO GUIDANCE > AGE 3   Final Result                  Ultrasound-guided midline placement performed by qualified nursing staff    as above.          DX-CHEST-PORTABLE (1 VIEW)   Final Result         1.  Extensive bilateral pulmonary parenchymal opacities compatible with ARDS, severe pulmonary edema, and/or superimposed pulmonary infiltrates, stable since prior study.      DX-CHEST-PORTABLE (1 VIEW)   Final Result         1.  Pulmonary edema and/or infiltrates are identified, which are stable since the prior exam.      DX-CHEST-PORTABLE (1 VIEW)   Final Result         1.  Bilateral pulmonary infiltrates, stable since prior study   2.  Multiple bilateral pulmonary nodules.      DX-CHEST-PORTABLE (1 VIEW)   Final Result      1.  Innumerable pulmonary nodules bilaterally.      2.  Increasing confluence and airspace opacities in the perihilar regions suspicious for superimposed pneumonitis on diffuse metastatic  disease      IR-PICC LINE PLACEMENT W/ GUIDANCE > AGE 5   Final Result                  Ultrasound-guided PICC placement performed by qualified nursing staff as    above.          DX-CHEST-PORTABLE (1 VIEW)   Final Result      1.  Innumerable bilateral pulmonary nodule, increased since 9/20/2023      2.  Differential diagnosis includes worsening metastases or superimposed pneumonia.      EC-ECHOCARDIOGRAM COMPLETE W/O CONT   Final Result      CT-CTA CHEST PULMONARY ARTERY W/ RECONS   Final Result      1.  Minimal pulmonary embolism. Localized right lower lobe segmental and subsegmental branches.   2.  No right heart strain.   3.  9 mm lucent/lytic lesion T1 vertebral body. Suspect osseous metastatic deposit.   4.  Progression of extensive mediastinal adenopathy since 8/20/2023 consistent with metastatic disease.   5.  Extensive progression of diffuse bilateral innumerable pulmonary nodules consistent with metastatic disease.   6.  Interval development of extensive groundglass and airspace opacities consistent with superimposed pneumonia.   7.  Progression of right adrenal mass now measuring 43 mm x 38 mm consistent with metastasis.      Fleischner Society pulmonary nodule recommendations:      DX-CHEST-PORTABLE (1 VIEW)   Final Result      Diffuse bilateral interstitial and alveolar opacities most consistent with pneumonia or possibly noncardiogenic pulmonary edema.             Assessment/Plan  * Acute on chronic respiratory failure (HCC)- (present on admission)  Assessment & Plan  Multiple factors including aggressive metastatic undifferentiated sarcoma versus sarcomatoid lung cancer.   history of stage 1 lung adenocarcinoma s/p SBRT, pulmonary embolism, COPD/asthma, recent pneumonia.      Initially treated at the ICU, patient still on high flow oxygen  Finish course of antibiotics  downgraded to IMCU on 10/2/2023.      Completed course of antibiotics.   Patient still on high flow oxygen  Continue steroids, inhaler  and nebulizers.  RT, continue weaning her from oxygen    Goals of care, counseling/discussion- (present on admission)  Assessment & Plan  Goals of care done last week extensively. Hospice recommended by oncology and ICU team.  Patient would like to try some form of therapy if possible and oncology offered an oral chemotherapeutic regime. She elected to change to DNR/DNI.   Continue with palliative care consultation.     Sarcoma (HCC)- (present on admission)  Assessment & Plan  Advanced, aggressive, on oral capmatinib    Pulmonary embolism on right (HCC)- (present on admission)  Assessment & Plan  Recently diagnosed.  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   Stated Capmatinib on 9/29/2023 by oncology team  Patient is DNR/DNI.  Palliative team on board, appreciate their help    Mass of sacrum- (present on admission)  Assessment & Plan  Aggressive metastatic undifferentiated sarcoma. On Capmatinib    Lung cancer (HCC)- (present on admission)  Assessment & Plan  Initially diagnosed stage I adenocarcinoma initially treated with chemoradiation.   Follows with Dr Newton (Onc)  Now with widespread metastatic sarcoma  Currently on capmatinib    Chronic pain due to neoplasm- (present on admission)  Assessment & Plan  Palliative care have been following for pain management.  Continue multimodal pain management, oxy, tylenol, lidocaine patches, gabapentin and IV pain medication for breakthrough.  Hospice following.    Cigarette nicotine dependence without complication- (present on admission)  Assessment & Plan  History of ongoing tobacco abuse, cessation discussed and recommended   Nicotine patch if needed          VTE prophylaxis: Eliquis      Greater than 51 minutes spent prepping to see patient (e.g. review of tests) obtaining and/or reviewing  separately obtained history. Performing a medically appropriate examination and/ evaluation.  Counseling and educating the patient/family/caregiver.  Ordering medications, tests, or procedures.  Referring and communicating with other health care professionals.  Documenting clinical information in EPIC.  Independently interpreting results and communicating results to patient/family/caregiver.  Care coordination

## 2023-10-11 NOTE — PROGRESS NOTES
Consult Note: Hematology/Oncology     Primary Care:  Checo Baker M.D.    Chief Complaint   Patient presents with    Shortness of Breath     PT hx lung ca that's metastasized. Was at another appt and was SOB. RA while walking in 70's per EMS. Currently on 6L NC         Current Treatment:     9/29/23: Capmatinib    Prior Treatment:     4/17/2023-4/21/2023: RLL Lung SBRT  7/19/23-7/24/2023: R Sacrum SBRT      Subjective:   History of Presenting Illness:    Melly Hendrickson is a 48 y.o. 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    Patient history dates back to July 2022 when she had a chest x-ray performed that revealed a potential mass in her right lung.  This led to a chest CT performed on February 13, 2023.  This demonstrated 1.4 cm mass in the right lower lobe and a 1.9 groundglass opacity also in the right lower lobe.  The groundglass opacity appeared stable from previous scans but the mass was thought to be new.      A biopsy was performed and confirmed lung adenocarcinoma.    Patient saw thoracic surgery and obtained PFTs as well as a PET scan.  She was discussed at tumor board and given her significant dyspnea and her underlying COPD it was thought that she would be a better candidate for SBRT rather than surgical resection.    April 17, 2023 patient began SBRT.  Completed this treatment without many issues.    After this patient did notice a small lump in her breast reduction scar line.  She said that over the next few days it grew and she ultimately went to her PCP    PCP tried to drain the mass; however it just bled significantly during the I&D attempt.  Thus she was sent to Dr. Becerra at [facility] for excision.     Pathology from this excision demonstrated poorly differentiated malignancy favoring carcinoma without obvious involvement of underlying skin and nonspecific IHC profile.  Based on  his diagnosis cancer type ID was sent off for.  Results showed 90% probability of sarcoma    Over the past several months she has noted severe pain in her sacral region.  She does have sacral lesions that are concerning for metastatic disease.  Patient has biopsy of sacral mass on 7/18/2023.     Of note patient has 5 children and 4 grandchildren.    She follows with pain management    I initially saw patient on 7/14.  At our 7/21, we had information back from biopsy and we discussed AIM.  Patient wished to hold off and think about this further.    On 7/28, we discussed initiating AIM, patient was not comfortable and wanted 2 opinion at [facility].    I saw patient again on 8/11.  We discussed AIM,  patient not ready and awaiting [facility] appointment.     She was recently admitted to the ER for hemoptysis on 8/20/23 and was found to have a PE.  She was still having hemoptysis and thus was not started on AC. She has discharged on 8/24/23.     I saw her subsequently on 9/7/23 and recommended AC given no hemoptysis, but active PE with cancer.  We also discussed chemo at this visit and she wished to wait.     During my visit with the patient on 9/19/23, Patient was endorsing 3 nodules on the R arm.  1 on the clavicle.  Patient also had a nodule on her L arm. Patient had 1 nodule on her bikini line. She has another on the inside of her L thigh. These started 1 week ago. She was on 2-3L of oxygen for the past several days.  We discussed intiating chemo and plan was to start on 9/26/23.  Pt requires a direct admit for AIM    Since that visit she had worseing SOB.  She was subsequently presented to the ER for SOB/cough and potential PNA. On admission, she was tachycardic, tachypneic, and afebrile.  Chest x-ray showed diffuse bilateral interstitial and alveolar opacities consistent with pneumonia. CTA chest on admission showed right subsegmental PE, worsening pulmonary nodules, T1 vertebral body region, possible pneumonia.   Procalcitonin was 0.11.  Ceftriaxone and azithromycin were empirically started.    Line was not able to be placed unable to start chemo.  Patients respiratory status declined and she was transferred to the ICU    Patient started on Capmatinib 9/29/23    Patient reports that she is doing well and relatively stable.    She is eager to improve and get home eventually.       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    Chronic obstructive pulmonary disease (HCC)     COPD (chronic obstructive pulmonary disease) (HCC)     DDD (degenerative disc disease), lumbar     Depression     Emphysema of lung (HCC)     MRSA (methicillin resistant staph aureus) culture positive     Pain     back - cervical, scapula, lower    Pericarditis     Primary adenocarcinoma of lower lobe of right lung (HCC)     Psychiatric disorder     Anxiety, Depression    Sleep apnea     Snoring     Urinary incontinence         Past Surgical History:   Procedure Laterality Date    ND BRONCHOSCOPY,DIAGNOSTIC N/A 8/22/2023    Procedure: BRONCHOSCOPY;  Surgeon: Sandra Lopez D.O.;  Location: [facility];  Service: Pulmonary    ND EXC SKIN BENIG >4CM TRUNK,ARM,LEG Left 6/7/2023    Procedure: EXCISION OF SOFT TISSUE MASS LEFT CHEST WALL;  Surgeon: Nasir Becerra M.D.;  Location: [facility];  Service: General    ND BRONCHOSCOPY,DIAGNOSTIC N/A 02/14/2023    Procedure: FIBER OPTIC BRONCHOSCOPY WASH, BRUSH, BRONCHOALVEOLAR LAVAGE, BIOPSY AND FINE NEEDLE ASPIRATION AND NAVIGATION, ROBOTICS;  Surgeon: Guicho Ellis M.D.;  Location: [facility];  Service: Pulmonary Robotic    ENDOBRONCHIAL US ADD-ON N/A 02/14/2023    Procedure: ENDOBRONCHIAL ULTRASOUND (EBUS);  Surgeon: Guicho Ellis M.D.;  Location: [facility];  Service: Pulmonary Robotic    MAMMOPLASTY  REDUCTION Bilateral     SEPTOPLASTY      TUBE & ECTOPIC PREG., REMOVAL      x 2       Social History     Tobacco Use    Smoking status: Some Days     Current packs/day: 0.50     Average packs/day: 0.5 packs/day for 20.0 years (10.0 ttl pk-yrs)     Types: Cigarettes     Passive exposure: Past    Smokeless tobacco: Never    Tobacco comments:     on and off    Vaping Use    Vaping Use: Never used   Substance Use Topics    Alcohol use: Not Currently     Comment: occ, rare    Drug use: Not Currently     Types: Marijuana     Comment: THC gummies        Family History   Problem Relation Age of Onset    Diabetes Mother     Cancer Father         Prostate    Cancer Paternal Grandmother         Cervical    Cancer Paternal Grandfather         Unk       Allergies   Allergen Reactions    Penicillins Hives, Rash and Swelling     Pt reports that she gets swelling in throat and face, rash all over face and arms.   Tolerated cefazolin    Aspirin Rash and Hives     Pt reports that she received a rash on face, pt reports it ok if she takes NORCO    Other Environmental Shortness of Breath     Perfumes, dander, scents       Current Facility-Administered Medications   Medication Dose Route Frequency Provider Last Rate Last Admin    nystatin-tetracycline-prednisone-diphenhydramine (Miracle Mouth Wash) oral susp 5 mL  5 mL Oral Q6HRS PRN NICOLASA AlvaradoP.REstrellitaN.   5 mL at 10/10/23 0514    ipratropium-albuterol (DUONEB) nebulizer solution  3 mL Nebulization 4X/DAY Oksana Mas M.D.   3 mL at 10/11/23 0818    levalbuterol (Xopenex) 1.25 MG/3ML nebulizer solution 1.25 mg  1.25 mg Nebulization Q2HRS PRN (RT) Oksana Mas M.D.        albuterol inhaler 2 Puff  2 Puff Inhalation Q4HRS PRN Oksana Mas M.D.   2 Puff at 10/11/23 0513    mometasone-formoterol (Dulera) 100-5 MCG/ACT inhaler 2 Puff  2 Puff Inhalation BID Oksana Mas M.D.   2 Puff at 10/11/23 0523    Respiratory Therapy Consult   Nebulization Continuous RT  Oksana Mas M.D.        tiotropium (Spiriva Respimat) 2.5 mcg/Act inhalation spray 5 mcg  5 mcg Inhalation QDAILY (RT) Oksana Mas M.D.   5 mcg at 10/08/23 1127    ipratropium-albuterol (DUONEB) nebulizer solution  3 mL Nebulization Q2HRS PRN (RT) Oksana Mas M.D.        melatonin tablet 10 mg  10 mg Oral HS PRN Dex Madrid M.D.        morphine ER (Ms Contin) tablet 15 mg  15 mg Oral Q12HRS Dex Madrid M.D.   15 mg at 10/06/23 0945    busPIRone (Buspar) tablet 15 mg  15 mg Oral TID Dex Madrid M.D.   15 mg at 10/11/23 0524    oxyCODONE immediate-release (Roxicodone) tablet 5 mg  5 mg Oral Q4HRS PRN Gerson Adam M.D.        Or    oxyCODONE immediate release (Roxicodone) tablet 10 mg  10 mg Oral Q4HRS PRN Gerson Adam M.D.   10 mg at 10/09/23 1125    acetaminophen (Tylenol) tablet 650 mg  650 mg Oral Q6HRS Gerson Adam M.D.   650 mg at 10/11/23 0524    LORazepam (Ativan) injection 0.5 mg  0.5 mg Intravenous Q4HRS PRN Tonny Edmond M.D.   0.5 mg at 10/11/23 0519    lidocaine (Lidoderm) 5 % 3 Patch  3 Patch Transdermal Q24HR Tonny Edmond M.D.   2 Patch at 10/04/23 0532    dexamethasone (Decadron) injection 4 mg  4 mg Intravenous Q6HRS Tonny Edmond M.D.   4 mg at 10/11/23 0519    sodium chloride (Ocean) 0.65 % nasal spray 2 Spray  2 Spray Nasal Q2HRS PRN Tonny Edmond M.D.        HYDROmorphone (Dilaudid) injection 1 mg  1 mg Intravenous Q HOUR PRN Madhu Simpson P.A.-C.   1 mg at 10/11/23 0759    capmatinib (Tabrecta) tablet 400 mg  400 mg Oral BID Alma Newton M.D.   400 mg at 10/11/23 0525    guaiFENesin ER (Mucinex) tablet 600 mg  600 mg Oral Q12HRS Naima Ambrosio, A.P.R.N.   600 mg at 10/11/23 0524    diphenhydrAMINE (Benadryl) injection 25 mg  25 mg Intravenous Q6HRS PRN Kay Christensen, A.P.R.N.   25 mg at 10/11/23 0624    diclofenac sodium (Voltaren) 1 % gel 2 g  2 g Topical 4X/DAY PRN Madhu Merino D.O.         loratadine (Claritin) tablet 10 mg  10 mg Oral DAILY SANJU GarciaOEstrellita   10 mg at 10/11/23 0524    senna-docusate (Pericolace Or Senokot S) 8.6-50 MG per tablet 2 Tablet  2 Tablet Oral BID SAJNU GarciaOEstrellita   2 Tablet at 10/08/23 0442    And    polyethylene glycol/lytes (Miralax) PACKET 1 Packet  1 Packet Oral QDAY PRN SANJU GarciaOEstrellita   1 Packet at 09/23/23 1141    And    magnesium hydroxide (Milk Of Magnesia) suspension 30 mL  30 mL Oral QDAY PRN SANJU GarciaOEstrellita   30 mL at 09/24/23 0939    And    bisacodyl (Dulcolax) suppository 10 mg  10 mg Rectal QDAY PRN SANJU GarciaOEstrellita   10 mg at 09/28/23 1300    acetaminophen (Tylenol) tablet 650 mg  650 mg Oral Q6HRS PRN Madhu Merino D.O.        hydrALAZINE (Apresoline) injection 10 mg  10 mg Intravenous Q4HRS PRN SANJU GarciaOEstrellita   10 mg at 09/26/23 1010    ondansetron (Zofran) syringe/vial injection 4 mg  4 mg Intravenous Q4HRS PRN SANJU GarciaOEstrellita   4 mg at 10/04/23 2150    ondansetron (Zofran ODT) dispertab 4 mg  4 mg Oral Q4HRS PRN Madhu Merino D.O.        promethazine (Phenergan) tablet 12.5-25 mg  12.5-25 mg Oral Q4HRS PRN Madhu Merino D.O.        promethazine (Phenergan) suppository 12.5-25 mg  12.5-25 mg Rectal Q4HRS PRN Madhu Merino D.O.        prochlorperazine (Compazine) injection 5-10 mg  5-10 mg Intravenous Q4HRS PRN Madhu Merino D.O.        guaiFENesin dextromethorphan (Robitussin DM) 100-10 MG/5ML syrup 10 mL  10 mL Oral Q6HRS PRN SANJU GarciaO.   10 mL at 09/30/23 1802    apixaban (Eliquis) tablet 5 mg  5 mg Oral BID SANJU GarciaO.   5 mg at 10/11/23 0524       Review of Systems   Constitutional:  Positive for malaise/fatigue. Negative for chills, fever and weight loss.   HENT:  Negative for congestion, ear pain, nosebleeds and sore throat.         Mouth sores   Eyes:  Negative for blurred vision.   Respiratory:  Negative for cough, sputum production, shortness of breath and wheezing.    Cardiovascular:   Negative for chest pain, palpitations, orthopnea and leg swelling.   Gastrointestinal:  Negative for abdominal pain, heartburn, nausea and vomiting.   Genitourinary:  Negative for dysuria, frequency and urgency.   Musculoskeletal:  Positive for back pain and joint pain. Negative for neck pain.   Neurological:  Negative for dizziness, tingling, tremors, sensory change, focal weakness and headaches.   Endo/Heme/Allergies:  Does not bruise/bleed easily.   Psychiatric/Behavioral:  Negative for depression, memory loss and suicidal ideas.    All other systems reviewed and are negative.      Problem list, medications, and allergies reviewed by myself today in Epic.     Objective:     Vitals:    10/11/23 0400 10/11/23 0405 10/11/23 0726 10/11/23 0800   BP:  121/84 120/74    Pulse: 96 98 (!) 117 (!) 126   Resp: 18 18 18 18   Temp:  36.9 °C (98.5 °F) 36.3 °C (97.3 °F)    TempSrc:  Oral Temporal    SpO2: 92% 94% 92% 91%   Weight:       Height:             DESC; KARNOFSKY SCALE WITH ECOG EQUIVALENT: 90, Able to carry on normal activity; minor signs or symptoms of disease (ECOG equivalent 0)    DISTRESS LEVEL: no acute distress    Physical Exam  Constitutional:       General: She is not in acute distress.     Appearance: She is not ill-appearing or toxic-appearing.   HENT:      Mouth/Throat:      Pharynx: Oropharyngeal exudate and posterior oropharyngeal erythema present.   Cardiovascular:      Rate and Rhythm: Tachycardia present.   Pulmonary:      Effort: No respiratory distress.      Breath sounds: No stridor. Rhonchi present. No wheezing.   Musculoskeletal:      Cervical back: Normal range of motion and neck supple.       Deferred    Labs:   Most recent labs reviewed.  Please see the lab tab of chart review    Imaging:   Most recent images below have been independently reviewed by me.  Please see the imaging tab of chart review    9/22/2023 : Echocardiogram showed LVEF ~ 65% with no reported abnormalities    7/27/23: PET    1.  Focal uptake in the medial RIGHT lower lobe of lung abutting the thoracic spine, consistent with tumor..  2.  No PET correlate for ill-defined spiculated nodule in the RIGHT lower lobe.  3.  Large hypermetabolic mass corresponds to lytic lesion in the RIGHT sacrum consistent with malignancy.  4.  Intramuscular focal uptake at the posterior LEFT shoulder, likely metastatic.    Pathology:    7/14/2023: Pathology of Chest wall  A. Chest wall mass:          Metastatic poorly differentiated malignancy favoring carcinoma           without obvious involvement of overlying skin and a           non-specific IHC profile of some keratins positive (see           microscopic description).          See comment.   Main Cancer Type: Sarcoma   Probability: 90%     Subtype: Undifferentiated Sarcoma ([facility])   Probability: 90%     7/19/2023: Path of the Sacral Bone  A. Sacral bone biopsy:          Bone cores effaced by a high-grade malignancy histologically           identical to the previously excised chest wall mass           ([id number]).     2/14/2023: Pathology of Lung Mass  A. Lung, right lower lobe fine needle aspiration slides:          Positive for carcinoma.   B. Core, right lower lobe lung:          Moderately differentiated lung adenocarcinoma.   C. Lung, right lower lobe biopsy core and touchprep slides:          Moderately differentiated lung adenocarcinoma.   D. Bronchoalveolar lavage right lower lobe:          Rare atypical cells, malignant cells vs. reactive bronchial           cells.     Assessment/Plan:      Cancer Staging   Primary undifferentiated sarcoma of soft tissue (HCC)  Staging form: Soft Tissue Sarcoma of the Trunk and Extremities, AJCC 8th Edition  - Clinical: Stage IV (cTX, cN0, cM1) - Signed by Alma Newton M.D. on 9/19/2023         Ms. 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    Patient remained stable but requiring significant oxygen.  This will limit her ability to be discharged.    I spoke with her hospitalist and agree with LTAC or rehab as long as she can continue taking Capmatinib.       Plan:  Patient to continue capmatinib.    Continue to wean oxygen as tolerated  I will continue to follow    Alma Newton M.D.  Hematology/Oncology

## 2023-10-11 NOTE — PROGRESS NOTES
Hospital Medicine Daily Progress Note    Date of Service  10/11/2023    Chief Complaint  48-year-old female with history of sarcoma and lung cancer presented 9/20 with dyspnea.      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  On admission patient had tachycardia tachypnea, chest x-ray showed bilateral infiltration, CTA for chest showed right lower lobe subsegmental PE, worsening pulmonary nodules with T1 vertebral body lesion, possible pneumonia.  Procalcitonin was 0.11.Eliquis was started.  Also antibiotics for pneumonia Ceftriaxone and azithromycin were empirically started.  Echo was done and showed normal ejection fraction 65%, initially patient was treated ICU secondary to increasing oxygen requirement, patient was on high flow oxygen, treated with a steroid and inhalers.  With some improvement and patient was transferred out of the ICU.    Patient was evaluated by Dr. Newton oncologist who recommended to start  capmatinib on 9/29/2023.    Patient has metastatic sarcoma with lung cancer and significant anxiety, palliative care following for pain management.  Patient needed Precedex drip for anxiety.  Patient is on every 4 hours prn Ativan, every 2 hours prn IV Dilaudid, Norco 10/325 every 3 hours prn. Tylenol every 6 hours scheduled, holding when sleeping. Dexamethasone 4 mg BID.  CODE STATUS was discussed with the patient and agreed for DNR/DNI.    Interval Problem Update  Axox3, still on high flow oxygen but down to 30%, she reports breathing is subjectively better, family at bedside, she denies pain, no cough, no n/v. ROS otherwise negative. Unable to go to [facility] locally due to her insurance but case mgt located a possible accepting [facility] in [place]  - patient willing to transfer there if accepted. ROS otherwise negative. I discussed plan with Dr. Newton oncology      I have discussed this patient's plan of care and discharge plan at IDT rounds today with Case Management, Nursing, Nursing leadership, and other members of the IDT team.    Consultants/Specialty  oncology critical care, palliative care    Code Status  DNAR/DNI    Disposition  The patient is not medically cleared for discharge to home or a post-acute facility.      I have placed the appropriate orders for post-discharge needs.    Review of Systems  Review of Systems   Constitutional:  Positive for malaise/fatigue. Negative for chills, diaphoresis, fever and weight loss.   HENT: Negative.  Negative for sore throat.    Eyes: Negative.  Negative for blurred vision.   Respiratory:  Positive for shortness of breath. Negative for cough.    Cardiovascular: Negative.  Negative for chest pain, palpitations and leg swelling.   Gastrointestinal: Negative.  Negative for abdominal pain, heartburn, nausea and vomiting.   Genitourinary: Negative.  Negative for dysuria and frequency.   Musculoskeletal:  Negative for myalgias and neck pain.   Skin: Negative.  Negative for itching and rash.   Neurological: Negative.  Negative for dizziness, focal weakness, weakness and headaches.   Endo/Heme/Allergies: Negative.  Negative for polydipsia. Does not bruise/bleed easily.   Psychiatric/Behavioral: Negative.  Negative for depression, substance abuse and suicidal ideas. The patient is not nervous/anxious.    All other systems reviewed and are negative.       Physical Exam  Temp:  [36.6 °C (97.8 °F)-37.3 °C (99.2 °F)] 36.9 °C (98.5 °F)  Pulse:  [] 98  Resp:  [18-28] 18  BP: (121-152)/(72-91) 121/84  SpO2:  [90 %-95 %] 94 %    Physical Exam  Vitals and nursing note reviewed. Exam conducted with a chaperone present.   Constitutional:       General: She is not in acute distress.     Appearance: Normal appearance. She is  well-developed. She is ill-appearing. She is not diaphoretic.   HENT:      Head: Normocephalic and atraumatic.      Nose: Nose normal.      Comments: High flow nasal cannula     Mouth/Throat:      Mouth: Mucous membranes are moist.   Eyes:      Conjunctiva/sclera: Conjunctivae normal.      Pupils: Pupils are equal, round, and reactive to light.   Neck:      Thyroid: No thyromegaly.      Vascular: No JVD.   Cardiovascular:      Rate and Rhythm: Normal rate and regular rhythm.      Pulses: Normal pulses.      Heart sounds: Normal heart sounds.      No friction rub. No gallop.   Pulmonary:      Effort: Pulmonary effort is normal. No respiratory distress.      Breath sounds: No wheezing, rhonchi or rales.      Comments: Decreased at bases  Abdominal:      General: Abdomen is flat. Bowel sounds are normal. There is no distension.      Palpations: Abdomen is soft. There is no mass.      Tenderness: There is no abdominal tenderness. There is no guarding or rebound.   Musculoskeletal:         General: No swelling, tenderness or deformity. Normal range of motion.      Cervical back: Normal range of motion and neck supple.      Right lower leg: No edema.      Left lower leg: No edema.   Lymphadenopathy:      Cervical: No cervical adenopathy.   Skin:     General: Skin is warm and dry.      Capillary Refill: Capillary refill takes less than 2 seconds.   Neurological:      General: No focal deficit present.      Mental Status: She is alert and oriented to person, place, and time. Mental status is at baseline.      Cranial Nerves: No cranial nerve deficit.      Motor: No weakness.   Psychiatric:         Mood and Affect: Mood normal.         Behavior: Behavior normal.         Fluids  No intake or output data in the 24 hours ending 10/11/23 0809          Laboratory  Recent Labs     10/09/23  0003 10/10/23  0018 10/11/23  0019   WBC 12.8* 13.7* 11.4*   RBC 4.23 4.11* 3.84*   HEMOGLOBIN 10.9* 10.9* 10.2*   HEMATOCRIT 36.5* 36.0*  33.9*   MCV 86.3 87.6 88.3   MCH 25.8* 26.5* 26.6*   MCHC 29.9* 30.3* 30.1*   RDW 59.1* 59.8* 60.1*   PLATELETCT 103* 98* 124*   MPV 9.6 9.7 9.7       Recent Labs     10/09/23  0003 10/10/23  0018   SODIUM 132* 133*   POTASSIUM 4.4 5.2   CHLORIDE 97 99   CO2 27 23   GLUCOSE 205* 125*   BUN 21 16   CREATININE 0.59 0.53   CALCIUM 8.1* 8.0*                       Imaging  IR-US GUIDED PIV   Final Result    Ultrasound-guided PERIPHERAL IV INSERTION performed by    qualified nursing staff as above.      DX-CHEST-LIMITED (1 VIEW)   Final Result      Stable bilateral pulmonary infiltrates.      DX-CHEST-LIMITED (1 VIEW)   Final Result         1.  Extensive bilateral pulmonary parenchymal opacities compatible with ARDS, severe pulmonary edema, and/or superimposed pulmonary infiltrates, slightly decreased since prior study.      DX-CHEST-PORTABLE (1 VIEW)   Final Result         1.  Extensive bilateral pulmonary parenchymal opacities compatible with ARDS, severe pulmonary edema, and/or superimposed pulmonary infiltrates, stable since prior study.      IR-MIDLINE CATHETER INSERTION WO GUIDANCE > AGE 3   Final Result                  Ultrasound-guided midline placement performed by qualified nursing staff    as above.          DX-CHEST-PORTABLE (1 VIEW)   Final Result         1.  Extensive bilateral pulmonary parenchymal opacities compatible with ARDS, severe pulmonary edema, and/or superimposed pulmonary infiltrates, stable since prior study.      DX-CHEST-PORTABLE (1 VIEW)   Final Result         1.  Pulmonary edema and/or infiltrates are identified, which are stable since the prior exam.      DX-CHEST-PORTABLE (1 VIEW)   Final Result         1.  Bilateral pulmonary infiltrates, stable since prior study   2.  Multiple bilateral pulmonary nodules.      DX-CHEST-PORTABLE (1 VIEW)   Final Result      1.  Innumerable pulmonary nodules bilaterally.      2.  Increasing confluence and airspace opacities in the perihilar regions  suspicious for superimposed pneumonitis on diffuse metastatic disease      IR-PICC LINE PLACEMENT W/ GUIDANCE > AGE 5   Final Result                  Ultrasound-guided PICC placement performed by qualified nursing staff as    above.          DX-CHEST-PORTABLE (1 VIEW)   Final Result      1.  Innumerable bilateral pulmonary nodule, increased since 9/20/2023      2.  Differential diagnosis includes worsening metastases or superimposed pneumonia.      EC-ECHOCARDIOGRAM COMPLETE W/O CONT   Final Result      CT-CTA CHEST PULMONARY ARTERY W/ RECONS   Final Result      1.  Minimal pulmonary embolism. Localized right lower lobe segmental and subsegmental branches.   2.  No right heart strain.   3.  9 mm lucent/lytic lesion T1 vertebral body. Suspect osseous metastatic deposit.   4.  Progression of extensive mediastinal adenopathy since 8/20/2023 consistent with metastatic disease.   5.  Extensive progression of diffuse bilateral innumerable pulmonary nodules consistent with metastatic disease.   6.  Interval development of extensive groundglass and airspace opacities consistent with superimposed pneumonia.   7.  Progression of right adrenal mass now measuring 43 mm x 38 mm consistent with metastasis.      Fleischner Society pulmonary nodule recommendations:      DX-CHEST-PORTABLE (1 VIEW)   Final Result      Diffuse bilateral interstitial and alveolar opacities most consistent with pneumonia or possibly noncardiogenic pulmonary edema.             Assessment/Plan  * Acute on chronic respiratory failure (HCC)- (present on admission)  Assessment & Plan  Multiple factors including aggressive metastatic undifferentiated sarcoma versus sarcomatoid lung cancer.   history of stage 1 lung adenocarcinoma s/p SBRT, pulmonary embolism, COPD/asthma, recent pneumonia.      Initially treated at the ICU, patient still on high flow oxygen  Finish course of antibiotics  downgraded to IMCU on 10/2/2023.      Completed course of antibiotics.    Patient still on high flow oxygen  Continue steroids, inhaler and nebulizers.  RT, continue weaning her from oxygen    Goals of care, counseling/discussion- (present on admission)  Assessment & Plan  Goals of care done last week extensively. Hospice recommended by oncology and ICU team.  Patient would like to try some form of therapy if possible and oncology offered an oral chemotherapeutic regime. She elected to change to DNR/DNI.   Continue with palliative care consultation.     Sarcoma (HCC)- (present on admission)  Assessment & Plan  Advanced, aggressive, on oral capmatinib    Pulmonary embolism on right (HCC)- (present on admission)  Assessment & Plan  Recently diagnosed.  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   Stated Capmatinib on 9/29/2023 by oncology team  Patient is DNR/DNI.  Palliative team on board, appreciate their help    Mass of sacrum- (present on admission)  Assessment & Plan  Aggressive metastatic undifferentiated sarcoma. On Capmatinib    Lung cancer (HCC)- (present on admission)  Assessment & Plan  Initially diagnosed stage I adenocarcinoma initially treated with chemoradiation.   Follows with Dr Newton (Onc)  Now with widespread metastatic sarcoma  Currently on capmatinib    Chronic pain due to neoplasm- (present on admission)  Assessment & Plan  Palliative care have been following for pain management.  Continue multimodal pain management, oxy, tylenol, lidocaine patches, gabapentin and IV pain medication for breakthrough.  Hospice following.    Cigarette nicotine dependence without complication- (present on admission)  Assessment & Plan  History of ongoing tobacco abuse, cessation discussed and recommended   Nicotine patch if needed          VTE prophylaxis: Eliquis      Greater than 51 minutes spent prepping to see  patient (e.g. review of tests) obtaining and/or reviewing separately obtained history. Performing a medically appropriate examination and/ evaluation.  Counseling and educating the patient/family/caregiver.  Ordering medications, tests, or procedures.  Referring and communicating with other health care professionals.  Documenting clinical information in EPIC.  Independently interpreting results and communicating results to patient/family/caregiver.  Care coordination

## 2023-10-11 NOTE — DISCHARGE PLANNING
Received Choice form @: 2818  Agency/Facility Name: [facility], [facility]  Referral sent per Choice form @: 0189

## 2023-10-11 NOTE — CARE PLAN
The patient is Unstable - High likelihood or risk of patient condition declining or worsening    Shift Goals  Clinical Goals: maintain O2 saturations, pain/anxiety  Patient Goals: pain control  Family Goals: pain control    Progress made toward(s) clinical / shift goals:      A/Ox4, pt currently on 15L oxymask tolerating well, 92% SpO2, pt is able to understand plan of care. All questions answered at the moment.  PRN pain medications given per MAR working effectively to promote comfort.   Fall precautions in place, bed in lowest position, call light and belongings in reach.   Pt calls appropriately.      Problem: Pain - Standard  Goal: Alleviation of pain or a reduction in pain to the patient’s comfort goal  Outcome: Progressing     Problem: Respiratory  Goal: Patient will achieve/maintain optimum respiratory ventilation and gas exchange  Outcome: Progressing     Problem: Knowledge Deficit - Standard  Goal: Patient and family/care givers will demonstrate understanding of plan of care, disease process/condition, diagnostic tests and medications  Outcome: Progressing       Patient is not progressing towards the following goals:

## 2023-10-11 NOTE — CARE PLAN
The patient is Watcher - Medium risk of patient condition declining or worsening    Shift Goals  Clinical Goals: Safety, pain managment, anxiety control, O2 titration  Patient Goals: Pain management, rest  Family Goals: pain control    Progress made toward(s) clinical / shift goals:    Problem: Pain - Standard  Goal: Alleviation of pain or a reduction in pain to the patient’s comfort goal  Outcome: Progressing     Problem: Knowledge Deficit - Standard  Goal: Patient and family/care givers will demonstrate understanding of plan of care, disease process/condition, diagnostic tests and medications  Outcome: Progressing     Problem: Skin Integrity  Goal: Skin integrity is maintained or improved  Outcome: Progressing       Patient is not progressing towards the following goals:

## 2023-10-11 NOTE — CARE PLAN
Problem: Bronchoconstriction  Goal: Improve in air movement and diminished wheezing  Description: Target End Date:  2 to 3 days    1.  Implement inhaled treatments  2.  Evaluate and manage medication effects  Outcome: Not Met   DUO Q 4 hr  Problem: Humidified High Flow Nasal Cannula  Goal: Maintain adequate oxygenation dependent on patient condition  Description: Target End Date:  resolve prior to discharge or when underlying condition is resolved/stabilized    1.  Implement humidified high flow oxygen therapy  2.  Titrate high flow oxygen to maintain appropriate SpO2  Outcome: Not Met   HHFNC 30/40

## 2023-10-12 NOTE — PROGRESS NOTES
Consult Note: Hematology/Oncology     Primary Care:  Checo Baker M.D.    Chief Complaint   Patient presents with    Shortness of Breath     PT hx lung ca that's metastasized. Was at another appt and was SOB. RA while walking in 70's per EMS. Currently on 6L NC         Current Treatment:     9/29/23: Capmatinib    Prior Treatment:     4/17/2023-4/21/2023: RLL Lung SBRT  7/19/23-7/24/2023: R Sacrum SBRT      Subjective:   History of Presenting Illness:    Melly Hendrickson is a 48 y.o. 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    Patient history dates back to July 2022 when she had a chest x-ray performed that revealed a potential mass in her right lung.  This led to a chest CT performed on February 13, 2023.  This demonstrated 1.4 cm mass in the right lower lobe and a 1.9 groundglass opacity also in the right lower lobe.  The groundglass opacity appeared stable from previous scans but the mass was thought to be new.      A biopsy was performed and confirmed lung adenocarcinoma.    Patient saw thoracic surgery and obtained PFTs as well as a PET scan.  She was discussed at tumor board and given her significant dyspnea and her underlying COPD it was thought that she would be a better candidate for SBRT rather than surgical resection.    April 17, 2023 patient began SBRT.  Completed this treatment without many issues.    After this patient did notice a small lump in her breast reduction scar line.  She said that over the next few days it grew and she ultimately went to her PCP    PCP tried to drain the mass; however it just bled significantly during the I&D attempt.  Thus she was sent to Dr. Becerra at \[facility]\"" for excision.     Pathology from this excision demonstrated poorly differentiated malignancy favoring carcinoma without obvious involvement of underlying skin and nonspecific IHC profile.  Based on  his diagnosis cancer type ID was sent off for.  Results showed 90% probability of sarcoma    Over the past several months she has noted severe pain in her sacral region.  She does have sacral lesions that are concerning for metastatic disease.  Patient has biopsy of sacral mass on 7/18/2023.     Of note patient has 5 children and 4 grandchildren.    She follows with pain management    I initially saw patient on 7/14.  At our 7/21, we had information back from biopsy and we discussed AIM.  Patient wished to hold off and think about this further.    On 7/28, we discussed initiating AIM, patient was not comfortable and wanted 2 opinion at [facility].    I saw patient again on 8/11.  We discussed AIM,  patient not ready and awaiting [facility] appointment.     She was recently admitted to the ER for hemoptysis on 8/20/23 and was found to have a PE.  She was still having hemoptysis and thus was not started on AC. She has discharged on 8/24/23.     I saw her subsequently on 9/7/23 and recommended AC given no hemoptysis, but active PE with cancer.  We also discussed chemo at this visit and she wished to wait.     During my visit with the patient on 9/19/23, Patient was endorsing 3 nodules on the R arm.  1 on the clavicle.  Patient also had a nodule on her L arm. Patient had 1 nodule on her bikini line. She has another on the inside of her L thigh. These started 1 week ago. She was on 2-3L of oxygen for the past several days.  We discussed intiating chemo and plan was to start on 9/26/23.  Pt requires a direct admit for AIM    Since that visit she had worseing SOB.  She was subsequently presented to the ER for SOB/cough and potential PNA. On admission, she was tachycardic, tachypneic, and afebrile.  Chest x-ray showed diffuse bilateral interstitial and alveolar opacities consistent with pneumonia. CTA chest on admission showed right subsegmental PE, worsening pulmonary nodules, T1 vertebral body region, possible pneumonia.   Procalcitonin was 0.11.  Ceftriaxone and azithromycin were empirically started.    Line was not able to be placed unable to start chemo.  Patients respiratory status declined and she was transferred to the ICU    Patient started on Capmatinib 9/29/23  Patient reports that mentally she is struggling given how long she has been in the hospital before.  She is trying hard to work towards discharge.    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    Chronic obstructive pulmonary disease (HCC)     COPD (chronic obstructive pulmonary disease) (HCC)     DDD (degenerative disc disease), lumbar     Depression     Emphysema of lung (HCC)     MRSA (methicillin resistant staph aureus) culture positive     Pain     back - cervical, scapula, lower    Pericarditis     Primary adenocarcinoma of lower lobe of right lung (HCC)     Psychiatric disorder     Anxiety, Depression    Sleep apnea     Snoring     Urinary incontinence         Past Surgical History:   Procedure Laterality Date    MI BRONCHOSCOPY,DIAGNOSTIC N/A 8/22/2023    Procedure: BRONCHOSCOPY;  Surgeon: Sandra Lopez D.O.;  Location: [facility];  Service: Pulmonary    MI EXC SKIN BENIG >4CM TRUNK,ARM,LEG Left 6/7/2023    Procedure: EXCISION OF SOFT TISSUE MASS LEFT CHEST WALL;  Surgeon: Nasir Becerra M.D.;  Location: [facility];  Service: General    MI BRONCHOSCOPY,DIAGNOSTIC N/A 02/14/2023    Procedure: FIBER OPTIC BRONCHOSCOPY WASH, BRUSH, BRONCHOALVEOLAR LAVAGE, BIOPSY AND FINE NEEDLE ASPIRATION AND NAVIGATION, ROBOTICS;  Surgeon: Guicho Ellis M.D.;  Location: [facility];  Service: Pulmonary Robotic    ENDOBRONCHIAL US ADD-ON N/A 02/14/2023    Procedure: ENDOBRONCHIAL ULTRASOUND (EBUS);  Surgeon: Guicho Ellis M.D.;  Location: [facility];  Service: Pulmonary Robotic    MAMMOPLASTY REDUCTION Bilateral     SEPTOPLASTY      TUBE & ECTOPIC PREG., REMOVAL      x 2       Social History     Tobacco Use    Smoking status: Some Days     Current packs/day: 0.50     Average packs/day: 0.5 packs/day for 20.0 years (10.0 ttl pk-yrs)     Types: Cigarettes     Passive exposure: Past    Smokeless tobacco: Never    Tobacco comments:     on and off    Vaping Use    Vaping Use: Never used   Substance Use Topics    Alcohol use: Not Currently     Comment: occ, rare    Drug use: Not Currently     Types: Marijuana     Comment: THC gummies        Family History   Problem Relation Age of Onset    Diabetes Mother     Cancer Father         Prostate    Cancer Paternal Grandmother         Cervical    Cancer Paternal Grandfather         Unk       Allergies   Allergen Reactions    Penicillins Hives, Rash and Swelling     Pt reports that she gets swelling in throat and face, rash all over face and arms.   Tolerated cefazolin    Aspirin Rash and Hives     Pt reports that she received a rash on face, pt reports it ok if she takes NORCO    Other Environmental Shortness of Breath     Perfumes, dander, scents       Current Facility-Administered Medications   Medication Dose Route Frequency Provider Last Rate Last Admin    nystatin-tetracycline-prednisone-diphenhydramine (Miracle Mouth Wash) oral susp 5 mL  5 mL Oral Q6HRS PRN NICOLASA AlvaradoP.REstrellitaNEstrellita   5 mL at 10/10/23 0514    ipratropium-albuterol (DUONEB) nebulizer solution  3 mL Nebulization 4X/DAY Oksana Mas M.D.   3 mL at 10/12/23 1401    levalbuterol (Xopenex) 1.25 MG/3ML nebulizer solution 1.25 mg  1.25 mg Nebulization Q2HRS PRN (RT) Oksana Mas M.D.        albuterol inhaler 2 Puff  2 Puff Inhalation Q4HRS PRN Oksana Mas M.D.   2 Puff at 10/11/23 0513    mometasone-formoterol (Dulera) 100-5 MCG/ACT inhaler 2 Puff  2 Puff Inhalation BID Oksana Mas M.D.   2 Puff at 10/12/23 0894     Respiratory Therapy Consult   Nebulization Continuous RT Oksana Mas M.D.        tiotropium (Spiriva Respimat) 2.5 mcg/Act inhalation spray 5 mcg  5 mcg Inhalation QDAILY (RT) Oksana Mas M.D.   5 mcg at 10/12/23 0805    ipratropium-albuterol (DUONEB) nebulizer solution  3 mL Nebulization Q2HRS PRN (RT) Oksana Mas M.D.        melatonin tablet 10 mg  10 mg Oral HS PRN Dex Madrid M.D.        morphine ER (Ms Contin) tablet 15 mg  15 mg Oral Q12HRS Dex Madrid M.D.   15 mg at 10/06/23 0945    busPIRone (Buspar) tablet 15 mg  15 mg Oral TID Dex Madrid M.D.   15 mg at 10/12/23 1235    oxyCODONE immediate-release (Roxicodone) tablet 5 mg  5 mg Oral Q4HRS PRN Gerson Adam M.D.        Or    oxyCODONE immediate release (Roxicodone) tablet 10 mg  10 mg Oral Q4HRS PRN Gerson Adam M.D.   10 mg at 10/12/23 1235    acetaminophen (Tylenol) tablet 650 mg  650 mg Oral Q6HRS Gerson Adam M.D.   650 mg at 10/12/23 1235    LORazepam (Ativan) injection 0.5 mg  0.5 mg Intravenous Q4HRS PRN Tonny Edmond M.D.   0.5 mg at 10/12/23 1235    lidocaine (Lidoderm) 5 % 3 Patch  3 Patch Transdermal Q24HR Tonny Edmond M.D.   2 Patch at 10/04/23 0532    dexamethasone (Decadron) injection 4 mg  4 mg Intravenous Q6HRS Tonny Edmond M.D.   4 mg at 10/12/23 1235    sodium chloride (Ocean) 0.65 % nasal spray 2 Spray  2 Spray Nasal Q2HRS PRN Tonny Edmond M.D.        HYDROmorphone (Dilaudid) injection 1 mg  1 mg Intravenous Q HOUR PRN Madhu Simpson P.A.-C.   1 mg at 10/12/23 1504    capmatinib (Tabrecta) tablet 400 mg  400 mg Oral BID Alma Newton M.D.   400 mg at 10/12/23 0450    guaiFENesin ER (Mucinex) tablet 600 mg  600 mg Oral Q12HRS Naima Ambrosio, A.P.R.N.   600 mg at 10/12/23 0448    diphenhydrAMINE (Benadryl) injection 25 mg  25 mg Intravenous Q6HRS PRN Kay Christensen, A.P.R.N.   25 mg at 10/12/23 1254    diclofenac sodium (Voltaren) 1 % gel 2  g  2 g Topical 4X/DAY PRN Madhu Merino D.O.        loratadine (Claritin) tablet 10 mg  10 mg Oral DAILY Madhu Merino D.O.   10 mg at 10/12/23 0448    senna-docusate (Pericolace Or Senokot S) 8.6-50 MG per tablet 2 Tablet  2 Tablet Oral BID Madhu Merino D.O.   2 Tablet at 10/08/23 0442    And    polyethylene glycol/lytes (Miralax) PACKET 1 Packet  1 Packet Oral QDAY PRN SANJU GarciaOEstrellita   1 Packet at 09/23/23 1141    And    magnesium hydroxide (Milk Of Magnesia) suspension 30 mL  30 mL Oral QDAY PRN Madhu Merino D.O.   30 mL at 09/24/23 0939    And    bisacodyl (Dulcolax) suppository 10 mg  10 mg Rectal QDAY PRN SANJU GarciaOEstrellita   10 mg at 09/28/23 1300    acetaminophen (Tylenol) tablet 650 mg  650 mg Oral Q6HRS PRN Madhu Merino D.O.        hydrALAZINE (Apresoline) injection 10 mg  10 mg Intravenous Q4HRS PRN Madhu Merino D.O.   10 mg at 09/26/23 1010    ondansetron (Zofran) syringe/vial injection 4 mg  4 mg Intravenous Q4HRS PRN SANJU GarciaOEstrellita   4 mg at 10/04/23 2150    ondansetron (Zofran ODT) dispertab 4 mg  4 mg Oral Q4HRS PRN Madhu Merino D.O.        promethazine (Phenergan) tablet 12.5-25 mg  12.5-25 mg Oral Q4HRS PRN Madhu Merino D.O.        promethazine (Phenergan) suppository 12.5-25 mg  12.5-25 mg Rectal Q4HRS PRN Madhu Merino D.O.        prochlorperazine (Compazine) injection 5-10 mg  5-10 mg Intravenous Q4HRS PRN Madhu Merino D.O.        guaiFENesin dextromethorphan (Robitussin DM) 100-10 MG/5ML syrup 10 mL  10 mL Oral Q6HRS PRN SANJU GarciaOEstrellita   10 mL at 10/11/23 1652    apixaban (Eliquis) tablet 5 mg  5 mg Oral BID SANJU GarciaO.   5 mg at 10/12/23 9788       Review of Systems   Constitutional:  Positive for malaise/fatigue. Negative for chills, fever and weight loss.   HENT:  Negative for congestion, ear pain, nosebleeds and sore throat.         Mouth sores   Eyes:  Negative for blurred vision.   Respiratory:  Negative for cough, sputum production,  shortness of breath and wheezing.    Cardiovascular:  Negative for chest pain, palpitations, orthopnea and leg swelling.   Gastrointestinal:  Negative for abdominal pain, heartburn, nausea and vomiting.   Genitourinary:  Negative for dysuria, frequency and urgency.   Musculoskeletal:  Positive for back pain and joint pain. Negative for neck pain.   Neurological:  Negative for dizziness, tingling, tremors, sensory change, focal weakness and headaches.   Endo/Heme/Allergies:  Does not bruise/bleed easily.   Psychiatric/Behavioral:  Negative for depression, memory loss and suicidal ideas.    All other systems reviewed and are negative.      Problem list, medications, and allergies reviewed by myself today in Epic.     Objective:     Vitals:    10/12/23 1153 10/12/23 1401 10/12/23 1420 10/12/23 1514   BP: (!) 146/76      Pulse: (!) 109 (!) 120     Resp: 18 19     Temp: 36.5 °C (97.7 °F)      TempSrc: Temporal      SpO2: 88% (!) 79% 92% 94%   Weight:       Height:             DESC; KARNOFSKY SCALE WITH ECOG EQUIVALENT: 90, Able to carry on normal activity; minor signs or symptoms of disease (ECOG equivalent 0)    DISTRESS LEVEL: no acute distress    Physical Exam  Constitutional:       General: She is not in acute distress.     Appearance: She is not ill-appearing or toxic-appearing.   HENT:      Mouth/Throat:      Pharynx: Oropharyngeal exudate and posterior oropharyngeal erythema present.   Cardiovascular:      Rate and Rhythm: Tachycardia present.   Pulmonary:      Effort: No respiratory distress.      Breath sounds: No stridor. Rhonchi present. No wheezing.   Musculoskeletal:      Cervical back: Normal range of motion and neck supple.       Deferred    Labs:   Most recent labs reviewed.  Please see the lab tab of chart review    Imaging:   Most recent images below have been independently reviewed by me.  Please see the imaging tab of chart review    9/22/2023 : Echocardiogram showed LVEF ~ 65% with no reported  abnormalities    7/27/23: PET   1.  Focal uptake in the medial RIGHT lower lobe of lung abutting the thoracic spine, consistent with tumor..  2.  No PET correlate for ill-defined spiculated nodule in the RIGHT lower lobe.  3.  Large hypermetabolic mass corresponds to lytic lesion in the RIGHT sacrum consistent with malignancy.  4.  Intramuscular focal uptake at the posterior LEFT shoulder, likely metastatic.    Pathology:    7/14/2023: Pathology of Chest wall  A. Chest wall mass:          Metastatic poorly differentiated malignancy favoring carcinoma           without obvious involvement of overlying skin and a           non-specific IHC profile of some keratins positive (see           microscopic description).          See comment.   Main Cancer Type: Sarcoma   Probability: 90%     Subtype: Undifferentiated Sarcoma ([facility])   Probability: 90%     7/19/2023: Path of the Sacral Bone  A. Sacral bone biopsy:          Bone cores effaced by a high-grade malignancy histologically           identical to the previously excised chest wall mass           ([id number]).     2/14/2023: Pathology of Lung Mass  A. Lung, right lower lobe fine needle aspiration slides:          Positive for carcinoma.   B. Core, right lower lobe lung:          Moderately differentiated lung adenocarcinoma.   C. Lung, right lower lobe biopsy core and touchprep slides:          Moderately differentiated lung adenocarcinoma.   D. Bronchoalveolar lavage right lower lobe:          Rare atypical cells, malignant cells vs. reactive bronchial           cells.     Assessment/Plan:      Cancer Staging   Primary undifferentiated sarcoma of soft tissue (HCC)  Staging form: Soft Tissue Sarcoma of the Trunk and Extremities, AJCC 8th Edition  - Clinical: Stage IV (cTX, cN0, cM1) - Signed by Alma Newton M.D. on 9/19/2023         Ms. 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    She remains stable and her oxygen requirements are slowly improving.  CBC is stable however CMP has not been drawn since the ninth.    From a oncology standpoint she is fine to be transferred to another facility as long as that facility allows her to continue taking capmatinib        Plan:  Patient to continue capmatinib.    Continue to wean oxygen as tolerated  Updated CMP  I will continue to follow    Alma Newton M.D.  Hematology/Oncology

## 2023-10-12 NOTE — FACE TO FACE
"Face to Face Note  -  Durable Medical Equipment    Fide Dillon M.D. - NPI: [id number]  I certify that this patient is under my care and that they had a durable medical equipment(DME)face to face encounter by myself that meets the physician DME face-to-face encounter requirements with this patient on:    Date of encounter:   Patient:                    MRN:                       YOB: 2025  Melly Shin [place]  [id number]  1975     The encounter with the patient was in whole, or in part, for the following medical condition, which is the primary reason for durable medical equipment:  Other - respiratory failure due to sarcoma    I certify that, based on my findings, the following durable medical equipment is medically necessary:    Oxygen   HOME O2 Saturation Measurements:(Values must be present for Home Oxygen orders)   60% on RA  94% on 10L at rest  90% on 10L with exertion     If patient feels more short of breath, they can go up to 6 liters per minute and contact healthcare provider.    Supporting Symptoms: The patient requires supplemental oxygen, as the following interventions have been tried with limited or no improvement: \"Ambulation with oximetry.    My Clinical findings support the need for the above equipment due to:  Hypoxia  "

## 2023-10-12 NOTE — CARE PLAN
The patient is Watcher - Medium risk of patient condition declining or worsening    Shift Goals  Clinical Goals: pain control, decrease anxiety, maintain O2 sats  Patient Goals: pain control  Family Goals: pain control    Progress made toward(s) clinical / shift goals:    Problem: Pain - Standard  Goal: Alleviation of pain or a reduction in pain to the patient’s comfort goal  Outcome: Progressing     Problem: Risk for Aspiration  Goal: Patient's risk for aspiration will be absent or decrease  Outcome: Progressing     Problem: Knowledge Deficit - Standard  Goal: Patient and family/care givers will demonstrate understanding of plan of care, disease process/condition, diagnostic tests and medications  Outcome: Progressing       Patient is not progressing towards the following goals:

## 2023-10-12 NOTE — PROGRESS NOTES
Hospital Medicine Daily Progress Note    Date of Service  10/12/2023    Chief Complaint  48-year-old female with history of sarcoma and lung cancer presented 9/20 with dyspnea.      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  On admission patient had tachycardia tachypnea, chest x-ray showed bilateral infiltration, CTA for chest showed right lower lobe subsegmental PE, worsening pulmonary nodules with T1 vertebral body lesion, possible pneumonia.  Procalcitonin was 0.11.Eliquis was started.  Also antibiotics for pneumonia Ceftriaxone and azithromycin were empirically started.  Echo was done and showed normal ejection fraction 65%, initially patient was treated ICU secondary to increasing oxygen requirement, patient was on high flow oxygen, treated with a steroid and inhalers.  With some improvement and patient was transferred out of the ICU.    Patient was evaluated by Dr. Newton oncologist who recommended to start  capmatinib on 9/29/2023.    Patient has metastatic sarcoma with lung cancer and significant anxiety, palliative care following for pain management.  Patient needed Precedex drip for anxiety.  Patient is on every 4 hours prn Ativan, every 2 hours prn IV Dilaudid, Norco 10/325 every 3 hours prn. Tylenol every 6 hours scheduled, holding when sleeping. Dexamethasone 4 mg BID.  CODE STATUS was discussed with the patient and agreed for DNR/DNI.    Interval Problem Update  Axox3, oxygen needs improving some, she is now down to 10 L and tolerating this well. She is refusing her long acting morphine, I discussed this with her explaining that it will provide better long term control vs the iv meds, she continued to decline, I also discussed my concern that  she wants to go home but IV pain meds are not an option at home, I will continue to address this with her as needed. I discussed case with Dr Newton, exam stable. ROS otherwise negative      I have discussed this patient's plan of care and discharge plan at IDT rounds today with Case Management, Nursing, Nursing leadership, and other members of the IDT team.    Consultants/Specialty  oncology critical care, palliative care    Code Status  DNAR/DNI    Disposition  The patient is not medically cleared for discharge to home or a post-acute facility.      I have placed the appropriate orders for post-discharge needs.    Review of Systems  Review of Systems   Constitutional:  Positive for malaise/fatigue. Negative for chills, diaphoresis, fever and weight loss.   HENT: Negative.  Negative for sore throat.    Eyes: Negative.  Negative for blurred vision.   Respiratory:  Positive for shortness of breath. Negative for cough.    Cardiovascular: Negative.  Negative for chest pain, palpitations and leg swelling.   Gastrointestinal: Negative.  Negative for abdominal pain, heartburn, nausea and vomiting.   Genitourinary: Negative.  Negative for dysuria and frequency.   Musculoskeletal:  Negative for myalgias and neck pain.   Skin: Negative.  Negative for itching and rash.   Neurological: Negative.  Negative for dizziness, focal weakness, weakness and headaches.   Endo/Heme/Allergies: Negative.  Negative for polydipsia. Does not bruise/bleed easily.   Psychiatric/Behavioral: Negative.  Negative for depression, substance abuse and suicidal ideas. The patient is not nervous/anxious.    All other systems reviewed and are negative.       Physical Exam  Temp:  [36.2 °C (97.2 °F)-37.2 °C (98.9 °F)] 36.4 °C (97.6 °F)  Pulse:  [] 118  Resp:  [18-22] 18  BP: (121-146)/(70-88) 121/81  SpO2:  [79 %-99 %] 93 %    Physical Exam  Vitals and nursing note reviewed. Exam conducted with a chaperone present.   Constitutional:       General:  She is not in acute distress.     Appearance: Normal appearance. She is well-developed. She is ill-appearing. She is not diaphoretic.   HENT:      Head: Normocephalic and atraumatic.      Nose: Nose normal.      Comments: High flow nasal cannula     Mouth/Throat:      Mouth: Mucous membranes are moist.   Eyes:      Conjunctiva/sclera: Conjunctivae normal.      Pupils: Pupils are equal, round, and reactive to light.   Neck:      Thyroid: No thyromegaly.      Vascular: No JVD.   Cardiovascular:      Rate and Rhythm: Normal rate and regular rhythm.      Pulses: Normal pulses.      Heart sounds: Normal heart sounds.      No friction rub. No gallop.   Pulmonary:      Effort: Pulmonary effort is normal. No respiratory distress.      Breath sounds: No wheezing, rhonchi or rales.      Comments: Decreased at bases  Abdominal:      General: Abdomen is flat. Bowel sounds are normal. There is no distension.      Palpations: Abdomen is soft. There is no mass.      Tenderness: There is no abdominal tenderness. There is no guarding or rebound.   Musculoskeletal:         General: No swelling, tenderness or deformity. Normal range of motion.      Cervical back: Normal range of motion and neck supple.      Right lower leg: No edema.      Left lower leg: No edema.   Lymphadenopathy:      Cervical: No cervical adenopathy.   Skin:     General: Skin is warm and dry.      Capillary Refill: Capillary refill takes less than 2 seconds.   Neurological:      General: No focal deficit present.      Mental Status: She is alert and oriented to person, place, and time. Mental status is at baseline.      Cranial Nerves: No cranial nerve deficit.      Motor: No weakness.   Psychiatric:         Mood and Affect: Mood normal.         Behavior: Behavior normal.         Fluids  No intake or output data in the 24 hours ending 10/12/23 1646          Laboratory  Recent Labs     10/10/23  0018 10/11/23  0019 10/12/23  0032   WBC 13.7* 11.4* 13.6*   RBC 4.11*  3.84* 3.82*   HEMOGLOBIN 10.9* 10.2* 10.3*   HEMATOCRIT 36.0* 33.9* 34.4*   MCV 87.6 88.3 90.1   MCH 26.5* 26.6* 27.0   MCHC 30.3* 30.1* 29.9*   RDW 59.8* 60.1* 61.1*   PLATELETCT 98* 124* 115*   MPV 9.7 9.7 10.0     Recent Labs     10/10/23  0018   SODIUM 133*   POTASSIUM 5.2   CHLORIDE 99   CO2 23   GLUCOSE 125*   BUN 16   CREATININE 0.53   CALCIUM 8.0*                     Imaging  IR-US GUIDED PIV   Final Result    Ultrasound-guided PERIPHERAL IV INSERTION performed by    qualified nursing staff as above.      IR-US GUIDED PIV   Final Result    Ultrasound-guided PERIPHERAL IV INSERTION performed by    qualified nursing staff as above.      DX-CHEST-LIMITED (1 VIEW)   Final Result      Stable bilateral pulmonary infiltrates.      DX-CHEST-LIMITED (1 VIEW)   Final Result         1.  Extensive bilateral pulmonary parenchymal opacities compatible with ARDS, severe pulmonary edema, and/or superimposed pulmonary infiltrates, slightly decreased since prior study.      DX-CHEST-PORTABLE (1 VIEW)   Final Result         1.  Extensive bilateral pulmonary parenchymal opacities compatible with ARDS, severe pulmonary edema, and/or superimposed pulmonary infiltrates, stable since prior study.      IR-MIDLINE CATHETER INSERTION WO GUIDANCE > AGE 3   Final Result                  Ultrasound-guided midline placement performed by qualified nursing staff    as above.          DX-CHEST-PORTABLE (1 VIEW)   Final Result         1.  Extensive bilateral pulmonary parenchymal opacities compatible with ARDS, severe pulmonary edema, and/or superimposed pulmonary infiltrates, stable since prior study.      DX-CHEST-PORTABLE (1 VIEW)   Final Result         1.  Pulmonary edema and/or infiltrates are identified, which are stable since the prior exam.      DX-CHEST-PORTABLE (1 VIEW)   Final Result         1.  Bilateral pulmonary infiltrates, stable since prior study   2.  Multiple bilateral pulmonary nodules.      DX-CHEST-PORTABLE (1 VIEW)    Final Result      1.  Innumerable pulmonary nodules bilaterally.      2.  Increasing confluence and airspace opacities in the perihilar regions suspicious for superimposed pneumonitis on diffuse metastatic disease      IR-PICC LINE PLACEMENT W/ GUIDANCE > AGE 5   Final Result                  Ultrasound-guided PICC placement performed by qualified nursing staff as    above.          DX-CHEST-PORTABLE (1 VIEW)   Final Result      1.  Innumerable bilateral pulmonary nodule, increased since 9/20/2023      2.  Differential diagnosis includes worsening metastases or superimposed pneumonia.      EC-ECHOCARDIOGRAM COMPLETE W/O CONT   Final Result      CT-CTA CHEST PULMONARY ARTERY W/ RECONS   Final Result      1.  Minimal pulmonary embolism. Localized right lower lobe segmental and subsegmental branches.   2.  No right heart strain.   3.  9 mm lucent/lytic lesion T1 vertebral body. Suspect osseous metastatic deposit.   4.  Progression of extensive mediastinal adenopathy since 8/20/2023 consistent with metastatic disease.   5.  Extensive progression of diffuse bilateral innumerable pulmonary nodules consistent with metastatic disease.   6.  Interval development of extensive groundglass and airspace opacities consistent with superimposed pneumonia.   7.  Progression of right adrenal mass now measuring 43 mm x 38 mm consistent with metastasis.      Fleischner Society pulmonary nodule recommendations:      DX-CHEST-PORTABLE (1 VIEW)   Final Result      Diffuse bilateral interstitial and alveolar opacities most consistent with pneumonia or possibly noncardiogenic pulmonary edema.             Assessment/Plan  * Acute on chronic respiratory failure (HCC)- (present on admission)  Assessment & Plan  Multiple factors including aggressive metastatic undifferentiated sarcoma versus sarcomatoid lung cancer.   history of stage 1 lung adenocarcinoma s/p SBRT, pulmonary embolism, COPD/asthma, recent pneumonia.      Initially treated at  the ICU, patient still on high flow oxygen  Finish course of antibiotics  downgraded to IMCU on 10/2/2023.      Completed course of antibiotics.   Now down to 10 L oxygen, continue to taper as tolerated  Continue steroids, inhaler and nebulizers.  RT, continue weaning her from oxygen    Goals of care, counseling/discussion- (present on admission)  Assessment & Plan  Goals of care done last week extensively. Hospice recommended by oncology and ICU team.  Patient would like to try some form of therapy if possible and oncology offered an oral chemotherapeutic regime. She elected to change to DNR/DNI.   Continue with palliative care consultation.     Sarcoma (HCC)- (present on admission)  Assessment & Plan  Advanced, aggressive, on oral capmatinib    Pulmonary embolism on right (HCC)- (present on admission)  Assessment & Plan  Recently diagnosed.  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   Stated Capmatinib on 9/29/2023 by oncology team  Patient is DNR/DNI.  Palliative team on board, appreciate their help    Mass of sacrum- (present on admission)  Assessment & Plan  Aggressive metastatic undifferentiated sarcoma. On Capmatinib    Lung cancer (HCC)- (present on admission)  Assessment & Plan  Initially diagnosed stage I adenocarcinoma initially treated with chemoradiation.   Discussed with Dr. Newton who continues to follow   Now with widespread metastatic sarcoma  Plan to continue capmatinib    Chronic pain due to neoplasm- (present on admission)  Assessment & Plan  Palliative care have been following for pain management.  Continue multimodal pain management, oxy, tylenol, lidocaine patches, gabapentin and IV pain medication for breakthrough.   Pain currently well controlled    Cigarette nicotine dependence without complication- (present on  admission)  Assessment & Plan  History of ongoing tobacco abuse, cessation discussed and recommended   Nicotine patch if needed          VTE prophylaxis: Eliquis      Greater than 51 minutes spent prepping to see patient (e.g. review of tests) obtaining and/or reviewing separately obtained history. Performing a medically appropriate examination and/ evaluation.  Counseling and educating the patient/family/caregiver.  Ordering medications, tests, or procedures.  Referring and communicating with other health care professionals.  Documenting clinical information in EPIC.  Independently interpreting results and communicating results to patient/family/caregiver.  Care coordination

## 2023-10-12 NOTE — DISCHARGE PLANNING
Case Management Discharge Planning    Admission Date: 9/20/2023  GMLOS: 4.9  ALOS: 22    6-Clicks ADL Score: 20  6-Clicks Mobility Score: 19      Anticipated Discharge Dispo: Discharge Disposition: Discharged to home/self care (01)    DME Needed: Yes    DME Ordered: No    Action(s) Taken: Discussed in IDT rounds, pt has been declined from LTAC due to insurance. Pt has been able to wean off of high flow and is on 11L of O2, discussed pt can go home with a 10L concentrator. Requested DPA to follow up with local DME companies to verify who can provide a 10L concentrator and if they will accept Medicaid FFS. Pt also receiving IV dilaudid must wean IV pain medications prior to dc.     Escalations Completed: None    Medically Clear: No    Next Steps: Pending wean of O2 and IV pain medications.     Barriers to Discharge: Medical clearance    Is the patient up for discharge tomorrow: No

## 2023-10-12 NOTE — CARE PLAN
Problem: Bronchoconstriction  Goal: Improve in air movement and diminished wheezing  Description: Target End Date:  2 to 3 days    1.  Implement inhaled treatments  2.  Evaluate and manage medication effects  Outcome: Progressing     Problem: Humidified High Flow Nasal Cannula  Goal: Maintain adequate oxygenation dependent on patient condition  Description: Target End Date:  resolve prior to discharge or when underlying condition is resolved/stabilized    1.  Implement humidified high flow oxygen therapy  2.  Titrate high flow oxygen to maintain appropriate SpO2  Outcome: Progressing     10-12L Oxymask

## 2023-10-12 NOTE — CARE PLAN
The patient is Watcher - Medium risk of patient condition declining or worsening    Shift Goals  Clinical Goals: decrease anxiety, control pain, maintain O2 sats  Patient Goals: pain control, decrease anxiety  Family Goals: not present    Progress made toward(s) clinical / shift goals:    A/Ox4, pt on 10L oxymask tolerating well, pt is able to understand plan of care. All questions answered at the moment.  PRN pain medications given per MAR working effectively to promote comfort. Pt refusing PO pain medications at this time, educated pt but continued to refuse.    Problem: Pain - Standard  Goal: Alleviation of pain or a reduction in pain to the patient’s comfort goal  Outcome: Progressing     Problem: Respiratory  Goal: Patient will achieve/maintain optimum respiratory ventilation and gas exchange  Outcome: Progressing     Problem: Knowledge Deficit - Standard  Goal: Patient and family/care givers will demonstrate understanding of plan of care, disease process/condition, diagnostic tests and medications  Outcome: Progressing     Problem: Skin Integrity  Goal: Skin integrity is maintained or improved  Outcome: Progressing       Patient is not progressing towards the following goals:

## 2023-10-13 NOTE — DISCHARGE PLANNING
1235  Received Choice form at 1411  Agency/Facility Name: [facility]  Referral sent per Choice form @ [phone]  DPA receieve fax response from [facility], Pt declined due to not having adequate DME to provide Pt at 10LPM.     LSW notified.     1444  DPA sent oxygen order to preferred per choice form.

## 2023-10-13 NOTE — PROGRESS NOTES
Consult Note: Hematology/Oncology     Primary Care:  Checo Baker M.D.    Chief Complaint   Patient presents with    Shortness of Breath     PT hx lung ca that's metastasized. Was at another appt and was SOB. RA while walking in 70's per EMS. Currently on 6L NC         Current Treatment:     9/29/23: Capmatinib    Prior Treatment:     4/17/2023-4/21/2023: RLL Lung SBRT  7/19/23-7/24/2023: R Sacrum SBRT      Subjective:   History of Presenting Illness:    Melly Hendrickson is a 48 y.o. 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    Patient history dates back to July 2022 when she had a chest x-ray performed that revealed a potential mass in her right lung.  This led to a chest CT performed on February 13, 2023.  This demonstrated 1.4 cm mass in the right lower lobe and a 1.9 groundglass opacity also in the right lower lobe.  The groundglass opacity appeared stable from previous scans but the mass was thought to be new.      A biopsy was performed and confirmed lung adenocarcinoma.    Patient saw thoracic surgery and obtained PFTs as well as a PET scan.  She was discussed at tumor board and given her significant dyspnea and her underlying COPD it was thought that she would be a better candidate for SBRT rather than surgical resection.    April 17, 2023 patient began SBRT.  Completed this treatment without many issues.    After this patient did notice a small lump in her breast reduction scar line.  She said that over the next few days it grew and she ultimately went to her PCP    PCP tried to drain the mass; however it just bled significantly during the I&D attempt.  Thus she was sent to Dr. Becerra at [facility] for excision.     Pathology from this excision demonstrated poorly differentiated malignancy favoring carcinoma without obvious involvement of underlying skin and nonspecific IHC profile.  Based on  his diagnosis cancer type ID was sent off for.  Results showed 90% probability of sarcoma    Over the past several months she has noted severe pain in her sacral region.  She does have sacral lesions that are concerning for metastatic disease.  Patient has biopsy of sacral mass on 7/18/2023.     Of note patient has 5 children and 4 grandchildren.    She follows with pain management    I initially saw patient on 7/14.  At our 7/21, we had information back from biopsy and we discussed AIM.  Patient wished to hold off and think about this further.    On 7/28, we discussed initiating AIM, patient was not comfortable and wanted 2 opinion at [place].    I saw patient again on 8/11.  We discussed AIM,  patient not ready and awaiting [place] appointment.     She was recently admitted to the ER for hemoptysis on 8/20/23 and was found to have a PE.  She was still having hemoptysis and thus was not started on AC. She has discharged on 8/24/23.     I saw her subsequently on 9/7/23 and recommended AC given no hemoptysis, but active PE with cancer.  We also discussed chemo at this visit and she wished to wait.     During my visit with the patient on 9/19/23, Patient was endorsing 3 nodules on the R arm.  1 on the clavicle.  Patient also had a nodule on her L arm. Patient had 1 nodule on her bikini line. She has another on the inside of her L thigh. These started 1 week ago. She was on 2-3L of oxygen for the past several days.  We discussed intiating chemo and plan was to start on 9/26/23.  Pt requires a direct admit for AIM    Since that visit she had worseing SOB.  She was subsequently presented to the ER for SOB/cough and potential PNA. On admission, she was tachycardic, tachypneic, and afebrile.  Chest x-ray showed diffuse bilateral interstitial and alveolar opacities consistent with pneumonia. CTA chest on admission showed right subsegmental PE, worsening pulmonary nodules, T1 vertebral body region, possible pneumonia.   Procalcitonin was 0.11.  Ceftriaxone and azithromycin were empirically started.    Line was not able to be placed unable to start chemo.  Patients respiratory status declined and she was transferred to the ICU    Patient started on Capmatinib 9/29/23    Patient's oxygen requirements increased overnight and she is now on 35 L of high flow oxygen.  Today had a long discussion with the patient regarding  steps moving forward.  Ultimately we continue to try to wean her oxygen and move her to a facility with the ultimate goal of getting her home.      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    Chronic obstructive pulmonary disease (HCC)     COPD (chronic obstructive pulmonary disease) (HCC)     DDD (degenerative disc disease), lumbar     Depression     Emphysema of lung ([facility])     MRSA (methicillin resistant staph aureus) culture positive     Pain     back - cervical, scapula, lower    Pericarditis     Primary adenocarcinoma of lower lobe of right lung ([facility])     Psychiatric disorder     Anxiety, Depression    Sleep apnea     Snoring     Urinary incontinence         Past Surgical History:   Procedure Laterality Date    ND BRONCHOSCOPY,DIAGNOSTIC N/A 8/22/2023    Procedure: BRONCHOSCOPY;  Surgeon: Sandra Lopez D.O.;  Location: [facility];  Service: Pulmonary    ND EXC SKIN BENIG >4CM TRUNK,ARM,LEG Left 6/7/2023    Procedure: EXCISION OF SOFT TISSUE MASS LEFT CHEST WALL;  Surgeon: Nasir Becerra M.D.;  Location: [facility];  Service: General    ND BRONCHOSCOPY,DIAGNOSTIC N/A 02/14/2023    Procedure: FIBER OPTIC BRONCHOSCOPY WASH, BRUSH, BRONCHOALVEOLAR LAVAGE, BIOPSY AND FINE NEEDLE ASPIRATION AND NAVIGATION, ROBOTICS;  Surgeon: Guicho Ellis M.D.;  Location: [facility];  Service: Pulmonary Robotic    ENDOBRONCHIAL US  ADD-ON N/A 02/14/2023    Procedure: ENDOBRONCHIAL ULTRASOUND (EBUS);  Surgeon: Guicho Ellis M.D.;  Location: [facility];  Service: Pulmonary Robotic    MAMMOPLASTY REDUCTION Bilateral     SEPTOPLASTY      TUBE & ECTOPIC PREG., REMOVAL      x 2       Social History     Tobacco Use    Smoking status: Some Days     Current packs/day: 0.50     Average packs/day: 0.5 packs/day for 20.0 years (10.0 ttl pk-yrs)     Types: Cigarettes     Passive exposure: Past    Smokeless tobacco: Never    Tobacco comments:     on and off    Vaping Use    Vaping Use: Never used   Substance Use Topics    Alcohol use: Not Currently     Comment: occ, rare    Drug use: Not Currently     Types: Marijuana     Comment: THC gummies        Family History   Problem Relation Age of Onset    Diabetes Mother     Cancer Father         Prostate    Cancer Paternal Grandmother         Cervical    Cancer Paternal Grandfather         Unk       Allergies   Allergen Reactions    Penicillins Hives, Rash and Swelling     Pt reports that she gets swelling in throat and face, rash all over face and arms.   Tolerated cefazolin    Aspirin Rash and Hives     Pt reports that she received a rash on face, pt reports it ok if she takes NORCO    Other Environmental Shortness of Breath     Perfumes, dander, scents       Current Facility-Administered Medications   Medication Dose Route Frequency Provider Last Rate Last Admin    [START ON 10/14/2023] tiotropium (Spiriva Respimat) 2.5 mcg/Act inhalation spray 5 mcg  5 mcg Inhalation DAILY Fide Dillon M.D.        nystatin-tetracycline-prednisone-diphenhydramine (Miracle Mouth Wash) oral susp 5 mL  5 mL Oral Q6HRS PRN Naima Ambrosio A.P.R.NEstrellita   5 mL at 10/10/23 0514    ipratropium-albuterol (DUONEB) nebulizer solution  3 mL Nebulization 4X/DAY Oksana Mas M.D.   3 mL at 10/13/23 1034    levalbuterol (Xopenex) 1.25 MG/3ML nebulizer solution 1.25 mg  1.25 mg Nebulization Q2HRS PRN (RT) Oksana  JAZMINE Mas        albuterol inhaler 2 Puff  2 Puff Inhalation Q4HRS PRN Oksana Mas M.D.   2 Puff at 10/11/23 0513    mometasone-formoterol (Dulera) 100-5 MCG/ACT inhaler 2 Puff  2 Puff Inhalation BID Oksana Mas M.D.   2 Puff at 10/13/23 0423    Respiratory Therapy Consult   Nebulization Continuous RT Oksana Mas M.D.        ipratropium-albuterol (DUONEB) nebulizer solution  3 mL Nebulization Q2HRS PRN (RT) Oksana Mas M.D.        melatonin tablet 10 mg  10 mg Oral HS PRN Dex Madrid M.D.        morphine ER (Ms Contin) tablet 15 mg  15 mg Oral Q12HRS Dex Madrid M.D.   15 mg at 10/06/23 0945    busPIRone (Buspar) tablet 15 mg  15 mg Oral TID Dex Madrid M.D.   15 mg at 10/13/23 1219    oxyCODONE immediate-release (Roxicodone) tablet 5 mg  5 mg Oral Q4HRS PRN Gerson Adam M.D.        Or    oxyCODONE immediate release (Roxicodone) tablet 10 mg  10 mg Oral Q4HRS PRN Gerson Adam M.D.   10 mg at 10/13/23 1334    acetaminophen (Tylenol) tablet 650 mg  650 mg Oral Q6HRS Gerson Adam M.D.   650 mg at 10/12/23 2344    LORazepam (Ativan) injection 0.5 mg  0.5 mg Intravenous Q4HRS PRN Tonny Edmond M.D.   0.5 mg at 10/13/23 0852    lidocaine (Lidoderm) 5 % 3 Patch  3 Patch Transdermal Q24HR Tonny Edmond M.D.   2 Patch at 10/04/23 0532    dexamethasone (Decadron) injection 4 mg  4 mg Intravenous Q6HRS Tonny Edmond M.D.   4 mg at 10/13/23 1219    sodium chloride (Ocean) 0.65 % nasal spray 2 Spray  2 Spray Nasal Q2HRS PRN Tonny Edmond M.D.        HYDROmorphone (Dilaudid) injection 1 mg  1 mg Intravenous Q HOUR PRN Madhu Simpson P.A.-C.   1 mg at 10/13/23 1039    capmatinib (Tabrecta) tablet 400 mg  400 mg Oral BID Alma Newton M.D.   400 mg at 10/13/23 0416    guaiFENesin ER (Mucinex) tablet 600 mg  600 mg Oral Q12HRS NICOLASA AlvaradoP.REstrellitaN.   600 mg at 10/13/23 0407    diphenhydrAMINE (Benadryl) injection 25 mg  25 mg  Intravenous Q6HRS PRN Kay Christensen A.P.R.NEstrellita   25 mg at 10/13/23 1231    diclofenac sodium (Voltaren) 1 % gel 2 g  2 g Topical 4X/DAY PRN Madhu Merino D.O.        loratadine (Claritin) tablet 10 mg  10 mg Oral DAILY SANJU GarciaOEstrellita   10 mg at 10/13/23 0408    senna-docusate (Pericolace Or Senokot S) 8.6-50 MG per tablet 2 Tablet  2 Tablet Oral BID SANJU GarciaOEstrellita   2 Tablet at 10/13/23 0407    And    polyethylene glycol/lytes (Miralax) PACKET 1 Packet  1 Packet Oral QDAY PRN SANJU GarciaOEstrellita   1 Packet at 10/13/23 0002    And    magnesium hydroxide (Milk Of Magnesia) suspension 30 mL  30 mL Oral QDAY PRN Madhu Merino D.O.   30 mL at 09/24/23 0939    And    bisacodyl (Dulcolax) suppository 10 mg  10 mg Rectal QDAY PRN SANJU GarciaOEstrellita   10 mg at 09/28/23 1300    acetaminophen (Tylenol) tablet 650 mg  650 mg Oral Q6HRS PRN Madhu Merino D.O.        hydrALAZINE (Apresoline) injection 10 mg  10 mg Intravenous Q4HRS PRN SANJU GarciaOEstrellita   10 mg at 09/26/23 1010    ondansetron (Zofran) syringe/vial injection 4 mg  4 mg Intravenous Q4HRS PRN SANJU GarciaOEstrellita   4 mg at 10/04/23 2150    ondansetron (Zofran ODT) dispertab 4 mg  4 mg Oral Q4HRS PRN Madhu Merino D.O.        promethazine (Phenergan) tablet 12.5-25 mg  12.5-25 mg Oral Q4HRS PRN Madhu Merino D.O.        promethazine (Phenergan) suppository 12.5-25 mg  12.5-25 mg Rectal Q4HRS PRN Madhu Merino D.O.        prochlorperazine (Compazine) injection 5-10 mg  5-10 mg Intravenous Q4HRS PRN Madhu Merino D.O.        guaiFENesin dextromethorphan (Robitussin DM) 100-10 MG/5ML syrup 10 mL  10 mL Oral Q6HRS PRN Madhu Merino D.O.   10 mL at 10/11/23 1652    apixaban (Eliquis) tablet 5 mg  5 mg Oral BID Madhu Merino D.O.   5 mg at 10/13/23 0408       Review of Systems   Constitutional:  Positive for malaise/fatigue. Negative for chills, fever and weight loss.   HENT:  Negative for congestion, ear pain, nosebleeds and sore  throat.         Mouth sores   Eyes:  Negative for blurred vision.   Respiratory:  Positive for shortness of breath. Negative for cough, sputum production and wheezing.    Cardiovascular:  Negative for chest pain, palpitations, orthopnea and leg swelling.        Chest heaviness   Gastrointestinal:  Negative for abdominal pain, heartburn, nausea and vomiting.   Genitourinary:  Negative for dysuria, frequency and urgency.   Musculoskeletal:  Positive for back pain and joint pain. Negative for neck pain.   Neurological:  Negative for dizziness, tingling, tremors, sensory change, focal weakness and headaches.   Endo/Heme/Allergies:  Does not bruise/bleed easily.   Psychiatric/Behavioral:  Negative for depression, memory loss and suicidal ideas.    All other systems reviewed and are negative.      Problem list, medications, and allergies reviewed by myself today in Epic.     Objective:     Vitals:    10/13/23 0903 10/13/23 0913 10/13/23 1027 10/13/23 1145   BP:  114/66  134/78   Pulse: (!) 140 (!) 120 (!) 118 (!) 115   Resp: (!) 26 20 20 18   Temp:    36.7 °C (98.1 °F)   TempSrc:  Temporal  Oral   SpO2: (!) 84% 96% 90% 95%   Weight:       Height:             DESC; KARNOFSKY SCALE WITH ECOG EQUIVALENT: 90, Able to carry on normal activity; minor signs or symptoms of disease (ECOG equivalent 0)    DISTRESS LEVEL: no acute distress    Physical Exam  Constitutional:       General: She is not in acute distress.     Appearance: She is not ill-appearing or toxic-appearing.   HENT:      Mouth/Throat:      Pharynx: Oropharyngeal exudate and posterior oropharyngeal erythema present.   Cardiovascular:      Rate and Rhythm: Tachycardia present.   Pulmonary:      Effort: No respiratory distress.      Breath sounds: No stridor. Rhonchi present. No wheezing.   Musculoskeletal:      Cervical back: Normal range of motion and neck supple.       Deferred    Labs:   Most recent labs reviewed.  Please see the lab tab of chart  review    Imaging:   Most recent images below have been independently reviewed by me.  Please see the imaging tab of chart review    9/22/2023 : Echocardiogram showed LVEF ~ 65% with no reported abnormalities    7/27/23: PET   1.  Focal uptake in the medial RIGHT lower lobe of lung abutting the thoracic spine, consistent with tumor..  2.  No PET correlate for ill-defined spiculated nodule in the RIGHT lower lobe.  3.  Large hypermetabolic mass corresponds to lytic lesion in the RIGHT sacrum consistent with malignancy.  4.  Intramuscular focal uptake at the posterior LEFT shoulder, likely metastatic.    Pathology:    7/14/2023: Pathology of Chest wall  A. Chest wall mass:          Metastatic poorly differentiated malignancy favoring carcinoma           without obvious involvement of overlying skin and a           non-specific IHC profile of some keratins positive (see           microscopic description).          See comment.   Main Cancer Type: Sarcoma   Probability: 90%     Subtype: Undifferentiated Sarcoma ([facility])   Probability: 90%     7/19/2023: Path of the Sacral Bone  A. Sacral bone biopsy:          Bone cores effaced by a high-grade malignancy histologically           identical to the previously excised chest wall mass           ([id number]).     2/14/2023: Pathology of Lung Mass  A. Lung, right lower lobe fine needle aspiration slides:          Positive for carcinoma.   B. Core, right lower lobe lung:          Moderately differentiated lung adenocarcinoma.   C. Lung, right lower lobe biopsy core and touchprep slides:          Moderately differentiated lung adenocarcinoma.   D. Bronchoalveolar lavage right lower lobe:          Rare atypical cells, malignant cells vs. reactive bronchial           cells.     Assessment/Plan:      Cancer Staging   Primary undifferentiated sarcoma of soft tissue (HCC)  Staging form: Soft Tissue Sarcoma of the Trunk and Extremities, AJCC 8th Edition  - Clinical: Stage IV (cTX, cN0,  cM1) - Signed by Alma Newton M.D. on 9/19/2023         Ms. 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    Unfortunately her oxygen requirements have increased and she is now on high flow oxygen 35 L.  Labs stable    From a oncology standpoint she is fine to be transferred to another facility as long as that facility allows her to continue taking capmatinib    Plan:  Patient to continue capmatinib.    Continue to wean oxygen as tolerated  I will continue to follow    Alma Newton M.D.  Hematology/Oncology

## 2023-10-13 NOTE — DISCHARGE PLANNING
Received choice form at: 0522  Agency/Facility name: [facility]  Referral sent per choice form at:  1600    The rest of the DME choice are in order in the continued care & services

## 2023-10-13 NOTE — CONSULTS
Brief Behavioral Health Care Note       Name: Melly Hendrickson  MRN: [id number]  : 1975  Age: 48 y.o.  Date of assessment: 10/13/2023  PCP: Checo Baker M.D.  Persons in attendance: Patient    Length of Intervention: 30 minutes        CHIEF COMPLAINT/PRESENTING ISSUE (as stated by Patient): Patient was seen lying  on hospital bed, drowsy, and reported not feeling well today. Patient's mother and friend were at bedside along with Respiratory.  Patient appears more depressed without psychosis or suicidal ideations.  Patient reports in addition to her medical complications, patient has to move from her apartment by Monday. Patient states she has no help. Her mother is visiting from [place] and is unable to provide this type of support as she is an elderly woman. Patient appears hopeless. Attempted to spend this session providing support and assisting patient in problem solving.    Will continue to follow and provide support.    Thank you,    Sherry Nolan, Ph.D., [facility]

## 2023-10-13 NOTE — CARE PLAN
Respiratory Update    Treatment modality: HHFNC 30L/60%   Frequency: q4     Pt tolerating current treatments well with no adverse reactions.

## 2023-10-13 NOTE — CONSULTS
Brief Behavioral Health Care Note    Attempted to meet with patient. Patient had visitors and a  praying at bedside. Will follow up at another time.    Thank you    Sherry Nolan, Ph.D., [facility]

## 2023-10-13 NOTE — PROGRESS NOTES
Hospital Medicine Daily Progress Note    Date of Service  10/13/2023    Chief Complaint  48-year-old female with history of sarcoma and lung cancer presented 9/20 with dyspnea.      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  On admission patient had tachycardia tachypnea, chest x-ray showed bilateral infiltration, CTA for chest showed right lower lobe subsegmental PE, worsening pulmonary nodules with T1 vertebral body lesion, possible pneumonia.  Procalcitonin was 0.11.Eliquis was started.  Also antibiotics for pneumonia Ceftriaxone and azithromycin were empirically started.  Echo was done and showed normal ejection fraction 65%, initially patient was treated ICU secondary to increasing oxygen requirement, patient was on high flow oxygen, treated with a steroid and inhalers.  With some improvement and patient was transferred out of the ICU.    Patient was evaluated by Dr. Newton oncologist who recommended to start  capmatinib on 9/29/2023.    Patient has metastatic sarcoma with lung cancer and significant anxiety, palliative care following for pain management.  Patient needed Precedex drip for anxiety.  Patient is on every 4 hours prn Ativan, every 2 hours prn IV Dilaudid, Norco 10/325 every 3 hours prn. Tylenol every 6 hours scheduled, holding when sleeping. Dexamethasone 4 mg BID.  CODE STATUS was discussed with the patient and agreed for DNR/DNI.    Interval Problem Update  Axox3, desaturation this am when she got up to the commode, now back on high flow, she reports subjectively her sob is stable, she also reports her pain is stable and she continues to decline her oral pain meds - this was discussed with her again as well. ROS otherwise negative.        I have discussed this patient's plan of care and discharge plan at IDT rounds today with Case Management, Nursing, Nursing leadership, and other members of the IDT team.    Consultants/Specialty  oncology critical care, palliative care    Code Status  DNAR/DNI    Disposition  The patient is not medically cleared for discharge to home or a post-acute facility.      I have placed the appropriate orders for post-discharge needs.    Review of Systems  Review of Systems   Constitutional:  Positive for malaise/fatigue. Negative for chills, diaphoresis, fever and weight loss.   HENT: Negative.  Negative for sore throat.    Eyes: Negative.  Negative for blurred vision.   Respiratory:  Positive for shortness of breath. Negative for cough.    Cardiovascular: Negative.  Negative for chest pain, palpitations and leg swelling.   Gastrointestinal: Negative.  Negative for abdominal pain, heartburn, nausea and vomiting.   Genitourinary: Negative.  Negative for dysuria and frequency.   Musculoskeletal:  Negative for myalgias and neck pain.   Skin: Negative.  Negative for itching and rash.   Neurological: Negative.  Negative for dizziness, focal weakness, weakness and headaches.   Endo/Heme/Allergies: Negative.  Negative for polydipsia. Does not bruise/bleed easily.   Psychiatric/Behavioral: Negative.  Negative for depression, substance abuse and suicidal ideas. The patient is not nervous/anxious.    All other systems reviewed and are negative.       Physical Exam  Temp:  [36.6 °C (97.9 °F)-36.9 °C (98.4 °F)] 36.8 °C (98.3 °F)  Pulse:  [] 121  Resp:  [18-26] 20  BP: (114-146)/(66-84) 121/78  SpO2:  [84 %-96 %] 92 %    Physical Exam  Vitals and nursing note reviewed. Exam conducted with a chaperone present.   Constitutional:       General: She is not in acute distress.     Appearance: Normal appearance. She is well-developed. She is ill-appearing. She is not diaphoretic.   HENT:      Head: Normocephalic and atraumatic.       Nose: Nose normal.      Comments: High flow nasal cannula     Mouth/Throat:      Mouth: Mucous membranes are moist.   Eyes:      Conjunctiva/sclera: Conjunctivae normal.      Pupils: Pupils are equal, round, and reactive to light.   Neck:      Thyroid: No thyromegaly.      Vascular: No JVD.   Cardiovascular:      Rate and Rhythm: Normal rate and regular rhythm.      Pulses: Normal pulses.      Heart sounds: Normal heart sounds.      No friction rub. No gallop.   Pulmonary:      Effort: Pulmonary effort is normal. No respiratory distress.      Breath sounds: No wheezing, rhonchi or rales.      Comments: Decreased at bases  Abdominal:      General: Abdomen is flat. Bowel sounds are normal. There is no distension.      Palpations: Abdomen is soft. There is no mass.      Tenderness: There is no abdominal tenderness. There is no guarding or rebound.   Musculoskeletal:         General: No swelling, tenderness or deformity. Normal range of motion.      Cervical back: Normal range of motion and neck supple.      Right lower leg: No edema.      Left lower leg: No edema.   Lymphadenopathy:      Cervical: No cervical adenopathy.   Skin:     General: Skin is warm and dry.      Capillary Refill: Capillary refill takes less than 2 seconds.   Neurological:      General: No focal deficit present.      Mental Status: She is alert and oriented to person, place, and time. Mental status is at baseline.      Cranial Nerves: No cranial nerve deficit.      Motor: No weakness.   Psychiatric:         Mood and Affect: Mood normal.         Behavior: Behavior normal.         Fluids  No intake or output data in the 24 hours ending 10/13/23 1711          Laboratory  Recent Labs     10/11/23  0019 10/12/23  0032 10/13/23  0042   WBC 11.4* 13.6* 12.7*   RBC 3.84* 3.82* 3.88*   HEMOGLOBIN 10.2* 10.3* 10.4*   HEMATOCRIT 33.9* 34.4* 34.6*   MCV 88.3 90.1 89.2   MCH 26.6* 27.0 26.8*   MCHC 30.1* 29.9* 30.1*   RDW 60.1* 61.1* 62.5*   PLATELETCT 124*  115* 116*   MPV 9.7 10.0 9.0     Recent Labs     10/13/23  0042   SODIUM 135   POTASSIUM 4.4   CHLORIDE 101   CO2 27   GLUCOSE 179*   BUN 15   CREATININE 0.52   CALCIUM 8.2*                     Imaging  IR-US GUIDED PIV   Final Result    Ultrasound-guided PERIPHERAL IV INSERTION performed by    qualified nursing staff as above.      IR-US GUIDED PIV   Final Result    Ultrasound-guided PERIPHERAL IV INSERTION performed by    qualified nursing staff as above.      IR-US GUIDED PIV   Final Result    Ultrasound-guided PERIPHERAL IV INSERTION performed by    qualified nursing staff as above.      DX-CHEST-LIMITED (1 VIEW)   Final Result      Stable bilateral pulmonary infiltrates.      DX-CHEST-LIMITED (1 VIEW)   Final Result         1.  Extensive bilateral pulmonary parenchymal opacities compatible with ARDS, severe pulmonary edema, and/or superimposed pulmonary infiltrates, slightly decreased since prior study.      DX-CHEST-PORTABLE (1 VIEW)   Final Result         1.  Extensive bilateral pulmonary parenchymal opacities compatible with ARDS, severe pulmonary edema, and/or superimposed pulmonary infiltrates, stable since prior study.      IR-MIDLINE CATHETER INSERTION WO GUIDANCE > AGE 3   Final Result                  Ultrasound-guided midline placement performed by qualified nursing staff    as above.          DX-CHEST-PORTABLE (1 VIEW)   Final Result         1.  Extensive bilateral pulmonary parenchymal opacities compatible with ARDS, severe pulmonary edema, and/or superimposed pulmonary infiltrates, stable since prior study.      DX-CHEST-PORTABLE (1 VIEW)   Final Result         1.  Pulmonary edema and/or infiltrates are identified, which are stable since the prior exam.      DX-CHEST-PORTABLE (1 VIEW)   Final Result         1.  Bilateral pulmonary infiltrates, stable since prior study   2.  Multiple bilateral pulmonary nodules.      DX-CHEST-PORTABLE (1 VIEW)   Final Result      1.  Innumerable pulmonary nodules  bilaterally.      2.  Increasing confluence and airspace opacities in the perihilar regions suspicious for superimposed pneumonitis on diffuse metastatic disease      IR-PICC LINE PLACEMENT W/ GUIDANCE > AGE 5   Final Result                  Ultrasound-guided PICC placement performed by qualified nursing staff as    above.          DX-CHEST-PORTABLE (1 VIEW)   Final Result      1.  Innumerable bilateral pulmonary nodule, increased since 9/20/2023      2.  Differential diagnosis includes worsening metastases or superimposed pneumonia.      EC-ECHOCARDIOGRAM COMPLETE W/O CONT   Final Result      CT-CTA CHEST PULMONARY ARTERY W/ RECONS   Final Result      1.  Minimal pulmonary embolism. Localized right lower lobe segmental and subsegmental branches.   2.  No right heart strain.   3.  9 mm lucent/lytic lesion T1 vertebral body. Suspect osseous metastatic deposit.   4.  Progression of extensive mediastinal adenopathy since 8/20/2023 consistent with metastatic disease.   5.  Extensive progression of diffuse bilateral innumerable pulmonary nodules consistent with metastatic disease.   6.  Interval development of extensive groundglass and airspace opacities consistent with superimposed pneumonia.   7.  Progression of right adrenal mass now measuring 43 mm x 38 mm consistent with metastasis.      Fleischner Society pulmonary nodule recommendations:      DX-CHEST-PORTABLE (1 VIEW)   Final Result      Diffuse bilateral interstitial and alveolar opacities most consistent with pneumonia or possibly noncardiogenic pulmonary edema.             Assessment/Plan  * Acute on chronic respiratory failure (HCC)- (present on admission)  Assessment & Plan  Multiple factors including aggressive metastatic undifferentiated sarcoma versus sarcomatoid lung cancer.   history of stage 1 lung adenocarcinoma s/p SBRT, pulmonary embolism, COPD/asthma, recent pneumonia.      Initially treated at the ICU, patient still on high flow oxygen  Finish  course of antibiotics  downgraded to IMCU on 10/2/2023.      Completed course of antibiotics.   Oxygen requirements increased again, now back on high flow - continue to follow, IS etc  Continue inhaler and nebulizers.  RT, continue weaning her from oxygen    Goals of care, counseling/discussion- (present on admission)  Assessment & Plan  Goals of care done last week extensively. Hospice recommended by oncology and ICU team.  Patient would like to try some form of therapy if possible and oncology offered an oral chemotherapeutic regime. She elected to change to DNR/DNI.   Continue with palliative care consultation.     Sarcoma (HCC)- (present on admission)  Assessment & Plan  Advanced, aggressive, on oral capmatinib    Pulmonary embolism on right (HCC)- (present on admission)  Assessment & Plan  Recently diagnosed.  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   Stated Capmatinib on 9/29/2023 by oncology team  Patient is DNR/DNI.  Palliative team on board, appreciate their help    Mass of sacrum- (present on admission)  Assessment & Plan  Aggressive metastatic undifferentiated sarcoma. On Capmatinib    Lung cancer (HCC)- (present on admission)  Assessment & Plan  Initially diagnosed stage I adenocarcinoma initially treated with chemoradiation.   Discussed again with Dr. Newton  Now with widespread metastatic sarcoma  Plan to continue capmatinib    Chronic pain due to neoplasm- (present on admission)  Assessment & Plan  Palliative care have been following for pain management.  Continue multimodal pain management, oxy, tylenol, lidocaine patches, gabapentin and IV pain medication for breakthrough.   Pain currently controlled  Patient refusing her oral meds - encouraged to take them for steadier pain control    Cigarette nicotine dependence without  complication- (present on admission)  Assessment & Plan  History of ongoing tobacco abuse, cessation discussed and recommended   Nicotine patch if needed          VTE prophylaxis: Eliquis      Greater than 51 minutes spent prepping to see patient (e.g. review of tests) obtaining and/or reviewing separately obtained history. Performing a medically appropriate examination and/ evaluation.  Counseling and educating the patient/family/caregiver.  Ordering medications, tests, or procedures.  Referring and communicating with other health care professionals.  Documenting clinical information in EPIC.  Independently interpreting results and communicating results to patient/family/caregiver.  Care coordination

## 2023-10-13 NOTE — PROGRESS NOTES
Assumed care of patient at bedside report from day RN. Updated on POC. Patient currently A & O x 4; on 10 L O2 via oxy mask sating in low 90%s; up x1 assist to the commode. No current  complaints of acute pain, patient sleeping no signs of respiratory disress.  Call light within reach. Whiteboard updated. Family at bedside.  Fall precautions in place. Bed locked and in lowest position. All questions answered. No other needs indicated at this time.

## 2023-10-13 NOTE — DISCHARGE PLANNING
Case Management Discharge Planning    Admission Date: 9/20/2023  GMLOS: 4.9  ALOS: 23    6-Clicks ADL Score: 20  6-Clicks Mobility Score: 19      Anticipated Discharge Dispo: Discharge Disposition: Discharged to home/self care (01)    DME Needed: Yes    DME Ordered: Yes    Action(s) Taken: Discussed in IDT rounds, pt is back on 35L of High Flow, discussed possibility of [facility]. Hospitalist placed F2F order for 10L concentrator, faxed order to [facility].    Spoke with pt at bedside, she stated she is not interested in going to [facility], will update hospitalist of pt's decision. Spoke with about 10L concentrator, she will agreeable and her dc plan is to go home with 10L concentrator. Provided AD packet and mobile notary list, pt stated she has an  and he is working on AD.     Escalations Completed: None    Medically Clear: No    Next Steps: Wean off of High Flow    Barriers to Discharge: Medical clearance    Is the patient up for discharge tomorrow: No

## 2023-10-13 NOTE — PROGRESS NOTES
Monitor summary    Rhythm: ST  Rate: 100-123  Ectopy: (R) COUP, TRIGEM, BIGEM, (O) PVC  Measurement: .10/.09/.32

## 2023-10-13 NOTE — CARE PLAN
The patient is Stable - Low risk of patient condition declining or worsening    Shift Goals  Clinical Goals: titrate O2 needs, pain control and sleep  Patient Goals: sleep, manage pain  Family Goals: keep her comfortable    Progress made toward(s) clinical / shift goals:    Problem: Respiratory  Goal: Patient will achieve/maintain optimum respiratory ventilation and gas exchange  Description: Target End Date:  Prior to discharge or change in level of care    Document on Assessment flowsheet    1.  Assess and monitor rate, rhythm, depth and effort of respiration  2.  Breath sounds assessed qshift and/or as needed  3.  Assess O2 saturation, administer/titrate oxygen as ordered  4.  Position patient for maximum ventilatory efficiency  5.  Turn, cough, and deep breath with splinting to improve effectiveness  6.  Collaborate with RT to administer medication/treatments per order  7.  Encourage use of incentive spirometer and encourage patient to cough after use and utilize splinting techniques if applicable  8.  Airway suctioning  9.  Monitor sputum production for changes in color, consistency and frequency  10. Perform frequent oral hygiene  11. Alternate physical activity with rest periods  Outcome: Progressing       Patient is not progressing towards the following goals:

## 2023-10-14 NOTE — CARE PLAN
The patient is Watcher - Medium risk of patient condition declining or worsening    Shift Goals  Clinical Goals: Pain management, monitor O2, anxiety control  Patient Goals: Pain management, rest  Family Goals: comfort    Progress made toward(s) clinical / shift goals:    Problem: Pain - Standard  Goal: Alleviation of pain or a reduction in pain to the patient’s comfort goal  Outcome: Progressing  Note: Pain assessed using 0-10 verbal pain scale, PRN pain medications administered per MAR.      Problem: Respiratory  Goal: Patient will achieve/maintain optimum respiratory ventilation and gas exchange  Outcome: Progressing  Note: Pt switched to nasal cannula, respiratory reapplied heated high flow nasal cannula as needed for desaturation.      Problem: Knowledge Deficit - Standard  Goal: Patient and family/care givers will demonstrate understanding of plan of care, disease process/condition, diagnostic tests and medications  Outcome: Progressing  Note: Pt updated on plan of care and states understanding for pain management, anxiety control with Ativan, and monitoring O2. All questions answered.        Patient is not progressing towards the following goals: N/A

## 2023-10-14 NOTE — PROGRESS NOTES
Monitor summary    Rhythm: ST  Rate: 104-123, 140 non-sustaining  Ectopy: (O)- (R) PVC, (R) bigem,   Measurement: .12/.08/.28

## 2023-10-14 NOTE — PROGRESS NOTES
Monitor Summary:   Rhythm: ST  Rate: 108-129  Measurement: .12/.08/.33  Ectopy: RPVC, RBIgem, Rcouplet,

## 2023-10-14 NOTE — PROGRESS NOTES
Hospital Medicine Daily Progress Note    Date of Service  10/14/2023    Chief Complaint  48-year-old female with history of sarcoma and lung cancer presented 9/20 with dyspnea.      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  On admission patient had tachycardia tachypnea, chest x-ray showed bilateral infiltration, CTA for chest showed right lower lobe subsegmental PE, worsening pulmonary nodules with T1 vertebral body lesion, possible pneumonia.  Procalcitonin was 0.11.Eliquis was started.  Also antibiotics for pneumonia Ceftriaxone and azithromycin were empirically started.  Echo was done and showed normal ejection fraction 65%, initially patient was treated ICU secondary to increasing oxygen requirement, patient was on high flow oxygen, treated with a steroid and inhalers.  With some improvement and patient was transferred out of the ICU.    Patient was evaluated by Dr. Newton oncologist who recommended to start  capmatinib on 9/29/2023.    Patient has metastatic sarcoma with lung cancer and significant anxiety, palliative care following for pain management.  Patient needed Precedex drip for anxiety.  Patient is on every 4 hours prn Ativan, every 2 hours prn IV Dilaudid, Norco 10/325 every 3 hours prn. Tylenol every 6 hours scheduled, holding when sleeping. Dexamethasone 4 mg BID.  CODE STATUS was discussed with the patient and agreed for DNR/DNI.    Interval Problem Update  Axox3, remains on high flow oxygen, she states her breathing is improving this am and she is eager to wean down as tolerated. Her pain is stable, continues to refuse oral long acting meds - readdressed and she was encouraged to try as would likely provide better pain control. States  she is feeling hopeful today, ros otherwise negative.     Addendum: atypical L shoulder pain, constant - will check ekg and trop, trial of lidocaine, tele, following  Ekg interpreted by me,early repolarization, unchanged from previous    I have discussed this patient's plan of care and discharge plan at IDT rounds today with Case Management, Nursing, Nursing leadership, and other members of the IDT team.    Consultants/Specialty  oncology critical care, palliative care    Code Status  DNAR/DNI    Disposition  The patient is not medically cleared for discharge to home or a post-acute facility.      I have placed the appropriate orders for post-discharge needs.    Review of Systems  Review of Systems   Constitutional:  Positive for malaise/fatigue. Negative for chills, diaphoresis, fever and weight loss.   HENT: Negative.  Negative for sore throat.    Eyes: Negative.  Negative for blurred vision.   Respiratory:  Positive for shortness of breath. Negative for cough.    Cardiovascular: Negative.  Negative for chest pain, palpitations and leg swelling.   Gastrointestinal: Negative.  Negative for abdominal pain, heartburn, nausea and vomiting.   Genitourinary: Negative.  Negative for dysuria and frequency.   Musculoskeletal:  Negative for myalgias and neck pain.   Skin: Negative.  Negative for itching and rash.   Neurological: Negative.  Negative for dizziness, focal weakness, weakness and headaches.   Endo/Heme/Allergies: Negative.  Negative for polydipsia. Does not bruise/bleed easily.   Psychiatric/Behavioral: Negative.  Negative for depression, substance abuse and suicidal ideas. The patient is not nervous/anxious.    All other systems reviewed and are negative.       Physical Exam  Temp:  [36.7 °C (98.1 °F)-37.4 °C (99.4 °F)] 37.4 °C (99.4 °F)  Pulse:  [101-140] 104  Resp:  [18-26] 22  BP: (114-144)/(66-91) 144/91  SpO2:  [84 %-96 %] 94 %    Physical Exam  Vitals and nursing note reviewed. Exam conducted with a  chaperone present.   Constitutional:       General: She is not in acute distress.     Appearance: Normal appearance. She is well-developed. She is ill-appearing. She is not diaphoretic.   HENT:      Head: Normocephalic and atraumatic.      Nose: Nose normal.      Comments: High flow nasal cannula     Mouth/Throat:      Mouth: Mucous membranes are moist.   Eyes:      Conjunctiva/sclera: Conjunctivae normal.      Pupils: Pupils are equal, round, and reactive to light.   Neck:      Thyroid: No thyromegaly.      Vascular: No JVD.   Cardiovascular:      Rate and Rhythm: Normal rate and regular rhythm.      Pulses: Normal pulses.      Heart sounds: Normal heart sounds.      No friction rub. No gallop.   Pulmonary:      Effort: Pulmonary effort is normal. No respiratory distress.      Breath sounds: No wheezing, rhonchi or rales.      Comments: Decreased at bases  Abdominal:      General: Abdomen is flat. Bowel sounds are normal. There is no distension.      Palpations: Abdomen is soft. There is no mass.      Tenderness: There is no abdominal tenderness. There is no guarding or rebound.   Musculoskeletal:         General: No swelling, tenderness or deformity. Normal range of motion.      Cervical back: Normal range of motion and neck supple.      Right lower leg: No edema.      Left lower leg: No edema.   Lymphadenopathy:      Cervical: No cervical adenopathy.   Skin:     General: Skin is warm and dry.      Capillary Refill: Capillary refill takes less than 2 seconds.   Neurological:      General: No focal deficit present.      Mental Status: She is alert and oriented to person, place, and time. Mental status is at baseline.      Cranial Nerves: No cranial nerve deficit.      Motor: No weakness.   Psychiatric:         Mood and Affect: Mood normal.         Behavior: Behavior normal.         Fluids  No intake or output data in the 24 hours ending 10/14/23 0796          Laboratory  Recent Labs     10/12/23  0039  10/13/23  0042 10/14/23  0024   WBC 13.6* 12.7* 13.3*   RBC 3.82* 3.88* 3.96*   HEMOGLOBIN 10.3* 10.4* 10.5*   HEMATOCRIT 34.4* 34.6* 35.0*   MCV 90.1 89.2 88.4   MCH 27.0 26.8* 26.5*   MCHC 29.9* 30.1* 30.0*   RDW 61.1* 62.5* 62.4*   PLATELETCT 115* 116* 116*   MPV 10.0 9.0 10.0       Recent Labs     10/13/23  0042   SODIUM 135   POTASSIUM 4.4   CHLORIDE 101   CO2 27   GLUCOSE 179*   BUN 15   CREATININE 0.52   CALCIUM 8.2*                       Imaging  IR-US GUIDED PIV   Final Result    Ultrasound-guided PERIPHERAL IV INSERTION performed by    qualified nursing staff as above.      IR-US GUIDED PIV   Final Result    Ultrasound-guided PERIPHERAL IV INSERTION performed by    qualified nursing staff as above.      IR-US GUIDED PIV   Final Result    Ultrasound-guided PERIPHERAL IV INSERTION performed by    qualified nursing staff as above.      DX-CHEST-LIMITED (1 VIEW)   Final Result      Stable bilateral pulmonary infiltrates.      DX-CHEST-LIMITED (1 VIEW)   Final Result         1.  Extensive bilateral pulmonary parenchymal opacities compatible with ARDS, severe pulmonary edema, and/or superimposed pulmonary infiltrates, slightly decreased since prior study.      DX-CHEST-PORTABLE (1 VIEW)   Final Result         1.  Extensive bilateral pulmonary parenchymal opacities compatible with ARDS, severe pulmonary edema, and/or superimposed pulmonary infiltrates, stable since prior study.      IR-MIDLINE CATHETER INSERTION WO GUIDANCE > AGE 3   Final Result                  Ultrasound-guided midline placement performed by qualified nursing staff    as above.          DX-CHEST-PORTABLE (1 VIEW)   Final Result         1.  Extensive bilateral pulmonary parenchymal opacities compatible with ARDS, severe pulmonary edema, and/or superimposed pulmonary infiltrates, stable since prior study.      DX-CHEST-PORTABLE (1 VIEW)   Final Result         1.  Pulmonary edema and/or infiltrates are identified, which are stable since the prior  exam.      DX-CHEST-PORTABLE (1 VIEW)   Final Result         1.  Bilateral pulmonary infiltrates, stable since prior study   2.  Multiple bilateral pulmonary nodules.      DX-CHEST-PORTABLE (1 VIEW)   Final Result      1.  Innumerable pulmonary nodules bilaterally.      2.  Increasing confluence and airspace opacities in the perihilar regions suspicious for superimposed pneumonitis on diffuse metastatic disease      IR-PICC LINE PLACEMENT W/ GUIDANCE > AGE 5   Final Result                  Ultrasound-guided PICC placement performed by qualified nursing staff as    above.          DX-CHEST-PORTABLE (1 VIEW)   Final Result      1.  Innumerable bilateral pulmonary nodule, increased since 9/20/2023      2.  Differential diagnosis includes worsening metastases or superimposed pneumonia.      EC-ECHOCARDIOGRAM COMPLETE W/O CONT   Final Result      CT-CTA CHEST PULMONARY ARTERY W/ RECONS   Final Result      1.  Minimal pulmonary embolism. Localized right lower lobe segmental and subsegmental branches.   2.  No right heart strain.   3.  9 mm lucent/lytic lesion T1 vertebral body. Suspect osseous metastatic deposit.   4.  Progression of extensive mediastinal adenopathy since 8/20/2023 consistent with metastatic disease.   5.  Extensive progression of diffuse bilateral innumerable pulmonary nodules consistent with metastatic disease.   6.  Interval development of extensive groundglass and airspace opacities consistent with superimposed pneumonia.   7.  Progression of right adrenal mass now measuring 43 mm x 38 mm consistent with metastasis.      Fleischner Society pulmonary nodule recommendations:      DX-CHEST-PORTABLE (1 VIEW)   Final Result      Diffuse bilateral interstitial and alveolar opacities most consistent with pneumonia or possibly noncardiogenic pulmonary edema.             Assessment/Plan  * Acute on chronic respiratory failure (HCC)- (present on admission)  Assessment & Plan  Multiple factors including  aggressive metastatic undifferentiated sarcoma versus sarcomatoid lung cancer.   history of stage 1 lung adenocarcinoma s/p SBRT, pulmonary embolism, COPD/asthma, recent pneumonia.      Initially treated at the ICU, patient still on high flow oxygen  Finish course of antibiotics  downgraded to IMCU on 10/2/2023.      Completed course of antibiotics.   Oxygen requirements increased again, now back on high flow - continue to follow, IS etc  Continue inhaler and nebulizers.  RT, continue weaning her from oxygen    Goals of care, counseling/discussion- (present on admission)  Assessment & Plan  Goals of care done last week extensively. Hospice recommended by oncology and ICU team.  Patient would like to try some form of therapy if possible and oncology offered an oral chemotherapeutic regime. She elected to change to DNR/DNI.   Continue with palliative care consultation.     Sarcoma (HCC)- (present on admission)  Assessment & Plan  Advanced, aggressive, on oral capmatinib    Pulmonary embolism on right (HCC)- (present on admission)  Assessment & Plan  Recently diagnosed.  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   Stated Capmatinib on 9/29/2023 by oncology team  Patient is DNR/DNI.  Palliative team on board, appreciate their help    Mass of sacrum- (present on admission)  Assessment & Plan  Aggressive metastatic undifferentiated sarcoma. On Capmatinib    Lung cancer (HCC)- (present on admission)  Assessment & Plan  Initially diagnosed stage I adenocarcinoma initially treated with chemoradiation.   Discussed again with Dr. Newton  Now with widespread metastatic sarcoma  Plan to continue capmatinib    Chronic pain due to neoplasm- (present on admission)  Assessment & Plan  Palliative care have been following for pain management.  Continue multimodal  pain management, oxy, tylenol, lidocaine patches, gabapentin and IV pain medication for breakthrough.   Pain currently controlled  Patient refusing her oral meds - encouraged to take them for steadier pain control    Cigarette nicotine dependence without complication- (present on admission)  Assessment & Plan  History of ongoing tobacco abuse, cessation discussed and recommended   Nicotine patch if needed          VTE prophylaxis: Eliquis      Greater than 51 minutes spent prepping to see patient (e.g. review of tests) obtaining and/or reviewing separately obtained history. Performing a medically appropriate examination and/ evaluation.  Counseling and educating the patient/family/caregiver.  Ordering medications, tests, or procedures.  Referring and communicating with other health care professionals.  Documenting clinical information in EPIC.  Independently interpreting results and communicating results to patient/family/caregiver.  Care coordination

## 2023-10-15 PROBLEM — R07.89 ATYPICAL CHEST PAIN: Status: ACTIVE | Noted: 2023-01-01

## 2023-10-15 NOTE — CARE PLAN
The patient is Watcher - Medium risk of patient condition declining or worsening    Shift Goals  Clinical Goals: Pain management, anxiety control, monitor O2  Patient Goals: Rest, pain management  Family Goals: comfort    Progress made toward(s) clinical / shift goals:    Problem: Pain - Standard  Goal: Alleviation of pain or a reduction in pain to the patient’s comfort goal  Outcome: Progressing  Note: Pain assessed using 0-10 verbal pain scale, PRN pain medications administered per MAR.      Problem: Respiratory  Goal: Patient will achieve/maintain optimum respiratory ventilation and gas exchange  Outcome: Progressing  Note: Pt maintaining adequate O2 on heated high flow nasal cannula     Problem: Knowledge Deficit - Standard  Goal: Patient and family/care givers will demonstrate understanding of plan of care, disease process/condition, diagnostic tests and medications  Outcome: Progressing  Note: Pt updated on plan of care and states understanding for plan of pain management, anxiety control, and monitoring O2. All questions answered.        Patient is not progressing towards the following goals: N/A

## 2023-10-15 NOTE — ASSESSMENT & PLAN NOTE
Non specific but unchanged ekg  trops in 60's, likely due to heart strain from hypoxia and PE  Sx improving  Echo still pending  Continue to follow

## 2023-10-15 NOTE — PROGRESS NOTES
Hospital Medicine Daily Progress Note    Date of Service  10/15/2023    Chief Complaint  48-year-old female with history of sarcoma and lung cancer presented 9/20 with dyspnea.      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  On admission patient had tachycardia tachypnea, chest x-ray showed bilateral infiltration, CTA for chest showed right lower lobe subsegmental PE, worsening pulmonary nodules with T1 vertebral body lesion, possible pneumonia.  Procalcitonin was 0.11.Eliquis was started.  Also antibiotics for pneumonia Ceftriaxone and azithromycin were empirically started.  Echo was done and showed normal ejection fraction 65%, initially patient was treated ICU secondary to increasing oxygen requirement, patient was on high flow oxygen, treated with a steroid and inhalers.  With some improvement and patient was transferred out of the ICU.    Patient was evaluated by Dr. Newton oncologist who recommended to start  capmatinib on 9/29/2023.    Patient has metastatic sarcoma with lung cancer and significant anxiety, palliative care following for pain management.  Patient needed Precedex drip for anxiety.  Patient is on every 4 hours prn Ativan, every 2 hours prn IV Dilaudid, Norco 10/325 every 3 hours prn. Tylenol every 6 hours scheduled, holding when sleeping. Dexamethasone 4 mg BID.  CODE STATUS was discussed with the patient and agreed for DNR/DNI.    Interval Problem Update  Axox3, oxygen requirements continue to fluctuate, was on 10 L overnight but now back on high flow at 35%, she reports sob subjectively stable. She reports atypical and constant L shoulder pain has improved, she reports the lidocaine patch is helping, trops minimally elevated range. Ros  otherwise negative    Addendum: atypical L shoulder pain, constant - will check ekg and trop, trial of lidocaine, tele, following  Ekg interpreted by me,early repolarization, unchanged from previous    I have discussed this patient's plan of care and discharge plan at IDT rounds today with Case Management, Nursing, Nursing leadership, and other members of the IDT team.    Consultants/Specialty  oncology critical care, palliative care    Code Status  DNAR/DNI    Disposition  The patient is not medically cleared for discharge to home or a post-acute facility.      I have placed the appropriate orders for post-discharge needs.    Review of Systems  Review of Systems   Constitutional:  Positive for malaise/fatigue. Negative for chills, diaphoresis, fever and weight loss.   HENT: Negative.  Negative for sore throat.    Eyes: Negative.  Negative for blurred vision.   Respiratory:  Positive for shortness of breath. Negative for cough.    Cardiovascular: Negative.  Negative for chest pain, palpitations and leg swelling.   Gastrointestinal: Negative.  Negative for abdominal pain, heartburn, nausea and vomiting.   Genitourinary: Negative.  Negative for dysuria and frequency.   Musculoskeletal:  Negative for myalgias and neck pain.   Skin: Negative.  Negative for itching and rash.   Neurological: Negative.  Negative for dizziness, focal weakness, weakness and headaches.   Endo/Heme/Allergies: Negative.  Negative for polydipsia. Does not bruise/bleed easily.   Psychiatric/Behavioral: Negative.  Negative for depression, substance abuse and suicidal ideas. The patient is not nervous/anxious.    All other systems reviewed and are negative.       Physical Exam  Temp:  [36.7 °C (98 °F)-37.4 °C (99.3 °F)] 36.7 °C (98.1 °F)  Pulse:  [] 120  Resp:  [20-28] 22  BP: (117-138)/(67-81) 138/79  SpO2:  [85 %-94 %] 89 %    Physical Exam  Vitals and nursing note reviewed. Exam conducted with a chaperone present.   Constitutional:        General: She is not in acute distress.     Appearance: Normal appearance. She is well-developed. She is ill-appearing. She is not diaphoretic.   HENT:      Head: Normocephalic and atraumatic.      Nose: Nose normal.      Comments: High flow nasal cannula     Mouth/Throat:      Mouth: Mucous membranes are moist.   Eyes:      Conjunctiva/sclera: Conjunctivae normal.      Pupils: Pupils are equal, round, and reactive to light.   Neck:      Thyroid: No thyromegaly.      Vascular: No JVD.   Cardiovascular:      Rate and Rhythm: Normal rate and regular rhythm.      Pulses: Normal pulses.      Heart sounds: Normal heart sounds.      No friction rub. No gallop.   Pulmonary:      Effort: Pulmonary effort is normal. No respiratory distress.      Breath sounds: No wheezing, rhonchi or rales.      Comments: Decreased at bases  Abdominal:      General: Abdomen is flat. Bowel sounds are normal. There is no distension.      Palpations: Abdomen is soft. There is no mass.      Tenderness: There is no abdominal tenderness. There is no guarding or rebound.   Musculoskeletal:         General: No swelling, tenderness or deformity. Normal range of motion.      Cervical back: Normal range of motion and neck supple.      Right lower leg: No edema.      Left lower leg: No edema.   Lymphadenopathy:      Cervical: No cervical adenopathy.   Skin:     General: Skin is warm and dry.      Capillary Refill: Capillary refill takes less than 2 seconds.   Neurological:      General: No focal deficit present.      Mental Status: She is alert and oriented to person, place, and time. Mental status is at baseline.      Cranial Nerves: No cranial nerve deficit.      Motor: No weakness.   Psychiatric:         Mood and Affect: Mood normal.         Behavior: Behavior normal.         Fluids  No intake or output data in the 24 hours ending 10/15/23 1440          Laboratory  Recent Labs     10/13/23  0042 10/14/23  0024 10/15/23  0015   WBC 12.7* 13.3* 12.3*    RBC 3.88* 3.96* 3.57*   HEMOGLOBIN 10.4* 10.5* 9.6*   HEMATOCRIT 34.6* 35.0* 31.8*   MCV 89.2 88.4 89.1   MCH 26.8* 26.5* 26.9*   MCHC 30.1* 30.0* 30.2*   RDW 62.5* 62.4* 62.6*   PLATELETCT 116* 116* 108*   MPV 9.0 10.0 9.8     Recent Labs     10/13/23  0042   SODIUM 135   POTASSIUM 4.4   CHLORIDE 101   CO2 27   GLUCOSE 179*   BUN 15   CREATININE 0.52   CALCIUM 8.2*                     Imaging  IR-US GUIDED PIV   Final Result    Ultrasound-guided PERIPHERAL IV INSERTION performed by    qualified nursing staff as above.      IR-US GUIDED PIV   Final Result    Ultrasound-guided PERIPHERAL IV INSERTION performed by    qualified nursing staff as above.      IR-US GUIDED PIV   Final Result    Ultrasound-guided PERIPHERAL IV INSERTION performed by    qualified nursing staff as above.      DX-CHEST-LIMITED (1 VIEW)   Final Result      Stable bilateral pulmonary infiltrates.      DX-CHEST-LIMITED (1 VIEW)   Final Result         1.  Extensive bilateral pulmonary parenchymal opacities compatible with ARDS, severe pulmonary edema, and/or superimposed pulmonary infiltrates, slightly decreased since prior study.      DX-CHEST-PORTABLE (1 VIEW)   Final Result         1.  Extensive bilateral pulmonary parenchymal opacities compatible with ARDS, severe pulmonary edema, and/or superimposed pulmonary infiltrates, stable since prior study.      IR-MIDLINE CATHETER INSERTION WO GUIDANCE > AGE 3   Final Result                  Ultrasound-guided midline placement performed by qualified nursing staff    as above.          DX-CHEST-PORTABLE (1 VIEW)   Final Result         1.  Extensive bilateral pulmonary parenchymal opacities compatible with ARDS, severe pulmonary edema, and/or superimposed pulmonary infiltrates, stable since prior study.      DX-CHEST-PORTABLE (1 VIEW)   Final Result         1.  Pulmonary edema and/or infiltrates are identified, which are stable since the prior exam.      DX-CHEST-PORTABLE (1 VIEW)   Final Result          1.  Bilateral pulmonary infiltrates, stable since prior study   2.  Multiple bilateral pulmonary nodules.      DX-CHEST-PORTABLE (1 VIEW)   Final Result      1.  Innumerable pulmonary nodules bilaterally.      2.  Increasing confluence and airspace opacities in the perihilar regions suspicious for superimposed pneumonitis on diffuse metastatic disease      IR-PICC LINE PLACEMENT W/ GUIDANCE > AGE 5   Final Result                  Ultrasound-guided PICC placement performed by qualified nursing staff as    above.          DX-CHEST-PORTABLE (1 VIEW)   Final Result      1.  Innumerable bilateral pulmonary nodule, increased since 9/20/2023      2.  Differential diagnosis includes worsening metastases or superimposed pneumonia.      EC-ECHOCARDIOGRAM COMPLETE W/O CONT   Final Result      CT-CTA CHEST PULMONARY ARTERY W/ RECONS   Final Result      1.  Minimal pulmonary embolism. Localized right lower lobe segmental and subsegmental branches.   2.  No right heart strain.   3.  9 mm lucent/lytic lesion T1 vertebral body. Suspect osseous metastatic deposit.   4.  Progression of extensive mediastinal adenopathy since 8/20/2023 consistent with metastatic disease.   5.  Extensive progression of diffuse bilateral innumerable pulmonary nodules consistent with metastatic disease.   6.  Interval development of extensive groundglass and airspace opacities consistent with superimposed pneumonia.   7.  Progression of right adrenal mass now measuring 43 mm x 38 mm consistent with metastasis.      Fleischner Society pulmonary nodule recommendations:      DX-CHEST-PORTABLE (1 VIEW)   Final Result      Diffuse bilateral interstitial and alveolar opacities most consistent with pneumonia or possibly noncardiogenic pulmonary edema.      EC-ECHOCARDIOGRAM COMPLETE W/O CONT    (Results Pending)          Assessment/Plan  * Acute on chronic respiratory failure (HCC)- (present on admission)  Assessment & Plan  Multiple factors including  aggressive metastatic undifferentiated sarcoma versus sarcomatoid lung cancer.   history of stage 1 lung adenocarcinoma s/p SBRT, pulmonary embolism, COPD/asthma, recent pneumonia.      Initially treated at the ICU, patient still on high flow oxygen  Finish course of antibiotics  downgraded to IMCU on 10/2/2023.      Completed course of antibiotics.   Remains on high flow, titration down as tolerated, continue supportive care  Continue inhaler and nebulizers.      Atypical chest pain  Assessment & Plan  Non specific but unchanged ekg  trops in 60's, likely due to heart strain from hypoxia and PE  Sx improving  Echo pending  Continue to follow    Goals of care, counseling/discussion- (present on admission)  Assessment & Plan  Goals of care done last week extensively. Hospice recommended by oncology and ICU team.  Patient would like to try some form of therapy if possible and oncology offered an oral chemotherapeutic regime. She elected to change to DNR/DNI.   Continue with palliative care consultation.     Sarcoma (HCC)- (present on admission)  Assessment & Plan  Advanced, aggressive, on oral capmatinib    Pulmonary embolism on right (HCC)- (present on admission)  Assessment & Plan  Recently diagnosed.  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   Stated Capmatinib on 9/29/2023 by oncology team  Patient is DNR/DNI.  Palliative following    Mass of sacrum- (present on admission)  Assessment & Plan  Aggressive metastatic undifferentiated sarcoma. On Capmatinib    Lung cancer (HCC)- (present on admission)  Assessment & Plan  Initially diagnosed stage I adenocarcinoma initially treated with chemoradiation.   Discussed again with Dr. Newton  Now with widespread metastatic sarcoma  Plan to continue capmatinib    Chronic pain due to neoplasm- (present  on admission)  Assessment & Plan  Palliative care have been following for pain management.  Continue multimodal pain management, oxy, tylenol, lidocaine patches, gabapentin and IV pain medication for breakthrough.   Pain currently controlled  Patient refusing her oral meds - again encouraged to take them for steadier pain control    Cigarette nicotine dependence without complication- (present on admission)  Assessment & Plan  History of ongoing tobacco abuse, cessation discussed and recommended   Nicotine patch declined          VTE prophylaxis: Eliquis      Greater than 51 minutes spent prepping to see patient (e.g. review of tests) obtaining and/or reviewing separately obtained history. Performing a medically appropriate examination and/ evaluation.  Counseling and educating the patient/family/caregiver.  Ordering medications, tests, or procedures.  Referring and communicating with other health care professionals.  Documenting clinical information in EPIC.  Independently interpreting results and communicating results to patient/family/caregiver.  Care coordination

## 2023-10-15 NOTE — PROGRESS NOTES
MD Dillon notified at 1753 of EKG results, rapid RN at bedside. Per MD Dillon and rapid RN, EKG looks same as previous. Lab called for troponin order.

## 2023-10-15 NOTE — PROGRESS NOTES
Consult Note: Hematology/Oncology     Primary Care:  Checo Baker M.D.    Chief Complaint   Patient presents with    Shortness of Breath     PT hx lung ca that's metastasized. Was at another appt and was SOB. RA while walking in 70's per EMS. Currently on 6L NC         Current Treatment:     9/29/23: Capmatinib    Prior Treatment:     4/17/2023-4/21/2023: RLL Lung SBRT  7/19/23-7/24/2023: R Sacrum SBRT      Subjective:   History of Presenting Illness:    Melly Hendrickson is a 48 y.o. 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    Patient history dates back to July 2022 when she had a chest x-ray performed that revealed a potential mass in her right lung.  This led to a chest CT performed on February 13, 2023.  This demonstrated 1.4 cm mass in the right lower lobe and a 1.9 groundglass opacity also in the right lower lobe.  The groundglass opacity appeared stable from previous scans but the mass was thought to be new.      A biopsy was performed and confirmed lung adenocarcinoma.    Patient saw thoracic surgery and obtained PFTs as well as a PET scan.  She was discussed at tumor board and given her significant dyspnea and her underlying COPD it was thought that she would be a better candidate for SBRT rather than surgical resection.    April 17, 2023 patient began SBRT.  Completed this treatment without many issues.    After this patient did notice a small lump in her breast reduction scar line.  She said that over the next few days it grew and she ultimately went to her PCP    PCP tried to drain the mass; however it just bled significantly during the I&D attempt.  Thus she was sent to Dr. Becerra at [facility] for excision.     Pathology from this excision demonstrated poorly differentiated malignancy favoring carcinoma without obvious involvement of underlying skin and nonspecific IHC profile.  Based on  his diagnosis cancer type ID was sent off for.  Results showed 90% probability of sarcoma    Over the past several months she has noted severe pain in her sacral region.  She does have sacral lesions that are concerning for metastatic disease.  Patient has biopsy of sacral mass on 7/18/2023.     Of note patient has 5 children and 4 grandchildren.    She follows with pain management    I initially saw patient on 7/14.  At our 7/21, we had information back from biopsy and we discussed AIM.  Patient wished to hold off and think about this further.    On 7/28, we discussed initiating AIM, patient was not comfortable and wanted 2 opinion at [facility].    I saw patient again on 8/11.  We discussed AIM,  patient not ready and awaiting [facility] appointment.     She was recently admitted to the ER for hemoptysis on 8/20/23 and was found to have a PE.  She was still having hemoptysis and thus was not started on AC. She has discharged on 8/24/23.     I saw her subsequently on 9/7/23 and recommended AC given no hemoptysis, but active PE with cancer.  We also discussed chemo at this visit and she wished to wait.     During my visit with the patient on 9/19/23, Patient was endorsing 3 nodules on the R arm.  1 on the clavicle.  Patient also had a nodule on her L arm. Patient had 1 nodule on her bikini line. She has another on the inside of her L thigh. These started 1 week ago. She was on 2-3L of oxygen for the past several days.  We discussed intiating chemo and plan was to start on 9/26/23.  Pt requires a direct admit for AIM    Since that visit she had worseing SOB.  She was subsequently presented to the ER for SOB/cough and potential PNA. On admission, she was tachycardic, tachypneic, and afebrile.  Chest x-ray showed diffuse bilateral interstitial and alveolar opacities consistent with pneumonia. CTA chest on admission showed right subsegmental PE, worsening pulmonary nodules, T1 vertebral body region, possible pneumonia.   Procalcitonin was 0.11.  Ceftriaxone and azithromycin were empirically started.    Line was not able to be placed unable to start chemo.  Patients respiratory status declined and she was transferred to the ICU    Patient started on Capmatinib 9/29/23    Oxygen requirements are stable.    We are continuing to discuss placement issues and plans moving forward.     Most of her subcutaneous lesions have shrunk in size, however there are 2 new areas on her shoulder and back that are concerning for a new lesion.     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    Chronic obstructive pulmonary disease (HCC)     COPD (chronic obstructive pulmonary disease) (HCC)     DDD (degenerative disc disease), lumbar     Depression     Emphysema of lung (HCC)     MRSA (methicillin resistant staph aureus) culture positive     Pain     back - cervical, scapula, lower    Pericarditis     Primary adenocarcinoma of lower lobe of right lung (HCC)     Psychiatric disorder     Anxiety, Depression    Sleep apnea     Snoring     Urinary incontinence         Past Surgical History:   Procedure Laterality Date    OK BRONCHOSCOPY,DIAGNOSTIC N/A 8/22/2023    Procedure: BRONCHOSCOPY;  Surgeon: Sandra Lopez D.O.;  Location: [facility];  Service: Pulmonary    OK EXC SKIN BENIG >4CM TRUNK,ARM,LEG Left 6/7/2023    Procedure: EXCISION OF SOFT TISSUE MASS LEFT CHEST WALL;  Surgeon: Nasir Becerra M.D.;  Location: [facility];  Service: General    OK BRONCHOSCOPY,DIAGNOSTIC N/A 02/14/2023    Procedure: FIBER OPTIC BRONCHOSCOPY WASH, BRUSH, BRONCHOALVEOLAR LAVAGE, BIOPSY AND FINE NEEDLE ASPIRATION AND NAVIGATION, ROBOTICS;  Surgeon: Guicho Ellis M.D.;  Location: [facility];  Service: Pulmonary Robotic    ENDOBRONCHIAL US ADD-ON N/A 02/14/2023    Procedure:  ENDOBRONCHIAL ULTRASOUND (EBUS);  Surgeon: Guicho Ellis M.D.;  Location: SURGERY [facility];  Service: Pulmonary Robotic    MAMMOPLASTY REDUCTION Bilateral     SEPTOPLASTY      TUBE & ECTOPIC PREG., REMOVAL      x 2       Social History     Tobacco Use    Smoking status: Some Days     Current packs/day: 0.50     Average packs/day: 0.5 packs/day for 20.0 years (10.0 ttl pk-yrs)     Types: Cigarettes     Passive exposure: Past    Smokeless tobacco: Never    Tobacco comments:     on and off    Vaping Use    Vaping Use: Never used   Substance Use Topics    Alcohol use: Not Currently     Comment: occ, rare    Drug use: Not Currently     Types: Marijuana     Comment: THC gummies        Family History   Problem Relation Age of Onset    Diabetes Mother     Cancer Father         Prostate    Cancer Paternal Grandmother         Cervical    Cancer Paternal Grandfather         Unk       Allergies   Allergen Reactions    Penicillins Hives, Rash and Swelling     Pt reports that she gets swelling in throat and face, rash all over face and arms.   Tolerated cefazolin    Aspirin Rash and Hives     Pt reports that she received a rash on face, pt reports it ok if she takes NORCO    Other Environmental Shortness of Breath     Perfumes, dander, scents       Current Facility-Administered Medications   Medication Dose Route Frequency Provider Last Rate Last Admin    calcium carbonate (Tums) chewable tab 500 mg  500 mg Oral BID PRN Fide Dillon M.D.        lidocaine (Lidoderm) 5 % 1 Patch  1 Patch Transdermal Q24HR Fide Dillon M.D.   1 Patch at 10/14/23 1823    tiotropium (Spiriva Respimat) 2.5 mcg/Act inhalation spray 5 mcg  5 mcg Inhalation DAILY Fide Dillon M.D.        nystatin-tetracycline-prednisone-diphenhydramine (Miracle Mouth Wash) oral susp 5 mL  5 mL Oral Q6HRS PRN Naima Ambrosio, NICOLASAP.R.N.   5 mL at 10/10/23 0514    levalbuterol (Xopenex) 1.25 MG/3ML nebulizer solution 1.25 mg  1.25 mg Nebulization Q2HRS PRN  (RT) Oksana Mas M.D.        albuterol inhaler 2 Puff  2 Puff Inhalation Q4HRS PRN Oksana Mas M.D.   2 Puff at 10/15/23 0614    mometasone-formoterol (Dulera) 100-5 MCG/ACT inhaler 2 Puff  2 Puff Inhalation BID Oksana Mas M.D.   2 Puff at 10/14/23 1717    Respiratory Therapy Consult   Nebulization Continuous RT Oksana Mas M.D.        ipratropium-albuterol (DUONEB) nebulizer solution  3 mL Nebulization Q2HRS PRN (RT) Oksana Mas M.D.   3 mL at 10/13/23 1939    melatonin tablet 10 mg  10 mg Oral HS PRN Dex Madrid M.D.        morphine ER (Ms Contin) tablet 15 mg  15 mg Oral Q12HRS Dex Madrid M.D.   15 mg at 10/14/23 1655    busPIRone (Buspar) tablet 15 mg  15 mg Oral TID Dex Madrid M.D.   15 mg at 10/15/23 1208    oxyCODONE immediate-release (Roxicodone) tablet 5 mg  5 mg Oral Q4HRS PRN Gerson Adam M.D.        Or    oxyCODONE immediate release (Roxicodone) tablet 10 mg  10 mg Oral Q4HRS PRN Gerson Adam M.D.   10 mg at 10/12/23 2344    acetaminophen (Tylenol) tablet 650 mg  650 mg Oral Q6HRS Gerson Adam M.D.   650 mg at 10/15/23 1208    LORazepam (Ativan) injection 0.5 mg  0.5 mg Intravenous Q4HRS PRN Tonny Edmond M.D.   0.5 mg at 10/15/23 1113    dexamethasone (Decadron) injection 4 mg  4 mg Intravenous Q6HRS Tonny Edmond M.D.   4 mg at 10/15/23 1208    sodium chloride (Ocean) 0.65 % nasal spray 2 Spray  2 Spray Nasal Q2HRS PRN Tonny Edmond M.D.        HYDROmorphone (Dilaudid) injection 1 mg  1 mg Intravenous Q HOUR PRN Madhu Simpson P.A.-C.   1 mg at 10/15/23 1208    capmatinib (Tabrecta) tablet 400 mg  400 mg Oral BID Alma Newton M.D.   400 mg at 10/15/23 0614    guaiFENesin ER (Mucinex) tablet 600 mg  600 mg Oral Q12HRS Naima Ambrosio, A.P.R.N.   600 mg at 10/15/23 0742    diphenhydrAMINE (Benadryl) injection 25 mg  25 mg Intravenous Q6HRS PRN Kay Christensen, A.P.R.N.   25 mg at 10/15/23 1224     diclofenac sodium (Voltaren) 1 % gel 2 g  2 g Topical 4X/DAY PRN Madhu Merino D.O.        loratadine (Claritin) tablet 10 mg  10 mg Oral DAILY SANJU GarciaOEstrellita   10 mg at 10/15/23 0742    senna-docusate (Pericolace Or Senokot S) 8.6-50 MG per tablet 2 Tablet  2 Tablet Oral BID SANJU GarciaOEstrellita   2 Tablet at 10/13/23 0407    And    polyethylene glycol/lytes (Miralax) PACKET 1 Packet  1 Packet Oral QDAY PRN SANJU GarciaO.   1 Packet at 10/13/23 0002    And    magnesium hydroxide (Milk Of Magnesia) suspension 30 mL  30 mL Oral QDAY PRN SANJU GarciaOEstrellita   30 mL at 09/24/23 0939    And    bisacodyl (Dulcolax) suppository 10 mg  10 mg Rectal QDAY PRN SANJU GacriaOEstrellita   10 mg at 09/28/23 1300    acetaminophen (Tylenol) tablet 650 mg  650 mg Oral Q6HRS PRN Madhu Merino D.O.        hydrALAZINE (Apresoline) injection 10 mg  10 mg Intravenous Q4HRS PRN Madhu Merino D.O.   10 mg at 09/26/23 1010    ondansetron (Zofran) syringe/vial injection 4 mg  4 mg Intravenous Q4HRS PRN SANJU GarciaOEstrellita   4 mg at 10/04/23 2150    ondansetron (Zofran ODT) dispertab 4 mg  4 mg Oral Q4HRS PRN Madhu Merino D.O.        promethazine (Phenergan) tablet 12.5-25 mg  12.5-25 mg Oral Q4HRS PRN Madhu Merino D.O.        promethazine (Phenergan) suppository 12.5-25 mg  12.5-25 mg Rectal Q4HRS PRN Madhu Merino D.O.        prochlorperazine (Compazine) injection 5-10 mg  5-10 mg Intravenous Q4HRS PRN Madhu Merino D.O.        guaiFENesin dextromethorphan (Robitussin DM) 100-10 MG/5ML syrup 10 mL  10 mL Oral Q6HRS PRN UMBERTO Garcia.O.   10 mL at 10/11/23 1652    apixaban (Eliquis) tablet 5 mg  5 mg Oral BID SANJU GarciaOEstrellita   5 mg at 10/15/23 0741       Review of Systems   Constitutional:  Positive for malaise/fatigue. Negative for chills, fever and weight loss.   HENT:  Negative for congestion, ear pain, nosebleeds and sore throat.         Mouth sores   Eyes:  Negative for blurred vision.   Respiratory:   Positive for shortness of breath. Negative for cough, sputum production and wheezing.    Cardiovascular:  Negative for chest pain, palpitations, orthopnea and leg swelling.        Chest heaviness   Gastrointestinal:  Negative for abdominal pain, heartburn, nausea and vomiting.   Genitourinary:  Negative for dysuria, frequency and urgency.   Musculoskeletal:  Positive for back pain and joint pain. Negative for neck pain.   Neurological:  Negative for dizziness, tingling, tremors, sensory change, focal weakness and headaches.   Endo/Heme/Allergies:  Does not bruise/bleed easily.   Psychiatric/Behavioral:  Negative for depression, memory loss and suicidal ideas.    All other systems reviewed and are negative.      Problem list, medications, and allergies reviewed by myself today in Epic.     Objective:     Vitals:    10/15/23 1023 10/15/23 1208 10/15/23 1209 10/15/23 1308   BP:   138/79    Pulse: (!) 112  (!) 120    Resp: 20 (!) 28 (!) 28 (!) 22   Temp:   36.7 °C (98.1 °F)    TempSrc:   Temporal    SpO2: 94%  89%    Weight:       Height:             DESC; KARNOFSKY SCALE WITH ECOG EQUIVALENT: 90, Able to carry on normal activity; minor signs or symptoms of disease (ECOG equivalent 0)    DISTRESS LEVEL: no acute distress    Physical Exam  Constitutional:       General: She is not in acute distress.     Appearance: She is not ill-appearing or toxic-appearing.   HENT:      Mouth/Throat:      Pharynx: No oropharyngeal exudate or posterior oropharyngeal erythema.   Cardiovascular:      Rate and Rhythm: Tachycardia present.   Pulmonary:      Effort: No respiratory distress.      Breath sounds: No stridor. Rhonchi present. No wheezing.   Musculoskeletal:      Cervical back: Normal range of motion and neck supple.   Skin:     Comments: Multiple subcutaneous lesions, smaller in size since starting Capmatinib.  2 new nodules (1 on back of shoulder, other by collar bone).        Deferred    Labs:   Most recent labs reviewed.   Please see the lab tab of chart review    Imaging:   Most recent images below have been independently reviewed by me.  Please see the imaging tab of chart review    9/22/2023 : Echocardiogram showed LVEF ~ 65% with no reported abnormalities    7/27/23: PET   1.  Focal uptake in the medial RIGHT lower lobe of lung abutting the thoracic spine, consistent with tumor..  2.  No PET correlate for ill-defined spiculated nodule in the RIGHT lower lobe.  3.  Large hypermetabolic mass corresponds to lytic lesion in the RIGHT sacrum consistent with malignancy.  4.  Intramuscular focal uptake at the posterior LEFT shoulder, likely metastatic.    Pathology:    7/14/2023: Pathology of Chest wall  A. Chest wall mass:          Metastatic poorly differentiated malignancy favoring carcinoma           without obvious involvement of overlying skin and a           non-specific IHC profile of some keratins positive (see           microscopic description).          See comment.   Main Cancer Type: Sarcoma   Probability: 90%     Subtype: Undifferentiated Sarcoma ([facility])   Probability: 90%     7/19/2023: Path of the Sacral Bone  A. Sacral bone biopsy:          Bone cores effaced by a high-grade malignancy histologically           identical to the previously excised chest wall mass           ([id number]).     2/14/2023: Pathology of Lung Mass  A. Lung, right lower lobe fine needle aspiration slides:          Positive for carcinoma.   B. Core, right lower lobe lung:          Moderately differentiated lung adenocarcinoma.   C. Lung, right lower lobe biopsy core and touchprep slides:          Moderately differentiated lung adenocarcinoma.   D. Bronchoalveolar lavage right lower lobe:          Rare atypical cells, malignant cells vs. reactive bronchial           cells.     Assessment/Plan:      Cancer Staging   Primary undifferentiated sarcoma of soft tissue (HCC)  Staging form: Soft Tissue Sarcoma of the Trunk and Extremities, AJCC 8th Edition  -  Clinical: Stage IV (cTX, cN0, cM1) - Signed by Alma Newton M.D. on 9/19/2023         Ms. 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    Labs stable. Oxygen requirements stable.     From a oncology standpoint she is fine to be transferred to another facility as long as that facility allows her to continue taking capmatinib    Plan:  Patient to continue capmatinib.    Will need CT CAP 6 weeks after starting capmatinib, roughly 1st week of November  Continue to wean oxygen as tolerated  I will continue to follow    Alma Newton M.D.  Hematology/Oncology    18 minutes spent on this case

## 2023-10-15 NOTE — PROGRESS NOTES
MD Dillon notified 1735 of increased pain in L side of chest. Pt states pain is felt near shoulder and is not radiating. EKG ordered, Lidocaine patch ordered, Dilaudid administered.

## 2023-10-16 NOTE — PROGRESS NOTES
Consult Note: Hematology/Oncology     Primary Care:  Checo Baker M.D.    Chief Complaint   Patient presents with    Shortness of Breath     PT hx lung ca that's metastasized. Was at another appt and was SOB. RA while walking in 70's per EMS. Currently on 6L NC         Current Treatment:     9/29/23: Capmatinib    Prior Treatment:     4/17/2023-4/21/2023: RLL Lung SBRT  7/19/23-7/24/2023: R Sacrum SBRT      Subjective:   History of Presenting Illness:    Melly Hendrickson is a 48 y.o. 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    Patient history dates back to July 2022 when she had a chest x-ray performed that revealed a potential mass in her right lung.  This led to a chest CT performed on February 13, 2023.  This demonstrated 1.4 cm mass in the right lower lobe and a 1.9 groundglass opacity also in the right lower lobe.  The groundglass opacity appeared stable from previous scans but the mass was thought to be new.      A biopsy was performed and confirmed lung adenocarcinoma.    Patient saw thoracic surgery and obtained PFTs as well as a PET scan.  She was discussed at tumor board and given her significant dyspnea and her underlying COPD it was thought that she would be a better candidate for SBRT rather than surgical resection.    April 17, 2023 patient began SBRT.  Completed this treatment without many issues.    After this patient did notice a small lump in her breast reduction scar line.  She said that over the next few days it grew and she ultimately went to her PCP    PCP tried to drain the mass; however it just bled significantly during the I&D attempt.  Thus she was sent to Dr. Becerra at [facility] for excision.     Pathology from this excision demonstrated poorly differentiated malignancy favoring carcinoma without obvious involvement of underlying skin and nonspecific IHC profile.  Based on  his diagnosis cancer type ID was sent off for.  Results showed 90% probability of sarcoma    Over the past several months she has noted severe pain in her sacral region.  She does have sacral lesions that are concerning for metastatic disease.  Patient has biopsy of sacral mass on 7/18/2023.     Of note patient has 5 children and 4 grandchildren.    She follows with pain management    I initially saw patient on 7/14.  At our 7/21, we had information back from biopsy and we discussed AIM.  Patient wished to hold off and think about this further.    On 7/28, we discussed initiating AIM, patient was not comfortable and wanted 2 opinion at [place] [facility].    I saw patient again on 8/11.  We discussed AIM,  patient not ready and awaiting [facility] appointment.     She was recently admitted to the ER for hemoptysis on 8/20/23 and was found to have a PE.  She was still having hemoptysis and thus was not started on AC. She has discharged on 8/24/23.     I saw her subsequently on 9/7/23 and recommended AC given no hemoptysis, but active PE with cancer.  We also discussed chemo at this visit and she wished to wait.     During my visit with the patient on 9/19/23, Patient was endorsing 3 nodules on the R arm.  1 on the clavicle.  Patient also had a nodule on her L arm. Patient had 1 nodule on her bikini line. She has another on the inside of her L thigh. These started 1 week ago. She was on 2-3L of oxygen for the past several days.  We discussed intiating chemo and plan was to start on 9/26/23.  Pt requires a direct admit for AIM    Since that visit she had worseing SOB.  She was subsequently presented to the ER for SOB/cough and potential PNA. On admission, she was tachycardic, tachypneic, and afebrile.  Chest x-ray showed diffuse bilateral interstitial and alveolar opacities consistent with pneumonia. CTA chest on admission showed right subsegmental PE, worsening pulmonary nodules, T1 vertebral body region, possible pneumonia.   Procalcitonin was 0.11.  Ceftriaxone and azithromycin were empirically started.    Line was not able to be placed unable to start chemo.  Patients respiratory status declined and she was transferred to the ICU    Patient started on Capmatinib 9/29/23    Oxygen requirements are stable.    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    Chronic obstructive pulmonary disease (HCC)     COPD (chronic obstructive pulmonary disease) (HCC)     DDD (degenerative disc disease), lumbar     Depression     Emphysema of lung (HCC)     MRSA (methicillin resistant staph aureus) culture positive     Pain     back - cervical, scapula, lower    Pericarditis     Primary adenocarcinoma of lower lobe of right lung (HCC)     Psychiatric disorder     Anxiety, Depression    Sleep apnea     Snoring     Urinary incontinence         Past Surgical History:   Procedure Laterality Date    GA BRONCHOSCOPY,DIAGNOSTIC N/A 8/22/2023    Procedure: BRONCHOSCOPY;  Surgeon: Sandra Lopez D.O.;  Location: [facility];  Service: Pulmonary    GA EXC SKIN BENIG >4CM TRUNK,ARM,LEG Left 6/7/2023    Procedure: EXCISION OF SOFT TISSUE MASS LEFT CHEST WALL;  Surgeon: Nasir Becerra M.D.;  Location: [facility];  Service: General    GA BRONCHOSCOPY,DIAGNOSTIC N/A 02/14/2023    Procedure: FIBER OPTIC BRONCHOSCOPY WASH, BRUSH, BRONCHOALVEOLAR LAVAGE, BIOPSY AND FINE NEEDLE ASPIRATION AND NAVIGATION, ROBOTICS;  Surgeon: Guicho Ellis M.D.;  Location: [facility];  Service: Pulmonary Robotic    ENDOBRONCHIAL US ADD-ON N/A 02/14/2023    Procedure: ENDOBRONCHIAL ULTRASOUND (EBUS);  Surgeon: Guicho Ellis M.D.;  Location: [facility];  Service: Pulmonary Robotic    MAMMOPLASTY REDUCTION Bilateral     SEPTOPLASTY      TUBE & ECTOPIC  PREG., REMOVAL      x 2       Social History     Tobacco Use    Smoking status: Some Days     Current packs/day: 0.50     Average packs/day: 0.5 packs/day for 20.0 years (10.0 ttl pk-yrs)     Types: Cigarettes     Passive exposure: Past    Smokeless tobacco: Never    Tobacco comments:     on and off    Vaping Use    Vaping Use: Never used   Substance Use Topics    Alcohol use: Not Currently     Comment: occ, rare    Drug use: Not Currently     Types: Marijuana     Comment: THC gummies        Family History   Problem Relation Age of Onset    Diabetes Mother     Cancer Father         Prostate    Cancer Paternal Grandmother         Cervical    Cancer Paternal Grandfather         Unk       Allergies   Allergen Reactions    Penicillins Hives, Rash and Swelling     Pt reports that she gets swelling in throat and face, rash all over face and arms.   Tolerated cefazolin    Aspirin Rash and Hives     Pt reports that she received a rash on face, pt reports it ok if she takes NORCO    Other Environmental Shortness of Breath     Perfumes, dander, scents       Current Facility-Administered Medications   Medication Dose Route Frequency Provider Last Rate Last Admin    LORazepam (Ativan) injection 1 mg  1 mg Intravenous Q4HRS PRN Fide Dillon M.D.        calcium carbonate (Tums) chewable tab 500 mg  500 mg Oral BID PRN Fide Dillon M.D.        lidocaine (Lidoderm) 5 % 1 Patch  1 Patch Transdermal Q24HR Fide Dillon M.D.   1 Patch at 10/15/23 1644    tiotropium (Spiriva Respimat) 2.5 mcg/Act inhalation spray 5 mcg  5 mcg Inhalation DAILY Fide Dillon M.D.   5 mcg at 10/16/23 0519    nystatin-tetracycline-prednisone-diphenhydramine (Miracle Mouth Wash) oral susp 5 mL  5 mL Oral Q6HRS PRN NICOLASA AlvaradoPEstrellitaREstrellitaN.   5 mL at 10/10/23 0514    levalbuterol (Xopenex) 1.25 MG/3ML nebulizer solution 1.25 mg  1.25 mg Nebulization Q2HRS PRN (RT) Oksana Mas M.D.        albuterol inhaler 2 Puff  2 Puff Inhalation Q4HRS PRN  Oksana Mas M.D.   2 Puff at 10/16/23 0357    mometasone-formoterol (Dulera) 100-5 MCG/ACT inhaler 2 Puff  2 Puff Inhalation BID Oksana Mas M.D.   2 Puff at 10/16/23 0520    Respiratory Therapy Consult   Nebulization Continuous RT Oksana Mas M.D.        ipratropium-albuterol (DUONEB) nebulizer solution  3 mL Nebulization Q2HRS PRN (RT) Oksana Mas M.D.   3 mL at 10/16/23 0921    melatonin tablet 10 mg  10 mg Oral HS PRN Dex Madrid M.D.        morphine ER (Ms Contin) tablet 15 mg  15 mg Oral Q12HRS Dex Madrid M.D.   15 mg at 10/16/23 0520    busPIRone (Buspar) tablet 15 mg  15 mg Oral TID Dex Madrid M.D.   15 mg at 10/16/23 0520    oxyCODONE immediate-release (Roxicodone) tablet 5 mg  5 mg Oral Q4HRS PRN Gerson Adam M.D.        Or    oxyCODONE immediate release (Roxicodone) tablet 10 mg  10 mg Oral Q4HRS PRN Gerson Adam M.D.   10 mg at 10/12/23 2344    acetaminophen (Tylenol) tablet 650 mg  650 mg Oral Q6HRS Gerson Adam M.D.   650 mg at 10/16/23 0520    LORazepam (Ativan) injection 0.5 mg  0.5 mg Intravenous Q4HRS PRN Tonny Edmond M.D.   0.5 mg at 10/16/23 0351    dexamethasone (Decadron) injection 4 mg  4 mg Intravenous Q6HRS Tonny Edmond M.D.   4 mg at 10/16/23 0520    sodium chloride (Ocean) 0.65 % nasal spray 2 Spray  2 Spray Nasal Q2HRS PRN Tonny Edmond M.D.        HYDROmorphone (Dilaudid) injection 1 mg  1 mg Intravenous Q HOUR PRN Madhu Simpson P.A.-C.   1 mg at 10/16/23 0836    capmatinib (Tabrecta) tablet 400 mg  400 mg Oral BID Alma Newton M.D.   400 mg at 10/16/23 0520    guaiFENesin ER (Mucinex) tablet 600 mg  600 mg Oral Q12HRS Naima Ambrosio, A.P.R.N.   600 mg at 10/16/23 0520    diphenhydrAMINE (Benadryl) injection 25 mg  25 mg Intravenous Q6HRS PRN Kay Christensen A.P.R.N.   25 mg at 10/16/23 0409    diclofenac sodium (Voltaren) 1 % gel 2 g  2 g Topical 4X/DAY PRN Madhu Merino D.O.         loratadine (Claritin) tablet 10 mg  10 mg Oral DAILY SANJU GarciaOEstrellita   10 mg at 10/16/23 0520    senna-docusate (Pericolace Or Senokot S) 8.6-50 MG per tablet 2 Tablet  2 Tablet Oral BID SANJU GarciaOEstrellita   2 Tablet at 10/16/23 0520    And    polyethylene glycol/lytes (Miralax) PACKET 1 Packet  1 Packet Oral QDAY PRN SANJU GarciaOEstrellita   1 Packet at 10/13/23 0002    And    magnesium hydroxide (Milk Of Magnesia) suspension 30 mL  30 mL Oral QDAY PRN SANJU GarciaOEstrellita   30 mL at 10/16/23 0520    And    bisacodyl (Dulcolax) suppository 10 mg  10 mg Rectal QDAY PRN SANJU GarciaOEstrellita   10 mg at 09/28/23 1300    acetaminophen (Tylenol) tablet 650 mg  650 mg Oral Q6HRS PRN Madhu Merino D.O.        hydrALAZINE (Apresoline) injection 10 mg  10 mg Intravenous Q4HRS PRN SANJU GarciaOEstrellita   10 mg at 09/26/23 1010    ondansetron (Zofran) syringe/vial injection 4 mg  4 mg Intravenous Q4HRS PRN SANJU GarciaOEstrellita   4 mg at 10/04/23 2150    ondansetron (Zofran ODT) dispertab 4 mg  4 mg Oral Q4HRS PRN Madhu Merino D.O.        promethazine (Phenergan) tablet 12.5-25 mg  12.5-25 mg Oral Q4HRS PRN Madhu Merino D.O.        promethazine (Phenergan) suppository 12.5-25 mg  12.5-25 mg Rectal Q4HRS PRN Madhu Merino D.O.        prochlorperazine (Compazine) injection 5-10 mg  5-10 mg Intravenous Q4HRS PRN Madhu Merino D.O.        guaiFENesin dextromethorphan (Robitussin DM) 100-10 MG/5ML syrup 10 mL  10 mL Oral Q6HRS PRN Madhu Merino D.O.   10 mL at 10/11/23 1652    apixaban (Eliquis) tablet 5 mg  5 mg Oral BID SANJU GarciaO.   5 mg at 10/16/23 0520       Review of Systems   Constitutional:  Positive for malaise/fatigue. Negative for chills, fever and weight loss.   HENT:  Negative for congestion, ear pain, nosebleeds and sore throat.         Mouth sores   Eyes:  Negative for blurred vision.   Respiratory:  Positive for shortness of breath. Negative for cough, sputum production and wheezing.     Cardiovascular:  Negative for chest pain, palpitations, orthopnea and leg swelling.        Chest heaviness   Gastrointestinal:  Negative for abdominal pain, heartburn, nausea and vomiting.   Genitourinary:  Negative for dysuria, frequency and urgency.   Musculoskeletal:  Positive for back pain and joint pain. Negative for neck pain.   Neurological:  Negative for dizziness, tingling, tremors, sensory change, focal weakness and headaches.   Endo/Heme/Allergies:  Does not bruise/bleed easily.   Psychiatric/Behavioral:  Negative for depression, memory loss and suicidal ideas.    All other systems reviewed and are negative.      Problem list, medications, and allergies reviewed by myself today in Epic.     Objective:     Vitals:    10/16/23 0836 10/16/23 0922 10/16/23 0936 10/16/23 1153   BP:    116/71   Pulse:  (!) 118  (!) 120   Resp: (!) 22 18 20 20   Temp:    36.6 °C (97.8 °F)   TempSrc:    Temporal   SpO2:  95%  97%   Weight:       Height:             DESC; KARNOFSKY SCALE WITH ECOG EQUIVALENT: 90, Able to carry on normal activity; minor signs or symptoms of disease (ECOG equivalent 0)    DISTRESS LEVEL: no acute distress    Physical Exam  Constitutional:       General: She is not in acute distress.     Appearance: She is not ill-appearing or toxic-appearing.   HENT:      Mouth/Throat:      Pharynx: No oropharyngeal exudate or posterior oropharyngeal erythema.   Cardiovascular:      Rate and Rhythm: Tachycardia present.   Pulmonary:      Effort: No respiratory distress.      Breath sounds: No stridor. Rhonchi present. No wheezing.   Musculoskeletal:      Cervical back: Normal range of motion and neck supple.   Skin:     Comments: Multiple subcutaneous lesions, smaller in size since starting Capmatinib.  2 new nodules (1 on back of shoulder, other by collar bone).        Deferred    Labs:   Most recent labs reviewed.  Please see the lab tab of chart review    Imaging:   Most recent images below have been  independently reviewed by me.  Please see the imaging tab of chart review    9/22/2023 : Echocardiogram showed LVEF ~ 65% with no reported abnormalities    7/27/23: PET   1.  Focal uptake in the medial RIGHT lower lobe of lung abutting the thoracic spine, consistent with tumor..  2.  No PET correlate for ill-defined spiculated nodule in the RIGHT lower lobe.  3.  Large hypermetabolic mass corresponds to lytic lesion in the RIGHT sacrum consistent with malignancy.  4.  Intramuscular focal uptake at the posterior LEFT shoulder, likely metastatic.    Pathology:    7/14/2023: Pathology of Chest wall  A. Chest wall mass:          Metastatic poorly differentiated malignancy favoring carcinoma           without obvious involvement of overlying skin and a           non-specific IHC profile of some keratins positive (see           microscopic description).          See comment.   Main Cancer Type: Sarcoma   Probability: 90%     Subtype: Undifferentiated Sarcoma ([facility])   Probability: 90%     7/19/2023: Path of the Sacral Bone  A. Sacral bone biopsy:          Bone cores effaced by a high-grade malignancy histologically           identical to the previously excised chest wall mass           ([id number]).     2/14/2023: Pathology of Lung Mass  A. Lung, right lower lobe fine needle aspiration slides:          Positive for carcinoma.   B. Core, right lower lobe lung:          Moderately differentiated lung adenocarcinoma.   C. Lung, right lower lobe biopsy core and touchprep slides:          Moderately differentiated lung adenocarcinoma.   D. Bronchoalveolar lavage right lower lobe:          Rare atypical cells, malignant cells vs. reactive bronchial           cells.     Assessment/Plan:      Cancer Staging   Primary undifferentiated sarcoma of soft tissue (HCC)  Staging form: Soft Tissue Sarcoma of the Trunk and Extremities, AJCC 8th Edition  - Clinical: Stage IV (cTX, cN0, cM1) - Signed by Alma Newton M.D. on 9/19/2023          Ms. 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    Labs stable. Oxygen requirements stable.     EKG reviewed -QTc interval is normal.  Patient with left ventricle hypertrophy and probable left atrial enlargement.    Discussed plan of care again with patient.  She is eager to leave the facility    From a oncology standpoint she is fine to be transferred to another facility as long as that facility allows her to continue taking capmatinib    Plan:  Patient to continue capmatinib.    Will need CT CAP 6 weeks after starting capmatinib, roughly 1st week of November  Continue to wean oxygen as tolerated  I will continue to follow    Alma Newton M.D.  Hematology/Oncology    15 minutes spent on this case

## 2023-10-16 NOTE — PROGRESS NOTES
MD Dillon called to bedside due to increased work of breathing, increased oxygen demand, and chest pain above L clavicle. EKG just completed, troponins pulled at this time. MD Dillon ordered another dose of lasix and increased ativan dosage.

## 2023-10-16 NOTE — PROGRESS NOTES
Hospital Medicine Daily Progress Note    Date of Service  10/16/2023    Chief Complaint  48-year-old female with history of sarcoma and lung cancer presented 9/20 with dyspnea.      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  On admission patient had tachycardia tachypnea, chest x-ray showed bilateral infiltration, CTA for chest showed right lower lobe subsegmental PE, worsening pulmonary nodules with T1 vertebral body lesion, possible pneumonia.  Procalcitonin was 0.11.Eliquis was started.  Also antibiotics for pneumonia Ceftriaxone and azithromycin were empirically started.  Echo was done and showed normal ejection fraction 65%, initially patient was treated ICU secondary to increasing oxygen requirement, patient was on high flow oxygen, treated with a steroid and inhalers.  With some improvement and patient was transferred out of the ICU.    Patient was evaluated by Dr. Newton oncologist who recommended to start  capmatinib on 9/29/2023.    Patient has metastatic sarcoma with lung cancer and significant anxiety, palliative care following for pain management.  Patient needed Precedex drip for anxiety.  Patient is on every 4 hours prn Ativan, every 2 hours prn IV Dilaudid, Norco 10/325 every 3 hours prn. Tylenol every 6 hours scheduled, holding when sleeping. Dexamethasone 4 mg BID.  CODE STATUS was discussed with the patient and agreed for DNR/DNI.    Interval Problem Update  Axox3 second rapid response this am pt was placed on max high flow after desaturation that happened when she got up to bedside commode, I reviewed her acute cxr which showed stable bilateral effusions and I also obtained and interpreted an ekg which showed no acute changes. She and her  friend were yelling at each other and this was worsening her desaturations so I asked her friend to leave the room, with iv lasix and diuresis and an increased dose of ativan she improved and oxygen titrated down from 70% back to 40% high flow. Echo pending. She denies new pain.  She confirmed she remains dnr/ dni and does not want hospice. ROS otherwise negative.     Addendum: atypical L shoulder pain, constant - will check ekg and trop, trial of lidocaine, tele, following  Ekg interpreted by me,early repolarization, unchanged from previous    I have discussed this patient's plan of care and discharge plan at IDT rounds today with Case Management, Nursing, Nursing leadership, and other members of the IDT team.    Consultants/Specialty  oncology critical care, palliative care    Code Status  DNAR/DNI    Disposition  The patient is not medically cleared for discharge to home or a post-acute facility.      I have placed the appropriate orders for post-discharge needs.    Review of Systems  Review of Systems   Constitutional:  Positive for malaise/fatigue. Negative for chills, diaphoresis, fever and weight loss.   HENT: Negative.  Negative for sore throat.    Eyes: Negative.  Negative for blurred vision.   Respiratory:  Positive for shortness of breath. Negative for cough.    Cardiovascular: Negative.  Negative for chest pain, palpitations and leg swelling.   Gastrointestinal: Negative.  Negative for abdominal pain, heartburn, nausea and vomiting.   Genitourinary: Negative.  Negative for dysuria and frequency.   Musculoskeletal:  Negative for myalgias and neck pain.   Skin: Negative.  Negative for itching and rash.   Neurological: Negative.  Negative for dizziness, focal weakness, weakness and headaches.   Endo/Heme/Allergies: Negative.  Negative for polydipsia. Does not bruise/bleed easily.   Psychiatric/Behavioral: Negative.  Negative for depression, substance abuse and suicidal ideas. The patient is not  nervous/anxious.    All other systems reviewed and are negative.       Physical Exam  Temp:  [36.1 °C (97 °F)-36.7 °C (98 °F)] 36.6 °C (97.8 °F)  Pulse:  [101-131] 131  Resp:  [18-30] 22  BP: (103-146)/(56-88) 116/71  SpO2:  [88 %-97 %] 91 %    Physical Exam  Vitals and nursing note reviewed. Exam conducted with a chaperone present.   Constitutional:       General: She is not in acute distress.     Appearance: Normal appearance. She is well-developed. She is ill-appearing. She is not diaphoretic.   HENT:      Head: Normocephalic and atraumatic.      Nose: Nose normal.      Comments: High flow nasal cannula     Mouth/Throat:      Mouth: Mucous membranes are moist.   Eyes:      Conjunctiva/sclera: Conjunctivae normal.      Pupils: Pupils are equal, round, and reactive to light.   Neck:      Thyroid: No thyromegaly.      Vascular: No JVD.   Cardiovascular:      Rate and Rhythm: Normal rate and regular rhythm.      Pulses: Normal pulses.      Heart sounds: Normal heart sounds.      No friction rub. No gallop.   Pulmonary:      Effort: Pulmonary effort is normal. No respiratory distress.      Breath sounds: No wheezing, rhonchi or rales.      Comments: Decreased at bases  No use of accessory muscle use  Abdominal:      General: Abdomen is flat. Bowel sounds are normal. There is no distension.      Palpations: Abdomen is soft. There is no mass.      Tenderness: There is no abdominal tenderness. There is no guarding or rebound.   Musculoskeletal:         General: No swelling, tenderness or deformity. Normal range of motion.      Cervical back: Normal range of motion and neck supple.      Right lower leg: No edema.      Left lower leg: No edema.   Lymphadenopathy:      Cervical: No cervical adenopathy.   Skin:     General: Skin is warm and dry.      Capillary Refill: Capillary refill takes less than 2 seconds.   Neurological:      General: No focal deficit present.      Mental Status: She is alert and oriented to person,  place, and time. Mental status is at baseline.      Cranial Nerves: No cranial nerve deficit.      Motor: No weakness.   Psychiatric:         Mood and Affect: Mood normal.         Behavior: Behavior normal.         Fluids    Intake/Output Summary (Last 24 hours) at 10/16/2023 1549  Last data filed at 10/16/2023 0922  Gross per 24 hour   Intake --   Output 1500 ml   Net -1500 ml             Laboratory  Recent Labs     10/14/23  0024 10/15/23  0015 10/16/23  0017   WBC 13.3* 12.3* 13.3*   RBC 3.96* 3.57* 3.66*   HEMOGLOBIN 10.5* 9.6* 10.0*   HEMATOCRIT 35.0* 31.8* 32.7*   MCV 88.4 89.1 89.3   MCH 26.5* 26.9* 27.3   MCHC 30.0* 30.2* 30.6*   RDW 62.4* 62.6* 62.9*   PLATELETCT 116* 108* 101*   MPV 10.0 9.8 9.6     Recent Labs     10/16/23  0017   SODIUM 139   POTASSIUM 4.4   CHLORIDE 105   CO2 26   GLUCOSE 264*   BUN 14   CREATININE 0.50   CALCIUM 8.2*                     Imaging  EC-ECHOCARDIOGRAM COMPLETE W/O CONT   Final Result      DX-CHEST-PORTABLE (1 VIEW)   Final Result         1.  Pulmonary edema and/or infiltrates are identified, which are stable since the prior exam.   2.  Trace bilateral pleural effusions      IR-US GUIDED PIV   Final Result    Ultrasound-guided PERIPHERAL IV INSERTION performed by    qualified nursing staff as above.      IR-US GUIDED PIV   Final Result    Ultrasound-guided PERIPHERAL IV INSERTION performed by    qualified nursing staff as above.      IR-US GUIDED PIV   Final Result    Ultrasound-guided PERIPHERAL IV INSERTION performed by    qualified nursing staff as above.      DX-CHEST-LIMITED (1 VIEW)   Final Result      Stable bilateral pulmonary infiltrates.      DX-CHEST-LIMITED (1 VIEW)   Final Result         1.  Extensive bilateral pulmonary parenchymal opacities compatible with ARDS, severe pulmonary edema, and/or superimposed pulmonary infiltrates, slightly decreased since prior study.      DX-CHEST-PORTABLE (1 VIEW)   Final Result         1.  Extensive bilateral pulmonary  parenchymal opacities compatible with ARDS, severe pulmonary edema, and/or superimposed pulmonary infiltrates, stable since prior study.      IR-MIDLINE CATHETER INSERTION WO GUIDANCE > AGE 3   Final Result                  Ultrasound-guided midline placement performed by qualified nursing staff    as above.          DX-CHEST-PORTABLE (1 VIEW)   Final Result         1.  Extensive bilateral pulmonary parenchymal opacities compatible with ARDS, severe pulmonary edema, and/or superimposed pulmonary infiltrates, stable since prior study.      DX-CHEST-PORTABLE (1 VIEW)   Final Result         1.  Pulmonary edema and/or infiltrates are identified, which are stable since the prior exam.      DX-CHEST-PORTABLE (1 VIEW)   Final Result         1.  Bilateral pulmonary infiltrates, stable since prior study   2.  Multiple bilateral pulmonary nodules.      DX-CHEST-PORTABLE (1 VIEW)   Final Result      1.  Innumerable pulmonary nodules bilaterally.      2.  Increasing confluence and airspace opacities in the perihilar regions suspicious for superimposed pneumonitis on diffuse metastatic disease      IR-PICC LINE PLACEMENT W/ GUIDANCE > AGE 5   Final Result                  Ultrasound-guided PICC placement performed by qualified nursing staff as    above.          DX-CHEST-PORTABLE (1 VIEW)   Final Result      1.  Innumerable bilateral pulmonary nodule, increased since 9/20/2023      2.  Differential diagnosis includes worsening metastases or superimposed pneumonia.      EC-ECHOCARDIOGRAM COMPLETE W/O CONT   Final Result      CT-CTA CHEST PULMONARY ARTERY W/ RECONS   Final Result      1.  Minimal pulmonary embolism. Localized right lower lobe segmental and subsegmental branches.   2.  No right heart strain.   3.  9 mm lucent/lytic lesion T1 vertebral body. Suspect osseous metastatic deposit.   4.  Progression of extensive mediastinal adenopathy since 8/20/2023 consistent with metastatic disease.   5.  Extensive progression of  diffuse bilateral innumerable pulmonary nodules consistent with metastatic disease.   6.  Interval development of extensive groundglass and airspace opacities consistent with superimposed pneumonia.   7.  Progression of right adrenal mass now measuring 43 mm x 38 mm consistent with metastasis.      Fleischner Society pulmonary nodule recommendations:      DX-CHEST-PORTABLE (1 VIEW)   Final Result      Diffuse bilateral interstitial and alveolar opacities most consistent with pneumonia or possibly noncardiogenic pulmonary edema.             Assessment/Plan  * Acute on chronic respiratory failure (HCC)- (present on admission)  Assessment & Plan  Multiple factors including aggressive metastatic undifferentiated sarcoma versus sarcomatoid lung cancer.   history of stage 1 lung adenocarcinoma s/p SBRT, pulmonary embolism, COPD/asthma, recent pneumonia.      Initially treated at the ICU, patient still on high flow oxygen  Finish course of antibiotics  downgraded to IMCU on 10/2/2023.      Completed course of antibiotics.   Worsening hypoxia this am, 30 minutes titration of high flow, see above   Continue inhaler and nebulizers.      Atypical chest pain  Assessment & Plan  Non specific but unchanged ekg  trops in 60's, likely due to heart strain from hypoxia and PE  Sx improving  Echo still pending  Continue to follow    Goals of care, counseling/discussion- (present on admission)  Assessment & Plan  Goals of care done last week extensively. Hospice recommended by oncology and ICU team.  Patient would like to try some form of therapy if possible and oncology offered an oral chemotherapeutic regime. She elected to change to DNR/DNI.   Continue with palliative care consultation.     Sarcoma (HCC)- (present on admission)  Assessment & Plan  Advanced, aggressive, on oral capmatinib    Pulmonary embolism on right (HCC)- (present on admission)  Assessment & Plan  Recently diagnosed.  Tolerating Eliquis and no  hemoptysis    Primary undifferentiated sarcoma of soft tissue (HCC)- (present on admission)  Assessment & Plan  Aggressive metastatic undifferentiated sarcoma versus sarcomatoid lung cancer.   Stated Capmatinib on 9/29/2023 by oncology team  Patient is DNR/DNI.  Palliative following    Mass of sacrum- (present on admission)  Assessment & Plan  Aggressive metastatic undifferentiated sarcoma. On Capmatinib    Lung cancer (HCC)- (present on admission)  Assessment & Plan  Initially diagnosed stage I adenocarcinoma initially treated with chemoradiation.   Discussed again with Dr. Newton  Now with widespread metastatic sarcoma  Plan to continue capmatinib    Chronic pain due to neoplasm- (present on admission)  Assessment & Plan  Palliative care have been following for pain management.  Continue multimodal pain management, oxy, tylenol, lidocaine patches, gabapentin and IV pain medication for breakthrough.   Pain currently controlled  Patient refusing her oral meds - again encouraged to take them for steadier pain control, she states she is reluctant because she feels it makes her anxiety worse    Cigarette nicotine dependence without complication- (present on admission)  Assessment & Plan  History of ongoing tobacco abuse, cessation discussed and recommended   Nicotine patch declined          VTE prophylaxis: Eliquis      Greater than 51 minutes spent prepping to see patient (e.g. review of tests) obtaining and/or reviewing separately obtained history. Performing a medically appropriate examination and/ evaluation.  Counseling and educating the patient/family/caregiver.  Ordering medications, tests, or procedures.  Referring and communicating with other health care professionals.  Documenting clinical information in EPIC.  Independently interpreting results and communicating results to patient/family/caregiver.  Care coordination

## 2023-10-16 NOTE — CARE PLAN
The patient is Watcher - Medium risk of patient condition declining or worsening    Shift Goals  Clinical Goals: pain control, monitor/titrate 02 down  Patient Goals: rest, pain managment  Family Goals: comfort    Progress made toward(s) clinical / shift goals:    Problem: Pain - Standard  Goal: Alleviation of pain or a reduction in pain to the patient’s comfort goal  Outcome: Progressing  Note: Pain assessed using 0-10 verbal pain scale, PRN pain medications administered per MAR.      Problem: Respiratory  Goal: Patient will achieve/maintain optimum respiratory ventilation and gas exchange  Outcome: Progressing  Note: Pt maintaining adequate O2 after 2 doses of lasix administered.      Problem: Knowledge Deficit - Standard  Goal: Patient and family/care givers will demonstrate understanding of plan of care, disease process/condition, diagnostic tests and medications  Outcome: Progressing  Note: Pt updated on plan of care, pt states understanding for plan of anxiety/pain management, and monitoring O2.        Patient is not progressing towards the following goals: N/A

## 2023-10-16 NOTE — PROGRESS NOTES
Monitor summary:   Sinus rhythm-sinus tachycardia   Occasional-frequent PVC's   0.13/0.07/0.29

## 2023-10-16 NOTE — DISCHARGE PLANNING
Case Management Discharge Planning    Admission Date: 9/20/2023  GMLOS: 4.9  ALOS: 26    6-Clicks ADL Score: 20  6-Clicks Mobility Score: 19      Anticipated Discharge Dispo: Discharge Disposition: Discharged to home/self care (01)    DME Needed:  O2    Action(s) Taken:  Patient discussed in rounds this AM. Patient remains on high flow O2 @ 30L. Goal is for patient to be weaned to a maximum of 10L O2 then discharged home with HH and O2. Patient is not cleared medically for D/C.     Escalations Completed: None    Medically Clear: No    Next Steps: CM will continue to follow patient and assist with DCP needs.     Barriers to Discharge: Medical clearance and DME    Is the patient up for discharge tomorrow: No

## 2023-10-16 NOTE — PROGRESS NOTES
NOC HOSPITALIST CROSS COVER    Notified by RN regarding the patient complaining of sudden onset shortness of breath, with associated desaturations into the low 80s, requiring bedside RN to place the patient on max high flow for short period of time in order to improve her oxygen saturation.  RT was called to bedside to give the patient a breathing treatment.  She also got Ativan for her anxiety which did help improve her tachypnea.      Vitals:    10/16/23 0440   BP:    Pulse: (!) 112   Resp: (!) 30   Temp:    SpO2: 96%          Plan:  #Shortness of breath  -Stat chest x-ray ordered and pending  -Ativan given with slight improvement  -RT at bedside to give DuoNeb  -VBG ordered and pending  -Titrate oxygen to maintain SPO2 greater than 92%    Update 0640:  Patient with worsening resp distress. Called to bedside to evaluate. Patient is in acute distress, sitting in tripod position, with accessory muscle use. CXR showing pulmonary edema, similar to prior. Will trial a one time dose of Lasix 40 mg IV. She is mouth breathing despite being maxed on HFNC. Placed an oxymask over the top in order to facilitate better oxygenation while mouth breathing and in acute distress. Was satting 85% on my arrival. Lung sounds very diminished with wheezes throughout. RT at bedside and provided ordered inhalers and duoneb via high flow circuit. She is also tachycardic, with heart rates in the 120s-140s, which is worsened with any movement. Following Lasix administration, the patient had improvements in her SpO2 and appeared slightly improved.Additionally, the patient is profoundly anxious, with her family member at bedside appearing to precipitate the anxiety. Patient's family member (sister) was asked to leave in order to allow the patient time to rest.     Plan:  -Lasix 40 mg IV  -Ativan 0.5 mg IV once (too early for other doses)  -Morphine 2 mg IV for air hunger  -VBG pending  -STAT EKG  -NT-proBNP ordered and pending    The patient  is critically ill and is at high likelihood for decompensation. Handoff given to bedside RN and oncoming sherineft hospitalist.     -----------------------------------------------------------------------------------------------------------    Electronically signed by:  Nida Freed, EVERT, APRN, SUSANNEP-BC  Hospitalist Services

## 2023-10-17 NOTE — CARE PLAN
Problem: Humidified High Flow Nasal Cannula  Goal: Maintain adequate oxygenation dependent on patient condition  Description: Target End Date:  resolve prior to discharge or when underlying condition is resolved/stabilized    1.  Implement humidified high flow oxygen therapy  2.  Titrate high flow oxygen to maintain appropriate SpO2  Outcome: Not Met  Flowsheets  Taken 10/17/2023 0251 by Garth Huang, RRT  O2 (LPM): 40  FiO2%: 80 %  Taken 9/25/2023 1956 by Guzman Toro RRT  Indication: COPD diagnosis: SpO2 less than 89% despite conventional supplemental oxygen devices

## 2023-10-17 NOTE — CARE PLAN
The patient is Watcher - Medium risk of patient condition declining or worsening    Shift Goals  Clinical Goals: pain control, monitor/titrate 02 down  Patient Goals: rest, pain managment  Family Goals: comfort    Progress made toward(s) clinical / shift goals:    Problem: Pain - Standard  Goal: Alleviation of pain or a reduction in pain to the patient’s comfort goal  Outcome: Progressing     Problem: Respiratory  Goal: Patient will achieve/maintain optimum respiratory ventilation and gas exchange  Outcome: Progressing     Problem: Self Care  Goal: Patient will have the ability to perform ADLs independently or with assistance (bathe, groom, dress, toilet and feed)  Outcome: Progressing     Problem: Skin Integrity  Goal: Skin integrity is maintained or improved  Outcome: Progressing     Problem: Psychosocial  Goal: Patient's level of anxiety will decrease  Outcome: Progressing  Goal: Patient's ability to verbalize feelings about condition will improve  Outcome: Progressing  Goal: Patient's ability to re-evaluate and adapt role responsibilities will improve  Outcome: Progressing  Goal: Patient and family will demonstrate ability to cope with life altering diagnosis and/or procedure  Outcome: Progressing  Goal: Spiritual and cultural needs incorporated into hospitalization  Outcome: Progressing       Patient is not progressing towards the following goals:

## 2023-10-17 NOTE — DISCHARGE PLANNING
Case Management Discharge Planning    Admission Date: 9/20/2023  GMLOS: 4.9  ALOS: 27    6-Clicks ADL Score: 20  6-Clicks Mobility Score: 19      Anticipated Discharge Dispo: Discharge Disposition: Discharged to home/self care (01)    Pt discussed in rounds this AM. Pt remains on high flow oxygen. CM called and left voice message with admissions [facility] for a return phone call in regards to referral.  CM will continue to follow patient and assist in DCP needs.

## 2023-10-17 NOTE — PROGRESS NOTES
Monitor Summary:     Rhythm: ST  Rate: 114-137  Ectopy: PAC(R), PVC(F), Coup, trigem  Measurements: .13/.09/.31                 12 Hour Chart Check

## 2023-10-17 NOTE — PROGRESS NOTES
Monitor summary    Rhythm: ST  Rate: 103-128  Ectopy: (R) PVC, (R) PAC, COUP  Measurement: .13/.07/.29

## 2023-10-17 NOTE — PROGRESS NOTES
Consult Note: Hematology/Oncology     Primary Care:  Checo Baker M.D.    Chief Complaint   Patient presents with    Shortness of Breath     PT hx lung ca that's metastasized. Was at another appt and was SOB. RA while walking in 70's per EMS. Currently on 6L NC         Current Treatment:     9/29/23: Capmatinib    Prior Treatment:     4/17/2023-4/21/2023: RLL Lung SBRT  7/19/23-7/24/2023: R Sacrum SBRT      Subjective:   History of Presenting Illness:    Melly Hendrickson is a 48 y.o. 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    Patient history dates back to July 2022 when she had a chest x-ray performed that revealed a potential mass in her right lung.  This led to a chest CT performed on February 13, 2023.  This demonstrated 1.4 cm mass in the right lower lobe and a 1.9 groundglass opacity also in the right lower lobe.  The groundglass opacity appeared stable from previous scans but the mass was thought to be new.      A biopsy was performed and confirmed lung adenocarcinoma.    Patient saw thoracic surgery and obtained PFTs as well as a PET scan.  She was discussed at tumor board and given her significant dyspnea and her underlying COPD it was thought that she would be a better candidate for SBRT rather than surgical resection.    April 17, 2023 patient began SBRT.  Completed this treatment without many issues.    After this patient did notice a small lump in her breast reduction scar line.  She said that over the next few days it grew and she ultimately went to her PCP    PCP tried to drain the mass; however it just bled significantly during the I&D attempt.  Thus she was sent to Dr. Becerra at [facility] for excision.     Pathology from this excision demonstrated poorly differentiated malignancy favoring carcinoma without obvious involvement of underlying skin and nonspecific IHC profile.  Based on  his diagnosis cancer type ID was sent off for.  Results showed 90% probability of sarcoma    Over the past several months she has noted severe pain in her sacral region.  She does have sacral lesions that are concerning for metastatic disease.  Patient has biopsy of sacral mass on 7/18/2023.     Of note patient has 5 children and 4 grandchildren.    She follows with pain management    I initially saw patient on 7/14.  At our 7/21, we had information back from biopsy and we discussed AIM.  Patient wished to hold off and think about this further.    On 7/28, we discussed initiating AIM, patient was not comfortable and wanted 2 opinion at [place].    I saw patient again on 8/11.  We discussed AIM,  patient not ready and awaiting [place] appointment.     She was recently admitted to the ER for hemoptysis on 8/20/23 and was found to have a PE.  She was still having hemoptysis and thus was not started on AC. She has discharged on 8/24/23.     I saw her subsequently on 9/7/23 and recommended AC given no hemoptysis, but active PE with cancer.  We also discussed chemo at this visit and she wished to wait.     During my visit with the patient on 9/19/23, Patient was endorsing 3 nodules on the R arm.  1 on the clavicle.  Patient also had a nodule on her L arm. Patient had 1 nodule on her bikini line. She has another on the inside of her L thigh. These started 1 week ago. She was on 2-3L of oxygen for the past several days.  We discussed intiating chemo and plan was to start on 9/26/23.  Pt requires a direct admit for AIM    Since that visit she had worseing SOB.  She was subsequently presented to the ER for SOB/cough and potential PNA. On admission, she was tachycardic, tachypneic, and afebrile.  Chest x-ray showed diffuse bilateral interstitial and alveolar opacities consistent with pneumonia. CTA chest on admission showed right subsegmental PE, worsening pulmonary nodules, T1 vertebral body region, possible pneumonia.   Procalcitonin was 0.11.  Ceftriaxone and azithromycin were empirically started.    Line was not able to be placed unable to start chemo.  Patients respiratory status declined and she was transferred to the ICU    Patient started on Capmatinib 9/29/23    Patient's oxygen requirements remain stable.  She does have some anxiety when moving around the bed and feeling short of breath.    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    Chronic obstructive pulmonary disease (HCC)     COPD (chronic obstructive pulmonary disease) (HCC)     DDD (degenerative disc disease), lumbar     Depression     Emphysema of lung (HCC)     MRSA (methicillin resistant staph aureus) culture positive     Pain     back - cervical, scapula, lower    Pericarditis     Primary adenocarcinoma of lower lobe of right lung ([facility])     Psychiatric disorder     Anxiety, Depression    Sleep apnea     Snoring     Urinary incontinence         Past Surgical History:   Procedure Laterality Date    CA BRONCHOSCOPY,DIAGNOSTIC N/A 8/22/2023    Procedure: BRONCHOSCOPY;  Surgeon: Sandra Lopez D.O.;  Location: [facility];  Service: Pulmonary    CA EXC SKIN BENIG >4CM TRUNK,ARM,LEG Left 6/7/2023    Procedure: EXCISION OF SOFT TISSUE MASS LEFT CHEST WALL;  Surgeon: Nasir Becerra M.D.;  Location: [facility];  Service: General    CA BRONCHOSCOPY,DIAGNOSTIC N/A 02/14/2023    Procedure: FIBER OPTIC BRONCHOSCOPY WASH, BRUSH, BRONCHOALVEOLAR LAVAGE, BIOPSY AND FINE NEEDLE ASPIRATION AND NAVIGATION, ROBOTICS;  Surgeon: Guicho Ellis M.D.;  Location: [facility];  Service: Pulmonary Robotic    ENDOBRONCHIAL US ADD-ON N/A 02/14/2023    Procedure: ENDOBRONCHIAL ULTRASOUND (EBUS);  Surgeon: Guicho Ellis M.D.;  Location: [facility];  Service: Pulmonary Robotic     MAMMOPLASTY REDUCTION Bilateral     SEPTOPLASTY      TUBE & ECTOPIC PREG., REMOVAL      x 2       Social History     Tobacco Use    Smoking status: Some Days     Current packs/day: 0.50     Average packs/day: 0.5 packs/day for 20.0 years (10.0 ttl pk-yrs)     Types: Cigarettes     Passive exposure: Past    Smokeless tobacco: Never    Tobacco comments:     on and off    Vaping Use    Vaping Use: Never used   Substance Use Topics    Alcohol use: Not Currently     Comment: occ, rare    Drug use: Not Currently     Types: Marijuana     Comment: THC gummies        Family History   Problem Relation Age of Onset    Diabetes Mother     Cancer Father         Prostate    Cancer Paternal Grandmother         Cervical    Cancer Paternal Grandfather         Unk       Allergies   Allergen Reactions    Penicillins Hives, Rash and Swelling     Pt reports that she gets swelling in throat and face, rash all over face and arms.   Tolerated cefazolin    Aspirin Rash and Hives     Pt reports that she received a rash on face, pt reports it ok if she takes NORCO    Other Environmental Shortness of Breath     Perfumes, dander, scents       Current Facility-Administered Medications   Medication Dose Route Frequency Provider Last Rate Last Admin    LORazepam (Ativan) injection 1 mg  1 mg Intravenous Q4HRS PRN Fide Dillon M.D.   1 mg at 10/17/23 1115    calcium carbonate (Tums) chewable tab 500 mg  500 mg Oral BID PRN Fide Dillon M.D.        lidocaine (Lidoderm) 5 % 1 Patch  1 Patch Transdermal Q24HR Fide Dillon M.D.   1 Patch at 10/16/23 1656    tiotropium (Spiriva Respimat) 2.5 mcg/Act inhalation spray 5 mcg  5 mcg Inhalation DAILY Fide Dillon M.D.   5 mcg at 10/17/23 0600    nystatin-tetracycline-prednisone-diphenhydramine (Miracle Mouth Wash) oral susp 5 mL  5 mL Oral Q6HRS PRN NICOLASA AlvaradoP.R.N.   5 mL at 10/10/23 0514    levalbuterol (Xopenex) 1.25 MG/3ML nebulizer solution 1.25 mg  1.25 mg Nebulization Q2HRS PRN (RT)  Oksana Mas M.D.        albuterol inhaler 2 Puff  2 Puff Inhalation Q4HRS PRN Oksana Mas M.D.   2 Puff at 10/16/23 0357    mometasone-formoterol (Dulera) 100-5 MCG/ACT inhaler 2 Puff  2 Puff Inhalation BID Oksana Mas M.D.   2 Puff at 10/17/23 0507    Respiratory Therapy Consult   Nebulization Continuous RT Oksana Mas M.D.        ipratropium-albuterol (DUONEB) nebulizer solution  3 mL Nebulization Q2HRS PRN (RT) Oksana Mas M.D.   3 mL at 10/17/23 1316    melatonin tablet 10 mg  10 mg Oral HS PRN Dex Madrid M.D.        morphine ER (Ms Contin) tablet 15 mg  15 mg Oral Q12HRS Dex Madrid M.D.   15 mg at 10/16/23 1655    busPIRone (Buspar) tablet 15 mg  15 mg Oral TID Dex Madrid M.D.   15 mg at 10/17/23 1312    oxyCODONE immediate-release (Roxicodone) tablet 5 mg  5 mg Oral Q4HRS PRN Gerson Adam M.D.        Or    oxyCODONE immediate release (Roxicodone) tablet 10 mg  10 mg Oral Q4HRS PRN Gerson Adam M.D.   10 mg at 10/12/23 2344    acetaminophen (Tylenol) tablet 650 mg  650 mg Oral Q6HRS Gerson Adam M.D.   650 mg at 10/17/23 1311    LORazepam (Ativan) injection 0.5 mg  0.5 mg Intravenous Q4HRS PRN Tonny Edmond M.D.   0.5 mg at 10/16/23 0351    dexamethasone (Decadron) injection 4 mg  4 mg Intravenous Q6HRS Tonny Edmond M.D.   4 mg at 10/17/23 1311    sodium chloride (Ocean) 0.65 % nasal spray 2 Spray  2 Spray Nasal Q2HRS PRN Tonny Edmond M.D.   2 Spray at 10/17/23 1312    HYDROmorphone (Dilaudid) injection 1 mg  1 mg Intravenous Q HOUR PRN Madhu Simpson P.A.-C.   1 mg at 10/17/23 1114    capmatinib (Tabrecta) tablet 400 mg  400 mg Oral BID Alma Newton M.D.   400 mg at 10/17/23 0507    guaiFENesin ER (Mucinex) tablet 600 mg  600 mg Oral Q12HRS Naima Ambrosio, A.P.R.N.   600 mg at 10/17/23 0507    diphenhydrAMINE (Benadryl) injection 25 mg  25 mg Intravenous Q6HRS PRN Kay Christensen A.P.R.N.   25 mg at  10/16/23 2232    diclofenac sodium (Voltaren) 1 % gel 2 g  2 g Topical 4X/DAY PRN Madhu Merino D.O.        loratadine (Claritin) tablet 10 mg  10 mg Oral DAILY SANJU GarciaOEstrellita   10 mg at 10/17/23 0508    senna-docusate (Pericolace Or Senokot S) 8.6-50 MG per tablet 2 Tablet  2 Tablet Oral BID SANJU GarciaOEstrellita   2 Tablet at 10/17/23 0506    And    polyethylene glycol/lytes (Miralax) PACKET 1 Packet  1 Packet Oral QDAY PRN SANJU GarciaOEstrellita   1 Packet at 10/13/23 0002    And    magnesium hydroxide (Milk Of Magnesia) suspension 30 mL  30 mL Oral QDAY PRN Madhu Merino D.O.   30 mL at 10/16/23 0520    And    bisacodyl (Dulcolax) suppository 10 mg  10 mg Rectal QDAY PRN SANJU GarciaOEstrellita   10 mg at 09/28/23 1300    acetaminophen (Tylenol) tablet 650 mg  650 mg Oral Q6HRS PRN Madhu Merino D.O.        hydrALAZINE (Apresoline) injection 10 mg  10 mg Intravenous Q4HRS PRN Madhu Merino D.O.   10 mg at 09/26/23 1010    ondansetron (Zofran) syringe/vial injection 4 mg  4 mg Intravenous Q4HRS PRN SANJU GarciaOEstrellita   4 mg at 10/04/23 2150    ondansetron (Zofran ODT) dispertab 4 mg  4 mg Oral Q4HRS PRN Madhu Merino D.O.        promethazine (Phenergan) tablet 12.5-25 mg  12.5-25 mg Oral Q4HRS PRN Madhu Merino D.O.        promethazine (Phenergan) suppository 12.5-25 mg  12.5-25 mg Rectal Q4HRS PRN Madhu Merino D.O.        prochlorperazine (Compazine) injection 5-10 mg  5-10 mg Intravenous Q4HRS PRN Madhu W. Wilber, D.O.        guaiFENesin dextromethorphan (Robitussin DM) 100-10 MG/5ML syrup 10 mL  10 mL Oral Q6HRS PRN SANJU GarciaOEstrellita   10 mL at 10/11/23 1652    apixaban (Eliquis) tablet 5 mg  5 mg Oral BID SANJU GarciaOEstrellita   5 mg at 10/17/23 0507       Review of Systems   Constitutional:  Positive for malaise/fatigue. Negative for chills, fever and weight loss.   HENT:  Negative for congestion, ear pain, nosebleeds and sore throat.         Mouth sores   Eyes:  Negative for blurred  vision.   Respiratory:  Positive for shortness of breath. Negative for cough, sputum production and wheezing.    Cardiovascular:  Negative for chest pain, palpitations, orthopnea and leg swelling.        Chest heaviness   Gastrointestinal:  Negative for abdominal pain, heartburn, nausea and vomiting.   Genitourinary:  Negative for dysuria, frequency and urgency.   Musculoskeletal:  Positive for back pain and joint pain. Negative for neck pain.   Neurological:  Negative for dizziness, tingling, tremors, sensory change, focal weakness and headaches.   Endo/Heme/Allergies:  Does not bruise/bleed easily.   Psychiatric/Behavioral:  Negative for depression, memory loss and suicidal ideas.    All other systems reviewed and are negative.      Problem list, medications, and allergies reviewed by myself today in Epic.     Objective:     Vitals:    10/17/23 0729 10/17/23 1122 10/17/23 1205 10/17/23 1320   BP:   133/79    Pulse: (!) 101 (!) 101 (!) 121 (!) 134   Resp: (!) 23 (!) 23 (!) 22 (!) 22   Temp:   37.2 °C (98.9 °F)    TempSrc:   Temporal    SpO2: 93% 93% 90% 91%   Weight:       Height:             DESC; KARNOFSKY SCALE WITH ECOG EQUIVALENT: 90, Able to carry on normal activity; minor signs or symptoms of disease (ECOG equivalent 0)    DISTRESS LEVEL: no acute distress    Physical Exam  Constitutional:       General: She is not in acute distress.     Appearance: She is not ill-appearing or toxic-appearing.   HENT:      Mouth/Throat:      Pharynx: No oropharyngeal exudate or posterior oropharyngeal erythema.   Cardiovascular:      Rate and Rhythm: Tachycardia present.   Pulmonary:      Effort: No respiratory distress.      Breath sounds: No stridor. Rhonchi present. No wheezing.   Musculoskeletal:      Cervical back: Normal range of motion and neck supple.   Skin:     Comments: Multiple subcutaneous lesions, smaller in size since starting Capmatinib.  2 new nodules (1 on back of shoulder, other by collar bone).         Deferred    Labs:   Most recent labs reviewed.  Please see the lab tab of chart review    Imaging:   Most recent images below have been independently reviewed by me.  Please see the imaging tab of chart review    9/22/2023 : Echocardiogram showed LVEF ~ 65% with no reported abnormalities    7/27/23: PET   1.  Focal uptake in the medial RIGHT lower lobe of lung abutting the thoracic spine, consistent with tumor..  2.  No PET correlate for ill-defined spiculated nodule in the RIGHT lower lobe.  3.  Large hypermetabolic mass corresponds to lytic lesion in the RIGHT sacrum consistent with malignancy.  4.  Intramuscular focal uptake at the posterior LEFT shoulder, likely metastatic.    Pathology:    7/14/2023: Pathology of Chest wall  A. Chest wall mass:          Metastatic poorly differentiated malignancy favoring carcinoma           without obvious involvement of overlying skin and a           non-specific IHC profile of some keratins positive (see           microscopic description).          See comment.   Main Cancer Type: Sarcoma   Probability: 90%     Subtype: Undifferentiated Sarcoma ([facility])   Probability: 90%     7/19/2023: Path of the Sacral Bone  A. Sacral bone biopsy:          Bone cores effaced by a high-grade malignancy histologically           identical to the previously excised chest wall mass           ([id number]).     2/14/2023: Pathology of Lung Mass  A. Lung, right lower lobe fine needle aspiration slides:          Positive for carcinoma.   B. Core, right lower lobe lung:          Moderately differentiated lung adenocarcinoma.   C. Lung, right lower lobe biopsy core and touchprep slides:          Moderately differentiated lung adenocarcinoma.   D. Bronchoalveolar lavage right lower lobe:          Rare atypical cells, malignant cells vs. reactive bronchial           cells.     Assessment/Plan:      Cancer Staging   Primary undifferentiated sarcoma of soft tissue (HCC)  Staging form: Soft Tissue  Sarcoma of the Trunk and Extremities, AJCC 8th Edition  - Clinical: Stage IV (cTX, cN0, cM1) - Signed by Alma Newton M.D. on 9/19/2023         Ms. 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    Labs stable. Oxygen requirements stable.     Again I talked with the patient about being transferred to a long-term care facility that can accommodate her oxygen requirements.  I am fine with this as long as she can continue taking capmatinib.    Plan:  Patient to continue capmatinib.    Will need CT CAP 6 weeks after starting capmatinib, roughly 1st week of November  Continue to wean oxygen as tolerated  I will continue to follow    Alma Newton M.D.  Hematology/Oncology    10 minutes spent on this case

## 2023-10-18 NOTE — CARE PLAN
Problem: Humidified High Flow Nasal Cannula  Goal: Maintain adequate oxygenation dependent on patient condition  Description: Target End Date:  resolve prior to discharge or when underlying condition is resolved/stabilized    1.  Implement humidified high flow oxygen therapy  2.  Titrate high flow oxygen to maintain appropriate SpO2  Outcome: Not Met   HHFNC 60 100

## 2023-10-18 NOTE — PROGRESS NOTES
Hospital Medicine Daily Progress Note    Date of Service  10/17/2023    Chief Complaint  48-year-old female with history of sarcoma and lung cancer presented 9/20 with dyspnea.      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  On admission patient had tachycardia tachypnea, chest x-ray showed bilateral infiltration, CTA for chest showed right lower lobe subsegmental PE, worsening pulmonary nodules with T1 vertebral body lesion, possible pneumonia.  Procalcitonin was 0.11.Eliquis was started.  Also antibiotics for pneumonia Ceftriaxone and azithromycin were empirically started.  Echo was done and showed normal ejection fraction 65%, initially patient was treated ICU secondary to increasing oxygen requirement, patient was on high flow oxygen, treated with a steroid and inhalers.  With some improvement and patient was transferred out of the ICU.    Patient was evaluated by Dr. Newton oncologist who recommended to start  capmatinib on 9/29/2023.    Patient has metastatic sarcoma with lung cancer and significant anxiety, palliative care following for pain management.  Patient needed Precedex drip for anxiety.  Patient is on every 4 hours prn Ativan, every 2 hours prn IV Dilaudid, Norco 10/325 every 3 hours prn. Tylenol every 6 hours scheduled, holding when sleeping. Dexamethasone 4 mg BID.  CODE STATUS was discussed with the patient and agreed for DNR/DNI.    Interval Problem Update  10/16 Axox3 second rapid response this am pt was placed on max high flow after desaturation that happened when she got up to bedside commode, I reviewed her acute cxr which showed stable bilateral effusions and I also obtained and interpreted an ekg which showed no acute changes. She  and her friend were yelling at each other and this was worsening her desaturations so I asked her friend to leave the room, with iv lasix and diuresis and an increased dose of ativan she improved and oxygen titrated down from 70% back to 40% high flow. Echo pending. She denies new pain.  She confirmed she remains dnr/ dni and does not want hospice. ROS otherwise negative.     Addendum: atypical L shoulder pain, constant - will check ekg and trop, trial of lidocaine, tele, following  Ekg interpreted by me,early repolarization, unchanged from previous    10/17 Stays on high flow, oxygen needs trending up, requires a 45 L, 90% FiO2  Sinus tachycardia, BP high, restarted metoprolol  Tachypnea, continue oxygen support  EF 60,     High complexity, critically sick, high chance of rapid decline.     I have discussed this patient's plan of care and discharge plan at IDT rounds today with Case Management, Nursing, Nursing leadership, and other members of the IDT team.    Consultants/Specialty  oncology critical care, palliative care    Code Status  DNAR/DNI    Disposition  The patient is not medically cleared for discharge to home or a post-acute facility.      I have placed the appropriate orders for post-discharge needs.    Review of Systems  Review of Systems   Constitutional:  Positive for malaise/fatigue. Negative for chills, diaphoresis, fever and weight loss.   HENT: Negative.  Negative for sore throat.    Eyes: Negative.  Negative for blurred vision.   Respiratory:  Positive for shortness of breath. Negative for cough.    Cardiovascular: Negative.  Negative for chest pain, palpitations and leg swelling.   Gastrointestinal: Negative.  Negative for abdominal pain, heartburn, nausea and vomiting.   Genitourinary: Negative.  Negative for dysuria and frequency.   Musculoskeletal:  Negative for myalgias and neck pain.   Skin: Negative.  Negative for itching and rash.   Neurological: Negative.  Negative for dizziness,  focal weakness, weakness and headaches.   Endo/Heme/Allergies: Negative.  Negative for polydipsia. Does not bruise/bleed easily.   Psychiatric/Behavioral: Negative.  Negative for depression, substance abuse and suicidal ideas. The patient is not nervous/anxious.    All other systems reviewed and are negative.       Physical Exam  Temp:  [36.1 °C (97 °F)-37.2 °C (98.9 °F)] 36.1 °C (97 °F)  Pulse:  [101-134] 128  Resp:  [20-24] 20  BP: (119-151)/(75-96) 139/91  SpO2:  [90 %-97 %] 94 %    Physical Exam  Vitals and nursing note reviewed. Exam conducted with a chaperone present.   Constitutional:       General: She is not in acute distress.     Appearance: Normal appearance. She is well-developed. She is ill-appearing. She is not diaphoretic.   HENT:      Head: Normocephalic and atraumatic.      Nose: Nose normal.      Comments: High flow nasal cannula     Mouth/Throat:      Mouth: Mucous membranes are moist.   Eyes:      Conjunctiva/sclera: Conjunctivae normal.      Pupils: Pupils are equal, round, and reactive to light.   Neck:      Thyroid: No thyromegaly.      Vascular: No JVD.   Cardiovascular:      Rate and Rhythm: Normal rate and regular rhythm.      Pulses: Normal pulses.      Heart sounds: Normal heart sounds.      No friction rub. No gallop.   Pulmonary:      Effort: Pulmonary effort is normal. No respiratory distress.      Breath sounds: No wheezing, rhonchi or rales.      Comments: Decreased at bases  No use of accessory muscle use  Abdominal:      General: Abdomen is flat. Bowel sounds are normal. There is no distension.      Palpations: Abdomen is soft. There is no mass.      Tenderness: There is no abdominal tenderness. There is no guarding or rebound.   Musculoskeletal:         General: No swelling, tenderness or deformity. Normal range of motion.      Cervical back: Normal range of motion and neck supple.      Right lower leg: No edema.      Left lower leg: No edema.   Lymphadenopathy:      Cervical: No  cervical adenopathy.   Skin:     General: Skin is warm and dry.      Capillary Refill: Capillary refill takes less than 2 seconds.   Neurological:      General: No focal deficit present.      Mental Status: She is alert and oriented to person, place, and time. Mental status is at baseline.      Cranial Nerves: No cranial nerve deficit.      Motor: No weakness.   Psychiatric:         Mood and Affect: Mood normal.         Behavior: Behavior normal.         Fluids  No intake or output data in the 24 hours ending 10/17/23 1815            Laboratory  Recent Labs     10/15/23  0015 10/16/23  0017 10/17/23  0014   WBC 12.3* 13.3* 12.4*   RBC 3.57* 3.66* 3.82*   HEMOGLOBIN 9.6* 10.0* 9.9*   HEMATOCRIT 31.8* 32.7* 33.9*   MCV 89.1 89.3 88.7   MCH 26.9* 27.3 25.9*   MCHC 30.2* 30.6* 29.2*   RDW 62.6* 62.9* 62.5*   PLATELETCT 108* 101* 96*   MPV 9.8 9.6 9.0     Recent Labs     10/16/23  0017 10/17/23  0014   SODIUM 139 138   POTASSIUM 4.4 4.3   CHLORIDE 105 102   CO2 26 30   GLUCOSE 264* 252*   BUN 14 17   CREATININE 0.50 0.49*   CALCIUM 8.2* 8.5                     Imaging  EC-ECHOCARDIOGRAM COMPLETE W/O CONT   Final Result      DX-CHEST-PORTABLE (1 VIEW)   Final Result         1.  Pulmonary edema and/or infiltrates are identified, which are stable since the prior exam.   2.  Trace bilateral pleural effusions      IR-US GUIDED PIV   Final Result    Ultrasound-guided PERIPHERAL IV INSERTION performed by    qualified nursing staff as above.      IR-US GUIDED PIV   Final Result    Ultrasound-guided PERIPHERAL IV INSERTION performed by    qualified nursing staff as above.      IR-US GUIDED PIV   Final Result    Ultrasound-guided PERIPHERAL IV INSERTION performed by    qualified nursing staff as above.      DX-CHEST-LIMITED (1 VIEW)   Final Result      Stable bilateral pulmonary infiltrates.      DX-CHEST-LIMITED (1 VIEW)   Final Result         1.  Extensive bilateral pulmonary parenchymal opacities compatible with ARDS, severe  pulmonary edema, and/or superimposed pulmonary infiltrates, slightly decreased since prior study.      DX-CHEST-PORTABLE (1 VIEW)   Final Result         1.  Extensive bilateral pulmonary parenchymal opacities compatible with ARDS, severe pulmonary edema, and/or superimposed pulmonary infiltrates, stable since prior study.      IR-MIDLINE CATHETER INSERTION WO GUIDANCE > AGE 3   Final Result                  Ultrasound-guided midline placement performed by qualified nursing staff    as above.          DX-CHEST-PORTABLE (1 VIEW)   Final Result         1.  Extensive bilateral pulmonary parenchymal opacities compatible with ARDS, severe pulmonary edema, and/or superimposed pulmonary infiltrates, stable since prior study.      DX-CHEST-PORTABLE (1 VIEW)   Final Result         1.  Pulmonary edema and/or infiltrates are identified, which are stable since the prior exam.      DX-CHEST-PORTABLE (1 VIEW)   Final Result         1.  Bilateral pulmonary infiltrates, stable since prior study   2.  Multiple bilateral pulmonary nodules.      DX-CHEST-PORTABLE (1 VIEW)   Final Result      1.  Innumerable pulmonary nodules bilaterally.      2.  Increasing confluence and airspace opacities in the perihilar regions suspicious for superimposed pneumonitis on diffuse metastatic disease      IR-PICC LINE PLACEMENT W/ GUIDANCE > AGE 5   Final Result                  Ultrasound-guided PICC placement performed by qualified nursing staff as    above.          DX-CHEST-PORTABLE (1 VIEW)   Final Result      1.  Innumerable bilateral pulmonary nodule, increased since 9/20/2023      2.  Differential diagnosis includes worsening metastases or superimposed pneumonia.      EC-ECHOCARDIOGRAM COMPLETE W/O CONT   Final Result      CT-CTA CHEST PULMONARY ARTERY W/ RECONS   Final Result      1.  Minimal pulmonary embolism. Localized right lower lobe segmental and subsegmental branches.   2.  No right heart strain.   3.  9 mm lucent/lytic lesion T1  vertebral body. Suspect osseous metastatic deposit.   4.  Progression of extensive mediastinal adenopathy since 8/20/2023 consistent with metastatic disease.   5.  Extensive progression of diffuse bilateral innumerable pulmonary nodules consistent with metastatic disease.   6.  Interval development of extensive groundglass and airspace opacities consistent with superimposed pneumonia.   7.  Progression of right adrenal mass now measuring 43 mm x 38 mm consistent with metastasis.      Fleischner Society pulmonary nodule recommendations:      DX-CHEST-PORTABLE (1 VIEW)   Final Result      Diffuse bilateral interstitial and alveolar opacities most consistent with pneumonia or possibly noncardiogenic pulmonary edema.             Assessment/Plan  * Acute on chronic respiratory failure (HCC)- (present on admission)  Assessment & Plan  Multiple factors including aggressive metastatic undifferentiated sarcoma versus sarcomatoid lung cancer.   history of stage 1 lung adenocarcinoma s/p SBRT, pulmonary embolism, COPD/asthma, recent pneumonia.      Initially treated at the ICU, patient still on high flow oxygen  Finish course of antibiotics  downgraded to IMCU on 10/2/2023.      Completed course of antibiotics.   Worsening hypoxia this am, 30 minutes titration of high flow, see above   Continue inhaler and nebulizers.      Atypical chest pain  Assessment & Plan  Non specific but unchanged ekg  trops in 60's, likely due to heart strain from hypoxia and PE  Sx improving  Echo still pending  Continue to follow    Goals of care, counseling/discussion- (present on admission)  Assessment & Plan  Goals of care done last week extensively. Hospice recommended by oncology and ICU team.  Patient would like to try some form of therapy if possible and oncology offered an oral chemotherapeutic regime. She elected to change to DNR/DNI.   Continue with palliative care consultation.     Sarcoma (HCC)- (present on admission)  Assessment &  Plan  Advanced, aggressive, on oral capmatinib    Pulmonary embolism on right (HCC)- (present on admission)  Assessment & Plan  Recently diagnosed.  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   Stated Capmatinib on 9/29/2023 by oncology team  Patient is DNR/DNI.  Palliative following    Mass of sacrum- (present on admission)  Assessment & Plan  Aggressive metastatic undifferentiated sarcoma. On Capmatinib    Lung cancer (HCC)- (present on admission)  Assessment & Plan  Initially diagnosed stage I adenocarcinoma initially treated with chemoradiation.   Discussed again with Dr. Newton  Now with widespread metastatic sarcoma  Plan to continue capmatinib    Chronic pain due to neoplasm- (present on admission)  Assessment & Plan  Palliative care have been following for pain management.  Continue multimodal pain management, oxy, tylenol, lidocaine patches, gabapentin and IV pain medication for breakthrough.   Pain currently controlled  Patient refusing her oral meds - again encouraged to take them for steadier pain control, she states she is reluctant because she feels it makes her anxiety worse    Cigarette nicotine dependence without complication- (present on admission)  Assessment & Plan  History of ongoing tobacco abuse, cessation discussed and recommended   Nicotine patch declined          VTE prophylaxis: Eliquis      Greater than 51 minutes spent prepping to see patient (e.g. review of tests) obtaining and/or reviewing separately obtained history. Performing a medically appropriate examination and/ evaluation.  Counseling and educating the patient/family/caregiver.  Ordering medications, tests, or procedures.  Referring and communicating with other health care professionals.  Documenting clinical information in EPIC.  Independently interpreting  results and communicating results to patient/family/caregiver.  Care coordination

## 2023-10-18 NOTE — PROGRESS NOTES
"At 00:43 monitor tech called and notified that patient was having slight ST elevation. On-call Hospitalist Jack Consuelo was notified at 00:44 of patient's slight ST elevation and that patient was feeling asymptomatic. Order was given to obtain a STAT EKG.     EKG was completed 01:55 and Hospitalist was notified of th results. Hospitalist informed that patient is having \"ST elevation somewhat globally likely due to her metastatic sarcoma in her chest wall.\" No new orders nor interventions at this time, as patient is feeling asymptomatic. Hospitalist informed to monitor patient at this time and if patient starts to experience any new s/sx, then to page again and further orders and interventions will be implemented.   "

## 2023-10-18 NOTE — CARE PLAN
Problem: Pain - Standard  Goal: Alleviation of pain or a reduction in pain to the patient’s comfort goal  Outcome: Progressing     Problem: Respiratory  Goal: Patient will achieve/maintain optimum respiratory ventilation and gas exchange  Outcome: Progressing     Problem: Hemodynamics - Pneumonia  Goal: Patient's hemodynamics, fluid balance and neurologic status will be stable or improve  Outcome: Progressing   The patient is Watcher - Medium risk of patient condition declining or worsening    Shift Goals  Clinical Goals: pain control, wean off O2,  Patient Goals: pain control  Family Goals: pain control    Progress made toward(s) clinical / shift goals:  RT protocol, vss    Patient is not progressing towards the following goals:

## 2023-10-18 NOTE — CARE PLAN
Problem: Humidified High Flow Nasal Cannula  Goal: Maintain adequate oxygenation dependent on patient condition  Description: Target End Date:  resolve prior to discharge or when underlying condition is resolved/stabilized    1.  Implement humidified high flow oxygen therapy  2.  Titrate high flow oxygen to maintain appropriate SpO2  10/18/2023 0350 by Garth Huang, LUCERO  Outcome: Not Progressing  Flowsheets  Taken 10/18/2023 0333 by Garth Huang, LUCERO  O2 (LPM): 50  FiO2%: 100 %  Taken 9/25/2023 1956 by Guzman Toro RRT  Indication: COPD diagnosis: SpO2 less than 89% despite conventional supplemental oxygen devices

## 2023-10-18 NOTE — CARE PLAN
The patient is Unstable - High likelihood or risk of patient condition declining or worsening    Shift Goals  Clinical Goals: maintain O2 saturations  Patient Goals: pain control  Family Goals: pt will have pain and anxiety controlled    Progress made toward(s) clinical / shift goals:      A/Ox4, pt is able to understand plan of care. All questions answered at the moment.  Fall precautions in place, bed in lowest position, call light and belongings in reach. PRN pain medications given per MAR working effectively to promote comfort.       Problem: Pain - Standard  Goal: Alleviation of pain or a reduction in pain to the patient’s comfort goal  Outcome: Progressing     Problem: Risk for Aspiration  Goal: Patient's risk for aspiration will be absent or decrease  Outcome: Progressing     Problem: Knowledge Deficit - Standard  Goal: Patient and family/care givers will demonstrate understanding of plan of care, disease process/condition, diagnostic tests and medications  Outcome: Progressing     Problem: Skin Integrity  Goal: Skin integrity is maintained or improved  Outcome: Progressing       Patient is not progressing towards the following goals:    Pt requiring O2 titration throughout the shift upon movement, pt becomes anxious and desaturates.    Problem: Respiratory  Goal: Patient will achieve/maintain optimum respiratory ventilation and gas exchange  Outcome: Not Progressing

## 2023-10-19 NOTE — DISCHARGE PLANNING
Case Management Discharge Planning    Admission Date: 9/20/2023  GMLOS: 4.9  ALOS: 28    6-Clicks ADL Score: 20  6-Clicks Mobility Score: 19      Anticipated Discharge Dispo: Discharge Disposition:   D/T to Medicare cert LTCH w/planned hosp IP readmit (91)    Action(s) Taken:   Pt has [place] Medicaid.  [facility]NETTE Velasquez and [facility] [place] declined.  Referrals sent via [facility] to [facility], [facility], [facility], [facility] and [facility].  Pt receiving 60 LPM [facility] oxygen.    Medically Clear: No    Next Steps:   Follow up on referrals.  Follow up with patient and daughter.    Barriers to Discharge: Medical clearance, placement

## 2023-10-19 NOTE — PROGRESS NOTES
Hospital Medicine Daily Progress Note    Date of Service  10/18/2023    Chief Complaint  48-year-old female with history of sarcoma and lung cancer presented 9/20 with dyspnea.      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  On admission patient had tachycardia tachypnea, chest x-ray showed bilateral infiltration, CTA for chest showed right lower lobe subsegmental PE, worsening pulmonary nodules with T1 vertebral body lesion, possible pneumonia.  Procalcitonin was 0.11.Eliquis was started.  Also antibiotics for pneumonia Ceftriaxone and azithromycin were empirically started.  Echo was done and showed normal ejection fraction 65%, initially patient was treated ICU secondary to increasing oxygen requirement, patient was on high flow oxygen, treated with a steroid and inhalers.  With some improvement and patient was transferred out of the ICU.    Patient was evaluated by Dr. Newton oncologist who recommended to start  capmatinib on 9/29/2023.    Patient has metastatic sarcoma with lung cancer and significant anxiety, palliative care following for pain management.  Patient needed Precedex drip for anxiety.  Patient is on every 4 hours prn Ativan, every 2 hours prn IV Dilaudid, Norco 10/325 every 3 hours prn. Tylenol every 6 hours scheduled, holding when sleeping. Dexamethasone 4 mg BID.  CODE STATUS was discussed with the patient and agreed for DNR/DNI.    Interval Problem Update  10/16 Axox3 second rapid response this am pt was placed on max high flow after desaturation that happened when she got up to bedside commode, I reviewed her acute cxr which showed stable bilateral effusions and I also obtained and interpreted an ekg which showed no acute changes. She  and her friend were yelling at each other and this was worsening her desaturations so I asked her friend to leave the room, with iv lasix and diuresis and an increased dose of ativan she improved and oxygen titrated down from 70% back to 40% high flow. Echo pending. She denies new pain.  She confirmed she remains dnr/ dni and does not want hospice. ROS otherwise negative.     Addendum: atypical L shoulder pain, constant - will check ekg and trop, trial of lidocaine, tele, following  Ekg interpreted by me,early repolarization, unchanged from previous    10/18 Stays on high flow, oxygen needs trending up, requires a 60 L, 92% FiO2  Sinus tachycardia, BP high, restarted metoprolol  Tachypnea, continue oxygen support  EF 60,     High complexity, critically sick, high chance of rapid decline.     I have discussed this patient's plan of care and discharge plan at IDT rounds today with Case Management, Nursing, Nursing leadership, and other members of the IDT team.    Consultants/Specialty  oncology critical care, palliative care    Code Status  DNAR/DNI    Disposition  The patient is not medically cleared for discharge to home or a post-acute facility.      I have placed the appropriate orders for post-discharge needs.    Review of Systems  Review of Systems   Constitutional:  Positive for malaise/fatigue. Negative for chills, diaphoresis, fever and weight loss.   HENT: Negative.  Negative for sore throat.    Eyes: Negative.  Negative for blurred vision.   Respiratory:  Positive for shortness of breath. Negative for cough.    Cardiovascular: Negative.  Negative for chest pain, palpitations and leg swelling.   Gastrointestinal: Negative.  Negative for abdominal pain, heartburn, nausea and vomiting.   Genitourinary: Negative.  Negative for dysuria and frequency.   Musculoskeletal:  Negative for myalgias and neck pain.   Skin: Negative.  Negative for itching and rash.   Neurological: Negative.  Negative for dizziness,  focal weakness, weakness and headaches.   Endo/Heme/Allergies: Negative.  Negative for polydipsia. Does not bruise/bleed easily.   Psychiatric/Behavioral: Negative.  Negative for depression, substance abuse and suicidal ideas. The patient is not nervous/anxious.    All other systems reviewed and are negative.       Physical Exam  Temp:  [36.2 °C (97.1 °F)-36.9 °C (98.5 °F)] 36.3 °C (97.4 °F)  Pulse:  [] 128  Resp:  [19-28] 24  BP: (107-165)/(55-98) 165/88  SpO2:  [88 %-98 %] 92 %    Physical Exam  Vitals and nursing note reviewed. Exam conducted with a chaperone present.   Constitutional:       General: She is not in acute distress.     Appearance: Normal appearance. She is well-developed. She is ill-appearing. She is not diaphoretic.   HENT:      Head: Normocephalic and atraumatic.      Nose: Nose normal.      Comments: High flow nasal cannula     Mouth/Throat:      Mouth: Mucous membranes are moist.   Eyes:      Conjunctiva/sclera: Conjunctivae normal.      Pupils: Pupils are equal, round, and reactive to light.   Neck:      Thyroid: No thyromegaly.      Vascular: No JVD.   Cardiovascular:      Rate and Rhythm: Normal rate and regular rhythm.      Pulses: Normal pulses.      Heart sounds: Normal heart sounds.      No friction rub. No gallop.   Pulmonary:      Effort: Pulmonary effort is normal. No respiratory distress.      Breath sounds: No wheezing, rhonchi or rales.      Comments: Decreased at bases  No use of accessory muscle use  Abdominal:      General: Abdomen is flat. Bowel sounds are normal. There is no distension.      Palpations: Abdomen is soft. There is no mass.      Tenderness: There is no abdominal tenderness. There is no guarding or rebound.   Musculoskeletal:         General: No swelling, tenderness or deformity. Normal range of motion.      Cervical back: Normal range of motion and neck supple.      Right lower leg: No edema.      Left lower leg: No edema.   Lymphadenopathy:      Cervical:  No cervical adenopathy.   Skin:     General: Skin is warm and dry.      Capillary Refill: Capillary refill takes less than 2 seconds.   Neurological:      General: No focal deficit present.      Mental Status: She is alert and oriented to person, place, and time. Mental status is at baseline.      Cranial Nerves: No cranial nerve deficit.      Motor: No weakness.   Psychiatric:         Mood and Affect: Mood normal.         Behavior: Behavior normal.         Fluids  No intake or output data in the 24 hours ending 10/18/23 1710            Laboratory  Recent Labs     10/16/23  0017 10/17/23  0014 10/18/23  0017   WBC 13.3* 12.4* 11.9*   RBC 3.66* 3.82* 3.85*   HEMOGLOBIN 10.0* 9.9* 10.1*   HEMATOCRIT 32.7* 33.9* 34.4*   MCV 89.3 88.7 89.4   MCH 27.3 25.9* 26.2*   MCHC 30.6* 29.2* 29.4*   RDW 62.9* 62.5* 62.4*   PLATELETCT 101* 96* 80*   MPV 9.6 9.0 9.7     Recent Labs     10/16/23  0017 10/17/23  0014   SODIUM 139 138   POTASSIUM 4.4 4.3   CHLORIDE 105 102   CO2 26 30   GLUCOSE 264* 252*   BUN 14 17   CREATININE 0.50 0.49*   CALCIUM 8.2* 8.5                     Imaging  EC-ECHOCARDIOGRAM COMPLETE W/O CONT   Final Result      DX-CHEST-PORTABLE (1 VIEW)   Final Result         1.  Pulmonary edema and/or infiltrates are identified, which are stable since the prior exam.   2.  Trace bilateral pleural effusions      IR-US GUIDED PIV   Final Result    Ultrasound-guided PERIPHERAL IV INSERTION performed by    qualified nursing staff as above.      IR-US GUIDED PIV   Final Result    Ultrasound-guided PERIPHERAL IV INSERTION performed by    qualified nursing staff as above.      IR-US GUIDED PIV   Final Result    Ultrasound-guided PERIPHERAL IV INSERTION performed by    qualified nursing staff as above.      DX-CHEST-LIMITED (1 VIEW)   Final Result      Stable bilateral pulmonary infiltrates.      DX-CHEST-LIMITED (1 VIEW)   Final Result         1.  Extensive bilateral pulmonary parenchymal opacities compatible with ARDS, severe  pulmonary edema, and/or superimposed pulmonary infiltrates, slightly decreased since prior study.      DX-CHEST-PORTABLE (1 VIEW)   Final Result         1.  Extensive bilateral pulmonary parenchymal opacities compatible with ARDS, severe pulmonary edema, and/or superimposed pulmonary infiltrates, stable since prior study.      IR-MIDLINE CATHETER INSERTION WO GUIDANCE > AGE 3   Final Result                  Ultrasound-guided midline placement performed by qualified nursing staff    as above.          DX-CHEST-PORTABLE (1 VIEW)   Final Result         1.  Extensive bilateral pulmonary parenchymal opacities compatible with ARDS, severe pulmonary edema, and/or superimposed pulmonary infiltrates, stable since prior study.      DX-CHEST-PORTABLE (1 VIEW)   Final Result         1.  Pulmonary edema and/or infiltrates are identified, which are stable since the prior exam.      DX-CHEST-PORTABLE (1 VIEW)   Final Result         1.  Bilateral pulmonary infiltrates, stable since prior study   2.  Multiple bilateral pulmonary nodules.      DX-CHEST-PORTABLE (1 VIEW)   Final Result      1.  Innumerable pulmonary nodules bilaterally.      2.  Increasing confluence and airspace opacities in the perihilar regions suspicious for superimposed pneumonitis on diffuse metastatic disease      IR-PICC LINE PLACEMENT W/ GUIDANCE > AGE 5   Final Result                  Ultrasound-guided PICC placement performed by qualified nursing staff as    above.          DX-CHEST-PORTABLE (1 VIEW)   Final Result      1.  Innumerable bilateral pulmonary nodule, increased since 9/20/2023      2.  Differential diagnosis includes worsening metastases or superimposed pneumonia.      EC-ECHOCARDIOGRAM COMPLETE W/O CONT   Final Result      CT-CTA CHEST PULMONARY ARTERY W/ RECONS   Final Result      1.  Minimal pulmonary embolism. Localized right lower lobe segmental and subsegmental branches.   2.  No right heart strain.   3.  9 mm lucent/lytic lesion T1  vertebral body. Suspect osseous metastatic deposit.   4.  Progression of extensive mediastinal adenopathy since 8/20/2023 consistent with metastatic disease.   5.  Extensive progression of diffuse bilateral innumerable pulmonary nodules consistent with metastatic disease.   6.  Interval development of extensive groundglass and airspace opacities consistent with superimposed pneumonia.   7.  Progression of right adrenal mass now measuring 43 mm x 38 mm consistent with metastasis.      Fleischner Society pulmonary nodule recommendations:      DX-CHEST-PORTABLE (1 VIEW)   Final Result      Diffuse bilateral interstitial and alveolar opacities most consistent with pneumonia or possibly noncardiogenic pulmonary edema.             Assessment/Plan  * Acute on chronic respiratory failure (HCC)- (present on admission)  Assessment & Plan  Multiple factors including aggressive metastatic undifferentiated sarcoma versus sarcomatoid lung cancer.   history of stage 1 lung adenocarcinoma s/p SBRT, pulmonary embolism, COPD/asthma, recent pneumonia.      Initially treated at the ICU, patient still on high flow oxygen  Finish course of antibiotics  downgraded to IMCU on 10/2/2023.      Completed course of antibiotics.   Worsening hypoxia this am, 30 minutes titration of high flow, see above   Continue inhaler and nebulizers.      Atypical chest pain  Assessment & Plan  Non specific but unchanged ekg  trops in 60's, likely due to heart strain from hypoxia and PE  Sx improving  Echo still pending  Continue to follow    Goals of care, counseling/discussion- (present on admission)  Assessment & Plan  Goals of care done last week extensively. Hospice recommended by oncology and ICU team.  Patient would like to try some form of therapy if possible and oncology offered an oral chemotherapeutic regime. She elected to change to DNR/DNI.   Continue with palliative care consultation.     Sarcoma (HCC)- (present on admission)  Assessment &  Plan  Advanced, aggressive, on oral capmatinib    Pulmonary embolism on right (HCC)- (present on admission)  Assessment & Plan  Recently diagnosed.  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   Stated Capmatinib on 9/29/2023 by oncology team  Patient is DNR/DNI.  Palliative following    Mass of sacrum- (present on admission)  Assessment & Plan  Aggressive metastatic undifferentiated sarcoma. On Capmatinib    Lung cancer (HCC)- (present on admission)  Assessment & Plan  Initially diagnosed stage I adenocarcinoma initially treated with chemoradiation.   Discussed again with Dr. Newton  Now with widespread metastatic sarcoma  Plan to continue capmatinib    Chronic pain due to neoplasm- (present on admission)  Assessment & Plan  Palliative care have been following for pain management.  Continue multimodal pain management, oxy, tylenol, lidocaine patches, gabapentin and IV pain medication for breakthrough.   Pain currently controlled  Patient refusing her oral meds - again encouraged to take them for steadier pain control, she states she is reluctant because she feels it makes her anxiety worse    Cigarette nicotine dependence without complication- (present on admission)  Assessment & Plan  History of ongoing tobacco abuse, cessation discussed and recommended   Nicotine patch declined          VTE prophylaxis: Eliquis      Greater than 51 minutes spent prepping to see patient (e.g. review of tests) obtaining and/or reviewing separately obtained history. Performing a medically appropriate examination and/ evaluation.  Counseling and educating the patient/family/caregiver.  Ordering medications, tests, or procedures.  Referring and communicating with other health care professionals.  Documenting clinical information in EPIC.  Independently interpreting  results and communicating results to patient/family/caregiver.  Care coordination

## 2023-10-19 NOTE — CARE PLAN
The patient is Watcher - Medium risk of patient condition declining or worsening    Shift Goals  Clinical Goals: Monitor O2 sats, pain/anxiety control  Patient Goals: pain control  Family Goals: Not present    Progress made toward(s) clinical / shift goals:    Problem: Knowledge Deficit - Standard  Goal: Patient and family/care givers will demonstrate understanding of plan of care, disease process/condition, diagnostic tests and medications  Outcome: Progressing     Problem: Skin Integrity  Goal: Skin integrity is maintained or improved  Outcome: Progressing     Problem: Fall Risk  Goal: Patient will remain free from falls  Outcome: Progressing       Patient is not progressing towards the following goals:

## 2023-10-19 NOTE — CARE PLAN
Problem: Humidified High Flow Nasal Cannula  Goal: Maintain adequate oxygenation dependent on patient condition  Description: Target End Date:  resolve prior to discharge or when underlying condition is resolved/stabilized    1.  Implement humidified high flow oxygen therapy  2.  Titrate high flow oxygen to maintain appropriate SpO2  Outcome: Not Progressing   High flow 60L/100% with non rebreather over

## 2023-10-19 NOTE — CARE PLAN
The patient is Unstable - High likelihood or risk of patient condition declining or worsening    Shift Goals  Clinical Goals: monitor O2 saturations, pain/anxiey control  Patient Goals: pain/anxiety control  Family Goals: Not present    Progress made toward(s) clinical / shift goals:      A/Ox4, pt is able to understand plan of care. All questions answered at the moment.  PRN pain medications given per MAR working effectively to promote comfort.       Problem: Pain - Standard  Goal: Alleviation of pain or a reduction in pain to the patient’s comfort goal  Outcome: Progressing     Problem: Knowledge Deficit - Standard  Goal: Patient and family/care givers will demonstrate understanding of plan of care, disease process/condition, diagnostic tests and medications  Outcome: Progressing     Problem: Skin Integrity  Goal: Skin integrity is maintained or improved  Outcome: Progressing       Patient is not progressing towards the following goals:    Pt is on 40L 100% FiO2, requiring hyperoxygentation upon movement. Pt with continued anxiety throughout the shift.     Problem: Respiratory  Goal: Patient will achieve/maintain optimum respiratory ventilation and gas exchange  Outcome: Not Progressing

## 2023-10-20 NOTE — DISCHARGE PLANNING
Case Management Discharge Planning    Admission Date: 9/20/2023  GMLOS: 4.9  ALOS: 30    6-Clicks ADL Score: 20  6-Clicks Mobility Score: 19      All referrals to [facility] facilities have declined patient's admittance due to not having Medicaid bed available. Patient remains on high flow oxygen of 50 L at the time this note was written.

## 2023-10-20 NOTE — PROGRESS NOTES
Hospital Medicine Daily Progress Note    Date of Service  10/19/2023    Chief Complaint  48-year-old female with history of sarcoma and lung cancer presented 9/20 with dyspnea.      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  On admission patient had tachycardia tachypnea, chest x-ray showed bilateral infiltration, CTA for chest showed right lower lobe subsegmental PE, worsening pulmonary nodules with T1 vertebral body lesion, possible pneumonia.  Procalcitonin was 0.11.Eliquis was started.  Also antibiotics for pneumonia Ceftriaxone and azithromycin were empirically started.  Echo was done and showed normal ejection fraction 65%, initially patient was treated ICU secondary to increasing oxygen requirement, patient was on high flow oxygen, treated with a steroid and inhalers.  With some improvement and patient was transferred out of the ICU.    Patient was evaluated by Dr. Newton oncologist who recommended to start  capmatinib on 9/29/2023.    Patient has metastatic sarcoma with lung cancer and significant anxiety, palliative care following for pain management.  Patient needed Precedex drip for anxiety.  Patient is on every 4 hours prn Ativan, every 2 hours prn IV Dilaudid, Norco 10/325 every 3 hours prn. Tylenol every 6 hours scheduled, holding when sleeping. Dexamethasone 4 mg BID.  CODE STATUS was discussed with the patient and agreed for DNR/DNI.    Interval Problem Update  10/16 Axox3 second rapid response this am pt was placed on max high flow after desaturation that happened when she got up to bedside commode, I reviewed her acute cxr which showed stable bilateral effusions and I also obtained and interpreted an ekg which showed no acute changes. She  and her friend were yelling at each other and this was worsening her desaturations so I asked her friend to leave the room, with iv lasix and diuresis and an increased dose of ativan she improved and oxygen titrated down from 70% back to 40% high flow. Echo pending. She denies new pain.  She confirmed she remains dnr/ dni and does not want hospice. ROS otherwise negative.     Addendum: atypical L shoulder pain, constant - will check ekg and trop, trial of lidocaine, tele, following  Ekg interpreted by me,early repolarization, unchanged from previous    10/18 Stays on high flow, oxygen needs trending up, requires a 60 L, 92% FiO2  Sinus tachycardia, BP high, restarted metoprolol  Tachypnea, continue oxygen support  EF 60,     10/19 still on high flow, 40- 60 L   Patient has been placed on Decadron IV in ICU, likely for COPD exacerbation,   I discussed with the pharmacy, will taper down in 7 to 10 days.  Leukocytosis-on steroid    Per oncology note Onc 10/17:  Will need CT CAP 6 weeks after starting capmatinib, roughly 1st week of November    High complexity, critically sick, high chance of rapid decline.     Pending LTAC    I have discussed this patient's plan of care and discharge plan at IDT rounds today with Case Management, Nursing, Nursing leadership, and other members of the IDT team.    Consultants/Specialty  oncology critical care, palliative care    Code Status  DNAR/DNI    Disposition  The patient is not medically cleared for discharge to home or a post-acute facility.      I have placed the appropriate orders for post-discharge needs.    Review of Systems  Review of Systems   Constitutional:  Positive for malaise/fatigue. Negative for chills, diaphoresis, fever and weight loss.   HENT: Negative.  Negative for sore throat.    Eyes: Negative.  Negative for blurred vision.   Respiratory:  Positive for shortness of breath. Negative for cough.    Cardiovascular: Negative.  Negative for chest pain,  palpitations and leg swelling.   Gastrointestinal: Negative.  Negative for abdominal pain, heartburn, nausea and vomiting.   Genitourinary: Negative.  Negative for dysuria and frequency.   Musculoskeletal:  Negative for myalgias and neck pain.   Skin: Negative.  Negative for itching and rash.   Neurological: Negative.  Negative for dizziness, focal weakness, weakness and headaches.   Endo/Heme/Allergies: Negative.  Negative for polydipsia. Does not bruise/bleed easily.   Psychiatric/Behavioral: Negative.  Negative for depression, substance abuse and suicidal ideas. The patient is not nervous/anxious.    All other systems reviewed and are negative.       Physical Exam  Temp:  [36.6 °C (97.8 °F)-36.9 °C (98.5 °F)] 36.8 °C (98.2 °F)  Pulse:  [] 108  Resp:  [24-32] 24  BP: (107-148)/(63-94) 118/71  SpO2:  [79 %-98 %] 92 %    Physical Exam  Vitals and nursing note reviewed. Exam conducted with a chaperone present.   Constitutional:       General: She is not in acute distress.     Appearance: Normal appearance. She is well-developed. She is ill-appearing. She is not diaphoretic.   HENT:      Head: Normocephalic and atraumatic.      Nose: Nose normal.      Comments: High flow nasal cannula     Mouth/Throat:      Mouth: Mucous membranes are moist.   Eyes:      Conjunctiva/sclera: Conjunctivae normal.      Pupils: Pupils are equal, round, and reactive to light.   Neck:      Thyroid: No thyromegaly.      Vascular: No JVD.   Cardiovascular:      Rate and Rhythm: Normal rate and regular rhythm.      Pulses: Normal pulses.      Heart sounds: Normal heart sounds.      No friction rub. No gallop.   Pulmonary:      Effort: Pulmonary effort is normal. No respiratory distress.      Breath sounds: No wheezing, rhonchi or rales.      Comments: Decreased at bases  No use of accessory muscle use  Abdominal:      General: Abdomen is flat. Bowel sounds are normal. There is no distension.      Palpations: Abdomen is soft. There is no  mass.      Tenderness: There is no abdominal tenderness. There is no guarding or rebound.   Musculoskeletal:         General: No swelling, tenderness or deformity. Normal range of motion.      Cervical back: Normal range of motion and neck supple.      Right lower leg: No edema.      Left lower leg: No edema.   Lymphadenopathy:      Cervical: No cervical adenopathy.   Skin:     General: Skin is warm and dry.      Capillary Refill: Capillary refill takes less than 2 seconds.   Neurological:      General: No focal deficit present.      Mental Status: She is alert and oriented to person, place, and time. Mental status is at baseline.      Cranial Nerves: No cranial nerve deficit.      Motor: No weakness.   Psychiatric:         Mood and Affect: Mood normal.         Behavior: Behavior normal.         Fluids  No intake or output data in the 24 hours ending 10/19/23 1712            Laboratory  Recent Labs     10/17/23  0014 10/18/23  0017 10/19/23  0020   WBC 12.4* 11.9* 14.3*   RBC 3.82* 3.85* 3.97*   HEMOGLOBIN 9.9* 10.1* 10.4*   HEMATOCRIT 33.9* 34.4* 36.8*   MCV 88.7 89.4 92.7   MCH 25.9* 26.2* 26.2*   MCHC 29.2* 29.4* 28.3*   RDW 62.5* 62.4* 64.7*   PLATELETCT 96* 80* 58*   MPV 9.0 9.7 10.6     Recent Labs     10/17/23  0014   SODIUM 138   POTASSIUM 4.3   CHLORIDE 102   CO2 30   GLUCOSE 252*   BUN 17   CREATININE 0.49*   CALCIUM 8.5                     Imaging  EC-ECHOCARDIOGRAM COMPLETE W/O CONT   Final Result      DX-CHEST-PORTABLE (1 VIEW)   Final Result         1.  Pulmonary edema and/or infiltrates are identified, which are stable since the prior exam.   2.  Trace bilateral pleural effusions      IR-US GUIDED PIV   Final Result    Ultrasound-guided PERIPHERAL IV INSERTION performed by    qualified nursing staff as above.      IR-US GUIDED PIV   Final Result    Ultrasound-guided PERIPHERAL IV INSERTION performed by    qualified nursing staff as above.      IR-US GUIDED PIV   Final Result    Ultrasound-guided  PERIPHERAL IV INSERTION performed by    qualified nursing staff as above.      DX-CHEST-LIMITED (1 VIEW)   Final Result      Stable bilateral pulmonary infiltrates.      DX-CHEST-LIMITED (1 VIEW)   Final Result         1.  Extensive bilateral pulmonary parenchymal opacities compatible with ARDS, severe pulmonary edema, and/or superimposed pulmonary infiltrates, slightly decreased since prior study.      DX-CHEST-PORTABLE (1 VIEW)   Final Result         1.  Extensive bilateral pulmonary parenchymal opacities compatible with ARDS, severe pulmonary edema, and/or superimposed pulmonary infiltrates, stable since prior study.      IR-MIDLINE CATHETER INSERTION WO GUIDANCE > AGE 3   Final Result                  Ultrasound-guided midline placement performed by qualified nursing staff    as above.          DX-CHEST-PORTABLE (1 VIEW)   Final Result         1.  Extensive bilateral pulmonary parenchymal opacities compatible with ARDS, severe pulmonary edema, and/or superimposed pulmonary infiltrates, stable since prior study.      DX-CHEST-PORTABLE (1 VIEW)   Final Result         1.  Pulmonary edema and/or infiltrates are identified, which are stable since the prior exam.      DX-CHEST-PORTABLE (1 VIEW)   Final Result         1.  Bilateral pulmonary infiltrates, stable since prior study   2.  Multiple bilateral pulmonary nodules.      DX-CHEST-PORTABLE (1 VIEW)   Final Result      1.  Innumerable pulmonary nodules bilaterally.      2.  Increasing confluence and airspace opacities in the perihilar regions suspicious for superimposed pneumonitis on diffuse metastatic disease      IR-PICC LINE PLACEMENT W/ GUIDANCE > AGE 5   Final Result                  Ultrasound-guided PICC placement performed by qualified nursing staff as    above.          DX-CHEST-PORTABLE (1 VIEW)   Final Result      1.  Innumerable bilateral pulmonary nodule, increased since 9/20/2023      2.  Differential diagnosis includes worsening metastases or  superimposed pneumonia.      EC-ECHOCARDIOGRAM COMPLETE W/O CONT   Final Result      CT-CTA CHEST PULMONARY ARTERY W/ RECONS   Final Result      1.  Minimal pulmonary embolism. Localized right lower lobe segmental and subsegmental branches.   2.  No right heart strain.   3.  9 mm lucent/lytic lesion T1 vertebral body. Suspect osseous metastatic deposit.   4.  Progression of extensive mediastinal adenopathy since 8/20/2023 consistent with metastatic disease.   5.  Extensive progression of diffuse bilateral innumerable pulmonary nodules consistent with metastatic disease.   6.  Interval development of extensive groundglass and airspace opacities consistent with superimposed pneumonia.   7.  Progression of right adrenal mass now measuring 43 mm x 38 mm consistent with metastasis.      Fleischner Society pulmonary nodule recommendations:      DX-CHEST-PORTABLE (1 VIEW)   Final Result      Diffuse bilateral interstitial and alveolar opacities most consistent with pneumonia or possibly noncardiogenic pulmonary edema.             Assessment/Plan  * Acute on chronic respiratory failure (HCC)- (present on admission)  Assessment & Plan  Mostly due to tumor progression in her lungs.  Unfortunately this poorly differentiated sarcoma has poor response respond to any therapy at this time per oncology.   Other factors including pulmonary embolism, COPD /asthma,  history of stage 1 lung adenocarcinoma s/p SBRT,recent pneumonia.    Initially treated at the ICU, patient still on high flow oxygen  Finish course of antibiotics  downgraded to IMCU on 10/2/2023.      Completed course of antibiotics.   Worsening hypoxia this am, 30 minutes titration of high flow, see above   Continue inhaler and nebulizers.      Atypical chest pain  Assessment & Plan  Non specific but unchanged ekg  trops in 60's, likely due to heart strain from hypoxia and PE  Sx improving  Echo still pending  Continue to follow    Goals of care, counseling/discussion-  (present on admission)  Assessment & Plan  Goals of care done last week extensively. Hospice recommended by oncology and ICU team.  Patient would like to try some form of therapy if possible and oncology offered an oral chemotherapeutic regime. She elected to change to DNR/DNI.   Continue with palliative care consultation.     Sarcoma (HCC)- (present on admission)  Assessment & Plan  Advanced, aggressive, on oral capmatinib    Pulmonary embolism on right (HCC)- (present on admission)  Assessment & Plan  Recently diagnosed.  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   Stated Capmatinib on 9/29/2023 by oncology team  Patient is DNR/DNI.  Palliative following    Mass of sacrum- (present on admission)  Assessment & Plan  Aggressive metastatic undifferentiated sarcoma. On Capmatinib    Lung cancer (HCC)- (present on admission)  Assessment & Plan  Initially diagnosed stage I adenocarcinoma initially treated with chemoradiation.   Discussed again with Dr. Newton  Now with widespread metastatic sarcoma  Plan to continue capmatinib    Chronic pain due to neoplasm- (present on admission)  Assessment & Plan  Palliative care have been following for pain management.  Continue multimodal pain management, oxy, tylenol, lidocaine patches, gabapentin and IV pain medication for breakthrough.   Pain currently controlled  Patient refusing her oral meds - again encouraged to take them for steadier pain control, she states she is reluctant because she feels it makes her anxiety worse    Cigarette nicotine dependence without complication- (present on admission)  Assessment & Plan  History of ongoing tobacco abuse, cessation discussed and recommended   Nicotine patch declined          VTE prophylaxis: Eliquis      Greater than 51 minutes spent prepping to see patient  (e.g. review of tests) obtaining and/or reviewing separately obtained history. Performing a medically appropriate examination and/ evaluation.  Counseling and educating the patient/family/caregiver.  Ordering medications, tests, or procedures.  Referring and communicating with other health care professionals.  Documenting clinical information in EPIC.  Independently interpreting results and communicating results to patient/family/caregiver.  Care coordination

## 2023-10-20 NOTE — CARE PLAN
The patient is Watcher - Medium risk of patient condition declining or worsening    Shift Goals  Clinical Goals: Pain management, monitor O2, anxiety control  Patient Goals: Rest, pain management  Family Goals: Not present    Progress made toward(s) clinical / shift goals:    Problem: Pain - Standard  Goal: Alleviation of pain or a reduction in pain to the patient’s comfort goal  Outcome: Progressing  Note: Pain assessed using 0-10 verbal pain scale, PRN pain medications administered per MAR.      Problem: Respiratory  Goal: Patient will achieve/maintain optimum respiratory ventilation and gas exchange  Outcome: Progressing  Note: Pt maintaining adequate O2 on high flow nasal cannula     Problem: Knowledge Deficit - Standard  Goal: Patient and family/care givers will demonstrate understanding of plan of care, disease process/condition, diagnostic tests and medications  Outcome: Progressing  Note: Pt updated on plan of care, pt states understanding for plan of pain management, monitoring O2, monitoring neuro status, and safety. All questions answered at this time.        Patient is not progressing towards the following goals: N/A

## 2023-10-20 NOTE — DISCHARGE PLANNING
Spoke with Birdie at [facility], states they have no open Medicaid beds. Spoke with Katina at [facility] states they have no open Medicaid beds.

## 2023-10-20 NOTE — PROGRESS NOTES
Consult Note: Hematology/Oncology     Primary Care:  Checo Baker M.D.    Chief Complaint   Patient presents with    Shortness of Breath     PT hx lung ca that's metastasized. Was at another appt and was SOB. RA while walking in 70's per EMS. Currently on 6L NC         Current Treatment:     9/29/23: Capmatinib    Prior Treatment:     4/17/2023-4/21/2023: RLL Lung SBRT  7/19/23-7/24/2023: R Sacrum SBRT      Subjective:   History of Presenting Illness:    Melly Hendrickson is a 48 y.o. 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    Patient history dates back to July 2022 when she had a chest x-ray performed that revealed a potential mass in her right lung.  This led to a chest CT performed on February 13, 2023.  This demonstrated 1.4 cm mass in the right lower lobe and a 1.9 groundglass opacity also in the right lower lobe.  The groundglass opacity appeared stable from previous scans but the mass was thought to be new.      A biopsy was performed and confirmed lung adenocarcinoma.    Patient saw thoracic surgery and obtained PFTs as well as a PET scan.  She was discussed at tumor board and given her significant dyspnea and her underlying COPD it was thought that she would be a better candidate for SBRT rather than surgical resection.    April 17, 2023 patient began SBRT.  Completed this treatment without many issues.    After this patient did notice a small lump in her breast reduction scar line.  She said that over the next few days it grew and she ultimately went to her PCP    PCP tried to drain the mass; however it just bled significantly during the I&D attempt.  Thus she was sent to Dr. Becerra at [facility] for excision.     Pathology from this excision demonstrated poorly differentiated malignancy favoring carcinoma without obvious involvement of underlying skin and nonspecific IHC profile.  Based on  his diagnosis cancer type ID was sent off for.  Results showed 90% probability of sarcoma    Over the past several months she has noted severe pain in her sacral region.  She does have sacral lesions that are concerning for metastatic disease.  Patient has biopsy of sacral mass on 7/18/2023.     Of note patient has 5 children and 4 grandchildren.    She follows with pain management    I initially saw patient on 7/14.  At our 7/21, we had information back from biopsy and we discussed AIM.  Patient wished to hold off and think about this further.    On 7/28, we discussed initiating AIM, patient was not comfortable and wanted 2 opinion at [facility].    I saw patient again on 8/11.  We discussed AIM,  patient not ready and awaiting [facility] appointment.     She was recently admitted to the ER for hemoptysis on 8/20/23 and was found to have a PE.  She was still having hemoptysis and thus was not started on AC. She has discharged on 8/24/23.     I saw her subsequently on 9/7/23 and recommended AC given no hemoptysis, but active PE with cancer.  We also discussed chemo at this visit and she wished to wait.     During my visit with the patient on 9/19/23, Patient was endorsing 3 nodules on the R arm.  1 on the clavicle.  Patient also had a nodule on her L arm. Patient had 1 nodule on her bikini line. She has another on the inside of her L thigh. These started 1 week ago. She was on 2-3L of oxygen for the past several days.  We discussed intiating chemo and plan was to start on 9/26/23.  Pt requires a direct admit for AIM    Since that visit she had worseing SOB.  She was subsequently presented to the ER for SOB/cough and potential PNA. On admission, she was tachycardic, tachypneic, and afebrile.  Chest x-ray showed diffuse bilateral interstitial and alveolar opacities consistent with pneumonia. CTA chest on admission showed right subsegmental PE, worsening pulmonary nodules, T1 vertebral body region, possible pneumonia.   Procalcitonin was 0.11.  Ceftriaxone and azithromycin were empirically started.    Line was not able to be placed unable to start chemo.  Patients respiratory status declined and she was transferred to the ICU    Patient started on Capmatinib 9/29/23    She reports being ++ anxious.  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    Chronic obstructive pulmonary disease (HCC)     COPD (chronic obstructive pulmonary disease) (HCC)     DDD (degenerative disc disease), lumbar     Depression     Emphysema of lung (HCC)     MRSA (methicillin resistant staph aureus) culture positive     Pain     back - cervical, scapula, lower    Pericarditis     Primary adenocarcinoma of lower lobe of right lung (HCC)     Psychiatric disorder     Anxiety, Depression    Sleep apnea     Snoring     Urinary incontinence         Past Surgical History:   Procedure Laterality Date    MN BRONCHOSCOPY,DIAGNOSTIC N/A 8/22/2023    Procedure: BRONCHOSCOPY;  Surgeon: Sandra Lopez D.O.;  Location: SURGERY [facility];  Service: Pulmonary    MN EXC SKIN BENIG >4CM TRUNK,ARM,LEG Left 6/7/2023    Procedure: EXCISION OF SOFT TISSUE MASS LEFT CHEST WALL;  Surgeon: Nasir Becerra M.D.;  Location: [facility];  Service: General    MN BRONCHOSCOPY,DIAGNOSTIC N/A 02/14/2023    Procedure: FIBER OPTIC BRONCHOSCOPY WASH, BRUSH, BRONCHOALVEOLAR LAVAGE, BIOPSY AND FINE NEEDLE ASPIRATION AND NAVIGATION, ROBOTICS;  Surgeon: Guicho Ellis M.D.;  Location: [facility];  Service: Pulmonary Robotic    ENDOBRONCHIAL US ADD-ON N/A 02/14/2023    Procedure: ENDOBRONCHIAL ULTRASOUND (EBUS);  Surgeon: Guicho Ellis M.D.;  Location: [facility];  Service: Pulmonary Robotic    MAMMOPLASTY REDUCTION Bilateral      SEPTOPLASTY      TUBE & ECTOPIC PREG., REMOVAL      x 2       Social History     Tobacco Use    Smoking status: Some Days     Current packs/day: 0.50     Average packs/day: 0.5 packs/day for 20.0 years (10.0 ttl pk-yrs)     Types: Cigarettes     Passive exposure: Past    Smokeless tobacco: Never    Tobacco comments:     on and off    Vaping Use    Vaping Use: Never used   Substance Use Topics    Alcohol use: Not Currently     Comment: occ, rare    Drug use: Not Currently     Types: Marijuana     Comment: THC gummies        Family History   Problem Relation Age of Onset    Diabetes Mother     Cancer Father         Prostate    Cancer Paternal Grandmother         Cervical    Cancer Paternal Grandfather         Unk       Allergies   Allergen Reactions    Penicillins Hives, Rash and Swelling     Pt reports that she gets swelling in throat and face, rash all over face and arms.   Tolerated cefazolin    Aspirin Rash and Hives     Pt reports that she received a rash on face, pt reports it ok if she takes NORCO    Other Environmental Shortness of Breath     Perfumes, dander, scents       Current Facility-Administered Medications   Medication Dose Route Frequency Provider Last Rate Last Admin    dexamethasone (Decadron) tablet 4 mg  4 mg Oral Q8HRS Kaal Cordova M.D.        Followed by    [START ON 10/22/2023] dexamethasone (Decadron) tablet 4 mg  4 mg Oral Q12HRS Kala Cordova M.D.        Followed by    [START ON 10/26/2023] dexamethasone (Decadron) tablet 4 mg  4 mg Oral DAILY Kala Cordova M.D.        metoprolol tartrate (Lopressor) tablet 25 mg  25 mg Oral BID Kala Cordova M.D.   25 mg at 10/19/23 1735    LORazepam (Ativan) injection 1 mg  1 mg Intravenous Q4HRS PRN Fide Dillon M.D.   1 mg at 10/19/23 1438    calcium carbonate (Tums) chewable tab 500 mg  500 mg Oral BID PRN Fide Dillon M.D.        lidocaine (Lidoderm) 5 % 1 Patch  1 Patch Transdermal Q24HR Fide Dillon M.D.   1 Patch at 10/19/23 1735    tiotropium (Spiriva Respimat)  2.5 mcg/Act inhalation spray 5 mcg  5 mcg Inhalation DAILY Fide Dillon M.D.   5 mcg at 10/19/23 0619    nystatin-tetracycline-prednisone-diphenhydramine (Miracle Mouth Wash) oral susp 5 mL  5 mL Oral Q6HRS PRN Naima Ambrosio A.P.R.NEstrellita   5 mL at 10/10/23 0514    levalbuterol (Xopenex) 1.25 MG/3ML nebulizer solution 1.25 mg  1.25 mg Nebulization Q2HRS PRN (RT) Oksana Mas M.D.   1.25 mg at 10/19/23 1557    albuterol inhaler 2 Puff  2 Puff Inhalation Q4HRS PRN Oksana Mas M.D.   2 Puff at 10/19/23 1147    mometasone-formoterol (Dulera) 100-5 MCG/ACT inhaler 2 Puff  2 Puff Inhalation BID Oksana Mas M.D.   2 Puff at 10/19/23 1734    Respiratory Therapy Consult   Nebulization Continuous RT Oksana Mas M.D.        ipratropium-albuterol (DUONEB) nebulizer solution  3 mL Nebulization Q2HRS PRN (RT) Oksana Mas M.D.   3 mL at 10/19/23 0434    melatonin tablet 10 mg  10 mg Oral HS PRN Dex Madrid M.D.        morphine ER (Ms Contin) tablet 15 mg  15 mg Oral Q12HRS Dex Madrid M.D.   15 mg at 10/19/23 1735    busPIRone (Buspar) tablet 15 mg  15 mg Oral TID Dex Madrid M.D.   15 mg at 10/19/23 1735    oxyCODONE immediate-release (Roxicodone) tablet 5 mg  5 mg Oral Q4HRS PRN Gerson Adam M.D.        Or    oxyCODONE immediate release (Roxicodone) tablet 10 mg  10 mg Oral Q4HRS PRN Gerson Adam M.D.   10 mg at 10/12/23 2344    acetaminophen (Tylenol) tablet 650 mg  650 mg Oral Q6HRS Gerson Adam M.D.   650 mg at 10/19/23 1735    LORazepam (Ativan) injection 0.5 mg  0.5 mg Intravenous Q4HRS PRN Tonny Edmond M.D.   0.5 mg at 10/16/23 0351    sodium chloride (Ocean) 0.65 % nasal spray 2 Spray  2 Spray Nasal Q2HRS PRN Tonny Edmond M.D.   2 Spray at 10/17/23 1312    HYDROmorphone (Dilaudid) injection 1 mg  1 mg Intravenous Q HOUR PRN Madhu Simpson P.A.-C.   1 mg at 10/19/23 1734    capmatinib (Tabrecta) tablet 400 mg  400 mg Oral BID  Alma Newton M.D.   400 mg at 10/19/23 1736    guaiFENesin ER (Mucinex) tablet 600 mg  600 mg Oral Q12HRS Naima Ambrosio, A.P.R.N.   600 mg at 10/19/23 1734    diphenhydrAMINE (Benadryl) injection 25 mg  25 mg Intravenous Q6HRS PRN Kay Christensen, A.P.R.N.   25 mg at 10/19/23 1229    diclofenac sodium (Voltaren) 1 % gel 2 g  2 g Topical 4X/DAY PRN SANJU GarciaOEstrellita        loratadine (Claritin) tablet 10 mg  10 mg Oral DAILY SANJU GarciaOEstrellita   10 mg at 10/19/23 0614    senna-docusate (Pericolace Or Senokot S) 8.6-50 MG per tablet 2 Tablet  2 Tablet Oral BID SANJU GarciaOEstrellita   2 Tablet at 10/17/23 0506    And    polyethylene glycol/lytes (Miralax) PACKET 1 Packet  1 Packet Oral QDAY PRN SANJU GarciaO.   1 Packet at 10/13/23 0002    And    magnesium hydroxide (Milk Of Magnesia) suspension 30 mL  30 mL Oral QDAY PRN SANJU GarciaOEstrellita   30 mL at 10/16/23 0520    And    bisacodyl (Dulcolax) suppository 10 mg  10 mg Rectal QDAY PRN SANJU GarciaOEstrellita   10 mg at 09/28/23 1300    acetaminophen (Tylenol) tablet 650 mg  650 mg Oral Q6HRS PRN SANJU GarciaOEstrellita        hydrALAZINE (Apresoline) injection 10 mg  10 mg Intravenous Q4HRS PRN SANJU GarciaOEstrellita   10 mg at 09/26/23 1010    ondansetron (Zofran) syringe/vial injection 4 mg  4 mg Intravenous Q4HRS PRN SANJU GarciaOEstrellita   4 mg at 10/04/23 2150    ondansetron (Zofran ODT) dispertab 4 mg  4 mg Oral Q4HRS PRN Madhu Merino D.O.        promethazine (Phenergan) tablet 12.5-25 mg  12.5-25 mg Oral Q4HRS PRN Madhu Merino D.O.        promethazine (Phenergan) suppository 12.5-25 mg  12.5-25 mg Rectal Q4HRS PRN Madhu Merino D.O.        prochlorperazine (Compazine) injection 5-10 mg  5-10 mg Intravenous Q4HRS PRN Madhu Merino D.O.        guaiFENesin dextromethorphan (Robitussin DM) 100-10 MG/5ML syrup 10 mL  10 mL Oral Q6HRS PRN Madhu Merino D.O.   10 mL at 10/11/23 1652    apixaban (Eliquis) tablet 5 mg  5 mg Oral BID Madhu  JAMISON Merino D.O.   5 mg at 10/19/23 1735       Review of Systems   Constitutional:  Positive for malaise/fatigue. Negative for chills, fever and weight loss.   HENT:  Negative for congestion, ear pain, nosebleeds and sore throat.         Mouth sores   Eyes:  Negative for blurred vision.   Respiratory:  Positive for shortness of breath. Negative for cough, sputum production and wheezing.    Cardiovascular:  Negative for chest pain, palpitations, orthopnea and leg swelling.        Chest heaviness   Gastrointestinal:  Negative for abdominal pain, heartburn, nausea and vomiting.   Genitourinary:  Negative for dysuria, frequency and urgency.   Musculoskeletal:  Positive for back pain and joint pain. Negative for neck pain.   Neurological:  Negative for dizziness, tingling, tremors, sensory change, focal weakness and headaches.   Endo/Heme/Allergies:  Does not bruise/bleed easily.   Psychiatric/Behavioral:  Negative for depression, memory loss and suicidal ideas.    All other systems reviewed and are negative.      Problem list, medications, and allergies reviewed by myself today in Epic.     Objective:     Vitals:    10/19/23 1557 10/19/23 1739 10/19/23 1901 10/19/23 1917   BP:  (!) 140/82  124/79   Pulse: (!) 108 (!) 108 (!) 110 94   Resp: (!) 24 (!) 25 (!) 24 (!) 22   Temp:  36.7 °C (98 °F)  36.1 °C (96.9 °F)   TempSrc:  Temporal  Temporal   SpO2: 92% 90% 90% 98%   Weight:       Height:             DESC; KARNOFSKY SCALE WITH ECOG EQUIVALENT: 90, Able to carry on normal activity; minor signs or symptoms of disease (ECOG equivalent 0)    DISTRESS LEVEL: no acute distress    Physical Exam  Constitutional:       General: She is not in acute distress.     Appearance: She is not ill-appearing or toxic-appearing.   HENT:      Mouth/Throat:      Pharynx: No oropharyngeal exudate or posterior oropharyngeal erythema.   Cardiovascular:      Rate and Rhythm: Tachycardia present.   Pulmonary:      Effort: No respiratory distress.       Breath sounds: No stridor. Rhonchi present. No wheezing.   Musculoskeletal:      Cervical back: Normal range of motion and neck supple.   Skin:     Comments: Multiple subcutaneous lesions, smaller in size since starting Capmatinib.  2 new nodules (1 on back of shoulder, other by collar bone).        Deferred    Labs:   Most recent labs reviewed.  Please see the lab tab of chart review    Imaging:   Most recent images below have been independently reviewed by me.  Please see the imaging tab of chart review    9/22/2023 : Echocardiogram showed LVEF ~ 65% with no reported abnormalities    7/27/23: PET   1.  Focal uptake in the medial RIGHT lower lobe of lung abutting the thoracic spine, consistent with tumor..  2.  No PET correlate for ill-defined spiculated nodule in the RIGHT lower lobe.  3.  Large hypermetabolic mass corresponds to lytic lesion in the RIGHT sacrum consistent with malignancy.  4.  Intramuscular focal uptake at the posterior LEFT shoulder, likely metastatic.    Pathology:    7/14/2023: Pathology of Chest wall  A. Chest wall mass:          Metastatic poorly differentiated malignancy favoring carcinoma           without obvious involvement of overlying skin and a           non-specific IHC profile of some keratins positive (see           microscopic description).          See comment.   Main Cancer Type: Sarcoma   Probability: 90%     Subtype: Undifferentiated Sarcoma ([facility])   Probability: 90%     7/19/2023: Path of the Sacral Bone  A. Sacral bone biopsy:          Bone cores effaced by a high-grade malignancy histologically           identical to the previously excised chest wall mass           ([id number]).     2/14/2023: Pathology of Lung Mass  A. Lung, right lower lobe fine needle aspiration slides:          Positive for carcinoma.   B. Core, right lower lobe lung:          Moderately differentiated lung adenocarcinoma.   C. Lung, right lower lobe biopsy core and touchprep slides:          Moderately  differentiated lung adenocarcinoma.   D. Bronchoalveolar lavage right lower lobe:          Rare atypical cells, malignant cells vs. reactive bronchial           cells.     Assessment/Plan:      Cancer Staging   Primary undifferentiated sarcoma of soft tissue (HCC)  Staging form: Soft Tissue Sarcoma of the Trunk and Extremities, AJCC 8th Edition  - Clinical: Stage IV (cTX, cN0, cM1) - Signed by Alma Newton M.D. on 9/19/2023         Ms. 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    Labs stable. Oxygen requirements stable.     Again I talked with the patient about being transferred to a long-term care facility that can accommodate her oxygen requirements. I understand she has been accepted to a LTAC in [place], but she has refused transfer.  I am fine with this as long as she can continue taking capmatinib. I will continue these discussions tomorrow.     I also had a long discussion with Camille (friend, who has been present everyday and Melly gave me permission to discuss case).  We discussed where she is from a [facility] status.  I again recommended transfer to LTAC.        Plan:  Patient to continue capmatinib.    Will need CT CAP 6 weeks after starting capmatinib, roughly 1st week of November  Continue to wean oxygen as tolerated  I will continue to follow    Alma Newton M.D.  Hematology/Oncology    25 minutes spent on this case

## 2023-10-20 NOTE — PROGRESS NOTES
Pt woke up A/Ox2, disoriented to place and situation with intermittent confusion. MD He and RICU rapid at bedside at this time.  Pt more awake and oriented at time of MD arrival, no new orders at this time as there is possible delirium. Per MD use distraction to help with confusion.

## 2023-10-20 NOTE — PROGRESS NOTES
Hospital Medicine Daily Progress Note    Date of Service  10/20/2023    Chief Complaint  48-year-old female with history of sarcoma and lung cancer presented 9/20 with dyspnea.      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  On admission patient had tachycardia tachypnea, chest x-ray showed bilateral infiltration, CTA for chest showed right lower lobe subsegmental PE, worsening pulmonary nodules with T1 vertebral body lesion, possible pneumonia.  Procalcitonin was 0.11.Eliquis was started.  Also antibiotics for pneumonia Ceftriaxone and azithromycin were empirically started.  Echo was done and showed normal ejection fraction 65%, initially patient was treated ICU secondary to increasing oxygen requirement, patient was on high flow oxygen, treated with a steroid and inhalers.  With some improvement and patient was transferred out of the ICU.    Patient was evaluated by Dr. Newton oncologist who recommended to start  capmatinib on 9/29/2023.    Patient has metastatic sarcoma with lung cancer and significant anxiety, palliative care following for pain management.  Patient needed Precedex drip for anxiety.  Patient is on every 4 hours prn Ativan, every 2 hours prn IV Dilaudid, Norco 10/325 every 3 hours prn. Tylenol every 6 hours scheduled, holding when sleeping. Dexamethasone 4 mg BID.  CODE STATUS was discussed with the patient and agreed for DNR/DNI.    Interval Problem Update  10/16 Axox3 second rapid response this am pt was placed on max high flow after desaturation that happened when she got up to bedside commode, I reviewed her acute cxr which showed stable bilateral effusions and I also obtained and interpreted an ekg which showed no acute changes. She  and her friend were yelling at each other and this was worsening her desaturations so I asked her friend to leave the room, with iv lasix and diuresis and an increased dose of ativan she improved and oxygen titrated down from 70% back to 40% high flow. Echo pending. She denies new pain.  She confirmed she remains dnr/ dni and does not want hospice. ROS otherwise negative.     Addendum: atypical L shoulder pain, constant - will check ekg and trop, trial of lidocaine, tele, following  Ekg interpreted by me,early repolarization, unchanged from previous    10/18 Stays on high flow, oxygen needs trending up, requires a 60 L, 92% FiO2  Sinus tachycardia, BP high, restarted metoprolol  Tachypnea, continue oxygen support  EF 60,     10/19 still on high flow, 40- 60 L   Patient has been placed on Decadron IV in ICU, likely for COPD exacerbation,   I discussed with the pharmacy, will taper down in 7 to 10 days.  Leukocytosis-on steroid    10/20 plt 58>27, Eliquis on hold  Episodes of confusion today, ABG ordered, normal pH, PCO2 48, better than previous ABG. CXR no scute pathology  Reported severe headache, I ordered a CT. CT showed Round soft tissue mass in the left temporalis muscle measuring 1.5 x 2 x 1.8 cm in diameter, which is a new.  Likely tumor metastasis.   I discussed it with radiology and oncology.     Stays on HF 60L    Addendem 6pm: The nurse reported increased breathing efforts RR 24 despite maximized HF 60L 100% Fio2. More confused. ABG no big changes, slightly better than the previous ones. I discussed with intensivist regarding escalating care to IMCU for possible BiPAP. However, Dr. Cardenas doesn't think the escalating care will help given the underlying unreversible causes.   I ordered CBC, CMP, procal to rule out acute etiologies. I reviewed the results, no concerning findings cause her worsening breathing issues or AMS. Procalcitoin 0.08, unlikely acute infection.     I ordered CTA chest to  check the progression of lung tumor and PE. May discuss with the family and the patient again about the goal of care if there are significant progression.     High complexity, critically sick, high chance of rapid decline.       I have discussed this patient's plan of care and discharge plan at IDT rounds today with Case Management, Nursing, Nursing leadership, and other members of the IDT team.    Consultants/Specialty  oncology critical care, palliative care    Code Status  DNAR/DNI    Disposition  The patient is not medically cleared for discharge to home or a post-acute facility.      I have placed the appropriate orders for post-discharge needs.    Review of Systems  Review of Systems   Constitutional:  Positive for malaise/fatigue. Negative for chills, diaphoresis, fever and weight loss.   HENT: Negative.  Negative for sore throat.    Eyes: Negative.  Negative for blurred vision.   Respiratory:  Positive for shortness of breath. Negative for cough.    Cardiovascular: Negative.  Negative for chest pain, palpitations and leg swelling.   Gastrointestinal: Negative.  Negative for abdominal pain, heartburn, nausea and vomiting.   Genitourinary: Negative.  Negative for dysuria and frequency.   Musculoskeletal:  Negative for myalgias and neck pain.   Skin: Negative.  Negative for itching and rash.   Neurological: Negative.  Negative for dizziness, focal weakness, weakness and headaches.   Endo/Heme/Allergies: Negative.  Negative for polydipsia. Does not bruise/bleed easily.   Psychiatric/Behavioral: Negative.  Negative for depression, substance abuse and suicidal ideas. The patient is not nervous/anxious.    All other systems reviewed and are negative.       Physical Exam  Temp:  [36.1 °C (96.9 °F)-36.9 °C (98.4 °F)] 36.6 °C (97.9 °F)  Pulse:  [] 99  Resp:  [18-25] 20  BP: ()/(56-82) 98/64  SpO2:  [90 %-100 %] 100 %    Physical Exam  Vitals and nursing note reviewed. Exam conducted with a chaperone  present.   Constitutional:       General: She is not in acute distress.     Appearance: Normal appearance. She is well-developed. She is ill-appearing. She is not diaphoretic.   HENT:      Head: Normocephalic and atraumatic.      Nose: Nose normal.      Comments: High flow nasal cannula     Mouth/Throat:      Mouth: Mucous membranes are moist.   Eyes:      Conjunctiva/sclera: Conjunctivae normal.      Pupils: Pupils are equal, round, and reactive to light.   Neck:      Thyroid: No thyromegaly.      Vascular: No JVD.   Cardiovascular:      Rate and Rhythm: Normal rate and regular rhythm.      Pulses: Normal pulses.      Heart sounds: Normal heart sounds.      No friction rub. No gallop.   Pulmonary:      Effort: Pulmonary effort is normal. No respiratory distress.      Breath sounds: No wheezing, rhonchi or rales.      Comments: Decreased at bases  No use of accessory muscle use  Abdominal:      General: Abdomen is flat. Bowel sounds are normal. There is no distension.      Palpations: Abdomen is soft. There is no mass.      Tenderness: There is no abdominal tenderness. There is no guarding or rebound.   Musculoskeletal:         General: No swelling, tenderness or deformity. Normal range of motion.      Cervical back: Normal range of motion and neck supple.      Right lower leg: No edema.      Left lower leg: No edema.   Lymphadenopathy:      Cervical: No cervical adenopathy.   Skin:     General: Skin is warm and dry.      Capillary Refill: Capillary refill takes less than 2 seconds.   Neurological:      General: No focal deficit present.      Mental Status: She is alert and oriented to person, place, and time. Mental status is at baseline.      Cranial Nerves: No cranial nerve deficit.      Motor: No weakness.   Psychiatric:         Mood and Affect: Mood normal.         Behavior: Behavior normal.         Fluids  No intake or output data in the 24 hours ending 10/20/23 4152            Laboratory  Recent Labs      10/18/23  0017 10/19/23  0020 10/20/23  1604   WBC 11.9* 14.3* 11.8*   RBC 3.85* 3.97* 3.70*   HEMOGLOBIN 10.1* 10.4* 9.9*   HEMATOCRIT 34.4* 36.8* 33.3*   MCV 89.4 92.7 90.0   MCH 26.2* 26.2* 26.8*   MCHC 29.4* 28.3* 29.7*   RDW 62.4* 64.7* 61.8*   PLATELETCT 80* 58* 27*   MPV 9.7 10.6  --      Recent Labs     10/20/23  0020   SODIUM 139   POTASSIUM 4.5   CHLORIDE 105   CO2 27   GLUCOSE 228*   BUN 23*   CREATININE 0.52   CALCIUM 8.6                     Imaging  CT-HEAD W/O   Final Result      1. No acute intracranial abnormality.   2. Round soft tissue mass in the left temporalis muscle measuring 1.5 x 2 x 1.8 cm in diameter. This lesion was not present on the prior PET/CT performed on 7/27/2023. Finding is most compatible with a soft tissue metastasis.      I, Dr. John Rosales, discussed the results of this examination directly by phone with Dr. Cordoav on 10/20/2023 at 1531 hours.         DX-CHEST-LIMITED (1 VIEW)   Final Result      Unchanged BILATERAL lung disease, suspect pneumonia      EC-ECHOCARDIOGRAM COMPLETE W/O CONT   Final Result      DX-CHEST-PORTABLE (1 VIEW)   Final Result         1.  Pulmonary edema and/or infiltrates are identified, which are stable since the prior exam.   2.  Trace bilateral pleural effusions      IR-US GUIDED PIV   Final Result    Ultrasound-guided PERIPHERAL IV INSERTION performed by    qualified nursing staff as above.      IR-US GUIDED PIV   Final Result    Ultrasound-guided PERIPHERAL IV INSERTION performed by    qualified nursing staff as above.      IR-US GUIDED PIV   Final Result    Ultrasound-guided PERIPHERAL IV INSERTION performed by    qualified nursing staff as above.      DX-CHEST-LIMITED (1 VIEW)   Final Result      Stable bilateral pulmonary infiltrates.      DX-CHEST-LIMITED (1 VIEW)   Final Result         1.  Extensive bilateral pulmonary parenchymal opacities compatible with ARDS, severe pulmonary edema, and/or superimposed pulmonary infiltrates, slightly  decreased since prior study.      DX-CHEST-PORTABLE (1 VIEW)   Final Result         1.  Extensive bilateral pulmonary parenchymal opacities compatible with ARDS, severe pulmonary edema, and/or superimposed pulmonary infiltrates, stable since prior study.      IR-MIDLINE CATHETER INSERTION WO GUIDANCE > AGE 3   Final Result                  Ultrasound-guided midline placement performed by qualified nursing staff    as above.          DX-CHEST-PORTABLE (1 VIEW)   Final Result         1.  Extensive bilateral pulmonary parenchymal opacities compatible with ARDS, severe pulmonary edema, and/or superimposed pulmonary infiltrates, stable since prior study.      DX-CHEST-PORTABLE (1 VIEW)   Final Result         1.  Pulmonary edema and/or infiltrates are identified, which are stable since the prior exam.      DX-CHEST-PORTABLE (1 VIEW)   Final Result         1.  Bilateral pulmonary infiltrates, stable since prior study   2.  Multiple bilateral pulmonary nodules.      DX-CHEST-PORTABLE (1 VIEW)   Final Result      1.  Innumerable pulmonary nodules bilaterally.      2.  Increasing confluence and airspace opacities in the perihilar regions suspicious for superimposed pneumonitis on diffuse metastatic disease      IR-PICC LINE PLACEMENT W/ GUIDANCE > AGE 5   Final Result                  Ultrasound-guided PICC placement performed by qualified nursing staff as    above.          DX-CHEST-PORTABLE (1 VIEW)   Final Result      1.  Innumerable bilateral pulmonary nodule, increased since 9/20/2023      2.  Differential diagnosis includes worsening metastases or superimposed pneumonia.      EC-ECHOCARDIOGRAM COMPLETE W/O CONT   Final Result      CT-CTA CHEST PULMONARY ARTERY W/ RECONS   Final Result      1.  Minimal pulmonary embolism. Localized right lower lobe segmental and subsegmental branches.   2.  No right heart strain.   3.  9 mm lucent/lytic lesion T1 vertebral body. Suspect osseous metastatic deposit.   4.  Progression of  extensive mediastinal adenopathy since 8/20/2023 consistent with metastatic disease.   5.  Extensive progression of diffuse bilateral innumerable pulmonary nodules consistent with metastatic disease.   6.  Interval development of extensive groundglass and airspace opacities consistent with superimposed pneumonia.   7.  Progression of right adrenal mass now measuring 43 mm x 38 mm consistent with metastasis.      Fleischner Society pulmonary nodule recommendations:      DX-CHEST-PORTABLE (1 VIEW)   Final Result      Diffuse bilateral interstitial and alveolar opacities most consistent with pneumonia or possibly noncardiogenic pulmonary edema.             Assessment/Plan  * Acute on chronic respiratory failure (HCC)- (present on admission)  Assessment & Plan  Mostly due to tumor progression in her lungs.  Unfortunately this poorly differentiated sarcoma has poor response respond to any therapy at this time per oncology.   Other factors including pulmonary embolism, COPD /asthma,  history of stage 1 lung adenocarcinoma s/p SBRT,recent pneumonia.    Initially treated at the ICU, patient still on high flow oxygen  Finish course of antibiotics  downgraded to IMCU on 10/2/2023.      Completed course of antibiotics.   Worsening hypoxia this am, 30 minutes titration of high flow, see above   Continue inhaler and nebulizers.      Atypical chest pain  Assessment & Plan  Non specific but unchanged ekg  trops in 60's, likely due to heart strain from hypoxia and PE  Sx improving  Echo still pending  Continue to follow    Goals of care, counseling/discussion- (present on admission)  Assessment & Plan  Goals of care done last week extensively. Hospice recommended by oncology and ICU team.  Patient would like to try some form of therapy if possible and oncology offered an oral chemotherapeutic regime. She elected to change to DNR/DNI.   Continue with palliative care consultation.     Sarcoma (HCC)- (present on admission)  Assessment  & Plan  Advanced, aggressive, on oral capmatinib    Pulmonary embolism on right (HCC)- (present on admission)  Assessment & Plan  Recently diagnosed.  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   Stated Capmatinib on 9/29/2023 by oncology team  Patient is DNR/DNI.  Palliative following    Mass of sacrum- (present on admission)  Assessment & Plan  Aggressive metastatic undifferentiated sarcoma. On Capmatinib    Lung cancer (HCC)- (present on admission)  Assessment & Plan  Initially diagnosed stage I adenocarcinoma initially treated with chemoradiation.   Discussed again with Dr. Newton  Now with widespread metastatic sarcoma  Plan to continue capmatinib    Chronic pain due to neoplasm- (present on admission)  Assessment & Plan  Palliative care have been following for pain management.  Continue multimodal pain management, oxy, tylenol, lidocaine patches, gabapentin and IV pain medication for breakthrough.   Pain currently controlled  Patient refusing her oral meds - again encouraged to take them for steadier pain control, she states she is reluctant because she feels it makes her anxiety worse    Cigarette nicotine dependence without complication- (present on admission)  Assessment & Plan  History of ongoing tobacco abuse, cessation discussed and recommended   Nicotine patch declined          VTE prophylaxis: Eliquis    The very real possibility of a deterioration of this patient’s condition required the highest level of my preparedness for sudden, emergent intervention. I provided critical care services, which included medication orders, frequent reevaluations of the patient’s condition and response to treatment, ordering and reviewing test results, and discussing the case with various consultants. The critical care time associated with the care  of the patient was 35 minutes. Review chart for interventions. This time is exclusive of any other billable procedures.

## 2023-10-20 NOTE — CARE PLAN
The patient is Unstable - High likelihood or risk of patient condition declining or worsening    Shift Goals  Clinical Goals: monitor O2, pain/ anxiety control  Patient Goals: rest, pain/ anxiety control  Family Goals: Not present    Progress made toward(s) clinical / shift goals:  Patient is AxO x4 and understands plan of care, all questions answered at this time.  Call light and personal belongings are within reach. Frequent rounding in place.  Bed is locked and in lowest position.  is on and sounding. PRN pain and anxiety medications available per MAR.     Patient is not progressing towards the following goals: NA

## 2023-10-21 NOTE — PROGRESS NOTES
Monitor summary:   Sinus rhythm-sinus tachycardia   Frequent PVC's   Occasional Trigem   0.13/0.08/0.27

## 2023-10-21 NOTE — PROGRESS NOTES
Pharmacy Pharmacotherapy Consult for LOS >30 days    Admit Date: 9/20/2023      Medications were reviewed for appropriateness and ongoing need.     Current Facility-Administered Medications   Medication Dose Route Frequency Provider Last Rate Last Admin    LORazepam (Ativan) injection 1 mg  1 mg Intravenous Q4HRS PRN Kala Cordova M.D.        dexamethasone (Decadron) tablet 4 mg  4 mg Oral Q8HRS Kala Cordova M.D.   4 mg at 10/21/23 1348    Followed by    [START ON 10/22/2023] dexamethasone (Decadron) tablet 4 mg  4 mg Oral Q12HRS Kala Cordova M.D.        Followed by    [START ON 10/26/2023] dexamethasone (Decadron) tablet 4 mg  4 mg Oral DAILY Kala Cordova M.D.        metoprolol tartrate (Lopressor) tablet 25 mg  25 mg Oral BID Kala Cordova M.D.   25 mg at 10/20/23 0501    calcium carbonate (Tums) chewable tab 500 mg  500 mg Oral BID PRN Kala Cordova M.D.        lidocaine (Lidoderm) 5 % 1 Patch  1 Patch Transdermal Q24HR Kala Cordova M.D.   1 Patch at 10/20/23 1709    tiotropium (Spiriva Respimat) 2.5 mcg/Act inhalation spray 5 mcg  5 mcg Inhalation DAILY Kala Cordova M.D.   5 mcg at 10/21/23 0428    nystatin-tetracycline-prednisone-diphenhydramine (Miracle Mouth Wash) oral susp 5 mL  5 mL Oral Q6HRS PRN Kala Cordova M.D.   5 mL at 10/10/23 0514    levalbuterol (Xopenex) 1.25 MG/3ML nebulizer solution 1.25 mg  1.25 mg Nebulization Q2HRS PRN (RT) Kala Cordova M.D.   1.25 mg at 10/19/23 1557    albuterol inhaler 2 Puff  2 Puff Inhalation Q4HRS PRN Kala Cordova M.D.   2 Puff at 10/20/23 1733    mometasone-formoterol (Dulera) 100-5 MCG/ACT inhaler 2 Puff  2 Puff Inhalation BID Kala Cordova M.D.   2 Puff at 10/21/23 0428    Respiratory Therapy Consult   Nebulization Continuous RT Kala Cordova M.D.        ipratropium-albuterol (DUONEB) nebulizer solution  3 mL Nebulization Q2HRS PRN (RT) Kala Cordova M.D.   3 mL at 10/20/23 1548    morphine ER (Ms Contin) tablet 15 mg  15 mg Oral Q12HRS Kala Cordova M.D.   15 mg at 10/21/23 0430    busPIRone (Buspar) tablet 15 mg  15 mg Oral TID  Kala Cordova M.D.   15 mg at 10/21/23 1157    oxyCODONE immediate-release (Roxicodone) tablet 5 mg  5 mg Oral Q4HRS PRMELLISA Adam M.D.        Or    oxyCODONE immediate release (Roxicodone) tablet 10 mg  10 mg Oral Q4HRS PRN Gerson Adam M.D.   10 mg at 10/21/23 1104    acetaminophen (Tylenol) tablet 650 mg  650 mg Oral Q6HRS Kala Cordova M.D.   650 mg at 10/21/23 1158    sodium chloride (Ocean) 0.65 % nasal spray 2 Spray  2 Spray Nasal Q2HRS PRMELLISA Cordova M.D.   2 Spray at 10/17/23 1312    HYDROmorphone (Dilaudid) injection 1 mg  1 mg Intravenous Q HOUR PRN Madhu Simpson P.A.-C.   1 mg at 10/21/23 0358    capmatinib (Tabrecta) tablet 400 mg  400 mg Oral BID Kala Cordoav M.D.   400 mg at 10/21/23 0432    guaiFENesin ER (Mucinex) tablet 600 mg  600 mg Oral Q12HRS Kala Cordova M.D.   600 mg at 10/21/23 0430    diphenhydrAMINE (Benadryl) injection 25 mg  25 mg Intravenous Q6HRS PRMELLISA Cordova M.D.   25 mg at 10/20/23 1036    loratadine (Claritin) tablet 10 mg  10 mg Oral DAILY Kala Cordova M.D.   10 mg at 10/21/23 0430    senna-docusate (Pericolace Or Senokot S) 8.6-50 MG per tablet 2 Tablet  2 Tablet Oral BID Kala Cordova M.D.   2 Tablet at 10/17/23 0506    And    polyethylene glycol/lytes (Miralax) PACKET 1 Packet  1 Packet Oral QDAY PRMELLISA Cordova M.D.   1 Packet at 10/13/23 0002    And    magnesium hydroxide (Milk Of Magnesia) suspension 30 mL  30 mL Oral QDAY DANNY Cordova M.D.   30 mL at 10/16/23 0520    And    bisacodyl (Dulcolax) suppository 10 mg  10 mg Rectal QDAY PRMELLISA Cordova M.D.   10 mg at 09/28/23 1300    acetaminophen (Tylenol) tablet 650 mg  650 mg Oral Q6HRS PRMELLISA Cordova M.D.        ondansetron (Zofran) syringe/vial injection 4 mg  4 mg Intravenous Q4HRS DANNY Cordova M.D.   4 mg at 10/04/23 2150    ondansetron (Zofran ODT) dispertab 4 mg  4 mg Oral Q4HRS PRMELLISA Cordova M.D.        guaiFENesin dextromethorphan (Robitussin DM) 100-10 MG/5ML syrup 10 mL  10 mL Oral Q6HRS PRMELLISA Cordova M.D.   10 mL at 10/11/23 1652     apixaban (Eliquis) tablet 5 mg  5 mg Oral BID Kala oCrdova M.D.   5 mg at 10/21/23 0429       Recommendations:  All scheduled medications remain appropriate.  Utilizing all PRNs, except: Voltaren (ordered 9/20 and never used), hydralazine (last used 9/26), melatonin (ordered 10/4 and never used), zofran (last given 10/4), compazine (never used), and phenergan (never used).  Discussed recommendations with MD: discontinue Voltaren, hydralazine, melatonin, compazine, and phenergan but keep zofran.  Aforementioned orders discontinued and all others renewed.        Verenice Gonzalez, PharmD

## 2023-10-21 NOTE — PROGRESS NOTES
This RN noticed increased work of breathing, vital signs stable. MD He notified and respiratory called for treatment.

## 2023-10-21 NOTE — CONSULTS
Brief CC consult note    Asked by Dr. Cordova to evaluate for IMCU for escalating O2 requirements.  48 year old female with NSCLC s/p SBRT, metastatic sarcoma with diffuse lung masses who is currently on capmatinib and having worsening O2 requirements. Patient is DNR/DNI but refused hospice. She has also been getting increasingly confused so CT head was obtained today which showed new soft tissue mass in L temporalis muscle c/f soft tissue met.   Patient woke up from a nap and was too confused to give me a history or further information about how she's doing and then went back to sleep.     Unfortunately, this is all likely from progression of her malignancy. Also likely component of untreated PE (due to worsening thrombocytopenia). No fevers or obvious aspiration to be c/f superimposed pneumonia. Does not appear overloaded on exam. Discussed with primary that they can obtain procal, BNP, cultures to further evaluate for acute causes but I don't have a high suspicion.   Recommend discussing these new findings of mass on CT head with patient and family.  She does not need IMCU level of care at this time. If her respiratory status worsens, then given all the above and that there's unlikely an acute reversible cause, I would not recommend escalation to BiPAP/CPAP (RN states she has not tolerated it in past and she even tries to take the HFNC off at times so pursuing bipap would be inappropriate) but rather proceeding with comfort based measures.         Kari Cardenas MD  Pulmonary and Critical Care Medicine  [facility]

## 2023-10-21 NOTE — CARE PLAN
Problem: Humidified High Flow Nasal Cannula  Goal: Maintain adequate oxygenation dependent on patient condition  Description: Target End Date:  resolve prior to discharge or when underlying condition is resolved/stabilized    1.  Implement humidified high flow oxygen therapy  2.  Titrate high flow oxygen to maintain appropriate SpO2  Outcome: Progressing     HHFNC 60LPM and 100%.

## 2023-10-21 NOTE — PROGRESS NOTES
Monitor Summary  Rhythm: SR  Rate: 78-90  Ectopy: R couplet  .16 / .06 / .36

## 2023-10-21 NOTE — CARE PLAN
The patient is Watcher - Medium risk of patient condition declining or worsening    Shift Goals  Clinical Goals: Pain management, monitor O2, anxiety control  Patient Goals: Sleep      Progress made toward(s) clinical / shift goals:     Problem: Pain - Standard  Goal: Alleviation of pain or a reduction in pain to the patient’s comfort goal  Outcome: Progressing     Problem: Respiratory  Goal: Patient will achieve/maintain optimum respiratory ventilation and gas exchange  Outcome: Progressing     Problem: Psychosocial  Goal: Patient's level of anxiety will decrease  Outcome: Progressing

## 2023-10-22 NOTE — PROGRESS NOTES
Hospital Medicine Daily Progress Note    Date of Service  10/22/2023    Chief Complaint  48-year-old female with history of sarcoma and lung cancer presented 9/20 with dyspnea.      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  On admission patient had tachycardia tachypnea, chest x-ray showed bilateral infiltration, CTA for chest showed right lower lobe subsegmental PE, worsening pulmonary nodules with T1 vertebral body lesion, possible pneumonia.  Procalcitonin was 0.11.Eliquis was started.  Also antibiotics for pneumonia Ceftriaxone and azithromycin were empirically started.  Echo was done and showed normal ejection fraction 65%, initially patient was treated ICU secondary to increasing oxygen requirement, patient was on high flow oxygen, treated with a steroid and inhalers.  With some improvement and patient was transferred out of the ICU.    Patient was evaluated by Dr. Newton oncologist who recommended to start  capmatinib on 9/29/2023.    Patient has metastatic sarcoma with lung cancer and significant anxiety, palliative care following for pain management.  Patient needed Precedex drip for anxiety.  Patient is on every 4 hours prn Ativan, every 2 hours prn IV Dilaudid, Norco 10/325 every 3 hours prn. Tylenol every 6 hours scheduled, holding when sleeping. Dexamethasone 4 mg BID.  CODE STATUS was discussed with the patient and agreed for DNR/DNI.    Interval Problem Update  10/16 Axox3 second rapid response this am pt was placed on max high flow after desaturation that happened when she got up to bedside commode, I reviewed her acute cxr which showed stable bilateral effusions and I also obtained and interpreted an ekg which showed no acute changes. She  and her friend were yelling at each other and this was worsening her desaturations so I asked her friend to leave the room, with iv lasix and diuresis and an increased dose of ativan she improved and oxygen titrated down from 70% back to 40% high flow. Echo pending. She denies new pain.  She confirmed she remains dnr/ dni and does not want hospice. ROS otherwise negative.     Addendum: atypical L shoulder pain, constant - will check ekg and trop, trial of lidocaine, tele, following  Ekg interpreted by me,early repolarization, unchanged from previous    10/18 Stays on high flow, oxygen needs trending up, requires a 60 L, 92% FiO2  Sinus tachycardia, BP high, restarted metoprolol  Tachypnea, continue oxygen support  EF 60,     10/19 still on high flow, 40- 60 L   Patient has been placed on Decadron IV in ICU, likely for COPD exacerbation,   I discussed with the pharmacy, will taper down in 7 to 10 days.  Leukocytosis-on steroid    10/20 plt 58>27, Eliquis on hold  Episodes of confusion today, ABG ordered, normal pH, PCO2 48, better than previous ABG. CXR no scute pathology  Reported severe headache, I ordered a CT. CT showed Round soft tissue mass in the left temporalis muscle measuring 1.5 x 2 x 1.8 cm in diameter, which is a new.  Likely tumor metastasis.   I discussed it with radiology and oncology.     Stays on HF 60L    Addendem 6pm: The nurse reported increased breathing efforts RR 24 despite maximized HF 60L 100% Fio2. More confused. ABG no big changes, slightly better than the previous ones. I discussed with intensivist regarding escalating care to IMCU for possible BiPAP. However, Dr. Cardenas doesn't think the escalating care will help given the underlying unreversible causes.   I ordered CBC, CMP, procal to rule out acute etiologies. I reviewed the results, no concerning findings cause her worsening breathing issues or AMS. Procalcitoin 0.08, unlikely acute infection.     I ordered CTA chest to  check the progression of lung tumor and PE. May discuss with the family and the patient again about the goal of care if there are significant progression.     10/21 respiratory status slightly better, 50 L, FiO2 80%.  No confusion this morning.  Denies chest pain.   Steroids was initiated in ICU, likely for COPD exacerbation.  It has been no improvement on steroids.  I am tapering it down    10/22 on 60L, FiO2 90%.  Episodes of confusion.  Not able to tell the month.   Stat ABG ordered for mental status changes, CO2 retention, but better than previous ABG, normal pH, PO2 is low 50%.     Patient mental status was better. goal of care discussed with her again at bedside.  She understood she has lung cancer, pulmonary clot, and possible head soft tissue metastasis.  She understood we already maximized her oxygen since she is DNR/DNI. Patient is willing to talk to the oncologist and palliative care again    Eliquis ON HOLD due to severe thrombocytopenia platelet<30    I reached out to the oncologist Dr. Newton regarding the CT head findings and may need further discussion with the patient    CTA is not able to be done since patient is on high flow and cannot tolerated    High complexity, critically sick, high chance of rapid decline.       I have discussed this patient's plan of care and discharge plan at IDT rounds today with Case Management, Nursing, Nursing leadership, and other members of the IDT team.    Consultants/Specialty  oncology critical care, palliative care    Code Status  DNAR/DNI    Disposition  The patient is not medically cleared for discharge to home or a post-acute facility.      I have placed the appropriate orders for post-discharge needs.    Review of Systems  Review of Systems   Constitutional:  Positive for malaise/fatigue. Negative for chills, diaphoresis, fever and weight loss.   HENT: Negative.  Negative for sore throat.    Eyes: Negative.  Negative for blurred vision.   Respiratory:   Positive for shortness of breath. Negative for cough.    Cardiovascular: Negative.  Negative for chest pain, palpitations and leg swelling.   Gastrointestinal: Negative.  Negative for abdominal pain, heartburn, nausea and vomiting.   Genitourinary: Negative.  Negative for dysuria and frequency.   Musculoskeletal:  Negative for myalgias and neck pain.   Skin: Negative.  Negative for itching and rash.   Neurological: Negative.  Negative for dizziness, focal weakness, weakness and headaches.   Endo/Heme/Allergies: Negative.  Negative for polydipsia. Does not bruise/bleed easily.   Psychiatric/Behavioral: Negative.  Negative for depression, substance abuse and suicidal ideas. The patient is not nervous/anxious.    All other systems reviewed and are negative.       Physical Exam  Temp:  [36.1 °C (97 °F)-37 °C (98.6 °F)] 36.9 °C (98.5 °F)  Pulse:  [] 115  Resp:  [18-26] 22  BP: ()/(58-74) 98/67  SpO2:  [91 %-97 %] 92 %    Physical Exam  Vitals and nursing note reviewed. Exam conducted with a chaperone present.   Constitutional:       General: She is in acute distress.      Appearance: She is well-developed. She is ill-appearing. She is not diaphoretic.   HENT:      Head: Normocephalic and atraumatic.      Nose: Nose normal.      Comments: High flow nasal cannula     Mouth/Throat:      Mouth: Mucous membranes are moist.   Eyes:      Extraocular Movements: Extraocular movements intact.      Conjunctiva/sclera: Conjunctivae normal.   Neck:      Thyroid: No thyromegaly.      Vascular: No JVD.   Cardiovascular:      Rate and Rhythm: Regular rhythm. Tachycardia present.      Pulses: Normal pulses.      Heart sounds: Normal heart sounds.      No friction rub. No gallop.   Pulmonary:      Effort: Respiratory distress present.      Breath sounds: No rhonchi or rales.      Comments: Decreased at bases  No use of accessory muscle use  Abdominal:      General: Bowel sounds are normal.      Palpations: Abdomen is soft.  There is no mass.      Tenderness: There is no abdominal tenderness. There is no guarding or rebound.   Musculoskeletal:         General: No swelling, tenderness or deformity. Normal range of motion.      Cervical back: Normal range of motion and neck supple.      Right lower leg: No edema.      Left lower leg: No edema.   Lymphadenopathy:      Cervical: No cervical adenopathy.   Skin:     General: Skin is warm and dry.      Capillary Refill: Capillary refill takes less than 2 seconds.   Neurological:      Mental Status: She is alert. She is disoriented.      Cranial Nerves: No cranial nerve deficit.      Motor: No weakness.   Psychiatric:      Comments: Anxious         Fluids    Intake/Output Summary (Last 24 hours) at 10/22/2023 1445  Last data filed at 10/22/2023 0940  Gross per 24 hour   Intake 420 ml   Output --   Net 420 ml               Laboratory  Recent Labs     10/20/23  1604 10/20/23  1915   WBC 11.8* 11.0*   RBC 3.70* 3.54*   HEMOGLOBIN 9.9* 9.5*   HEMATOCRIT 33.3* 31.5*   MCV 90.0 89.0   MCH 26.8* 26.8*   MCHC 29.7* 30.2*   RDW 61.8* 60.8*   PLATELETCT 27* 22*     Recent Labs     10/20/23  1915 10/21/23  0023 10/22/23  0011   SODIUM 134* 135 134*   POTASSIUM 4.3 4.5 3.9   CHLORIDE 100 99 98   CO2 28 29 30   GLUCOSE 210* 133* 193*   BUN 16 16 15   CREATININE 0.42* 0.43* 0.47*   CALCIUM 8.0* 7.9* 8.1*                     Imaging  CT-HEAD W/O   Final Result      1. No acute intracranial abnormality.   2. Round soft tissue mass in the left temporalis muscle measuring 1.5 x 2 x 1.8 cm in diameter. This lesion was not present on the prior PET/CT performed on 7/27/2023. Finding is most compatible with a soft tissue metastasis.      I, Dr. John Rosales, discussed the results of this examination directly by phone with Dr. Cordova on 10/20/2023 at 1531 hours.         DX-CHEST-LIMITED (1 VIEW)   Final Result      Unchanged BILATERAL lung disease, suspect pneumonia      EC-ECHOCARDIOGRAM COMPLETE W/O CONT   Final  Result      DX-CHEST-PORTABLE (1 VIEW)   Final Result         1.  Pulmonary edema and/or infiltrates are identified, which are stable since the prior exam.   2.  Trace bilateral pleural effusions      IR-US GUIDED PIV   Final Result    Ultrasound-guided PERIPHERAL IV INSERTION performed by    qualified nursing staff as above.      IR-US GUIDED PIV   Final Result    Ultrasound-guided PERIPHERAL IV INSERTION performed by    qualified nursing staff as above.      IR-US GUIDED PIV   Final Result    Ultrasound-guided PERIPHERAL IV INSERTION performed by    qualified nursing staff as above.      DX-CHEST-LIMITED (1 VIEW)   Final Result      Stable bilateral pulmonary infiltrates.      DX-CHEST-LIMITED (1 VIEW)   Final Result         1.  Extensive bilateral pulmonary parenchymal opacities compatible with ARDS, severe pulmonary edema, and/or superimposed pulmonary infiltrates, slightly decreased since prior study.      DX-CHEST-PORTABLE (1 VIEW)   Final Result         1.  Extensive bilateral pulmonary parenchymal opacities compatible with ARDS, severe pulmonary edema, and/or superimposed pulmonary infiltrates, stable since prior study.      IR-MIDLINE CATHETER INSERTION WO GUIDANCE > AGE 3   Final Result                  Ultrasound-guided midline placement performed by qualified nursing staff    as above.          DX-CHEST-PORTABLE (1 VIEW)   Final Result         1.  Extensive bilateral pulmonary parenchymal opacities compatible with ARDS, severe pulmonary edema, and/or superimposed pulmonary infiltrates, stable since prior study.      DX-CHEST-PORTABLE (1 VIEW)   Final Result         1.  Pulmonary edema and/or infiltrates are identified, which are stable since the prior exam.      DX-CHEST-PORTABLE (1 VIEW)   Final Result         1.  Bilateral pulmonary infiltrates, stable since prior study   2.  Multiple bilateral pulmonary nodules.      DX-CHEST-PORTABLE (1 VIEW)   Final Result      1.  Innumerable pulmonary nodules  bilaterally.      2.  Increasing confluence and airspace opacities in the perihilar regions suspicious for superimposed pneumonitis on diffuse metastatic disease      IR-PICC LINE PLACEMENT W/ GUIDANCE > AGE 5   Final Result                  Ultrasound-guided PICC placement performed by qualified nursing staff as    above.          DX-CHEST-PORTABLE (1 VIEW)   Final Result      1.  Innumerable bilateral pulmonary nodule, increased since 9/20/2023      2.  Differential diagnosis includes worsening metastases or superimposed pneumonia.      EC-ECHOCARDIOGRAM COMPLETE W/O CONT   Final Result      CT-CTA CHEST PULMONARY ARTERY W/ RECONS   Final Result      1.  Minimal pulmonary embolism. Localized right lower lobe segmental and subsegmental branches.   2.  No right heart strain.   3.  9 mm lucent/lytic lesion T1 vertebral body. Suspect osseous metastatic deposit.   4.  Progression of extensive mediastinal adenopathy since 8/20/2023 consistent with metastatic disease.   5.  Extensive progression of diffuse bilateral innumerable pulmonary nodules consistent with metastatic disease.   6.  Interval development of extensive groundglass and airspace opacities consistent with superimposed pneumonia.   7.  Progression of right adrenal mass now measuring 43 mm x 38 mm consistent with metastasis.      Fleischner Society pulmonary nodule recommendations:      DX-CHEST-PORTABLE (1 VIEW)   Final Result      Diffuse bilateral interstitial and alveolar opacities most consistent with pneumonia or possibly noncardiogenic pulmonary edema.      CT-CTA CHEST PULMONARY ARTERY W/ RECONS    (Results Pending)          Assessment/Plan  * Acute on chronic respiratory failure (HCC)- (present on admission)  Assessment & Plan  Mostly due to tumor progression in her lungs.  Unfortunately this poorly differentiated sarcoma has poor response respond to any therapy at this time per oncology.   Other factors including pulmonary embolism, COPD /asthma,   history of stage 1 lung adenocarcinoma s/p SBRT,recent pneumonia.    Initially treated at the ICU, patient still on high flow oxygen  Finish course of antibiotics  downgraded to IMCU on 10/2/2023.      Completed course of antibiotics.   Continue inhaler and nebulizers.    10/20 worsening respiratory status, requiring HF 60L 100% Fio2. More confused. ABG no big changes, slightly better than the previous ones. I discussed with intensivist regarding escalating care to IMCU for possible BiPAP. However, Dr. Cardenas doesn't think the escalating care will help given the underlying unreversible causes.   I ordered CBC, CMP, procal to rule out acute etiologies. I reviewed the results, no concerning findings cause her worsening breathing issues or AMS. Procalcitoin 0.08, unlikely acute infection.     I ordered CTA chest to check the progression of lung tumor and PE. May discuss with the family and the patient again about the goal of care if there are significant progression.     10/21 respiratory status slightly better, 50 L, FiO2 80%.  No confusion this morning.  Denies chest pain.   Steroids was initiated in ICU, likely for COPD exacerbation.  It has been no improvement on steroids.  I am tapering it down    10/22 10/22 on 60L, FiO2 90%.  Episodes of confusion.  Not able to tell the month.   Stat ABG ordered for mental status changes, CO2 retention, but better than previous ABG, normal pH, PO2 is low 50%.     Patient mental status was better. goal of care discussed with her again at bedside.  She understood she has lung cancer, pulmonary clot, and possible head soft tissue metastasis.  She understood we already maximized her oxygen since she is DNR/DNI. Patient is willing to talk to the oncologist and palliative care again    I reached out to the oncologist Dr. Newton regarding the CT head findings and may need further discussion with the patient    CTA is not able to be done since patient is on high flow and cannot  tolerated    CTA chest pending        Atypical chest pain  Assessment & Plan  Non specific but unchanged ekg  trops in 60's, likely due to heart strain from hypoxia and PE  Sx improving  Echo still pending  Continue to follow    Goals of care, counseling/discussion- (present on admission)  Assessment & Plan  Goals of care done last week extensively. Hospice recommended by oncology and ICU team.  Patient would like to try some form of therapy if possible and oncology offered an oral chemotherapeutic regime. She elected to change to DNR/DNI.   Continue with palliative care consultation.     Sarcoma (HCC)- (present on admission)  Assessment & Plan  Advanced, aggressive, on oral capmatinib      Reported severe headache, I ordered a CT. CT showed Round soft tissue mass in the left temporalis muscle measuring 1.5 x 2 x 1.8 cm in diameter, which is a new.  Likely tumor metastasis.     Notified oncology          Pulmonary embolism on right (HCC)- (present on admission)  Assessment & Plan  Recently diagnosed.  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   Stated Capmatinib on 9/29/2023 by oncology team  Patient is DNR/DNI.  Palliative following    Mass of sacrum- (present on admission)  Assessment & Plan  Aggressive metastatic undifferentiated sarcoma. On Capmatinib    Lung cancer (HCC)- (present on admission)  Assessment & Plan  Initially diagnosed stage I adenocarcinoma initially treated with chemoradiation.   Discussed again with Dr. Newton  Now with widespread metastatic sarcoma  Plan to continue capmatinib    Chronic pain due to neoplasm- (present on admission)  Assessment & Plan  Palliative care have been following for pain management.  Continue multimodal pain management, oxy, tylenol, lidocaine patches,  gabapentin and IV pain medication for breakthrough.   Pain currently controlled  Patient refusing her oral meds - again encouraged to take them for steadier pain control, she states she is reluctant because she feels it makes her anxiety worse    Cigarette nicotine dependence without complication- (present on admission)  Assessment & Plan  History of ongoing tobacco abuse, cessation discussed and recommended   Nicotine patch declined          VTE prophylaxis: Eliquis ON HOLD due to severe thrombocytopenia platelet<30    I spent greater than 52 minutes for chart review, obtaining history independently, performing medically appropriate examination,  documenting , ordering medications, tests, or procedures, referring and communicating with other health care professionals, Independently interpreting results and communicating results with patient/family/caregiver. More than 50% of time was spent in face-to-face clinical encounter.

## 2023-10-22 NOTE — CARE PLAN
The patient is Unstable - High likelihood or risk of patient condition declining or worsening    Shift Goals  Clinical Goals: Pt will maintain oxygen saturation above 90%  Patient Goals: Pt will be able to rest comfortably and talk to Dr Newton  Family Goals: Pt will be comfortable    Progress made toward(s) clinical / shift goals: Pt able to talk to Dr Newton. Plan for Dr Newton to come to bedside for goals of care conversation.     Patient is not progressing towards the following goals: Pt desaturating frequently in to the low 80's on 100% FiO2 and 60L. Requiring nonrebreather for supplement at times. Pt with temp of 101.Tachy in the 120s. MD notified. New orders received. [facility] rapid nurse updated and following.      Problem: Pain - Standard  Goal: Alleviation of pain or a reduction in pain to the patient’s comfort goal  Outcome: Not Progressing     Problem: Respiratory  Goal: Patient will achieve/maintain optimum respiratory ventilation and gas exchange  Outcome: Not Progressing     Problem: Self Care  Goal: Patient will have the ability to perform ADLs independently or with assistance (bathe, groom, dress, toilet and feed)  Outcome: Not Progressing     Problem: Psychosocial  Goal: Patient's level of anxiety will decrease  Outcome: Not Progressing  Goal: Patient and family will demonstrate ability to cope with life altering diagnosis and/or procedure  Outcome: Not Progressing

## 2023-10-22 NOTE — CARE PLAN
The patient is Unstable - High likelihood or risk of patient condition declining or worsening    Shift Goals  Clinical Goals: Anxiety relief, monitor O2 levels  Patient Goals: Pain relief, sleep      Progress made toward(s) clinical / shift goals:     Problem: Pain - Standard  Goal: Alleviation of pain or a reduction in pain to the patient’s comfort goal  Outcome: Progressing     Problem: Respiratory  Goal: Patient will achieve/maintain optimum respiratory ventilation and gas exchange  Outcome: Progressing     Problem: Skin Integrity  Goal: Skin integrity is maintained or improved  Outcome: Progressing     Problem: Psychosocial  Goal: Patient's level of anxiety will decrease  Outcome: Progressing

## 2023-10-22 NOTE — CARE PLAN
The patient is Unstable - High likelihood or risk of patient condition declining or worsening    Shift Goals  Clinical Goals: Pt will have improvement in her anxiety and be able to rest comfortably  Patient Goals: Pt will be able to talk to Dr Newton  Family Goals: not present    Progress made toward(s) clinical / shift goals:  Pt free from injury. Up with two person assist to the bedside commode.     Patient is not progressing towards the following goals:      Problem: Pain - Standard  Goal: Alleviation of pain or a reduction in pain to the patient’s comfort goal  Outcome: Not Progressing     Pain is persistently at a 7/10. Medicated frequently with minimal relief. Frequent assessment in place.     Problem: Respiratory  Goal: Patient will achieve/maintain optimum respiratory ventilation and gas exchange  Outcome: Not Progressing    Pt continues to desaturate in to the 70's with any exertion despite high flow oxygen. Moderate work of breathing with accessory muscle use noted.      Problem: Psychosocial  Goal: Patient's level of anxiety will decrease  Outcome: Not Progressing

## 2023-10-23 NOTE — CARE PLAN
Problem: Humidified High Flow Nasal Cannula  Goal: Maintain adequate oxygenation dependent on patient condition  Description: Target End Date:  resolve prior to discharge or when underlying condition is resolved/stabilized    1.  Implement humidified high flow oxygen therapy  2.  Titrate high flow oxygen to maintain appropriate SpO2  Outcome: Progressing     High flow nasal cannula 50 LPM and 100%

## 2023-10-23 NOTE — CARE PLAN
The patient is Unstable - High likelihood or risk of patient condition declining or worsening    Shift Goals  Clinical Goals: Patient will main O2 saturtaion above 90%, reduce anxiety and pain  Patient Goals: Rest  Family Goals: NA    Progress made toward(s) clinical / shift goals:  Safety - patient was not impulsive throughout the night and called appropriately when needing to get out of bed.     Patient is not progressing towards the following goals: Pt desaturating frequently in to the low 70's on 100% FiO2 and 60L. Requiring nonrebreather for supplement at times. Pt pain remains 7/10 even after medicated for pain.       Problem: Pain - Standard  Goal: Alleviation of pain or a reduction in pain to the patient’s comfort goal  Outcome: Not Progressing     Problem: Respiratory  Goal: Patient will achieve/maintain optimum respiratory ventilation and gas exchange  Outcome: Not Progressing     Problem: Self Care  Goal: Patient will have the ability to perform ADLs independently or with assistance (bathe, groom, dress, toilet and feed)  Outcome: Not Progressing

## 2023-10-23 NOTE — PROGRESS NOTES
Monitor summary:   Sinus rhythm-sinus tachycardia   Rare PVC's and Couplets   0.12/0.08/0.33

## 2023-10-23 NOTE — PROGRESS NOTES
Consult Note: Hematology/Oncology     Primary Care:  Checo Baker M.D.    Chief Complaint   Patient presents with    Shortness of Breath     PT hx lung ca that's metastasized. Was at another appt and was SOB. RA while walking in 70's per EMS. Currently on 6L NC         Current Treatment:     9/29/23: Capmatinib    Prior Treatment:     4/17/2023-4/21/2023: RLL Lung SBRT  7/19/23-7/24/2023: R Sacrum SBRT      Subjective:   History of Presenting Illness:    Melly Hendrickson is a 48 y.o. 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    Patient history dates back to July 2022 when she had a chest x-ray performed that revealed a potential mass in her right lung.  This led to a chest CT performed on February 13, 2023.  This demonstrated 1.4 cm mass in the right lower lobe and a 1.9 groundglass opacity also in the right lower lobe.  The groundglass opacity appeared stable from previous scans but the mass was thought to be new.      A biopsy was performed and confirmed lung adenocarcinoma.    Patient saw thoracic surgery and obtained PFTs as well as a PET scan.  She was discussed at tumor board and given her significant dyspnea and her underlying COPD it was thought that she would be a better candidate for SBRT rather than surgical resection.    April 17, 2023 patient began SBRT.  Completed this treatment without many issues.    After this patient did notice a small lump in her breast reduction scar line.  She said that over the next few days it grew and she ultimately went to her PCP    PCP tried to drain the mass; however it just bled significantly during the I&D attempt.  Thus she was sent to Dr. Becerra at [facility] for excision.     Pathology from this excision demonstrated poorly differentiated malignancy favoring carcinoma without obvious involvement of underlying skin and nonspecific IHC profile.  Based on  his diagnosis cancer type ID was sent off for.  Results showed 90% probability of sarcoma    Over the past several months she has noted severe pain in her sacral region.  She does have sacral lesions that are concerning for metastatic disease.  Patient has biopsy of sacral mass on 7/18/2023.     Of note patient has 5 children and 4 grandchildren.    She follows with pain management    I initially saw patient on 7/14.  At our 7/21, we had information back from biopsy and we discussed AIM.  Patient wished to hold off and think about this further.    On 7/28, we discussed initiating AIM, patient was not comfortable and wanted 2 opinion at [facility].    I saw patient again on 8/11.  We discussed AIM,  patient not ready and awaiting [facility] appointment.     She was recently admitted to the ER for hemoptysis on 8/20/23 and was found to have a PE.  She was still having hemoptysis and thus was not started on AC. She has discharged on 8/24/23.     I saw her subsequently on 9/7/23 and recommended AC given no hemoptysis, but active PE with cancer.  We also discussed chemo at this visit and she wished to wait.     During my visit with the patient on 9/19/23, Patient was endorsing 3 nodules on the R arm.  1 on the clavicle.  Patient also had a nodule on her L arm. Patient had 1 nodule on her bikini line. She has another on the inside of her L thigh. These started 1 week ago. She was on 2-3L of oxygen for the past several days.  We discussed intiating chemo and plan was to start on 9/26/23.  Pt requires a direct admit for AIM    Since that visit she had worseing SOB.  She was subsequently presented to the ER for SOB/cough and potential PNA. On admission, she was tachycardic, tachypneic, and afebrile.  Chest x-ray showed diffuse bilateral interstitial and alveolar opacities consistent with pneumonia. CTA chest on admission showed right subsegmental PE, worsening pulmonary nodules, T1 vertebral body region, possible pneumonia.   Procalcitonin was 0.11.  Ceftriaxone and azithromycin were empirically started.    Line was not able to be placed unable to start chemo.  Patients respiratory status declined and she was transferred to the ICU    Patient started on Capmatinib 9/29/23    Unfortunately patient appears to be declining mentally as well but is not improving symptomatically.    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    Chronic obstructive pulmonary disease (HCC)     COPD (chronic obstructive pulmonary disease) (HCC)     DDD (degenerative disc disease), lumbar     Depression     Emphysema of lung (HCC)     MRSA (methicillin resistant staph aureus) culture positive     Pain     back - cervical, scapula, lower    Pericarditis     Primary adenocarcinoma of lower lobe of right lung (HCC)     Psychiatric disorder     Anxiety, Depression    Sleep apnea     Snoring     Urinary incontinence         Past Surgical History:   Procedure Laterality Date    RI BRONCHOSCOPY,DIAGNOSTIC N/A 8/22/2023    Procedure: BRONCHOSCOPY;  Surgeon: Sandra Lopez D.O.;  Location: [facility];  Service: Pulmonary    RI EXC SKIN BENIG >4CM TRUNK,ARM,LEG Left 6/7/2023    Procedure: EXCISION OF SOFT TISSUE MASS LEFT CHEST WALL;  Surgeon: Nasir Becerra M.D.;  Location: [facility];  Service: General    RI BRONCHOSCOPY,DIAGNOSTIC N/A 02/14/2023    Procedure: FIBER OPTIC BRONCHOSCOPY WASH, BRUSH, BRONCHOALVEOLAR LAVAGE, BIOPSY AND FINE NEEDLE ASPIRATION AND NAVIGATION, ROBOTICS;  Surgeon: Guicho Ellis M.D.;  Location: [facility];  Service: Pulmonary Robotic    ENDOBRONCHIAL US ADD-ON N/A 02/14/2023    Procedure: ENDOBRONCHIAL ULTRASOUND (EBUS);  Surgeon: Guicho Ellis M.D.;  Location: [facility];  Service: Pulmonary Robotic    MAMMOPLASTY REDUCTION  Bilateral     SEPTOPLASTY      TUBE & ECTOPIC PREG., REMOVAL      x 2       Social History     Tobacco Use    Smoking status: Some Days     Current packs/day: 0.50     Average packs/day: 0.5 packs/day for 20.0 years (10.0 ttl pk-yrs)     Types: Cigarettes     Passive exposure: Past    Smokeless tobacco: Never    Tobacco comments:     on and off    Vaping Use    Vaping Use: Never used   Substance Use Topics    Alcohol use: Not Currently     Comment: occ, rare    Drug use: Not Currently     Types: Marijuana     Comment: THC gummies        Family History   Problem Relation Age of Onset    Diabetes Mother     Cancer Father         Prostate    Cancer Paternal Grandmother         Cervical    Cancer Paternal Grandfather         Unk       Allergies   Allergen Reactions    Penicillins Hives, Rash and Swelling     Pt reports that she gets swelling in throat and face, rash all over face and arms.   Tolerated cefazolin    Aspirin Rash and Hives     Pt reports that she received a rash on face, pt reports it ok if she takes NORCO    Other Environmental Shortness of Breath     Perfumes, dander, scents       Current Facility-Administered Medications   Medication Dose Route Frequency Provider Last Rate Last Admin    LORazepam (Ativan) injection 0.5 mg  0.5 mg Intravenous Q6HRS PRN Kala [facility] M.D.        LORazepam (Ativan) tablet 1 mg  1 mg Oral Q4HRS PRN Kala [facility] M.D.   1 mg at 10/23/23 1304    MD Alert...Vancomycin per Pharmacy   Other PHARMACY TO DOSE Kala [facility] M.D.        vancomycin (Vancocin) 1,250 mg in  mL IVPB  1,250 mg Intravenous Q12HR Kala [facility] M.D.   Stopped at 10/23/23 1036    cefepime (Maxipime) 2 g in  mL IVPB  2 g Intravenous Q8HRS Kala [facility] M.D.   Stopped at 10/23/23 1339    [Held by provider] apixaban (Eliquis) tablet 5 mg  5 mg Oral BID Kala [facility] M.D.        dexamethasone (Decadron) tablet 4 mg  4 mg Oral Q12HRS Kala [facility] M.D.   4 mg at 10/23/23 0600    Followed by    [START ON 10/26/2023] dexamethasone  (Decadron) tablet 4 mg  4 mg Oral DAILY Kala [facility] M.D.        metoprolol tartrate (Lopressor) tablet 25 mg  25 mg Oral BID Kala [facility] M.D.   25 mg at 10/23/23 0512    calcium carbonate (Tums) chewable tab 500 mg  500 mg Oral BID PRN Kala [facility] M.D.        lidocaine (Lidoderm) 5 % 1 Patch  1 Patch Transdermal Q24HR Kala [facility] M.D.   1 Patch at 10/20/23 1709    tiotropium (Spiriva Respimat) 2.5 mcg/Act inhalation spray 5 mcg  5 mcg Inhalation DAILY Kala [facility] M.D.   5 mcg at 10/23/23 0513    nystatin-tetracycline-prednisone-diphenhydramine (Miracle Mouth Wash) oral susp 5 mL  5 mL Oral Q6HRS PRN Kala [facility] M.D.   5 mL at 10/10/23 0514    levalbuterol (Xopenex) 1.25 MG/3ML nebulizer solution 1.25 mg  1.25 mg Nebulization Q2HRS PRN (RT) Kala [facility] M.D.   1.25 mg at 10/19/23 1557    albuterol inhaler 2 Puff  2 Puff Inhalation Q4HRS PRN Kala [facility] M.D.   2 Puff at 10/20/23 1733    mometasone-formoterol (Dulera) 100-5 MCG/ACT inhaler 2 Puff  2 Puff Inhalation BID Kala [facility] M.D.   2 Puff at 10/23/23 0513    Respiratory Therapy Consult   Nebulization Continuous RT Kala [facility] M.D.        ipratropium-albuterol (DUONEB) nebulizer solution  3 mL Nebulization Q2HRS PRN (RT) Kala [facility] M.D.   3 mL at 10/20/23 1548    morphine ER (Ms Contin) tablet 15 mg  15 mg Oral Q12HRS Kala [facility] M.D.   15 mg at 10/23/23 0513    busPIRone (Buspar) tablet 15 mg  15 mg Oral TID Kala [facility] M.D.   15 mg at 10/23/23 1137    oxyCODONE immediate-release (Roxicodone) tablet 5 mg  5 mg Oral Q4HRS PRN Gerson Adam M.D.        Or    oxyCODONE immediate release (Roxicodone) tablet 10 mg  10 mg Oral Q4HRS PRN Gerson Adam M.D.   10 mg at 10/22/23 1338    acetaminophen (Tylenol) tablet 650 mg  650 mg Oral Q6HRS Kala [facility] M.D.   650 mg at 10/23/23 1137    sodium chloride (Ocean) 0.65 % nasal spray 2 Spray  2 Spray Nasal Q2HRS PRN Kala [facility] M.D.   2 Spray at 10/17/23 1312    HYDROmorphone (Dilaudid) injection 1 mg  1 mg Intravenous Q HOUR PRN Madhu Simpson P.A.-C.   1 mg  at 10/23/23 1412    capmatinib (Tabrecta) tablet 400 mg  400 mg Oral BID Kala [facility] M.D.   400 mg at 10/23/23 0513    guaiFENesin ER (Mucinex) tablet 600 mg  600 mg Oral Q12HRS Kala [facility] M.D.   600 mg at 10/23/23 0514    diphenhydrAMINE (Benadryl) injection 25 mg  25 mg Intravenous Q6HRS PRN Kala [facility] M.D.   25 mg at 10/22/23 1959    loratadine (Claritin) tablet 10 mg  10 mg Oral DAILY Kala [facility] M.D.   10 mg at 10/23/23 0514    senna-docusate (Pericolace Or Senokot S) 8.6-50 MG per tablet 2 Tablet  2 Tablet Oral BID K[facility] M.D.   2 Tablet at 10/23/23 0512    And    polyethylene glycol/lytes (Miralax) PACKET 1 Packet  1 Packet Oral QDAY DANNY [facility] M.D.   1 Packet at 10/13/23 0002    And    magnesium hydroxide (Milk Of Magnesia) suspension 30 mL  30 mL Oral QDAY DANNY [facility] M.D.   30 mL at 10/16/23 0520    And    bisacodyl (Dulcolax) suppository 10 mg  10 mg Rectal QDAY PRMELLISA [facility] M.D.   10 mg at 09/28/23 1300    acetaminophen (Tylenol) tablet 650 mg  650 mg Oral Q6HRS PRMELLISA [facility] M.D.        ondansetron (Zofran) syringe/vial injection 4 mg  4 mg Intravenous Q4HRS PRMELLISA [facility] M.D.   4 mg at 10/04/23 2150    ondansetron (Zofran ODT) dispertab 4 mg  4 mg Oral Q4HRS PRMELLISA [facility] M.D.        guaiFENesin dextromethorphan (Robitussin DM) 100-10 MG/5ML syrup 10 mL  10 mL Oral Q6HRS PRN Kala [facility] M.D.   10 mL at 10/11/23 1652       Review of Systems   Constitutional:  Positive for malaise/fatigue. Negative for chills, fever and weight loss.   HENT:  Negative for congestion, ear pain, nosebleeds and sore throat.         Mouth sores   Eyes:  Negative for blurred vision.   Respiratory:  Positive for shortness of breath. Negative for cough, sputum production and wheezing.    Cardiovascular:  Negative for chest pain, palpitations, orthopnea and leg swelling.        Chest heaviness   Gastrointestinal:  Negative for abdominal pain, heartburn, nausea and vomiting.   Genitourinary:  Negative for dysuria, frequency and urgency.    Musculoskeletal:  Positive for back pain and joint pain. Negative for neck pain.   Neurological:  Negative for dizziness, tingling, tremors, sensory change, focal weakness and headaches.   Endo/Heme/Allergies:  Does not bruise/bleed easily.   Psychiatric/Behavioral:  Negative for depression, memory loss and suicidal ideas.    All other systems reviewed and are negative.      Problem list, medications, and allergies reviewed by myself today in Epic.     Objective:     Vitals:    10/23/23 0606 10/23/23 0732 10/23/23 1115 10/23/23 1519   BP:  101/63 108/65 102/64   Pulse:  (!) 103 (!) 102 (!) 120   Resp:  (!) 25 (!) 26 (!) 22   Temp:  36.8 °C (98.3 °F) 36 °C (96.8 °F) 36.8 °C (98.3 °F)   TempSrc:  Temporal Temporal Temporal   SpO2: 95% 88% 90% 88%   Weight:       Height:             DESC; KARNOFSKY SCALE WITH ECOG EQUIVALENT: 90, Able to carry on normal activity; minor signs or symptoms of disease (ECOG equivalent 0)    DISTRESS LEVEL: no acute distress    Physical Exam  Constitutional:       General: She is not in acute distress.     Appearance: She is not ill-appearing or toxic-appearing.   HENT:      Mouth/Throat:      Pharynx: No oropharyngeal exudate or posterior oropharyngeal erythema.   Cardiovascular:      Rate and Rhythm: Tachycardia present.   Pulmonary:      Effort: No respiratory distress.      Breath sounds: No stridor. Rhonchi present. No wheezing.   Musculoskeletal:      Cervical back: Normal range of motion and neck supple.   Skin:     Comments: Multiple subcutaneous lesions, smaller in size since starting Capmatinib.  2 new nodules (1 on back of shoulder, other by collar bone).        Deferred    Labs:   Most recent labs reviewed.  Please see the lab tab of chart review    Imaging:   Most recent images below have been independently reviewed by me.  Please see the imaging tab of chart review    9/22/2023 : Echocardiogram showed LVEF ~ 65% with no reported abnormalities    7/27/23: PET   1.  Focal  uptake in the medial RIGHT lower lobe of lung abutting the thoracic spine, consistent with tumor..  2.  No PET correlate for ill-defined spiculated nodule in the RIGHT lower lobe.  3.  Large hypermetabolic mass corresponds to lytic lesion in the RIGHT sacrum consistent with malignancy.  4.  Intramuscular focal uptake at the posterior LEFT shoulder, likely metastatic.    Pathology:    7/14/2023: Pathology of Chest wall  A. Chest wall mass:          Metastatic poorly differentiated malignancy favoring carcinoma           without obvious involvement of overlying skin and a           non-specific IHC profile of some keratins positive (see           microscopic description).          See comment.   Main Cancer Type: Sarcoma   Probability: 90%     Subtype: Undifferentiated Sarcoma ([facility])   Probability: 90%     7/19/2023: Path of the Sacral Bone  A. Sacral bone biopsy:          Bone cores effaced by a high-grade malignancy histologically           identical to the previously excised chest wall mass           ([id number]).     2/14/2023: Pathology of Lung Mass  A. Lung, right lower lobe fine needle aspiration slides:          Positive for carcinoma.   B. Core, right lower lobe lung:          Moderately differentiated lung adenocarcinoma.   C. Lung, right lower lobe biopsy core and touchprep slides:          Moderately differentiated lung adenocarcinoma.   D. Bronchoalveolar lavage right lower lobe:          Rare atypical cells, malignant cells vs. reactive bronchial           cells.     Assessment/Plan:      Cancer Staging   Primary undifferentiated sarcoma of soft tissue (HCC)  Staging form: Soft Tissue Sarcoma of the Trunk and Extremities, AJCC 8th Edition  - Clinical: Stage IV (cTX, cN0, cM1) - Signed by Alma Newton M.D. on 9/19/2023         Ms. 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    Today had a long discussion with the patient, her mother, and her friend Camille.    I voiced my concerns that her capmatinib is not working given that her oxygen requirements are still very high.  It does appear that she improved for a while however her oxygen requirements are still at max.  I am concerned that potentially the capmatinib worked initially but given how aggressive her cancer is it stopped working.    We talked frankly about that this medication may be futile.  She says that she is not ready to die.  I told her that she should consider asking family members or friends that she would like to see to come see her in the hospital as I believe her cancer is progressing.  Unfortunately I cannot tell this as we cannot get it chest CT done, given her very high oxygen requirements.    She and her family and friends seem to understand but would like time to talk about this.  I think this is reasonable and I will revisit the conversation tomorrow.      Alma Newton M.D.  Hematology/Oncology    50 minutes spent on this case

## 2023-10-23 NOTE — PROGRESS NOTES
Pharmacy Vancomycin Kinetics Note for 10/22/2023     48 y.o. female on Vancomycin day # 1     Vancomycin Indication (AUC Dosing): Severe or complicated infection (possible HAP, pcn allergy)    Provider specified end date: 10/27/23    Active Antibiotics (From admission, onward)      Ordered     Ordering Provider       Sun Oct 22, 2023  5:43 PM    10/22/23 1743  vancomycin (Vancocin) 1,250 mg in  mL IVPB  (vancomycin (VANCOCIN) IV (LD + Maintenance))  EVERY 12 HOURS        Note to Pharmacy: Per P&T Kinetics Protocol    JAZMINE Walker Oct 22, 2023  5:40 PM    10/22/23 1740  vancomycin (Vancocin) 2,000 mg in  mL IVPB  (vancomycin (VANCOCIN) IV (LD + Maintenance))  ONCE        Note to Pharmacy: Per P&T Kinetics Protocol    JAZMINE Walker Oct 22, 2023  5:16 PM    10/22/23 1716  MD Alert...Vancomycin per Pharmacy  PHARMACY TO DOSE        Question:  Indication(s) for vancomycin?  Answer:  Pneumonia    Kala [facility] M.D.          Dosing Weight: 81.7 kg (180 lb 1.9 oz)      Admission History: Admitted on 9/20/2023 for Acute on chronic respiratory failure (HCC) [J96.20]  Respiratory failure with hypoxia (HCC) [J96.91]  Pertinent history: history of sarcoma and lung cancer presented with dyspnea. Requiring high flow o2 with worsening repiratory status. Spiked a fever and empiric antibiotics orderd. Pt has a penicillin allergy listed.    Allergies:     Penicillins, Aspirin, and Other environmental     Pertinent cultures to date:     Results       Procedure Component Value Units Date/Time    S. Aureus By PCR, Nasal Complete [[id number]]     Order Status: No result Specimen: Respirate     BLOOD CULTURE [[id number]]     Order Status: Sent Specimen: Blood from Peripheral     BLOOD CULTURE [[id number]]     Order Status: Sent Specimen: Blood from Peripheral     BLOOD CULTURE [[id number]]     Order Status: Canceled Specimen: Blood from Peripheral     BLOOD CULTURE [[id number]]     Order Status: Canceled Specimen:  "Blood from Peripheral     URINALYSIS [[id number]]     Order Status: No result Specimen: Urine             Labs:     Estimated Creatinine Clearance: 165.3 mL/min (A) (by C-G formula based on SCr of 0.47 mg/dL (L)).  Recent Labs     10/20/23  1604 10/20/23  1915   WBC 11.8* 11.0*   NEUTSPOLYS  --  84.40*     Recent Labs     10/20/23  0020 10/20/23  1915 10/21/23  0023 10/22/23  0011   BUN 23* 16 16 15   CREATININE 0.52 0.42* 0.43* 0.47*   ALBUMIN 2.5* 2.6*  --   --        Intake/Output Summary (Last 24 hours) at 10/22/2023 1743  Last data filed at 10/22/2023 0940  Gross per 24 hour   Intake 420 ml   Output --   Net 420 ml      /76   Pulse (!) 121   Temp (!) 38.3 °C (100.9 °F) (Oral)   Resp (!) 24   Ht 1.829 m (6' 0.01\")   Wt 81.7 kg (180 lb 1.9 oz)   SpO2 91%  Temp (24hrs), Av.1 °C (98.7 °F), Min:36.1 °C (97 °F), Max:38.3 °C (100.9 °F)      List concerns for Vancomycin clearance:     Abnormal LFTs;BUN/Scr ratio greater than 20:1    Pharmacokinetics:     AUC kinetics:   Ke (hr ^-1): 0.1065 hr^-1  Half life: 6.51 hr  Clearance: 5.656  Estimated TDD: 2828  Estimated Dose: 1002  Estimated interval: 8.5    A/P:     -  Vancomycin dose: 2000mg loading dose followed by 1250mg IV q12h    -  Next vancomycin level(s):    Around 4th 1250mg dose    -  Predicted vancomycin AUC from initial AUC test calculator: 442 mg·hr/L    -  Comments: PCN allergy requiring alternate antibiotics as unable to use zosyn. Renal function appears stable. Low risk for accumulation. Pharmacy will monitor/adjust as needed per protocol.     Love Gomez, PharmD    "

## 2023-10-23 NOTE — DISCHARGE PLANNING
Case Management Discharge Planning    Admission Date: 9/20/2023  GMLOS: 4.9  ALOS: 33    6-Clicks ADL Score: 20  6-Clicks Mobility Score: 19      Patient discussed in rounds this AM. Patient is now on abx due to fever. Cancer has no metastasized to the brain and patient has developed a PE. Patient is b=not cleared medically for discharge.

## 2023-10-24 PROBLEM — D72.829 LEUKOCYTOSIS: Status: ACTIVE | Noted: 2023-01-01

## 2023-10-24 PROBLEM — R79.89 ELEVATED LFTS: Status: ACTIVE | Noted: 2023-01-01

## 2023-10-24 PROBLEM — D63.8 ANEMIA, CHRONIC DISEASE: Status: ACTIVE | Noted: 2023-01-01

## 2023-10-24 PROBLEM — C79.89 METASTATIC SARCOMA (HCC): Status: ACTIVE | Noted: 2023-01-01

## 2023-10-24 PROBLEM — J96.21 ACUTE ON CHRONIC RESPIRATORY FAILURE WITH HYPOXIA (HCC): Status: ACTIVE | Noted: 2023-01-01

## 2023-10-24 PROBLEM — D69.6 THROMBOCYTOPENIA (HCC): Status: ACTIVE | Noted: 2023-01-01

## 2023-10-24 PROBLEM — I27.20 PULMONARY HTN (HCC): Status: ACTIVE | Noted: 2023-01-01

## 2023-10-24 NOTE — PROGRESS NOTES
Monitor summary:   Sinus tachycardia 105-112  Up to 197 nonsustained  Rare PVC's   0.14/0.08/0.30

## 2023-10-24 NOTE — PROGRESS NOTES
Monitor Summary  Rhythm: SR, ST  Rate:   Ectopy: R PVC  .16 / .08 / .30

## 2023-10-24 NOTE — CARE PLAN
Problem: Humidified High Flow Nasal Cannula  Goal: Maintain adequate oxygenation dependent on patient condition  Description: Target End Date:  resolve prior to discharge or when underlying condition is resolved/stabilized    1.  Implement humidified high flow oxygen therapy  2.  Titrate high flow oxygen to maintain appropriate SpO2  Outcome: Not Met   HHFNC 60L/100

## 2023-10-24 NOTE — PROGRESS NOTES
Hospital Medicine Daily Progress Note    Date of Service  10/23/2023    Chief Complaint  48-year-old female with history of sarcoma and lung cancer presented 9/20 with dyspnea.      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  On admission patient had tachycardia tachypnea, chest x-ray showed bilateral infiltration, CTA for chest showed right lower lobe subsegmental PE, worsening pulmonary nodules with T1 vertebral body lesion, possible pneumonia.  Procalcitonin was 0.11.Eliquis was started.  Also antibiotics for pneumonia Ceftriaxone and azithromycin were empirically started.  Echo was done and showed normal ejection fraction 65%, initially patient was treated ICU secondary to increasing oxygen requirement, patient was on high flow oxygen, treated with a steroid and inhalers.  With some improvement and patient was transferred out of the ICU.    Patient was evaluated by Dr. Newton oncologist who recommended to start  capmatinib on 9/29/2023.    Patient has metastatic sarcoma with lung cancer and significant anxiety, palliative care following for pain management.  Patient needed Precedex drip for anxiety.  Patient is on every 4 hours prn Ativan, every 2 hours prn IV Dilaudid, Norco 10/325 every 3 hours prn. Tylenol every 6 hours scheduled, holding when sleeping. Dexamethasone 4 mg BID.  CODE STATUS was discussed with the patient and agreed for DNR/DNI.    Interval Problem Update  10/16 Axox3 second rapid response this am pt was placed on max high flow after desaturation that happened when she got up to bedside commode, I reviewed her acute cxr which showed stable bilateral effusions and I also obtained and interpreted an ekg which showed no acute changes. She  and her friend were yelling at each other and this was worsening her desaturations so I asked her friend to leave the room, with iv lasix and diuresis and an increased dose of ativan she improved and oxygen titrated down from 70% back to 40% high flow. Echo pending. She denies new pain.  She confirmed she remains dnr/ dni and does not want hospice. ROS otherwise negative.     Addendum: atypical L shoulder pain, constant - will check ekg and trop, trial of lidocaine, tele, following  Ekg interpreted by me,early repolarization, unchanged from previous    10/18 Stays on high flow, oxygen needs trending up, requires a 60 L, 92% FiO2  Sinus tachycardia, BP high, restarted metoprolol  Tachypnea, continue oxygen support  EF 60,     10/19 still on high flow, 40- 60 L   Patient has been placed on Decadron IV in ICU, likely for COPD exacerbation,   I discussed with the pharmacy, will taper down in 7 to 10 days.  Leukocytosis-on steroid    10/20 plt 58>27, Eliquis on hold  Episodes of confusion today, ABG ordered, normal pH, PCO2 48, better than previous ABG. CXR no scute pathology  Reported severe headache, I ordered a CT. CT showed Round soft tissue mass in the left temporalis muscle measuring 1.5 x 2 x 1.8 cm in diameter, which is a new.  Likely tumor metastasis.   I discussed it with radiology and oncology.     Stays on HF 60L    Addendem 6pm: The nurse reported increased breathing efforts RR 24 despite maximized HF 60L 100% Fio2. More confused. ABG no big changes, slightly better than the previous ones. I discussed with intensivist regarding escalating care to IMCU for possible BiPAP. However, Dr. Cardenas doesn't think the escalating care will help given the underlying unreversible causes.   I ordered CBC, CMP, procal to rule out acute etiologies. I reviewed the results, no concerning findings cause her worsening breathing issues or AMS. Procalcitoin 0.08, unlikely acute infection.     I ordered CTA chest to  check the progression of lung tumor and PE. May discuss with the family and the patient again about the goal of care if there are significant progressionno     10/21 respiratory status slightly better, 50 L, FiO2 80%.  No confusion this morning.  Denies chest pain.   Steroids was initiated in ICU, likely for COPD exacerbation.  It has been no improvement on steroids.  I am tapering it down    10/22 on 60L, FiO2 90%.  Episodes of confusion.  Not able to tell the month.   Stat ABG ordered for mental status changes, CO2 retention, but better than previous ABG, normal pH, PO2 is low 50%.     Patient mental status was better. goal of care discussed with her again at bedside.  She understood she has lung cancer, pulmonary clot, and possible head soft tissue metastasis.  She understood we already maximized her oxygen since she is DNR/DNI. Patient is willing to talk to the oncologist and palliative care again    10/23 more frequent episodes of confusion. Increased oxygen needs. Stays High flow 60L, still in significant respiratory distress. Tachycardia, tachypnea.   Likely due to lung cancer progression and untreated PE.   Note able to repeat CTA  since patient is on high flow     Eliquis ON HOLD due to severe thrombocytopenia platelet<30    Fever last night. I started cefepime and vanc (severe allergy to penicidin  ). Ordered b/c, UA and CXR. Procal 0.16, Cxr similar as before. MRSA screen negative. I discontinued vanc. May dc cefepime if b/c stay negative tomorrow.     oncologist Dr. Newton had a length discussion with the family and the patient at bedside. The treatment of capmatinib may be futile.     Very guarded progress given the metastatic aggressive sarcoma with the new temporalis muscle mass despite the cancer treatment.   Additionally, possible progression of PE since she is not a candidate for anticoagulation due to plt < 30    Patient agreed to talk to palliative care again. I talked to Mana, her team will see  patient tomorrow.     High complexity, critically sick, high chance of rapid decline.       I have discussed this patient's plan of care and discharge plan at IDT rounds today with Case Management, Nursing, Nursing leadership, and other members of the IDT team.    Consultants/Specialty  oncology critical care, palliative care    Code Status  DNAR/DNI    Disposition  The patient is not medically cleared for discharge to home or a post-acute facility.      I have placed the appropriate orders for post-discharge needs.    Review of Systems  Review of Systems   Constitutional:  Positive for malaise/fatigue. Negative for chills, diaphoresis, fever and weight loss.   HENT: Negative.  Negative for sore throat.    Eyes: Negative.  Negative for blurred vision.   Respiratory:  Positive for shortness of breath. Negative for cough.    Cardiovascular: Negative.  Negative for chest pain, palpitations and leg swelling.   Gastrointestinal: Negative.  Negative for abdominal pain, heartburn, nausea and vomiting.   Genitourinary: Negative.  Negative for dysuria and frequency.   Musculoskeletal:  Negative for myalgias and neck pain.   Skin: Negative.  Negative for itching and rash.   Neurological: Negative.  Negative for dizziness, focal weakness, weakness and headaches.   Endo/Heme/Allergies: Negative.  Negative for polydipsia. Does not bruise/bleed easily.   Psychiatric/Behavioral: Negative.  Negative for depression, substance abuse and suicidal ideas. The patient is not nervous/anxious.    All other systems reviewed and are negative.       Physical Exam  Temp:  [36 °C (96.8 °F)-36.8 °C (98.3 °F)] 36 °C (96.8 °F)  Pulse:  [] 97  Resp:  [18-26] 24  BP: (101-129)/(61-71) 119/71  SpO2:  [88 %-99 %] 96 %    Physical Exam  Vitals and nursing note reviewed. Exam conducted with a chaperone present.   Constitutional:       General: She is in acute distress.      Appearance: She is well-developed. She is ill-appearing. She is not  diaphoretic.   HENT:      Head: Normocephalic and atraumatic.      Nose: Nose normal.      Comments: High flow nasal cannula     Mouth/Throat:      Mouth: Mucous membranes are moist.   Eyes:      Extraocular Movements: Extraocular movements intact.      Conjunctiva/sclera: Conjunctivae normal.   Neck:      Thyroid: No thyromegaly.      Vascular: No JVD.   Cardiovascular:      Rate and Rhythm: Regular rhythm. Tachycardia present.      Pulses: Normal pulses.      Heart sounds: Normal heart sounds.      No friction rub. No gallop.   Pulmonary:      Effort: Respiratory distress present.      Breath sounds: No rhonchi or rales.      Comments: Decreased at bases  No use of accessory muscle use  Abdominal:      General: Bowel sounds are normal.      Palpations: Abdomen is soft. There is no mass.      Tenderness: There is no abdominal tenderness. There is no guarding or rebound.   Musculoskeletal:         General: No swelling, tenderness or deformity. Normal range of motion.      Cervical back: Normal range of motion and neck supple.      Right lower leg: No edema.      Left lower leg: No edema.   Lymphadenopathy:      Cervical: No cervical adenopathy.   Skin:     General: Skin is warm and dry.      Capillary Refill: Capillary refill takes less than 2 seconds.   Neurological:      Mental Status: She is alert. She is disoriented.      Cranial Nerves: No cranial nerve deficit.      Motor: No weakness.   Psychiatric:      Comments: Anxious         Fluids  No intake or output data in the 24 hours ending 10/23/23 2231              Laboratory  Recent Labs     10/22/23  1736 10/23/23  0019   WBC 12.1* 11.9*   RBC 3.60* 3.13*   HEMOGLOBIN 9.4* 8.4*   HEMATOCRIT 32.0* 28.3*   MCV 88.9 90.4   MCH 26.1* 26.8*   MCHC 29.4* 29.7*   RDW 60.9* 62.0*   PLATELETCT 30* 29*     Recent Labs     10/21/23  0023 10/22/23  0011 10/23/23  0019   SODIUM 135 134* 136   POTASSIUM 4.5 3.9 4.3   CHLORIDE 99 98 100   CO2 29 30 30   GLUCOSE 133* 193* 148*    BUN 16 15 14   CREATININE 0.43* 0.47* 0.58   CALCIUM 7.9* 8.1* 8.0*                     Imaging  DX-CHEST-PORTABLE (1 VIEW)   Final Result      1.  No interval change in the bilateral parenchymal opacities most consistent with multifocal pneumonia.      CT-HEAD W/O   Final Result      1. No acute intracranial abnormality.   2. Round soft tissue mass in the left temporalis muscle measuring 1.5 x 2 x 1.8 cm in diameter. This lesion was not present on the prior PET/CT performed on 7/27/2023. Finding is most compatible with a soft tissue metastasis.      I, Dr. John Rosales, discussed the results of this examination directly by phone with Dr. Cordova on 10/20/2023 at 1531 hours.         DX-CHEST-LIMITED (1 VIEW)   Final Result      Unchanged BILATERAL lung disease, suspect pneumonia      EC-ECHOCARDIOGRAM COMPLETE W/O CONT   Final Result      DX-CHEST-PORTABLE (1 VIEW)   Final Result         1.  Pulmonary edema and/or infiltrates are identified, which are stable since the prior exam.   2.  Trace bilateral pleural effusions      IR-US GUIDED PIV   Final Result    Ultrasound-guided PERIPHERAL IV INSERTION performed by    qualified nursing staff as above.      IR-US GUIDED PIV   Final Result    Ultrasound-guided PERIPHERAL IV INSERTION performed by    qualified nursing staff as above.      IR-US GUIDED PIV   Final Result    Ultrasound-guided PERIPHERAL IV INSERTION performed by    qualified nursing staff as above.      DX-CHEST-LIMITED (1 VIEW)   Final Result      Stable bilateral pulmonary infiltrates.      DX-CHEST-LIMITED (1 VIEW)   Final Result         1.  Extensive bilateral pulmonary parenchymal opacities compatible with ARDS, severe pulmonary edema, and/or superimposed pulmonary infiltrates, slightly decreased since prior study.      DX-CHEST-PORTABLE (1 VIEW)   Final Result         1.  Extensive bilateral pulmonary parenchymal opacities compatible with ARDS, severe pulmonary edema, and/or superimposed pulmonary  infiltrates, stable since prior study.      IR-MIDLINE CATHETER INSERTION WO GUIDANCE > AGE 3   Final Result                  Ultrasound-guided midline placement performed by qualified nursing staff    as above.          DX-CHEST-PORTABLE (1 VIEW)   Final Result         1.  Extensive bilateral pulmonary parenchymal opacities compatible with ARDS, severe pulmonary edema, and/or superimposed pulmonary infiltrates, stable since prior study.      DX-CHEST-PORTABLE (1 VIEW)   Final Result         1.  Pulmonary edema and/or infiltrates are identified, which are stable since the prior exam.      DX-CHEST-PORTABLE (1 VIEW)   Final Result         1.  Bilateral pulmonary infiltrates, stable since prior study   2.  Multiple bilateral pulmonary nodules.      DX-CHEST-PORTABLE (1 VIEW)   Final Result      1.  Innumerable pulmonary nodules bilaterally.      2.  Increasing confluence and airspace opacities in the perihilar regions suspicious for superimposed pneumonitis on diffuse metastatic disease      IR-PICC LINE PLACEMENT W/ GUIDANCE > AGE 5   Final Result                  Ultrasound-guided PICC placement performed by qualified nursing staff as    above.          DX-CHEST-PORTABLE (1 VIEW)   Final Result      1.  Innumerable bilateral pulmonary nodule, increased since 9/20/2023      2.  Differential diagnosis includes worsening metastases or superimposed pneumonia.      EC-ECHOCARDIOGRAM COMPLETE W/O CONT   Final Result      CT-CTA CHEST PULMONARY ARTERY W/ RECONS   Final Result      1.  Minimal pulmonary embolism. Localized right lower lobe segmental and subsegmental branches.   2.  No right heart strain.   3.  9 mm lucent/lytic lesion T1 vertebral body. Suspect osseous metastatic deposit.   4.  Progression of extensive mediastinal adenopathy since 8/20/2023 consistent with metastatic disease.   5.  Extensive progression of diffuse bilateral innumerable pulmonary nodules consistent with metastatic disease.   6.  Interval  development of extensive groundglass and airspace opacities consistent with superimposed pneumonia.   7.  Progression of right adrenal mass now measuring 43 mm x 38 mm consistent with metastasis.      Fleischner Society pulmonary nodule recommendations:      DX-CHEST-PORTABLE (1 VIEW)   Final Result      Diffuse bilateral interstitial and alveolar opacities most consistent with pneumonia or possibly noncardiogenic pulmonary edema.             Assessment/Plan  * Acute on chronic respiratory failure (HCC)- (present on admission)  Assessment & Plan  Mostly due to tumor progression in her lungs.  Unfortunately this poorly differentiated sarcoma has poor response respond to any therapy at this time per oncology.   Other factors including pulmonary embolism, COPD /asthma,  history of stage 1 lung adenocarcinoma s/p SBRT,recent pneumonia.    Initially treated at the ICU, patient still on high flow oxygen  Finish course of antibiotics  downgraded to IMCU on 10/2/2023.      Completed course of antibiotics.   Continue inhaler and nebulizers.    10/20 worsening respiratory status, requiring HF 60L 100% Fio2. More confused. ABG no big changes, slightly better than the previous ones. I discussed with intensivist regarding escalating care to IMCU for possible BiPAP. However, Dr. Cardenas doesn't think the escalating care will help given the underlying unreversible causes.   I ordered CBC, CMP, procal to rule out acute etiologies. I reviewed the results, no concerning findings cause her worsening breathing issues or AMS. Procalcitoin 0.08, unlikely acute infection.     I ordered CTA chest to check the progression of lung tumor and PE. May discuss with the family and the patient again about the goal of care if there are significant progression.     10/23 more frequent episodes of confusion. Increased oxygen needs. Stays High flow 60L, still in significant respiratory distress. Tachycardia, tachypnea.  Very guarded progress given  the metastatic aggressive sarcoma with the new temporalis muscle mass despite the cancer treatment.   Additionally,  possible progression of PE since she is not a candidate for anticoagulation due to plt < 30  Not able to repeat CTA since patient is on high flow     Patient agreed to talk to palliative care again. I talked to Mana, her team will see patient tomorrow.     High complexity, critically sick, high chance of rapid decline.         Atypical chest pain  Assessment & Plan  Non specific but unchanged ekg  trops in 60's, likely due to heart strain from hypoxia and PE  Sx improving  Echo still pending  Continue to follow    Goals of care, counseling/discussion- (present on admission)  Assessment & Plan  Goals of care done last week extensively. Hospice recommended by oncology and ICU team.  Patient would like to try some form of therapy if possible and oncology offered an oral chemotherapeutic regime. She elected to change to DNR/DNI.   Continue with palliative care consultation.     Sarcoma (HCC)- (present on admission)  Assessment & Plan  Advanced, aggressive, on oral capmatinib      Reported severe headache, I ordered a CT. CT showed Round soft tissue mass in the left temporalis muscle measuring 1.5 x 2 x 1.8 cm in diameter, which is a new.  Likely tumor metastasis.     Notified oncology          Pulmonary embolism on right (HCC)- (present on admission)  Assessment & Plan  Recently diagnosed.  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   Stated Capmatinib on 9/29/2023 by oncology team  Patient is DNR/DNI.  Palliative following    Mass of sacrum- (present on admission)  Assessment & Plan  Aggressive metastatic undifferentiated sarcoma. On Capmatinib    Lung cancer (HCC)- (present on  admission)  Assessment & Plan  Initially diagnosed stage I adenocarcinoma initially treated with chemoradiation.   Discussed again with Dr. Newton  Now with widespread metastatic sarcoma  Plan to continue capmatinib    Chronic pain due to neoplasm- (present on admission)  Assessment & Plan  Palliative care have been following for pain management.  Continue multimodal pain management, oxy, tylenol, lidocaine patches, gabapentin and IV pain medication for breakthrough.   Pain currently controlled  Patient refusing her oral meds - again encouraged to take them for steadier pain control, she states she is reluctant because she feels it makes her anxiety worse    Cigarette nicotine dependence without complication- (present on admission)  Assessment & Plan  History of ongoing tobacco abuse, cessation discussed and recommended   Nicotine patch declined          VTE prophylaxis: Eliquis ON HOLD due to severe thrombocytopenia platelet<30    I spent greater than 52 minutes for chart review, obtaining history independently, performing medically appropriate examination,  documenting , ordering medications, tests, or procedures, referring and communicating with other health care professionals, Independently interpreting results and communicating results with patient/family/caregiver. More than 50% of time was spent in face-to-face clinical encounter.

## 2023-10-24 NOTE — CARE PLAN
The patient is Unstable - High likelihood or risk of patient condition declining or worsening    Shift Goals  Clinical Goals: monitor o2, POC/speak to doc  Patient Goals: speak to the docs  Family Goals: NA    Progress made toward(s) clinical / shift goals:    Problem: Pain - Standard  Goal: Alleviation of pain or a reduction in pain to the patient’s comfort goal  Outcome: Progressing       Patient is not progressing towards the following goals:      Problem: Respiratory  Goal: Patient will achieve/maintain optimum respiratory ventilation and gas exchange  Outcome: Not Progressing  Note: Pt remains on high flow and non-rebreather mask together.  Pt desats with movement.

## 2023-10-24 NOTE — DISCHARGE PLANNING
Case Management Discharge Planning    Admission Date: 9/20/2023  GMLOS: 4.9  ALOS: 34    6-Clicks ADL Score: 20  6-Clicks Mobility Score: 19      @1130 CM received a call from [facility] ([facility]). [facility] has denied patient's admission due to not accepting her insurance.      CM received a call from Reanna ([facility]; ph: [phone]) in regards to [facility] referral.[facility] will review patients records for possible [facility] admit and call CM back with decision.   [address] [place] [address]

## 2023-10-24 NOTE — PROGRESS NOTES
Consult Note: Hematology/Oncology     Primary Care:  Checo Baker M.D.    Chief Complaint   Patient presents with    Shortness of Breath     PT hx lung ca that's metastasized. Was at another appt and was SOB. RA while walking in 70's per EMS. Currently on 6L NC         Current Treatment:     9/29/23: Capmatinib    Prior Treatment:     4/17/2023-4/21/2023: RLL Lung SBRT  7/19/23-7/24/2023: R Sacrum SBRT      Subjective:   History of Presenting Illness:    Melly Hendrickson is a 48 y.o. 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    Patient history dates back to July 2022 when she had a chest x-ray performed that revealed a potential mass in her right lung.  This led to a chest CT performed on February 13, 2023.  This demonstrated 1.4 cm mass in the right lower lobe and a 1.9 groundglass opacity also in the right lower lobe.  The groundglass opacity appeared stable from previous scans but the mass was thought to be new.      A biopsy was performed and confirmed lung adenocarcinoma.    Patient saw thoracic surgery and obtained PFTs as well as a PET scan.  She was discussed at tumor board and given her significant dyspnea and her underlying COPD it was thought that she would be a better candidate for SBRT rather than surgical resection.    April 17, 2023 patient began SBRT.  Completed this treatment without many issues.    After this patient did notice a small lump in her breast reduction scar line.  She said that over the next few days it grew and she ultimately went to her PCP    PCP tried to drain the mass; however it just bled significantly during the I&D attempt.  Thus she was sent to Dr. Becerra at [facility] for excision.     Pathology from this excision demonstrated poorly differentiated malignancy favoring carcinoma without obvious involvement of underlying skin and nonspecific IHC profile.  Based on  his diagnosis cancer type ID was sent off for.  Results showed 90% probability of sarcoma    Over the past several months she has noted severe pain in her sacral region.  She does have sacral lesions that are concerning for metastatic disease.  Patient has biopsy of sacral mass on 7/18/2023.     Of note patient has 5 children and 4 grandchildren.    She follows with pain management    I initially saw patient on 7/14.  At our 7/21, we had information back from biopsy and we discussed AIM.  Patient wished to hold off and think about this further.    On 7/28, we discussed initiating AIM, patient was not comfortable and wanted 2 opinion at [facility].    I saw patient again on 8/11.  We discussed AIM,  patient not ready and awaiting [place] appointment.     She was recently admitted to the ER for hemoptysis on 8/20/23 and was found to have a PE.  She was still having hemoptysis and thus was not started on AC. She has discharged on 8/24/23.     I saw her subsequently on 9/7/23 and recommended AC given no hemoptysis, but active PE with cancer.  We also discussed chemo at this visit and she wished to wait.     During my visit with the patient on 9/19/23, Patient was endorsing 3 nodules on the R arm.  1 on the clavicle.  Patient also had a nodule on her L arm. Patient had 1 nodule on her bikini line. She has another on the inside of her L thigh. These started 1 week ago. She was on 2-3L of oxygen for the past several days.  We discussed intiating chemo and plan was to start on 9/26/23.  Pt requires a direct admit for AIM    Since that visit she had worseing SOB.  She was subsequently presented to the ER for SOB/cough and potential PNA. On admission, she was tachycardic, tachypneic, and afebrile.  Chest x-ray showed diffuse bilateral interstitial and alveolar opacities consistent with pneumonia. CTA chest on admission showed right subsegmental PE, worsening pulmonary nodules, T1 vertebral body region, possible pneumonia.   Procalcitonin was 0.11.  Ceftriaxone and azithromycin were empirically started.    Line was not able to be placed unable to start chemo.  Patients respiratory status declined and she was transferred to the ICU    Patient started on Capmatinib 9/29/23    Unfortunately patient appears to be declining.  I am meeting with the patient today to discuss goals of care and treatment plan going forward.    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    Chronic obstructive pulmonary disease (HCC)     COPD (chronic obstructive pulmonary disease) (HCC)     DDD (degenerative disc disease), lumbar     Depression     Emphysema of lung (HCC)     MRSA (methicillin resistant staph aureus) culture positive     Pain     back - cervical, scapula, lower    Pericarditis     Primary adenocarcinoma of lower lobe of right lung (HCC)     Psychiatric disorder     Anxiety, Depression    Sleep apnea     Snoring     Urinary incontinence         Past Surgical History:   Procedure Laterality Date    OH BRONCHOSCOPY,DIAGNOSTIC N/A 8/22/2023    Procedure: BRONCHOSCOPY;  Surgeon: Sandra Lopez D.O.;  Location: [facility];  Service: Pulmonary    OH EXC SKIN BENIG >4CM TRUNK,ARM,LEG Left 6/7/2023    Procedure: EXCISION OF SOFT TISSUE MASS LEFT CHEST WALL;  Surgeon: Nasir Becerra M.D.;  Location: [facility];  Service: General    OH BRONCHOSCOPY,DIAGNOSTIC N/A 02/14/2023    Procedure: FIBER OPTIC BRONCHOSCOPY WASH, BRUSH, BRONCHOALVEOLAR LAVAGE, BIOPSY AND FINE NEEDLE ASPIRATION AND NAVIGATION, ROBOTICS;  Surgeon: Guicho Ellis M.D.;  Location: [facility];  Service: Pulmonary Robotic    ENDOBRONCHIAL US ADD-ON N/A 02/14/2023    Procedure: ENDOBRONCHIAL ULTRASOUND (EBUS);  Surgeon: Guicho Ellis M.D.;  Location: [facility];  Service:  Pulmonary Robotic    MAMMOPLASTY REDUCTION Bilateral     SEPTOPLASTY      TUBE & ECTOPIC PREG., REMOVAL      x 2       Social History     Tobacco Use    Smoking status: Some Days     Current packs/day: 0.50     Average packs/day: 0.5 packs/day for 20.0 years (10.0 ttl pk-yrs)     Types: Cigarettes     Passive exposure: Past    Smokeless tobacco: Never    Tobacco comments:     on and off    Vaping Use    Vaping Use: Never used   Substance Use Topics    Alcohol use: Not Currently     Comment: occ, rare    Drug use: Not Currently     Types: Marijuana     Comment: THC gummies        Family History   Problem Relation Age of Onset    Diabetes Mother     Cancer Father         Prostate    Cancer Paternal Grandmother         Cervical    Cancer Paternal Grandfather         Unk       Allergies   Allergen Reactions    Penicillins Hives, Rash and Swelling     Pt reports that she gets swelling in throat and face, rash all over face and arms.   Tolerated cefazolin    Aspirin Rash and Hives     Pt reports that she received a rash on face, pt reports it ok if she takes NORCO    Other Environmental Shortness of Breath     Perfumes, dander, scents       Current Facility-Administered Medications   Medication Dose Route Frequency Provider Last Rate Last Admin    LORazepam (Ativan) injection 0.5 mg  0.5 mg Intravenous Q6HRS PRN Kala Cordova M.D.   0.5 mg at 10/24/23 0331    LORazepam (Ativan) tablet 1 mg  1 mg Oral Q4HRS PRN Kala Cordova M.D.   1 mg at 10/24/23 1048    cefepime (Maxipime) 2 g in  mL IVPB  2 g Intravenous Q8HRS Kala Cordova M.D.   Stopped at 10/24/23 0822    [Held by provider] apixaban (Eliquis) tablet 5 mg  5 mg Oral BID Kala Cordova M.D.        dexamethasone (Decadron) tablet 4 mg  4 mg Oral Q12HRS Kala Cordova M.D.   4 mg at 10/24/23 0334    Followed by    [START ON 10/26/2023] dexamethasone (Decadron) tablet 4 mg  4 mg Oral DAILY Kala Cordova M.D.        metoprolol tartrate (Lopressor) tablet 25 mg  25 mg Oral BID Kala Cordova M.D.   25 mg  at 10/24/23 0336    calcium carbonate (Tums) chewable tab 500 mg  500 mg Oral BID PRN Kala Cordova M.D.        lidocaine (Lidoderm) 5 % 1 Patch  1 Patch Transdermal Q24HR Kala Cordova M.D.   1 Patch at 10/23/23 1802    tiotropium (Spiriva Respimat) 2.5 mcg/Act inhalation spray 5 mcg  5 mcg Inhalation DAILY Kala Cordova M.D.   5 mcg at 10/24/23 0338    nystatin-tetracycline-prednisone-diphenhydramine (Miracle Mouth Wash) oral susp 5 mL  5 mL Oral Q6HRS PRN Kala Cordova M.D.   5 mL at 10/10/23 0514    levalbuterol (Xopenex) 1.25 MG/3ML nebulizer solution 1.25 mg  1.25 mg Nebulization Q2HRS PRN (RT) Kala Cordova M.D.   1.25 mg at 10/19/23 1557    albuterol inhaler 2 Puff  2 Puff Inhalation Q4HRS PRN Kala Cordova M.D.   2 Puff at 10/20/23 1733    mometasone-formoterol (Dulera) 100-5 MCG/ACT inhaler 2 Puff  2 Puff Inhalation BID Kala Cordova M.D.   2 Puff at 10/24/23 0339    Respiratory Therapy Consult   Nebulization Continuous RT Kala Cordova M.D.        ipratropium-albuterol (DUONEB) nebulizer solution  3 mL Nebulization Q2HRS PRN (RT) Kala Cordova M.D.   3 mL at 10/24/23 0947    morphine ER (Ms Contin) tablet 15 mg  15 mg Oral Q12HRS Kala Cordova M.D.   15 mg at 10/24/23 0336    busPIRone (Buspar) tablet 15 mg  15 mg Oral TID Kala Cordova M.D.   15 mg at 10/24/23 1238    oxyCODONE immediate-release (Roxicodone) tablet 5 mg  5 mg Oral Q4HRS PRMELLISA Adam M.D.        Or    oxyCODONE immediate release (Roxicodone) tablet 10 mg  10 mg Oral Q4HRS PRMELLISA Adam M.D.   10 mg at 10/22/23 1338    acetaminophen (Tylenol) tablet 650 mg  650 mg Oral Q6HRS Kala Cordova M.D.   650 mg at 10/24/23 1236    sodium chloride (Ocean) 0.65 % nasal spray 2 Spray  2 Spray Nasal Q2HRS PRN Kala Cordova M.D.   2 Spray at 10/17/23 1312    HYDROmorphone (Dilaudid) injection 1 mg  1 mg Intravenous Q HOUR PRN DWIGHT Gomes-RICARDO   1 mg at 10/24/23 1240    capmatinib (Tabrecta) tablet 400 mg  400 mg Oral BID Kala Cordova M.D.   400 mg at 10/24/23 0342    guaiFENesin ER (Mucinex)  tablet 600 mg  600 mg Oral Q12HRS Klaa Cordova M.D.   600 mg at 10/24/23 0341    diphenhydrAMINE (Benadryl) injection 25 mg  25 mg Intravenous Q6HRS PRN Kala Cordova M.D.   25 mg at 10/22/23 1959    loratadine (Claritin) tablet 10 mg  10 mg Oral DAILY Kala Cordova M.D.   10 mg at 10/24/23 0335    senna-docusate (Pericolace Or Senokot S) 8.6-50 MG per tablet 2 Tablet  2 Tablet Oral BID Kala Cordova M.D.   2 Tablet at 10/24/23 0336    And    polyethylene glycol/lytes (Miralax) PACKET 1 Packet  1 Packet Oral QDAY PRMELLISA Cordova M.D.   1 Packet at 10/13/23 0002    And    magnesium hydroxide (Milk Of Magnesia) suspension 30 mL  30 mL Oral QDAY DANNY Cordova M.D.   30 mL at 10/16/23 0520    And    bisacodyl (Dulcolax) suppository 10 mg  10 mg Rectal QDAY PRMELLISA Cordova M.D.   10 mg at 09/28/23 1300    acetaminophen (Tylenol) tablet 650 mg  650 mg Oral Q6HRS PRN Kala Cordova M.D.        ondansetron (Zofran) syringe/vial injection 4 mg  4 mg Intravenous Q4HRS PRN Kala Cordova M.D.   4 mg at 10/04/23 2150    ondansetron (Zofran ODT) dispertab 4 mg  4 mg Oral Q4HRS PRMELLISA Cordova M.D.        guaiFENesin dextromethorphan (Robitussin DM) 100-10 MG/5ML syrup 10 mL  10 mL Oral Q6HRS PRMELLISA Cordova M.D.   10 mL at 10/11/23 1652       Review of Systems   Constitutional:  Positive for malaise/fatigue. Negative for chills, fever and weight loss.   HENT:  Negative for congestion, ear pain, nosebleeds and sore throat.         Mouth sores   Eyes:  Negative for blurred vision.   Respiratory:  Positive for shortness of breath. Negative for cough, sputum production and wheezing.    Cardiovascular:  Negative for chest pain, palpitations, orthopnea and leg swelling.        Chest heaviness   Gastrointestinal:  Negative for abdominal pain, heartburn, nausea and vomiting.   Genitourinary:  Negative for dysuria, frequency and urgency.   Musculoskeletal:  Positive for back pain and joint pain. Negative for neck pain.   Neurological:  Negative for dizziness, tingling, tremors,  sensory change, focal weakness and headaches.   Endo/Heme/Allergies:  Does not bruise/bleed easily.   Psychiatric/Behavioral:  Negative for depression, memory loss and suicidal ideas.    All other systems reviewed and are negative.      Problem list, medications, and allergies reviewed by myself today in Epic.     Objective:     Vitals:    10/24/23 0735 10/24/23 0758 10/24/23 0948 10/24/23 1153   BP:  101/67  117/69   Pulse: 100 (!) 105 (!) 111 (!) 115   Resp: (!) 24 (!) 26 (!) 24 (!) 24   Temp:  36.8 °C (98.3 °F)  36.4 °C (97.5 °F)   TempSrc:  Temporal  Temporal   SpO2: 95% 88% 95% 93%   Weight:       Height:             DESC; KARNOFSKY SCALE WITH ECOG EQUIVALENT: 90, Able to carry on normal activity; minor signs or symptoms of disease (ECOG equivalent 0)    DISTRESS LEVEL: no acute distress    Physical Exam  Deferred    Labs:   Most recent labs reviewed.  Please see the lab tab of chart review    Imaging:   Most recent images below have been independently reviewed by me.  Please see the imaging tab of chart review    9/22/2023 : Echocardiogram showed LVEF ~ 65% with no reported abnormalities    7/27/23: PET   1.  Focal uptake in the medial RIGHT lower lobe of lung abutting the thoracic spine, consistent with tumor..  2.  No PET correlate for ill-defined spiculated nodule in the RIGHT lower lobe.  3.  Large hypermetabolic mass corresponds to lytic lesion in the RIGHT sacrum consistent with malignancy.  4.  Intramuscular focal uptake at the posterior LEFT shoulder, likely metastatic.    Pathology:    7/14/2023: Pathology of Chest wall  A. Chest wall mass:          Metastatic poorly differentiated malignancy favoring carcinoma           without obvious involvement of overlying skin and a           non-specific IHC profile of some keratins positive (see           microscopic description).          See comment.   Main Cancer Type: Sarcoma   Probability: 90%     Subtype: Undifferentiated Sarcoma ([facility]H)   Probability: 90%      7/19/2023: Path of the Sacral Bone  A. Sacral bone biopsy:          Bone cores effaced by a high-grade malignancy histologically           identical to the previously excised chest wall mass           ([id number]).     2/14/2023: Pathology of Lung Mass  A. Lung, right lower lobe fine needle aspiration slides:          Positive for carcinoma.   B. Core, right lower lobe lung:          Moderately differentiated lung adenocarcinoma.   C. Lung, right lower lobe biopsy core and touchprep slides:          Moderately differentiated lung adenocarcinoma.   D. Bronchoalveolar lavage right lower lobe:          Rare atypical cells, malignant cells vs. reactive bronchial           cells.     Assessment/Plan:      Cancer Staging   Primary undifferentiated sarcoma of soft tissue (HCC)  Staging form: Soft Tissue Sarcoma of the Trunk and Extremities, AJCC 8th Edition  - Clinical: Stage IV (cTX, cN0, cM1) - Signed by Alma Newton M.D. on 9/19/2023         Ms. 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    Today I again had a long discussion with the patient and her mother Elida.  Palliative care was also in the room and social work.  We discussed at length my concerns regarding her progression of disease and the likelihood that Capmatinib is not working given her rapid decline.    We had a long discussion regarding how she wants to move forward.  We discussed stopping the medication and pursuing comfort care with hospice.  Even speaking with her she is clearly exhausted from labored breathing despite being 70 L at 100%.    Understandably her mother is reluctant to moved to hospice.  The patient seems to be coming to terms with the inevitable but clinically she has significant progression.  We did  have a elise conversation informed her that we cannot stop was happening but we know how it happens.    I recommend stopping capmatinib and pursuing hospice for comfort care.  Family will have further discussions with palliative care      Alma Newton M.D.  Hematology/Oncology    50 minutes spent on this case

## 2023-10-24 NOTE — CONSULTS
Palliative Care   Acknowledgement of consult-refer to note published today.     Thank you for allowing Palliative Care in support of this patient and family. Please contact  with any changes in status, questions, or concerns.     Kay Christensen, MSN, APRN, ACNPC-AG.  Palliative Care Nurse Practitioner  [phone]

## 2023-10-24 NOTE — PROGRESS NOTES
"Inpatient Palliative Care     Location: [place] [address]     HPI:   Melly is seen lying in bed with HFNC in place, she also has NRB in her hands but is not wearing it. She is visibly tachypneic, using accessory muscles. Saturations are high 80's.  Her mother, Elida, is resting on a cot nearby.   .     Summary:  Co visit with Dr. Newton and JEAN PIERRE Pinto. Dr. Newton recommending hospice/comfort focused care and discontinuing oral chemo. Elida is very resistant to this. Privately spoke to Elida to provide comfort and support. She states \"I can't lose my baby...she is the strongest one\". Melly is visibly tired with brief periods of confusion. She does verbalize understanding of discussion. I introduced potential GIP eligibility after comfort focused treatment decision is made. Agreed to return later this afternoon to further discuss.    1540: re presented to patient's room. She would like to postpone meeting until tomorrow, \"I don't want to cry now\".      Active listening, reflection, reminiscing, validation & normalization, and empathic support utilized throughout this encounter.  All questions answered and contact information provided, encouraged to call with any questions or concerns.      Plan:     1) attempt ongoing GOC/comfort measures/hospice care discussions tomorrow.   2) PC to continue to follow.   3)        Thank you for allowing me the opportunity to participate in the care of  Melly.     I spent a total of 40 minutes reviewing medical records, direct face-to-face time with the patient and/or family, coordination of care, and documentation. This is separate from the time spent on advance care planning, which is documented above.     Kay Christensen, MSN, APRN, ACNPC-AG.  Palliative Care Nurse Practitioner  [phone]    "

## 2023-10-25 NOTE — CARE PLAN
The patient is Unstable - High likelihood or risk of patient condition declining or worsening    Shift Goals  Clinical Goals: monitor O2  Patient Goals: rest, pain and anxiety control  Family Goals: NA    Progress made toward(s) clinical / shift goals:  Patient is AxO x4 and understands plan of care, all questions answered at this time.  Bed is locked and in lowest position.     Patient is not progressing towards the following goals:  Pt unable to maintain oxygen saturations above 90%, pt will get anxious and pull off high flow delivery of oxygen along with mask.     Problem: Respiratory  Goal: Patient will achieve/maintain optimum respiratory ventilation and gas exchange  Outcome: Not Progressing     Problem: Psychosocial  Goal: Patient's level of anxiety will decrease  Outcome: Not Progressing

## 2023-10-25 NOTE — PROGRESS NOTES
Monitor summary:   Sinus rhythm-sinus tachycardia   Rare-occasional PVC's   0.14/0.08/0.30

## 2023-10-25 NOTE — DISCHARGE PLANNING
Case Management Discharge Planning    Admission Date: 2023  GMLOS: 4.9  ALOS: 35    6-Clicks ADL Score: 20  6-Clicks Mobility Score: 19    CM met with patient's sister, mother, and family friend. Information on [place]  homes provided so that family can make arrangements for the patient's transport for burial.

## 2023-10-25 NOTE — PROGRESS NOTES
NOC HOSPITALIST CROSS COVER    Notified by RN regarding patient's having increased tachycardia and changes on monitor.  Order placed for EKG.  Before EKG could be performed patient was at bedside with primary RN and charge RN.  Per nursing staff patient was agitated and then laid down and was found to be pulseless.  She was pronounced  via two RN pronouncement.  Family was at bedside as well.          -----------------------------------------------------------------------------------------------------------    Electronically signed by:  SANJU Simpson PA-C  Hospitalist Services

## 2023-10-25 NOTE — PROGRESS NOTES
Hospital Medicine Daily Progress Note    Date of Service  10/24/2023    Chief Complaint  Melly Hendrickson is a 48 y.o. female admitted 9/20/2023 with respiratory failure    Hospital Course  Admitted with acute on chronic respiratory failure, known history of metastatic sarcoma, lung cancer, she initially required admission to the ICU on high flow nasal later transferred to the [facility].  She was empirically covered with IV Rocephin and azithromycin for possible pneumonia.  Oncology was consulted on the case, she was started on capmatinib.  She also has known history of pulmonary embolism, but her anticoagulation with Eliquis had to be held due to thrombocytopenia.  She was later transferred to oncology.  She continues to require high flow nasal, and oncology has noted poor response to the capmatinib.    Interval Problem Update  Resp failure - O2 HFN 60 lpm  Sarcoma - Oncology recommendations noted  Leukocytosis - 96229, tmax 98.4    Updates given and plan of care discussed with patient's mother who was at bedside.    I have discussed this patient's plan of care and discharge plan at IDT rounds today with Case Management, Nursing, Nursing leadership, and other members of the IDT team.    Consultants/Specialty  critical care, oncology, and palliative care    Code Status  DNAR/DNI    Disposition  The patient is not medically cleared for discharge to home or a post-acute facility.  Anticipate discharge to: a long-term acute care hospital    I have placed the appropriate orders for post-discharge needs.    Review of Systems  Review of Systems   Constitutional:  Positive for malaise/fatigue. Negative for chills, diaphoresis and fever.   HENT:  Negative for congestion, hearing loss and sore throat.    Eyes:  Negative for blurred vision.   Respiratory:  Positive for cough and shortness of breath. Negative for wheezing.    Cardiovascular:  Negative for chest pain, palpitations and leg swelling.   Gastrointestinal:   Positive for nausea. Negative for abdominal pain, diarrhea, heartburn and vomiting.   Genitourinary:  Negative for dysuria, flank pain and hematuria.   Musculoskeletal:  Positive for myalgias. Negative for back pain, joint pain and neck pain.   Skin:  Negative for rash.   Neurological:  Positive for dizziness and weakness. Negative for sensory change, speech change, focal weakness and headaches.   Psychiatric/Behavioral:  The patient is nervous/anxious.         Physical Exam  Temp:  [35.8 °C (96.5 °F)-36.9 °C (98.4 °F)] 36.9 °C (98.4 °F)  Pulse:  [] 121  Resp:  [21-28] 27  BP: (101-124)/(61-78) 124/78  SpO2:  [88 %-97 %] 92 %    Physical Exam  Vitals and nursing note reviewed.   Constitutional:       Appearance: She is ill-appearing.   HENT:      Head: Normocephalic and atraumatic.      Nose: No congestion.      Mouth/Throat:      Mouth: Mucous membranes are moist.   Eyes:      Extraocular Movements: Extraocular movements intact.      Conjunctiva/sclera: Conjunctivae normal.   Cardiovascular:      Rate and Rhythm: Regular rhythm. Tachycardia present.   Pulmonary:      Effort: Tachypnea and accessory muscle usage present.      Breath sounds: Wheezing and rales present.   Abdominal:      General: There is no distension.      Tenderness: There is no abdominal tenderness. There is no guarding or rebound.   Musculoskeletal:      Cervical back: No tenderness.      Right lower leg: No edema.      Left lower leg: No edema.   Skin:     General: Skin is warm and dry.   Neurological:      General: No focal deficit present.      Mental Status: She is alert and oriented to person, place, and time.      Cranial Nerves: No cranial nerve deficit.         Fluids    Intake/Output Summary (Last 24 hours) at 10/24/2023 1713  Last data filed at 10/24/2023 0507  Gross per 24 hour   Intake --   Output 700 ml   Net -700 ml       Laboratory  Recent Labs     10/22/23  1736 10/23/23  0019 10/24/23  0014   WBC 12.1* 11.9* 12.4*   RBC  3.60* 3.13* 3.26*   HEMOGLOBIN 9.4* 8.4* 8.5*   HEMATOCRIT 32.0* 28.3* 29.5*   MCV 88.9 90.4 90.5   MCH 26.1* 26.8* 26.1*   MCHC 29.4* 29.7* 28.8*   RDW 60.9* 62.0* 61.9*   PLATELETCT 30* 29* 33*     Recent Labs     10/22/23  0011 10/23/23  0019 10/24/23  0014   SODIUM 134* 136 138   POTASSIUM 3.9 4.3 4.8   CHLORIDE 98 100 105   CO2 30 30 26   GLUCOSE 193* 148* 172*   BUN 15 14 16   CREATININE 0.47* 0.58 0.46*   CALCIUM 8.1* 8.0* 8.1*                   Imaging  DX-CHEST-PORTABLE (1 VIEW)   Final Result      1.  No interval change in the bilateral parenchymal opacities most consistent with multifocal pneumonia.      CT-HEAD W/O   Final Result      1. No acute intracranial abnormality.   2. Round soft tissue mass in the left temporalis muscle measuring 1.5 x 2 x 1.8 cm in diameter. This lesion was not present on the prior PET/CT performed on 7/27/2023. Finding is most compatible with a soft tissue metastasis.      I, Dr. John Rosales, discussed the results of this examination directly by phone with Dr. Cordova on 10/20/2023 at 1531 hours.         DX-CHEST-LIMITED (1 VIEW)   Final Result      Unchanged BILATERAL lung disease, suspect pneumonia      EC-ECHOCARDIOGRAM COMPLETE W/O CONT   Final Result      DX-CHEST-PORTABLE (1 VIEW)   Final Result         1.  Pulmonary edema and/or infiltrates are identified, which are stable since the prior exam.   2.  Trace bilateral pleural effusions      IR-US GUIDED PIV   Final Result    Ultrasound-guided PERIPHERAL IV INSERTION performed by    qualified nursing staff as above.      IR-US GUIDED PIV   Final Result    Ultrasound-guided PERIPHERAL IV INSERTION performed by    qualified nursing staff as above.      IR-US GUIDED PIV   Final Result    Ultrasound-guided PERIPHERAL IV INSERTION performed by    qualified nursing staff as above.      DX-CHEST-LIMITED (1 VIEW)   Final Result      Stable bilateral pulmonary infiltrates.      DX-CHEST-LIMITED (1 VIEW)   Final Result         1.   Extensive bilateral pulmonary parenchymal opacities compatible with ARDS, severe pulmonary edema, and/or superimposed pulmonary infiltrates, slightly decreased since prior study.      DX-CHEST-PORTABLE (1 VIEW)   Final Result         1.  Extensive bilateral pulmonary parenchymal opacities compatible with ARDS, severe pulmonary edema, and/or superimposed pulmonary infiltrates, stable since prior study.      IR-MIDLINE CATHETER INSERTION WO GUIDANCE > AGE 3   Final Result                  Ultrasound-guided midline placement performed by qualified nursing staff    as above.          DX-CHEST-PORTABLE (1 VIEW)   Final Result         1.  Extensive bilateral pulmonary parenchymal opacities compatible with ARDS, severe pulmonary edema, and/or superimposed pulmonary infiltrates, stable since prior study.      DX-CHEST-PORTABLE (1 VIEW)   Final Result         1.  Pulmonary edema and/or infiltrates are identified, which are stable since the prior exam.      DX-CHEST-PORTABLE (1 VIEW)   Final Result         1.  Bilateral pulmonary infiltrates, stable since prior study   2.  Multiple bilateral pulmonary nodules.      DX-CHEST-PORTABLE (1 VIEW)   Final Result      1.  Innumerable pulmonary nodules bilaterally.      2.  Increasing confluence and airspace opacities in the perihilar regions suspicious for superimposed pneumonitis on diffuse metastatic disease      IR-PICC LINE PLACEMENT W/ GUIDANCE > AGE 5   Final Result                  Ultrasound-guided PICC placement performed by qualified nursing staff as    above.          DX-CHEST-PORTABLE (1 VIEW)   Final Result      1.  Innumerable bilateral pulmonary nodule, increased since 9/20/2023      2.  Differential diagnosis includes worsening metastases or superimposed pneumonia.      EC-ECHOCARDIOGRAM COMPLETE W/O CONT   Final Result      CT-CTA CHEST PULMONARY ARTERY W/ RECONS   Final Result      1.  Minimal pulmonary embolism. Localized right lower lobe segmental and  subsegmental branches.   2.  No right heart strain.   3.  9 mm lucent/lytic lesion T1 vertebral body. Suspect osseous metastatic deposit.   4.  Progression of extensive mediastinal adenopathy since 8/20/2023 consistent with metastatic disease.   5.  Extensive progression of diffuse bilateral innumerable pulmonary nodules consistent with metastatic disease.   6.  Interval development of extensive groundglass and airspace opacities consistent with superimposed pneumonia.   7.  Progression of right adrenal mass now measuring 43 mm x 38 mm consistent with metastasis.      Fleischner Society pulmonary nodule recommendations:      DX-CHEST-PORTABLE (1 VIEW)   Final Result      Diffuse bilateral interstitial and alveolar opacities most consistent with pneumonia or possibly noncardiogenic pulmonary edema.           Assessment/Plan  * Acute on chronic respiratory failure with hypoxia (HCC)- (present on admission)  Assessment & Plan  RT protocol      Metastatic sarcoma (HCC)- (present on admission)  Assessment & Plan  oral capmatinib  Oncology recommendations noted  Consult Palliative care           Pulmonary embolism on right (HCC)- (present on admission)  Assessment & Plan  Holding Eliquis due to thrombocytopenia    Lung cancer (HCC)- (present on admission)  Assessment & Plan  Oncology following    Chronic pain due to neoplasm- (present on admission)  Assessment & Plan  Pain control    Elevated LFTs- (present on admission)  Assessment & Plan  Follow cmp    Pulmonary HTN (HCC)- (present on admission)  Assessment & Plan  Monitor volume status    Leukocytosis- (present on admission)  Assessment & Plan  Follow cbc  IV Cefepime for possible Pneumonia    Thrombocytopenia (HCC)- (present on admission)  Assessment & Plan  Follow cbc    Anemia, chronic disease- (present on admission)  Assessment & Plan  Follow cbc    Type 2 diabetes mellitus with hyperglycemia, without long-term current use of insulin (HCC)- (present on  admission)  Assessment & Plan  hgba1c 6.7  monitor    Cigarette nicotine dependence without complication- (present on admission)  Assessment & Plan  Refused Nicotine replacement protocol         VTE prophylaxis:   SCDs/TEDs      I have performed a physical exam and reviewed and updated ROS and Plan today (10/24/2023). In review of yesterday's note (10/23/2023), there are no changes except as documented above.

## 2023-10-25 NOTE — DISCHARGE SUMMARY
Death Summary    Cause of Death  Acute on chronic respiratory failure due to metastatic sarcoma     Comorbid Conditions at the Time of Death  Principal Problem:    Acute on chronic respiratory failure with hypoxia (HCC) (POA: Yes)  Active Problems:    Chronic pain due to neoplasm (POA: Yes)    Lung cancer (HCC) (POA: Yes)    Pulmonary embolism on right (HCC) (POA: Yes)    Metastatic sarcoma (HCC) (POA: Yes)    Type 2 diabetes mellitus with hyperglycemia, without long-term current use of insulin (HCC) (POA: Yes)    Anemia, chronic disease (POA: Yes)    Thrombocytopenia (HCC) (POA: Yes)    Leukocytosis (POA: Yes)    Pulmonary HTN (HCC) (POA: Yes)    Elevated LFTs (POA: Yes)    Respiratory failure with hypoxia (HCC) (POA: Yes)    Cigarette nicotine dependence without complication (POA: Yes)  Resolved Problems:    Hyperlipidemia (POA: Yes)      History of Presenting Illness and Hospital Course  This is a 48 y.o. female admitted 9/20/2023 with acute on chronic respiratory failure, known history of metastatic sarcoma, lung cancer, she initially required admission to the ICU on high flow nasal later transferred to the [facility].  She was empirically covered with IV Rocephin and azithromycin for possible pneumonia.  Oncology was consulted on the case, she was started on capmatinib.  She also has known history of pulmonary embolism, but her anticoagulation with Eliquis had to be held due to thrombocytopenia. She was later transferred to Oncology.  She continued to require high flow nasal, and Oncology has noted poor response to the Capmatinib. Oncology had a lengthy discussion with her regards to the poor response to medication and treatment with very poor prognosis and they recommended comfort care and hospice.  Patient's CODE STATUS was DNR/DNI, palliative care was consulted on the case.  There were prior discussions with pulmonary medicine and critical care to the patient was a poor candidate for BiPAP as she was unable  tolerate this previously. Palliative care attempted to see her but she wanted to defer discussion until the next day.  Patient apparently had increasing respiratory distress throughout the night and her O2 sats continued to trend down and she went into cardiorespiratory arrest.    Death Date: 10/25/23   Death Time: 0615         Pronounced By (RN1): Alicia TAVAREZ  Pronounced By (RN2): Nehal TAVAREZ

## 2023-10-25 NOTE — CARE PLAN
Problem: Humidified High Flow Nasal Cannula  Goal: Maintain adequate oxygenation dependent on patient condition  Description: Target End Date:  resolve prior to discharge or when underlying condition is resolved/stabilized    1.  Implement humidified high flow oxygen therapy  2.  Titrate high flow oxygen to maintain appropriate SpO2  Outcome: Not Progressing  Flowsheets  Taken 10/25/2023 0149 by Garth Huang, RRT  O2 (LPM): (75 total with NRB on top of HHFNC) 60  FiO2%: 100 %

## 2023-10-27 LAB
BACTERIA BLD CULT: NORMAL
BACTERIA BLD CULT: NORMAL
SIGNIFICANT IND 70042: NORMAL
SIGNIFICANT IND 70042: NORMAL
SITE SITE: NORMAL
SITE SITE: NORMAL
SOURCE SOURCE: NORMAL
SOURCE SOURCE: NORMAL

## 2024-07-31 NOTE — PROGRESS NOTES
Hospital Medicine Daily Progress Note    Date of Service  10/21/2023    Chief Complaint  48-year-old female with history of sarcoma and lung cancer presented 9/20 with dyspnea.      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  On admission patient had tachycardia tachypnea, chest x-ray showed bilateral infiltration, CTA for chest showed right lower lobe subsegmental PE, worsening pulmonary nodules with T1 vertebral body lesion, possible pneumonia.  Procalcitonin was 0.11.Eliquis was started.  Also antibiotics for pneumonia Ceftriaxone and azithromycin were empirically started.  Echo was done and showed normal ejection fraction 65%, initially patient was treated ICU secondary to increasing oxygen requirement, patient was on high flow oxygen, treated with a steroid and inhalers.  With some improvement and patient was transferred out of the ICU.    Patient was evaluated by Dr. Newton oncologist who recommended to start  capmatinib on 9/29/2023.    Patient has metastatic sarcoma with lung cancer and significant anxiety, palliative care following for pain management.  Patient needed Precedex drip for anxiety.  Patient is on every 4 hours prn Ativan, every 2 hours prn IV Dilaudid, Norco 10/325 every 3 hours prn. Tylenol every 6 hours scheduled, holding when sleeping. Dexamethasone 4 mg BID.  CODE STATUS was discussed with the patient and agreed for DNR/DNI.    Interval Problem Update  10/16 Axox3 second rapid response this am pt was placed on max high flow after desaturation that happened when she got up to bedside commode, I reviewed her acute cxr which showed stable bilateral effusions and I also obtained and interpreted an ekg which showed no acute changes. She  and her friend were yelling at each other and this was worsening her desaturations so I asked her friend to leave the room, with iv lasix and diuresis and an increased dose of ativan she improved and oxygen titrated down from 70% back to 40% high flow. Echo pending. She denies new pain.  She confirmed she remains dnr/ dni and does not want hospice. ROS otherwise negative.     Addendum: atypical L shoulder pain, constant - will check ekg and trop, trial of lidocaine, tele, following  Ekg interpreted by me,early repolarization, unchanged from previous    10/18 Stays on high flow, oxygen needs trending up, requires a 60 L, 92% FiO2  Sinus tachycardia, BP high, restarted metoprolol  Tachypnea, continue oxygen support  EF 60,     10/19 still on high flow, 40- 60 L   Patient has been placed on Decadron IV in ICU, likely for COPD exacerbation,   I discussed with the pharmacy, will taper down in 7 to 10 days.  Leukocytosis-on steroid    10/20 plt 58>27, Eliquis on hold  Episodes of confusion today, ABG ordered, normal pH, PCO2 48, better than previous ABG. CXR no scute pathology  Reported severe headache, I ordered a CT. CT showed Round soft tissue mass in the left temporalis muscle measuring 1.5 x 2 x 1.8 cm in diameter, which is a new.  Likely tumor metastasis.   I discussed it with radiology and oncology.     Stays on HF 60L    Addendem 6pm: The nurse reported increased breathing efforts RR 24 despite maximized HF 60L 100% Fio2. More confused. ABG no big changes, slightly better than the previous ones. I discussed with intensivist regarding escalating care to IMCU for possible BiPAP. However, Dr. Cardenas doesn't think the escalating care will help given the underlying unreversible causes.   I ordered CBC, CMP, procal to rule out acute etiologies. I reviewed the results, no concerning findings cause her worsening breathing issues or AMS. Procalcitoin 0.08, unlikely acute infection.     I ordered CTA chest to  check the progression of lung tumor and PE. May discuss with the family and the patient again about the goal of care if there are significant progression.     10/21 respiratory status slightly better, 50 L, FiO2 80%.  No confusion this morning.  Denies chest pain.   Steroids was initiated in ICU, likely for COPD exacerbation.  It has been no improvement on steroids.  I am tapering it down    CTA chest pending    High complexity, critically sick, high chance of rapid decline.       I have discussed this patient's plan of care and discharge plan at IDT rounds today with Case Management, Nursing, Nursing leadership, and other members of the IDT team.    Consultants/Specialty  oncology critical care, palliative care    Code Status  DNAR/DNI    Disposition  The patient is not medically cleared for discharge to home or a post-acute facility.      I have placed the appropriate orders for post-discharge needs.    Review of Systems  Review of Systems   Constitutional:  Positive for malaise/fatigue. Negative for chills, diaphoresis, fever and weight loss.   HENT: Negative.  Negative for sore throat.    Eyes: Negative.  Negative for blurred vision.   Respiratory:  Positive for shortness of breath. Negative for cough.    Cardiovascular: Negative.  Negative for chest pain, palpitations and leg swelling.   Gastrointestinal: Negative.  Negative for abdominal pain, heartburn, nausea and vomiting.   Genitourinary: Negative.  Negative for dysuria and frequency.   Musculoskeletal:  Negative for myalgias and neck pain.   Skin: Negative.  Negative for itching and rash.   Neurological: Negative.  Negative for dizziness, focal weakness, weakness and headaches.   Endo/Heme/Allergies: Negative.  Negative for polydipsia. Does not bruise/bleed easily.   Psychiatric/Behavioral: Negative.  Negative for depression, substance abuse and suicidal ideas. The patient is not nervous/anxious.    All other systems reviewed and are negative.        Physical Exam  Temp:  [36.1 °C (96.9 °F)-37.3 °C (99.1 °F)] 36.7 °C (98 °F)  Pulse:  [] 128  Resp:  [17-24] 18  BP: ()/(49-66) 104/64  SpO2:  [89 %-100 %] 89 %    Physical Exam  Vitals and nursing note reviewed. Exam conducted with a chaperone present.   Constitutional:       General: She is not in acute distress.     Appearance: Normal appearance. She is well-developed. She is ill-appearing. She is not diaphoretic.   HENT:      Head: Normocephalic and atraumatic.      Nose: Nose normal.      Comments: High flow nasal cannula     Mouth/Throat:      Mouth: Mucous membranes are moist.   Eyes:      Conjunctiva/sclera: Conjunctivae normal.      Pupils: Pupils are equal, round, and reactive to light.   Neck:      Thyroid: No thyromegaly.      Vascular: No JVD.   Cardiovascular:      Rate and Rhythm: Normal rate and regular rhythm.      Pulses: Normal pulses.      Heart sounds: Normal heart sounds.      No friction rub. No gallop.   Pulmonary:      Effort: Pulmonary effort is normal. No respiratory distress.      Breath sounds: No wheezing, rhonchi or rales.      Comments: Decreased at bases  No use of accessory muscle use  Abdominal:      General: Abdomen is flat. Bowel sounds are normal. There is no distension.      Palpations: Abdomen is soft. There is no mass.      Tenderness: There is no abdominal tenderness. There is no guarding or rebound.   Musculoskeletal:         General: No swelling, tenderness or deformity. Normal range of motion.      Cervical back: Normal range of motion and neck supple.      Right lower leg: No edema.      Left lower leg: No edema.   Lymphadenopathy:      Cervical: No cervical adenopathy.   Skin:     General: Skin is warm and dry.      Capillary Refill: Capillary refill takes less than 2 seconds.   Neurological:      General: No focal deficit present.      Mental Status: She is alert and oriented to person, place, and time. Mental status is at baseline.      Cranial Nerves:  No cranial nerve deficit.      Motor: No weakness.   Psychiatric:         Mood and Affect: Mood normal.         Behavior: Behavior normal.         Fluids    Intake/Output Summary (Last 24 hours) at 10/21/2023 1405  Last data filed at 10/21/2023 1000  Gross per 24 hour   Intake 240 ml   Output --   Net 240 ml               Laboratory  Recent Labs     10/19/23  0020 10/20/23  1604 10/20/23  1915   WBC 14.3* 11.8* 11.0*   RBC 3.97* 3.70* 3.54*   HEMOGLOBIN 10.4* 9.9* 9.5*   HEMATOCRIT 36.8* 33.3* 31.5*   MCV 92.7 90.0 89.0   MCH 26.2* 26.8* 26.8*   MCHC 28.3* 29.7* 30.2*   RDW 64.7* 61.8* 60.8*   PLATELETCT 58* 27* 22*   MPV 10.6  --   --      Recent Labs     10/20/23  0020 10/20/23  1915 10/21/23  0023   SODIUM 139 134* 135   POTASSIUM 4.5 4.3 4.5   CHLORIDE 105 100 99   CO2 27 28 29   GLUCOSE 228* 210* 133*   BUN 23* 16 16   CREATININE 0.52 0.42* 0.43*   CALCIUM 8.6 8.0* 7.9*                     Imaging  CT-HEAD W/O   Final Result      1. No acute intracranial abnormality.   2. Round soft tissue mass in the left temporalis muscle measuring 1.5 x 2 x 1.8 cm in diameter. This lesion was not present on the prior PET/CT performed on 7/27/2023. Finding is most compatible with a soft tissue metastasis.      I, Dr. John Rosales, discussed the results of this examination directly by phone with Dr. Cordova on 10/20/2023 at 1531 hours.         DX-CHEST-LIMITED (1 VIEW)   Final Result      Unchanged BILATERAL lung disease, suspect pneumonia      EC-ECHOCARDIOGRAM COMPLETE W/O CONT   Final Result      DX-CHEST-PORTABLE (1 VIEW)   Final Result         1.  Pulmonary edema and/or infiltrates are identified, which are stable since the prior exam.   2.  Trace bilateral pleural effusions      IR-US GUIDED PIV   Final Result    Ultrasound-guided PERIPHERAL IV INSERTION performed by    qualified nursing staff as above.      IR-US GUIDED PIV   Final Result    Ultrasound-guided PERIPHERAL IV INSERTION performed by    qualified nursing  staff as above.      IR-US GUIDED PIV   Final Result    Ultrasound-guided PERIPHERAL IV INSERTION performed by    qualified nursing staff as above.      DX-CHEST-LIMITED (1 VIEW)   Final Result      Stable bilateral pulmonary infiltrates.      DX-CHEST-LIMITED (1 VIEW)   Final Result         1.  Extensive bilateral pulmonary parenchymal opacities compatible with ARDS, severe pulmonary edema, and/or superimposed pulmonary infiltrates, slightly decreased since prior study.      DX-CHEST-PORTABLE (1 VIEW)   Final Result         1.  Extensive bilateral pulmonary parenchymal opacities compatible with ARDS, severe pulmonary edema, and/or superimposed pulmonary infiltrates, stable since prior study.      IR-MIDLINE CATHETER INSERTION WO GUIDANCE > AGE 3   Final Result                  Ultrasound-guided midline placement performed by qualified nursing staff    as above.          DX-CHEST-PORTABLE (1 VIEW)   Final Result         1.  Extensive bilateral pulmonary parenchymal opacities compatible with ARDS, severe pulmonary edema, and/or superimposed pulmonary infiltrates, stable since prior study.      DX-CHEST-PORTABLE (1 VIEW)   Final Result         1.  Pulmonary edema and/or infiltrates are identified, which are stable since the prior exam.      DX-CHEST-PORTABLE (1 VIEW)   Final Result         1.  Bilateral pulmonary infiltrates, stable since prior study   2.  Multiple bilateral pulmonary nodules.      DX-CHEST-PORTABLE (1 VIEW)   Final Result      1.  Innumerable pulmonary nodules bilaterally.      2.  Increasing confluence and airspace opacities in the perihilar regions suspicious for superimposed pneumonitis on diffuse metastatic disease      IR-PICC LINE PLACEMENT W/ GUIDANCE > AGE 5   Final Result                  Ultrasound-guided PICC placement performed by qualified nursing staff as    above.          DX-CHEST-PORTABLE (1 VIEW)   Final Result      1.  Innumerable bilateral pulmonary nodule, increased since  9/20/2023      2.  Differential diagnosis includes worsening metastases or superimposed pneumonia.      EC-ECHOCARDIOGRAM COMPLETE W/O CONT   Final Result      CT-CTA CHEST PULMONARY ARTERY W/ RECONS   Final Result      1.  Minimal pulmonary embolism. Localized right lower lobe segmental and subsegmental branches.   2.  No right heart strain.   3.  9 mm lucent/lytic lesion T1 vertebral body. Suspect osseous metastatic deposit.   4.  Progression of extensive mediastinal adenopathy since 8/20/2023 consistent with metastatic disease.   5.  Extensive progression of diffuse bilateral innumerable pulmonary nodules consistent with metastatic disease.   6.  Interval development of extensive groundglass and airspace opacities consistent with superimposed pneumonia.   7.  Progression of right adrenal mass now measuring 43 mm x 38 mm consistent with metastasis.      Fleischner Society pulmonary nodule recommendations:      DX-CHEST-PORTABLE (1 VIEW)   Final Result      Diffuse bilateral interstitial and alveolar opacities most consistent with pneumonia or possibly noncardiogenic pulmonary edema.      CT-CTA CHEST PULMONARY ARTERY W/ RECONS    (Results Pending)          Assessment/Plan  * Acute on chronic respiratory failure (HCC)- (present on admission)  Assessment & Plan  Mostly due to tumor progression in her lungs.  Unfortunately this poorly differentiated sarcoma has poor response respond to any therapy at this time per oncology.   Other factors including pulmonary embolism, COPD /asthma,  history of stage 1 lung adenocarcinoma s/p SBRT,recent pneumonia.    Initially treated at the ICU, patient still on high flow oxygen  Finish course of antibiotics  downgraded to IMCU on 10/2/2023.      Completed course of antibiotics.   Continue inhaler and nebulizers.    10/20 worsening respiratory status, requiring HF 60L 100% Fio2. More confused. ABG no big changes, slightly better than the previous ones. I discussed with intensivist  regarding escalating care to IMCU for possible BiPAP. However, Dr. Cardenas doesn't think the escalating care will help given the underlying unreversible causes.   I ordered CBC, CMP, procal to rule out acute etiologies. I reviewed the results, no concerning findings cause her worsening breathing issues or AMS. Procalcitoin 0.08, unlikely acute infection.     I ordered CTA chest to check the progression of lung tumor and PE. May discuss with the family and the patient again about the goal of care if there are significant progression.     10/21 respiratory status slightly better, 50 L, FiO2 80%.  No confusion this morning.  Denies chest pain.   Steroids was initiated in ICU, likely for COPD exacerbation.  It has been no improvement on steroids.  I am tapering it down    CTA chest pending        Atypical chest pain  Assessment & Plan  Non specific but unchanged ekg  trops in 60's, likely due to heart strain from hypoxia and PE  Sx improving  Echo still pending  Continue to follow    Goals of care, counseling/discussion- (present on admission)  Assessment & Plan  Goals of care done last week extensively. Hospice recommended by oncology and ICU team.  Patient would like to try some form of therapy if possible and oncology offered an oral chemotherapeutic regime. She elected to change to DNR/DNI.   Continue with palliative care consultation.     Sarcoma (HCC)- (present on admission)  Assessment & Plan  Advanced, aggressive, on oral capmatinib      Reported severe headache, I ordered a CT. CT showed Round soft tissue mass in the left temporalis muscle measuring 1.5 x 2 x 1.8 cm in diameter, which is a new.  Likely tumor metastasis.     Notified oncology          Pulmonary embolism on right (HCC)- (present on admission)  Assessment & Plan  Recently diagnosed.  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   Stated Capmatinib on 9/29/2023 by oncology team  Patient is DNR/DNI.  Palliative following    Mass of sacrum- (present on admission)  Assessment & Plan  Aggressive metastatic undifferentiated sarcoma. On Capmatinib    Lung cancer (HCC)- (present on admission)  Assessment & Plan  Initially diagnosed stage I adenocarcinoma initially treated with chemoradiation.   Discussed again with Dr. Newton  Now with widespread metastatic sarcoma  Plan to continue capmatinib    Chronic pain due to neoplasm- (present on admission)  Assessment & Plan  Palliative care have been following for pain management.  Continue multimodal pain management, oxy, tylenol, lidocaine patches, gabapentin and IV pain medication for breakthrough.   Pain currently controlled  Patient refusing her oral meds - again encouraged to take them for steadier pain control, she states she is reluctant because she feels it makes her anxiety worse    Cigarette nicotine dependence without complication- (present on admission)  Assessment & Plan  History of ongoing tobacco abuse, cessation discussed and recommended   Nicotine patch declined          VTE prophylaxis: Eliquis    I spent greater than 52 minutes for chart review, obtaining history independently, performing medically appropriate examination,  documenting , ordering medications, tests, or procedures, referring and communicating with other health care professionals, Independently interpreting results and communicating results with patient/family/caregiver. More than 50% of time was spent in face-to-face clinical encounter.     No

## (undated) DEVICE — CANISTER SUCTION 3000ML MECHANICAL FILTER AUTO SHUTOFF MEDI-VAC NONSTERILE LF DISP  (40EA/CA)

## (undated) DEVICE — WATER IRRIGATION STERILE 1000ML (12EA/CA)

## (undated) DEVICE — GOWN WARMING STANDARD FLEX - (30/CA)

## (undated) DEVICE — TUBING CLEARLINK DUO-VENT - C-FLO (48EA/CA)

## (undated) DEVICE — CANISTER SUCTION RIGID RED 1500CC (40EA/CA)

## (undated) DEVICE — TUBE SUCTION YANKAUER  1/4 X 6FT (20EA/CA)"

## (undated) DEVICE — BAG IV VISION ION IF1000 (10EA/BX)

## (undated) DEVICE — SYRINGE STERILE 10 ML LL (200/BX)

## (undated) DEVICE — SPONGE GAUZE NON-STERILE 4X4 - (2000/CA 10PK/CA)

## (undated) DEVICE — SUTURE 3-0 VICRYL PLUS SH - 8X 18 INCH (12/BX)

## (undated) DEVICE — TUBE E-T HI-LO CUFF 8.0MM (10EA/PK)

## (undated) DEVICE — TOWEL STOP TIMEOUT SAFETY FLAG (40EA/CA)

## (undated) DEVICE — STAPLER SKIN DISP - (6/BX 10BX/CA) VISISTAT

## (undated) DEVICE — KIT  I.V. START (100EA/CA)

## (undated) DEVICE — CATHETER IV 20 GA X 1-1/4 ---SURG.& SDS ONLY--- (50EA/BX)

## (undated) DEVICE — SUCTION INSTRUMENT YANKAUER BULBOUS TIP W/O VENT (50EA/CA)

## (undated) DEVICE — CATHETER IV SAFETY 20 GA X 1-1/4 (50/BX)

## (undated) DEVICE — SET EXTENSION WITH 2 PORTS (48EA/CA) ***PART #2C8610 IS A SUBSTITUTE*****

## (undated) DEVICE — TUBE E-T HI-LO CUFF 7.0MM (10EA/PK)

## (undated) DEVICE — LACTATED RINGERS INJ 1000 ML - (14EA/CA 60CA/PF)

## (undated) DEVICE — SENSOR OXIMETER ADULT SPO2 RD SET (20EA/BX)

## (undated) DEVICE — NEEDLE BIOPSY FLEXISION OD21 GA IF1000 (5EA/BX)

## (undated) DEVICE — ROBOTIC SURGERY SERVICES

## (undated) DEVICE — SUTURE 4-0 MONOCRYL PLUS PS-2 - 27 INCH (36/BX)

## (undated) DEVICE — GOWN SURGEONS LARGE - (32/CA)

## (undated) DEVICE — TUBE CONNECTING SUCTION - CLEAR PLASTIC STERILE 72 IN (50EA/CA)

## (undated) DEVICE — SYRINGE SAFETY 10 ML 18 GA X 1 1/2 BLUNT LL (100/BX 4BX/CA)

## (undated) DEVICE — CANNULA O2 COMFORT SOFT EAR ADULT 7 FT TUBING (50/CA)

## (undated) DEVICE — CATHETER GUIDING ION (1/EA)

## (undated) DEVICE — PAD LAP STERILE 18 X 18 - (5/PK 40PK/CA)

## (undated) DEVICE — SYRINGE SAFETY 3 ML 18 GA X 1 1/2 BLUNT LL (100/BX 8BX/CA)

## (undated) DEVICE — MASK WITH FACE SHIELD (25/BX 4BX/CA)

## (undated) DEVICE — GLOVE, LITE (PAIR)

## (undated) DEVICE — CHLORAPREP 26 ML APPLICATOR - ORANGE TINT(25/CA)

## (undated) DEVICE — SUTURE GENERAL

## (undated) DEVICE — SET LEADWIRE 5 LEAD BEDSIDE DISPOSABLE ECG (1SET OF 5/EA)

## (undated) DEVICE — SYRINGE DISP. 60 CC LL - (30/BX, 12BX/CA)**WHEN THESE ARE GONE ORDER #500206**

## (undated) DEVICE — PROBE ENDOSCOPIC PERIPHERAL VISION ION 1.5 IF1000 (1/EA)

## (undated) DEVICE — SPONGE GAUZESTER 4 X 4 4PLY - (128PK/CA)

## (undated) DEVICE — PACK MINOR BASIN - (2EA/CA)

## (undated) DEVICE — SUCTION INSTRUMENT YANKAUER OPEN TIP W/O VENT (50EA/CA)

## (undated) DEVICE — Device

## (undated) DEVICE — SYRINGE SAFETY 5 ML 18 GA X 1-1/2 BLUNT LL (100/BX 4BX/CA)

## (undated) DEVICE — JELLY SURGILUBE STERILE FOIL 5 GM (150EA/BX)

## (undated) DEVICE — BOVIE BLADE COATED - (50/PK)